# Patient Record
Sex: MALE | Race: OTHER | NOT HISPANIC OR LATINO | URBAN - METROPOLITAN AREA
[De-identification: names, ages, dates, MRNs, and addresses within clinical notes are randomized per-mention and may not be internally consistent; named-entity substitution may affect disease eponyms.]

---

## 2017-09-11 ENCOUNTER — INPATIENT (INPATIENT)
Facility: HOSPITAL | Age: 77
LOS: 2 days | Discharge: HOME | End: 2017-09-14
Attending: INTERNAL MEDICINE | Admitting: INTERNAL MEDICINE
Payer: MEDICARE

## 2017-09-11 VITALS
OXYGEN SATURATION: 100 % | TEMPERATURE: 98 F | SYSTOLIC BLOOD PRESSURE: 115 MMHG | HEART RATE: 54 BPM | DIASTOLIC BLOOD PRESSURE: 80 MMHG | RESPIRATION RATE: 16 BRPM

## 2017-09-11 DIAGNOSIS — K92.1 MELENA: ICD-10-CM

## 2017-09-11 DIAGNOSIS — K92.2 GASTROINTESTINAL HEMORRHAGE, UNSPECIFIED: ICD-10-CM

## 2017-09-11 DIAGNOSIS — N17.9 ACUTE KIDNEY FAILURE, UNSPECIFIED: ICD-10-CM

## 2017-09-11 DIAGNOSIS — E78.5 HYPERLIPIDEMIA, UNSPECIFIED: ICD-10-CM

## 2017-09-11 DIAGNOSIS — I10 ESSENTIAL (PRIMARY) HYPERTENSION: ICD-10-CM

## 2017-09-11 DIAGNOSIS — I48.91 UNSPECIFIED ATRIAL FIBRILLATION: ICD-10-CM

## 2017-09-11 DIAGNOSIS — Z29.9 ENCOUNTER FOR PROPHYLACTIC MEASURES, UNSPECIFIED: ICD-10-CM

## 2017-09-11 DIAGNOSIS — M10.9 GOUT, UNSPECIFIED: ICD-10-CM

## 2017-09-11 DIAGNOSIS — R55 SYNCOPE AND COLLAPSE: ICD-10-CM

## 2017-09-11 LAB
ALBUMIN SERPL ELPH-MCNC: 3.6 G/DL — SIGNIFICANT CHANGE UP (ref 3.3–5)
ALP SERPL-CCNC: 57 U/L — SIGNIFICANT CHANGE UP (ref 40–120)
ALT FLD-CCNC: 13 U/L — SIGNIFICANT CHANGE UP (ref 4–41)
ANISOCYTOSIS BLD QL: SLIGHT — SIGNIFICANT CHANGE UP
APTT BLD: 33.5 SEC — SIGNIFICANT CHANGE UP (ref 27.5–37.4)
AST SERPL-CCNC: 17 U/L — SIGNIFICANT CHANGE UP (ref 4–40)
BASE EXCESS BLDV CALC-SCNC: 1.8 MMOL/L — SIGNIFICANT CHANGE UP
BASOPHILS # BLD AUTO: 0.05 K/UL — SIGNIFICANT CHANGE UP (ref 0–0.2)
BASOPHILS NFR BLD AUTO: 0.5 % — SIGNIFICANT CHANGE UP (ref 0–2)
BASOPHILS NFR SPEC: 0 % — SIGNIFICANT CHANGE UP (ref 0–2)
BILIRUB SERPL-MCNC: 0.2 MG/DL — SIGNIFICANT CHANGE UP (ref 0.2–1.2)
BLD GP AB SCN SERPL QL: NEGATIVE — SIGNIFICANT CHANGE UP
BLOOD GAS VENOUS - CREATININE: 1.17 MG/DL — SIGNIFICANT CHANGE UP (ref 0.5–1.3)
BUN SERPL-MCNC: 57 MG/DL — HIGH (ref 7–23)
CALCIUM SERPL-MCNC: 8.9 MG/DL — SIGNIFICANT CHANGE UP (ref 8.4–10.5)
CHLORIDE BLDV-SCNC: 106 MMOL/L — SIGNIFICANT CHANGE UP (ref 96–108)
CHLORIDE SERPL-SCNC: 104 MMOL/L — SIGNIFICANT CHANGE UP (ref 98–107)
CK MB BLD-MCNC: 1 NG/ML — SIGNIFICANT CHANGE UP (ref 1–6.6)
CK SERPL-CCNC: 15 U/L — LOW (ref 30–200)
CK SERPL-CCNC: 21 U/L — LOW (ref 30–200)
CO2 SERPL-SCNC: 25 MMOL/L — SIGNIFICANT CHANGE UP (ref 22–31)
CREAT SERPL-MCNC: 1.17 MG/DL — SIGNIFICANT CHANGE UP (ref 0.5–1.3)
DACRYOCYTES BLD QL SMEAR: SLIGHT — SIGNIFICANT CHANGE UP
ELLIPTOCYTES BLD QL SMEAR: SLIGHT — SIGNIFICANT CHANGE UP
EOSINOPHIL # BLD AUTO: 0.1 K/UL — SIGNIFICANT CHANGE UP (ref 0–0.5)
EOSINOPHIL NFR BLD AUTO: 0.9 % — SIGNIFICANT CHANGE UP (ref 0–6)
EOSINOPHIL NFR FLD: 0 % — SIGNIFICANT CHANGE UP (ref 0–6)
FERRITIN SERPL-MCNC: 180.3 NG/ML — SIGNIFICANT CHANGE UP (ref 30–400)
GAS PNL BLDV: 139 MMOL/L — SIGNIFICANT CHANGE UP (ref 136–146)
GIANT PLATELETS BLD QL SMEAR: PRESENT — SIGNIFICANT CHANGE UP
GLUCOSE BLDV-MCNC: 156 — HIGH (ref 70–99)
GLUCOSE SERPL-MCNC: 151 MG/DL — HIGH (ref 70–99)
HCO3 BLDV-SCNC: 24 MMOL/L — SIGNIFICANT CHANGE UP (ref 20–27)
HCT VFR BLD CALC: 31.2 % — LOW (ref 39–50)
HCT VFR BLD CALC: 38.6 % — LOW (ref 39–50)
HCT VFR BLDV CALC: 38.2 % — LOW (ref 39–51)
HGB BLD-MCNC: 11.8 G/DL — LOW (ref 13–17)
HGB BLD-MCNC: 9.9 G/DL — LOW (ref 13–17)
HGB BLDV-MCNC: 12.4 G/DL — LOW (ref 13–17)
HYPOCHROMIA BLD QL: SLIGHT — SIGNIFICANT CHANGE UP
IMM GRANULOCYTES # BLD AUTO: 0.04 # — SIGNIFICANT CHANGE UP
IMM GRANULOCYTES NFR BLD AUTO: 0.4 % — SIGNIFICANT CHANGE UP (ref 0–1.5)
INR BLD: 1.5 — HIGH (ref 0.88–1.17)
IRON SATN MFR SERPL: 107 UG/DL — SIGNIFICANT CHANGE UP (ref 45–165)
IRON SATN MFR SERPL: 217 UG/DL — SIGNIFICANT CHANGE UP (ref 155–535)
LACTATE BLDV-MCNC: 2.1 MMOL/L — HIGH (ref 0.5–2)
LDH SERPL L TO P-CCNC: 105 U/L — LOW (ref 135–225)
LYMPHOCYTES # BLD AUTO: 2.79 K/UL — SIGNIFICANT CHANGE UP (ref 1–3.3)
LYMPHOCYTES # BLD AUTO: 25.8 % — SIGNIFICANT CHANGE UP (ref 13–44)
LYMPHOCYTES NFR SPEC AUTO: 25.9 % — SIGNIFICANT CHANGE UP (ref 13–44)
MCHC RBC-ENTMCNC: 22.5 PG — LOW (ref 27–34)
MCHC RBC-ENTMCNC: 23.6 PG — LOW (ref 27–34)
MCHC RBC-ENTMCNC: 30.6 % — LOW (ref 32–36)
MCHC RBC-ENTMCNC: 31.7 % — LOW (ref 32–36)
MCV RBC AUTO: 73.5 FL — LOW (ref 80–100)
MCV RBC AUTO: 74.5 FL — LOW (ref 80–100)
MICROCYTES BLD QL: SLIGHT — SIGNIFICANT CHANGE UP
MONOCYTES # BLD AUTO: 0.67 K/UL — SIGNIFICANT CHANGE UP (ref 0–0.9)
MONOCYTES NFR BLD AUTO: 6.2 % — SIGNIFICANT CHANGE UP (ref 2–14)
MONOCYTES NFR BLD: 6.5 % — SIGNIFICANT CHANGE UP (ref 2–9)
NEUTROPHIL AB SER-ACNC: 64.8 % — SIGNIFICANT CHANGE UP (ref 43–77)
NEUTROPHILS # BLD AUTO: 7.17 K/UL — SIGNIFICANT CHANGE UP (ref 1.8–7.4)
NEUTROPHILS NFR BLD AUTO: 66.2 % — SIGNIFICANT CHANGE UP (ref 43–77)
NEUTS BAND # BLD: 0.9 % — SIGNIFICANT CHANGE UP (ref 0–6)
NRBC # FLD: 0 — SIGNIFICANT CHANGE UP
NRBC # FLD: 0 — SIGNIFICANT CHANGE UP
OB PNL STL: POSITIVE — SIGNIFICANT CHANGE UP
PCO2 BLDV: 51 MMHG — SIGNIFICANT CHANGE UP (ref 41–51)
PH BLDV: 7.34 PH — SIGNIFICANT CHANGE UP (ref 7.32–7.43)
PLATELET # BLD AUTO: 187 K/UL — SIGNIFICANT CHANGE UP (ref 150–400)
PLATELET # BLD AUTO: 218 K/UL — SIGNIFICANT CHANGE UP (ref 150–400)
PLATELET COUNT - ESTIMATE: NORMAL — SIGNIFICANT CHANGE UP
PMV BLD: 11.6 FL — SIGNIFICANT CHANGE UP (ref 7–13)
PMV BLD: 11.9 FL — SIGNIFICANT CHANGE UP (ref 7–13)
PO2 BLDV: < 24 MMHG — LOW (ref 35–40)
POIKILOCYTOSIS BLD QL AUTO: SLIGHT — SIGNIFICANT CHANGE UP
POTASSIUM BLDV-SCNC: 5.3 MMOL/L — HIGH (ref 3.4–4.5)
POTASSIUM SERPL-MCNC: 5.2 MMOL/L — SIGNIFICANT CHANGE UP (ref 3.5–5.3)
POTASSIUM SERPL-SCNC: 5.2 MMOL/L — SIGNIFICANT CHANGE UP (ref 3.5–5.3)
PROT SERPL-MCNC: 7.7 G/DL — SIGNIFICANT CHANGE UP (ref 6–8.3)
PROTHROM AB SERPL-ACNC: 16.9 SEC — HIGH (ref 9.8–13.1)
RBC # BLD: 4.19 M/UL — LOW (ref 4.2–5.8)
RBC # BLD: 5.25 M/UL — SIGNIFICANT CHANGE UP (ref 4.2–5.8)
RBC # FLD: 15 % — HIGH (ref 10.3–14.5)
RBC # FLD: 15.2 % — HIGH (ref 10.3–14.5)
RETICS #: 0.1 10X6/UL — HIGH (ref 0.02–0.07)
RETICS/RBC NFR: 1.4 % — SIGNIFICANT CHANGE UP (ref 0.5–2.5)
REVIEW TO FOLLOW: YES — SIGNIFICANT CHANGE UP
RH IG SCN BLD-IMP: POSITIVE — SIGNIFICANT CHANGE UP
SAO2 % BLDV: 17.2 % — LOW (ref 60–85)
SODIUM SERPL-SCNC: 142 MMOL/L — SIGNIFICANT CHANGE UP (ref 135–145)
TARGETS BLD QL SMEAR: SLIGHT — SIGNIFICANT CHANGE UP
TROPONIN T SERPL-MCNC: < 0.06 NG/ML — SIGNIFICANT CHANGE UP (ref 0–0.06)
TROPONIN T SERPL-MCNC: < 0.06 NG/ML — SIGNIFICANT CHANGE UP (ref 0–0.06)
UIBC SERPL-MCNC: 110 UG/DL — SIGNIFICANT CHANGE UP (ref 110–370)
VARIANT LYMPHS # BLD: 1.9 % — SIGNIFICANT CHANGE UP
WBC # BLD: 10.82 K/UL — HIGH (ref 3.8–10.5)
WBC # BLD: 9.84 K/UL — SIGNIFICANT CHANGE UP (ref 3.8–10.5)
WBC # FLD AUTO: 10.82 K/UL — HIGH (ref 3.8–10.5)
WBC # FLD AUTO: 9.84 K/UL — SIGNIFICANT CHANGE UP (ref 3.8–10.5)

## 2017-09-11 PROCEDURE — 99223 1ST HOSP IP/OBS HIGH 75: CPT

## 2017-09-11 RX ORDER — METOPROLOL TARTRATE 50 MG
5 TABLET ORAL ONCE
Qty: 0 | Refills: 0 | Status: COMPLETED | OUTPATIENT
Start: 2017-09-11 | End: 2017-09-11

## 2017-09-11 RX ORDER — ALLOPURINOL 300 MG
100 TABLET ORAL DAILY
Qty: 0 | Refills: 0 | Status: DISCONTINUED | OUTPATIENT
Start: 2017-09-11 | End: 2017-09-14

## 2017-09-11 RX ORDER — DILTIAZEM HCL 120 MG
120 CAPSULE, EXT RELEASE 24 HR ORAL DAILY
Qty: 0 | Refills: 0 | Status: DISCONTINUED | OUTPATIENT
Start: 2017-09-11 | End: 2017-09-12

## 2017-09-11 RX ORDER — METOPROLOL TARTRATE 50 MG
50 TABLET ORAL
Qty: 0 | Refills: 0 | Status: DISCONTINUED | OUTPATIENT
Start: 2017-09-11 | End: 2017-09-14

## 2017-09-11 RX ORDER — LISINOPRIL 2.5 MG/1
5 TABLET ORAL DAILY
Qty: 0 | Refills: 0 | Status: DISCONTINUED | OUTPATIENT
Start: 2017-09-11 | End: 2017-09-12

## 2017-09-11 RX ORDER — SODIUM CHLORIDE 9 MG/ML
1000 INJECTION INTRAMUSCULAR; INTRAVENOUS; SUBCUTANEOUS
Qty: 0 | Refills: 0 | Status: DISCONTINUED | OUTPATIENT
Start: 2017-09-11 | End: 2017-09-14

## 2017-09-11 RX ORDER — PANTOPRAZOLE SODIUM 20 MG/1
40 TABLET, DELAYED RELEASE ORAL
Qty: 0 | Refills: 0 | Status: DISCONTINUED | OUTPATIENT
Start: 2017-09-11 | End: 2017-09-12

## 2017-09-11 RX ORDER — SODIUM CHLORIDE 9 MG/ML
1000 INJECTION INTRAMUSCULAR; INTRAVENOUS; SUBCUTANEOUS ONCE
Qty: 0 | Refills: 0 | Status: COMPLETED | OUTPATIENT
Start: 2017-09-11 | End: 2017-09-11

## 2017-09-11 RX ADMIN — Medication 50 MILLIGRAM(S): at 18:05

## 2017-09-11 RX ADMIN — PANTOPRAZOLE SODIUM 40 MILLIGRAM(S): 20 TABLET, DELAYED RELEASE ORAL at 17:46

## 2017-09-11 RX ADMIN — SODIUM CHLORIDE 1000 MILLILITER(S): 9 INJECTION INTRAMUSCULAR; INTRAVENOUS; SUBCUTANEOUS at 10:57

## 2017-09-11 RX ADMIN — SODIUM CHLORIDE 75 MILLILITER(S): 9 INJECTION INTRAMUSCULAR; INTRAVENOUS; SUBCUTANEOUS at 17:03

## 2017-09-11 RX ADMIN — Medication 5 MILLIGRAM(S): at 17:03

## 2017-09-11 NOTE — H&P ADULT - PROBLEM SELECTOR PLAN 1
Admit to telemetry, serial EKGs, serial cardiac enzymes, orthostatics  Check CBC, CMP, TSH, FLP, HgA1C, UA  Fall precautions, ambulate with assistance  Likely multifactorial in setting of rapid atrial fibrillation, GI bleed, CJ  Continue NS at 75 cc/hr for hydration  Echocardiogram ordered  PT eval ordered for disposition  F/U MD note

## 2017-09-11 NOTE — ED ADULT TRIAGE NOTE - CHIEF COMPLAINT QUOTE
Pt with black, tarry stools since yesterday. Pt with 2 near syncopal episodes today. Pt on Xarelto for Afib. Currently in AFib.

## 2017-09-11 NOTE — H&P ADULT - NEGATIVE NEUROLOGICAL SYMPTOMS
no focal seizures/no tremors/no paresthesias/no generalized seizures/no facial palsy/no difficulty walking/no headache/no hemiparesis/no loss of sensation/no transient paralysis/no confusion

## 2017-09-11 NOTE — ED PROVIDER NOTE - ATTENDING CONTRIBUTION TO CARE
ED Attending Dr. Vicente: 78 yo male with afib on Xarelto (recently switched off of warfarin) in ED with generalized weakness and SOB, associated with multiple episodes of dark stool.  Wife states pt had syncopal episode this morning.  Pt denies CP, N/V or abdominal pain.  On exam pt overall well appearing and in NAD, heart irregular/tachy, lungs CTAB, abd NTND, extremities without swelling, strength 5/5 in all extremities and skin without rash.  I chaperoned rectal exam showing very dark brown liquid stool in vault.

## 2017-09-11 NOTE — H&P ADULT - NEGATIVE OPHTHALMOLOGIC SYMPTOMS
no photophobia/no pain R/no diplopia/no blurred vision R/no pain L/no blurred vision L/no loss of vision L/no loss of vision R

## 2017-09-11 NOTE — H&P ADULT - NSHPLABSRESULTS_GEN_ALL_CORE
EKG: Atrial fibrillation at 125 bpm with RVR  CE x1: Negative  WBC: 10.82  H/H: 11.8/38.6  Occult: Positive  BUN/Cr: 57/1.17  Glucose: 151

## 2017-09-11 NOTE — ED ADULT NURSE REASSESSMENT NOTE - NS ED NURSE REASSESS COMMENT FT1
Pt remains A&Ox3, denies dizziness, lightheadedness, weakness, palpitations, n/v/d at this time. Pt states he has had one episode of melana since being in the ED. Awaiting disposition. Pt appears comfortably in stretcher, respirations even and unlabored, nad noted at this time. Safety and comfort maintained.

## 2017-09-11 NOTE — H&P ADULT - ATTENDING COMMENTS
76 yo M w/ hx afib on xarelto ( last dose yesterday) p/w weakness. Pt was eating breakfast this AM when he wasn't feeling good subsequently had a syncopal episode. He states he did not hit the floor was lowered by his son. +dark stools. EKG- afib with RVR in 120 given metorpolol 5 mg IV BUN/Cr 57/1.17 H/H 11.8/38.6-->9.9/31.2 MCV-73. s/p 1l NS bolus. Denies ASA use/NSAID use/steroid no prior EGD/wt loss/hematemesis/BRBPR.  Acute blood loss anemia-likely UGIB consistent with elevated BUN and microcytic anemia                                       -CBC q6 transfuse as need keep H/H above 8/24                                      -PPI BID                                      -GI consult- shiekh                                      -check Fe studies                                        Afib- cont IVF       -hold Xarelto         -rate control with BB/cardizem          -check TTE

## 2017-09-11 NOTE — H&P ADULT - RS GEN PE MLT RESP DETAILS PC
no chest wall tenderness/clear to auscultation bilaterally/good air movement/respirations non-labored/airway patent/no wheezes/breath sounds equal/no rhonchi/no intercostal retractions/no rales

## 2017-09-11 NOTE — H&P ADULT - NSHPSOCIALHISTORY_GEN_ALL_CORE
. Lives with wife. Retired. Denies smoking/drinking. Received flu vaccine in 2016. Received PNA vaccine within last 5 years.

## 2017-09-11 NOTE — H&P ADULT - PROBLEM SELECTOR PLAN 4
BUN/Cr 57/1.17 with signs of dehydration (poor skin turgor, delayed capillary refill, dry mucosa)  Start NS at 75 cc/hr  Monitor renal function and lytes

## 2017-09-11 NOTE — H&P ADULT - HISTORY OF PRESENT ILLNESS
76 y/o [facility] male with a PMHx of atrial fibrillation (recently discontinued Coumadin on 8/29 and started Xarelto on 9/1), HTN and HLD presents to ED S/P syncopal episode this morning. Pt reports that he has been on Coumadin for years but his cardiologist just changed him to Xarelto on 9/1 because it was "safer." For the past three days, pt has been experiencing generalized weakness and fatigue, in addition to episodes of dizziness and dyspnea made worse with movement and exertion. Pt cannot identify any palliating factors, but notices that his symptoms are made worse with movement. Pt has also been having episodes of melena since Friday. Pt has had multiple episodes since 78 y/o [facility] male with a PMHx of atrial fibrillation (recently discontinued Coumadin on 8/29 and started Xarelto on 9/1), HTN and HLD presents to ED S/P syncopal episode this morning. Pt reports that he has been on Coumadin for years but his cardiologist just changed him to Xarelto on 9/1 because it was "safer." For the past three days, pt has been experiencing generalized weakness and fatigue, in addition to episodes of dizziness and dyspnea made worse with movement and exertion. Pt cannot identify any palliating factors, but notices that his symptoms are made worse with movement. Pt has also been having multiple episodes of melena since Friday. Pt reports that his stool is formed and has not seen any blood; it just appears darker than normal. This morning after patient woke up and prepared for breakfast, he elicited to his son and wife that he was not feeling well. Shortly after, pt had a syncopal episode and fell to the ground. Pt apparently was caught by his son and lowered to the ground with no head or bodily trauma. Pt lost consciousness for a couple of minutes but does not really remember anything that happened after stating that he did not feel well. Family called EMS was patient was picked up and brought to [facility]. Pt reports that he started to feel better after being placed on oxygen on route to hospital. Pt does admit to decreased appetite. Pt denies fever, chills, recent travel, headache, visual deficits, chest pain, orthopnea, palpitations, abdominal pain, N/V/D/C, hematochezia, dysuria, hematuria, seizures, convulsions, paresthesias, hemiparesis, aura, tongue lacerations, facial droop. Upon arrival to ED, EKG: Atrial fibrillation at 125 bpm with RVR. CE x1: Negative. WBC: 10.82. H/H: 11.8/38.6. Occult: Positive. BUN/Cr: 57/1.17. Glucose: 151. Pt was admitted to telemetry.

## 2017-09-11 NOTE — ED PROVIDER NOTE - OBJECTIVE STATEMENT
77 y.o male pmhx of Afib on xarelto ( was switched from coumadin 9/1 by Dr. Stevens) presents with weakness, SOB upon sitting up and 3 episodes of dark stool since 2 am. Wife states had syncopal episode earlier this morning. Denies fevers, chills, chest pain, cough, n/v/d, numbness, tingling, weakness.

## 2017-09-11 NOTE — H&P ADULT - NEGATIVE CARDIOVASCULAR SYMPTOMS
no chest pain/no peripheral edema/no orthopnea/no palpitations/no paroxysmal nocturnal dyspnea/no claudication

## 2017-09-11 NOTE — H&P ADULT - PROBLEM SELECTOR PLAN 3
Monitor on telemetry  Metoprolol 5mg IVP x 1 given for rapid atrial fibrillation with good response  HR currently in 90-100s  Continue Metoprolol and Cardizem for rate control (home medications)  Xarelto held for GI bleed  CHADS score 2  Will determine need for A/C pending GI workup

## 2017-09-11 NOTE — H&P ADULT - GASTROINTESTINAL DETAILS
no distention/soft/no guarding/no masses palpable/bowel sounds normal/no rebound tenderness/nontender

## 2017-09-11 NOTE — H&P ADULT - ASSESSMENT
76 y/o male with a PMHx of atrial fibrillation on Xarelto (recently switched from Coumadin), HTN, HLD and gout presents to ED with dizziness and weakness, follow by an episode of syncope likely in the setting of GI bleed, complicated by rapid atrial fibrillation.

## 2017-09-11 NOTE — ED PROVIDER NOTE - PROGRESS NOTE DETAILS
jen gregory: spoke to dr. stroud- pt on xarelto with normal recent stress test, EF 55% . pt to be admitted discussed case with pt, agrees with plan.

## 2017-09-11 NOTE — H&P ADULT - NEGATIVE GASTROINTESTINAL SYMPTOMS
no flatulence/no abdominal pain/no hematochezia/no nausea/no steatorrhea/no jaundice/no vomiting/no constipation/no change in bowel habits/no diarrhea

## 2017-09-11 NOTE — H&P ADULT - NEUROLOGICAL DETAILS
responds to pain/sensation intact/cranial nerves intact/normal strength/responds to verbal commands/alert and oriented x 3

## 2017-09-11 NOTE — ED ADULT NURSE NOTE - OBJECTIVE STATEMENT
Presents with c/o rectal bleeding, denies any abdominal pain at this time. Pt endorses being on Xarelto since Sept 1, 2017.  Pt is AOX4, skin warm, dry and intact. Pt maintained NPO, on cardiac monitor with known AFib.  IV access obtained, labs drawn and sent.  IVF in progress. Continue to monitor. SR up x 2.

## 2017-09-12 DIAGNOSIS — K92.1 MELENA: ICD-10-CM

## 2017-09-12 LAB
BLD GP AB SCN SERPL QL: NEGATIVE — SIGNIFICANT CHANGE UP
BUN SERPL-MCNC: 40 MG/DL — HIGH (ref 7–23)
CALCIUM SERPL-MCNC: 8.3 MG/DL — LOW (ref 8.4–10.5)
CHLORIDE SERPL-SCNC: 113 MMOL/L — HIGH (ref 98–107)
CHOLEST SERPL-MCNC: 92 MG/DL — LOW (ref 120–199)
CO2 SERPL-SCNC: 24 MMOL/L — SIGNIFICANT CHANGE UP (ref 22–31)
CREAT SERPL-MCNC: 0.96 MG/DL — SIGNIFICANT CHANGE UP (ref 0.5–1.3)
FOLATE SERPL-MCNC: 10.5 NG/ML — SIGNIFICANT CHANGE UP (ref 4.7–20)
GLUCOSE SERPL-MCNC: 125 MG/DL — HIGH (ref 70–99)
HBA1C BLD-MCNC: 7 % — HIGH (ref 4–5.6)
HCT VFR BLD CALC: 28.7 % — LOW (ref 39–50)
HCT VFR BLD CALC: 29.9 % — LOW (ref 39–50)
HCT VFR BLD CALC: 30.1 % — LOW (ref 39–50)
HCT VFR BLD CALC: 30.3 % — LOW (ref 39–50)
HDLC SERPL-MCNC: 22 MG/DL — LOW (ref 35–55)
HGB BLD-MCNC: 8.8 G/DL — LOW (ref 13–17)
HGB BLD-MCNC: 9.3 G/DL — LOW (ref 13–17)
HGB BLD-MCNC: 9.5 G/DL — LOW (ref 13–17)
HGB BLD-MCNC: 9.6 G/DL — LOW (ref 13–17)
INR BLD: 1.22 — HIGH (ref 0.88–1.17)
LIPID PNL WITH DIRECT LDL SERPL: 52 MG/DL — SIGNIFICANT CHANGE UP
MAGNESIUM SERPL-MCNC: 1.9 MG/DL — SIGNIFICANT CHANGE UP (ref 1.6–2.6)
MCHC RBC-ENTMCNC: 22.7 PG — LOW (ref 27–34)
MCHC RBC-ENTMCNC: 23.4 PG — LOW (ref 27–34)
MCHC RBC-ENTMCNC: 23.4 PG — LOW (ref 27–34)
MCHC RBC-ENTMCNC: 24 PG — LOW (ref 27–34)
MCHC RBC-ENTMCNC: 30.7 % — LOW (ref 32–36)
MCHC RBC-ENTMCNC: 31.1 % — LOW (ref 32–36)
MCHC RBC-ENTMCNC: 31.6 % — LOW (ref 32–36)
MCHC RBC-ENTMCNC: 31.7 % — LOW (ref 32–36)
MCV RBC AUTO: 73.9 FL — LOW (ref 80–100)
MCV RBC AUTO: 74 FL — LOW (ref 80–100)
MCV RBC AUTO: 75.3 FL — LOW (ref 80–100)
MCV RBC AUTO: 76 FL — LOW (ref 80–100)
NRBC # FLD: 0 — SIGNIFICANT CHANGE UP
PLATELET # BLD AUTO: 167 K/UL — SIGNIFICANT CHANGE UP (ref 150–400)
PLATELET # BLD AUTO: 177 K/UL — SIGNIFICANT CHANGE UP (ref 150–400)
PLATELET # BLD AUTO: 178 K/UL — SIGNIFICANT CHANGE UP (ref 150–400)
PLATELET # BLD AUTO: 184 K/UL — SIGNIFICANT CHANGE UP (ref 150–400)
PMV BLD: 11.4 FL — SIGNIFICANT CHANGE UP (ref 7–13)
PMV BLD: 11.4 FL — SIGNIFICANT CHANGE UP (ref 7–13)
PMV BLD: 11.9 FL — SIGNIFICANT CHANGE UP (ref 7–13)
PMV BLD: 12 FL — SIGNIFICANT CHANGE UP (ref 7–13)
POTASSIUM SERPL-MCNC: 4.6 MMOL/L — SIGNIFICANT CHANGE UP (ref 3.5–5.3)
POTASSIUM SERPL-SCNC: 4.6 MMOL/L — SIGNIFICANT CHANGE UP (ref 3.5–5.3)
PROTHROM AB SERPL-ACNC: 13.7 SEC — HIGH (ref 9.8–13.1)
RBC # BLD: 3.88 M/UL — LOW (ref 4.2–5.8)
RBC # BLD: 3.96 M/UL — LOW (ref 4.2–5.8)
RBC # BLD: 3.97 M/UL — LOW (ref 4.2–5.8)
RBC # BLD: 4.1 M/UL — LOW (ref 4.2–5.8)
RBC # FLD: 15.2 % — HIGH (ref 10.3–14.5)
RBC # FLD: 15.5 % — HIGH (ref 10.3–14.5)
RBC # FLD: 15.6 % — HIGH (ref 10.3–14.5)
RBC # FLD: 16.9 % — HIGH (ref 10.3–14.5)
RH IG SCN BLD-IMP: POSITIVE — SIGNIFICANT CHANGE UP
SODIUM SERPL-SCNC: 147 MMOL/L — HIGH (ref 135–145)
TRIGL SERPL-MCNC: 144 MG/DL — SIGNIFICANT CHANGE UP (ref 10–149)
TSH SERPL-MCNC: 1.21 UIU/ML — SIGNIFICANT CHANGE UP (ref 0.27–4.2)
VIT B12 SERPL-MCNC: 372 PG/ML — SIGNIFICANT CHANGE UP (ref 200–900)
WBC # BLD: 10.15 K/UL — SIGNIFICANT CHANGE UP (ref 3.8–10.5)
WBC # BLD: 10.28 K/UL — SIGNIFICANT CHANGE UP (ref 3.8–10.5)
WBC # BLD: 12.35 K/UL — HIGH (ref 3.8–10.5)
WBC # BLD: 9.44 K/UL — SIGNIFICANT CHANGE UP (ref 3.8–10.5)
WBC # FLD AUTO: 10.15 K/UL — SIGNIFICANT CHANGE UP (ref 3.8–10.5)
WBC # FLD AUTO: 10.28 K/UL — SIGNIFICANT CHANGE UP (ref 3.8–10.5)
WBC # FLD AUTO: 12.35 K/UL — HIGH (ref 3.8–10.5)
WBC # FLD AUTO: 9.44 K/UL — SIGNIFICANT CHANGE UP (ref 3.8–10.5)

## 2017-09-12 RX ORDER — PANTOPRAZOLE SODIUM 20 MG/1
8 TABLET, DELAYED RELEASE ORAL
Qty: 80 | Refills: 0 | Status: DISCONTINUED | OUTPATIENT
Start: 2017-09-12 | End: 2017-09-13

## 2017-09-12 RX ORDER — SUCRALFATE 1 G
1 TABLET ORAL
Qty: 0 | Refills: 0 | Status: DISCONTINUED | OUTPATIENT
Start: 2017-09-12 | End: 2017-09-14

## 2017-09-12 RX ORDER — PREGABALIN 225 MG/1
1000 CAPSULE ORAL DAILY
Qty: 0 | Refills: 0 | Status: COMPLETED | OUTPATIENT
Start: 2017-09-12 | End: 2017-09-13

## 2017-09-12 RX ADMIN — SODIUM CHLORIDE 75 MILLILITER(S): 9 INJECTION INTRAMUSCULAR; INTRAVENOUS; SUBCUTANEOUS at 04:38

## 2017-09-12 RX ADMIN — PREGABALIN 1000 MICROGRAM(S): 225 CAPSULE ORAL at 13:36

## 2017-09-12 RX ADMIN — PANTOPRAZOLE SODIUM 40 MILLIGRAM(S): 20 TABLET, DELAYED RELEASE ORAL at 04:32

## 2017-09-12 RX ADMIN — PANTOPRAZOLE SODIUM 10 MG/HR: 20 TABLET, DELAYED RELEASE ORAL at 18:49

## 2017-09-12 RX ADMIN — Medication 50 MILLIGRAM(S): at 18:29

## 2017-09-12 RX ADMIN — Medication 1 GRAM(S): at 18:29

## 2017-09-12 RX ADMIN — SODIUM CHLORIDE 75 MILLILITER(S): 9 INJECTION INTRAMUSCULAR; INTRAVENOUS; SUBCUTANEOUS at 18:49

## 2017-09-12 RX ADMIN — Medication 120 MILLIGRAM(S): at 11:16

## 2017-09-12 RX ADMIN — Medication 100 MILLIGRAM(S): at 13:12

## 2017-09-12 NOTE — PROGRESS NOTE ADULT - PROBLEM SELECTOR PLAN 5
discontinue unnecessary BP meds  adin since BP running on the low side  leave metoprolol on for HR control

## 2017-09-12 NOTE — CONSULT NOTE ADULT - SUBJECTIVE AND OBJECTIVE BOX
Chief Complaint:  Patient is a 77y old  Male who presents with a chief complaint of Weakness, melena (11 Sep 2017 17:42)    Gout  HLD (hyperlipidemia)  HTN (hypertension)  AF (atrial fibrillation)  No pertinent past medical history  No significant past surgical history     HPI:  78 y/o [facility] male with a PMHx of atrial fibrillation (recently discontinued Coumadin on 8/29 and started Xarelto on 9/1), HTN and HLD presents to ED S/P syncopal episode this morning and having episodes of melena. Pt reports that he has been on Coumadin for years but his cardiologist just changed him to Xarelto on 9/1 because it was "safer." For the past three days, pt has been experiencing generalized weakness and fatigue, in addition to episodes of dizziness and dyspnea with multiple episodes of melena since Friday. While making breakfast, he elicited to his son and wife that he was not feeling well and shortly after, pt had a syncopal episode and fell to the ground. Pt denies ever having EGD or Colonoscopy in the past. Pt does admit to decreased appetite. Pt denies fever, chills, recent travel, headache, visual deficits, chest pain, orthopnea, palpitations, abdominal pain, N/V/D/C, hematochezia, dysuria, hematuria, seizures, convulsions, paresthesias, hemiparesis, aura, tongue lacerations, facial droop. Upon arrival to ED, EKG: Atrial fibrillation at 125 bpm with RVR. CE x1: Negative. WBC: 10.82. H/H: 11.8/38.6. Occult: Positive. BUN/Cr: 57/1.17. Glucose: 151. Pt was admitted to telemetry and being transfused PRBC.      No Known Allergies      sodium chloride 0.9%. 1000 milliLiter(s) IV Continuous <Continuous>  metoprolol 50 milliGRAM(s) Oral two times a day  allopurinol 100 milliGRAM(s) Oral daily  cyanocobalamin Injectable 1000 MICROGram(s) IntraMuscular daily  pantoprazole Infusion 8 mG/Hr IV Continuous <Continuous>  sucralfate 1 Gram(s) Oral four times a day        FAMILY HISTORY:  No pertinent family history in first degree relatives        Review of Systems:    General:  No wt loss, fevers, chills, night sweats, fatigue  Eyes:  Good vision, no reported pain  ENT:  No sore throat, pain, runny nose, dysphagia  CV:  No pain, palpitations, no lightheadedness  Resp:  No dyspnea, cough, tachypnea, wheezing  GI: +melena; denies n/v/d/c, abdominal pain or brbpr   :  No pain, bleeding, incontinence, nocturia  Muscle:  No pain, weakness  Neuro:  No weakness, tingling, memory problems  Psych:  No fatigue, insomnia, mood problems, depression  Endocrine:  No polyuria, polydypsia, cold/heat intolerance  Heme:  No petechiae, ecchymosis, easy bruisability  Skin:  No rash, tattoos, scars, edema    Relevant Family History:   n/c    Relevant Social History: n/c      Physical Exam:    Vital Signs:  Vital Signs Last 24 Hrs  T(C): 36.6 (12 Sep 2017 10:54), Max: 36.7 (11 Sep 2017 22:53)  T(F): 97.8 (12 Sep 2017 10:54), Max: 98 (11 Sep 2017 22:53)  HR: 66 (12 Sep 2017 10:54) (66 - 120)  BP: 137/97 (12 Sep 2017 10:54) (97/62 - 139/83)  BP(mean): --  RR: 17 (12 Sep 2017 10:54) (15 - 17)  SpO2: 100% (12 Sep 2017 10:54) (97% - 100%)  Daily Height in cm: 166.37 (11 Sep 2017 17:42)    Daily     General:  Appears stated age, well-groomed, nad  HEENT:  NC/AT,  conjunctivae clear and pink, no thyromegaly, nodules, adenopathy, no JVD  Chest:  Full & symmetric excursion, no increased effort, breath sounds clear  Cardiovascular:  Regular rhythm, S1, S2, no murmur/rub/S3/S4, no abdominal bruit, no edema  Abdomen:  Soft, non-tender, non-distended, normoactive bowel sounds,  no masses ,no hepatosplenomeagaly, no signs of chronic liver disease  Extremities:  no cyanosis,clubbing or edema  Skin:  No rash/erythema/ecchymoses/petechiae/wounds/abscess/warm/dry  Neuro/Psych:  A&O  , no asterixis, no tremor, no encephalopathy    Laboratory:                            9.3    12.35 )-----------( 184      ( 12 Sep 2017 12:21 )             29.9     09-12    147<H>  |  113<H>  |  40<H>  ----------------------------<  125<H>  4.6   |  24  |  0.96    Ca    8.3<L>      12 Sep 2017 05:15  Mg     1.9     09-12    TPro  7.7  /  Alb  3.6  /  TBili  0.2  /  DBili  x   /  AST  17  /  ALT  13  /  AlkPhos  57  09-11    LIVER FUNCTIONS - ( 11 Sep 2017 11:07 )  Alb: 3.6 g/dL / Pro: 7.7 g/dL / ALK PHOS: 57 u/L / ALT: 13 u/L / AST: 17 u/L / GGT: x           PT/INR - ( 12 Sep 2017 05:15 )   PT: 13.7 SEC;   INR: 1.22          PTT - ( 11 Sep 2017 10:57 )  PTT:33.5 SEC      Imaging:

## 2017-09-12 NOTE — CONSULT NOTE ADULT - SUBJECTIVE AND OBJECTIVE BOX
Cardiology/Vascular Medicine Inpatient Consultation Note      HISTORY OF PRESENT ILLNESS:  76 y/o [facility] male with a PMHx of atrial fibrillation (recently discontinued Coumadin on 8/29 and started Xarelto on 9/1), HTN and HLD presents to ED S/P syncopal episode this morning. Pt reports that he has been on Coumadin for years but his cardiologist just changed him to Xarelto on 9/1 because it was "safer." For the past three days, pt has been experiencing generalized weakness and fatigue, in addition to episodes of dizziness and dyspnea made worse with movement and exertion. Pt cannot identify any palliating factors, but notices that his symptoms are made worse with movement. Pt has also been having multiple episodes of melena since Friday. Pt reports that his stool is formed and has not seen any blood; it just appears darker than normal. This morning after patient woke up and prepared for breakfast, he elicited to his son and wife that he was not feeling well. Shortly after, pt had a syncopal episode and fell to the ground. Pt apparently was caught by his son and lowered to the ground with no head or bodily trauma. Pt lost consciousness for a couple of minutes but does not really remember anything that happened after stating that he did not feel well. Family called EMS was patient was picked up and brought to [facility]. Pt reports that he started to feel better after being placed on oxygen on route to hospital. Pt does admit to decreased appetite. Pt denies fever, chills, recent travel, headache, visual deficits, chest pain, orthopnea, palpitations, abdominal pain, N/V/D/C, hematochezia, dysuria, hematuria, seizures, convulsions, paresthesias, hemiparesis, aura, tongue lacerations, facial droop. Upon arrival to ED, EKG: Atrial fibrillation at 125 bpm with RVR. CE x1: Negative. WBC: 10.82. H/H: 11.8/38.6. Occult: Positive. BUN/Cr: 57/1.17. Glucose: 151. Pt was admitted to telemetry. (11 Sep 2017 17:42)      Allergies  No Known Allergies    MEDICATIONS:  metoprolol 50 milliGRAM(s) Oral two times a day    pantoprazole Infusion 8 mG/Hr IV Continuous <Continuous>  sucralfate 1 Gram(s) Oral four times a day    allopurinol 100 milliGRAM(s) Oral daily    sodium chloride 0.9%. 1000 milliLiter(s) IV Continuous <Continuous>  cyanocobalamin Injectable 1000 MICROGram(s) IntraMuscular daily      PAST MEDICAL & SURGICAL HISTORY:  Gout  HLD (hyperlipidemia)  HTN (hypertension)  AF (atrial fibrillation)  No significant past surgical history      FAMILY HISTORY:  No pertinent family history in first degree relatives      SOCIAL HISTORY:    NC    REVIEW OF SYSTEMS:  CONSTITUTIONAL: No fever, weight loss, or fatigue  EYES: No eye pain, visual disturbances, or discharge  ENMT:  No difficulty hearing, tinnitus, vertigo; No sinus or throat pain  NECK: No pain or stiffness  RESPIRATORY: No cough, wheezing, chills or hemoptysis; No Shortness of Breath  CARDIOVASCULAR: No chest pain, palpitations, passing out, dizziness, or leg swelling  GASTROINTESTINAL: No abdominal or epigastric pain. No nausea, vomiting, or hematemesis; No diarrhea or constipation. No melena or hematochezia.  GENITOURINARY: No dysuria, frequency, hematuria, or incontinence  NEUROLOGICAL: No headaches, memory loss, loss of strength, numbness, or tremors  SKIN: No itching, burning, rashes, or lesions   LYMPH Nodes: No enlarged glands  ENDOCRINE: No heat or cold intolerance; No hair loss  MUSCULOSKELETAL: No joint pain or swelling; No muscle, back, or extremity pain  PSYCHIATRIC: No depression, anxiety, mood swings, or difficulty sleeping  HEME/LYMPH: No easy bruising, or bleeding gums  ALLERGY AND IMMUNOLOGIC: No hives or eczema	    [ ] All others negative	  [ ] Unable to obtain    PHYSICAL EXAM:  T(C): 36.6 (09-12-17 @ 10:54), Max: 36.7 (09-11-17 @ 22:53)  HR: 66 (09-12-17 @ 10:54) (66 - 120)  BP: 137/97 (09-12-17 @ 10:54) (97/62 - 139/83)  RR: 17 (09-12-17 @ 10:54) (15 - 17)  SpO2: 100% (09-12-17 @ 10:54) (97% - 100%)  Wt(kg): --  I&O's Summary      Appearance: Normal	  HEENT:   Normal oral mucosa, PERRL, EOMI	  Lymphatic: No lymphadenopathy  Cardiovascular: Normal S1 S2, No JVD, No murmurs, No edema  Respiratory: Lungs clear to auscultation	  Psychiatry: A & O x 3, Mood & affect appropriate  Gastrointestinal:  Soft, Non-tender, + BS	  Skin: No rashes, No ecchymoses, No cyanosis	  Neurologic: Non-focal  Extremities: Normal range of motion, No clubbing, cyanosis or edema  Vascular: Peripheral pulses palpable 2+ bilaterally      LABS:	                           9.3    12.35 )-----------( 184      ( 12 Sep 2017 12:21 )             29.9     09-12    147<H>  |  113<H>  |  40<H>  ----------------------------<  125<H>  4.6   |  24  |  0.96  09-11    142  |  104  |  57<H>  ----------------------------<  151<H>  5.2   |  25  |  1.17    Ca    8.3<L>      12 Sep 2017 05:15  Ca    8.9      11 Sep 2017 11:07  Mg     1.9     09-12    TPro  7.7  /  Alb  3.6  /  TBili  0.2  /  DBili  x   /  AST  17  /  ALT  13  /  AlkPhos  57  09-11      proBNP:   Lipid Profile: Cholesterol, total92 mg/dL<L> [120 - 199]  Direct LDL52 mg/dL  HDL Cholesterol, serum22 mg/dL<L> [35 - 55]  Triglycerides, vjiyc296 mg/dL [10 - 149]    HgA1c: Hemoglobin A1C, Whole Blood: 7.0 % (09-12 @ 05:15)    TSH: Thyroid Stimulating Hormone, Serum: 1.21 uIU/mL (09-12 @ 05:15)

## 2017-09-12 NOTE — CONSULT NOTE ADULT - PROBLEM SELECTOR RECOMMENDATION 3
- likely from transient hypotension from GI bleed, however, cardiology eval in progress/appreciated  - TTE pending

## 2017-09-12 NOTE — CONSULT NOTE ADULT - ATTENDING COMMENTS
Patient seen and examined.  Agree with above.   -F/u GI  -Check TTE  -AC on hold for SHERWIN Miguel MD  [facility]  [address] [place] [address], [id number]  [place] [address]  office: [phone]  pager: [phone]

## 2017-09-12 NOTE — PROGRESS NOTE ADULT - SUBJECTIVE AND OBJECTIVE BOX
Patient is a 77y old  Male who presents with a chief complaint of Weakness, melena (11 Sep 2017 17:42)  patient not known to me, assigned this am at 930Am to my care  chart reviewed, events thus far noted      SUBJECTIVE / OVERNIGHT EVENTS: No nausea, vomiting or diarrhea, no fever or chills, no dizziness or chest pain, no dysuria or hematuria, no joint pain or swelling  no BM this AM. LAst BM yesterday dark/black  MEDICATIONS  (STANDING):  pantoprazole  Injectable 40 milliGRAM(s) IV Push two times a day  sodium chloride 0.9%. 1000 milliLiter(s) (75 mL/Hr) IV Continuous <Continuous>  lisinopril 5 milliGRAM(s) Oral daily  diltiazem    milliGRAM(s) Oral daily  metoprolol 50 milliGRAM(s) Oral two times a day  allopurinol 100 milliGRAM(s) Oral daily    MEDICATIONS  (PRN):    PHYSICAL EXAM:  GENERAL: NAD, well-developed  HEAD:  Atraumatic, Normocephalic  EYES: EOMI, PERRLA, conjunctiva and sclera clear, mild pallor  NECK: Supple, No JVD  CHEST/LUNG: Clear to auscultation bilaterally; No wheeze  HEART: irregular rate and rhythm; No murmurs, rubs, or gallops  ABDOMEN: Soft, Nontender, Nondistended; Bowel sounds present  EXTREMITIES:   no edema, No clubbing or cyanosis, + Peripheral Pulses,  PSYCH: AO x 3  NEUROLOGY: non-focal  SKIN: No rashes or lesions    LABS:                        8.8    9.44  )-----------( 178      ( 12 Sep 2017 05:15 )             28.7     09-12    147<H>  |  113<H>  |  40<H>  ----------------------------<  125<H>  4.6   |  24  |  0.96    Ca    8.3<L>      12 Sep 2017 05:15  Mg     1.9     09-12    TPro  7.7  /  Alb  3.6  /  TBili  0.2  /  DBili  x   /  AST  17  /  ALT  13  /  AlkPhos  57  09-11    PT/INR - ( 12 Sep 2017 05:15 )   PT: 13.7 SEC;   INR: 1.22          PTT - ( 11 Sep 2017 10:57 )  PTT:33.5 SEC  CARDIAC MARKERS ( 11 Sep 2017 17:45 )  x     / < 0.06 ng/mL / 15 u/L / 1.00 ng/mL / x      CARDIAC MARKERS ( 11 Sep 2017 11:07 )  x     / < 0.06 ng/mL / 21 u/L / x     / x                Consultant(s) Notes Reviewed:      Care Discussed with Consultants/Other Providers:    Contact Number, Dr Ariza [phone]

## 2017-09-12 NOTE — CONSULT NOTE ADULT - PROBLEM SELECTOR RECOMMENDATION 9
- likely 2/2 Xarelto use; r/o UGIB  - transfuse prn   - trend h/h  - ppi gtt  - carafate qid  - monitor stools for further melena  - cld today  - npo p mn for EGD tomorrow, 9/13 - likely 2/2 Xarelto use; r/o UGIB  - hold xarelto in setting of melena  - transfuse prn   - trend h/h  - ppi gtt  - carafate qid  - monitor stools for further melena  - cld today  - npo p mn for EGD tomorrow, 9/13

## 2017-09-12 NOTE — PROGRESS NOTE ADULT - ASSESSMENT
78 y/o male with a PMHx of atrial fibrillation on Xarelto (recently switched from Coumadin), HTN, HLD and gout presents to ED with dizziness and weakness, follow by an episode of syncope likely in the setting of GI bleed, complicated by rapid atrial fibrillation.

## 2017-09-12 NOTE — CONSULT NOTE ADULT - ASSESSMENT
78 y/o male with a PMHx of atrial fibrillation on Xarelto (recently switched from Coumadin), HTN, HLD and gout presents to ED with dizziness and weakness, follow by an episode of syncope likely in the setting of GI bleed, complicated by rapid atrial fibrillation

## 2017-09-12 NOTE — CONSULT NOTE ADULT - SUBJECTIVE AND OBJECTIVE BOX
76 y/o [place] male with a PMHx of atrial fibrillation (recently discontinued Coumadin on 8/29 and started Xarelto on 9/1), HTN and HLD presents after having diarrhea ( black stools) since 9/9. He had a prodrome of feeling "bad" was diaphoretic and had very brief syncopal episode   Pt reports that he has been on Coumadin for years but his cardiologist just changed him to Xarelto on 9/1 because it was "safer." He reports that he was very therapeutic on coumadin with inr "always" bet 2-3  He also complains of dizziness on standing feeling of lightheadedness. He denies any other associated symptoms. There was no post ictal confusion.  no tongue laceration no incontinence.     Review of Systems:  · Negative General Symptoms	no fever; no chills; no sweating	  · Skin Symptoms	rash; itching	  · Negative Ophthalmologic Symptoms	no photophobia	   	 	  · Negative Respiratory and Thorax Symptoms	no wheezing; no dyspnea; no cough; no pleuritic chest pain	  · Negative Cardiovascular Symptoms	no chest pain; no palpitations; no dyspnea on exertion	  · Negative Gastrointestinal Symptoms	+ occ nausea; no vomiting; + bloody diarrhea; no abdominal pain	  · Genitourinary Comments	no dysuria	  · Negative Musculoskeletal Symptoms	no arthralgia	  · Negative Neurological Symptoms	no transient paralysis; no weakness; no paresthesias	  · Negative Psychiatric Symptoms	no suicidal ideation; no depression	      Allergies and Intolerances:        Allergies:  	No Known Allergies:     Past Medical History:  as above       Social History:  Social History (marital status, living situation, occupation, tobacco use, alcohol and drug use, and sexual history): Pt lives with family. No history of significant smoking or ETOH. quit alcohol may years ago.  retired . Has been living in the   for over 50 years. 	          Appears well.   no acute distress.   exam: alert oriented x 3   able to name, repeat.  Can follow 3step command  able to say # of quarters in $1.75   Recall 2/3    Attention fair  normal fluency.      EOMI   No facial   tongue is midline  Moving all 4 ext no drift.   Finger to nose no dysmetria     gait : unable to assess as pt complains of dizziness on standing.         Assessment and Recommendation:   · Assessment		  Plan:   76 y/o [place] male with a PMHx of atrial fibrillation (recently discontinued Coumadin on 8/29 and started Xarelto on 9/1), HTN and HLD presents to ED S/P syncopal episode.    Dizziness check orthostatics however becomes tachycardic on standing which suggests orthostatic hypotension. Rapid afib may also be causing poor perfusion.   likely volume depletion due to to diarrhea.   fluid hydration would help. bun/cr elevated  He is getting transfused per primary team.   consider compression stocking if orthostatic.   I do not suspect a neurologic etiology for his dizziness, as the prodrome suggestive of a cardiac syncope.    Keep bp > 120.   Microcytic anemia: of unknown etiology, management per medicine. No evidence of iron def. Consider hemoglobin electrophoresis.       Thank you for allowing me to participate in the care of this brian patient.       Attending Attestation:   60 minutes spent on total encounter; more than 50% of the visit was spent counseling and/or coordinating care by the attending physician.

## 2017-09-12 NOTE — PROGRESS NOTE ADULT - PROBLEM SELECTOR PLAN 2
appears to maybe have ongoing bleeding  will give one unit PRBC adin since Hb has drastically dropped since admission   and recent use of Xarelto  likely EGD tomorrow

## 2017-09-12 NOTE — PROGRESS NOTE ADULT - PROBLEM SELECTOR PLAN 4
creatinine 0.9 today  will monitor  BP 90s systolic  should improve with blood transfusion  continue IVF   Monitor renal function and lytes

## 2017-09-12 NOTE — CHART NOTE - NSCHARTNOTEFT_GEN_A_CORE
Discussed with Dr. Jakob Ariza. Would like to transfuse 1 unit pRBC. T&S ordered. Pt consented and signed (in chart). 1 unit pRBC ordered. Continue to monitor CBC.

## 2017-09-12 NOTE — CONSULT NOTE ADULT - SUBJECTIVE AND OBJECTIVE BOX
HISTORY OF PRESENT ILLNESS:  78 y/o [facility] male with a PMHx of atrial fibrillation (recently discontinued Coumadin on 8/29 and started Xarelto on 9/1), HTN and HLD presents to ED S/P syncopal episode this morning. Pt reports that he has been on Coumadin for years but his cardiologist just changed him to Xarelto on 9/1 because it was "safer." For the past three days, pt has been experiencing generalized weakness and fatigue, in addition to episodes of dizziness and dyspnea made worse with movement and exertion.  Pt has also been having multiple episodes of melena since Friday. Pt reports that his stool is formed and has not seen any blood; it just appears darker than normal. This morning after patient woke up and prepared for breakfast, he elicited to his son and wife that he was not feeling well. Shortly after, pt had a syncopal episode and fell to the ground. Pt apparently was caught by his son and lowered to the ground with no head or bodily trauma. Pt lost consciousness for a couple of minutes but does not really remember anything that happened after stating that he did not feel well.  Denies PMH of CAD/MI/CHF.  Denies CP or SOB.    PAST MEDICAL & SURGICAL HISTORY:  Gout  HLD (hyperlipidemia)  HTN (hypertension)  AF (atrial fibrillation)  No significant past surgical history      FAMILY HISTORY:  No pertinent family history in first degree relatives      SOCIAL HISTORY:    ( x) Non-smoker ( ) Smoker ( ) Alcohol Abuse ( ) IVDA    Allergies    No Known Allergies    MEDICATIONS  (STANDING):  sodium chloride 0.9%. 1000 milliLiter(s) (75 mL/Hr) IV Continuous <Continuous>  metoprolol 50 milliGRAM(s) Oral two times a day  allopurinol 100 milliGRAM(s) Oral daily  cyanocobalamin Injectable 1000 MICROGram(s) IntraMuscular daily  pantoprazole Infusion 8 mG/Hr (10 mL/Hr) IV Continuous <Continuous>  sucralfate 1 Gram(s) Oral four times a day    REVIEW OF SYSTEMS:     CONSTITUTIONAL: No fever,chills  RESPIRATORY: No cough, wheezing, chills or hemoptysis; No Shortness of Breath  CARDIOVASCULAR: No chest pain, palpitations, passing out, dizziness, or leg swelling  GASTROINTESTINAL: +poor appetite +melena No Nausea/vomiting.  GENITOURINARY: No dysuria, frequency, hematuria, or incontinence  NEUROLOGICAL: No headaches, memory loss, loss of strength, numbness, or tremors  SKIN: No itching, burning, rashes, or lesions   HEME/LYMPH: No easy bruising, or bleeding gums  	  [ x] All others negative	  [ ] Unable to obtain    PHYSICAL EXAM:  Vital Signs Last 24 Hrs  T(C): 36.8 (12 Sep 2017 15:36), Max: 36.8 (12 Sep 2017 15:36)  T(F): 98.2 (12 Sep 2017 15:36), Max: 98.2 (12 Sep 2017 15:36)  HR: 124 (12 Sep 2017 15:36) (66 - 124)  BP: 123/67 (12 Sep 2017 15:36) (97/62 - 139/83)  RR: 18 (12 Sep 2017 15:36) (15 - 18)  SpO2: 100% (12 Sep 2017 15:36) (97% - 100%)  	  Lymphatic: No lymphadenopathy  Cardiovascular: Normal S1 S2, No JVD, No murmurs, No edema  Respiratory: Lungs clear to auscultation Bilaterally	  Gastrointestinal:  Soft, Non-tender, + BS	  Skin: No rashes, No ecchymoses, No cyanosis	  Extremities: Normal range of motion, No clubbing, cyanosis or edema  Vascular: Peripheral pulses palpable 2+ bilaterally    DATA:	      ECG:  Afib, Non specific ST-T changes	    LABS:	 	    CARDIAC MARKERS ( 11 Sep 2017 17:45 )  x     / < 0.06 ng/mL / 15 u/L / 1.00 ng/mL / x      CARDIAC MARKERS ( 11 Sep 2017 11:07 )  x     / < 0.06 ng/mL / 21 u/L / x     / x                          9.3    12.35 )-----------( 184      ( 12 Sep 2017 12:21 )             29.9   147<H>  |  113<H>  |  40<H>  ----------------------------<  125<H>  4.6   |  24  |  0.96    Ca    8.3<L>      12 Sep 2017 05:15  Mg     1.9     09-12    TPro  7.7  /  Alb  3.6  /  TBili  0.2  /  DBili  x   /  AST  17  /  ALT  13  /  AlkPhos  57  09-11    PT/INR - ( 12 Sep 2017 05:15 )   PT: 13.7 SEC;   INR: 1.22       HgA1c: Hemoglobin A1C, Whole Blood: 7.0 % (09-12 @ 05:15)    TSH: Thyroid Stimulating Hormone, Serum: 1.21 uIU/mL (09-12 @ 05:15)    ASSESSMENT/PLAN: 	  78 y/o Des male with a PMHx of atrial fibrillation (recently discontinued Coumadin on 8/29 and started Xarelto on 9/1), HTN and HLD presents to ED S/P syncopal episode this morning, witnessed LOC by family.  Reports Melena for the last few days.  No Chest pain  --ACS ruled out with serial CE  --Xarelto on hold  --f/u GI eval  --Check TTE eval LV function  --monitor on telemetry    Karen Michelle PA-C  [facility]  [address], [address]   [place] [address]  office [phone]  pager [phone]

## 2017-09-12 NOTE — ED ADULT NURSE REASSESSMENT NOTE - NS ED NURSE REASSESS COMMENT FT1
Appears sleeping in stretcher, respirations even and unlabored, nad noted at this time. Safety and comfort maintained. Pt in afib on the monitor (has hx of afib). Report given to ESS[facility] 1 MIKAEL Adames pt transported to ESSU 1 in the ED.

## 2017-09-13 ENCOUNTER — RESULT REVIEW (OUTPATIENT)
Age: 77
End: 2017-09-13

## 2017-09-13 LAB
BUN SERPL-MCNC: 19 MG/DL — SIGNIFICANT CHANGE UP (ref 7–23)
CALCIUM SERPL-MCNC: 8.4 MG/DL — SIGNIFICANT CHANGE UP (ref 8.4–10.5)
CHLORIDE SERPL-SCNC: 112 MMOL/L — HIGH (ref 98–107)
CO2 SERPL-SCNC: 21 MMOL/L — LOW (ref 22–31)
CREAT SERPL-MCNC: 0.88 MG/DL — SIGNIFICANT CHANGE UP (ref 0.5–1.3)
GLUCOSE SERPL-MCNC: 109 MG/DL — HIGH (ref 70–99)
HCT VFR BLD CALC: 29 % — LOW (ref 39–50)
HGB BLD-MCNC: 9.1 G/DL — LOW (ref 13–17)
MAGNESIUM SERPL-MCNC: 1.8 MG/DL — SIGNIFICANT CHANGE UP (ref 1.6–2.6)
MCHC RBC-ENTMCNC: 23.8 PG — LOW (ref 27–34)
MCHC RBC-ENTMCNC: 31.4 % — LOW (ref 32–36)
MCV RBC AUTO: 75.9 FL — LOW (ref 80–100)
NRBC # FLD: 0.02 — SIGNIFICANT CHANGE UP
PLATELET # BLD AUTO: 168 K/UL — SIGNIFICANT CHANGE UP (ref 150–400)
PMV BLD: 11.5 FL — SIGNIFICANT CHANGE UP (ref 7–13)
POTASSIUM SERPL-MCNC: 4.1 MMOL/L — SIGNIFICANT CHANGE UP (ref 3.5–5.3)
POTASSIUM SERPL-SCNC: 4.1 MMOL/L — SIGNIFICANT CHANGE UP (ref 3.5–5.3)
RBC # BLD: 3.82 M/UL — LOW (ref 4.2–5.8)
RBC # FLD: 16.3 % — HIGH (ref 10.3–14.5)
SODIUM SERPL-SCNC: 142 MMOL/L — SIGNIFICANT CHANGE UP (ref 135–145)
WBC # BLD: 8.96 K/UL — SIGNIFICANT CHANGE UP (ref 3.8–10.5)
WBC # FLD AUTO: 8.96 K/UL — SIGNIFICANT CHANGE UP (ref 3.8–10.5)

## 2017-09-13 PROCEDURE — 88305 TISSUE EXAM BY PATHOLOGIST: CPT | Mod: 26

## 2017-09-13 PROCEDURE — 88312 SPECIAL STAINS GROUP 1: CPT | Mod: 26

## 2017-09-13 RX ORDER — PANTOPRAZOLE SODIUM 20 MG/1
40 TABLET, DELAYED RELEASE ORAL
Qty: 0 | Refills: 0 | Status: DISCONTINUED | OUTPATIENT
Start: 2017-09-13 | End: 2017-09-14

## 2017-09-13 RX ADMIN — Medication 1 GRAM(S): at 18:07

## 2017-09-13 RX ADMIN — Medication 1 GRAM(S): at 01:41

## 2017-09-13 RX ADMIN — PANTOPRAZOLE SODIUM 40 MILLIGRAM(S): 20 TABLET, DELAYED RELEASE ORAL at 18:08

## 2017-09-13 RX ADMIN — Medication 100 MILLIGRAM(S): at 11:40

## 2017-09-13 RX ADMIN — PREGABALIN 1000 MICROGRAM(S): 225 CAPSULE ORAL at 18:08

## 2017-09-13 RX ADMIN — Medication 50 MILLIGRAM(S): at 18:07

## 2017-09-13 RX ADMIN — Medication 1 GRAM(S): at 11:40

## 2017-09-13 RX ADMIN — SODIUM CHLORIDE 75 MILLILITER(S): 9 INJECTION INTRAMUSCULAR; INTRAVENOUS; SUBCUTANEOUS at 06:14

## 2017-09-13 RX ADMIN — PANTOPRAZOLE SODIUM 10 MG/HR: 20 TABLET, DELAYED RELEASE ORAL at 06:38

## 2017-09-13 RX ADMIN — Medication 50 MILLIGRAM(S): at 06:14

## 2017-09-13 RX ADMIN — Medication 1 GRAM(S): at 06:14

## 2017-09-13 NOTE — PROGRESS NOTE ADULT - PROBLEM SELECTOR PLAN 1
likely from transient hypotension from GI bleed  will await echocardiogram  cardiology consult noted   neuro consult noted  will follow recommendations

## 2017-09-13 NOTE — PROGRESS NOTE ADULT - SUBJECTIVE AND OBJECTIVE BOX
SUBJECTIVE: No CP or SOB    MEDICATIONS  (STANDING):  sodium chloride 0.9%. 1000 milliLiter(s) (75 mL/Hr) IV Continuous <Continuous>  metoprolol 50 milliGRAM(s) Oral two times a day  allopurinol 100 milliGRAM(s) Oral daily  cyanocobalamin Injectable 1000 MICROGram(s) IntraMuscular daily  pantoprazole Infusion 8 mG/Hr (10 mL/Hr) IV Continuous <Continuous>  sucralfate 1 Gram(s) Oral four times a day    LABS:                        9.1    8.96  )-----------( 168      ( 13 Sep 2017 05:40 )             29.0     142  |  112<H>  |  19  ----------------------------<  109<H>  4.1   |  21<L>  |  0.88    Ca    8.4      13 Sep 2017 05:40  Mg     1.8     09-13    CARDIAC MARKERS ( 11 Sep 2017 17:45 )  x     / < 0.06 ng/mL / 15 u/L / 1.00 ng/mL / x      CARDIAC MARKERS ( 11 Sep 2017 11:07 )  x     / < 0.06 ng/mL / 21 u/L / x     / x        PHYSICAL EXAM:  Vital Signs Last 24 Hrs  T(C): 36.7 (13 Sep 2017 13:05), Max: 36.8 (12 Sep 2017 15:36)  T(F): 98 (13 Sep 2017 13:05), Max: 98.2 (12 Sep 2017 15:36)  HR: 78 (13 Sep 2017 13:05) (77 - 124)  BP: 125/71 (13 Sep 2017 13:05) (114/69 - 139/71)  RR: 18 (13 Sep 2017 13:05) (18 - 18)  SpO2: 100% (13 Sep 2017 13:05) (100% - 100%)    HEENT: Normal Oral mucosa, PERRL, EOMI  Lymphatic: No obvious lymphadenopathy, No edema  Cardiovascular: Normal S1S2, No JVD, 1/6 ANAI, Peripheral pulses palpable 2+ B/L  Respiratory: Lungs clear to auscultation, normal effort  Gastrointestinal: Abdomen soft, ND, NT, +BS    DIAGNOSTIC DATA  TELEMETRY: Afib     RADIOLOGY:    ASSESSMENT AND PLAN:  76 y/o [facility] male with a PMHx of atrial fibrillation (recently discontinued Coumadin on 8/29 and started Xarelto on 9/1), HTN and HLD presents to ED S/P syncopal episode this morning, witnessed LOC by family.  Reports Melena for the last few days.  No Chest pain  --ACS ruled out with serial CE  --Xarelto on hold  --Check TTE eval LV function  --Continue telemetry  --No further work up needed prior to EGD, f/u report    Karen Michelle PA-C  [facility]  [address], [address]   [place] [address]  office [phone]  pager [phone]

## 2017-09-13 NOTE — PROGRESS NOTE ADULT - PROBLEM SELECTOR PLAN 3
HR better controlled   will continue to monitor  hold all AC at this point secondary to likely ongoing bleeding  patient states does NOT want to resume Xarelto  will d/w cardiology attending

## 2017-09-13 NOTE — PROGRESS NOTE ADULT - SUBJECTIVE AND OBJECTIVE BOX
Patient is a 77y old  Male who presents with a chief complaint of Weakness, syncope and diagnosed with GI bleed    SUBJECTIVE / OVERNIGHT EVENTS: No nausea, vomiting or diarrhea, no fever or chills, no dizziness or chest pain, no dysuria or hematuria, no joint pain or swelling    MEDICATIONS  (STANDING):  sodium chloride 0.9%. 1000 milliLiter(s) (75 mL/Hr) IV Continuous <Continuous>  metoprolol 50 milliGRAM(s) Oral two times a day  allopurinol 100 milliGRAM(s) Oral daily  cyanocobalamin Injectable 1000 MICROGram(s) IntraMuscular daily  pantoprazole Infusion 8 mG/Hr (10 mL/Hr) IV Continuous <Continuous>  sucralfate 1 Gram(s) Oral four times a day    MEDICATIONS  (PRN):    PHYSICAL EXAM:  GENERAL: NAD, well-developed  HEAD:  Atraumatic, Normocephalic  EYES: EOMI, PERRLA, conjunctiva and sclera clear, no pallor today  NECK: Supple, No JVD  CHEST/LUNG: Clear to auscultation bilaterally; No wheeze  HEART: irregular rate and rhythm; No murmurs, rubs, or gallops  ABDOMEN: Soft, Nontender, Nondistended; Bowel sounds present  EXTREMITIES:   no edema, No clubbing or cyanosis, + Peripheral Pulses,  PSYCH: AO x 3  NEUROLOGY: non-focal  SKIN: No rashes or lesions    LABS:                        9.1    8.96  )-----------( 168      ( 13 Sep 2017 05:40 )             29.0     09-13    142  |  112<H>  |  19  ----------------------------<  109<H>  4.1   |  21<L>  |  0.88    Ca    8.4      13 Sep 2017 05:40  Mg     1.8     09-13    TPro  7.7  /  Alb  3.6  /  TBili  0.2  /  DBili  x   /  AST  17  /  ALT  13  /  AlkPhos  57  09-11    PT/INR - ( 12 Sep 2017 05:15 )   PT: 13.7 SEC;   INR: 1.22            CARDIAC MARKERS ( 11 Sep 2017 17:45 )  x     / < 0.06 ng/mL / 15 u/L / 1.00 ng/mL / x      CARDIAC MARKERS ( 11 Sep 2017 11:07 )  x     / < 0.06 ng/mL / 21 u/L / x     / x                Consultant(s) Notes Reviewed:      Care Discussed with Consultants/Other Providers:    Contact Number, Dr Ariza [phone]

## 2017-09-13 NOTE — PROGRESS NOTE ADULT - SUBJECTIVE AND OBJECTIVE BOX
He denies any dizziness on standing.   He feels better after the blood transfusion       Appears well.   no acute distress.   exam: alert oriented x 3   able to name, repeat.  Can follow 3step command  able to say # of quarters in $1.75   Recall 2/3    Attention fair  normal fluency.      EOMI   No facial   tongue is midline  Moving all 4 ext no drift.   Finger to nose no dysmetria  Gait : able to ambulate without assistance.

## 2017-09-13 NOTE — PROGRESS NOTE ADULT - ASSESSMENT
Assessment and Recommendation:      76 y/o Des male with a PMHx of atrial fibrillation (recently discontinued Coumadin on 8/29 and started Xarelto on 9/1), HTN and HLD presents to ED S/P syncopal episode.    Dizziness: check orthostatics, ordered not done. Rapid afib may also becontributing to the dizziness.   likely volume depletion due to to diarrhea.     bun/cr improving      I do not suspect a neurologic etiology for his dizziness, as the prodrome suggestive of a cardiac syncope.    Keep bp > 120.   Microcytic anemia: of unknown etiology, management per medicine. No evidence of iron def. Consider hemoglobin electrophoresis to r/o underlying hemoglobinopathy    Awaiting endoscopy today to r/o ugi bleed.     Thank you for allowing me to participate in the care of this brian patient. As the dizziness is resolved,I will sign off. please reconsult if necessary.

## 2017-09-14 ENCOUNTER — TRANSCRIPTION ENCOUNTER (OUTPATIENT)
Age: 77
End: 2017-09-14

## 2017-09-14 VITALS — HEART RATE: 96 BPM | DIASTOLIC BLOOD PRESSURE: 79 MMHG | SYSTOLIC BLOOD PRESSURE: 153 MMHG

## 2017-09-14 DIAGNOSIS — I48.91 UNSPECIFIED ATRIAL FIBRILLATION: ICD-10-CM

## 2017-09-14 LAB
BUN SERPL-MCNC: 11 MG/DL — SIGNIFICANT CHANGE UP (ref 7–23)
CALCIUM SERPL-MCNC: 8 MG/DL — LOW (ref 8.4–10.5)
CHLORIDE SERPL-SCNC: 112 MMOL/L — HIGH (ref 98–107)
CO2 SERPL-SCNC: 21 MMOL/L — LOW (ref 22–31)
CREAT SERPL-MCNC: 0.82 MG/DL — SIGNIFICANT CHANGE UP (ref 0.5–1.3)
GLUCOSE SERPL-MCNC: 108 MG/DL — HIGH (ref 70–99)
HCT VFR BLD CALC: 26.2 % — LOW (ref 39–50)
HGB BLD-MCNC: 8.3 G/DL — LOW (ref 13–17)
MCHC RBC-ENTMCNC: 24.5 PG — LOW (ref 27–34)
MCHC RBC-ENTMCNC: 31.7 % — LOW (ref 32–36)
MCV RBC AUTO: 77.3 FL — LOW (ref 80–100)
NRBC # FLD: 0.02 — SIGNIFICANT CHANGE UP
PLATELET # BLD AUTO: 165 K/UL — SIGNIFICANT CHANGE UP (ref 150–400)
PMV BLD: 11.6 FL — SIGNIFICANT CHANGE UP (ref 7–13)
POTASSIUM SERPL-MCNC: 3.9 MMOL/L — SIGNIFICANT CHANGE UP (ref 3.5–5.3)
POTASSIUM SERPL-SCNC: 3.9 MMOL/L — SIGNIFICANT CHANGE UP (ref 3.5–5.3)
RBC # BLD: 3.39 M/UL — LOW (ref 4.2–5.8)
RBC # FLD: 16.7 % — HIGH (ref 10.3–14.5)
SODIUM SERPL-SCNC: 144 MMOL/L — SIGNIFICANT CHANGE UP (ref 135–145)
WBC # BLD: 8.22 K/UL — SIGNIFICANT CHANGE UP (ref 3.8–10.5)
WBC # FLD AUTO: 8.22 K/UL — SIGNIFICANT CHANGE UP (ref 3.8–10.5)

## 2017-09-14 RX ORDER — RIVAROXABAN 15 MG-20MG
1 KIT ORAL
Qty: 0 | Refills: 0 | COMMUNITY

## 2017-09-14 RX ORDER — DILTIAZEM HCL 120 MG
1 CAPSULE, EXT RELEASE 24 HR ORAL
Qty: 0 | Refills: 0 | COMMUNITY

## 2017-09-14 RX ORDER — LISINOPRIL 2.5 MG/1
1 TABLET ORAL
Qty: 0 | Refills: 0 | COMMUNITY
Start: 2017-09-14

## 2017-09-14 RX ORDER — SUCRALFATE 1 G
1 TABLET ORAL
Qty: 120 | Refills: 0 | OUTPATIENT
Start: 2017-09-14 | End: 2017-10-14

## 2017-09-14 RX ORDER — POTASSIUM CHLORIDE 20 MEQ
1 PACKET (EA) ORAL
Qty: 0 | Refills: 0 | COMMUNITY

## 2017-09-14 RX ORDER — LISINOPRIL 2.5 MG/1
1 TABLET ORAL
Qty: 0 | Refills: 0 | COMMUNITY

## 2017-09-14 RX ORDER — PANTOPRAZOLE SODIUM 20 MG/1
1 TABLET, DELAYED RELEASE ORAL
Qty: 60 | Refills: 0 | OUTPATIENT
Start: 2017-09-14 | End: 2017-10-14

## 2017-09-14 RX ORDER — DILTIAZEM HCL 120 MG
1 CAPSULE, EXT RELEASE 24 HR ORAL
Qty: 0 | Refills: 0 | COMMUNITY
Start: 2017-09-14

## 2017-09-14 RX ADMIN — PANTOPRAZOLE SODIUM 40 MILLIGRAM(S): 20 TABLET, DELAYED RELEASE ORAL at 17:02

## 2017-09-14 RX ADMIN — Medication 1 GRAM(S): at 00:57

## 2017-09-14 RX ADMIN — SODIUM CHLORIDE 75 MILLILITER(S): 9 INJECTION INTRAMUSCULAR; INTRAVENOUS; SUBCUTANEOUS at 05:22

## 2017-09-14 RX ADMIN — Medication 1 GRAM(S): at 17:02

## 2017-09-14 RX ADMIN — Medication 1 GRAM(S): at 12:04

## 2017-09-14 RX ADMIN — SODIUM CHLORIDE 75 MILLILITER(S): 9 INJECTION INTRAMUSCULAR; INTRAVENOUS; SUBCUTANEOUS at 00:57

## 2017-09-14 RX ADMIN — Medication 100 MILLIGRAM(S): at 12:04

## 2017-09-14 RX ADMIN — Medication 50 MILLIGRAM(S): at 05:21

## 2017-09-14 RX ADMIN — Medication 50 MILLIGRAM(S): at 17:02

## 2017-09-14 RX ADMIN — PANTOPRAZOLE SODIUM 40 MILLIGRAM(S): 20 TABLET, DELAYED RELEASE ORAL at 05:21

## 2017-09-14 RX ADMIN — Medication 1 GRAM(S): at 05:21

## 2017-09-14 NOTE — DISCHARGE NOTE ADULT - CARE PLAN
Principal Discharge DX:	Syncope and collapse  Goal:	prevent another episode  Instructions for follow-up, activity and diet:	keep well hydrated  continue current meds  F/U with PMD in 1 week  Secondary Diagnosis:	UGIB (upper gastrointestinal bleed)  Instructions for follow-up, activity and diet:	continue current meds  F/U with Frances Morfin in 1-2 weeks  Secondary Diagnosis:	AF (atrial fibrillation)  Instructions for follow-up, activity and diet:	continue current meds  Stop Xarelto for 1 week till your appointment with Dr. Stevens on 9/20th at 1:45pm for further recommendations  Secondary Diagnosis:	HLD (hyperlipidemia)  Instructions for follow-up, activity and diet:	low chol diet  continue current meds  Secondary Diagnosis:	HTN (hypertension)  Instructions for follow-up, activity and diet:	low salt diet  continue current meds  Secondary Diagnosis:	CJ (acute kidney injury)  Instructions for follow-up, activity and diet:	Follow up kidney function by blood work with PMD in 1 week  Secondary Diagnosis:	Gout  Instructions for follow-up, activity and diet:	continue current meds

## 2017-09-14 NOTE — DISCHARGE NOTE ADULT - PATIENT PORTAL LINK FT
“You can access the Follow[employer] Patient Portal, offered by [facility], by registering with the following website: [email or web address]”

## 2017-09-14 NOTE — PROGRESS NOTE ADULT - ATTENDING COMMENTS
Agree with above.   -Check TTE  -f/u GI to see if ac can be restarted safely in the future    Jennifer Miguel MD  [facility]  [address] [place] [address], [address]  [place] [address]  office: [phone]  pager: [phone]
Agree with above.   -f/u GI to see if ac would be safe to restart in the future  -Check TTE    Jennifer Miguel MD  [facility]  [address] [place] [address], [address]  [place] [address]  office: [phone]  pager: [phone]
discussed with patient in detail, all questions answered
discussed with patient in detail, all questions answered
discussed with patient in detail, all questions answered   d/w tele PA in detail

## 2017-09-14 NOTE — DISCHARGE NOTE ADULT - HOSPITAL COURSE
76 y/o [facility] male with a PMHx of atrial fibrillation (recently discontinued Coumadin on 8/29 and started Xarelto on 9/1), HTN and HLD presents to ED S/P syncopal episode this morning. Pt reports that he has been on Coumadin for years but his cardiologist just changed him to Xarelto on 9/1 because it was "safer." For the past three days, pt has been experiencing generalized weakness and fatigue, in addition to episodes of dizziness and dyspnea made worse with movement and exertion. Pt cannot identify any palliating factors, but notices that his symptoms are made worse with movement. Pt has also been having multiple episodes of melena since Friday. Pt reports that his stool is formed and has not seen any blood; it just appears darker than normal. This morning after patient woke up and prepared for breakfast, he elicited to his son and wife that he was not feeling well. Shortly after, pt had a syncopal episode and fell to the ground. Pt apparently was caught by his son and lowered to the ground with no head or bodily trauma. Pt lost consciousness for a couple of minutes but does not really remember anything that happened after stating that he did not feel well. Family called EMS was patient was picked up and brought to [facility]. Pt reports that he started to feel better after being placed on oxygen on route to hospital. Pt does admit to decreased appetite. Pt denies fever, chills, recent travel, headache, visual deficits, chest pain, orthopnea, palpitations, abdominal pain, N/V/D/C, hematochezia, dysuria, hematuria, seizures, convulsions, paresthesias, hemiparesis, aura, tongue lacerations, facial droop. Upon arrival to ED, EKG: Atrial fibrillation at 125 bpm with RVR. CE x1: Negative. WBC: 10.82. H/H: 11.8/38.6. Occult: Positive. BUN/Cr: 57/1.17. Glucose: 151. Pt was admitted to telemetry.   The following was the hospital course:  EKG: Atrial fibrillation at 125 bpm with RVR  CE x2: Negative  WBC: 10.82  H/H: 11.8/38.6  Occult: Positive  BUN/Cr: 57/1.17  Glucose: 151    9/12 GI consult (Dr. Starkey): Melena likely secondary to Xarelto use. Hold Xarelto in setting of melena. Transfuse PRN. Trend H/H. PPI gtt. Carafate QID. Monitor stools for further melena. NPO after midnight for EGD tomorrow, 9/13. Rapid atrial fibrillation. Cards following/appreciated. Hold Xarelto for now given melena. Echo pending.   9/12 Neuro consult ([place]): Dizziness. Check orthostatics. Pt becomes tachycardic on standing which suggests orthostatic hypotension. Rapid afib may also be causing poor perfusion. Likely volume depletion due to to diarrhea. Fluid hydration would help. Compression stockings if orthostatic. I do not suspect a neurologic etiology for his dizziness, as the prodrome suggestive of a cardiac syncope. Microcytic anemia of unknown etiology, management per medicine. No evidence of iron def. Consider hemoglobin electrophoresis.   9/12 Cards consult (Dr. Miguel): ACS ruled out with serial cardiac enzymes. Xarelto on hold. Follow up GI eval. Check echo evaluate LV function. Monitor on telemetry.   9/13 EGD: Impression:          - Non-bleeding gastric ulcer with a clean ulcer base                        (Nolberto Class III). Biopsied.                       - Non-bleeding gastric ulcer with a clean ulcer base                        (Nolberto Class III).  Recommendation:      - Give Protonix (pantoprazole): 8 mg/hr IV by continuous                        infusion. Carafate 1 gm q 6 hours. Monitor H/h, tranfuse                        as needed. Follow up biopsy results.  9/13 Medicine: REgular diet, Protonix 40mg IV BID, No AC  9/14 Medicine:  s/p EGD noted  for gastric ulcer  d/w Dr ASCENCION Ariza of GI  ok to discharge home  on carafate and PPI BID  patient given copy of EGD  d/w pts cardiology attending Dr Stevens  he will decide to resume anticoagulation as outpatient  no anticoagulation at present for 1 week.

## 2017-09-14 NOTE — PROGRESS NOTE ADULT - PROBLEM SELECTOR PLAN 2
s/p EGD noted  for gastric ulcer  d/w Dr ASCENCION Ariza of GI  ok to discharge home  on carafate and PPI BID  patient given copy of EGD  d/w pts cardiology attending Dr Stevens  he will decide to resume anticoagulation as outpatient  no anticoagulation at present for 1 week

## 2017-09-14 NOTE — DISCHARGE NOTE ADULT - PLAN OF CARE
prevent another episode keep well hydrated  continue current meds  F/U with PMD in 1 week continue current meds  F/U with Frances Morfin in 1-2 weeks continue current meds  Stop Xarelto for 1 week till your appointment with Dr. Stevens on 9/20th at 1:45pm for further recommendations low chol diet  continue current meds low salt diet  continue current meds Follow up kidney function by blood work with PMD in 1 week continue current meds

## 2017-09-14 NOTE — PROGRESS NOTE ADULT - PROBLEM SELECTOR PLAN 1
secondary to peptic ulcer disease  continue protonix 40 mg bid, carafate 1 gm q 6  monitor h/h  avoid NSAIDs  f/u biopsy to rule out H pylori gastritis

## 2017-09-14 NOTE — PROGRESS NOTE ADULT - SUBJECTIVE AND OBJECTIVE BOX
Patient is a 77y old  Male who presents with a chief complaint of Weakness, melena (11 Sep 2017 17:42)      SUBJECTIVE / OVERNIGHT EVENTS: No nausea, vomiting or diarrhea, no fever or chills, no dizziness or chest pain, no dysuria or hematuria, no joint pain or swelling  no BM yet after EGD    MEDICATIONS  (STANDING):  sodium chloride 0.9%. 1000 milliLiter(s) (75 mL/Hr) IV Continuous <Continuous>  metoprolol 50 milliGRAM(s) Oral two times a day  allopurinol 100 milliGRAM(s) Oral daily  sucralfate 1 Gram(s) Oral four times a day  pantoprazole  Injectable 40 milliGRAM(s) IV Push two times a day    MEDICATIONS  (PRN):        CAPILLARY BLOOD GLUCOSE        I&O's Summary    13 Sep 2017 07:01  -  14 Sep 2017 07:00  --------------------------------------------------------  IN: 0 mL / OUT: 450 mL / NET: -450 mL    PHYSICAL EXAM:  GENERAL: NAD, well-developed  HEAD:  Atraumatic, Normocephalic  EYES: EOMI, PERRLA, conjunctiva and sclera clear, no pallor today  NECK: Supple, No JVD  CHEST/LUNG: Clear to auscultation bilaterally; No wheeze  HEART: irregular rate and rhythm; No murmurs, rubs, or gallops  ABDOMEN: Soft, Nontender, Nondistended; Bowel sounds present  EXTREMITIES:   no edema, No clubbing or cyanosis, + Peripheral Pulses,  PSYCH: AO x 3  NEUROLOGY: non-focal  SKIN: No rashes or lesions    LABS:                        8.3    8.22  )-----------( 165      ( 14 Sep 2017 06:20 )             26.2     09-14    144  |  112<H>  |  11  ----------------------------<  108<H>  3.9   |  21<L>  |  0.82    Ca    8.0<L>      14 Sep 2017 06:20  Mg     1.8     09-13                  Consultant(s) Notes Reviewed:      Care Discussed with Consultants/Other Providers:    Contact Number, Dr Ariza [phone]

## 2017-09-14 NOTE — PROGRESS NOTE ADULT - PROBLEM SELECTOR PLAN 2
AC on hold  resume in 4-5 days after ulcer healing with acid suppression therapy   monitor h/h  cardiology recs

## 2017-09-14 NOTE — PROGRESS NOTE ADULT - SUBJECTIVE AND OBJECTIVE BOX
SUBJECTIVE: No CP or SOB      MEDICATIONS  (STANDING):  sodium chloride 0.9%. 1000 milliLiter(s) (75 mL/Hr) IV Continuous <Continuous>  metoprolol 50 milliGRAM(s) Oral two times a day  allopurinol 100 milliGRAM(s) Oral daily  sucralfate 1 Gram(s) Oral four times a day  pantoprazole  Injectable 40 milliGRAM(s) IV Push two times a day    LABS:                        8.3    8.22  )-----------( 165      ( 14 Sep 2017 06:20 )             26.2     Hemoglobin: 8.3 g/dL (09-14 @ 06:20)  Hemoglobin: 9.1 g/dL (09-13 @ 05:40)  Hemoglobin: 9.5 g/dL (09-12 @ 20:53)  Hemoglobin: 9.3 g/dL (09-12 @ 12:21)  Hemoglobin: 8.8 g/dL (09-12 @ 05:15)    09-14    144  |  112<H>  |  11  ----------------------------<  108<H>  3.9   |  21<L>  |  0.82    Ca    8.0<L>      14 Sep 2017 06:20  Mg     1.8     09-13    Creatinine Trend: 0.82<--, 0.88<--, 0.96<--, 1.17<--     CARDIAC MARKERS ( 11 Sep 2017 17:45 )  x     / < 0.06 ng/mL / 15 u/L / 1.00 ng/mL / x        PHYSICAL EXAM  Vital Signs Last 24 Hrs  T(C): 36.8 (14 Sep 2017 10:30), Max: 36.8 (14 Sep 2017 10:30)  T(F): 98.2 (14 Sep 2017 10:30), Max: 98.2 (14 Sep 2017 10:30)  HR: 109 (14 Sep 2017 10:30) (78 - 109)  BP: 134/88 (14 Sep 2017 10:30) (124/68 - 135/72)  RR: 18 (14 Sep 2017 10:30) (18 - 18)  SpO2: 100% (14 Sep 2017 10:30) (100% - 100%)    HEENT: Normal Oral mucosa, PERRL, EOMI  Lymphatic: No obvious lymphadenopathy, No edema  Cardiovascular: Normal S1S2, No JVD, 1/6 ANAI, Peripheral pulses palpable 2+ B/L  Respiratory: Lungs clear to auscultation, normal effort  Gastrointestinal: Abdomen soft, ND, NT, +BS    DIAGNOSTIC DATA  TELEMETRY: Afib     < from: Upper Endoscopy (09.13.17 @ 14:13) >  Impression:          - Non-bleeding gastric ulcer with a clean ulcer base                        (Nolberto Class III). Biopsied.                       - Non-bleeding gastric ulcer with a clean ulcer base                        (Nolberto Class III).  Recommendation:      - Give Protonix (pantoprazole): 8 mg/hr IV by continuous                        infusion. Carafate 1 gm q 6 hours. Monitor H/h, tranfuse                        as needed. Follow up biopsy results.                                                  Attending Participation:       I personally performed the entire procedure.                                                                                     _________________  FATOUMATA BONILLA MD  9/13/2017 2:45:07 PM  This report has been signed electronically.  Number of Addenda: 0    < end of copied text >      ASSESSMENT AND PLAN:  78 y/o male with a PMHx of atrial fibrillation (recently discontinued Coumadin on 8/29 and started Xarelto on 9/1), HTN and HLD presents to ED S/P syncopal episode this morning, witnessed LOC by family.  Reports Melena for the last few days.  No Chest pain  --ACS ruled out with serial CE  --Xarelto on hold  --s/p EGD as above  --Check TTE to eval LV function      Karen Michelle PA-C  [facility]  [address], [address]   [place] [address]  office [phone]  pager [phone] SUBJECTIVE: No CP or SOB      MEDICATIONS  (STANDING):  sodium chloride 0.9%. 1000 milliLiter(s) (75 mL/Hr) IV Continuous <Continuous>  metoprolol 50 milliGRAM(s) Oral two times a day  allopurinol 100 milliGRAM(s) Oral daily  sucralfate 1 Gram(s) Oral four times a day  pantoprazole  Injectable 40 milliGRAM(s) IV Push two times a day    LABS:                        8.3    8.22  )-----------( 165      ( 14 Sep 2017 06:20 )             26.2     Hemoglobin: 8.3 g/dL (09-14 @ 06:20)  Hemoglobin: 9.1 g/dL (09-13 @ 05:40)  Hemoglobin: 9.5 g/dL (09-12 @ 20:53)  Hemoglobin: 9.3 g/dL (09-12 @ 12:21)  Hemoglobin: 8.8 g/dL (09-12 @ 05:15)    09-14    144  |  112<H>  |  11  ----------------------------<  108<H>  3.9   |  21<L>  |  0.82    Ca    8.0<L>      14 Sep 2017 06:20  Mg     1.8     09-13    Creatinine Trend: 0.82<--, 0.88<--, 0.96<--, 1.17<--     CARDIAC MARKERS ( 11 Sep 2017 17:45 )  x     / < 0.06 ng/mL / 15 u/L / 1.00 ng/mL / x        PHYSICAL EXAM  Vital Signs Last 24 Hrs  T(C): 36.8 (14 Sep 2017 10:30), Max: 36.8 (14 Sep 2017 10:30)  T(F): 98.2 (14 Sep 2017 10:30), Max: 98.2 (14 Sep 2017 10:30)  HR: 109 (14 Sep 2017 10:30) (78 - 109)  BP: 134/88 (14 Sep 2017 10:30) (124/68 - 135/72)  RR: 18 (14 Sep 2017 10:30) (18 - 18)  SpO2: 100% (14 Sep 2017 10:30) (100% - 100%)    HEENT: Normal Oral mucosa, PERRL, EOMI  Lymphatic: No obvious lymphadenopathy, No edema  Cardiovascular: Normal S1S2, No JVD, 1/6 ANAI, Peripheral pulses palpable 2+ B/L  Respiratory: Lungs clear to auscultation, normal effort  Gastrointestinal: Abdomen soft, ND, NT, +BS    DIAGNOSTIC DATA  TELEMETRY: Afib     < from: Upper Endoscopy (09.13.17 @ 14:13) >  Impression:          - Non-bleeding gastric ulcer with a clean ulcer base                        (Nolberto Class III). Biopsied.                       - Non-bleeding gastric ulcer with a clean ulcer base                        (Nolberto Class III).  Recommendation:      - Give Protonix (pantoprazole): 8 mg/hr IV by continuous                        infusion. Carafate 1 gm q 6 hours. Monitor H/h, tranfuse                        as needed. Follow up biopsy results.                                                  Attending Participation:       I personally performed the entire procedure.                                                                                     _________________  FATOUMATA BONILLA MD  9/13/2017 2:45:07 PM  This report has been signed electronically.  Number of Addenda: 0    < end of copied text >      ASSESSMENT AND PLAN:  78 y/o male with a PMHx of atrial fibrillation (recently discontinued Coumadin on 8/29 and started Xarelto on 9/1), HTN and HLD presents to ED S/P syncopal episode this morning, witnessed LOC by family.  Reports Melena for the last few days.  No Chest pain  --ACS ruled out with serial CE  --Xarelto on hold  --s/p EGD as above  --Check TTE to eval LV function  --f/u GI to see If/When AC can be resumed      Karen Michelle PA-C  [facility]  [address], [address]   [place] [address]  office [phone]  pager [phone]

## 2017-09-14 NOTE — DISCHARGE NOTE ADULT - CARE PROVIDER_API CALL
Branden Stevens), Cardiology  [address] [place] [address]  Phone: [phone]  Fax: [phone]    Frances Ariza), Internal Medicine  [address] [place] [address]  Phone: [phone]  Fax: [phone]

## 2017-09-14 NOTE — PROGRESS NOTE ADULT - SUBJECTIVE AND OBJECTIVE BOX
Interval history: denies blood in stools, abdominal pain      HPI:  76 y/o Des male with a PMHx of atrial fibrillation (recently discontinued Coumadin on 8/29 and started Xarelto on 9/1), HTN and HLD presents to ED S/P syncopal episode this morning and having episodes of melena. Pt reports that he has been on Coumadin for years but his cardiologist just changed him to Xarelto on 9/1 because it was "safer." For the past three days, pt has been experiencing generalized weakness and fatigue, in addition to episodes of dizziness and dyspnea with multiple episodes of melena since Friday. While making breakfast, he elicited to his son and wife that he was not feeling well and shortly after, pt had a syncopal episode and fell to the ground. Pt denies ever having EGD or Colonoscopy in the past. Pt does admit to decreased appetite. Pt denies fever, chills, recent travel, headache, visual deficits, chest pain, orthopnea, palpitations, abdominal pain, N/V/D/C, hematochezia, dysuria, hematuria, seizures, convulsions, paresthesias, hemiparesis, aura, tongue lacerations, facial droop. Upon arrival to ED, EKG: Atrial fibrillation at 125 bpm with RVR. CE x1: Negative. WBC: 10.82. H/H: 11.8/38.6. Occult: Positive. BUN/Cr: 57/1.17. Glucose: 151. Pt was admitted to telemetry and being transfused PRBC.      No Known Allergies      sodium chloride 0.9%. 1000 milliLiter(s) IV Continuous <Continuous>  metoprolol 50 milliGRAM(s) Oral two times a day  allopurinol 100 milliGRAM(s) Oral daily  cyanocobalamin Injectable 1000 MICROGram(s) IntraMuscular daily  pantoprazole Infusion 8 mG/Hr IV Continuous <Continuous>  sucralfate 1 Gram(s) Oral four times a day      Review of Systems:    General:  No wt loss, fevers, chills, night sweats, fatigue  Eyes:  Good vision, no reported pain  ENT:  No sore throat, pain, runny nose, dysphagia  CV:  No pain, palpitations, no lightheadedness  Resp:  No dyspnea, cough, tachypnea, wheezing  GI: +melena; denies n/v/d/c, abdominal pain or brbpr   :  No pain, bleeding, incontinence, nocturia  Muscle:  No pain, weakness  Neuro:  No weakness, tingling, memory problems  Psych:  No fatigue, insomnia, mood problems, depression  Endocrine:  No polyuria, polydypsia, cold/heat intolerance  Heme:  No petechiae, ecchymosis, easy bruisability  Skin:  No rash, tattoos, scars, edema    Relevant Family History:   n/c    Relevant Social History: n/c      Physical Exam:    Vital Signs:  Vital Signs Last 24 Hrs  T(C): 36.6 (12 Sep 2017 10:54), Max: 36.7 (11 Sep 2017 22:53)  T(F): 97.8 (12 Sep 2017 10:54), Max: 98 (11 Sep 2017 22:53)  HR: 66 (12 Sep 2017 10:54) (66 - 120)  BP: 137/97 (12 Sep 2017 10:54) (97/62 - 139/83)  BP(mean): --  RR: 17 (12 Sep 2017 10:54) (15 - 17)  SpO2: 100% (12 Sep 2017 10:54) (97% - 100%)  Daily Height in cm: 166.37 (11 Sep 2017 17:42)    Daily     General:  Appears stated age, well-groomed, nad  HEENT:  NC/AT,  conjunctivae clear and pink, no thyromegaly, nodules, adenopathy, no JVD  Chest:  Full & symmetric excursion, no increased effort, breath sounds clear  Cardiovascular:  Regular rhythm, S1, S2, no murmur/rub/S3/S4, no abdominal bruit, no edema  Abdomen:  Soft, non-tender, non-distended, normoactive bowel sounds,  no masses ,no hepatosplenomeagaly, no signs of chronic liver disease  Extremities:  no cyanosis,clubbing or edema  Skin:  No rash/erythema/ecchymoses/petechiae/wounds/abscess/warm/dry  Neuro/Psych:  A&O  , no asterixis, no tremor, no encephalopathy    Laboratory:                            9.3    12.35 )-----------( 184      ( 12 Sep 2017 12:21 )             29.9     09-12    147<H>  |  113<H>  |  40<H>  ----------------------------<  125<H>  4.6   |  24  |  0.96    Ca    8.3<L>      12 Sep 2017 05:15  Mg     1.9     09-12    TPro  7.7  /  Alb  3.6  /  TBili  0.2  /  DBili  x   /  AST  17  /  ALT  13  /  AlkPhos  57  09-11    LIVER FUNCTIONS - ( 11 Sep 2017 11:07 )  Alb: 3.6 g/dL / Pro: 7.7 g/dL / ALK PHOS: 57 u/L / ALT: 13 u/L / AST: 17 u/L / GGT: x           PT/INR - ( 12 Sep 2017 05:15 )   PT: 13.7 SEC;   INR: 1.22          PTT - ( 11 Sep 2017 10:57 )  PTT:33.5 SEC      Imaging:

## 2017-09-14 NOTE — DISCHARGE NOTE ADULT - SECONDARY DIAGNOSIS.
UGIB (upper gastrointestinal bleed) AF (atrial fibrillation) HLD (hyperlipidemia) HTN (hypertension) CJ (acute kidney injury) Gout

## 2017-09-14 NOTE — DISCHARGE NOTE ADULT - MEDICATION SUMMARY - MEDICATIONS TO TAKE
I will START or STAY ON the medications listed below when I get home from the hospital:    Prinivil 5 mg oral tablet  -- 1 tab(s) by mouth once a day  -- Indication: For HTN (hypertension)    Cardizem  mg/24 hours oral capsule, extended release  -- 1 cap(s) by mouth once a day  -- Indication: For HTN (hypertension)    allopurinol 100 mg oral tablet  -- 1 tab(s) by mouth once a day  -- Indication: For Gout    metoprolol tartrate 50 mg oral tablet  -- 1 tab(s) by mouth 2 times a day  -- Indication: For HTN (hypertension)    sucralfate 1 g oral tablet  -- 1 tab(s) by mouth 4 times a day  -- Indication: For UGIB (upper gastrointestinal bleed)    Protonix 40 mg oral delayed release tablet  -- 1 tab(s) by mouth 2 times a day   -- It is very important that you take or use this exactly as directed.  Do not skip doses or discontinue unless directed by your doctor.  Obtain medical advice before taking any non-prescription drugs as some may affect the action of this medication.  Swallow whole.  Do not crush.    -- Indication: For UGIB (upper gastrointestinal bleed)

## 2017-09-15 LAB — SURGICAL PATHOLOGY STUDY: SIGNIFICANT CHANGE UP

## 2017-11-14 ENCOUNTER — INPATIENT (INPATIENT)
Facility: HOSPITAL | Age: 77
LOS: 3 days | Discharge: HOME | End: 2017-11-18
Attending: INTERNAL MEDICINE | Admitting: INTERNAL MEDICINE
Payer: MEDICARE

## 2017-11-14 VITALS
TEMPERATURE: 99 F | HEART RATE: 91 BPM | RESPIRATION RATE: 20 BRPM | OXYGEN SATURATION: 100 % | DIASTOLIC BLOOD PRESSURE: 90 MMHG | SYSTOLIC BLOOD PRESSURE: 152 MMHG

## 2017-11-14 DIAGNOSIS — J12.9 VIRAL PNEUMONIA, UNSPECIFIED: ICD-10-CM

## 2017-11-14 LAB
ALBUMIN SERPL ELPH-MCNC: 3.9 G/DL — SIGNIFICANT CHANGE UP (ref 3.3–5)
ALP SERPL-CCNC: 81 U/L — SIGNIFICANT CHANGE UP (ref 40–120)
ALT FLD-CCNC: 14 U/L — SIGNIFICANT CHANGE UP (ref 4–41)
ANISOCYTOSIS BLD QL: SIGNIFICANT CHANGE UP
APTT BLD: 34.2 SEC — SIGNIFICANT CHANGE UP (ref 27.5–37.4)
AST SERPL-CCNC: 26 U/L — SIGNIFICANT CHANGE UP (ref 4–40)
B PERT DNA SPEC QL NAA+PROBE: SIGNIFICANT CHANGE UP
BASE EXCESS BLDV CALC-SCNC: 0.7 MMOL/L — SIGNIFICANT CHANGE UP
BASOPHILS # BLD AUTO: 0.05 K/UL — SIGNIFICANT CHANGE UP (ref 0–0.2)
BASOPHILS NFR BLD AUTO: 0.5 % — SIGNIFICANT CHANGE UP (ref 0–2)
BILIRUB SERPL-MCNC: 0.7 MG/DL — SIGNIFICANT CHANGE UP (ref 0.2–1.2)
BLOOD GAS VENOUS - CREATININE: 0.99 MG/DL — SIGNIFICANT CHANGE UP (ref 0.5–1.3)
BUN SERPL-MCNC: 13 MG/DL — SIGNIFICANT CHANGE UP (ref 7–23)
C PNEUM DNA SPEC QL NAA+PROBE: NOT DETECTED — SIGNIFICANT CHANGE UP
CALCIUM SERPL-MCNC: 8.3 MG/DL — LOW (ref 8.4–10.5)
CHLORIDE BLDV-SCNC: 105 MMOL/L — SIGNIFICANT CHANGE UP (ref 96–108)
CHLORIDE SERPL-SCNC: 100 MMOL/L — SIGNIFICANT CHANGE UP (ref 98–107)
CK MB BLD-MCNC: 1 NG/ML — SIGNIFICANT CHANGE UP (ref 1–6.6)
CK MB BLD-MCNC: SIGNIFICANT CHANGE UP (ref 0–2.5)
CK SERPL-CCNC: 66 U/L — SIGNIFICANT CHANGE UP (ref 30–200)
CO2 SERPL-SCNC: 23 MMOL/L — SIGNIFICANT CHANGE UP (ref 22–31)
CREAT SERPL-MCNC: 1.04 MG/DL — SIGNIFICANT CHANGE UP (ref 0.5–1.3)
EOSINOPHIL # BLD AUTO: 0.11 K/UL — SIGNIFICANT CHANGE UP (ref 0–0.5)
EOSINOPHIL NFR BLD AUTO: 1 % — SIGNIFICANT CHANGE UP (ref 0–6)
FLUAV H1 2009 PAND RNA SPEC QL NAA+PROBE: NOT DETECTED — SIGNIFICANT CHANGE UP
FLUAV H1 RNA SPEC QL NAA+PROBE: NOT DETECTED — SIGNIFICANT CHANGE UP
FLUAV H3 RNA SPEC QL NAA+PROBE: NOT DETECTED — SIGNIFICANT CHANGE UP
FLUAV SUBTYP SPEC NAA+PROBE: SIGNIFICANT CHANGE UP
FLUBV RNA SPEC QL NAA+PROBE: NOT DETECTED — SIGNIFICANT CHANGE UP
GAS PNL BLDV: 129 MMOL/L — LOW (ref 136–146)
GLUCOSE BLDV-MCNC: 137 — HIGH (ref 70–99)
GLUCOSE SERPL-MCNC: 134 MG/DL — HIGH (ref 70–99)
HADV DNA SPEC QL NAA+PROBE: NOT DETECTED — SIGNIFICANT CHANGE UP
HCO3 BLDV-SCNC: 24 MMOL/L — SIGNIFICANT CHANGE UP (ref 20–27)
HCOV 229E RNA SPEC QL NAA+PROBE: NOT DETECTED — SIGNIFICANT CHANGE UP
HCOV HKU1 RNA SPEC QL NAA+PROBE: NOT DETECTED — SIGNIFICANT CHANGE UP
HCOV NL63 RNA SPEC QL NAA+PROBE: NOT DETECTED — SIGNIFICANT CHANGE UP
HCOV OC43 RNA SPEC QL NAA+PROBE: NOT DETECTED — SIGNIFICANT CHANGE UP
HCT VFR BLD CALC: 32 % — LOW (ref 39–50)
HCT VFR BLDV CALC: 30 % — LOW (ref 39–51)
HGB BLD-MCNC: 9.7 G/DL — LOW (ref 13–17)
HGB BLDV-MCNC: 9.7 G/DL — LOW (ref 13–17)
HMPV RNA SPEC QL NAA+PROBE: NOT DETECTED — SIGNIFICANT CHANGE UP
HPIV1 RNA SPEC QL NAA+PROBE: NOT DETECTED — SIGNIFICANT CHANGE UP
HPIV2 RNA SPEC QL NAA+PROBE: NOT DETECTED — SIGNIFICANT CHANGE UP
HPIV3 RNA SPEC QL NAA+PROBE: NOT DETECTED — SIGNIFICANT CHANGE UP
HPIV4 RNA SPEC QL NAA+PROBE: NOT DETECTED — SIGNIFICANT CHANGE UP
HYPOCHROMIA BLD QL: SIGNIFICANT CHANGE UP
IMM GRANULOCYTES # BLD AUTO: 0.06 # — SIGNIFICANT CHANGE UP
IMM GRANULOCYTES NFR BLD AUTO: 0.6 % — SIGNIFICANT CHANGE UP (ref 0–1.5)
INR BLD: 1.59 — HIGH (ref 0.88–1.17)
LACTATE BLDV-MCNC: 2.9 MMOL/L — HIGH (ref 0.5–2)
LYMPHOCYTES # BLD AUTO: 1.8 K/UL — SIGNIFICANT CHANGE UP (ref 1–3.3)
LYMPHOCYTES # BLD AUTO: 16.8 % — SIGNIFICANT CHANGE UP (ref 13–44)
M PNEUMO DNA SPEC QL NAA+PROBE: NOT DETECTED — SIGNIFICANT CHANGE UP
MACROCYTES BLD QL: SIGNIFICANT CHANGE UP
MANUAL SMEAR VERIFICATION: SIGNIFICANT CHANGE UP
MCHC RBC-ENTMCNC: 20.4 PG — LOW (ref 27–34)
MCHC RBC-ENTMCNC: 30.3 % — LOW (ref 32–36)
MCV RBC AUTO: 67.4 FL — LOW (ref 80–100)
MICROCYTES BLD QL: SLIGHT — SIGNIFICANT CHANGE UP
MONOCYTES # BLD AUTO: 0.9 K/UL — SIGNIFICANT CHANGE UP (ref 0–0.9)
MONOCYTES NFR BLD AUTO: 8.4 % — SIGNIFICANT CHANGE UP (ref 2–14)
NEUTROPHILS # BLD AUTO: 7.8 K/UL — HIGH (ref 1.8–7.4)
NEUTROPHILS NFR BLD AUTO: 72.7 % — SIGNIFICANT CHANGE UP (ref 43–77)
NRBC # FLD: 0 — SIGNIFICANT CHANGE UP
NT-PROBNP SERPL-SCNC: 2656 PG/ML — SIGNIFICANT CHANGE UP
OVALOCYTES BLD QL SMEAR: SLIGHT — SIGNIFICANT CHANGE UP
PCO2 BLDV: 47 MMHG — SIGNIFICANT CHANGE UP (ref 41–51)
PH BLDV: 7.36 PH — SIGNIFICANT CHANGE UP (ref 7.32–7.43)
PLATELET # BLD AUTO: 245 K/UL — SIGNIFICANT CHANGE UP (ref 150–400)
PLATELET COUNT - ESTIMATE: NORMAL — SIGNIFICANT CHANGE UP
PMV BLD: 10.4 FL — SIGNIFICANT CHANGE UP (ref 7–13)
PO2 BLDV: 26 MMHG — LOW (ref 35–40)
POIKILOCYTOSIS BLD QL AUTO: SIGNIFICANT CHANGE UP
POTASSIUM BLDV-SCNC: 5.3 MMOL/L — HIGH (ref 3.4–4.5)
POTASSIUM SERPL-MCNC: 4.6 MMOL/L — SIGNIFICANT CHANGE UP (ref 3.5–5.3)
POTASSIUM SERPL-SCNC: 4.6 MMOL/L — SIGNIFICANT CHANGE UP (ref 3.5–5.3)
PROT SERPL-MCNC: 7.8 G/DL — SIGNIFICANT CHANGE UP (ref 6–8.3)
PROTHROM AB SERPL-ACNC: 18 SEC — HIGH (ref 9.8–13.1)
RBC # BLD: 4.75 M/UL — SIGNIFICANT CHANGE UP (ref 4.2–5.8)
RBC # FLD: 19.5 % — HIGH (ref 10.3–14.5)
RSV RNA SPEC QL NAA+PROBE: NOT DETECTED — SIGNIFICANT CHANGE UP
RV+EV RNA SPEC QL NAA+PROBE: POSITIVE — HIGH
SAO2 % BLDV: 38 % — LOW (ref 60–85)
SCHISTOCYTES BLD QL AUTO: SLIGHT — SIGNIFICANT CHANGE UP
SODIUM SERPL-SCNC: 136 MMOL/L — SIGNIFICANT CHANGE UP (ref 135–145)
TARGETS BLD QL SMEAR: SIGNIFICANT CHANGE UP
TROPONIN T SERPL-MCNC: < 0.06 NG/ML — SIGNIFICANT CHANGE UP (ref 0–0.06)
WBC # BLD: 10.72 K/UL — HIGH (ref 3.8–10.5)
WBC # FLD AUTO: 10.72 K/UL — HIGH (ref 3.8–10.5)

## 2017-11-14 PROCEDURE — 71010: CPT | Mod: 26

## 2017-11-14 RX ORDER — FUROSEMIDE 40 MG
40 TABLET ORAL ONCE
Qty: 0 | Refills: 0 | Status: COMPLETED | OUTPATIENT
Start: 2017-11-14 | End: 2017-11-14

## 2017-11-14 RX ORDER — VANCOMYCIN HCL 1 G
1000 VIAL (EA) INTRAVENOUS ONCE
Qty: 0 | Refills: 0 | Status: DISCONTINUED | OUTPATIENT
Start: 2017-11-14 | End: 2017-11-15

## 2017-11-14 RX ORDER — CIPROFLOXACIN LACTATE 400MG/40ML
400 VIAL (ML) INTRAVENOUS ONCE
Qty: 0 | Refills: 0 | Status: COMPLETED | OUTPATIENT
Start: 2017-11-14 | End: 2017-11-14

## 2017-11-14 RX ORDER — IPRATROPIUM/ALBUTEROL SULFATE 18-103MCG
3 AEROSOL WITH ADAPTER (GRAM) INHALATION EVERY 6 HOURS
Qty: 0 | Refills: 0 | Status: DISCONTINUED | OUTPATIENT
Start: 2017-11-14 | End: 2017-11-18

## 2017-11-14 RX ORDER — CEFEPIME 1 G/1
1000 INJECTION, POWDER, FOR SOLUTION INTRAMUSCULAR; INTRAVENOUS ONCE
Qty: 0 | Refills: 0 | Status: COMPLETED | OUTPATIENT
Start: 2017-11-14 | End: 2017-11-14

## 2017-11-14 RX ORDER — TIOTROPIUM BROMIDE 18 UG/1
1 CAPSULE ORAL; RESPIRATORY (INHALATION) DAILY
Qty: 0 | Refills: 0 | Status: DISCONTINUED | OUTPATIENT
Start: 2017-11-14 | End: 2017-11-18

## 2017-11-14 RX ORDER — IPRATROPIUM/ALBUTEROL SULFATE 18-103MCG
3 AEROSOL WITH ADAPTER (GRAM) INHALATION ONCE
Qty: 0 | Refills: 0 | Status: COMPLETED | OUTPATIENT
Start: 2017-11-14 | End: 2017-11-14

## 2017-11-14 RX ORDER — ALBUTEROL 90 UG/1
1 AEROSOL, METERED ORAL EVERY 4 HOURS
Qty: 0 | Refills: 0 | Status: DISCONTINUED | OUTPATIENT
Start: 2017-11-14 | End: 2017-11-18

## 2017-11-14 RX ORDER — NITROGLYCERIN 6.5 MG
0.4 CAPSULE, EXTENDED RELEASE ORAL ONCE
Qty: 0 | Refills: 0 | Status: COMPLETED | OUTPATIENT
Start: 2017-11-14 | End: 2017-11-14

## 2017-11-14 RX ADMIN — Medication 3 MILLILITER(S): at 19:31

## 2017-11-14 RX ADMIN — Medication 3 MILLILITER(S): at 19:32

## 2017-11-14 RX ADMIN — Medication 0.4 MILLIGRAM(S): at 19:31

## 2017-11-14 RX ADMIN — Medication 40 MILLIGRAM(S): at 22:33

## 2017-11-14 RX ADMIN — Medication 200 MILLIGRAM(S): at 23:17

## 2017-11-14 NOTE — ED ADULT NURSE NOTE - OBJECTIVE STATEMENT
pt arrived from home via ems. changed into gown, attached to monitors. swabbed for flu, placed on droplet precautions pending flu results. offered visitors mask to wear. iv start 20g left ac, labs drawn. labeled and sent to lab. pt hx afib, afib on monitor. hr  irregular. c/o cp with sob and cough.

## 2017-11-14 NOTE — ED ADULT NURSE NOTE - CHPI ED SYMPTOMS NEG
no decreased eating/drinking/no chills/no fever/no weakness/no numbness/no nausea/no tingling/no dizziness/no vomiting

## 2017-11-14 NOTE — ED PROVIDER NOTE - OBJECTIVE STATEMENT
78 y/o M with PMH  atrial fibrillation on  Xarelto HTN and HLD p/w sob and cough x 3 days.  Pt. states he has had increased SOB over 3 days worse with ambulation. Pt states he has nonproductive sputum for last 2 days. denies chest pain, chills, nausea, vomiting, diarrhea, dysuria. Pt. states he feels more SOB over the last 2 days and has mild LE. denies hx of COPD or smoking. tachypneic at time of exam, aaox4,

## 2017-11-14 NOTE — ED ADULT NURSE REASSESSMENT NOTE - NS ED NURSE REASSESS COMMENT FT1
RN break Coverage : Alert and oriented x 4. Pt received from RN Enedina in no distress. VSS . Will continue to monitor.

## 2017-11-14 NOTE — ED PROVIDER NOTE - ATTENDING CONTRIBUTION TO CARE
I, Jennifer Cabot, MD, have performed a history and physical exam of the patient and discussed their management with the resident. I reviewed the resident's note and agree with the documented findings and plan of care. My medical decision making and observations are found above.    cabot: 77M with PMH of atrial fibrillation on  Xarelto, HTN, HLD here with 3 days of substernal CP, cough with white sputum, SOB.  No N/V/diaphoresis.  No F/C.  CP is intermittent, exertional.  No PE risk factors aside from tachycardia.  No sick contacts.  On exam, , HDS, saturating 94% on RA.  bilateral wheezing throughout, heart sounds tachy but normal, abd benign, LE with trace BLE edema, pulses normal and equal.  Enterovirus positive, bilateral patchy opacities.  Will cover for HCAP and admit given age, comorbidities, bilat opacities.

## 2017-11-14 NOTE — ED PROVIDER NOTE - MEDICAL DECISION MAKING DETAILS
78 y/o M with PMH  atrial fibrillation on  Xarelto HTN and HLD p/w sob and cough x 3 days. r/o CHF vs multiliobar pna,. cxr, probnp, cbc, cmp, vbg, pt/inr, trop, ckmb, bipap, nitro 78 y/o M with PMH  atrial fibrillation on  Xarelto HTN and HLD p/w sob and cough x 3 days. r/o CHF vs multiliobar pna,. cxr, probnp, cbc, cmp, vbg, pt/inr, trop, ckmb, nitro    cabot: 77M with PMH of atrial fibrillation on  Xarelto, HTN, HLD here with 3 days of substernal CP, cough with white sputum, SOB.  No N/V/diaphoresis.  No F/C.  CP is intermittent, exertional.  No PE risk factors aside from tachycardia.  No sick contacts.  On exam, , HDS, saturating 94% on RA.  bilateral wheezing throughout, heart sounds tachy but normal, abd benign, LE with trace BLE edema, pulses normal and equal.  Enterovirus positive, bilateral patchy opacities.  Will cover for HCAP and admit given age, comorbidities, bilat opacities.

## 2017-11-15 DIAGNOSIS — M10.9 GOUT, UNSPECIFIED: ICD-10-CM

## 2017-11-15 DIAGNOSIS — I10 ESSENTIAL (PRIMARY) HYPERTENSION: ICD-10-CM

## 2017-11-15 DIAGNOSIS — R06.02 SHORTNESS OF BREATH: ICD-10-CM

## 2017-11-15 DIAGNOSIS — I48.91 UNSPECIFIED ATRIAL FIBRILLATION: ICD-10-CM

## 2017-11-15 DIAGNOSIS — Z29.9 ENCOUNTER FOR PROPHYLACTIC MEASURES, UNSPECIFIED: ICD-10-CM

## 2017-11-15 DIAGNOSIS — E78.5 HYPERLIPIDEMIA, UNSPECIFIED: ICD-10-CM

## 2017-11-15 LAB
BASOPHILS # BLD AUTO: 0.04 K/UL — SIGNIFICANT CHANGE UP (ref 0–0.2)
BASOPHILS NFR BLD AUTO: 0.4 % — SIGNIFICANT CHANGE UP (ref 0–2)
BUN SERPL-MCNC: 14 MG/DL — SIGNIFICANT CHANGE UP (ref 7–23)
CALCIUM SERPL-MCNC: 8.2 MG/DL — LOW (ref 8.4–10.5)
CHLORIDE SERPL-SCNC: 98 MMOL/L — SIGNIFICANT CHANGE UP (ref 98–107)
CK MB BLD-MCNC: 1 NG/ML — SIGNIFICANT CHANGE UP (ref 1–6.6)
CK MB BLD-MCNC: 1 NG/ML — SIGNIFICANT CHANGE UP (ref 1–6.6)
CK MB BLD-MCNC: SIGNIFICANT CHANGE UP (ref 0–2.5)
CK MB BLD-MCNC: SIGNIFICANT CHANGE UP (ref 0–2.5)
CK SERPL-CCNC: 73 U/L — SIGNIFICANT CHANGE UP (ref 30–200)
CK SERPL-CCNC: 74 U/L — SIGNIFICANT CHANGE UP (ref 30–200)
CO2 SERPL-SCNC: 26 MMOL/L — SIGNIFICANT CHANGE UP (ref 22–31)
CREAT SERPL-MCNC: 1.1 MG/DL — SIGNIFICANT CHANGE UP (ref 0.5–1.3)
EOSINOPHIL # BLD AUTO: 0.37 K/UL — SIGNIFICANT CHANGE UP (ref 0–0.5)
EOSINOPHIL NFR BLD AUTO: 3.8 % — SIGNIFICANT CHANGE UP (ref 0–6)
GLUCOSE SERPL-MCNC: 111 MG/DL — HIGH (ref 70–99)
HCT VFR BLD CALC: 29.8 % — LOW (ref 39–50)
HGB BLD-MCNC: 8.8 G/DL — LOW (ref 13–17)
IMM GRANULOCYTES # BLD AUTO: 0.02 # — SIGNIFICANT CHANGE UP
IMM GRANULOCYTES NFR BLD AUTO: 0.2 % — SIGNIFICANT CHANGE UP (ref 0–1.5)
LYMPHOCYTES # BLD AUTO: 2.84 K/UL — SIGNIFICANT CHANGE UP (ref 1–3.3)
LYMPHOCYTES # BLD AUTO: 29 % — SIGNIFICANT CHANGE UP (ref 13–44)
MAGNESIUM SERPL-MCNC: 1.9 MG/DL — SIGNIFICANT CHANGE UP (ref 1.6–2.6)
MCHC RBC-ENTMCNC: 19.6 PG — LOW (ref 27–34)
MCHC RBC-ENTMCNC: 29.5 % — LOW (ref 32–36)
MCV RBC AUTO: 66.2 FL — LOW (ref 80–100)
MONOCYTES # BLD AUTO: 1.11 K/UL — HIGH (ref 0–0.9)
MONOCYTES NFR BLD AUTO: 11.3 % — SIGNIFICANT CHANGE UP (ref 2–14)
NEUTROPHILS # BLD AUTO: 5.42 K/UL — SIGNIFICANT CHANGE UP (ref 1.8–7.4)
NEUTROPHILS NFR BLD AUTO: 55.3 % — SIGNIFICANT CHANGE UP (ref 43–77)
NRBC # FLD: 0 — SIGNIFICANT CHANGE UP
PHOSPHATE SERPL-MCNC: 3.5 MG/DL — SIGNIFICANT CHANGE UP (ref 2.5–4.5)
PLATELET # BLD AUTO: 224 K/UL — SIGNIFICANT CHANGE UP (ref 150–400)
PMV BLD: 9.9 FL — SIGNIFICANT CHANGE UP (ref 7–13)
POTASSIUM SERPL-MCNC: 3.9 MMOL/L — SIGNIFICANT CHANGE UP (ref 3.5–5.3)
POTASSIUM SERPL-SCNC: 3.9 MMOL/L — SIGNIFICANT CHANGE UP (ref 3.5–5.3)
RBC # BLD: 4.5 M/UL — SIGNIFICANT CHANGE UP (ref 4.2–5.8)
RBC # FLD: 19.5 % — HIGH (ref 10.3–14.5)
SODIUM SERPL-SCNC: 139 MMOL/L — SIGNIFICANT CHANGE UP (ref 135–145)
TROPONIN T SERPL-MCNC: < 0.06 NG/ML — SIGNIFICANT CHANGE UP (ref 0–0.06)
TROPONIN T SERPL-MCNC: < 0.06 NG/ML — SIGNIFICANT CHANGE UP (ref 0–0.06)
TSH SERPL-MCNC: 0.87 UIU/ML — SIGNIFICANT CHANGE UP (ref 0.27–4.2)
WBC # BLD: 9.8 K/UL — SIGNIFICANT CHANGE UP (ref 3.8–10.5)
WBC # FLD AUTO: 9.8 K/UL — SIGNIFICANT CHANGE UP (ref 3.8–10.5)

## 2017-11-15 PROCEDURE — 71250 CT THORAX DX C-: CPT | Mod: 26

## 2017-11-15 PROCEDURE — 93306 TTE W/DOPPLER COMPLETE: CPT | Mod: 26

## 2017-11-15 RX ORDER — INFLUENZA VIRUS VACCINE 15; 15; 15; 15 UG/.5ML; UG/.5ML; UG/.5ML; UG/.5ML
0.5 SUSPENSION INTRAMUSCULAR ONCE
Qty: 0 | Refills: 0 | Status: DISCONTINUED | OUTPATIENT
Start: 2017-11-15 | End: 2017-11-18

## 2017-11-15 RX ORDER — METOPROLOL TARTRATE 50 MG
50 TABLET ORAL
Qty: 0 | Refills: 0 | Status: DISCONTINUED | OUTPATIENT
Start: 2017-11-15 | End: 2017-11-18

## 2017-11-15 RX ORDER — VANCOMYCIN HCL 1 G
1000 VIAL (EA) INTRAVENOUS ONCE
Qty: 0 | Refills: 0 | Status: COMPLETED | OUTPATIENT
Start: 2017-11-15 | End: 2017-11-15

## 2017-11-15 RX ORDER — CEFEPIME 1 G/1
1000 INJECTION, POWDER, FOR SOLUTION INTRAMUSCULAR; INTRAVENOUS ONCE
Qty: 0 | Refills: 0 | Status: COMPLETED | OUTPATIENT
Start: 2017-11-15 | End: 2017-11-15

## 2017-11-15 RX ORDER — CEFEPIME 1 G/1
INJECTION, POWDER, FOR SOLUTION INTRAMUSCULAR; INTRAVENOUS
Qty: 0 | Refills: 0 | Status: DISCONTINUED | OUTPATIENT
Start: 2017-11-15 | End: 2017-11-18

## 2017-11-15 RX ORDER — ATORVASTATIN CALCIUM 80 MG/1
10 TABLET, FILM COATED ORAL AT BEDTIME
Qty: 0 | Refills: 0 | Status: DISCONTINUED | OUTPATIENT
Start: 2017-11-15 | End: 2017-11-18

## 2017-11-15 RX ORDER — SODIUM CHLORIDE 9 MG/ML
3 INJECTION INTRAMUSCULAR; INTRAVENOUS; SUBCUTANEOUS EVERY 8 HOURS
Qty: 0 | Refills: 0 | Status: DISCONTINUED | OUTPATIENT
Start: 2017-11-15 | End: 2017-11-18

## 2017-11-15 RX ORDER — SUCRALFATE 1 G
1 TABLET ORAL
Qty: 0 | Refills: 0 | Status: DISCONTINUED | OUTPATIENT
Start: 2017-11-15 | End: 2017-11-18

## 2017-11-15 RX ORDER — CEFEPIME 1 G/1
1000 INJECTION, POWDER, FOR SOLUTION INTRAMUSCULAR; INTRAVENOUS EVERY 8 HOURS
Qty: 0 | Refills: 0 | Status: DISCONTINUED | OUTPATIENT
Start: 2017-11-15 | End: 2017-11-18

## 2017-11-15 RX ORDER — FUROSEMIDE 40 MG
40 TABLET ORAL DAILY
Qty: 0 | Refills: 0 | Status: DISCONTINUED | OUTPATIENT
Start: 2017-11-15 | End: 2017-11-17

## 2017-11-15 RX ORDER — RIVAROXABAN 15 MG-20MG
15 KIT ORAL EVERY 24 HOURS
Qty: 0 | Refills: 0 | Status: DISCONTINUED | OUTPATIENT
Start: 2017-11-15 | End: 2017-11-18

## 2017-11-15 RX ORDER — VANCOMYCIN HCL 1 G
1000 VIAL (EA) INTRAVENOUS EVERY 12 HOURS
Qty: 0 | Refills: 0 | Status: DISCONTINUED | OUTPATIENT
Start: 2017-11-16 | End: 2017-11-18

## 2017-11-15 RX ORDER — LISINOPRIL 2.5 MG/1
5 TABLET ORAL DAILY
Qty: 0 | Refills: 0 | Status: DISCONTINUED | OUTPATIENT
Start: 2017-11-15 | End: 2017-11-18

## 2017-11-15 RX ORDER — DILTIAZEM HCL 120 MG
120 CAPSULE, EXT RELEASE 24 HR ORAL DAILY
Qty: 0 | Refills: 0 | Status: DISCONTINUED | OUTPATIENT
Start: 2017-11-15 | End: 2017-11-18

## 2017-11-15 RX ORDER — ALLOPURINOL 300 MG
100 TABLET ORAL DAILY
Qty: 0 | Refills: 0 | Status: DISCONTINUED | OUTPATIENT
Start: 2017-11-15 | End: 2017-11-18

## 2017-11-15 RX ORDER — VANCOMYCIN HCL 1 G
VIAL (EA) INTRAVENOUS
Qty: 0 | Refills: 0 | Status: DISCONTINUED | OUTPATIENT
Start: 2017-11-15 | End: 2017-11-18

## 2017-11-15 RX ADMIN — Medication 1 GRAM(S): at 21:56

## 2017-11-15 RX ADMIN — Medication 250 MILLIGRAM(S): at 23:05

## 2017-11-15 RX ADMIN — Medication 1 GRAM(S): at 07:02

## 2017-11-15 RX ADMIN — Medication 50 MILLIGRAM(S): at 17:38

## 2017-11-15 RX ADMIN — RIVAROXABAN 15 MILLIGRAM(S): KIT at 17:38

## 2017-11-15 RX ADMIN — ATORVASTATIN CALCIUM 10 MILLIGRAM(S): 80 TABLET, FILM COATED ORAL at 21:56

## 2017-11-15 RX ADMIN — SODIUM CHLORIDE 3 MILLILITER(S): 9 INJECTION INTRAMUSCULAR; INTRAVENOUS; SUBCUTANEOUS at 21:49

## 2017-11-15 RX ADMIN — Medication 3 MILLILITER(S): at 04:55

## 2017-11-15 RX ADMIN — LISINOPRIL 5 MILLIGRAM(S): 2.5 TABLET ORAL at 07:01

## 2017-11-15 RX ADMIN — Medication 3 MILLILITER(S): at 17:37

## 2017-11-15 RX ADMIN — Medication 1 GRAM(S): at 17:38

## 2017-11-15 RX ADMIN — Medication 100 MILLIGRAM(S): at 17:38

## 2017-11-15 RX ADMIN — Medication 120 MILLIGRAM(S): at 07:01

## 2017-11-15 RX ADMIN — SODIUM CHLORIDE 3 MILLILITER(S): 9 INJECTION INTRAMUSCULAR; INTRAVENOUS; SUBCUTANEOUS at 15:03

## 2017-11-15 RX ADMIN — CEFEPIME 100 MILLIGRAM(S): 1 INJECTION, POWDER, FOR SOLUTION INTRAMUSCULAR; INTRAVENOUS at 23:05

## 2017-11-15 RX ADMIN — CEFEPIME 100 MILLIGRAM(S): 1 INJECTION, POWDER, FOR SOLUTION INTRAMUSCULAR; INTRAVENOUS at 14:19

## 2017-11-15 RX ADMIN — SODIUM CHLORIDE 3 MILLILITER(S): 9 INJECTION INTRAMUSCULAR; INTRAVENOUS; SUBCUTANEOUS at 07:02

## 2017-11-15 RX ADMIN — Medication 50 MILLIGRAM(S): at 07:02

## 2017-11-15 RX ADMIN — Medication 3 MILLILITER(S): at 12:17

## 2017-11-15 RX ADMIN — CEFEPIME 100 MILLIGRAM(S): 1 INJECTION, POWDER, FOR SOLUTION INTRAMUSCULAR; INTRAVENOUS at 00:08

## 2017-11-15 RX ADMIN — Medication 250 MILLIGRAM(S): at 12:47

## 2017-11-15 RX ADMIN — Medication 1 GRAM(S): at 12:29

## 2017-11-15 RX ADMIN — Medication 3 MILLILITER(S): at 06:16

## 2017-11-15 RX ADMIN — Medication 40 MILLIGRAM(S): at 09:48

## 2017-11-15 NOTE — H&P ADULT - ASSESSMENT
78 y/o M with hx of afib on Xarelto, HTN, HLD, non bleeding gastric ulcer presents with SOB and orthopnea. admitted for acut annette chronic CHF 76 y/o M with hx of afib on Xarelto, HTN, HLD, non bleeding gastric ulcer presents with SOB and orthopnea. admitted for acute on chronic CHF

## 2017-11-15 NOTE — H&P ADULT - HISTORY OF PRESENT ILLNESS
78 y/o M with hx of afib on Xarelto, HTN, HLD, non bleeding gastric ulcer presents with SOB and orthopnea. Patient is complains dyspnea while lying flat x 2 days. He states he normally has no issues with lying flat. He also complains of SANCHEZ after 9ft which is also new for him. Also noted to have mild LE edema and non productive cough. Denies fever, chills, falls, LOC, chest pain, abdominal pain, nausea, vomiting, melena, hematochezia calf tenderness, dysuria, diarrhea, or constipation.     Patient was admitted for anemia/gastric ulcer in September and Xarelto was on hold for anemia, but it was restarted at lower dose per patient

## 2017-11-15 NOTE — CONSULT NOTE ADULT - SUBJECTIVE AND OBJECTIVE BOX
HISTORY OF PRESENT ILLNESS:    76 y/o M with hx of afib on Xarelto, HTN, HLD, non bleeding gastric ulcer presents with SOB and orthopnea. Patient is complains dyspnea while lying flat x 2 days. He states he normally has no issues with lying flat. He also complains of SANCHEZ after 9ft which is also new for him. Also noted to have mild LE edema and non productive cough.  Denies CP on exertion.  Records from last hospitalization in September reviewed.    PAST MEDICAL & SURGICAL HISTORY:  Gout  HLD (hyperlipidemia)  HTN (hypertension)  AF (atrial fibrillation)  No significant past surgical history    FAMILY HISTORY:  No pertinent family history in first degree relatives    SOCIAL HISTORY:    (x ) Non-smoker ( ) Smoker ( ) Alcohol Abuse ( ) IVDA    Allergies  No Known Allergies    MEDICATIONS  (STANDING):  ALBUTerol    90 MICROgram(s) HFA Inhaler 1 Puff(s) Inhalation every 4 hours  ALBUTerol/ipratropium for Nebulization 3 milliLiter(s) Nebulizer every 6 hours  allopurinol 100 milliGRAM(s) Oral daily  atorvastatin 10 milliGRAM(s) Oral at bedtime     cefepime  IVPB 1000 milliGRAM(s) IV Intermittent every 8 hours  diltiazem    milliGRAM(s) Oral daily  furosemide   Injectable 40 milliGRAM(s) IV Push daily  lisinopril 5 milliGRAM(s) Oral daily  metoprolol     tartrate 50 milliGRAM(s) Oral two times a day  rivaroxaban 15 milliGRAM(s) Oral every 24 hours  sodium chloride 0.9% lock flush 3 milliLiter(s) IV Push every 8 hours  sucralfate 1 Gram(s) Oral four times a day  tiotropium 18 MICROgram(s) Capsule 1 Capsule(s) Inhalation daily  vancomycin  IVPB        MEDICATIONS  (PRN):      REVIEW OF SYSTEMS:     CONSTITUTIONAL: No fever, chills, weight loss, or fatigue  RESPIRATORY: see HPI  CARDIOVASCULAR: No chest pain, palpitations, dizziness, syncope, or leg swelling  GASTROINTESTINAL: No abdominal pain. No nausea, vomiting, diarrhea or constipation. No melena or hematochezia.  GENITOURINARY: No dysuria, frequency, hematuria, or incontinence  NEUROLOGICAL: No headaches, memory loss, loss of strength, numbness, or tremors  SKIN: No itching, burning, rashes, or lesions   LYMPH Nodes: No enlarged glands  	  [ x] All others negative	  [ ] Unable to obtain    PHYSICAL EXAM:  Vital Signs Last 24 Hrs  T(C): 36.8 (15 Nov 2017 13:41), Max: 37.4 (14 Nov 2017 16:47)  T(F): 98.3 (15 Nov 2017 13:41), Max: 99.4 (14 Nov 2017 16:47)  HR: 72 (15 Nov 2017 13:41) (72 - 110)  BP: 139/72 (15 Nov 2017 13:41) (126/77 - 160/99)  RR: 16 (15 Nov 2017 13:41) (14 - 23)  SpO2: 100% (15 Nov 2017 13:41) (94% - 100%)    HEENT:   Normal oral mucosa, PERRL, EOMI	  Cardiovascular:  S1 S2 RRR, No JVD, No murmurs  Respiratory: Lungs CTA B/L, No wheeze, rales, or rhonchi	  Gastrointestinal:  Soft, Non-tender, + BS	  Extremities: No clubbing, cyanosis or edema  Vascular: Peripheral pulses palpable 2+ bilaterally    DATA:	      ECG:  	Afib, nonspecific ST-T changes    RADIOLOGY:    < from: CT Chest No Cont (11.15.17 @ 02:55) >  IMPRESSION: Right upper and lower lobe groundglass opacities are   nonspecific however because of retraction of the right major fissure is   suggestive of atelectasis. Small bilateral pleural effusions.    Mild mediastinal lymphadenopathy as described above.    FANTASMA SIMS M.D., RADIOLOGY RESIDENT  This document has been electronically signed.  JUSTICE ESTES M.D., ATTENDING RADIOLOGIST  This document has been electronicallysigned. Nov 15 2017  2:30PM    < end of copied text >  		  LABS:	 	    CARDIAC MARKERS ( 15 Nov 2017 06:14 )  x     / < 0.06 ng/mL / 73 u/L / 1.00 ng/mL / x      CARDIAC MARKERS ( 15 Nov 2017 03:00 )  x     / < 0.06 ng/mL / 74 u/L / 1.00 ng/mL / x      CARDIAC MARKERS ( 14 Nov 2017 18:48 )  x     / < 0.06 ng/mL / 66 u/L / 1.00 ng/mL / x                          8.8    9.80  )-----------( 224      ( 15 Nov 2017 06:14 )             29.8     139  |  98  |  14  ----------------------------<  111<H>  3.9   |  26  |  1.10    Ca    8.2<L>      15 Nov 2017 06:14  Phos  3.5     11-15  Mg     1.9     11-15    TPro  7.8  /  Alb  3.9  /  TBili  0.7  /  DBili  x   /  AST  26  /  ALT  14  /  AlkPhos  81  11-14    PT/INR - ( 14 Nov 2017 18:50 )   PT: 18.0 SEC;   INR: 1.59     PTT - ( 14 Nov 2017 18:50 )  PTT:34.2 SEC  Serum Pro-Brain Natriuretic Peptide: 2656 pg/mL (11-14 @ 18:48)  Thyroid Stimulating Hormone, Serum: 0.87 uIU/mL (11-15 @ 06:14)    ASSESSMENT/PLAN: 	  76 y/o M (OP cardiologist Dr Stevens) with hx of afib on Xarelto, HTN, HLD, non bleeding gastric ulcer on recent EGD presents with SOB and orthopnea.  --Started on IV Lasix given small pleural effusions seen on CT CHest  --Started on IV Abx for ?PNA per medicine  --cont Xarelto  --Check TTE  --further ischemic work up pending above    Karen Michelle PA-C  [facility]  [address], [address]   [place] [address]  office [phone]  pager [phone]

## 2017-11-15 NOTE — CONSULT NOTE ADULT - SUBJECTIVE AND OBJECTIVE BOX
I have seen and examined the patients and reviewed the history Pt has been sick for few days but has been coughing for a quite some time now! He got  more SOB on walking upstairs but denies having any fever as sore throat!  Patient is a 77y old  Male who presents with a chief complaint of SANCHEZ and Orthopnea x 2 days (15 Nov 2017 02:36):: He denies any underlying lung disease and had not been  using any inhlares for breathing: But he does say he has had chronic cough for a while!: His RVP is positve too this time on admission!!      HPI:  76 y/o M with hx of afib on Xarelto, HTN, HLD, non bleeding gastric ulcer presents with SOB and orthopnea. Patient is complains dyspnea while lying flat x 2 days. He states he normally has no issues with lying flat. He also complains of SANCHEZ after 9ft which is also new for him. Also noted to have mild LE edema and non productive cough. Denies fever, chills, falls, LOC, chest pain, abdominal pain, nausea, vomiting, melena, hematochezia calf tenderness, dysuria, diarrhea, or constipation.     Patient was admitted for anemia/gastric ulcer in September and Xarelto was on hold for anemia, but it was restarted at lower dose per patient (15 Nov 2017 02:36)      ?FOLLOWING PRESENT  [x ] Hx of PE/DVT, [x ] Hx COPD, [x ] Hx of Asthma, [y ] Hx of Hospitalization, [ x]  Hx of BiPAP/CPAP use, [x ] Hx of GUCCI    Allergies    No Known Allergies    Intolerances        PAST MEDICAL & SURGICAL HISTORY:  Gout  HLD (hyperlipidemia)  HTN (hypertension)  AF (atrial fibrillation)  No significant past surgical history      FAMILY HISTORY:  No pertinent family history in first degree relatives      Social History: [  x] TOBACCO                  [ x ] ETOH                                 [ x ] IVDA/DRUGS    REVIEW OF SYSTEMS      General:	x    Skin/Breast:x  	  Ophthalmologic:x  	  ENMT:	x    Respiratory and Thorax: sob, cough   	  Cardiovascular:	SANCHEZ    Gastrointestinal:	x    Genitourinary:	x    Musculoskeletal:	x    Neurological:	x    Psychiatric:x    Hematology/Lymphatics:	x    Endocrine:	x    Allergic/Immunologic:	x    MEDICATIONS  (STANDING):  ALBUTerol    90 MICROgram(s) HFA Inhaler 1 Puff(s) Inhalation every 4 hours  ALBUTerol/ipratropium for Nebulization 3 milliLiter(s) Nebulizer every 6 hours  allopurinol 100 milliGRAM(s) Oral daily  atorvastatin 10 milliGRAM(s) Oral at bedtime  diltiazem    milliGRAM(s) Oral daily  furosemide   Injectable 40 milliGRAM(s) IV Push daily  lisinopril 5 milliGRAM(s) Oral daily  metoprolol     tartrate 50 milliGRAM(s) Oral two times a day  rivaroxaban 15 milliGRAM(s) Oral every 24 hours  sodium chloride 0.9% lock flush 3 milliLiter(s) IV Push every 8 hours  sucralfate 1 Gram(s) Oral four times a day  tiotropium 18 MICROgram(s) Capsule 1 Capsule(s) Inhalation daily    MEDICATIONS  (PRN):       Vital Signs Last 24 Hrs  T(C): 36.4 (15 Nov 2017 10:00), Max: 37.4 (14 Nov 2017 16:47)  T(F): 97.6 (15 Nov 2017 10:00), Max: 99.4 (14 Nov 2017 16:47)  HR: 82 (15 Nov 2017 10:00) (82 - 110)  BP: 126/77 (15 Nov 2017 10:00) (126/77 - 160/99)  BP(mean): --  RR: 14 (15 Nov 2017 10:00) (14 - 23)  SpO2: 97% (15 Nov 2017 10:00) (94% - 100%)        I&O's Summary      Physical Exam:   GENERAL: NAD, well-groomed, well-developed  HEENT: SHERINE/   Atraumatic, Normocephalic  ENMT: No tonsillar erythema, exudates, or enlargement; Moist mucous membranes, Good dentition, No lesions  NECK: Supple, No JVD, Normal thyroid  CHEST/LUNG: Scattered crackles right lower half  CVS: Regular rate and rhythm; No murmurs, rubs, or gallops  GI: : Soft, Nontender, Nondistended; Bowel sounds present  NERVOUS SYSTEM:  Alert & Oriented X3  EXTREMITIES:  2+ Peripheral Pulses, No clubbing, cyanosis, or edema  LYMPH: No lymphadenopathy noted  SKIN: No rashes or lesions  ENDOCRINOLOGY: No Thyromegaly  PSYCH: Appropriate    Labs:  0.7<47<4>>26<<7.365>>0.7<<3><<4><<5<<269>>  CARDIAC MARKERS ( 15 Nov 2017 06:14 )  x     / < 0.06 ng/mL / 73 u/L / 1.00 ng/mL / x      CARDIAC MARKERS ( 15 Nov 2017 03:00 )  x     / < 0.06 ng/mL / 74 u/L / 1.00 ng/mL / x      CARDIAC MARKERS ( 14 Nov 2017 18:48 )  x     / < 0.06 ng/mL / 66 u/L / 1.00 ng/mL / x                                8.8    9.80  )-----------( 224      ( 15 Nov 2017 06:14 )             29.8                         9.7    10.72 )-----------( 245      ( 14 Nov 2017 18:48 )             32.0     11-15    139  |  98  |  14  ----------------------------<  111<H>  3.9   |  26  |  1.10  11-14    136  |  100  |  13  ----------------------------<  134<H>  4.6   |  23  |  1.04    Ca    8.2<L>      15 Nov 2017 06:14  Ca    8.3<L>      14 Nov 2017 18:48  Phos  3.5     11-15  Mg     1.9     11-15    TPro  7.8  /  Alb  3.9  /  TBili  0.7  /  DBili  x   /  AST  26  /  ALT  14  /  AlkPhos  81  11-14    CAPILLARY BLOOD GLUCOSE        LIVER FUNCTIONS - ( 14 Nov 2017 18:48 )  Alb: 3.9 g/dL / Pro: 7.8 g/dL / ALK PHOS: 81 u/L / ALT: 14 u/L / AST: 26 u/L / GGT: x           PT/INR - ( 14 Nov 2017 18:50 )   PT: 18.0 SEC;   INR: 1.59          PTT - ( 14 Nov 2017 18:50 )  PTT:34.2 SEC    D DImer  Serum Pro-Brain Natriuretic Peptide: 2656 pg/mL (11-14 @ 18:48)    Cultures:           < from: Xray Chest 1 View AP/PA (11.14.17 @ 19:46) >    EXAM:  RAD CHEST AP PA 1 VIEW        PROCEDURE DATE:  Nov 14 2017         INTERPRETATION:  CLINICAL INDICATION: Cough, shortness of breath.    EXAM: Frontal view of the chest with no prior radiographs.    FINDINGS:   Small bibasilar opacities. There are no pleural effusions or pneumothorax.  The heart size is enlarged.  No acute bony pathology.    IMPRESSION:  Right basilar haziness, nonspecific in nature could   represent layering effusions.                FANTASMA SIMS M.D., RADIOLOGY RESIDENT  This document has been electronically signed.  ABRIL IYER M.D.; ATTENDING RADIOLOGIST  This document has been electronically signed. Nov 15 2017  8:42AM        < end of copied text >              Rapid Respiratory Viral Panel Result        11-14 @ 18:48  Rapid RVP --  Coronovirus --  Adenovirus NOT DETECTED  Bordetella Pertussis NOT DETECTED  Chlamydia Pneumonia NOT DETECTED  Entero/RhinovirusPOSITIVE  HKU1 Coronovirus --  HMPV Coronovirus NOT DETECTED  Influenza A NOT DETECTED (any subtype)  Influenza AH1 NOT DETECTED  Influenza AH1 2009 NOT DETECTED  Influenza AH3 NOT DETECTED  Influenza B NOT DETECTED  Mycoplasma Pneumoniae NOT DETECTED This nucleic acid amplification assay was performed using  the [employer] FilmArray. The following pathogens are tested  for: Adenovirus, Coronavirus 229E, Coronavirus HKU1,  Coronavirus NL63, Coronavirus OC43, Human Metapneumovirus  (HMPV), Rhinovirus/Enterovirus, Influenza A H1, Influenza A  H1 2009 (Pandemic H1 2009), Influenza A H3, Influenza A (Flu  A) subtype not identified, Influenza B, Parainfluenza Virus  (types 1, 2, 3, 4), Respiratory Syncytial Virus (RSV),  Bordetella pertussis, Chlamydophila pneumoniae, and  Mycoplasma pneumoniae. A negative FilmArray result does not  always exclude the possibility of viral or bacterial  infection. Laboratory results should always be interpreted  in the context of clinical findings.  NL63 Coronovirus --  OC43 Coronovirus --  Parainfluenza 1 NOT DETECTED  Parainfluenza 2 NOT DETECTED  Parainfluenza 3 NOT DETECTED  Parainfluenza 4 NOT DETECTED  Resp Syncytial Virus NOT DETECTED        Studies  Chest X-RAY  CT SCAN Chest   CT Abdomen  Venous Dopplers: LE:   Others

## 2017-11-15 NOTE — CONSULT NOTE ADULT - ATTENDING COMMENTS
Patient seen and examined.  Agree with above.   -Check TTE to assess LV function  -ac for cva prevention if no contraindications    Jennifer Miguel MD  [facility]  [address] [place] [address], [address]  [place] [address]  office: [phone]  pager: [phone]

## 2017-11-15 NOTE — PHYSICAL THERAPY INITIAL EVALUATION ADULT - PERTINENT HX OF CURRENT PROBLEM, REHAB EVAL
dyspnea on exertion & orthopnea X2 days, history of fib on Xarelto, HTN, HLD, non bleeding gastric ulcer

## 2017-11-15 NOTE — H&P ADULT - PROBLEM SELECTOR PLAN 1
Admit to tele . CHF exacerbation v ?PNA from viral illness  check cbc, bmp, tsh, trend CE  CT chest ordered  IV lasix  Strict I&Os  daily weights  Fluid restriction to 1200 mL   f/u MD note  Discussed with Dr Ariza

## 2017-11-15 NOTE — H&P ADULT - ATTENDING COMMENTS
seen and examined     PHYSICAL EXAM: vital signs as above  in no apparent distress  HEENT: SHERINE EOMI  Neck: Supple, no JVD, no thyromegaly  Lungs: no rhonchi, no wheeze, b/l crackles lower lung zones decreased breath sounds at bases   CVS: S1 S2 irregular soft ejection systolic murmur best heard at left sternal border   Abdomen: no tenderness, no organomegaly, BS present  Neuro: AO x 3 no focal weakness, no sensory abnormalities  Psych: appropriate affect  Skin: warm, dry  Ext: no cyanosis or clubbing, b/l pitting edema R>L   Msk: no joint swelling or deformities  Back: no CVA tenderness, no kyphosis/scoliosis     Plan as outlined above  pulmonary consult Dr Trevon Schmitz to see  CT chest result pending   s/p one dose of antibiotics   positive for enterovirus on RVP  possibility of pneumonia remains high  will defer to pulmonary seen and examined     PHYSICAL EXAM: vital signs as above  in no apparent distress  HEENT: SHERINE EOMI  Neck: Supple, no JVD, no thyromegaly  Lungs: no rhonchi, no wheeze, b/l crackles lower lung zones decreased breath sounds at bases   CVS: S1 S2 irregular soft ejection systolic murmur best heard at left sternal border   Abdomen: no tenderness, no organomegaly, BS present  Neuro: AO x 3 no focal weakness, no sensory abnormalities  Psych: appropriate affect  Skin: warm, dry  Ext: no cyanosis or clubbing, b/l pitting edema R>L   Msk: no joint swelling or deformities  Back: no CVA tenderness, no kyphosis/scoliosis     Plan as outlined above  pulmonary consult Dr Trevon Schmitz to see  CT chest result pending   s/p one dose of antibiotics   positive for enterovirus on RVP  possibility of pneumonia remains high  will defer to pulmonary    Meanwhile continue to diurese with IV lasix  cardiology attending evaluation Dr Miguel to see

## 2017-11-15 NOTE — CONSULT NOTE ADULT - PROBLEM SELECTOR RECOMMENDATION 9
seems multifactorial: underlying pneumonia as well as CHF exacerbation: He does have bilateral pleural effusions: There is no official report of CT scan chest yet available! For now would add broad spectrum antibiotics: Need to do echo as his probnp is high too!! Pleural effusions are not large enough to tap: Supportive care for rhinovirus positivity!!

## 2017-11-15 NOTE — PHYSICAL THERAPY INITIAL EVALUATION ADULT - PATIENT PROFILE REVIEW, REHAB EVAL
yes/Spoke with RN, patient is OK to be seen for PT today. Formal activity orders not in place. PT evaluate and treat

## 2017-11-15 NOTE — CONSULT NOTE ADULT - ASSESSMENT
78 y/o M with hx of afib on Xarelto, HTN, HLD, non bleeding gastric ulcer presents with SOB and orthopnea. admitted for acute on chronic CHF

## 2017-11-15 NOTE — H&P ADULT - NSHPLABSRESULTS_GEN_ALL_CORE
9.7    10.72 )-----------( 245      ( 14 Nov 2017 18:48 )             32.0     11-14    136  |  100  |  13  ----------------------------<  134<H>  4.6   |  23  |  1.04    Ca    8.3<L>      14 Nov 2017 18:48    TPro  7.8  /  Alb  3.9  /  TBili  0.7  /  DBili  x   /  AST  26  /  ALT  14  /  AlkPhos  81  11-14      CARDIAC MARKERS ( 14 Nov 2017 18:48 )  x     / < 0.06 ng/mL / 66 u/L / 1.00 ng/mL / x    EKG: afib with RVR at 104 BPM

## 2017-11-16 DIAGNOSIS — J18.9 PNEUMONIA, UNSPECIFIED ORGANISM: ICD-10-CM

## 2017-11-16 DIAGNOSIS — I50.33 ACUTE ON CHRONIC DIASTOLIC (CONGESTIVE) HEART FAILURE: ICD-10-CM

## 2017-11-16 LAB
BUN SERPL-MCNC: 11 MG/DL — SIGNIFICANT CHANGE UP (ref 7–23)
CALCIUM SERPL-MCNC: 8.7 MG/DL — SIGNIFICANT CHANGE UP (ref 8.4–10.5)
CHLORIDE SERPL-SCNC: 98 MMOL/L — SIGNIFICANT CHANGE UP (ref 98–107)
CO2 SERPL-SCNC: 25 MMOL/L — SIGNIFICANT CHANGE UP (ref 22–31)
CREAT SERPL-MCNC: 1.26 MG/DL — SIGNIFICANT CHANGE UP (ref 0.5–1.3)
GLUCOSE SERPL-MCNC: 107 MG/DL — HIGH (ref 70–99)
HCT VFR BLD CALC: 36.7 % — LOW (ref 39–50)
HGB BLD-MCNC: 10.6 G/DL — LOW (ref 13–17)
MAGNESIUM SERPL-MCNC: 2 MG/DL — SIGNIFICANT CHANGE UP (ref 1.6–2.6)
MCHC RBC-ENTMCNC: 19.6 PG — LOW (ref 27–34)
MCHC RBC-ENTMCNC: 28.9 % — LOW (ref 32–36)
MCV RBC AUTO: 67.8 FL — LOW (ref 80–100)
NRBC # FLD: 0 — SIGNIFICANT CHANGE UP
PLATELET # BLD AUTO: 259 K/UL — SIGNIFICANT CHANGE UP (ref 150–400)
PMV BLD: 10.5 FL — SIGNIFICANT CHANGE UP (ref 7–13)
POTASSIUM SERPL-MCNC: 3.8 MMOL/L — SIGNIFICANT CHANGE UP (ref 3.5–5.3)
POTASSIUM SERPL-SCNC: 3.8 MMOL/L — SIGNIFICANT CHANGE UP (ref 3.5–5.3)
RBC # BLD: 5.41 M/UL — SIGNIFICANT CHANGE UP (ref 4.2–5.8)
RBC # FLD: 20.3 % — HIGH (ref 10.3–14.5)
SODIUM SERPL-SCNC: 136 MMOL/L — SIGNIFICANT CHANGE UP (ref 135–145)
WBC # BLD: 8.46 K/UL — SIGNIFICANT CHANGE UP (ref 3.8–10.5)
WBC # FLD AUTO: 8.46 K/UL — SIGNIFICANT CHANGE UP (ref 3.8–10.5)

## 2017-11-16 RX ADMIN — Medication 100 MILLIGRAM(S): at 11:37

## 2017-11-16 RX ADMIN — Medication 1 GRAM(S): at 11:37

## 2017-11-16 RX ADMIN — Medication 1 GRAM(S): at 06:11

## 2017-11-16 RX ADMIN — CEFEPIME 100 MILLIGRAM(S): 1 INJECTION, POWDER, FOR SOLUTION INTRAMUSCULAR; INTRAVENOUS at 06:10

## 2017-11-16 RX ADMIN — Medication 40 MILLIGRAM(S): at 06:10

## 2017-11-16 RX ADMIN — Medication 3 MILLILITER(S): at 05:08

## 2017-11-16 RX ADMIN — Medication 1 GRAM(S): at 23:03

## 2017-11-16 RX ADMIN — CEFEPIME 100 MILLIGRAM(S): 1 INJECTION, POWDER, FOR SOLUTION INTRAMUSCULAR; INTRAVENOUS at 14:03

## 2017-11-16 RX ADMIN — CEFEPIME 100 MILLIGRAM(S): 1 INJECTION, POWDER, FOR SOLUTION INTRAMUSCULAR; INTRAVENOUS at 22:02

## 2017-11-16 RX ADMIN — Medication 50 MILLIGRAM(S): at 17:17

## 2017-11-16 RX ADMIN — ATORVASTATIN CALCIUM 10 MILLIGRAM(S): 80 TABLET, FILM COATED ORAL at 22:02

## 2017-11-16 RX ADMIN — Medication 3 MILLILITER(S): at 10:13

## 2017-11-16 RX ADMIN — Medication 120 MILLIGRAM(S): at 06:10

## 2017-11-16 RX ADMIN — LISINOPRIL 5 MILLIGRAM(S): 2.5 TABLET ORAL at 06:11

## 2017-11-16 RX ADMIN — Medication 50 MILLIGRAM(S): at 18:22

## 2017-11-16 RX ADMIN — Medication 1 GRAM(S): at 17:16

## 2017-11-16 RX ADMIN — Medication 250 MILLIGRAM(S): at 17:23

## 2017-11-16 RX ADMIN — Medication 3 MILLILITER(S): at 16:38

## 2017-11-16 RX ADMIN — SODIUM CHLORIDE 3 MILLILITER(S): 9 INJECTION INTRAMUSCULAR; INTRAVENOUS; SUBCUTANEOUS at 14:04

## 2017-11-16 RX ADMIN — RIVAROXABAN 15 MILLIGRAM(S): KIT at 17:17

## 2017-11-16 RX ADMIN — Medication 50 MILLIGRAM(S): at 06:11

## 2017-11-16 RX ADMIN — SODIUM CHLORIDE 3 MILLILITER(S): 9 INJECTION INTRAMUSCULAR; INTRAVENOUS; SUBCUTANEOUS at 06:14

## 2017-11-16 RX ADMIN — SODIUM CHLORIDE 3 MILLILITER(S): 9 INJECTION INTRAMUSCULAR; INTRAVENOUS; SUBCUTANEOUS at 22:00

## 2017-11-16 RX ADMIN — Medication 3 MILLILITER(S): at 21:09

## 2017-11-16 NOTE — PROGRESS NOTE ADULT - ATTENDING COMMENTS
Patient seen and examined.  Agree with above.   -TTE with normal LV function  -No further cv workup needed at this time    Jennifer Miguel MD  [facility]  [address] [facility], [address]  [place] [address]  office: [phone]  pager: [phone] Patient seen and examined.  Agree with above.   -Admitted with sob, found to have +RVP  -TTE with normal LV function  -No further cv workup needed at this time    Jennifer Miguel MD  [facility]  [address] [place] [address], [address]  [place] [address]  office: [phone]  pager: [phone]

## 2017-11-16 NOTE — PROGRESS NOTE ADULT - PROBLEM SELECTOR PLAN 2
on IV lasix  will continue  diuresing well  cardiology attending note appreciated  await echo on IV lasix  will continue  diuresing well  cardiology attending note appreciated  echocardiogram result noted  normal LV function

## 2017-11-17 LAB
BUN SERPL-MCNC: 24 MG/DL — HIGH (ref 7–23)
CALCIUM SERPL-MCNC: 8.8 MG/DL — SIGNIFICANT CHANGE UP (ref 8.4–10.5)
CHLORIDE SERPL-SCNC: 98 MMOL/L — SIGNIFICANT CHANGE UP (ref 98–107)
CO2 SERPL-SCNC: 21 MMOL/L — LOW (ref 22–31)
CREAT SERPL-MCNC: 1.27 MG/DL — SIGNIFICANT CHANGE UP (ref 0.5–1.3)
GLUCOSE BLDC GLUCOMTR-MCNC: 204 MG/DL — HIGH (ref 70–99)
GLUCOSE SERPL-MCNC: 197 MG/DL — HIGH (ref 70–99)
HCT VFR BLD CALC: 35.3 % — LOW (ref 39–50)
HGB BLD-MCNC: 10.4 G/DL — LOW (ref 13–17)
MAGNESIUM SERPL-MCNC: 2.1 MG/DL — SIGNIFICANT CHANGE UP (ref 1.6–2.6)
MCHC RBC-ENTMCNC: 19.7 PG — LOW (ref 27–34)
MCHC RBC-ENTMCNC: 29.5 % — LOW (ref 32–36)
MCV RBC AUTO: 66.9 FL — LOW (ref 80–100)
NRBC # FLD: 0 — SIGNIFICANT CHANGE UP
PHOSPHATE SERPL-MCNC: 3 MG/DL — SIGNIFICANT CHANGE UP (ref 2.5–4.5)
PLATELET # BLD AUTO: 262 K/UL — SIGNIFICANT CHANGE UP (ref 150–400)
PMV BLD: 9.7 FL — SIGNIFICANT CHANGE UP (ref 7–13)
POTASSIUM SERPL-MCNC: 4.3 MMOL/L — SIGNIFICANT CHANGE UP (ref 3.5–5.3)
POTASSIUM SERPL-SCNC: 4.3 MMOL/L — SIGNIFICANT CHANGE UP (ref 3.5–5.3)
RBC # BLD: 5.28 M/UL — SIGNIFICANT CHANGE UP (ref 4.2–5.8)
RBC # FLD: 19.9 % — HIGH (ref 10.3–14.5)
SODIUM SERPL-SCNC: 134 MMOL/L — LOW (ref 135–145)
VANCOMYCIN TROUGH SERPL-MCNC: 15.7 UG/ML — SIGNIFICANT CHANGE UP (ref 10–20)
WBC # BLD: 6.88 K/UL — SIGNIFICANT CHANGE UP (ref 3.8–10.5)
WBC # FLD AUTO: 6.88 K/UL — SIGNIFICANT CHANGE UP (ref 3.8–10.5)

## 2017-11-17 RX ORDER — FUROSEMIDE 40 MG
40 TABLET ORAL DAILY
Qty: 0 | Refills: 0 | Status: DISCONTINUED | OUTPATIENT
Start: 2017-11-17 | End: 2017-11-18

## 2017-11-17 RX ADMIN — Medication 250 MILLIGRAM(S): at 17:19

## 2017-11-17 RX ADMIN — Medication 120 MILLIGRAM(S): at 05:31

## 2017-11-17 RX ADMIN — RIVAROXABAN 15 MILLIGRAM(S): KIT at 17:22

## 2017-11-17 RX ADMIN — Medication 1 GRAM(S): at 12:28

## 2017-11-17 RX ADMIN — CEFEPIME 100 MILLIGRAM(S): 1 INJECTION, POWDER, FOR SOLUTION INTRAMUSCULAR; INTRAVENOUS at 14:36

## 2017-11-17 RX ADMIN — Medication 50 MILLIGRAM(S): at 05:31

## 2017-11-17 RX ADMIN — Medication 3 MILLILITER(S): at 16:27

## 2017-11-17 RX ADMIN — Medication 1 GRAM(S): at 17:20

## 2017-11-17 RX ADMIN — SODIUM CHLORIDE 3 MILLILITER(S): 9 INJECTION INTRAMUSCULAR; INTRAVENOUS; SUBCUTANEOUS at 22:01

## 2017-11-17 RX ADMIN — CEFEPIME 100 MILLIGRAM(S): 1 INJECTION, POWDER, FOR SOLUTION INTRAMUSCULAR; INTRAVENOUS at 05:31

## 2017-11-17 RX ADMIN — SODIUM CHLORIDE 3 MILLILITER(S): 9 INJECTION INTRAMUSCULAR; INTRAVENOUS; SUBCUTANEOUS at 05:30

## 2017-11-17 RX ADMIN — CEFEPIME 100 MILLIGRAM(S): 1 INJECTION, POWDER, FOR SOLUTION INTRAMUSCULAR; INTRAVENOUS at 22:02

## 2017-11-17 RX ADMIN — Medication 3 MILLILITER(S): at 04:49

## 2017-11-17 RX ADMIN — SODIUM CHLORIDE 3 MILLILITER(S): 9 INJECTION INTRAMUSCULAR; INTRAVENOUS; SUBCUTANEOUS at 14:53

## 2017-11-17 RX ADMIN — LISINOPRIL 5 MILLIGRAM(S): 2.5 TABLET ORAL at 05:31

## 2017-11-17 RX ADMIN — ATORVASTATIN CALCIUM 10 MILLIGRAM(S): 80 TABLET, FILM COATED ORAL at 22:02

## 2017-11-17 RX ADMIN — Medication 3 MILLILITER(S): at 10:49

## 2017-11-17 RX ADMIN — Medication 40 MILLIGRAM(S): at 17:22

## 2017-11-17 RX ADMIN — Medication 1 GRAM(S): at 05:31

## 2017-11-17 RX ADMIN — Medication 50 MILLIGRAM(S): at 17:20

## 2017-11-17 RX ADMIN — Medication 250 MILLIGRAM(S): at 06:15

## 2017-11-17 RX ADMIN — Medication 3 MILLILITER(S): at 20:48

## 2017-11-17 RX ADMIN — Medication 40 MILLIGRAM(S): at 05:31

## 2017-11-17 RX ADMIN — Medication 100 MILLIGRAM(S): at 12:28

## 2017-11-17 NOTE — DIETITIAN INITIAL EVALUATION ADULT. - OTHER INFO
Pt admitted with Dx Viral Pneumonia. Remains with excellent PO intakes.  Denies chewing, swallowing difficulties or any nausea, vomiting, diarrhea, constipation. Pt voiced concerns about very high blood sugars while in hospital, states sugars are <130gm/dl at home when checked. Noted Pt is on 50 mg of prednisone, informed likely related to steroid therapy. Usual weight 183#. Educated on low na diet and consistent CHO meal intakes (last admit 9/12/17--HgA1C noted at 75). Pt admitted with Dx Viral Pneumonia. Remains with excellent PO intakes.  Denies chewing, swallowing difficulties or any nausea, vomiting, diarrhea, constipation. Pt voiced concerns about very high blood sugars while in hospital, states sugars are <130gm/dl at home when checked. Noted Pt is on 50 mg of prednisone, informed likely related to steroid therapy. Usual weight 183#. Educated on low na diet and consistent CHO meal intakes (last admit 9/12/17--HgA1C noted at 7.0).

## 2017-11-17 NOTE — PROGRESS NOTE ADULT - PROBLEM SELECTOR PLAN 2
on IV Lasix  will continue  diuresing well  cardiology attending note appreciated  echocardiogram result noted  normal LV function

## 2017-11-17 NOTE — DIETITIAN INITIAL EVALUATION ADULT. - PROBLEM SELECTOR PLAN 1
Admit to tele . CHF exacerbation v ?PNA from viral illness  check cbc, bmp, tsh, trend CE  CT chest ordered  IV lasix  Strict I&Os  daily weights  Fluid restriction to 1200 mL   f/u MD note  Discussed with Dr Airza

## 2017-11-17 NOTE — DIETITIAN INITIAL EVALUATION ADULT. - MD RECOMMEND
Recommend to change diet to Low Na, Consistent Carbohydrate, 1200 Fluid Restriction, Caffeine Restricted, No Carbonated beverages, No chocolate Recommend to change diet to Low Na, Consistent Carbohydrate, 1200 ML Fluid Restriction, Caffeine Restricted, No Carbonated beverages, No chocolate

## 2017-11-17 NOTE — PROGRESS NOTE ADULT - ATTENDING COMMENTS
Patient seen and examined.  Agree with above.   -Admitted with dyspnea, found to have +RVP  -TTE with normal LV function and mild AS  -no further cardiac workup needed at this time    Jennifer Miguel MD  [facility]  [address] [place] [address], [address]  [place] [address]  office: [phone]  pager: [phone]

## 2017-11-18 ENCOUNTER — TRANSCRIPTION ENCOUNTER (OUTPATIENT)
Age: 77
End: 2017-11-18

## 2017-11-18 VITALS
OXYGEN SATURATION: 97 % | RESPIRATION RATE: 16 BRPM | SYSTOLIC BLOOD PRESSURE: 132 MMHG | DIASTOLIC BLOOD PRESSURE: 65 MMHG | HEART RATE: 81 BPM

## 2017-11-18 LAB
BUN SERPL-MCNC: 30 MG/DL — HIGH (ref 7–23)
CALCIUM SERPL-MCNC: 8.5 MG/DL — SIGNIFICANT CHANGE UP (ref 8.4–10.5)
CHLORIDE SERPL-SCNC: 100 MMOL/L — SIGNIFICANT CHANGE UP (ref 98–107)
CO2 SERPL-SCNC: 21 MMOL/L — LOW (ref 22–31)
CREAT SERPL-MCNC: 1.28 MG/DL — SIGNIFICANT CHANGE UP (ref 0.5–1.3)
GLUCOSE SERPL-MCNC: 132 MG/DL — HIGH (ref 70–99)
HCT VFR BLD CALC: 31.2 % — LOW (ref 39–50)
HGB BLD-MCNC: 9.3 G/DL — LOW (ref 13–17)
MAGNESIUM SERPL-MCNC: 2 MG/DL — SIGNIFICANT CHANGE UP (ref 1.6–2.6)
MCHC RBC-ENTMCNC: 19.5 PG — LOW (ref 27–34)
MCHC RBC-ENTMCNC: 29.8 % — LOW (ref 32–36)
MCV RBC AUTO: 65.5 FL — LOW (ref 80–100)
NRBC # FLD: 0 — SIGNIFICANT CHANGE UP
PHOSPHATE SERPL-MCNC: 2.9 MG/DL — SIGNIFICANT CHANGE UP (ref 2.5–4.5)
PLATELET # BLD AUTO: 261 K/UL — SIGNIFICANT CHANGE UP (ref 150–400)
PMV BLD: 10.6 FL — SIGNIFICANT CHANGE UP (ref 7–13)
POTASSIUM SERPL-MCNC: 3.9 MMOL/L — SIGNIFICANT CHANGE UP (ref 3.5–5.3)
POTASSIUM SERPL-SCNC: 3.9 MMOL/L — SIGNIFICANT CHANGE UP (ref 3.5–5.3)
RBC # BLD: 4.76 M/UL — SIGNIFICANT CHANGE UP (ref 4.2–5.8)
RBC # FLD: 19.2 % — HIGH (ref 10.3–14.5)
SODIUM SERPL-SCNC: 137 MMOL/L — SIGNIFICANT CHANGE UP (ref 135–145)
WBC # BLD: 10.46 K/UL — SIGNIFICANT CHANGE UP (ref 3.8–10.5)
WBC # FLD AUTO: 10.46 K/UL — SIGNIFICANT CHANGE UP (ref 3.8–10.5)

## 2017-11-18 RX ORDER — LISINOPRIL 2.5 MG/1
1 TABLET ORAL
Qty: 0 | Refills: 0 | DISCHARGE
Start: 2017-11-18

## 2017-11-18 RX ORDER — LISINOPRIL 2.5 MG/1
0 TABLET ORAL
Qty: 0 | Refills: 0 | COMMUNITY

## 2017-11-18 RX ORDER — METOPROLOL TARTRATE 50 MG
1 TABLET ORAL
Qty: 0 | Refills: 0 | COMMUNITY

## 2017-11-18 RX ORDER — FUROSEMIDE 40 MG
1 TABLET ORAL
Qty: 0 | Refills: 0 | COMMUNITY
Start: 2017-11-18

## 2017-11-18 RX ORDER — FUROSEMIDE 40 MG
1 TABLET ORAL
Qty: 30 | Refills: 1 | OUTPATIENT
Start: 2017-11-18 | End: 2018-01-16

## 2017-11-18 RX ORDER — FONDAPARINUX SODIUM 2.5 MG/.5ML
0 INJECTION, SOLUTION SUBCUTANEOUS
Qty: 0 | Refills: 0 | COMMUNITY

## 2017-11-18 RX ORDER — SUCRALFATE 1 G
1 TABLET ORAL
Qty: 0 | Refills: 0 | COMMUNITY
Start: 2017-11-18

## 2017-11-18 RX ORDER — SUCRALFATE 1 G
1 TABLET ORAL
Qty: 120 | Refills: 0 | OUTPATIENT
Start: 2017-11-18 | End: 2017-12-18

## 2017-11-18 RX ORDER — RIVAROXABAN 15 MG-20MG
1 KIT ORAL
Qty: 0 | Refills: 0 | COMMUNITY
Start: 2017-11-18

## 2017-11-18 RX ORDER — METOPROLOL TARTRATE 50 MG
1 TABLET ORAL
Qty: 0 | Refills: 0 | COMMUNITY
Start: 2017-11-18

## 2017-11-18 RX ADMIN — Medication 50 MILLIGRAM(S): at 06:03

## 2017-11-18 RX ADMIN — RIVAROXABAN 15 MILLIGRAM(S): KIT at 17:23

## 2017-11-18 RX ADMIN — SODIUM CHLORIDE 3 MILLILITER(S): 9 INJECTION INTRAMUSCULAR; INTRAVENOUS; SUBCUTANEOUS at 06:05

## 2017-11-18 RX ADMIN — Medication 250 MILLIGRAM(S): at 17:23

## 2017-11-18 RX ADMIN — CEFEPIME 100 MILLIGRAM(S): 1 INJECTION, POWDER, FOR SOLUTION INTRAMUSCULAR; INTRAVENOUS at 06:00

## 2017-11-18 RX ADMIN — SODIUM CHLORIDE 3 MILLILITER(S): 9 INJECTION INTRAMUSCULAR; INTRAVENOUS; SUBCUTANEOUS at 11:04

## 2017-11-18 RX ADMIN — Medication 100 MILLIGRAM(S): at 11:04

## 2017-11-18 RX ADMIN — Medication 3 MILLILITER(S): at 16:10

## 2017-11-18 RX ADMIN — Medication 120 MILLIGRAM(S): at 06:00

## 2017-11-18 RX ADMIN — Medication 3 MILLILITER(S): at 09:13

## 2017-11-18 RX ADMIN — Medication 50 MILLIGRAM(S): at 17:23

## 2017-11-18 RX ADMIN — Medication 1 GRAM(S): at 11:04

## 2017-11-18 RX ADMIN — Medication 1 GRAM(S): at 00:55

## 2017-11-18 RX ADMIN — CEFEPIME 100 MILLIGRAM(S): 1 INJECTION, POWDER, FOR SOLUTION INTRAMUSCULAR; INTRAVENOUS at 13:05

## 2017-11-18 RX ADMIN — Medication 1 GRAM(S): at 17:23

## 2017-11-18 RX ADMIN — Medication 50 MILLIGRAM(S): at 06:04

## 2017-11-18 RX ADMIN — Medication 1 GRAM(S): at 06:04

## 2017-11-18 RX ADMIN — LISINOPRIL 5 MILLIGRAM(S): 2.5 TABLET ORAL at 06:03

## 2017-11-18 RX ADMIN — Medication 40 MILLIGRAM(S): at 06:03

## 2017-11-18 RX ADMIN — Medication 3 MILLILITER(S): at 03:18

## 2017-11-18 RX ADMIN — Medication 250 MILLIGRAM(S): at 06:04

## 2017-11-18 NOTE — PROGRESS NOTE ADULT - PROBLEM SELECTOR PLAN 3
HR controlled : on AC : tolerating well  11/18 On AC, rate controlled
HR controlled : on AC : tolerating well
cont statin
HR controlled : on AC : tolerating well
cont statin
cont statin

## 2017-11-18 NOTE — PROGRESS NOTE ADULT - PROBLEM SELECTOR PROBLEM 3
AF (atrial fibrillation)
AF (atrial fibrillation)
HLD (hyperlipidemia)
AF (atrial fibrillation)
HLD (hyperlipidemia)
HLD (hyperlipidemia)

## 2017-11-18 NOTE — PROGRESS NOTE ADULT - PROBLEM SELECTOR PLAN 1
secondary to pneumonia as well as wheezing: steroids added for a few days only!  follow vanc levels  11/18 doing better. denies SOB. still wheezing though. Continue current meds including oral steriod
continue antibiotics  will likely change to po antibiotics tomorrow and discharge  wheeze much less
secondary to pneumonia as well as wheezing: steroids added for a few days only!  follow vanc levels  11/17: improved significantly: mild wheezing still percent , but he  feels much better then before: CT damien wetzel noted: but would like to finish 7 days of therapy!
continue antibiotics  will likely change to po antibiotics tomorrow and discharge  wheeze much less  Augmentin 875 BIC x two more days
likely cause of shortness of breath along with CHF  on IV antibiotics  will continue  pulmonary note appreciated
secondary to pneumonia as well as wheezing: steroids added for a few days only!  follow vanc levels

## 2017-11-18 NOTE — DISCHARGE NOTE ADULT - OTHER SIGNIFICANT FINDINGS
(PMH) Gout  (PMH) HLD (hyperlipidemia)  (PMH) HTN (hypertension)  (PMH) AF (atrial fibrillation)  (PSH) No significant past surgical history

## 2017-11-18 NOTE — PROGRESS NOTE ADULT - PROBLEM SELECTOR PROBLEM 2
HLD (hyperlipidemia)
Acute on chronic diastolic congestive heart failure
HLD (hyperlipidemia)
Acute on chronic diastolic congestive heart failure
Acute on chronic diastolic congestive heart failure
HLD (hyperlipidemia)

## 2017-11-18 NOTE — DISCHARGE NOTE ADULT - CARE PLAN
Principal Discharge DX:	Acute on chronic diastolic congestive heart failure  Goal:	Maintain euvolemia.  Instructions for follow-up, activity and diet:	Continue Lasix, current medications.   Monitor urine output, weight, fluid intake.   Follow-up with your Private Medical Doctor within 1 week.  Secondary Diagnosis:	AF (atrial fibrillation)  Goal:	Rate control.  Instructions for follow-up, activity and diet:	Continue Xarelto, Metoprolol.  Secondary Diagnosis:	Pneumonia  Goal:	Continue to monitor.  Instructions for follow-up, activity and diet:	Continue Augmentin 875mg for 2 days.  Secondary Diagnosis:	Gout  Goal:	Continue to monitor.  Instructions for follow-up, activity and diet:	Continue Allopurinol, Prednisone.  Secondary Diagnosis:	HTN (hypertension)  Goal:	Reduce blood pressure.  Instructions for follow-up, activity and diet:	Continue Lisinopril, Metoprolol, Lasix.

## 2017-11-18 NOTE — DISCHARGE NOTE ADULT - MEDICATION SUMMARY - MEDICATIONS TO TAKE
I will START or STAY ON the medications listed below when I get home from the hospital:    predniSONE 50 mg oral tablet  -- 1 tab(s) by mouth once a day  -- Indication: For Steroid    lisinopril 5 mg oral tablet  -- 1 tab(s) by mouth once a day  -- Indication: For HTN (hypertension)    Cartia  mg/24 hours oral capsule, extended release  -- Indication: For A-Fib    rivaroxaban 15 mg oral tablet  -- 1 tab(s) by mouth every 24 hours  -- Indication: For A-Fib    allopurinol 100 mg oral tablet  -- 1 tab(s) by mouth once a day  -- Indication: For Gout    pravastatin 40 mg oral tablet  -- Indication: For HLD (hyperlipidemia)    metoprolol tartrate 50 mg oral tablet  -- 1 tab(s) by mouth 2 times a day  -- Indication: For A-fib    furosemide 40 mg oral tablet  -- 1 tab(s) by mouth once a day  -- Indication: For CHF exacerbation    sucralfate 1 g oral tablet  -- 1 tab(s) by mouth 4 times a day  -- Indication: For GERD    Augmentin 875 mg-125 mg oral tablet  -- 1 tab(s) by mouth every 12 hours  -- Indication: For Pneumonia I will START or STAY ON the medications listed below when I get home from the hospital:    predniSONE 50 mg oral tablet  -- 1 tab(s) by mouth once a day stop after 1 day.   -- Indication: For Steroid     lisinopril 5 mg oral tablet  -- 1 tab(s) by mouth once a day  -- Indication: For HTN (hypertension)    Cartia  mg/24 hours oral capsule, extended release  -- Indication: For A-Fib    rivaroxaban 15 mg oral tablet  -- 1 tab(s) by mouth every 24 hours  -- Indication: For A-Fib    allopurinol 100 mg oral tablet  -- 1 tab(s) by mouth once a day  -- Indication: For Gout    pravastatin 40 mg oral tablet  -- Indication: For HLD (hyperlipidemia)    metoprolol tartrate 50 mg oral tablet  -- 1 tab(s) by mouth 2 times a day  -- Indication: For A-fib    furosemide 40 mg oral tablet  -- 1 tab(s) by mouth once a day  -- Indication: For CHF    sucralfate 1 g oral tablet  -- 1 tab(s) by mouth 4 times a day  -- Indication: For GERD    Augmentin 875 mg-125 mg oral tablet  -- 1 tab(s) by mouth every 12 hours  -- Indication: For Pneumonia

## 2017-11-18 NOTE — PROGRESS NOTE ADULT - PROBLEM SELECTOR PLAN 6
on AC anyway  11/18 on AC
cont allopurinol
on AC anyway
cont allopurinol
cont allopurinol
on AC anyway

## 2017-11-18 NOTE — PROGRESS NOTE ADULT - PROBLEM SELECTOR PLAN 2
now on po furosemide  will monitor   diuresing well  cardiology attending note appreciated  echocardiogram result noted  normal LV function

## 2017-11-18 NOTE — PROGRESS NOTE ADULT - PROBLEM SELECTOR PLAN 4
Controlled  11/18 SBP less than 160 for last 24 hrs
Controlled
HR controlled   will monitor  continue Xarelto
Controlled
HR controlled   will monitor  continue Xarelto
HR controlled   will monitor  continue Xarelto

## 2017-11-18 NOTE — PROGRESS NOTE ADULT - SUBJECTIVE AND OBJECTIVE BOX
Patient is a 77y old  Male who presents with a chief complaint of SANCHEZ and Orthopnea x 2 days    SUBJECTIVE / OVERNIGHT EVENTS: No nausea, vomiting or diarrhea, no fever or chills, no dizziness or chest pain, no dysuria or hematuria, no joint pain or swelling    states feels much better  family at bedside    MEDICATIONS  (STANDING):  ALBUTerol    90 MICROgram(s) HFA Inhaler 1 Puff(s) Inhalation every 4 hours  ALBUTerol/ipratropium for Nebulization 3 milliLiter(s) Nebulizer every 6 hours  allopurinol 100 milliGRAM(s) Oral daily  atorvastatin 10 milliGRAM(s) Oral at bedtime  cefepime  IVPB      cefepime  IVPB 1000 milliGRAM(s) IV Intermittent every 8 hours  diltiazem    milliGRAM(s) Oral daily  furosemide   Injectable 40 milliGRAM(s) IV Push daily  influenza   Vaccine 0.5 milliLiter(s) IntraMuscular once  lisinopril 5 milliGRAM(s) Oral daily  metoprolol     tartrate 50 milliGRAM(s) Oral two times a day  predniSONE   Tablet 50 milliGRAM(s) Oral daily  rivaroxaban 15 milliGRAM(s) Oral every 24 hours  sodium chloride 0.9% lock flush 3 milliLiter(s) IV Push every 8 hours  sucralfate 1 Gram(s) Oral four times a day  tiotropium 18 MICROgram(s) Capsule 1 Capsule(s) Inhalation daily  vancomycin  IVPB 1000 milliGRAM(s) IV Intermittent every 12 hours  vancomycin  IVPB        MEDICATIONS  (PRN):        CAPILLARY BLOOD GLUCOSE      POCT Blood Glucose.: 204 mg/dL (17 Nov 2017 09:18)    I&O's Summary    16 Nov 2017 07:01  -  17 Nov 2017 07:00  --------------------------------------------------------  IN: 1318 mL / OUT: 700 mL / NET: 618 mL    PHYSICAL EXAM:  GENERAL: NAD, well-developed  HEAD:  Atraumatic, Normocephalic  EYES: EOMI, PERRLA, conjunctiva and sclera clear, no pallor today  NECK: Supple, No JVD  CHEST/LUNG: b/l wheeze much improved, no crackles decreased breath sounds at bases   likely rub right lower lung zone  HEART: irregular rate and rhythm; No murmurs, rubs, or gallops  ABDOMEN: Soft, Nontender, Nondistended; Bowel sounds present  EXTREMITIES:   no edema, No clubbing or cyanosis, + Peripheral Pulses,  PSYCH: AO x 3  NEUROLOGY: non-focal  SKIN: No rashes or lesions    LABS:                        10.4   6.88  )-----------( 262      ( 17 Nov 2017 05:30 )             35.3     11-17    134<L>  |  98  |  24<H>  ----------------------------<  197<H>  4.3   |  21<L>  |  1.27    Ca    8.8      17 Nov 2017 05:30  Phos  3.0     11-17  Mg     2.1     11-17                  Consultant(s) Notes Reviewed:      Care Discussed with Consultants/Other Providers:    Contact Number, Dr Ariza [phone]
Patient is a 77y old  Male who presents with a chief complaint of SANCHEZ and Orthopnea x 2 days (15 Nov 2017 02:36)  pt is doing ok  not SOB       Any change in ROS:     MEDICATIONS  (STANDING):  ALBUTerol    90 MICROgram(s) HFA Inhaler 1 Puff(s) Inhalation every 4 hours  ALBUTerol/ipratropium for Nebulization 3 milliLiter(s) Nebulizer every 6 hours  allopurinol 100 milliGRAM(s) Oral daily  atorvastatin 10 milliGRAM(s) Oral at bedtime  cefepime  IVPB      cefepime  IVPB 1000 milliGRAM(s) IV Intermittent every 8 hours  diltiazem    milliGRAM(s) Oral daily  furosemide    Tablet 40 milliGRAM(s) Oral daily  influenza   Vaccine 0.5 milliLiter(s) IntraMuscular once  lisinopril 5 milliGRAM(s) Oral daily  metoprolol     tartrate 50 milliGRAM(s) Oral two times a day  predniSONE   Tablet 50 milliGRAM(s) Oral daily  rivaroxaban 15 milliGRAM(s) Oral every 24 hours  sodium chloride 0.9% lock flush 3 milliLiter(s) IV Push every 8 hours  sucralfate 1 Gram(s) Oral four times a day  tiotropium 18 MICROgram(s) Capsule 1 Capsule(s) Inhalation daily  vancomycin  IVPB 1000 milliGRAM(s) IV Intermittent every 12 hours  vancomycin  IVPB        MEDICATIONS  (PRN):    Vital Signs Last 24 Hrs  T(C): 36.3 (17 Nov 2017 12:26), Max: 37 (16 Nov 2017 21:58)  T(F): 97.4 (17 Nov 2017 12:26), Max: 98.6 (16 Nov 2017 21:58)  HR: 84 (17 Nov 2017 12:26) (67 - 111)  BP: 128/72 (17 Nov 2017 12:26) (112/66 - 141/90)  BP(mean): --  RR: 14 (17 Nov 2017 12:26) (14 - 18)  SpO2: 98% (17 Nov 2017 12:26) (97% - 100%)    I&O's Summary    16 Nov 2017 07:01  -  17 Nov 2017 07:00  --------------------------------------------------------  IN: 1318 mL / OUT: 700 mL / NET: 618 mL    17 Nov 2017 07:01  -  17 Nov 2017 16:19  --------------------------------------------------------  IN: 590 mL / OUT: 0 mL / NET: 590 mL          Physical Exam:   GENERAL: NAD, well-groomed, well-developed  HEENT: SHERINE/   Atraumatic, Normocephalic  ENMT: No tonsillar erythema, exudates, or enlargement; Moist mucous membranes, Good dentition, No lesions  NECK: Supple, No JVD, Normal thyroid  CHEST/LUNG: pleural rub vs coarse rhonchi bilateral lower lobes   CVS: Regular rate and rhythm; No murmurs, rubs, or gallops  GI: : Soft, Nontender, Nondistended; Bowel sounds present  NERVOUS SYSTEM:  Alert & Oriented X3  EXTREMITIES:  2+ Peripheral Pulses, No clubbing, cyanosis, or edema  LYMPH: No lymphadenopathy noted  SKIN: No rashes or lesions  ENDOCRINOLOGY: No Thyromegaly  PSYCH: Appropriate    Labs:  24                            10.4   6.88  )-----------( 262      ( 17 Nov 2017 05:30 )             35.3                         10.6   8.46  )-----------( 259      ( 16 Nov 2017 07:00 )             36.7                         8.8    9.80  )-----------( 224      ( 15 Nov 2017 06:14 )             29.8                         9.7    10.72 )-----------( 245      ( 14 Nov 2017 18:48 )             32.0     11-17    134<L>  |  98  |  24<H>  ----------------------------<  197<H>  4.3   |  21<L>  |  1.27  11-16    136  |  98  |  11  ----------------------------<  107<H>  3.8   |  25  |  1.26  11-15    139  |  98  |  14  ----------------------------<  111<H>  3.9   |  26  |  1.10  11-14    136  |  100  |  13  ----------------------------<  134<H>  4.6   |  23  |  1.04    Ca    8.8      17 Nov 2017 05:30  Ca    8.7      16 Nov 2017 07:00  Phos  3.0     11-17  Mg     2.1     11-17  Mg     2.0     11-16    TPro  7.8  /  Alb  3.9  /  TBili  0.7  /  DBili  x   /  AST  26  /  ALT  14  /  AlkPhos  81  11-14    CAPILLARY BLOOD GLUCOSE      POCT Blood Glucose.: 204 mg/dL (17 Nov 2017 09:18)            Serum Pro-Brain Natriuretic Peptide: 2656 pg/mL (11-14 @ 18:48)  Cultures:     Vancomycin Trough:     11-17 @ 05:30  15.7    Gentamicin:          11-17 @ 05:30  --              < from: CT Chest No Cont (11.15.17 @ 02:55) >  HEST:     LUNGS AND LARGE AIRWAYS: Patent central airways.  Groundglass opacities   in the right lower lobe with retraction of the major fissure and inferior   portion of the right upper lobe. Scattered small peripheral patchy and   linear opacities in the bilateral upper lobes and right middle lobe.   Small calcified granuloma in the right lower lobe. No pulmonary nodules.  PLEURA: Small bilateral pleural effusions.  VESSELS: Normal left-sided aortic arch and descending aorta. No aneurysm.   Mild aortic atherosclerotic changes.  HEART: Heart size is enlarged. No pericardial effusion. Aortic and   coronary artery calcifications.  MEDIASTINUM AND PAYAL: Mildmediastinal lymphadenopathy. For reference,   largest lymph node in the pretracheal space measures 1.1 cm in short axis   (series 3, image 121). Small prevascular lymph nodes are seen.  CHEST WALL AND LOWER NECK: Within normal limits.  VISUALIZED UPPER ABDOMEN: Cholelithiasis.  BONES: Mild thoracic spondylosis.    IMPRESSION: Right upper and lower lobe groundglass opacities are   nonspecific however because of retraction of the right major fissure is   suggestive of atelectasis. Small bilateral pleural effusions.    Mild mediastinal lymphadenopathy as described above.                  FANTASMA SIMS M.D., RADIOLOGY RESIDENT  This document has been electronically signed.  JUSTICE ESTES M.D., ATTENDING RADIOLOGIST  This document has been electronicallysigned. Nov 15 2017  2:30PM        < end of copied text >          Rapid Respiratory Viral Panel Result        11-14 @ 18:48  Rapid RVP --  Coronovirus --  Adenovirus NOT DETECTED  Bordetella Pertussis NOT DETECTED  Chlamydia Pneumonia NOT DETECTED  Entero/RhinovirusPOSITIVE  HKU1 Coronovirus --  HMPV Coronovirus NOT DETECTED  Influenza A NOT DETECTED (any subtype)  Influenza AH1 NOT DETECTED  Influenza AH1 2009 NOT DETECTED  Influenza AH3 NOT DETECTED  Influenza B NOT DETECTED  Mycoplasma Pneumoniae NOT DETECTED This nucleic acid amplification assay was performed using  the [employer] FilmArray. The following pathogens are tested  for: Adenovirus, Coronavirus 229E, Coronavirus HKU1,  Coronavirus NL63, Coronavirus OC43, Human Metapneumovirus  (HMPV), Rhinovirus/Enterovirus, Influenza A H1, Influenza A  H1 2009 (Pandemic H1 2009), Influenza A H3, Influenza A (Flu  A) subtype not identified, Influenza B, Parainfluenza Virus  (types 1, 2, 3, 4), Respiratory Syncytial Virus (RSV),  Bordetella pertussis, Chlamydophila pneumoniae, and  Mycoplasma pneumoniae. A negative FilmArray result does not  always exclude the possibility of viral or bacterial  infection. Laboratory results should always be interpreted  in the context of clinical findings.  NL63 Coronovirus --  OC43 Coronovirus --  Parainfluenza 1 NOT DETECTED  Parainfluenza 2 NOT DETECTED  Parainfluenza 3 NOT DETECTED  Parainfluenza 4 NOT DETECTED  Resp Syncytial Virus NOT DETECTED          Studies  Chest X-RAY  CT SCAN Chest   Venous Dopplers: LE:   CT Abdomen  Others
CARDIOLOGY ATTENDING    no palpitations, no syncope, no angina    ALBUTerol    90 MICROgram(s) HFA Inhaler 1 Puff(s) Inhalation every 4 hours  ALBUTerol/ipratropium for Nebulization 3 milliLiter(s) Nebulizer every 6 hours  allopurinol 100 milliGRAM(s) Oral daily  atorvastatin 10 milliGRAM(s) Oral at bedtime  cefepime  IVPB      cefepime  IVPB 1000 milliGRAM(s) IV Intermittent every 8 hours  diltiazem    milliGRAM(s) Oral daily  furosemide    Tablet 40 milliGRAM(s) Oral daily  influenza   Vaccine 0.5 milliLiter(s) IntraMuscular once  lisinopril 5 milliGRAM(s) Oral daily  metoprolol     tartrate 50 milliGRAM(s) Oral two times a day  predniSONE   Tablet 50 milliGRAM(s) Oral daily  rivaroxaban 15 milliGRAM(s) Oral every 24 hours  sodium chloride 0.9% lock flush 3 milliLiter(s) IV Push every 8 hours  sucralfate 1 Gram(s) Oral four times a day  tiotropium 18 MICROgram(s) Capsule 1 Capsule(s) Inhalation daily  vancomycin  IVPB 1000 milliGRAM(s) IV Intermittent every 12 hours  vancomycin  IVPB                                9.3    10.46 )-----------( 261      ( 18 Nov 2017 06:30 )             31.2       11-18    137  |  100  |  30<H>  ----------------------------<  132<H>  3.9   |  21<L>  |  1.28    Ca    8.5      18 Nov 2017 06:30  Phos  2.9     11-18  Mg     2.0     11-18        T(C): 36.6 (11-18-17 @ 13:43), Max: 36.6 (11-17-17 @ 19:23)  HR: 88 (11-18-17 @ 13:43) (68 - 95)  BP: 128/60 (11-18-17 @ 13:43) (115/70 - 154/90)  RR: 16 (11-18-17 @ 13:43) (12 - 16)  SpO2: 100% (11-18-17 @ 13:43) (97% - 100%)  Wt(kg): --    no JVD  RRR, no murmurs  CTAB  soft nt/nd  no c/c/e    echo normal      A/P) 76 y/o male (OP cardiologist Dr Stevens) with hx of afib on Xarelto, HTN, HLD, non bleeding gastric ulcer on recent EGD presents with SOB and orthopnea. Echo normal. Found to be RVP positive    -Started on IV Abx for PNA   -RVP + enterovirus  -cont Xarelto for CVA prevention   -TTE noted above with normal LV function, mild As  -no further cardiac workup needed
Patient is a 77y old  Male who presents with a chief complaint of SANCHEZ and Orthopnea x 2 days (15 Nov 2017 02:36)      Any change in ROS:  still wheezing a little bit: but feels much better thenyesterday1    MEDICATIONS  (STANDING):  ALBUTerol    90 MICROgram(s) HFA Inhaler 1 Puff(s) Inhalation every 4 hours  ALBUTerol/ipratropium for Nebulization 3 milliLiter(s) Nebulizer every 6 hours  allopurinol 100 milliGRAM(s) Oral daily  atorvastatin 10 milliGRAM(s) Oral at bedtime  cefepime  IVPB      cefepime  IVPB 1000 milliGRAM(s) IV Intermittent every 8 hours  diltiazem    milliGRAM(s) Oral daily  furosemide   Injectable 40 milliGRAM(s) IV Push daily  influenza   Vaccine 0.5 milliLiter(s) IntraMuscular once  lisinopril 5 milliGRAM(s) Oral daily  metoprolol     tartrate 50 milliGRAM(s) Oral two times a day  rivaroxaban 15 milliGRAM(s) Oral every 24 hours  sodium chloride 0.9% lock flush 3 milliLiter(s) IV Push every 8 hours  sucralfate 1 Gram(s) Oral four times a day  tiotropium 18 MICROgram(s) Capsule 1 Capsule(s) Inhalation daily  vancomycin  IVPB 1000 milliGRAM(s) IV Intermittent every 12 hours  vancomycin  IVPB        MEDICATIONS  (PRN):    Vital Signs Last 24 Hrs  T(C): 36.8 (16 Nov 2017 14:30), Max: 36.8 (15 Nov 2017 20:24)  T(F): 98.3 (16 Nov 2017 14:30), Max: 98.3 (15 Nov 2017 20:24)  HR: 106 (16 Nov 2017 16:38) (73 - 113)  BP: 112/61 (16 Nov 2017 13:16) (112/61 - 159/90)  BP(mean): --  RR: 14 (16 Nov 2017 13:16) (12 - 16)  SpO2: 99% (16 Nov 2017 13:16) (96% - 99%)    I&O's Summary    15 Nov 2017 07:01  -  16 Nov 2017 07:00  --------------------------------------------------------  IN: 535 mL / OUT: 950 mL / NET: -415 mL    16 Nov 2017 07:01  -  16 Nov 2017 17:18  --------------------------------------------------------  IN: 398 mL / OUT: 0 mL / NET: 398 mL          Physical Exam:   GENERAL: NAD, well-groomed, well-developed  HEENT: SHERINE/   Atraumatic, Normocephalic  ENMT: No tonsillar erythema, exudates, or enlargement; Moist mucous membranes, Good dentition, No lesions  NECK: Supple, No JVD, Normal thyroid  CHEST/LUNG: mild wheezing   CVS: Regular rate and rhythm; No murmurs, rubs, or gallops  GI: : Soft, Nontender, Nondistended; Bowel sounds present  NERVOUS SYSTEM:  Alert & Oriented X3, Good concentration; Motor Strength 5/5 B/L upper and lower extremities; DTRs 2+ intact and symmetric  EXTREMITIES:  2+ Peripheral Pulses, No clubbing, cyanosis, or edema  LYMPH: No lymphadenopathy noted  SKIN: No rashes or lesions  ENDOCRINOLOGY: No Thyromegaly  PSYCH: Appropriate    Labs:  24  CARDIAC MARKERS ( 15 Nov 2017 06:14 )  x     / < 0.06 ng/mL / 73 u/L / 1.00 ng/mL / x      CARDIAC MARKERS ( 15 Nov 2017 03:00 )  x     / < 0.06 ng/mL / 74 u/L / 1.00 ng/mL / x      CARDIAC MARKERS ( 14 Nov 2017 18:48 )  x     / < 0.06 ng/mL / 66 u/L / 1.00 ng/mL / x                                10.6   8.46  )-----------( 259      ( 16 Nov 2017 07:00 )             36.7                         8.8    9.80  )-----------( 224      ( 15 Nov 2017 06:14 )             29.8                         9.7    10.72 )-----------( 245      ( 14 Nov 2017 18:48 )             32.0     11-16    136  |  98  |  11  ----------------------------<  107<H>  3.8   |  25  |  1.26  11-15    139  |  98  |  14  ----------------------------<  111<H>  3.9   |  26  |  1.10  11-14    136  |  100  |  13  ----------------------------<  134<H>  4.6   |  23  |  1.04    Ca    8.7      16 Nov 2017 07:00  Ca    8.2<L>      15 Nov 2017 06:14  Ca    8.3<L>      14 Nov 2017 18:48  Phos  3.5     11-15  Mg     2.0     11-16  Mg     1.9     11-15    TPro  7.8  /  Alb  3.9  /  TBili  0.7  /  DBili  x   /  AST  26  /  ALT  14  /  AlkPhos  81  11-14    CAPILLARY BLOOD GLUCOSE          LIVER FUNCTIONS - ( 14 Nov 2017 18:48 )  Alb: 3.9 g/dL / Pro: 7.8 g/dL / ALK PHOS: 81 u/L / ALT: 14 u/L / AST: 26 u/L / GGT: x           PT/INR - ( 14 Nov 2017 18:50 )   PT: 18.0 SEC;   INR: 1.59          PTT - ( 14 Nov 2017 18:50 )  PTT:34.2 SEC    Serum Pro-Brain Natriuretic Peptide: 2656 pg/mL (11-14 @ 18:48)  Cultures:             < from: CT Chest No Cont (11.15.17 @ 02:55) >    CHEST:     LUNGS AND LARGE AIRWAYS: Patent central airways.  Groundglass opacities   in the right lower lobe with retraction of the major fissure and inferior   portion of the right upper lobe. Scattered small peripheral patchy and   linear opacities in the bilateral upper lobes and right middle lobe.   Small calcified granuloma in the right lower lobe. No pulmonary nodules.  PLEURA: Small bilateral pleural effusions.  VESSELS: Normal left-sided aortic arch and descending aorta. No aneurysm.   Mild aortic atherosclerotic changes.  HEART: Heart size is enlarged. No pericardial effusion. Aortic and   coronary artery calcifications.  MEDIASTINUM AND PAYAL: Mildmediastinal lymphadenopathy. For reference,   largest lymph node in the pretracheal space measures 1.1 cm in short axis   (series 3, image 121). Small prevascular lymph nodes are seen.  CHEST WALL AND LOWER NECK: Within normal limits.  VISUALIZED UPPER ABDOMEN: Cholelithiasis.  BONES: Mild thoracic spondylosis.    IMPRESSION: Right upper and lower lobe groundglass opacities are   nonspecific however because of retraction of the right major fissure is   suggestive of atelectasis. Small bilateral pleural effusions.    Mild mediastinal lymphadenopathy as described above.                  FANTASMA SIMS M.D., RADIOLOGY RESIDENT  This document has been electronically signed.  JUSTICE ESTES M.D., ATTENDING RADIOLOGIST  This document has been electronicallysigned. Nov 15 2017  2:30PM        < end of copied text >            Rapid Respiratory Viral Panel Result        11-14 @ 18:48  Rapid RVP --  Coronovirus --  Adenovirus NOT DETECTED  Bordetella Pertussis NOT DETECTED  Chlamydia Pneumonia NOT DETECTED  Entero/RhinovirusPOSITIVE  HKU1 Coronovirus --  HMPV Coronovirus NOT DETECTED  Influenza A NOT DETECTED (any subtype)  Influenza AH1 NOT DETECTED  Influenza AH1 2009 NOT DETECTED  Influenza AH3 NOT DETECTED  Influenza B NOT DETECTED  Mycoplasma Pneumoniae NOT DETECTED This nucleic acid amplification assay was performed using  the [employer] FilmArray. The following pathogens are tested  for: Adenovirus, Coronavirus 229E, Coronavirus HKU1,  Coronavirus NL63, Coronavirus OC43, Human Metapneumovirus  (HMPV), Rhinovirus/Enterovirus, Influenza A H1, Influenza A  H1 2009 (Pandemic H1 2009), Influenza A H3, Influenza A (Flu  A) subtype not identified, Influenza B, Parainfluenza Virus  (types 1, 2, 3, 4), Respiratory Syncytial Virus (RSV),  Bordetella pertussis, Chlamydophila pneumoniae, and  Mycoplasma pneumoniae. A negative FilmArray result does not  always exclude the possibility of viral or bacterial  infection. Laboratory results should always be interpreted  in the context of clinical findings.  NL63 Coronovirus --  OC43 Coronovirus --  Parainfluenza 1 NOT DETECTED  Parainfluenza 2 NOT DETECTED  Parainfluenza 3 NOT DETECTED  Parainfluenza 4 NOT DETECTED  Resp Syncytial Virus NOT DETECTED          Studies  Chest X-RAY  CT SCAN Chest   Venous Dopplers: LE:   CT Abdomen  Others
Patient is a 77y old  Male who presents with a chief complaint of SANCHEZ and Orthopnea x 2 days (15 Nov 2017 02:36)      SUBJECTIVE / OVERNIGHT EVENTS: No nausea, vomiting or diarrhea, no fever or chills, no dizziness or chest pain, no dysuria or hematuria, no joint pain or swelling    feels much better    MEDICATIONS  (STANDING):  ALBUTerol    90 MICROgram(s) HFA Inhaler 1 Puff(s) Inhalation every 4 hours  ALBUTerol/ipratropium for Nebulization 3 milliLiter(s) Nebulizer every 6 hours  allopurinol 100 milliGRAM(s) Oral daily  atorvastatin 10 milliGRAM(s) Oral at bedtime  cefepime  IVPB      cefepime  IVPB 1000 milliGRAM(s) IV Intermittent every 8 hours  diltiazem    milliGRAM(s) Oral daily  furosemide   Injectable 40 milliGRAM(s) IV Push daily  influenza   Vaccine 0.5 milliLiter(s) IntraMuscular once  lisinopril 5 milliGRAM(s) Oral daily  metoprolol     tartrate 50 milliGRAM(s) Oral two times a day  rivaroxaban 15 milliGRAM(s) Oral every 24 hours  sodium chloride 0.9% lock flush 3 milliLiter(s) IV Push every 8 hours  sucralfate 1 Gram(s) Oral four times a day  tiotropium 18 MICROgram(s) Capsule 1 Capsule(s) Inhalation daily  vancomycin  IVPB 1000 milliGRAM(s) IV Intermittent every 12 hours  vancomycin  IVPB        MEDICATIONS  (PRN):        CAPILLARY BLOOD GLUCOSE        I&O's Summary    15 Nov 2017 07:01  -  16 Nov 2017 07:00  --------------------------------------------------------  IN: 535 mL / OUT: 950 mL / NET: -415 mL    16 Nov 2017 07:01  -  16 Nov 2017 12:00  --------------------------------------------------------  IN: 398 mL / OUT: 0 mL / NET: 398 mL    PHYSICAL EXAM:  GENERAL: NAD, well-developed  HEAD:  Atraumatic, Normocephalic  EYES: EOMI, PERRLA, conjunctiva and sclera clear, no pallor today  NECK: Supple, No JVD  CHEST/LUNG: mild b/l wheeze no crackles  HEART: irregular rate and rhythm; No murmurs, rubs, or gallops  ABDOMEN: Soft, Nontender, Nondistended; Bowel sounds present  EXTREMITIES:   no edema, No clubbing or cyanosis, + Peripheral Pulses,  PSYCH: AO x 3  NEUROLOGY: non-focal  SKIN: No rashes or lesions    LABS:                        10.6   8.46  )-----------( 259      ( 16 Nov 2017 07:00 )             36.7     11-16    136  |  98  |  11  ----------------------------<  107<H>  3.8   |  25  |  1.26    Ca    8.7      16 Nov 2017 07:00  Phos  3.5     11-15  Mg     2.0     11-16    TPro  7.8  /  Alb  3.9  /  TBili  0.7  /  DBili  x   /  AST  26  /  ALT  14  /  AlkPhos  81  11-14    PT/INR - ( 14 Nov 2017 18:50 )   PT: 18.0 SEC;   INR: 1.59          PTT - ( 14 Nov 2017 18:50 )  PTT:34.2 SEC  CARDIAC MARKERS ( 15 Nov 2017 06:14 )  x     / < 0.06 ng/mL / 73 u/L / 1.00 ng/mL / x      CARDIAC MARKERS ( 15 Nov 2017 03:00 )  x     / < 0.06 ng/mL / 74 u/L / 1.00 ng/mL / x      CARDIAC MARKERS ( 14 Nov 2017 18:48 )  x     / < 0.06 ng/mL / 66 u/L / 1.00 ng/mL / x                Consultant(s) Notes Reviewed:      Care Discussed with Consultants/Other Providers:    Contact Number, Dr Ariza [phone]
Patient is a 77y old  Male who presents with a chief complaint of SANCHEZ and Orthopnea x 2 days (15 Nov 2017 02:36)      SUBJECTIVE / OVERNIGHT EVENTS: No nausea, vomiting or diarrhea, no fever or chills, no dizziness or chest pain, no dysuria or hematuria, no joint pain or swelling    still a little cough but improved     MEDICATIONS  (STANDING):  ALBUTerol    90 MICROgram(s) HFA Inhaler 1 Puff(s) Inhalation every 4 hours  ALBUTerol/ipratropium for Nebulization 3 milliLiter(s) Nebulizer every 6 hours  allopurinol 100 milliGRAM(s) Oral daily  atorvastatin 10 milliGRAM(s) Oral at bedtime  cefepime  IVPB      cefepime  IVPB 1000 milliGRAM(s) IV Intermittent every 8 hours  diltiazem    milliGRAM(s) Oral daily  furosemide    Tablet 40 milliGRAM(s) Oral daily  influenza   Vaccine 0.5 milliLiter(s) IntraMuscular once  lisinopril 5 milliGRAM(s) Oral daily  metoprolol     tartrate 50 milliGRAM(s) Oral two times a day  predniSONE   Tablet 50 milliGRAM(s) Oral daily  rivaroxaban 15 milliGRAM(s) Oral every 24 hours  sodium chloride 0.9% lock flush 3 milliLiter(s) IV Push every 8 hours  sucralfate 1 Gram(s) Oral four times a day  tiotropium 18 MICROgram(s) Capsule 1 Capsule(s) Inhalation daily  vancomycin  IVPB 1000 milliGRAM(s) IV Intermittent every 12 hours  vancomycin  IVPB        MEDICATIONS  (PRN):        CAPILLARY BLOOD GLUCOSE        I&O's Summary    17 Nov 2017 07:01  -  18 Nov 2017 07:00  --------------------------------------------------------  IN: 1475 mL / OUT: 1500 mL / NET: -25 mL    18 Nov 2017 07:01  -  18 Nov 2017 12:16  --------------------------------------------------------  IN: 120 mL / OUT: 0 mL / NET: 120 mL      PHYSICAL EXAM:  GENERAL: NAD, well-developed  HEAD:  Atraumatic, Normocephalic  EYES: EOMI, PERRLA, conjunctiva and sclera clear, no pallor today  NECK: Supple, No JVD  CHEST/LUNG: b/l wheeze much improved, no crackles decreased breath sounds at bases   rub right lower lung zone better air entry  HEART: irregular rate and rhythm; No murmurs, rubs, or gallops  ABDOMEN: Soft, Nontender, Nondistended; Bowel sounds present  EXTREMITIES:   no edema, No clubbing or cyanosis, + Peripheral Pulses,  PSYCH: AO x 3  NEUROLOGY: non-focal  SKIN: No rashes or lesions    LABS:                        9.3    10.46 )-----------( 261      ( 18 Nov 2017 06:30 )             31.2     11-18    137  |  100  |  30<H>  ----------------------------<  132<H>  3.9   |  21<L>  |  1.28    Ca    8.5      18 Nov 2017 06:30  Phos  2.9     11-18  Mg     2.0     11-18                  Consultant(s) Notes Reviewed:      Care Discussed with Consultants/Other Providers:    Contact Number, Dr Ariza [phone]
SUBJECTIVE: No CP Palps or SOB.      MEDICATIONS  (STANDING):  ALBUTerol    90 MICROgram(s) HFA Inhaler 1 Puff(s) Inhalation every 4 hours  ALBUTerol/ipratropium for Nebulization 3 milliLiter(s) Nebulizer every 6 hours  allopurinol 100 milliGRAM(s) Oral daily  atorvastatin 10 milliGRAM(s) Oral at bedtime  cefepime  IVPB      cefepime  IVPB 1000 milliGRAM(s) IV Intermittent every 8 hours  diltiazem    milliGRAM(s) Oral daily  furosemide    Tablet 40 milliGRAM(s) Oral daily  influenza   Vaccine 0.5 milliLiter(s) IntraMuscular once  lisinopril 5 milliGRAM(s) Oral daily  metoprolol     tartrate 50 milliGRAM(s) Oral two times a day  predniSONE   Tablet 50 milliGRAM(s) Oral daily  rivaroxaban 15 milliGRAM(s) Oral every 24 hours  sodium chloride 0.9% lock flush 3 milliLiter(s) IV Push every 8 hours  sucralfate 1 Gram(s) Oral four times a day  tiotropium 18 MICROgram(s) Capsule 1 Capsule(s) Inhalation daily  vancomycin  IVPB 1000 milliGRAM(s) IV Intermittent every 12 hours  vancomycin  IVPB        MEDICATIONS  (PRN):      LABS:                        10.4   6.88  )-----------( 262      ( 17 Nov 2017 05:30 )             35.3     Hemoglobin: 10.4 g/dL (11-17 @ 05:30)  Hemoglobin: 10.6 g/dL (11-16 @ 07:00)  Hemoglobin: 8.8 g/dL (11-15 @ 06:14)  Hemoglobin: 9.7 g/dL (11-14 @ 18:48)    11-17    134<L>  |  98  |  24<H>  ----------------------------<  197<H>  4.3   |  21<L>  |  1.27    Ca    8.8      17 Nov 2017 05:30  Phos  3.0     11-17  Mg     2.1     11-17      Creatinine Trend: 1.27<--, 1.26<--, 1.10<--, 1.04<--     CARDIAC MARKERS ( 15 Nov 2017 06:14 )  x     / < 0.06 ng/mL / 73 u/L / 1.00 ng/mL / x      CARDIAC MARKERS ( 15 Nov 2017 03:00 )  x     / < 0.06 ng/mL / 74 u/L / 1.00 ng/mL / x      CARDIAC MARKERS ( 14 Nov 2017 18:48 )  x     / < 0.06 ng/mL / 66 u/L / 1.00 ng/mL / x            PHYSICAL EXAM  Vital Signs Last 24 Hrs  T(C): 36.3 (17 Nov 2017 12:26), Max: 37 (16 Nov 2017 21:58)  T(F): 97.4 (17 Nov 2017 12:26), Max: 98.6 (16 Nov 2017 21:58)  HR: 84 (17 Nov 2017 12:26) (67 - 111)  BP: 128/72 (17 Nov 2017 12:26) (112/66 - 141/90)  BP(mean): --  RR: 14 (17 Nov 2017 12:26) (14 - 18)  SpO2: 98% (17 Nov 2017 12:26) (97% - 100%)    Cardiovascular:  S1S2 RRR, No JVD, 1/6 ANAI   Respiratory: Lungs clear to auscultation, normal effort  Gastrointestinal: Abdomen soft, ND, NT, +BS  Skin: Warm, dry, intact. No rash.  Musculoskeletal: Normal ROM, normal strength  Vascular: Peripheral pulses palpable 2+ B/L    DIAGNOSTIC DATA  TELEMETRY: AF 80s    < from: CT Chest No Cont (11.15.17 @ 02:55) >    IMPRESSION: Right upper and lower lobe groundglass opacities are   nonspecific however because of retraction of the right major fissure is   suggestive of atelectasis. Small bilateral pleural effusions.    Mild mediastinal lymphadenopathy as described above.    FANTASMA SIMS M.D., RADIOLOGY RESIDENT  This document has been electronically signed.  JUSTICE ESTES M.D., ATTENDING RADIOLOGIST  This document has been electronicallysigned. Nov 15 2017  2:30PM    < end of copied text >    < from: Transthoracic Echocardiogram (11.15.17 @ 15:33) >  CONCLUSIONS:  1. Mitral annular calcification, otherwise normal mitral  valve. Mild mitral regurgitation.  2. Aortic valve leaflet morphology not well visualized.  The valve is calcified.Peak transaortic valve gradient  equals 22 mm Hg, mean transaortic valve gradient equals 11  mm Hg, consistent with at least mild aortic stenosis. Mild  aortic regurgitation.  3. Normal left ventricular internal dimensions and wall  thicknesses.  4. Endocardium not well visualized; grossly normal left  ventricular systolic function.  5. The right ventricle is not well visualized; grossly  normal right ventricular systolic function.  ------------------------------------------------------------------------  Confirmed on  11/15/2017 - 16:39:05 by Gurdeep Shane M.D.    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  --Started on IV Lasix given small pleural effusions seen on CT CHest  --Started on IV Abx for PNA   --RVP + enterovirus  --cont Xarelto for CVA prevention   --TTE noted above with normal LV function, mild As  --no further ischemic work up at this time given PNA / Viral illness
SUBJECTIVE: No CP Palps or SOB.  Sitting in chair    MEDICATIONS  (STANDING):  ALBUTerol    90 MICROgram(s) HFA Inhaler 1 Puff(s) Inhalation every 4 hours  ALBUTerol/ipratropium for Nebulization 3 milliLiter(s) Nebulizer every 6 hours  allopurinol 100 milliGRAM(s) Oral daily  atorvastatin 10 milliGRAM(s) Oral at bedtime  cefepime  IVPB 1000 milliGRAM(s) IV Intermittent every 8 hours  diltiazem    milliGRAM(s) Oral daily  furosemide   Injectable 40 milliGRAM(s) IV Push daily  influenza   Vaccine 0.5 milliLiter(s) IntraMuscular once  lisinopril 5 milliGRAM(s) Oral daily  metoprolol     tartrate 50 milliGRAM(s) Oral two times a day  rivaroxaban 15 milliGRAM(s) Oral every 24 hours  sodium chloride 0.9% lock flush 3 milliLiter(s) IV Push every 8 hours  sucralfate 1 Gram(s) Oral four times a day  tiotropium 18 MICROgram(s) Capsule 1 Capsule(s) Inhalation daily  vancomycin  IVPB 1000 milliGRAM(s) IV Intermittent every 12 hours    LABS:                        10.6   8.46  )-----------( 259      ( 16 Nov 2017 07:00 )             36.7    136  |  98  |  11  ----------------------------<  107<H>  3.8   |  25  |  1.26    Ca    8.7      16 Nov 2017 07:00  Phos  3.5     11-15  Mg     2.0     11-16    TPro  7.8  /  Alb  3.9  /  TBili  0.7  /  DBili  x   /  AST  26  /  ALT  14  /  AlkPhos  81  11-14    CARDIAC MARKERS ( 15 Nov 2017 06:14 )  x     / < 0.06 ng/mL / 73 u/L / 1.00 ng/mL / x      CARDIAC MARKERS ( 15 Nov 2017 03:00 )  x     / < 0.06 ng/mL / 74 u/L / 1.00 ng/mL / x      CARDIAC MARKERS ( 14 Nov 2017 18:48 )  x     / < 0.06 ng/mL / 66 u/L / 1.00 ng/mL / x        Serum Pro-Brain Natriuretic Peptide: 2656 pg/mL (11-14 @ 18:48)    PHYSICAL EXAM:  Vital Signs Last 24 Hrs  T(C): 36.7 (16 Nov 2017 06:08), Max: 36.8 (15 Nov 2017 13:41)  T(F): 98 (16 Nov 2017 06:08), Max: 98.3 (15 Nov 2017 13:41)  HR: 104 (16 Nov 2017 10:14) (72 - 113)  BP: 121/79 (16 Nov 2017 06:08) (121/79 - 159/90)  RR: 12 (16 Nov 2017 06:08) (12 - 16)  SpO2: 99% (16 Nov 2017 10:14) (96% - 100%)    Cardiovascular:  S1S2 RRR, No JVD, 1/6 ANAI   Respiratory: Lungs clear to auscultation, normal effort  Gastrointestinal: Abdomen soft, ND, NT, +BS  Skin: Warm, dry, intact. No rash.  Musculoskeletal: Normal ROM, normal strength  Vascular: Peripheral pulses palpable 2+ B/L    DIAGNOSTIC DATA  TELEMETRY: AF 80s    < from: CT Chest No Cont (11.15.17 @ 02:55) >    IMPRESSION: Right upper and lower lobe groundglass opacities are   nonspecific however because of retraction of the right major fissure is   suggestive of atelectasis. Small bilateral pleural effusions.    Mild mediastinal lymphadenopathy as described above.    FANTASMA SIMS M.D., RADIOLOGY RESIDENT  This document has been electronically signed.  JUSTICE ESTES M.D., ATTENDING RADIOLOGIST  This document has been electronicallysigned. Nov 15 2017  2:30PM    < end of copied text >    < from: Transthoracic Echocardiogram (11.15.17 @ 15:33) >  CONCLUSIONS:  1. Mitral annular calcification, otherwise normal mitral  valve. Mild mitral regurgitation.  2. Aortic valve leaflet morphology not well visualized.  The valve is calcified.Peak transaortic valve gradient  equals 22 mm Hg, mean transaortic valve gradient equals 11  mm Hg, consistent with at least mild aortic stenosis. Mild  aortic regurgitation.  3. Normal left ventricular internal dimensions and wall  thicknesses.  4. Endocardium not well visualized; grossly normal left  ventricular systolic function.  5. The right ventricle is not well visualized; grossly  normal right ventricular systolic function.  ------------------------------------------------------------------------  Confirmed on  11/15/2017 - 16:39:05 by Gurdeep Shane M.D.    < end of copied text >    ASSESSMENT AND PLAN:    78 y/o M (OP cardiologist Dr Stevens) with hx of afib on Xarelto, HTN, HLD, non bleeding gastric ulcer on recent EGD presents with SOB and orthopnea.  --Started on IV Lasix given small pleural effusions seen on CT CHest  --Started on IV Abx for ?PNA   --RVP + enterovirus  --cont Xarelto  --TTE noted above with normal LV function, mild As  --no further ischemic work up at this time given PNA/Viral illness    Karen Michelle PA-C  [facility]  [address], [address]   [place] [address]  office [phone]  pager [phone]
Patient is a 77y old  Male who presents with a chief complaint of SANCHEZ and Orthopnea x 2 days (15 Nov 2017 02:36)    Pertinent ROS: OOB to chair, doing ok. cough (+), wheezing without acute distress    MEDICATIONS  (STANDING):  ALBUTerol    90 MICROgram(s) HFA Inhaler 1 Puff(s) Inhalation every 4 hours  ALBUTerol/ipratropium for Nebulization 3 milliLiter(s) Nebulizer every 6 hours  allopurinol 100 milliGRAM(s) Oral daily  atorvastatin 10 milliGRAM(s) Oral at bedtime  cefepime  IVPB      cefepime  IVPB 1000 milliGRAM(s) IV Intermittent every 8 hours  diltiazem    milliGRAM(s) Oral daily  furosemide    Tablet 40 milliGRAM(s) Oral daily  influenza   Vaccine 0.5 milliLiter(s) IntraMuscular once  lisinopril 5 milliGRAM(s) Oral daily  metoprolol     tartrate 50 milliGRAM(s) Oral two times a day  predniSONE   Tablet 50 milliGRAM(s) Oral daily  rivaroxaban 15 milliGRAM(s) Oral every 24 hours  sodium chloride 0.9% lock flush 3 milliLiter(s) IV Push every 8 hours  sucralfate 1 Gram(s) Oral four times a day  tiotropium 18 MICROgram(s) Capsule 1 Capsule(s) Inhalation daily  vancomycin  IVPB 1000 milliGRAM(s) IV Intermittent every 12 hours  vancomycin  IVPB        MEDICATIONS  (PRN):    Vital Signs Last 24 Hrs  T(C): 36.6 (18 Nov 2017 05:58), Max: 36.6 (17 Nov 2017 19:23)  T(F): 97.8 (18 Nov 2017 05:58), Max: 97.9 (17 Nov 2017 19:23)  HR: 80 (18 Nov 2017 09:14) (68 - 95)  BP: 115/70 (18 Nov 2017 05:58) (115/70 - 154/90)  BP(mean): --  RR: 12 (18 Nov 2017 05:58) (12 - 14)  SpO2: 97% (18 Nov 2017 09:14) (97% - 100%)    I&O's Summary    17 Nov 2017 07:01  -  18 Nov 2017 07:00  --------------------------------------------------------  IN: 1475 mL / OUT: 1500 mL / NET: -25 mL    18 Nov 2017 07:01  -  18 Nov 2017 12:01  --------------------------------------------------------  IN: 120 mL / OUT: 0 mL / NET: 120 mL        Physical Exam:   GENERAL: NAD, well-groomed, well-developed  HEENT: SHERINE, Atraumatic, Normocephalic  NECK: Supple, No JVD, Normal thyroid  CHEST/LUNG: expiratory wheezing (+) without acute distress  CVS: irregular rate and rhythm; No murmurs, rubs, or gallops  GI: : Soft, Nontender, Nondistended; Bowel sounds present, obese  NERVOUS SYSTEM:  Alert & Oriented X3  EXTREMITIES:  2+ Peripheral Pulses, No clubbing, cyanosis, or edema  SKIN: Normal color, normal turgor, dry  ENDOCRINOLOGY: No Thyromegaly  PSYCH: Appropriate  Labs:                  9.3    10.46 )-----------( 261      ( 18 Nov 2017 06:30 )             31.2     11-18    137  |  100  |  30<H>  ----------------------------<  132<H>  3.9   |  21<L>  |  1.28    Ca    8.5      18 Nov 2017 06:30  Phos  2.9     11-18  Mg     2.0     11-18      CAPILLARY BLOOD GLUCOSE              D DImer  Cultures:     Vancomycin Trough:     11-17 @ 05:30  15.7    Gentamicin:          11-17 @ 05:30  --    Rapid Respiratory Viral Panel Result        11-14 @ 18:48  Rapid RVP --  Coronovirus --  Adenovirus NOT DETECTED  Bordetella Pertussis NOT DETECTED  Chlamydia Pneumonia NOT DETECTED  Entero/RhinovirusPOSITIVE  HKU1 Coronovirus --  HMPV Coronovirus NOT DETECTED  Influenza A NOT DETECTED (any subtype)  Influenza AH1 NOT DETECTED  Influenza AH1 2009 NOT DETECTED  Influenza AH3 NOT DETECTED  Influenza B NOT DETECTED  Mycoplasma Pneumoniae NOT DETECTED This nucleic acid amplification assay was performed using  the [employer] FilmArray. The following pathogens are tested  for: Adenovirus, Coronavirus 229E, Coronavirus HKU1,  Coronavirus NL63, Coronavirus OC43, Human Metapneumovirus  (HMPV), Rhinovirus/Enterovirus, Influenza A H1, Influenza A  H1 2009 (Pandemic H1 2009), Influenza A H3, Influenza A (Flu  A) subtype not identified, Influenza B, Parainfluenza Virus  (types 1, 2, 3, 4), Respiratory Syncytial Virus (RSV),  Bordetella pertussis, Chlamydophila pneumoniae, and  Mycoplasma pneumoniae. A negative FilmArray result does not  always exclude the possibility of viral or bacterial  infection. Laboratory results should always be interpreted  in the context of clinical findings.  NL63 Coronovirus --  OC43 Coronovirus --  Parainfluenza 1 NOT DETECTED  Parainfluenza 2 NOT DETECTED  Parainfluenza 3 NOT DETECTED  Parainfluenza 4 NOT DETECTED  Resp Syncytial Virus NOT DETECTED        Studies  Chest X-RAY < from: Xray Chest 1 View AP/PA (11.14.17 @ 19:46) >    EXAM:  RAD CHEST AP PA 1 VIEW        PROCEDURE DATE:  Nov 14 2017         INTERPRETATION:  CLINICAL INDICATION: Cough, shortness of breath.    EXAM: Frontal view of the chest with no prior radiographs.    FINDINGS:   Small bibasilar opacities. There are no pleural effusions or pneumothorax.  The heart size is enlarged.  No acute bony pathology.    IMPRESSION:  Right basilar haziness, nonspecific in nature could   represent layering effusions.    < end of copied text >    CT SCAN Chest < from: CT Chest No Cont (11.15.17 @ 02:55) >    EXAM:  CT CHEST        PROCEDURE DATE:  Nov 15 2017         INTERPRETATION:  CLINICAL INFORMATION: Shortness of breath    COMPARISON: No prior chest CT imaging. Prior chest radiograph 11/14/2017.    PROCEDURE:   CT of the Chest was performed without intravenous contrast.  Sagittal and coronal reformats were performed.    FINDINGS:    CHEST:     LUNGS AND LARGE AIRWAYS: Patent central airways.  Groundglass opacities   in the right lower lobe with retraction of the major fissure and inferior   portion of the right upper lobe. Scattered small peripheral patchy and   linear opacities in the bilateral upper lobes and right middle lobe.   Small calcified granuloma in the right lower lobe. No pulmonary nodules.  PLEURA: Small bilateral pleural effusions.  VESSELS: Normal left-sided aortic arch and descending aorta. No aneurysm.   Mild aortic atherosclerotic changes.  HEART: Heart size is enlarged. No pericardial effusion. Aortic and   coronary artery calcifications.  MEDIASTINUM AND PAYAL: Mildmediastinal lymphadenopathy. For reference,   largest lymph node in the pretracheal space measures 1.1 cm in short axis   (series 3, image 121). Small prevascular lymph nodes are seen.  CHEST WALL AND LOWER NECK: Within normal limits.  VISUALIZED UPPER ABDOMEN: Cholelithiasis.  BONES: Mild thoracic spondylosis.    IMPRESSION: Right upper and lower lobe groundglass opacities are   nonspecific however because of retraction of the right major fissure is   suggestive of atelectasis. Small bilateral pleural effusions.    Mild mediastinal lymphadenopathy as described above.    < end of copied text >    CT Abdomen   Venous Dopplers: LE:   Others  < from: Transthoracic Echocardiogram (11.15.17 @ 15:33) >  Patient name: ANA MARQUEZ  YOB: 1940   Age: 77 (M)   MR#: [id number]  Study Date: 11/15/2017  Location: CDUSonographer: Christine Fuller RDCS  Study quality: Technically Difficult  Referring Physician: Jakob Ariza MD  Blood Pressure: 152/90 mmHg  Height: 152 cm  Weight: 83 kg  BSA: 1.8 m2  ------------------------------------------------------------------------  PROCEDURE: Transthoracic echocardiogram with 2-D, M-Mode  and complete spectral and color flow Doppler.  INDICATION: Shortness of breath (R06.02)  ------------------------------------------------------------------------  DIMENSIONS:  Dimensions:     Normal Values:  LA:     4.3 cm    2.0 - 4.0 cm  Ao:     3.3 cm    2.0 - 3.8 cm  SEPTUM: 0.7 cm    0.6 - 1.2 cm  PWT:    0.6 cm    0.6 - 1.1 cm  LVIDd:  4.4 cm    3.0 - 5.6 cm  LVIDs:  2.8 cm    1.8 - 4.0 cm  Derived Variables:  LVMI: 47 g/m2  RWT: 0.27  Fractional short: 36 %  Ejection Fraction (Teicholtz): 66 %  ------------------------------------------------------------------------  OBSERVATIONS:  Mitral Valve: Mitral annular calcification, otherwise  normal mitral valve. Mild mitral regurgitation.  Aortic Root: Normal aortic root.  Aortic Valve: Aortic valve leaflet morphology not well  visualized.  The valve iscalcified. Peak transaortic valve  gradient equals 22 mm Hg, mean transaortic valve gradient  equals 11 mm Hg, consistent with at least mild aortic  stenosis. Mild aortic regurgitation.  Left Atrium: Mildly dilated left atrium.  LA volume index =  36cc/m2.  Left Ventricle: Endocardium not well visualized; grossly  normal left ventricular systolic function. Normal left  ventricular internal dimensions and wall thicknesses.  Right Heart: Normal right atrium. The right ventricle is  not well visualized; grossly normal right ventricular  systolic function. Normal tricuspid valve.  Mild tricuspid  regurgitation. Normal pulmonic valve. Minimal pulmonic  regurgitation.  Pericardium/PleuraNormal pericardium with no pericardial  effusion.  ------------------------------------------------------------------------  CONCLUSIONS:  1. Mitral annular calcification, otherwise normal mitral  valve. Mild mitral regurgitation.  2. Aortic valve leaflet morphology not well visualized.  The valve is calcified.Peak transaortic valve gradient  equals 22 mm Hg, mean transaortic valve gradient equals 11  mm Hg, consistent with at least mild aortic stenosis. Mild  aortic regurgitation.  3. Normal left ventricular internal dimensions and wall  thicknesses.  4. Endocardium not well visualized; grossly normal left  ventricular systolic function.  5. The right ventricle is not well visualized; grossly  normal right ventricular systolic function.    < end of copied text >

## 2017-11-18 NOTE — DISCHARGE NOTE ADULT - PLAN OF CARE
Maintain euvolemia. Continue Lasix, current medications.   Monitor urine output, weight, fluid intake.   Follow-up with your Private Medical Doctor within 1 week. Rate control. Continue Xarelto, Metoprolol. Continue to monitor. Continue Augmentin 875mg for 2 days. Continue Allopurinol, Prednisone. Reduce blood pressure. Continue Lisinopril, Metoprolol, Lasix.

## 2017-11-18 NOTE — DISCHARGE NOTE ADULT - PATIENT PORTAL LINK FT
“You can access the [employer] Patient Portal, offered by [facility], by registering with the following website: [email or web address]”

## 2017-11-18 NOTE — PROGRESS NOTE ADULT - ASSESSMENT
76 y/o M with hx of afib on Xarelto, HTN, HLD, non bleeding gastric ulcer presents with SOB and orthopnea. admitted for acute on chronic CHF
78 y/o M with hx of afib on Xarelto, HTN, HLD, non bleeding gastric ulcer presents with SOB and orthopnea. admitted for acute on chronic CHF
76 y/o M with hx of afib on Xarelto, HTN, HLD, non bleeding gastric ulcer presents with SOB and orthopnea. admitted for acute on chronic CHF

## 2017-11-18 NOTE — PROGRESS NOTE ADULT - PROBLEM SELECTOR PROBLEM 4
HTN (hypertension)
AF (atrial fibrillation)
HTN (hypertension)
AF (atrial fibrillation)
AF (atrial fibrillation)
HTN (hypertension)

## 2017-11-18 NOTE — PROGRESS NOTE ADULT - PROBLEM SELECTOR PLAN 5
stable: no pain  11/18 on allopurinol
acceptable at present
stable: no pain
acceptable at present
acceptable at present
stable: no pain

## 2017-11-18 NOTE — PROGRESS NOTE ADULT - PROBLEM SELECTOR PROBLEM 6
Need for prophylactic measure
Gout
Need for prophylactic measure
Gout
Gout
Need for prophylactic measure

## 2017-11-18 NOTE — DISCHARGE NOTE ADULT - HOSPITAL COURSE
76 y/o M with PMH  atrial fibrillation on Xarelto HTN and HLD p/w sob and cough x 3 days.    +SOB --> CHF exacberation --> on PO lasix, TTE-EF 66%, Mild AS, Mild AR, nl LVSF, nl RVSF   +Suspected PNA-on Vanco & Cefepime, pulm following, CT Chest-GGO, atelectasis, bilat pleural effusions (too small to tap), RVP +Entero/Rhinovirus    Tests:   EKG: afiib wtih ARVR at 104 BPM  CEx3 neg  BNP: 2656  11/14 RVP: +Entero/Rhinovirus  11/14 CXR: Right basilar haziness, nonspecific in nature could represent layering effusions.  11/15 CT Chest: Right upper and lower lobe groundglass opacities are nonspecific however because of retraction of the right major fissure is suggestive of atelectasis. Small bilateral pleural effusions. Mild mediastinal lymphadenopathy as described above.  11/15 TTE: EF 66%  1. Mitral annular calcification, otherwise normal mitral valve. Mild mitral regurgitation.  2. Aortic valve leaflet morphology not well visualized. The valve is calcified.Peak transaortic valve gradient equals 22 mm Hg, mean transaortic valve gradient equals 11 mm Hg, consistent with at least mild aortic stenosis. Mild aortic regurgitation.  3. Normal left ventricular internal dimensions and wall thicknesses.  4. Endocardium not well visualized; grossly normal left ventricular systolic function.  5. The right ventricle is not well visualized; grossly normal right ventricular systolic function  Cardiology ([facility]) 11/15 TTE, AC for cva prevention if no contraindication  Pulm([facility]) 11/15  Add broad spectrum ABX, f/u CT chest, need TTE, no need to tap effusion, supportive care for Rhinovirus  Med: 11/15 Pulm c/s, CT chest, IV lasix.  11/16: Med: IV abx per pulm, IV lasix  11/16: Cardio: no further inpt w/u  11/16: Pulm: still wheezing a little bit: but feels much better than yesterday. follow vanc levels. steroids added for a few days only! 76 y/o M with PMH  atrial fibrillation on Xarelto HTN and HLD p/w sob and cough x 3 days.    +SOB --> CHF exacberation --> on PO lasix, TTE-EF 66%, Mild AS, Mild AR, nl LVSF, nl RVSF   +Suspected PNA-on Vanco & Cefepime, pulm following, CT Chest-GGO, atelectasis, bilat pleural effusions (too small to tap), RVP +Entero/Rhinovirus    Tests:   EKG: afiib wtih ARVR at 104 BPM  CEx3 neg  BNP: 2656  11/14 RVP: +Entero/Rhinovirus  11/14 CXR: Right basilar haziness, nonspecific in nature could represent layering effusions.  11/15 CT Chest: Right upper and lower lobe groundglass opacities are nonspecific however because of retraction of the right major fissure is suggestive of atelectasis. Small bilateral pleural effusions. Mild mediastinal lymphadenopathy as described above.  11/15 TTE: EF 66%  1. Mitral annular calcification, otherwise normal mitral valve. Mild mitral regurgitation.  2. Aortic valve leaflet morphology not well visualized. The valve is calcified.Peak transaortic valve gradient equals 22 mm Hg, mean transaortic valve gradient equals 11 mm Hg, consistent with at least mild aortic stenosis. Mild aortic regurgitation.  3. Normal left ventricular internal dimensions and wall thicknesses.  4. Endocardium not well visualized; grossly normal left ventricular systolic function.  5. The right ventricle is not well visualized; grossly normal right ventricular systolic function  Cardiology ([facility] [place]) 11/15 TTE, AC for cva prevention if no contraindication  Pulm([facility]) 11/15  Add broad spectrum ABX, f/u CT chest, need TTE, no need to tap effusion, supportive care for Rhinovirus  Med: 11/15 Pulm c/s, CT chest, IV lasix.  11/16: Med: IV abx per pulm, IV lasix  11/16: Cardio: no further inpt w/u  11/16: Pulm: still wheezing a little bit: but feels much better than yesterday. follow vanc levels. steroids added for a few days only!    11/18/17 Pt is medically stable for discharge home today as per Dr. Ariza.

## 2017-11-18 NOTE — PROGRESS NOTE ADULT - PROVIDER SPECIALTY LIST ADULT
Cardiology
Internal Medicine
Internal Medicine
Pulmonology
Pulmonology
Internal Medicine
Pulmonology

## 2018-05-06 ENCOUNTER — INPATIENT (INPATIENT)
Facility: HOSPITAL | Age: 78
LOS: 2 days | Discharge: HOME | End: 2018-05-09
Attending: HOSPITALIST | Admitting: HOSPITALIST
Payer: MEDICARE

## 2018-05-06 VITALS
SYSTOLIC BLOOD PRESSURE: 161 MMHG | TEMPERATURE: 98 F | HEART RATE: 65 BPM | RESPIRATION RATE: 16 BRPM | DIASTOLIC BLOOD PRESSURE: 76 MMHG | OXYGEN SATURATION: 100 %

## 2018-05-06 DIAGNOSIS — R27.0 ATAXIA, UNSPECIFIED: ICD-10-CM

## 2018-05-06 LAB
ALBUMIN SERPL ELPH-MCNC: 3.8 G/DL — SIGNIFICANT CHANGE UP (ref 3.3–5)
ALP SERPL-CCNC: 75 U/L — SIGNIFICANT CHANGE UP (ref 40–120)
ALT FLD-CCNC: 15 U/L — SIGNIFICANT CHANGE UP (ref 4–41)
ANISOCYTOSIS BLD QL: SIGNIFICANT CHANGE UP
APTT BLD: 37.4 SEC — SIGNIFICANT CHANGE UP (ref 27.5–37.4)
AST SERPL-CCNC: 22 U/L — SIGNIFICANT CHANGE UP (ref 4–40)
BASE EXCESS BLDV CALC-SCNC: 1.2 MMOL/L — SIGNIFICANT CHANGE UP
BASOPHILS # BLD AUTO: 0.08 K/UL — SIGNIFICANT CHANGE UP (ref 0–0.2)
BASOPHILS NFR BLD AUTO: 1.1 % — SIGNIFICANT CHANGE UP (ref 0–2)
BASOPHILS NFR SPEC: 1.1 % — SIGNIFICANT CHANGE UP (ref 0–2)
BILIRUB SERPL-MCNC: 0.3 MG/DL — SIGNIFICANT CHANGE UP (ref 0.2–1.2)
BLASTS # FLD: 0 % — SIGNIFICANT CHANGE UP (ref 0–0)
BLOOD GAS VENOUS - CREATININE: 1.04 MG/DL — SIGNIFICANT CHANGE UP (ref 0.5–1.3)
BUN SERPL-MCNC: 16 MG/DL — SIGNIFICANT CHANGE UP (ref 7–23)
BURR CELLS BLD QL SMEAR: PRESENT — SIGNIFICANT CHANGE UP
CALCIUM SERPL-MCNC: 8.8 MG/DL — SIGNIFICANT CHANGE UP (ref 8.4–10.5)
CHLORIDE BLDV-SCNC: 107 MMOL/L — SIGNIFICANT CHANGE UP (ref 96–108)
CHLORIDE SERPL-SCNC: 104 MMOL/L — SIGNIFICANT CHANGE UP (ref 98–107)
CK MB BLD-MCNC: 1.33 NG/ML — SIGNIFICANT CHANGE UP (ref 1–6.6)
CK MB BLD-MCNC: SIGNIFICANT CHANGE UP (ref 0–2.5)
CK SERPL-CCNC: 56 U/L — SIGNIFICANT CHANGE UP (ref 30–200)
CO2 SERPL-SCNC: 19 MMOL/L — LOW (ref 22–31)
CREAT SERPL-MCNC: 1.08 MG/DL — SIGNIFICANT CHANGE UP (ref 0.5–1.3)
EOSINOPHIL # BLD AUTO: 0.29 K/UL — SIGNIFICANT CHANGE UP (ref 0–0.5)
EOSINOPHIL NFR BLD AUTO: 3.8 % — SIGNIFICANT CHANGE UP (ref 0–6)
EOSINOPHIL NFR FLD: 3.4 % — SIGNIFICANT CHANGE UP (ref 0–6)
GAS PNL BLDV: 134 MMOL/L — LOW (ref 136–146)
GIANT PLATELETS BLD QL SMEAR: PRESENT — SIGNIFICANT CHANGE UP
GLUCOSE BLDV-MCNC: 173 — HIGH (ref 70–99)
GLUCOSE SERPL-MCNC: 166 MG/DL — HIGH (ref 70–99)
HCO3 BLDV-SCNC: 24 MMOL/L — SIGNIFICANT CHANGE UP (ref 20–27)
HCT VFR BLD CALC: 42.7 % — SIGNIFICANT CHANGE UP (ref 39–50)
HCT VFR BLDV CALC: 42.5 % — SIGNIFICANT CHANGE UP (ref 39–51)
HGB BLD-MCNC: 12.8 G/DL — LOW (ref 13–17)
HGB BLDV-MCNC: 13.8 G/DL — SIGNIFICANT CHANGE UP (ref 13–17)
HYPOCHROMIA BLD QL: SLIGHT — SIGNIFICANT CHANGE UP
IMM GRANULOCYTES # BLD AUTO: 0.02 # — SIGNIFICANT CHANGE UP
IMM GRANULOCYTES NFR BLD AUTO: 0.3 % — SIGNIFICANT CHANGE UP (ref 0–1.5)
INR BLD: 1.29 — HIGH (ref 0.88–1.17)
LACTATE BLDV-MCNC: 1.5 MMOL/L — SIGNIFICANT CHANGE UP (ref 0.5–2)
LYMPHOCYTES # BLD AUTO: 2.72 K/UL — SIGNIFICANT CHANGE UP (ref 1–3.3)
LYMPHOCYTES # BLD AUTO: 35.9 % — SIGNIFICANT CHANGE UP (ref 13–44)
LYMPHOCYTES NFR SPEC AUTO: 43.3 % — SIGNIFICANT CHANGE UP (ref 13–44)
MACROCYTES BLD QL: SLIGHT — SIGNIFICANT CHANGE UP
MCHC RBC-ENTMCNC: 19.8 PG — LOW (ref 27–34)
MCHC RBC-ENTMCNC: 30 % — LOW (ref 32–36)
MCV RBC AUTO: 66 FL — LOW (ref 80–100)
METAMYELOCYTES # FLD: 1.1 % — HIGH (ref 0–1)
MICROCYTES BLD QL: SIGNIFICANT CHANGE UP
MONOCYTES # BLD AUTO: 0.71 K/UL — SIGNIFICANT CHANGE UP (ref 0–0.9)
MONOCYTES NFR BLD AUTO: 9.4 % — SIGNIFICANT CHANGE UP (ref 2–14)
MONOCYTES NFR BLD: 1.1 % — LOW (ref 2–9)
MYELOCYTES NFR BLD: 1.1 % — HIGH (ref 0–0)
NEUTROPHIL AB SER-ACNC: 41.1 % — LOW (ref 43–77)
NEUTROPHILS # BLD AUTO: 3.75 K/UL — SIGNIFICANT CHANGE UP (ref 1.8–7.4)
NEUTROPHILS NFR BLD AUTO: 49.5 % — SIGNIFICANT CHANGE UP (ref 43–77)
NEUTS BAND # BLD: 0 % — SIGNIFICANT CHANGE UP (ref 0–6)
NRBC # FLD: 0 — SIGNIFICANT CHANGE UP
NT-PROBNP SERPL-SCNC: 1106 PG/ML — SIGNIFICANT CHANGE UP
OTHER - HEMATOLOGY %: 0 — SIGNIFICANT CHANGE UP
OVALOCYTES BLD QL SMEAR: SIGNIFICANT CHANGE UP
PCO2 BLDV: 49 MMHG — SIGNIFICANT CHANGE UP (ref 41–51)
PH BLDV: 7.35 PH — SIGNIFICANT CHANGE UP (ref 7.32–7.43)
PLATELET # BLD AUTO: 211 K/UL — SIGNIFICANT CHANGE UP (ref 150–400)
PLATELET COUNT - ESTIMATE: NORMAL — SIGNIFICANT CHANGE UP
PMV BLD: SIGNIFICANT CHANGE UP FL (ref 7–13)
PO2 BLDV: 27 MMHG — LOW (ref 35–40)
POIKILOCYTOSIS BLD QL AUTO: SIGNIFICANT CHANGE UP
POLYCHROMASIA BLD QL SMEAR: SIGNIFICANT CHANGE UP
POTASSIUM BLDV-SCNC: 8.7 MMOL/L — CRITICAL HIGH (ref 3.4–4.5)
POTASSIUM SERPL-MCNC: 5 MMOL/L — SIGNIFICANT CHANGE UP (ref 3.5–5.3)
POTASSIUM SERPL-SCNC: 5 MMOL/L — SIGNIFICANT CHANGE UP (ref 3.5–5.3)
PROMYELOCYTES # FLD: 0 % — SIGNIFICANT CHANGE UP (ref 0–0)
PROT SERPL-MCNC: 7.7 G/DL — SIGNIFICANT CHANGE UP (ref 6–8.3)
PROTHROM AB SERPL-ACNC: 14.9 SEC — HIGH (ref 9.8–13.1)
RBC # BLD: 6.47 M/UL — HIGH (ref 4.2–5.8)
RBC # FLD: 21.7 % — HIGH (ref 10.3–14.5)
SAO2 % BLDV: 42 % — LOW (ref 60–85)
SMUDGE CELLS # BLD: PRESENT — SIGNIFICANT CHANGE UP
SODIUM SERPL-SCNC: 138 MMOL/L — SIGNIFICANT CHANGE UP (ref 135–145)
TARGETS BLD QL SMEAR: SIGNIFICANT CHANGE UP
TROPONIN T SERPL-MCNC: < 0.06 NG/ML — SIGNIFICANT CHANGE UP (ref 0–0.06)
VARIANT LYMPHS # BLD: 7.8 % — SIGNIFICANT CHANGE UP
WBC # BLD: 7.57 K/UL — SIGNIFICANT CHANGE UP (ref 3.8–10.5)
WBC # FLD AUTO: 7.57 K/UL — SIGNIFICANT CHANGE UP (ref 3.8–10.5)

## 2018-05-06 PROCEDURE — 99223 1ST HOSP IP/OBS HIGH 75: CPT | Mod: GC

## 2018-05-06 PROCEDURE — 71045 X-RAY EXAM CHEST 1 VIEW: CPT | Mod: 26

## 2018-05-06 PROCEDURE — 70450 CT HEAD/BRAIN W/O DYE: CPT | Mod: 26

## 2018-05-06 RX ORDER — DILTIAZEM HCL 120 MG
120 CAPSULE, EXT RELEASE 24 HR ORAL DAILY
Qty: 0 | Refills: 0 | Status: DISCONTINUED | OUTPATIENT
Start: 2018-05-06 | End: 2018-05-09

## 2018-05-06 RX ORDER — ALLOPURINOL 300 MG
100 TABLET ORAL DAILY
Qty: 0 | Refills: 0 | Status: DISCONTINUED | OUTPATIENT
Start: 2018-05-06 | End: 2018-05-09

## 2018-05-06 RX ORDER — LISINOPRIL 2.5 MG/1
5 TABLET ORAL DAILY
Qty: 0 | Refills: 0 | Status: DISCONTINUED | OUTPATIENT
Start: 2018-05-06 | End: 2018-05-09

## 2018-05-06 RX ORDER — SODIUM CHLORIDE 9 MG/ML
1000 INJECTION INTRAMUSCULAR; INTRAVENOUS; SUBCUTANEOUS ONCE
Qty: 0 | Refills: 0 | Status: COMPLETED | OUTPATIENT
Start: 2018-05-06 | End: 2018-05-06

## 2018-05-06 RX ORDER — RIVAROXABAN 15 MG-20MG
15 KIT ORAL EVERY 24 HOURS
Qty: 0 | Refills: 0 | Status: DISCONTINUED | OUTPATIENT
Start: 2018-05-07 | End: 2018-05-08

## 2018-05-06 RX ORDER — METOPROLOL TARTRATE 50 MG
25 TABLET ORAL
Qty: 0 | Refills: 0 | Status: DISCONTINUED | OUTPATIENT
Start: 2018-05-06 | End: 2018-05-09

## 2018-05-06 RX ORDER — ALBUTEROL 90 UG/1
2.5 AEROSOL, METERED ORAL
Qty: 0 | Refills: 0 | Status: COMPLETED | OUTPATIENT
Start: 2018-05-06 | End: 2018-05-06

## 2018-05-06 RX ADMIN — ALBUTEROL 2.5 MILLIGRAM(S): 90 AEROSOL, METERED ORAL at 18:27

## 2018-05-06 RX ADMIN — ALBUTEROL 2.5 MILLIGRAM(S): 90 AEROSOL, METERED ORAL at 18:52

## 2018-05-06 RX ADMIN — SODIUM CHLORIDE 2000 MILLILITER(S): 9 INJECTION INTRAMUSCULAR; INTRAVENOUS; SUBCUTANEOUS at 22:30

## 2018-05-06 RX ADMIN — ALBUTEROL 2.5 MILLIGRAM(S): 90 AEROSOL, METERED ORAL at 19:31

## 2018-05-06 NOTE — H&P ADULT - PROBLEM SELECTOR PLAN 9
Patient was previously hospitalized with dx of "acute on chronic CHF" in the setting of SOB and +RVP. TTE demonstrated normal LV size and function, no mentioned signs of acute or chronic CHF. Of note, patient was previously on low dose diuretics outpatient for minor swelling. However, at this time, it does not seem that the patient has heart failure based on most recent TTE.  - Consider follow-up with PMD.  - Monitor volume status and for respiratory distress. Patient was previously hospitalized with dx of "acute on chronic CHF" in the setting of SOB and +RVP. TTE demonstrated normal LV size and function; Of note, patient was previously on low dose diuretics outpatient for LE swelling. Suspect mild pEF HF in the past;   - Monitor volume status  - BP control

## 2018-05-06 NOTE — H&P ADULT - NSHPLABSRESULTS_GEN_ALL_CORE
All labs personally reviewed: WBC 7.57, Hgb 12.8, Plt 211, INR 1.29, K 5.0, BUN 15, Cr 1.08, LFTs wnl, CE wnl.    All Imaging personally reviewed: CXR demonstrated no emergent findings on prelim read. Appears clear, but ?LLL consolidation, pending official read. CT head demonstrated no acute findings.    EKG personally reviewed: AFib, 58 bpm, normal intervals, QTc 441, normal axis, no TWI or ST elevations All labs personally reviewed: WBC 7.57, Hgb 12.8, Plt 211, INR 1.29, K 5.0, BUN 15, Cr 1.08, LFTs wnl, CE wnl.    All Imaging personally reviewed: CXR demonstrated no emergent findings on prelim read. Appears clear, but ?LLL consolidation, pending official read. CT head demonstrated no acute findings.    EKG personally reviewed: AFib, 58 bpm, QTc 441, no acute TWI or ST changes - my reading

## 2018-05-06 NOTE — H&P ADULT - PROBLEM SELECTOR PLAN 5
Patient currently takes 10 mg PO potassium supplement daily. Current K 5.0. This is likely because patient was formerly on loop diuretics.  - Will discontinue PO potassium at this time and monitor on BMP. Patient currently takes 10 mg PO potassium supplement daily. Current K 5.0.  Supp likely due to prior loop diuretic regimen - now off  - Will discontinue PO potassium at this time and monitor on BMP.

## 2018-05-06 NOTE — ED PROVIDER NOTE - NEUROLOGICAL, MLM
Alert and oriented, no focal deficits, no motor or sensory deficits. cn 2-12 intact, finger to nose intact, pt ataxic when standing, feels very lightheaded when attempting to stand

## 2018-05-06 NOTE — H&P ADULT - PROBLEM SELECTOR PLAN 4
Patient hypertensive in ED.  - C/w home meds Lisinopril 5 mg, Diltiazem 120 mg/d, and Metoprolol (changed 50 ER qDay to 25 tartrate BID).  - Monitor q4h vitals. Goal normotension. Adjust medications as indicated.

## 2018-05-06 NOTE — CONSULT NOTE ADULT - SUBJECTIVE AND OBJECTIVE BOX
HPI:  Pt is a 78 yo M with a PMH of A-Fib (on Xarelto), HTN, HLD and CHF who is presenting with complaint of right foot cramping and weakness on awakening that progressed to right hand cramping and weakness throughout the day. Pt's aid notes that en route to the ED pt had an episode of slurred speech that resolved within minutes. Pt notes feeling unwell over the past week with cough and multiple episodes of dizziness on ambulating that are worsening in intensity. Otherwise denies any fevers, chills, HA, numbness, tingling, vision changes, CP, SOB, nausea, vomiting, abdominal pain, changes in BMs/urination. NIHSS: 0. MRS: 0.      MEDICATIONS  (STANDING):    MEDICATIONS  (PRN):    PAST MEDICAL & SURGICAL HISTORY:  Gout  HLD (hyperlipidemia)  HTN (hypertension)  AF (atrial fibrillation)  No significant past surgical history    FAMILY HISTORY:  No pertinent family history in first degree relatives    Allergies    No Known Allergies    Intolerances    SHx - No smoking, No ETOH, No drug abuse    Review of Systems:  See HPI.    Vital Signs Last 24 Hrs  T(C): 36.7 (06 May 2018 17:29), Max: 36.7 (06 May 2018 17:29)  T(F): 98.1 (06 May 2018 17:29), Max: 98.1 (06 May 2018 17:29)  HR: 65 (06 May 2018 17:29) (65 - 65)  BP: 161/76 (06 May 2018 17:29) (161/76 - 161/76)  BP(mean): --  RR: 16 (06 May 2018 18:22) (16 - 16)  SpO2: 100% (06 May 2018 18:22) (100% - 100%)      General Exam:   General appearance: No acute distress    Neurological Exam:  Mental Status: Orientated to self, date and place.  Attention intact.  No dysarthria. Speech fluent.  Cranial Nerves: PERRL, EOMI, VFF, no nystagmus. CN V1-3 intact to light touch b/l.  No facial asymmetry.  Hearing intact to finger rub bilaterally.  Tongue, uvula and palate midline.  Sternocleidomastoid and Trapezius intact bilaterally.    Motor:   Tone: normal.                  Strength:     [] Upper extremity                      Delt       Bicep    Tricep                                                  R         5/5        5/5        5/5       5/5                                               L          5/5        5/5        5/5       5/5  [] Lower extremity                       HF          KE          KF        DF         PF                                               R        5/5 5/5 5/5 5/5 5/5                                               L         5/5 5/5 5/5 5/5 5/5  Pronator drift: none b/l  Dysmetria: None to finger-nose-finger b/l  No truncal ataxia.    Tremor: No resting, postural or action tremor.  No myoclonus.    Sensation: intact to light touch throughout, no neglect or extinction    Gait: unsteady, felt dizzy on standing      05-06    138  |  104  |  16  ----------------------------<  166<H>  5.0   |  19<L>  |  1.08    Ca    8.8      06 May 2018 17:30    TPro  7.7  /  Alb  3.8  /  TBili  0.3  /  DBili  x   /  AST  22  /  ALT  15  /  AlkPhos  75  05-06                            12.8   7.57  )-----------( 211      ( 06 May 2018 17:30 )             42.7       Radiology:  < from: CT Brain Stroke Protocol (05.06.18 @ 17:12) >  IMPRESSION:     No CT evidence for acute infarct or acute hemorrhage. If the patient has   a new and persistent neurologic deficit, consider short interval   follow-up head CT or brain MRI follow-up.

## 2018-05-06 NOTE — H&P ADULT - ASSESSMENT
77M PMH A-Fib (on Xarelto), HTN, HLD, PUD and ?CHF who presents with TIA 78yo Male with Hx of A-Fib (on Xarelto), HTN, HLD, PUD and ?CHF a/w likely TIA vs CVA

## 2018-05-06 NOTE — ED ADULT NURSE REASSESSMENT NOTE - NS ED NURSE REASSESS COMMENT FT1
pt awake, a/ox3, vitals as noted, pt states "the cramps went away", pt is NAD at this time, on cardiac monitor, will continue to monitor

## 2018-05-06 NOTE — CONSULT NOTE ADULT - ATTENDING COMMENTS
I have seen and examined this patient with the stroke neurology team.     History was reviewed with the patient and/or available family members.   ROS: All negative except documented in the HPI.   Neurological exam was performed and agree with exam as documented above.   Laboratory results and imaging studies were reviewed by me.   I agree with the neurology resident note as documented above.    77 years old man with multiple vascular risk factors, including atrial fibrillation, on therapeutic anticoagulation with rivaroxaban is evaluated at [facility] for acute onset of right-sided symptoms described as sensory paresthesia involving the right foot/lower leg, which spread to the right hand/forearm over 4-5 hours and associated right-sided weakness on 5/6. CT brain on admission did not show any evidence of acute infarct or hemorrhage. Neurological examination today shows subjective right arm and leg sensory paresthesia, as well as subtle right upper extremity ataxia/dysmetria.     Impression:  Cerebral thrombosis with cerebral infarction. Probable left PCA distribution stroke leading to left thalamic dysfunction, presenting as ataxic-hemiparesis (hypoesthetic) lacunar stroke syndrome - likely etiology being small vessel disease but possibility of cardioembolism in the setting of atrial fibrillation could not be ruled out, doubt large vessel disease, being the etiology of his stroke  but  who needs to be ruled out    Plan:  - Therapeutic anticoagulation ([id number] score>1 and possible non-valvular atrial fibrillation) with rivaroxaban as per home dose for secondary stroke prevention  - No indications to add antiplatelet therapy to therapeutic anticoagulation unless patient has history of CAD or cardiac stents  - SCDs for the DVT prophylaxis   - LDL 82; consider atorvastatin 80 mg at bedtime once able to pass the swallow evaluation considering likely atheroembolic etiology of his stroke  - HbA1C 7.2, continue with aggressive vascular risk factors modifications   - Permissive HTN up to 180/105 mmHg (in the setting of therapeutic anticoagulation) for first 48-72 hours from symptom onset followed by gradual normotension  - Awaiting MRI brain without contrast; MRA head and neck to evaluate for intracranial and extracranial cerebral vasculature   - TTE with bubble study  and continue with telemetry   - Evaluation and management of atrial fibrillation as per primary team  - PT/OT/Speech and swallow/safety eval    Above mentioned plan was discussed with patient and available family member in detail. All the questions were answered and concerns were addressed.

## 2018-05-06 NOTE — H&P ADULT - NSHPSOCIALHISTORY_GEN_ALL_CORE
Denies tobacco or alcohol use.  Used to drink alcohol daily, quit in 1988  Lives with wife. Able to walk without assistance and performs all ADLs Reports no tobacco or alcohol use.  Used to drink alcohol daily, quit in 1988  Lives with wife.  Able to walk without assistance and performs all ADLs

## 2018-05-06 NOTE — H&P ADULT - NSHPREVIEWOFSYSTEMS_GEN_ALL_CORE
Review of Systems:   CONSTITUTIONAL: No fever, weight changes, fatigue, appetite changes  EYES: No eye pain, visual disturbances, or discharge  ENMT:  No difficulty hearing, tinnitus, vertigo; No sinus or throat pain  NECK: No pain or stiffness  RESPIRATORY: +cough. No wheezing, chills or hemoptysis; No shortness of breath  CARDIOVASCULAR: No chest pain, palpitations, dizziness, or leg swelling  GASTROINTESTINAL: No abdominal or epigastric pain. No nausea, vomiting, or hematemesis; No diarrhea or constipation. No melena or hematochezia.  GENITOURINARY: No dysuria, frequency, hematuria, or incontinence  NEUROLOGICAL: No headaches, memory loss, loss of strength, numbness, or tremors  SKIN: No itching, burning, rashes, or lesions   LYMPH NODES: No enlarged glands  ENDOCRINE: No heat or cold intolerance; No hair loss  MUSCULOSKELETAL: No joint pain or swelling; No muscle, back, or extremity pain  PSYCHIATRIC: No depression, anxiety, mood swings, or difficulty sleeping  HEME/LYMPH: No easy bruising, or bleeding gums  ALLERY AND IMMUNOLOGIC: No hives or eczema Review of Systems:   CONSTITUTIONAL: No fever, weight changes, fatigue, appetite changes  EYES: No eye pain, visual disturbances, or discharge  ENMT:  No difficulty hearing, tinnitus, vertigo; No sinus or throat pain  NECK: No pain or stiffness  RESPIRATORY: +cough. No wheezing, chills or hemoptysis; No shortness of breath  CARDIOVASCULAR: No chest pain, palpitations, dizziness, or leg swelling  GASTROINTESTINAL: No abdominal or epigastric pain. No nausea, vomiting, or hematemesis; No diarrhea or constipation. No melena or hematochezia.  GENITOURINARY: No dysuria, frequency, hematuria, or incontinence  NEUROLOGICAL: No headaches or memory loss, (+) Right hand weakness and numbness with unstable gait  SKIN: No itching, burning, rashes, or lesions   LYMPH NODES: No enlarged glands  ENDOCRINE: No heat or cold intolerance; No hair loss  MUSCULOSKELETAL: No joint pain or swelling; No muscle, back, or extremity pain  PSYCHIATRIC: No depression, anxiety, mood swings, or difficulty sleeping  HEME/LYMPH: No easy bruising, or bleeding gums  ALLERY AND IMMUNOLOGIC: No hives or eczema

## 2018-05-06 NOTE — H&P ADULT - PROBLEM SELECTOR PLAN 1
Patient with episode of "cramping" in LLE, associated with worsening weakness and instability, progressing to hand tingling and reported episode of dysarthria. All symptoms currently resolved. Suspect TIA. CT negative for acute stroke  - Neurology following, appreciate recs  - C/w Xarelto  - c/w home statin  - Follow-up lipid panel and A1c in AM  - PT/OT ordered  - Goal to maintain normotension, per Neuro  - Consider TTE to evaluate for LV thrombus. Patient may require full-dose anticoagulation, in which case he may require PPI given hx PUD with bleed. Patient with episode of "cramping" in LLE, associated with worsening weakness and gait instability, progressing to hand tingling and reported episode of dysarthria. All symptoms currently resolved. Suspect TIA. CT negative for acute stroke  - Neurology following, appreciate recs  - C/w Xarelto  - c/w home statin  - Follow-up lipid panel and A1c in AM  - PT/OT ordered  - Goal to maintain normotension, per Neuro  - TTE to evaluate for LV thrombus.  - Patient may require full-dose anticoagulation, in which case he may require PPI given hx PUD with bleed. Patient with episode of "cramping" in LLE, associated with worsening weakness and gait instability, progressing to hand tingling and reported episode of dysarthria. All symptoms currently resolved. Suspect TIA. CT negative for acute stroke  - Neurology following, appreciate recs  - C/w Xarelto  - c/w home statin  - Follow-up lipid panel and A1c in AM  - PT/OT ordered  - Goal to maintain normotension, per Neuro  - TTE to evaluate for LV thrombus.  - Patient may require full-dose anticoagulation, in which case he may require PPI given hx PUD with bleed.  -No need for Telemetry, known h/o Afib  -MRA head and neck Patient with episode of "cramping" in LLE, associated with worsening weakness and gait instability, progressing to hand tingling and reported episode of dysarthria. All symptoms currently resolved. Suspect TIA. CT negative for acute stroke  - Neurology following, appreciate recs  - C/w Xarelto  - c/w home statin  - Follow-up lipid panel and A1c in AM  - PT/OT ordered  - Goal to maintain normotension, per Neuro  - TTE to evaluate for LV thrombus.  - Patient may require full-dose anticoagulation, in which case he may require PPI given hx PUD with bleed.  -No need for Telemetry, known h/o Afib  -MR head, MRA h&n  -NPO until S&S performed

## 2018-05-06 NOTE — H&P ADULT - PROBLEM SELECTOR PLAN 2
Patient reports 1 week of persistent cough 2/2 URI. Likely viral syndrome with residual cough. Denies fevers or chills.  - Flonase for PND associated cough  - Tessalon perles PRN  - Trend for fevers  - Follow-up official read CXR.

## 2018-05-06 NOTE — H&P ADULT - NSHPPHYSICALEXAM_GEN_ALL_CORE
.  VITAL SIGNS:  T(C): 36.6 (05-06-18 @ 20:43), Max: 36.7 (05-06-18 @ 17:29)  T(F): 97.9 (05-06-18 @ 20:43), Max: 98.1 (05-06-18 @ 17:29)  HR: 84 (05-06-18 @ 20:43) (65 - 84)  BP: 170/82 (05-06-18 @ 20:43) (161/76 - 170/82)  BP(mean): --  RR: 15 (05-06-18 @ 20:43) (15 - 16)  SpO2: 100% (05-06-18 @ 20:43) (100% - 100%)  Wt(kg): --    PHYSICAL EXAM:    Constitutional: WDWN resting comfortably in bed; NAD  Head: NC/AT  Eyes: PERRL, EOMI, anicteric sclera  ENT: no nasal discharge; uvula midline, no oropharyngeal erythema or exudates; MMM  Neck: supple; no JVD or thyromegaly  Respiratory: CTA B/L; no W/R/R, no retractions  Cardiac: +S1/S2; RRR; no M/R/G; PMI non-displaced  Gastrointestinal: soft, NT/ND; no rebound or guarding; +BSx4  Back: spine midline, no bony tenderness or step-offs; no CVAT B/L  Extremities: no clubbing or cyanosis; no peripheral edema  Musculoskeletal: NROM x4; no joint swelling, tenderness or erythema  Vascular: 2+ radial, femoral, DP/PT pulses B/L  Dermatologic: skin warm, dry and intact; no rashes, wounds, or scars  Lymphatic: no submandibular or cervical LAD  Neurologic: AAOx3; CNII-XII grossly intact; no focal deficits  Psychiatric: affect and characteristics of appearance, verbalizations, behaviors are appropriate .  VITAL SIGNS:  T(C): 36.6 (05-06-18 @ 20:43), Max: 36.7 (05-06-18 @ 17:29)  T(F): 97.9 (05-06-18 @ 20:43), Max: 98.1 (05-06-18 @ 17:29)  HR: 84 (05-06-18 @ 20:43) (65 - 84)  BP: 170/82 (05-06-18 @ 20:43) (161/76 - 170/82)  BP(mean): --  RR: 15 (05-06-18 @ 20:43) (15 - 16)  SpO2: 100% (05-06-18 @ 20:43) (100% - 100%)  Wt(kg): --    PHYSICAL EXAM:    Constitutional: WDWN resting comfortably in bed; NAD  Eyes: PERRL, EOMI, anicteric sclera  ENT: no nasal discharge; uvula midline, no oropharyngeal erythema or exudates; MMM  Neck: supple; no JVD or thyromegaly  Respiratory: CTA B/L; no W/R/C, no retractions  Cardiac: +S1/S2; no M/R/G; PMI non-displaced  Gastrointestinal: soft, NT/ND; no rebound or guarding; +BSx4  Back: spine midline, no bony tenderness or step-offs; no CVAT B/L  Extremities: no clubbing or cyanosis; no peripheral edema  Musculoskeletal: NROM x4; no joint swelling, tenderness or erythema  Vascular: 2+ radial, femoral, DP/PT pulses B/L  Dermatologic: skin warm, dry and intact; no rashes, wounds, or scars  Psychiatric: affect and characteristics of appearance, verbalizations, behaviors are appropriate

## 2018-05-06 NOTE — CONSULT NOTE ADULT - ASSESSMENT
Pt is a 78 yo M with a PMH of A-Fib (on Xarelto), HTN, HLD and CHF who is presenting with complaint of right foot cramping and weakness on awakening that progressed to right hand cramping and weakness throughout the day. Had an episode of slurred speech that resolved within minutes. Feeling unwell the past week with cough, multiple episodes of dizziness on ambulating. Exam nonfocal, but significant for dizziness on standing and unsteady gait. Likely orthostatic, very low suspicion for stroke given hx and exam, though regardless pt already on Xarelto. NIHSS: 0. MRS: 0.    Recommendations:  - Can Repeat Head CT in 24-48 hours to confirm if there is a stroke or not  - c/w Xarelto  - c/w home statin  - Cardiac w/u  - Lipid Panel  - HbA1c Pt is a 76 yo M with a PMH of A-Fib (on Xarelto), HTN, HLD and CHF who is presenting with complaint of right foot cramping and weakness on awakening that progressed to right hand cramping and weakness throughout the day. Had an episode of slurred speech that resolved within minutes. Feeling unwell the past week with cough, multiple episodes of dizziness on ambulating. Exam nonfocal, but significant for dizziness on standing and unsteady gait. Likely orthostatic, very low suspicion for stroke given hx and exam, though regardless pt already on Xarelto. NIHSS: 0. MRS: 0.    Recommendations:  - Can Repeat Head CT in 24-48 hours to r/o stroke as inpatient or outpatient  - c/w Xarelto  - c/w home statin  - Cardiac w/u  - Lipid Panel  - HbA1c Pt is a 78 yo M with a PMH of A-Fib (on Xarelto), HTN, HLD and CHF who is presenting with complaint of right foot cramping and weakness on awakening that progressed to right hand cramping and weakness throughout the day. Had an episode of slurred speech that resolved within minutes. Feeling unwell the past week with cough, multiple episodes of dizziness on ambulating. Exam nonfocal, but significant for dizziness on standing and unsteady gait. Likely orthostatic, very low suspicion for stroke given hx and exam, though regardless pt already on Xarelto. NIHSS: 0. MRS: 0.    Recommendations:  - Can Repeat Head CT in 24-48 hours to r/o stroke as inpatient or outpatient  - c/w Xarelto  - c/w home statin  - Cardiac w/u Pt is a 76 yo M with a PMH of A-Fib (on Xarelto), HTN, HLD and CHF who is presenting with complaint of right foot cramping and weakness on awakening that progressed to right hand cramping and weakness throughout the day. Had an episode of slurred speech that resolved within minutes. Feeling unwell the past week with cough, multiple episodes of dizziness on ambulating. Exam nonfocal, but significant for dizziness on standing and unsteady gait. Likely orthostatic, very low suspicion for stroke given hx and exam, though regardless pt already on Xarelto. NIHSS: 0. MRS: 0.    Recommendations:  - Repeat Head CT in 24 hours  - c/w Xarelto  - c/w home statin  - PT/OT  - HbA1c  - Lipid Panel Pt is a 78 yo M with a PMH of A-Fib (on Xarelto), HTN, HLD and CHF who is presenting with complaint of right foot cramping and weakness on awakening that progressed to right hand cramping and weakness throughout the day. Had an episode of slurred speech that resolved within minutes. Feeling unwell the past week with cough, multiple episodes of dizziness on ambulating. Exam nonfocal, but significant for dizziness on standing and unsteady gait. Likely orthostatic, very low suspicion for stroke given hx and exam, though regardless pt already on Xarelto. NIHSS: 0. MRS: 0.    Recommendations:  - Repeat Head CT in 24 hours  - c/w Xarelto  - c/w home statin  - PT/OT  - HbA1c  - Lipid Panel  - Maintain Normotension Pt is a 78 yo M with a PMH of A-Fib (on Xarelto), HTN, HLD and CHF who is presenting with complaint of right foot cramping and weakness on awakening that progressed to right hand cramping and weakness throughout the day. Had an episode of slurred speech that resolved within minutes. Feeling unwell the past week with cough, multiple episodes of dizziness on ambulating. Exam nonfocal, but significant for dizziness on standing and unsteady gait. Low suspicion for stroke given hx and exam, though regardless pt already on Xarelto. NIHSS: 0. MRS: 0.    Recommendations:  - Repeat Head CT in 24 hours  - c/w Xarelto  - c/w home statin  - PT/OT  - HbA1c  - Lipid Panel  - Maintain Normotension

## 2018-05-06 NOTE — H&P ADULT - PROBLEM SELECTOR PLAN 8
Patient reports he is on adjusted dose of Xarelto because of his history of UGIB with peptic ulcers. No signs/symptoms of GI bleed at this time.  - Monitor for signs of bleed.  - C/w current dose of Xarelto  - Consider PPI if Xarelto is increased to 20 mg daily.

## 2018-05-06 NOTE — ED PROVIDER NOTE - PROGRESS NOTE DETAILS
karli: pt evaluated by myself in walk-in. on xarelto for a fib. no hx cva.   awoke 745am with rt sided weakness and inability to walk. slurred speech since 1pm.   there is weakness to RLE. Pt is awake and alert and able to speak understandbly.   pt brought to CT and to be scanned stat. Neurology contacted and will evaluate pt.  Dr Kennedy will be caring for pt and is aware of pts hx. Neurology at bedside

## 2018-05-06 NOTE — ED PROVIDER NOTE - MEDICAL DECISION MAKING DETAILS
subjective weakness in rue and rle without objective weakness on exam->ct head, neuro consult, basic labs/lytes, if all wnl will complete TIA workup

## 2018-05-06 NOTE — ED PROVIDER NOTE - OBJECTIVE STATEMENT
77yM hx afib on xarelto, htn, hld, chf (on lasix, EF 66%), mild AS/AR p/w weak rt knee that he noted upon waking up at 6am and gradually worsened throughout the day and became accompanied by rt hand weakness at 11am. Wife noted slurred speech at 4pm today en route to hospital for rue and rle weakness. Pt states he felt funny last night but did not have focal deficits at that time. Pt did not fall. No recent illness, fever. Pt states symptoms have improved now as compared to this morning. Had a cough x 1wk (not on abx).  Cards: Rodney

## 2018-05-06 NOTE — H&P ADULT - HISTORY OF PRESENT ILLNESS
74M PMH 77M PMH A-Fib (on Xarelto), HTN, HLD, PUD and ?CHF who presents with chief complaint of R sided weakness. The patient noted lower R leg crampling when he woke up this morning that worsened throughout the day. He had to have his wife help him walk because he thought he would fall, he reports no actual falls. He later noted weakness of his R hand in the late afternoon, which prompted him to go to the ED. His wife noted him to have slurred speech on the way to the hospital, which resolved within a few minutes. Patient feels back to baseline at this time.  Patient does note that he has had a URI for the past ~10 days with persistent cough that is not improving. He denies any fevers or chills, CP, N/V, palpitations, diarrhea, constipation, LE swelling, ear pain/pressure, tinnitus. 77M PMH A-Fib (on Xarelto), HTN, HLD, PUD and ?CHF who presents with chief complaint of R sided weakness. The patient noted lower R leg crampling when he woke up this morning that worsened throughout the day. He had to have his wife help him walk because he thought he would fall, he reports no actual falls. He later noted weakness of his R hand in the late afternoon, which prompted him to go to the ED. His wife noted him to have slurred speech on the way to the hospital, which resolved within a few minutes. Patient feels back to baseline at this time.  Patient does note that he has had a URI for the past ~10 days with persistent cough that is not improving. He denies any fevers or chills, CP, N/V, palpitations, diarrhea, constipation, LE swelling, ear pain/pressure, tinnitus.  Patient is currently taking Xarelto 15 mg instead of 20 mg daily, which he reports is because he had an upper GI bleed due to peptic ulcers in the past, and had his dose lowered to reduce the risk of bleeding.  Patient does not take aspirin.    ED Vitals: T 98.1, HR 65, /76, RR 16, SpO2 100% on RA  Initial Labs: WBC 7.57, Hgb 12.8, Plt 211, INR 1.29, K 5.0, BUN 15, Cr 1.08, LFTs wnl, CE wnl.  Initial Imaging: CXR demonstrated no emergent findings on prelim read. Appears clear, but ?LLL consolidation, pending official read. CT head demonstrated no acute findings.    In the ED, the patient received 1L IV NS, albuterol x1, and orthostatics were checked, found to be normal.  Patient was seen by neurology. 78yo Male with Hx of A-Fib (on Xarelto), HTN, HLD, PUD and ?CHF who presents with chief complaint of Rt sided weakness. The patient noted lower Rt leg cramping when he woke up this morning that worsened throughout the day. He had to have his wife help him walk because he thought he would fall, he reports no actual falls. He later noted weakness of his R hand in the late afternoon, which prompted him to go to the ED. His wife noted him to have slurred speech on the way to the hospital, which resolved within a few minutes. Patient feels back to baseline at this time.  Patient does note that he has had a URI for the past ~10 days with persistent cough that is not improving. He denies any fevers or chills, CP, N/V, palpitations, diarrhea, constipation, LE swelling, ear pain/pressure, tinnitus.  Patient is currently taking Xarelto 15 mg instead of 20 mg daily, which he reports is because he had an upper GI bleed due to peptic ulcers in the past, and had his dose lowered to reduce the risk of bleeding.  Patient does not take aspirin.    ED Vitals: T 98.1, HR 65, /76, RR 16, SpO2 100% on RA  Initial Labs: WBC 7.57, Hgb 12.8, Plt 211, INR 1.29, K 5.0, BUN 15, Cr 1.08, LFTs wnl, CE wnl.  Initial Imaging: CXR demonstrated no emergent findings on prelim read. Appears clear, but ?LLL consolidation, pending official read. CT head demonstrated no acute findings.    In the ED, the patient received 1L IV NS, albuterol x1, and orthostatics were checked, found to be normal.  Patient was seen by neurology. 76yo Male with Hx of A-Fib (on Xarelto), HTN, HLD, PUD and ?CHF who presents with chief complaint of Rt sided weakness. The patient noted lower Rt leg cramping when he woke up this morning that worsened throughout the day. He had to have his wife help him walk because he thought he would fall, he reports no actual falls. He later noted weakness of his Rt hand in the late afternoon, which prompted him to go to the ED. His wife noted him to have slurred speech on the way to the hospital, which resolved within a few minutes. Patient feels back to baseline at this time.  Patient does note that he has had a URI for the past ~10 days with persistent cough that is not improving. He denies any fevers or chills, CP, N/V, palpitations, diarrhea, constipation, LE swelling, ear pain/pressure, tinnitus.  Patient is currently taking Xarelto 15 mg instead of 20 mg daily, which he reports is because he had an upper GI bleed due to peptic ulcers in the past, and had his dose lowered to reduce the risk of bleeding.  Patient does not take aspirin.    ED Vitals: T 98.1, HR 65, /76, RR 16, SpO2 100% on RA  Initial Labs: WBC 7.57, Hgb 12.8, Plt 211, INR 1.29, K 5.0, BUN 15, Cr 1.08, LFTs wnl, CE wnl.    In the ED, the patient received 1L IV NS, albuterol x1, and orthostatics were checked, found to be normal.  Patient was seen by neurology.

## 2018-05-06 NOTE — H&P ADULT - PROBLEM SELECTOR PLAN 3
Patient currently in AFib, rate controlled. [id number] score 4 to 5 (+2 - age, +1 - HTN, +1 TIA today. Questionable hx CHF). On Xarelto 15 mg daily (instead of 20 mg, patient states dose lowered because of hx UGIB 2/2 peptic ulcers).  - C/w diltiazem and metoprolol with hold parameters  - C/w Xarelto 15 mg daily  - If Xarelto dose increased, would suggest PPI ppx. Patient currently in AFib, rate controlled. [id number] score 5.  On Xarelto 15 mg daily (instead of 20 mg, patient states dose lowered because of hx UGIB 2/2 peptic ulcers).  - C/w diltiazem and metoprolol with hold parameters  - C/w Xarelto 15 mg daily  - If Xarelto dose increased, would suggest PPI ppx.

## 2018-05-06 NOTE — ED PROVIDER NOTE - ATTENDING CONTRIBUTION TO CARE
77M p/w c/o R sided weakness and inability to walk starting this morning as well as slurred speech starting this early afternoon, most symptoms are resolving however pt still feeling quite unsteady when walking, associated with dizziness when standing.  No fever or trauma.  Pt on Xarelto and out of window thus not a candidate for tPA.  Deficits have improved since being in ED however pt with significantly unsteady gait.  Admit.  Orthostatics wnl  VS:  unremarkable except HTN    GEN - NAD; well appearing; A+O x3.  Speech fluent, face symmetric.  HEAD - NC/AT     ENT - PEERL, EOMI, mucous membranes  moist , no discharge      NECK: Neck supple, non-tender without lymphadenopathy, no masses, no JVD  PULM - CTA b/l,  symmetric breath sounds  COR -  normal heart sounds    ABD - , ND, NT, soft, no guarding, no rebound, no masses    BACK - no CVA tenderness, nontender spine     EXTREMS - no edema, no deformity, warm and well perfused    SKIN - no rash or bruising      NEUROLOGIC - alert, CN 2-12 intact, sensation nl, motor 5/5 RUE/LUE/RLE/LLE.  Unsteady gait, near falling after taking only 1-2 steps.

## 2018-05-06 NOTE — ED ADULT NURSE NOTE - OBJECTIVE STATEMENT
Pt received a&ox3, c/o right hand and leg cramping/numbness x 11 hours, no neuro deficits noted, equal sensation and strength noted to both sides of body, pt denies hx of strokes, vs as reported, pt denies ha/dizziness/lightheadedness/blurry vision/cp/sob/nv, pt appears comfortable and to be in NAD, 20 gauge IV placed in left and right acs, labs drawn and sent, pt placed on monitor, will continue to monitor.

## 2018-05-07 DIAGNOSIS — Z29.9 ENCOUNTER FOR PROPHYLACTIC MEASURES, UNSPECIFIED: ICD-10-CM

## 2018-05-07 DIAGNOSIS — I10 ESSENTIAL (PRIMARY) HYPERTENSION: ICD-10-CM

## 2018-05-07 DIAGNOSIS — R05 COUGH: ICD-10-CM

## 2018-05-07 DIAGNOSIS — E78.5 HYPERLIPIDEMIA, UNSPECIFIED: ICD-10-CM

## 2018-05-07 DIAGNOSIS — Z87.11 PERSONAL HISTORY OF PEPTIC ULCER DISEASE: ICD-10-CM

## 2018-05-07 DIAGNOSIS — E87.6 HYPOKALEMIA: ICD-10-CM

## 2018-05-07 DIAGNOSIS — M10.9 GOUT, UNSPECIFIED: ICD-10-CM

## 2018-05-07 DIAGNOSIS — G45.9 TRANSIENT CEREBRAL ISCHEMIC ATTACK, UNSPECIFIED: ICD-10-CM

## 2018-05-07 DIAGNOSIS — E11.9 TYPE 2 DIABETES MELLITUS WITHOUT COMPLICATIONS: ICD-10-CM

## 2018-05-07 DIAGNOSIS — I48.91 UNSPECIFIED ATRIAL FIBRILLATION: ICD-10-CM

## 2018-05-07 DIAGNOSIS — I50.9 HEART FAILURE, UNSPECIFIED: ICD-10-CM

## 2018-05-07 LAB
ALBUMIN SERPL ELPH-MCNC: 3.7 G/DL — SIGNIFICANT CHANGE UP (ref 3.3–5)
ALP SERPL-CCNC: 78 U/L — SIGNIFICANT CHANGE UP (ref 40–120)
ALT FLD-CCNC: 17 U/L — SIGNIFICANT CHANGE UP (ref 4–41)
APPEARANCE UR: CLEAR — SIGNIFICANT CHANGE UP
APTT BLD: 34.4 SEC — SIGNIFICANT CHANGE UP (ref 27.5–37.4)
AST SERPL-CCNC: 21 U/L — SIGNIFICANT CHANGE UP (ref 4–40)
BASOPHILS # BLD AUTO: 0.07 K/UL — SIGNIFICANT CHANGE UP (ref 0–0.2)
BASOPHILS NFR BLD AUTO: 0.9 % — SIGNIFICANT CHANGE UP (ref 0–2)
BILIRUB SERPL-MCNC: 0.5 MG/DL — SIGNIFICANT CHANGE UP (ref 0.2–1.2)
BILIRUB UR-MCNC: NEGATIVE — SIGNIFICANT CHANGE UP
BLOOD UR QL VISUAL: NEGATIVE — SIGNIFICANT CHANGE UP
BUN SERPL-MCNC: 11 MG/DL — SIGNIFICANT CHANGE UP (ref 7–23)
CALCIUM SERPL-MCNC: 8.5 MG/DL — SIGNIFICANT CHANGE UP (ref 8.4–10.5)
CHLORIDE SERPL-SCNC: 102 MMOL/L — SIGNIFICANT CHANGE UP (ref 98–107)
CHOLEST SERPL-MCNC: 126 MG/DL — SIGNIFICANT CHANGE UP (ref 120–199)
CO2 SERPL-SCNC: 23 MMOL/L — SIGNIFICANT CHANGE UP (ref 22–31)
COLOR SPEC: SIGNIFICANT CHANGE UP
CREAT SERPL-MCNC: 0.85 MG/DL — SIGNIFICANT CHANGE UP (ref 0.5–1.3)
EOSINOPHIL # BLD AUTO: 0.27 K/UL — SIGNIFICANT CHANGE UP (ref 0–0.5)
EOSINOPHIL NFR BLD AUTO: 3.5 % — SIGNIFICANT CHANGE UP (ref 0–6)
GLUCOSE SERPL-MCNC: 114 MG/DL — HIGH (ref 70–99)
GLUCOSE UR-MCNC: NEGATIVE — SIGNIFICANT CHANGE UP
HBA1C BLD-MCNC: 7.2 % — HIGH (ref 4–5.6)
HCT VFR BLD CALC: 42.9 % — SIGNIFICANT CHANGE UP (ref 39–50)
HDLC SERPL-MCNC: 41 MG/DL — SIGNIFICANT CHANGE UP (ref 35–55)
HGB BLD-MCNC: 12.9 G/DL — LOW (ref 13–17)
IMM GRANULOCYTES # BLD AUTO: 0.02 # — SIGNIFICANT CHANGE UP
IMM GRANULOCYTES NFR BLD AUTO: 0.3 % — SIGNIFICANT CHANGE UP (ref 0–1.5)
INR BLD: 1.07 — SIGNIFICANT CHANGE UP (ref 0.88–1.17)
KETONES UR-MCNC: NEGATIVE — SIGNIFICANT CHANGE UP
LEUKOCYTE ESTERASE UR-ACNC: SIGNIFICANT CHANGE UP
LIPID PNL WITH DIRECT LDL SERPL: 82 MG/DL — SIGNIFICANT CHANGE UP
LYMPHOCYTES # BLD AUTO: 3 K/UL — SIGNIFICANT CHANGE UP (ref 1–3.3)
LYMPHOCYTES # BLD AUTO: 38.8 % — SIGNIFICANT CHANGE UP (ref 13–44)
MAGNESIUM SERPL-MCNC: 2 MG/DL — SIGNIFICANT CHANGE UP (ref 1.6–2.6)
MCHC RBC-ENTMCNC: 19.6 PG — LOW (ref 27–34)
MCHC RBC-ENTMCNC: 30.1 % — LOW (ref 32–36)
MCV RBC AUTO: 65.3 FL — LOW (ref 80–100)
MONOCYTES # BLD AUTO: 0.74 K/UL — SIGNIFICANT CHANGE UP (ref 0–0.9)
MONOCYTES NFR BLD AUTO: 9.6 % — SIGNIFICANT CHANGE UP (ref 2–14)
MUCOUS THREADS # UR AUTO: SIGNIFICANT CHANGE UP
NEUTROPHILS # BLD AUTO: 3.64 K/UL — SIGNIFICANT CHANGE UP (ref 1.8–7.4)
NEUTROPHILS NFR BLD AUTO: 46.9 % — SIGNIFICANT CHANGE UP (ref 43–77)
NITRITE UR-MCNC: NEGATIVE — SIGNIFICANT CHANGE UP
NRBC # FLD: 0 — SIGNIFICANT CHANGE UP
PH UR: 6.5 — SIGNIFICANT CHANGE UP (ref 4.6–8)
PHOSPHATE SERPL-MCNC: 3.1 MG/DL — SIGNIFICANT CHANGE UP (ref 2.5–4.5)
PLATELET # BLD AUTO: 188 K/UL — SIGNIFICANT CHANGE UP (ref 150–400)
PMV BLD: SIGNIFICANT CHANGE UP FL (ref 7–13)
POTASSIUM SERPL-MCNC: 4.3 MMOL/L — SIGNIFICANT CHANGE UP (ref 3.5–5.3)
POTASSIUM SERPL-SCNC: 4.3 MMOL/L — SIGNIFICANT CHANGE UP (ref 3.5–5.3)
PROT SERPL-MCNC: 7.7 G/DL — SIGNIFICANT CHANGE UP (ref 6–8.3)
PROT UR-MCNC: 30 MG/DL — HIGH
PROTHROM AB SERPL-ACNC: 12.3 SEC — SIGNIFICANT CHANGE UP (ref 9.8–13.1)
RBC # BLD: 6.57 M/UL — HIGH (ref 4.2–5.8)
RBC # FLD: 21.6 % — HIGH (ref 10.3–14.5)
RBC CASTS # UR COMP ASSIST: SIGNIFICANT CHANGE UP (ref 0–?)
SODIUM SERPL-SCNC: 138 MMOL/L — SIGNIFICANT CHANGE UP (ref 135–145)
SP GR SPEC: 1.01 — SIGNIFICANT CHANGE UP (ref 1–1.04)
SQUAMOUS # UR AUTO: SIGNIFICANT CHANGE UP
TRIGL SERPL-MCNC: 78 MG/DL — SIGNIFICANT CHANGE UP (ref 10–149)
TSH SERPL-MCNC: 1.73 UIU/ML — SIGNIFICANT CHANGE UP (ref 0.27–4.2)
UROBILINOGEN FLD QL: NORMAL MG/DL — SIGNIFICANT CHANGE UP
WBC # BLD: 7.74 K/UL — SIGNIFICANT CHANGE UP (ref 3.8–10.5)
WBC # FLD AUTO: 7.74 K/UL — SIGNIFICANT CHANGE UP (ref 3.8–10.5)
WBC CLUMPS #/AREA URNS HPF: PRESENT — HIGH (ref 0–?)
WBC UR QL: SIGNIFICANT CHANGE UP (ref 0–?)

## 2018-05-07 PROCEDURE — 99233 SBSQ HOSP IP/OBS HIGH 50: CPT

## 2018-05-07 RX ORDER — GLUCAGON INJECTION, SOLUTION 0.5 MG/.1ML
1 INJECTION, SOLUTION SUBCUTANEOUS ONCE
Qty: 0 | Refills: 0 | Status: DISCONTINUED | OUTPATIENT
Start: 2018-05-07 | End: 2018-05-09

## 2018-05-07 RX ORDER — DEXTROSE 50 % IN WATER 50 %
12.5 SYRINGE (ML) INTRAVENOUS ONCE
Qty: 0 | Refills: 0 | Status: DISCONTINUED | OUTPATIENT
Start: 2018-05-07 | End: 2018-05-09

## 2018-05-07 RX ORDER — DEXTROSE 50 % IN WATER 50 %
1 SYRINGE (ML) INTRAVENOUS ONCE
Qty: 0 | Refills: 0 | Status: DISCONTINUED | OUTPATIENT
Start: 2018-05-07 | End: 2018-05-09

## 2018-05-07 RX ORDER — DEXTROSE 50 % IN WATER 50 %
25 SYRINGE (ML) INTRAVENOUS ONCE
Qty: 0 | Refills: 0 | Status: DISCONTINUED | OUTPATIENT
Start: 2018-05-07 | End: 2018-05-09

## 2018-05-07 RX ORDER — SODIUM CHLORIDE 9 MG/ML
1000 INJECTION, SOLUTION INTRAVENOUS
Qty: 0 | Refills: 0 | Status: DISCONTINUED | OUTPATIENT
Start: 2018-05-07 | End: 2018-05-09

## 2018-05-07 RX ORDER — FLUTICASONE PROPIONATE 50 MCG
1 SPRAY, SUSPENSION NASAL
Qty: 0 | Refills: 0 | Status: DISCONTINUED | OUTPATIENT
Start: 2018-05-07 | End: 2018-05-09

## 2018-05-07 RX ORDER — INSULIN LISPRO 100/ML
VIAL (ML) SUBCUTANEOUS
Qty: 0 | Refills: 0 | Status: DISCONTINUED | OUTPATIENT
Start: 2018-05-07 | End: 2018-05-09

## 2018-05-07 RX ORDER — PANTOPRAZOLE SODIUM 20 MG/1
40 TABLET, DELAYED RELEASE ORAL
Qty: 0 | Refills: 0 | Status: DISCONTINUED | OUTPATIENT
Start: 2018-05-07 | End: 2018-05-07

## 2018-05-07 RX ORDER — INSULIN LISPRO 100/ML
VIAL (ML) SUBCUTANEOUS AT BEDTIME
Qty: 0 | Refills: 0 | Status: DISCONTINUED | OUTPATIENT
Start: 2018-05-07 | End: 2018-05-09

## 2018-05-07 RX ADMIN — Medication 120 MILLIGRAM(S): at 06:59

## 2018-05-07 RX ADMIN — Medication 1 SPRAY(S): at 17:47

## 2018-05-07 RX ADMIN — Medication 25 MILLIGRAM(S): at 17:46

## 2018-05-07 RX ADMIN — Medication 100 MILLIGRAM(S): at 06:59

## 2018-05-07 RX ADMIN — Medication 25 MILLIGRAM(S): at 06:59

## 2018-05-07 RX ADMIN — LISINOPRIL 5 MILLIGRAM(S): 2.5 TABLET ORAL at 06:59

## 2018-05-07 RX ADMIN — Medication 1 SPRAY(S): at 06:59

## 2018-05-07 RX ADMIN — Medication 100 MILLIGRAM(S): at 13:51

## 2018-05-07 RX ADMIN — Medication 40 MILLIGRAM(S): at 21:23

## 2018-05-07 RX ADMIN — Medication 100 MILLIGRAM(S): at 21:23

## 2018-05-07 RX ADMIN — RIVAROXABAN 15 MILLIGRAM(S): KIT at 17:47

## 2018-05-07 RX ADMIN — Medication 1: at 17:47

## 2018-05-07 NOTE — PROGRESS NOTE ADULT - PROBLEM SELECTOR PLAN 4
- C/w home meds Lisinopril 5 mg, Diltiazem 120 mg/d, and Metoprolol (changed 50 ER qDay to 25 tartrate BID). - HbA1c 7.2, pt was formerly on Metformin 500mg daily, but was taken off by PMD for years and now controlling his DM2 with diet/exercise  - Pt OK with restarting Metformin as outpatient, would d/w PMD prior to discharge - HbA1c 7.2, pt was formerly on Metformin 500mg daily, but was taken off by PMD for years and now controlling his DM2 with diet/exercise  - Pt OK with restarting Metformin as outpatient, would d/w PMD prior to discharge.  - Will monitor FSG while inpatient and continue low dose HISS

## 2018-05-07 NOTE — PROGRESS NOTE ADULT - PROBLEM SELECTOR PLAN 5
- C/w pravastatin  \ - C/w home meds Lisinopril 5 mg, Diltiazem 120 mg/d, and Metoprolol (changed 50 ER qDay to 25 tartrate BID).

## 2018-05-07 NOTE — OCCUPATIONAL THERAPY INITIAL EVALUATION ADULT - PLANNED THERAPY INTERVENTIONS, OT EVAL
balance training/neuromuscular re-education/transfer training/motor coordination training/ADL retraining/bed mobility training

## 2018-05-07 NOTE — PHYSICAL THERAPY INITIAL EVALUATION ADULT - ADDITIONAL COMMENTS
Pt. was left supine in stretcher post PT Evaluation, NAD. RN aware of pt. status and participation in PT.

## 2018-05-07 NOTE — PROGRESS NOTE ADULT - PROBLEM SELECTOR PLAN 7
- Patient reports he is on adjusted dose of Xarelto because of his history of UGIB with peptic ulcers. No signs/symptoms of GI bleed at this time.  - Pt has been off PPI and Sucralfate after prescribed course.  Consider starting H2 blocker - No evidence of active flare.  - C/w allopurinol

## 2018-05-07 NOTE — PROGRESS NOTE ADULT - PROBLEM SELECTOR PLAN 1
- Suspect TIA. CT negative for acute stroke  - Neurology following, MRI/A pending, TTE with bubble pending  - Pt on Xarelto, however dosed at 15mg as per cardiology recs given hx of GIB.  Discussed increasing dose back to 20mg with pt and family - they would like to wait for MRI results first and then decide   - c/w home statin  - PT/OT: PT recommending inpt rehab   - S&S passed - Suspect TIA. CT negative for acute stroke  - Neurology following, MRI/A pending, TTE with bubble pending  - Pt on Xarelto, however dosed at 15mg as per cardiology recs given hx of GIB.  Discussed increasing dose back to 20mg with pt and family - they would like to wait for MRI results first and then decide   - c/w home statin  - PT/OT: PT recommending inpt rehab   - Will start dysphagia diet until official S&S evaluation.

## 2018-05-07 NOTE — PROGRESS NOTE ADULT - PROBLEM SELECTOR PLAN 8
No need for additional DVT ppx - C/w patient specific Xarelto regimen - Patient reports he is on adjusted dose of Xarelto because of his history of UGIB with peptic ulcers. No signs/symptoms of GI bleed at this time.  - Pt has been off PPI and Sucralfate after prescribed course.  Consider starting H2 blocker

## 2018-05-07 NOTE — OCCUPATIONAL THERAPY INITIAL EVALUATION ADULT - PERTINENT HX OF CURRENT PROBLEM, REHAB EVAL
6yo Male with Hx of A-Fib (on Xarelto), HTN, HLD, PUD and ?CHF who presents with chief complaint of Rt sided weakness. His wife noted him to have slurred speech on the way to the hospital, which resolved within a few minutes. CT Brain: No CT evidence for acute infarct or acute hemorrhage. If the patient has a new and persistent neurologic deficit, consider short interval follow-up head CT or brain MRI follow-up.

## 2018-05-07 NOTE — PHYSICAL THERAPY INITIAL EVALUATION ADULT - PERTINENT HX OF CURRENT PROBLEM, REHAB EVAL
Pt. admitted for right handed weakness and unstable gait. Per documentation, no CT evidence for acute infarct or acute hemorrhage. PMH of A-Fib (on Xarelto), HTN, HLD, PUD and ?CHF.

## 2018-05-07 NOTE — PHYSICAL THERAPY INITIAL EVALUATION ADULT - GENERAL OBSERVATIONS, REHAB EVAL
Consult received, chart reviewed. Patient received supine in [facility]. Patient agreed to Evaluation from Physical Therapist.

## 2018-05-07 NOTE — PROGRESS NOTE ADULT - SUBJECTIVE AND OBJECTIVE BOX
Patient is a 77y old  Male who presents with a chief complaint of Right hand weakness and numbness with unstable gait (06 May 2018 22:32)      SUBJECTIVE / OVERNIGHT EVENTS: Still with numbness and weakness in R foot.  No further slurring of speech and numbness/weakness in R hand has improved.  Denies chest pain/SOB; had 2 second pause on telemetry early this morning, but asymptomatic.       MEDICATIONS  (STANDING):  allopurinol 100 milliGRAM(s) Oral daily  benzonatate 100 milliGRAM(s) Oral every 8 hours  diltiazem    milliGRAM(s) Oral daily  fluticasone propionate 50 MICROgram(s)/spray Nasal Spray 1 Spray(s) Both Nostrils two times a day  lisinopril 5 milliGRAM(s) Oral daily  metoprolol tartrate 25 milliGRAM(s) Oral two times a day  pravastatin 40 milliGRAM(s) Oral at bedtime  rivaroxaban 15 milliGRAM(s) Oral every 24 hours    MEDICATIONS  (PRN):      Vital Signs Last 24 Hrs  T(C): 36.5 (07 May 2018 09:38), Max: 36.7 (06 May 2018 17:29)  T(F): 97.7 (07 May 2018 09:38), Max: 98.1 (06 May 2018 17:29)  HR: 77 (07 May 2018 09:38) (65 - 84)  BP: 155/88 (07 May 2018 09:38) (154/87 - 174/82)  BP(mean): --  RR: 19 (07 May 2018 09:38) (15 - 21)  SpO2: 99% (07 May 2018 09:38) (99% - 100%)  CAPILLARY BLOOD GLUCOSE      POCT Blood Glucose.: 166 mg/dL (06 May 2018 16:59)      PHYSICAL EXAM  GENERAL: NAD, well-developed  HEAD:  Atraumatic, Normocephalic  EYES: EOMI, PERRLA, conjunctiva and sclera clear  NECK: Supple, No JVD  CHEST/LUNG: Clear to auscultation bilaterally; No wheeze  HEART: Irregular rate; No murmurs, rubs, or gallops  ABDOMEN: Soft, Nontender, Nondistended; Bowel sounds present  EXTREMITIES:  2+ Peripheral Pulses, No clubbing, cyanosis, or edema  PSYCH: AAOx3  SKIN: No rashes or lesions  NEURO: Motor in RLE 4/5, 5/5 LLE and b/l UE, sensory fully intact, no dysarthria, no aphasia     LABS:                        12.9   7.74  )-----------( 188      ( 07 May 2018 06:59 )             42.9     -    138  |  102  |  11  ----------------------------<  114<H>  4.3   |  23  |  0.85    Ca    8.5      07 May 2018 06:59  Phos  3.1       Mg     2.0         TPro  7.7  /  Alb  3.7  /  TBili  0.5  /  DBili  x   /  AST  21  /  ALT  17  /  AlkPhos  78      PT/INR - ( 07 May 2018 06:59 )   PT: 12.3 SEC;   INR: 1.07          PTT - ( 07 May 2018 06:59 )  PTT:34.4 SEC  CARDIAC MARKERS ( 06 May 2018 17:30 )  x     / < 0.06 ng/mL / 56 u/L / 1.33 ng/mL / x          Urinalysis Basic - ( 07 May 2018 04:00 )    Color: PLYEL / Appearance: CLEAR / S.011 / pH: 6.5  Gluc: NEGATIVE / Ketone: NEGATIVE  / Bili: NEGATIVE / Urobili: NORMAL mg/dL   Blood: NEGATIVE / Protein: 30 mg/dL / Nitrite: NEGATIVE   Leuk Esterase: TRACE / RBC: 0-2 / WBC 0-2   Sq Epi: OCC / Non Sq Epi: x / Bacteria: x        RADIOLOGY & ADDITIONAL TESTS:    Imaging Personally Reviewed:  < from: CT Brain Stroke Protocol (18 @ 17:12) >  IMPRESSION:     No CT evidence for acute infarct or acute hemorrhage. If the patient has   a new and persistent neurologic deficit, consider short interval   follow-up head CT or brain MRI follow-up.      < end of copied text >    Consultant(s) Notes Reviewed:  Neuro

## 2018-05-07 NOTE — PROGRESS NOTE ADULT - PROBLEM SELECTOR PROBLEM 4
HTN (hypertension) Type 2 diabetes mellitus without complication, without long-term current use of insulin

## 2018-05-07 NOTE — PROGRESS NOTE ADULT - ASSESSMENT
78yo Male with Hx of A-Fib (on subtherapeutic dose of Xarelto 15mg 2/2 hx of GIB), HTN, HLD, PUD a/w likely TIA vs CVA

## 2018-05-07 NOTE — ED ADULT NURSE REASSESSMENT NOTE - NS ED NURSE REASSESS COMMENT FT1
pt awake, a/ox3, vitals as noted in NAD, admitting MD at bedside, pt states he has equal sensation to all extremities- able to move all extremities on command- will continue to monitor pt awake, a/ox3, vitals as noted in NAD, admitting MD at bedside, pt states he has equal sensation to all extremities- able to move all extremities on command, no slurred speech noted, no facial asymmetry- will continue to monitor

## 2018-05-07 NOTE — ED ADULT NURSE REASSESSMENT NOTE - NS ED NURSE REASSESS COMMENT FT1
pt sleeping, arouses to verbal stimuli, UA sent, pt in NAD, vitally stable, a/ox3, able to move all extremities on command, pt states his sensation is equal in all extremities, will continue to monitor

## 2018-05-07 NOTE — PHYSICAL THERAPY INITIAL EVALUATION ADULT - CRITERIA FOR SKILLED THERAPEUTIC INTERVENTIONS
impairments found/therapy frequency/rehab potential/risk reduction/prevention/anticipated discharge recommendation/predicted duration of therapy intervention

## 2018-05-07 NOTE — OCCUPATIONAL THERAPY INITIAL EVALUATION ADULT - DIAGNOSIS, OT EVAL
Right UE ataxia, decreased standing balance, decreased functional mobility, decreased ADL independence

## 2018-05-08 DIAGNOSIS — I63.9 CEREBRAL INFARCTION, UNSPECIFIED: ICD-10-CM

## 2018-05-08 LAB
BUN SERPL-MCNC: 17 MG/DL — SIGNIFICANT CHANGE UP (ref 7–23)
CALCIUM SERPL-MCNC: 8.7 MG/DL — SIGNIFICANT CHANGE UP (ref 8.4–10.5)
CHLORIDE SERPL-SCNC: 103 MMOL/L — SIGNIFICANT CHANGE UP (ref 98–107)
CO2 SERPL-SCNC: 23 MMOL/L — SIGNIFICANT CHANGE UP (ref 22–31)
CREAT SERPL-MCNC: 0.98 MG/DL — SIGNIFICANT CHANGE UP (ref 0.5–1.3)
GLUCOSE SERPL-MCNC: 92 MG/DL — SIGNIFICANT CHANGE UP (ref 70–99)
HCT VFR BLD CALC: 44.8 % — SIGNIFICANT CHANGE UP (ref 39–50)
HGB BLD-MCNC: 13.3 G/DL — SIGNIFICANT CHANGE UP (ref 13–17)
MAGNESIUM SERPL-MCNC: 2 MG/DL — SIGNIFICANT CHANGE UP (ref 1.6–2.6)
MCHC RBC-ENTMCNC: 19.7 PG — LOW (ref 27–34)
MCHC RBC-ENTMCNC: 29.7 % — LOW (ref 32–36)
MCV RBC AUTO: 66.4 FL — LOW (ref 80–100)
NRBC # FLD: 0 — SIGNIFICANT CHANGE UP
PLATELET # BLD AUTO: 200 K/UL — SIGNIFICANT CHANGE UP (ref 150–400)
PMV BLD: SIGNIFICANT CHANGE UP FL (ref 7–13)
POTASSIUM SERPL-MCNC: 4 MMOL/L — SIGNIFICANT CHANGE UP (ref 3.5–5.3)
POTASSIUM SERPL-SCNC: 4 MMOL/L — SIGNIFICANT CHANGE UP (ref 3.5–5.3)
RBC # BLD: 6.75 M/UL — HIGH (ref 4.2–5.8)
RBC # FLD: 21.4 % — HIGH (ref 10.3–14.5)
SODIUM SERPL-SCNC: 139 MMOL/L — SIGNIFICANT CHANGE UP (ref 135–145)
WBC # BLD: 6.97 K/UL — SIGNIFICANT CHANGE UP (ref 3.8–10.5)
WBC # FLD AUTO: 6.97 K/UL — SIGNIFICANT CHANGE UP (ref 3.8–10.5)

## 2018-05-08 PROCEDURE — 70548 MR ANGIOGRAPHY NECK W/DYE: CPT | Mod: 26

## 2018-05-08 PROCEDURE — 99233 SBSQ HOSP IP/OBS HIGH 50: CPT

## 2018-05-08 PROCEDURE — 70551 MRI BRAIN STEM W/O DYE: CPT | Mod: 26

## 2018-05-08 RX ORDER — RIVAROXABAN 15 MG-20MG
20 KIT ORAL EVERY 24 HOURS
Qty: 0 | Refills: 0 | Status: DISCONTINUED | OUTPATIENT
Start: 2018-05-08 | End: 2018-05-08

## 2018-05-08 RX ORDER — RIVAROXABAN 15 MG-20MG
15 KIT ORAL EVERY 24 HOURS
Qty: 0 | Refills: 0 | Status: DISCONTINUED | OUTPATIENT
Start: 2018-05-08 | End: 2018-05-09

## 2018-05-08 RX ADMIN — Medication 100 MILLIGRAM(S): at 12:56

## 2018-05-08 RX ADMIN — Medication 120 MILLIGRAM(S): at 06:30

## 2018-05-08 RX ADMIN — LISINOPRIL 5 MILLIGRAM(S): 2.5 TABLET ORAL at 05:13

## 2018-05-08 RX ADMIN — Medication 25 MILLIGRAM(S): at 18:52

## 2018-05-08 RX ADMIN — Medication 100 MILLIGRAM(S): at 21:55

## 2018-05-08 RX ADMIN — Medication 1 SPRAY(S): at 18:52

## 2018-05-08 RX ADMIN — Medication 100 MILLIGRAM(S): at 05:13

## 2018-05-08 RX ADMIN — Medication 40 MILLIGRAM(S): at 21:55

## 2018-05-08 RX ADMIN — Medication 25 MILLIGRAM(S): at 05:13

## 2018-05-08 RX ADMIN — Medication 1 SPRAY(S): at 05:13

## 2018-05-08 RX ADMIN — RIVAROXABAN 15 MILLIGRAM(S): KIT at 18:53

## 2018-05-08 NOTE — PROGRESS NOTE ADULT - PROBLEM SELECTOR PLAN 1
- Neurology following, MRI/A pending, TTE with bubble pending  - Pt on Xarelto, however dosed at 15mg as per cardiology recs given hx of GIB. However due to CVA, would recommend to resume at proper dose of 20mg  - c/w home statin  - PT/OT: PT recommending inpt rehab

## 2018-05-08 NOTE — PROGRESS NOTE ADULT - PROBLEM SELECTOR PLAN 8
- Patient reports he is on adjusted dose of Xarelto because of his history of UGIB with peptic ulcers. No signs/symptoms of GI bleed at this time.  - Pt has been off PPI and Sucralfate after prescribed course.  Consider starting H2 blocker

## 2018-05-08 NOTE — PROGRESS NOTE ADULT - PROBLEM SELECTOR PLAN 4
- HbA1c 7.2, pt was formerly on Metformin 500mg daily, but was taken off by PMD for years and now controlling his DM2 with diet/exercise  - Pt OK with restarting Metformin as outpatient, would d/w PMD prior to discharge.  - Will monitor FSG while inpatient and continue low dose HISS

## 2018-05-08 NOTE — SPEECH LANGUAGE PATHOLOGY EVALUATION - COMMENTS
Patient is a 77 year old male who presents with a chief complaint of Right hand weakness and numbness with unstable gait. CT Head negative for acute pathology. Suspect TIA.

## 2018-05-08 NOTE — SWALLOW BEDSIDE ASSESSMENT ADULT - SWALLOW EVAL: DIAGNOSIS
Patient demonstrates grossly intact oral/pharyngeal stages of swallow function characterized by adequate bolus collection, manipulation and transfer, timely pharyngeal trigger, adequate hyolaryngeal excursion upon digital palpation, and no overt clinical s/s of larygneal penetration/aspiration across trials of puree, regular solids, and thin liquids via single cup sips as well consecutive cup sip drinking x3 oz.

## 2018-05-08 NOTE — SPEECH LANGUAGE PATHOLOGY EVALUATION - SLP DIAGNOSIS
Patient's expressive and receptive language skills are judged to be WFLs. Patient demonstrates ability to follow 2-step commands, respond appropriately to open-ended questions, and express simple thoughts, ideas and opinions fluently. Patient also demonstrates functional cognitive-linguistic abilities characterized by intact short-term memory, long-term memory, and simple verbal problem solving and reasoning skills. Initial symptoms of "slurred speech" appear to have resolved at this time as patient demonstrates adequate articulatory precision and rate of speech during conversational output.

## 2018-05-08 NOTE — PROGRESS NOTE ADULT - PROBLEM SELECTOR PLAN 5
- C/w home meds Lisinopril 5 mg, Diltiazem 120 mg/d, and Metoprolol (changed 50 ER qDay to 25 tartrate BID).

## 2018-05-08 NOTE — PROGRESS NOTE ADULT - ASSESSMENT
Pt is a 76 yo M with a PMH of A-Fib (on Xarelto), HTN, HLD and CHF who is presenting with complaint of right foot cramping and weakness on awakening that progressed to right hand cramping and weakness throughout the day. Had an episode of slurred speech that resolved within minutes. Feeling unwell the past week with cough, multiple episodes of dizziness on ambulating. Exam significant for mild right sided ataxia,  mild LE drift and unsteady gait.     Possible Left PCA distribution stroke likely 2/2 small vessel disease, Cardioembolic source to be ruled out     Recommendations:  - PT needs a repeat CT scan 24 - 48 hours after stroke code - if MRI scan completed no need for repeat ct head.  - MRI brain, MRA head WO paulie, MRA neck with paulie  - Wll increase dose of Xarelto to 20mg   - c/w home statin  - PT/OT - rehab  - TTE can be down as inpatient or outpatient. 77 years old man with multiple vascular risk factors, including atrial fibrillation, on therapeutic anticoagulation with rivaroxaban is evaluated at [facility] for acute onset of right-sided symptoms described as sensory paresthesia involving the right foot/lower leg, which spread to the right hand/forearm over 4-5 hours and associated right-sided weakness on 5/6. CT brain on admission did not show any evidence of acute infarct or hemorrhage. Neurological examination today shows subjective right arm and leg sensory paresthesia, as well as subtle right upper extremity ataxia/dysmetria.     Impression:  Cerebral thrombosis with cerebral infarction. Probable left PCA distribution stroke leading to left thalamic dysfunction, presenting as ataxic-hemiparesis (hypoesthetic) lacunar stroke syndrome - likely etiology being small vessel disease but possibility of cardioembolism in the setting of atrial fibrillation could not be ruled out, doubt large vessel disease, being the etiology of his stroke  but  who needs to be ruled out    Plan:  - Therapeutic anticoagulation ([id number] score>1 and possible non-valvular atrial fibrillation) with rivaroxaban as per home dose for secondary stroke prevention. He reports to be taking rivaroxaban 15 mg once a day for therapeutic anticoagulation at home, which would provide subtherapeutic anticoagulation for secondary stroke prevention in this patient with normal renal function. Recommend either increasing dose to 20 mg once a day or switching to apixaban for therapeutic anticoagulation for secondary stroke prevention.   - No indications to add antiplatelet therapy to therapeutic anticoagulation unless patient has history of CAD or cardiac stents  - SCDs for the DVT prophylaxis   - LDL 82; consider atorvastatin 80 mg at bedtime once able to pass the swallow evaluation considering likely atheroembolic etiology of his stroke  - HbA1C 7.2, continue with aggressive vascular risk factors modifications   - Permissive HTN up to 180/105 mmHg (in the setting of therapeutic anticoagulation) for first 48-72 hours from symptom onset followed by gradual normotension  - Awaiting MRI brain without contrast; MRA head and neck to evaluate for intracranial and extracranial cerebral vasculature   - TTE with bubble study  and continue with telemetry   - Evaluation and management of atrial fibrillation as per primary team  - PT/OT/Speech and swallow/safety eval  - Stroke education     Above mentioned plan was discussed with patient in detail. All the questions were answered and concerns were addressed.

## 2018-05-08 NOTE — SWALLOW BEDSIDE ASSESSMENT ADULT - ASR SWALLOW ASPIRATION MONITOR
cough/upper respiratory infection/fever/throat clearing/oral hygiene/pneumonia/position upright (90Y)/gurgly voice/change of breathing pattern

## 2018-05-08 NOTE — PROGRESS NOTE ADULT - SUBJECTIVE AND OBJECTIVE BOX
CC: F/U for CVA    SUBJECTIVE / OVERNIGHT EVENTS:  States that his Rt sided weakness is improved.  No F/C, N/V, CP, SOB, Cough, lightheadedness, dizziness, abdominal pain, diarrhea, dysuria.    MEDICATIONS  (STANDING):  allopurinol 100 milliGRAM(s) Oral daily  benzonatate 100 milliGRAM(s) Oral every 8 hours  dextrose 5%. 1000 milliLiter(s) (50 mL/Hr) IV Continuous <Continuous>  dextrose 50% Injectable 12.5 Gram(s) IV Push once  dextrose 50% Injectable 25 Gram(s) IV Push once  dextrose 50% Injectable 25 Gram(s) IV Push once  diltiazem    milliGRAM(s) Oral daily  fluticasone propionate 50 MICROgram(s)/spray Nasal Spray 1 Spray(s) Both Nostrils two times a day  insulin lispro (HumaLOG) corrective regimen sliding scale   SubCutaneous three times a day before meals  insulin lispro (HumaLOG) corrective regimen sliding scale   SubCutaneous at bedtime  lisinopril 5 milliGRAM(s) Oral daily  metoprolol tartrate 25 milliGRAM(s) Oral two times a day  pravastatin 40 milliGRAM(s) Oral at bedtime  rivaroxaban 15 milliGRAM(s) Oral every 24 hours    MEDICATIONS  (PRN):  dextrose Gel 1 Dose(s) Oral once PRN Blood Glucose LESS THAN 70 milliGRAM(s)/deciliter  glucagon  Injectable 1 milliGRAM(s) IntraMuscular once PRN Glucose LESS THAN 70 milligrams/deciliter      Vital Signs Last 24 Hrs  T(C): 36.6 (08 May 2018 12:55), Max: 36.8 (07 May 2018 20:00)  T(F): 97.8 (08 May 2018 12:55), Max: 98.3 (07 May 2018 20:00)  HR: 74 (08 May 2018 12:55) (59 - 75)  BP: 144/79 (08 May 2018 12:55) (144/79 - 173/100)  BP(mean): --  RR: 18 (08 May 2018 12:55) (18 - 18)  SpO2: 95% (08 May 2018 12:55) (95% - 100%)  CAPILLARY BLOOD GLUCOSE      POCT Blood Glucose.: 105 mg/dL (08 May 2018 12:40)  POCT Blood Glucose.: 95 mg/dL (08 May 2018 08:31)  POCT Blood Glucose.: 81 mg/dL (07 May 2018 22:44)  POCT Blood Glucose.: 153 mg/dL (07 May 2018 17:23)    I&O's Summary      PHYSICAL EXAM:  GENERAL: NAD, well-developed  HEAD:  Atraumatic, Normocephalic  EYES: EOMI, PERRLA, conjunctiva and sclera clear  NECK: Supple, No JVD  CHEST/LUNG: Clear to auscultation bilaterally; No wheeze  HEART: Irregular rate; No murmurs, rubs, or gallops  ABDOMEN: Soft, Nontender, Nondistended; Bowel sounds present  EXTREMITIES:  2+ Peripheral Pulses, No clubbing, cyanosis, or edema  PSYCH: AAOx3  SKIN: No rashes or lesions  NEURO: Motor in RLE 4/5, 5/5 LLE and b/l UE, sensory fully intact, no dysarthria, no aphasia    LABS:                        13.3   6.97  )-----------( 200      ( 08 May 2018 06:10 )             44.8     05-08    139  |  103  |  17  ----------------------------<  92  4.0   |  23  |  0.98    Ca    8.7      08 May 2018 07:56  Phos  3.1     05-07  Mg     2.0     05-08    TPro  7.7  /  Alb  3.7  /  TBili  0.5  /  DBili  x   /  AST  21  /  ALT  17  /  AlkPhos  78  05-07    PT/INR - ( 07 May 2018 06:59 )   PT: 12.3 SEC;   INR: 1.07          PTT - ( 07 May 2018 06:59 )  PTT:34.4 SEC  CARDIAC MARKERS ( 06 May 2018 17:30 )  x     / < 0.06 ng/mL / 56 u/L / 1.33 ng/mL / x          Urinalysis Basic - ( 07 May 2018 04:00 )    Color: PLYEL / Appearance: CLEAR / S.011 / pH: 6.5  Gluc: NEGATIVE / Ketone: NEGATIVE  / Bili: NEGATIVE / Urobili: NORMAL mg/dL   Blood: NEGATIVE / Protein: 30 mg/dL / Nitrite: NEGATIVE   Leuk Esterase: TRACE / RBC: 0-2 / WBC 0-2   Sq Epi: OCC / Non Sq Epi: x / Bacteria: x        RADIOLOGY & ADDITIONAL TESTS:    Imaging Personally Reviewed:    Care Discussed with Consultants/Other Providers: Neurology - Dr. Mcneal - discussion of stroke management. Vascular Medicine - Dr. Shin - discussion of stroke management.

## 2018-05-08 NOTE — SWALLOW BEDSIDE ASSESSMENT ADULT - COMMENTS
Patient is a 77 year old male who presents with a chief complaint of Right hand weakness and numbness with unstable gait.   Patient was received awake, alert and cooperative. Patient is a 77 year old male who presents with a chief complaint of Right hand weakness and numbness with unstable gait. CT Head negative for acute pathology. Suspect TIA.

## 2018-05-08 NOTE — PROGRESS NOTE ADULT - SUBJECTIVE AND OBJECTIVE BOX
Neurology Consult Note    Interval History - no overnight events reports, pt in NAD.    MEDICATIONS  (STANDING):  allopurinol 100 milliGRAM(s) Oral daily  benzonatate 100 milliGRAM(s) Oral every 8 hours  dextrose 5%. 1000 milliLiter(s) (50 mL/Hr) IV Continuous <Continuous>  dextrose 50% Injectable 12.5 Gram(s) IV Push once  dextrose 50% Injectable 25 Gram(s) IV Push once  dextrose 50% Injectable 25 Gram(s) IV Push once  diltiazem    milliGRAM(s) Oral daily  fluticasone propionate 50 MICROgram(s)/spray Nasal Spray 1 Spray(s) Both Nostrils two times a day  insulin lispro (HumaLOG) corrective regimen sliding scale   SubCutaneous three times a day before meals  insulin lispro (HumaLOG) corrective regimen sliding scale   SubCutaneous at bedtime  lisinopril 5 milliGRAM(s) Oral daily  metoprolol tartrate 25 milliGRAM(s) Oral two times a day  pravastatin 40 milliGRAM(s) Oral at bedtime  rivaroxaban 15 milliGRAM(s) Oral every 24 hours    MEDICATIONS  (STANDING):  allopurinol 100 milliGRAM(s) Oral daily  benzonatate 100 milliGRAM(s) Oral every 8 hours  dextrose 5%. 1000 milliLiter(s) (50 mL/Hr) IV Continuous <Continuous>  dextrose 50% Injectable 12.5 Gram(s) IV Push once  dextrose 50% Injectable 25 Gram(s) IV Push once  dextrose 50% Injectable 25 Gram(s) IV Push once  diltiazem    milliGRAM(s) Oral daily  fluticasone propionate 50 MICROgram(s)/spray Nasal Spray 1 Spray(s) Both Nostrils two times a day  insulin lispro (HumaLOG) corrective regimen sliding scale   SubCutaneous three times a day before meals  insulin lispro (HumaLOG) corrective regimen sliding scale   SubCutaneous at bedtime  lisinopril 5 milliGRAM(s) Oral daily  metoprolol tartrate 25 milliGRAM(s) Oral two times a day  pravastatin 40 milliGRAM(s) Oral at bedtime  rivaroxaban 15 milliGRAM(s) Oral every 24 hours    MEDICATIONS  (PRN):  dextrose Gel 1 Dose(s) Oral once PRN Blood Glucose LESS THAN 70 milliGRAM(s)/deciliter  glucagon  Injectable 1 milliGRAM(s) IntraMuscular once PRN Glucose LESS THAN 70 milligrams/deciliter    General Exam:   General appearance: No acute distress    Neurological Exam:  Mental Status: Orientated to self, date and place.  Attention intact.  No dysarthria. Speech fluent.  Cranial Nerves: PERRL, EOMI, VFF, no nystagmus. CN V1-3 intact to light touch b/l.  No facial asymmetry.  Hearing intact to finger rub bilaterally.  Tongue, uvula and palate midline.  Sternocleidomastoid and Trapezius intact bilaterally.    Motor: RLE very mild drift               Strength:     [] Upper extremity                      Delt       Bicep    Tricep                                                  R         5/5        5/5        5/5       5/5                                               L          5/5        5/5        5/5       5/5  [] Lower extremity                       HF          KE          KF        DF         PF                                               R        5-/5        5/5        5/5       5/5       5/5                                               L         5/5        5/5       5/5       5/5        5/5  Pronator drift: none b/l  Dysmetria: FNF mildly ataxic on the right side   Sensation: intact to light touch throughout, no neglect or extinction  Gait -Unsteady     Labs             13.3   6.97  )-----------( 200      ( 08 May 2018 06:10 )             44.8   05-08    139  |  103  |  17  ----------------------------<  92  4.0   |  23  |  0.98    Ca    8.7      08 May 2018 07:56  Phos  3.1     05-07  Mg     2.0     05-08    TPro  7.7  /  Alb  3.7  /  TBili  0.5  /  DBili  x   /  AST  21  /  ALT  17  /  AlkPhos  78  05-07    Radiology:  < from: CT Brain Stroke Protocol (05.06.18 @ 17:12) >  IMPRESSION:     No CT evidence for acute infarct or acute hemorrhage. If the patient has   a new and persistent neurologic deficit, consider short interval   follow-up head CT or brain MRI follow-up. Neurology Progress Note    Interval History - no overnight events reports, pt in NAD.    MEDICATIONS  (STANDING):  allopurinol 100 milliGRAM(s) Oral daily  benzonatate 100 milliGRAM(s) Oral every 8 hours  dextrose 5%. 1000 milliLiter(s) (50 mL/Hr) IV Continuous <Continuous>  dextrose 50% Injectable 12.5 Gram(s) IV Push once  dextrose 50% Injectable 25 Gram(s) IV Push once  dextrose 50% Injectable 25 Gram(s) IV Push once  diltiazem    milliGRAM(s) Oral daily  fluticasone propionate 50 MICROgram(s)/spray Nasal Spray 1 Spray(s) Both Nostrils two times a day  insulin lispro (HumaLOG) corrective regimen sliding scale   SubCutaneous three times a day before meals  insulin lispro (HumaLOG) corrective regimen sliding scale   SubCutaneous at bedtime  lisinopril 5 milliGRAM(s) Oral daily  metoprolol tartrate 25 milliGRAM(s) Oral two times a day  pravastatin 40 milliGRAM(s) Oral at bedtime  rivaroxaban 15 milliGRAM(s) Oral every 24 hours    MEDICATIONS  (STANDING):  allopurinol 100 milliGRAM(s) Oral daily  benzonatate 100 milliGRAM(s) Oral every 8 hours  dextrose 5%. 1000 milliLiter(s) (50 mL/Hr) IV Continuous <Continuous>  dextrose 50% Injectable 12.5 Gram(s) IV Push once  dextrose 50% Injectable 25 Gram(s) IV Push once  dextrose 50% Injectable 25 Gram(s) IV Push once  diltiazem    milliGRAM(s) Oral daily  fluticasone propionate 50 MICROgram(s)/spray Nasal Spray 1 Spray(s) Both Nostrils two times a day  insulin lispro (HumaLOG) corrective regimen sliding scale   SubCutaneous three times a day before meals  insulin lispro (HumaLOG) corrective regimen sliding scale   SubCutaneous at bedtime  lisinopril 5 milliGRAM(s) Oral daily  metoprolol tartrate 25 milliGRAM(s) Oral two times a day  pravastatin 40 milliGRAM(s) Oral at bedtime  rivaroxaban 15 milliGRAM(s) Oral every 24 hours    MEDICATIONS  (PRN):  dextrose Gel 1 Dose(s) Oral once PRN Blood Glucose LESS THAN 70 milliGRAM(s)/deciliter  glucagon  Injectable 1 milliGRAM(s) IntraMuscular once PRN Glucose LESS THAN 70 milligrams/deciliter    General Exam:   General appearance: No acute distress    Neurological Exam:  Mental Status: Orientated to self, date and place.  Attention intact.  No dysarthria. Speech fluent.  Cranial Nerves: PERRL, EOMI, VFF, no nystagmus. CN V1-3 intact to light touch b/l.  No facial asymmetry.  Hearing intact to finger rub bilaterally.  Tongue, uvula and palate midline.  Sternocleidomastoid and Trapezius intact bilaterally.    Motor: RLE very mild drift               Strength:     [] Upper extremity                      Delt       Bicep    Tricep                                                  R         5/5        5/5        5/5       5/5                                               L          5/5        5/5        5/5       5/5  [] Lower extremity                       HF          KE          KF        DF         PF                                               R        5-/5        5/5        5/5       5/5       5/5                                               L         5/5        5/5       5/5       5/5        5/5  Pronator drift: none b/l  Dysmetria: FNF mildly ataxic on the right side   Sensation: intact to light touch throughout, no neglect or extinction  Gait -Unsteady     Labs             13.3   6.97  )-----------( 200      ( 08 May 2018 06:10 )             44.8   05-08    139  |  103  |  17  ----------------------------<  92  4.0   |  23  |  0.98    Ca    8.7      08 May 2018 07:56  Phos  3.1     05-07  Mg     2.0     05-08    TPro  7.7  /  Alb  3.7  /  TBili  0.5  /  DBili  x   /  AST  21  /  ALT  17  /  AlkPhos  78  05-07    Radiology:  < from: CT Brain Stroke Protocol (05.06.18 @ 17:12) >  IMPRESSION:     No CT evidence for acute infarct or acute hemorrhage. If the patient has   a new and persistent neurologic deficit, consider short interval   follow-up head CT or brain MRI follow-up.

## 2018-05-09 ENCOUNTER — TRANSCRIPTION ENCOUNTER (OUTPATIENT)
Age: 78
End: 2018-05-09

## 2018-05-09 VITALS
SYSTOLIC BLOOD PRESSURE: 142 MMHG | HEART RATE: 70 BPM | RESPIRATION RATE: 18 BRPM | DIASTOLIC BLOOD PRESSURE: 97 MMHG | TEMPERATURE: 98 F | OXYGEN SATURATION: 98 %

## 2018-05-09 LAB
BUN SERPL-MCNC: 21 MG/DL — SIGNIFICANT CHANGE UP (ref 7–23)
CALCIUM SERPL-MCNC: 8.6 MG/DL — SIGNIFICANT CHANGE UP (ref 8.4–10.5)
CHLORIDE SERPL-SCNC: 105 MMOL/L — SIGNIFICANT CHANGE UP (ref 98–107)
CO2 SERPL-SCNC: 23 MMOL/L — SIGNIFICANT CHANGE UP (ref 22–31)
CREAT SERPL-MCNC: 1.08 MG/DL — SIGNIFICANT CHANGE UP (ref 0.5–1.3)
GLUCOSE SERPL-MCNC: 84 MG/DL — SIGNIFICANT CHANGE UP (ref 70–99)
HCT VFR BLD CALC: 42.4 % — SIGNIFICANT CHANGE UP (ref 39–50)
HGB BLD-MCNC: 12.7 G/DL — LOW (ref 13–17)
MAGNESIUM SERPL-MCNC: 1.9 MG/DL — SIGNIFICANT CHANGE UP (ref 1.6–2.6)
MCHC RBC-ENTMCNC: 19.7 PG — LOW (ref 27–34)
MCHC RBC-ENTMCNC: 30 % — LOW (ref 32–36)
MCV RBC AUTO: 65.6 FL — LOW (ref 80–100)
NRBC # FLD: 0 — SIGNIFICANT CHANGE UP
PLATELET # BLD AUTO: 203 K/UL — SIGNIFICANT CHANGE UP (ref 150–400)
PMV BLD: SIGNIFICANT CHANGE UP FL (ref 7–13)
POTASSIUM SERPL-MCNC: 4.2 MMOL/L — SIGNIFICANT CHANGE UP (ref 3.5–5.3)
POTASSIUM SERPL-SCNC: 4.2 MMOL/L — SIGNIFICANT CHANGE UP (ref 3.5–5.3)
RBC # BLD: 6.46 M/UL — HIGH (ref 4.2–5.8)
RBC # FLD: 21.4 % — HIGH (ref 10.3–14.5)
SODIUM SERPL-SCNC: 141 MMOL/L — SIGNIFICANT CHANGE UP (ref 135–145)
WBC # BLD: 7.01 K/UL — SIGNIFICANT CHANGE UP (ref 3.8–10.5)
WBC # FLD AUTO: 7.01 K/UL — SIGNIFICANT CHANGE UP (ref 3.8–10.5)

## 2018-05-09 PROCEDURE — 99239 HOSP IP/OBS DSCHRG MGMT >30: CPT

## 2018-05-09 RX ORDER — METFORMIN HYDROCHLORIDE 850 MG/1
1 TABLET ORAL
Qty: 60 | Refills: 0 | OUTPATIENT
Start: 2018-05-09 | End: 2018-06-07

## 2018-05-09 RX ORDER — POTASSIUM CHLORIDE 20 MEQ
1 PACKET (EA) ORAL
Qty: 0 | Refills: 0 | COMMUNITY

## 2018-05-09 RX ORDER — FLUTICASONE PROPIONATE 50 MCG
1 SPRAY, SUSPENSION NASAL
Qty: 1 | Refills: 0
Start: 2018-05-09 | End: 2018-05-28

## 2018-05-09 RX ORDER — RIVAROXABAN 15 MG-20MG
20 KIT ORAL EVERY 24 HOURS
Qty: 0 | Refills: 0 | Status: DISCONTINUED | OUTPATIENT
Start: 2018-05-09 | End: 2018-05-09

## 2018-05-09 RX ORDER — RIVAROXABAN 15 MG-20MG
1 KIT ORAL
Qty: 30 | Refills: 0
Start: 2018-05-09 | End: 2018-06-07

## 2018-05-09 RX ADMIN — Medication 100 MILLIGRAM(S): at 05:17

## 2018-05-09 RX ADMIN — Medication 100 MILLIGRAM(S): at 13:20

## 2018-05-09 RX ADMIN — Medication 1 SPRAY(S): at 05:18

## 2018-05-09 RX ADMIN — Medication 25 MILLIGRAM(S): at 05:19

## 2018-05-09 RX ADMIN — LISINOPRIL 5 MILLIGRAM(S): 2.5 TABLET ORAL at 05:18

## 2018-05-09 RX ADMIN — Medication 120 MILLIGRAM(S): at 05:17

## 2018-05-09 RX ADMIN — RIVAROXABAN 20 MILLIGRAM(S): KIT at 18:12

## 2018-05-09 NOTE — PROGRESS NOTE ADULT - ASSESSMENT
77 years old man with multiple vascular risk factors, including atrial fibrillation, on therapeutic anticoagulation with rivaroxaban is evaluated at [facility] for acute onset of right-sided symptoms described as sensory paresthesia involving the right foot/lower leg, which spread to the right hand/forearm over 4-5 hours and associated right-sided weakness on 5/6. CT brain on admission did not show any evidence of acute infarct or hemorrhage. Neurological examination today shows subjective right arm and leg sensory paresthesia, as well as subtle right upper extremity ataxia/dysmetria. MRI shows     Impression:  Cerebral thrombosis with cerebral infarction. Probable left PCA distribution stroke leading to left thalamic dysfunction, presenting as ataxic-hemiparesis (hypoesthetic) lacunar stroke syndrome - likely etiology being small vessel disease but possibility of cardioembolism in the setting of atrial fibrillation could not be ruled out, doubt large vessel disease, being the etiology of his stroke  but  who needs to be ruled out    Plan:  - Therapeutic anticoagulation ([id number] score>1 and possible non-valvular atrial fibrillation) with rivaroxaban as per home dose for secondary stroke prevention. He reports to be taking rivaroxaban 15 mg once a day for therapeutic anticoagulation at home, which would provide subtherapeutic anticoagulation for secondary stroke prevention in this patient with normal renal function. Recommend either increasing dose to 20 mg once a day or switching to apixaban for therapeutic anticoagulation for secondary stroke prevention.   - No indications to add antiplatelet therapy to therapeutic anticoagulation unless patient has history of CAD or cardiac stents  - SCDs for the DVT prophylaxis   - LDL 82; consider atorvastatin 80 mg at bedtime once able to pass the swallow evaluation considering likely atheroembolic etiology of his stroke  - HbA1C 7.2, continue with aggressive vascular risk factors modifications   - Permissive HTN up to 180/105 mmHg (in the setting of therapeutic anticoagulation) for first 48-72 hours from symptom onset followed by gradual normotension  - TTE with bubble study  and continue with telemetry   - Evaluation and management of atrial fibrillation as per primary team  - PT/OT - rehab   - Stroke education

## 2018-05-09 NOTE — DISCHARGE NOTE ADULT - PLAN OF CARE
To help recover or improve as much as possible your sensory and motor abilities, to become more independent, and prevent future strokes. Follow up with neurology in 2-3 weeks. call to make appointment To restore or maintain a normal heart rate and rhythm, to prevent blood clots, and decrease the risks of stroke CVA/TIA. Please take your medications as prescribed.  Continue to take your blood thinner as prescribed and follow with your physician.  Low fat diet, reduce caffeine intake, and exercise at least 30 minutes daily. improve cough likely viral. continue supportive measure prevent gout flare followup with PMD in 2 weeks To maintain normal cholesterol levels to prevent stroke, coronary artery disease, peripheral vascular disease and heart attacks. Low fat diet, exercise daily and continue current medications. Follow up with primary care physician and cardiologist for management. Low sodium and fat diet, continue anti-hypertensive medications, and follow up with primary care physician. To maintain a normal blood glucose level and HgA1C level < 5.7 and to prevent diabetic complications such as uncontrolled diabetes, diabetic coma, blindness, renal failure, and amputations. Monitor finger sticks pre-meal and bedtime, low salt, fat and carbohydrate diet, minimize glucose intake.  Exercise daily for at least 30 minutes and weight loss.  Follow up with primary care physician and endocrinologist for routine Hemoglobin A1C checks and management.  Follow up with your ophthalmologist for routine yearly vision exams. You are started on metformin which can cause diarrhea. Monitor finger sticks low salt, fat and carbohydrate diet, minimize glucose intake.  Exercise daily for at least 30 minutes and weight loss.  Follow up with primary care physician and endocrinologist for routine Hemoglobin A1C checks and management.  Follow up with your ophthalmologist for routine yearly vision exams. Monitor finger sticks low salt, fat and carbohydrate diet, minimize glucose intake.  Exercise daily for at least 30 minutes and weight loss.  Follow up with primary care physician and endocrinologist for routine Hemoglobin A1C checks and management.  Follow up with your ophthalmologist for routine yearly vision exams.

## 2018-05-09 NOTE — PROGRESS NOTE ADULT - PROBLEM SELECTOR PLAN 3
- Hypercoagulable state 2/2 hx AFib, currently rate controlled. [id number] score 5. Had 2 sec pause this morning, asymptomatic  - Await MRI results prior to increasing dose of Xarelto back to 20mg  - C/w diltiazem and metoprolol with hold parameters
- Hypercoagulable state 2/2 hx AFib, currently rate controlled. [id number] score 5. Had 2 sec pause this morning, asymptomatic  - increased dose of Xarelto back to 20mg  - C/w diltiazem and metoprolol with hold parameters
- Hypercoagulable state 2/2 hx AFib, currently rate controlled. [id number] score 5. Had 2 sec pause this morning, asymptomatic  - Await MRI results prior to increasing dose of Xarelto back to 20mg  - C/w diltiazem and metoprolol with hold parameters

## 2018-05-09 NOTE — PROGRESS NOTE ADULT - PROBLEM SELECTOR PLAN 8
- Patient reports he is on adjusted dose of Xarelto because of his history of UGIB with peptic ulcers. No signs/symptoms of GI bleed at this time.  - Pt has been off PPI and Sucralfate after prescribed course.

## 2018-05-09 NOTE — CONSULT NOTE ADULT - SUBJECTIVE AND OBJECTIVE BOX
Cardiology/Vascular Medicine Inpatient Consultation Note    Date of Admission:        CHIEF COMPLAINT:        HISTORY OF PRESENT ILLNESS:  HPI:  76yo Male with Hx of A-Fib (on Xarelto), HTN, HLD, PUD and ?CHF who presents with chief complaint of Rt sided weakness. The patient noted lower Rt leg cramping when he woke up this morning that worsened throughout the day. He had to have his wife help him walk because he thought he would fall, he reports no actual falls. He later noted weakness of his Rt hand in the late afternoon, which prompted him to go to the ED. His wife noted him to have slurred speech on the way to the hospital, which resolved within a few minutes. Patient feels back to baseline at this time.  Patient does note that he has had a URI for the past ~10 days with persistent cough that is not improving. He denies any fevers or chills, CP, N/V, palpitations, diarrhea, constipation, LE swelling, ear pain/pressure, tinnitus.  Patient is currently taking Xarelto 15 mg instead of 20 mg daily, which he reports is because he had an upper GI bleed due to peptic ulcers in the past, and had his dose lowered to reduce the risk of bleeding.  Patient does not take aspirin.    ED Vitals: T 98.1, HR 65, /76, RR 16, SpO2 100% on RA  Initial Labs: WBC 7.57, Hgb 12.8, Plt 211, INR 1.29, K 5.0, BUN 15, Cr 1.08, LFTs wnl, CE wnl.    In the ED, the patient received 1L IV NS, albuterol x1, and orthostatics were checked, found to be normal.  Patient was seen by neurology. (06 May 2018 22:32)          Allergies    No Known Allergies    Intolerances    	    MEDICATIONS:  diltiazem    milliGRAM(s) Oral daily  lisinopril 5 milliGRAM(s) Oral daily  metoprolol tartrate 25 milliGRAM(s) Oral two times a day  rivaroxaban 20 milliGRAM(s) Oral every 24 hours      benzonatate 100 milliGRAM(s) Oral every 8 hours        allopurinol 100 milliGRAM(s) Oral daily  dextrose 50% Injectable 12.5 Gram(s) IV Push once  dextrose 50% Injectable 25 Gram(s) IV Push once  dextrose 50% Injectable 25 Gram(s) IV Push once  dextrose Gel 1 Dose(s) Oral once PRN  glucagon  Injectable 1 milliGRAM(s) IntraMuscular once PRN  insulin lispro (HumaLOG) corrective regimen sliding scale   SubCutaneous three times a day before meals  insulin lispro (HumaLOG) corrective regimen sliding scale   SubCutaneous at bedtime  pravastatin 40 milliGRAM(s) Oral at bedtime    dextrose 5%. 1000 milliLiter(s) IV Continuous <Continuous>  fluticasone propionate 50 MICROgram(s)/spray Nasal Spray 1 Spray(s) Both Nostrils two times a day      PAST MEDICAL & SURGICAL HISTORY:  Gout  HLD (hyperlipidemia)  HTN (hypertension)  AF (atrial fibrillation)  No significant past surgical history      FAMILY HISTORY:  Family history of stroke (Sibling)      SOCIAL HISTORY:    [ ] Non-smoker  [ ] Smoker  [ ] Alcohol    REVIEW OF SYSTEMS:  CONSTITUTIONAL: No fever, weight loss, or fatigue  EYES: No eye pain, visual disturbances, or discharge  ENMT:  No difficulty hearing, tinnitus, vertigo; No sinus or throat pain  NECK: No pain or stiffness  RESPIRATORY: No cough, wheezing, chills or hemoptysis; No Shortness of Breath  CARDIOVASCULAR: No chest pain, palpitations, passing out, dizziness, or leg swelling  GASTROINTESTINAL: No abdominal or epigastric pain. No nausea, vomiting, or hematemesis; No diarrhea or constipation. No melena or hematochezia.  GENITOURINARY: No dysuria, frequency, hematuria, or incontinence  NEUROLOGICAL: No headaches, memory loss, loss of strength, numbness, or tremors  SKIN: No itching, burning, rashes, or lesions   LYMPH Nodes: No enlarged glands  ENDOCRINE: No heat or cold intolerance; No hair loss  MUSCULOSKELETAL: No joint pain or swelling; No muscle, back, or extremity pain  PSYCHIATRIC: No depression, anxiety, mood swings, or difficulty sleeping  HEME/LYMPH: No easy bruising, or bleeding gums  ALLERY AND IMMUNOLOGIC: No hives or eczema	    [ ] All others negative	  [ ] Unable to obtain    PHYSICAL EXAM:  T(C): 36.7 (05-09-18 @ 05:00), Max: 36.7 (05-08-18 @ 19:30)  HR: 75 (05-09-18 @ 05:00) (69 - 75)  BP: 155/92 (05-09-18 @ 05:00) (155/92 - 184/100)  RR: 18 (05-09-18 @ 05:00) (18 - 18)  SpO2: 98% (05-09-18 @ 05:00) (96% - 98%)  Wt(kg): --  I&O's Summary      Appearance: Normal	  HEENT:   Normal oral mucosa, PERRL, EOMI	  Lymphatic: No lymphadenopathy  Cardiovascular: Normal S1 S2, No JVD, No murmurs, No edema  Respiratory: Lungs clear to auscultation	  Psychiatry: A & O x 3, Mood & affect appropriate  Gastrointestinal:  Soft, Non-tender, + BS	  Skin: No rashes, No ecchymoses, No cyanosis	  Neurologic: Non-focal  Extremities: Normal range of motion, No clubbing, cyanosis or edema  Vascular: Peripheral pulses palpable 2+ bilaterally      LABS:	 	                          12.7   7.01  )-----------( 203      ( 09 May 2018 07:25 )             42.4     05-09    141  |  105  |  21  ----------------------------<  84  4.2   |  23  |  1.08  05-08    139  |  103  |  17  ----------------------------<  92  4.0   |  23  |  0.98    Ca    8.6      09 May 2018 07:25  Ca    8.7      08 May 2018 07:56  Mg     1.9     05-09  Mg     2.0     05-08        proBNP:   Lipid Profile:   HgA1c:   TSH:       CARDIAC MARKERS:      CKMB: 1.33 ng/mL (05-06-18 @ 17:30)    CKMB Relative Index: Test not performed (05-06 @ 17:30)      TELEMETRY: 	    ECG:   	  RADIOLOGY:  OTHER: 	      PREVIOUS DIAGNOSTIC CARDIOVASCULAR TESTING:      [ ]  Echocardiogram:  [ ]  Catheterization:  [ ]  Stress test:    [ ]  Vascular studies:

## 2018-05-09 NOTE — DISCHARGE NOTE ADULT - MEDICATION SUMMARY - MEDICATIONS TO STOP TAKING
I will STOP taking the medications listed below when I get home from the hospital:    potassium chloride 10 mEq oral tablet, extended release  -- 1 tab(s) by mouth once a day

## 2018-05-09 NOTE — DISCHARGE NOTE ADULT - PROVIDER TOKENS
TOKSALINA:'[id number]' TOKEN:'[id number]',FREE:[LAST:[[facility] clinic],PHONE:[phone]],FAX:[(   )    -],ADDRESS:[[facility] [place][note 2] Kunal rankin    can consider another clinic at [phone]]] TOKEN:'[id number]',FREE:[LAST:[[facility]],PHONE:[phone]],FAX:[(   )    -],ADDRESS:[[facility] Kunal rankin    can consider another clinic at [phone]]],TOKEN:'[id number]'

## 2018-05-09 NOTE — DISCHARGE NOTE ADULT - SECONDARY DIAGNOSIS.
AF (atrial fibrillation) Cough Gout HLD (hyperlipidemia) HTN (hypertension) Type 2 diabetes mellitus without complication, without long-term current use of insulin

## 2018-05-09 NOTE — DISCHARGE NOTE ADULT - CARE PLAN
Principal Discharge DX:	Cerebrovascular accident (CVA), unspecified mechanism  Secondary Diagnosis:	AF (atrial fibrillation)  Secondary Diagnosis:	Cough  Secondary Diagnosis:	Gout  Secondary Diagnosis:	HLD (hyperlipidemia)  Secondary Diagnosis:	HTN (hypertension)  Secondary Diagnosis:	Type 2 diabetes mellitus without complication, without long-term current use of insulin Principal Discharge DX:	Cerebrovascular accident (CVA), unspecified mechanism  Goal:	To help recover or improve as much as possible your sensory and motor abilities, to become more independent, and prevent future strokes.  Assessment and plan of treatment:	Follow up with neurology in 2-3 weeks. call to make appointment  Secondary Diagnosis:	AF (atrial fibrillation)  Goal:	To restore or maintain a normal heart rate and rhythm, to prevent blood clots, and decrease the risks of stroke CVA/TIA.  Assessment and plan of treatment:	Please take your medications as prescribed.  Continue to take your blood thinner as prescribed and follow with your physician.  Low fat diet, reduce caffeine intake, and exercise at least 30 minutes daily.  Secondary Diagnosis:	Cough  Goal:	improve cough  Assessment and plan of treatment:	likely viral. continue supportive measure  Secondary Diagnosis:	Gout  Goal:	prevent gout flare  Assessment and plan of treatment:	followup with PMD in 2 weeks  Secondary Diagnosis:	HLD (hyperlipidemia)  Goal:	To maintain normal cholesterol levels to prevent stroke, coronary artery disease, peripheral vascular disease and heart attacks.  Assessment and plan of treatment:	Low fat diet, exercise daily and continue current medications. Follow up with primary care physician and cardiologist for management.  Secondary Diagnosis:	HTN (hypertension)  Goal:	Low sodium and fat diet, continue anti-hypertensive medications, and follow up with primary care physician.  Assessment and plan of treatment:	Low sodium and fat diet, continue anti-hypertensive medications, and follow up with primary care physician.  Secondary Diagnosis:	Type 2 diabetes mellitus without complication, without long-term current use of insulin  Goal:	To maintain a normal blood glucose level and HgA1C level < 5.7 and to prevent diabetic complications such as uncontrolled diabetes, diabetic coma, blindness, renal failure, and amputations.  Assessment and plan of treatment:	Monitor finger sticks pre-meal and bedtime, low salt, fat and carbohydrate diet, minimize glucose intake.  Exercise daily for at least 30 minutes and weight loss.  Follow up with primary care physician and endocrinologist for routine Hemoglobin A1C checks and management.  Follow up with your ophthalmologist for routine yearly vision exams. Principal Discharge DX:	Cerebrovascular accident (CVA), unspecified mechanism  Goal:	To help recover or improve as much as possible your sensory and motor abilities, to become more independent, and prevent future strokes.  Assessment and plan of treatment:	Follow up with neurology in 2-3 weeks. call to make appointment  Secondary Diagnosis:	AF (atrial fibrillation)  Goal:	To restore or maintain a normal heart rate and rhythm, to prevent blood clots, and decrease the risks of stroke CVA/TIA.  Assessment and plan of treatment:	Please take your medications as prescribed.  Continue to take your blood thinner as prescribed and follow with your physician.  Low fat diet, reduce caffeine intake, and exercise at least 30 minutes daily.  Secondary Diagnosis:	Cough  Goal:	improve cough  Assessment and plan of treatment:	likely viral. continue supportive measure  Secondary Diagnosis:	Gout  Goal:	prevent gout flare  Assessment and plan of treatment:	followup with PMD in 2 weeks  Secondary Diagnosis:	HLD (hyperlipidemia)  Goal:	To maintain normal cholesterol levels to prevent stroke, coronary artery disease, peripheral vascular disease and heart attacks.  Assessment and plan of treatment:	Low fat diet, exercise daily and continue current medications. Follow up with primary care physician and cardiologist for management.  Secondary Diagnosis:	HTN (hypertension)  Goal:	Low sodium and fat diet, continue anti-hypertensive medications, and follow up with primary care physician.  Assessment and plan of treatment:	Low sodium and fat diet, continue anti-hypertensive medications, and follow up with primary care physician.  Secondary Diagnosis:	Type 2 diabetes mellitus without complication, without long-term current use of insulin  Goal:	To maintain a normal blood glucose level and HgA1C level < 5.7 and to prevent diabetic complications such as uncontrolled diabetes, diabetic coma, blindness, renal failure, and amputations.  Assessment and plan of treatment:	You are started on metformin which can cause diarrhea. Monitor finger sticks low salt, fat and carbohydrate diet, minimize glucose intake.  Exercise daily for at least 30 minutes and weight loss.  Follow up with primary care physician and endocrinologist for routine Hemoglobin A1C checks and management.  Follow up with your ophthalmologist for routine yearly vision exams. Principal Discharge DX:	Cerebrovascular accident (CVA), unspecified mechanism  Goal:	To help recover or improve as much as possible your sensory and motor abilities, to become more independent, and prevent future strokes.  Assessment and plan of treatment:	Follow up with neurology in 2-3 weeks. call to make appointment  Secondary Diagnosis:	AF (atrial fibrillation)  Goal:	To restore or maintain a normal heart rate and rhythm, to prevent blood clots, and decrease the risks of stroke CVA/TIA.  Assessment and plan of treatment:	Please take your medications as prescribed.  Continue to take your blood thinner as prescribed and follow with your physician.  Low fat diet, reduce caffeine intake, and exercise at least 30 minutes daily.  Secondary Diagnosis:	Cough  Goal:	improve cough  Assessment and plan of treatment:	likely viral. continue supportive measure  Secondary Diagnosis:	Gout  Goal:	prevent gout flare  Assessment and plan of treatment:	followup with PMD in 2 weeks  Secondary Diagnosis:	HLD (hyperlipidemia)  Goal:	To maintain normal cholesterol levels to prevent stroke, coronary artery disease, peripheral vascular disease and heart attacks.  Assessment and plan of treatment:	Low fat diet, exercise daily and continue current medications. Follow up with primary care physician and cardiologist for management.  Secondary Diagnosis:	HTN (hypertension)  Goal:	Low sodium and fat diet, continue anti-hypertensive medications, and follow up with primary care physician.  Assessment and plan of treatment:	Low sodium and fat diet, continue anti-hypertensive medications, and follow up with primary care physician.  Secondary Diagnosis:	Type 2 diabetes mellitus without complication, without long-term current use of insulin  Goal:	To maintain a normal blood glucose level and HgA1C level < 5.7 and to prevent diabetic complications such as uncontrolled diabetes, diabetic coma, blindness, renal failure, and amputations.  Assessment and plan of treatment:	Monitor finger sticks low salt, fat and carbohydrate diet, minimize glucose intake.  Exercise daily for at least 30 minutes and weight loss.  Follow up with primary care physician and endocrinologist for routine Hemoglobin A1C checks and management.  Follow up with your ophthalmologist for routine yearly vision exams.

## 2018-05-09 NOTE — PROGRESS NOTE ADULT - SUBJECTIVE AND OBJECTIVE BOX
CC: F/U for CVA    SUBJECTIVE / OVERNIGHT EVENTS:  Weakness is improving.  No new complaints.  No F/C, N/V, CP, SOB, Cough, lightheadedness, dizziness, abdominal pain, diarrhea, dysuria.    MEDICATIONS  (STANDING):  allopurinol 100 milliGRAM(s) Oral daily  benzonatate 100 milliGRAM(s) Oral every 8 hours  dextrose 5%. 1000 milliLiter(s) (50 mL/Hr) IV Continuous <Continuous>  dextrose 50% Injectable 12.5 Gram(s) IV Push once  dextrose 50% Injectable 25 Gram(s) IV Push once  dextrose 50% Injectable 25 Gram(s) IV Push once  diltiazem    milliGRAM(s) Oral daily  fluticasone propionate 50 MICROgram(s)/spray Nasal Spray 1 Spray(s) Both Nostrils two times a day  insulin lispro (HumaLOG) corrective regimen sliding scale   SubCutaneous three times a day before meals  insulin lispro (HumaLOG) corrective regimen sliding scale   SubCutaneous at bedtime  lisinopril 5 milliGRAM(s) Oral daily  metoprolol tartrate 25 milliGRAM(s) Oral two times a day  pravastatin 40 milliGRAM(s) Oral at bedtime  rivaroxaban 20 milliGRAM(s) Oral every 24 hours    MEDICATIONS  (PRN):  dextrose Gel 1 Dose(s) Oral once PRN Blood Glucose LESS THAN 70 milliGRAM(s)/deciliter  glucagon  Injectable 1 milliGRAM(s) IntraMuscular once PRN Glucose LESS THAN 70 milligrams/deciliter      Vital Signs Last 24 Hrs  T(C): 36.7 (09 May 2018 05:00), Max: 36.7 (08 May 2018 19:30)  T(F): 98.1 (09 May 2018 05:00), Max: 98.1 (09 May 2018 05:00)  HR: 75 (09 May 2018 05:00) (69 - 75)  BP: 155/92 (09 May 2018 05:00) (155/92 - 184/100)  BP(mean): --  RR: 18 (09 May 2018 05:00) (18 - 18)  SpO2: 98% (09 May 2018 05:00) (96% - 98%)  CAPILLARY BLOOD GLUCOSE      POCT Blood Glucose.: 129 mg/dL (09 May 2018 12:36)  POCT Blood Glucose.: 118 mg/dL (09 May 2018 08:50)  POCT Blood Glucose.: 159 mg/dL (08 May 2018 22:45)  POCT Blood Glucose.: 86 mg/dL (08 May 2018 18:58)    I&O's Summary      PHYSICAL EXAM:  GENERAL: NAD, well-developed  HEAD:  Atraumatic, Normocephalic  EYES: EOMI, PERRLA, conjunctiva and sclera clear  NECK: Supple, No JVD  CHEST/LUNG: Clear to auscultation bilaterally; No wheeze  HEART: Irregular rate; No murmurs, rubs, or gallops  ABDOMEN: Soft, Nontender, Nondistended; Bowel sounds present  EXTREMITIES:  2+ Peripheral Pulses, No clubbing, cyanosis, or edema  PSYCH: AAOx3  SKIN: No rashes or lesions  NEURO: Motor in RLE 4/5, 5/5 LLE and b/l UE, sensory fully intact, no dysarthria, no     LABS:                        12.7   7.01  )-----------( 203      ( 09 May 2018 07:25 )             42.4     05-09    141  |  105  |  21  ----------------------------<  84  4.2   |  23  |  1.08    Ca    8.6      09 May 2018 07:25  Mg     1.9     05-09                RADIOLOGY & ADDITIONAL TESTS:  < from: MR Head No Cont (05.08.18 @ 19:00) >  IMPRESSION:    BRAIN MRI:   Acute nonhemorrhagic left thalamic infarct. No midline shift,   hydrocephalus, or abnormal intracranial enhancement.    Mild-moderate chronic bilateral cerebral white matter microvascular   changes.    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   Mild-moderate multifocal stenosis of the distal V2 and V3 segments of the   left vertebral artery.    Mild stenosis of the right internal carotid artery as described above.   Otherwise, the external MR angiography is within normal limits by NASCET   criteria.    Results discussed with LANE [facility], by Neuroradiology fellow Dr. Ignacio at   9:30 AM on May 9, 2018.               DEDRICK IGNACIO M.D., RADIOLOGY FELLOW  This document has been electronically signed.  SENTHIL NEVAREZ M.D., ATTENDING RADIOLOGIST  This document has been electronically signed. May  9 2018 12:58PM    < end of copied text >    Imaging Personally Reviewed:    Care Discussed with Consultants/Other Providers: Neurology - Dr. Mcneal - discussion of stroke management. Vascular Medicine - Dr. Shin - discussion of stroke management.

## 2018-05-09 NOTE — PROGRESS NOTE ADULT - SUBJECTIVE AND OBJECTIVE BOX
Neurology Progress Note    Interval History - no overnight events reports, pt in NAD.    MEDICATIONS  (STANDING):  allopurinol 100 milliGRAM(s) Oral daily  benzonatate 100 milliGRAM(s) Oral every 8 hours  dextrose 5%. 1000 milliLiter(s) (50 mL/Hr) IV Continuous <Continuous>  dextrose 50% Injectable 12.5 Gram(s) IV Push once  dextrose 50% Injectable 25 Gram(s) IV Push once  dextrose 50% Injectable 25 Gram(s) IV Push once  diltiazem    milliGRAM(s) Oral daily  fluticasone propionate 50 MICROgram(s)/spray Nasal Spray 1 Spray(s) Both Nostrils two times a day  insulin lispro (HumaLOG) corrective regimen sliding scale   SubCutaneous three times a day before meals  insulin lispro (HumaLOG) corrective regimen sliding scale   SubCutaneous at bedtime  lisinopril 5 milliGRAM(s) Oral daily  metoprolol tartrate 25 milliGRAM(s) Oral two times a day  pravastatin 40 milliGRAM(s) Oral at bedtime  rivaroxaban 15 milliGRAM(s) Oral every 24 hours    MEDICATIONS  (PRN):  dextrose Gel 1 Dose(s) Oral once PRN Blood Glucose LESS THAN 70 milliGRAM(s)/deciliter  glucagon  Injectable 1 milliGRAM(s) IntraMuscular once PRN Glucose LESS THAN 70 milligrams/deciliter    General Exam:   General appearance: No acute distress    Neurological Exam:  Mental Status: Orientated to self, date and place.  Attention intact.  No dysarthria. Speech fluent.  Cranial Nerves: PERRL, EOMI, VFF, no nystagmus. CN V1-3 intact to light touch b/l.  No facial asymmetry.  Hearing intact to finger rub bilaterally.  Tongue, uvula and palate midline.  Sternocleidomastoid and Trapezius intact bilaterally.    Motor: RLE very mild drift               Strength:     [] Upper extremity                      Delt       Bicep    Tricep                                                  R         5/5        5/5        5/5       5/5                                               L          5/5        5/5        5/5       5/5  [] Lower extremity                       HF          KE          KF        DF         PF                                               R        5-/5        5/5        5/5       5/5       5/5                                               L         5/5        5/5       5/5       5/5        5/5  Pronator drift: none b/l  Dysmetria: FNF mildly ataxic on the right side   Sensation: intact to light touch throughout, no neglect or extinction  Gait -Unsteady     Labs             13.3   6.97  )-----------( 200      ( 08 May 2018 06:10 )             44.8   05-08    139  |  103  |  17  ----------------------------<  92  4.0   |  23  |  0.98    Ca    8.7      08 May 2018 07:56  Phos  3.1     05-07  Mg     2.0     05-08    TPro  7.7  /  Alb  3.7  /  TBili  0.5  /  DBili  x   /  AST  21  /  ALT  17  /  AlkPhos  78  05-07    Radiology:  < from: CT Brain Stroke Protocol (05.06.18 @ 17:12) >  IMPRESSION:     No CT evidence for acute infarct or acute hemorrhage. If the patient has   a new and persistent neurologic deficit, consider short interval   follow-up head CT or brain MRI follow-up.

## 2018-05-09 NOTE — DISCHARGE NOTE ADULT - HOSPITAL COURSE
76yo Male with Hx of A-Fib (on Xarelto), HTN, HLD, PUD and ?CHF who presents with chief complaint of Rt sided weakness. The patient noted lower Rt leg cramping when he woke up this morning that worsened throughout the day. He had to have his wife help him walk because he thought he would fall, he reports no actual falls. He later noted weakness of his Rt hand in the late afternoon, which prompted him to go to the ED. His wife noted him to have slurred speech on the way to the hospital, which resolved within a few minutes. Patient feels back to baseline at this time.  Patient does note that he has had a URI for the past ~10 days with persistent cough that is not improving. He denies any fevers or chills, CP, N/V, palpitations, diarrhea, constipation, LE swelling, ear pain/pressure, tinnitus. Patient is currently taking Xarelto 15 mg instead of 20 mg daily, which he reports is because he had an upper GI bleed due to peptic ulcers in the past, and had his dose lowered to reduce the risk of bleeding. Patient does not take aspirin.    Hospital course:  5/6 CT head: negative for cva   5/8 BRAIN MRI: Acute nonhemorrhagic left thalamic infarct. No midline shift, hydrocephalus, or abnormal intracranial enhancement. Mild-moderate chronic bilateral cerebral white matter microvascular changes.  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 Mild-moderate multifocal stenosis of the distal V2 and V3 segments of the left vertebral artery.Mild stenosis of the right internal carotid artery as described above. Otherwise, the external MR angiography is within normal limits by NASCET criteria.    Pt with symptoms concern for stroke. Code stroke called, pt underwent CT head negative for CVA. Neurology consulted and recommended MRI/PT and TTE. MRI noted acute left thalamic CVA. Pt with hx of afib recommended to continue xarelto at full dose of 20mg. TTE***. PT recommended home PT.

## 2018-05-09 NOTE — DISCHARGE NOTE ADULT - PATIENT PORTAL LINK FT
You can access the [facility] Patient Portal, offered by [facility], by registering with the following website: [email or web address]

## 2018-05-09 NOTE — DISCHARGE NOTE ADULT - ADDITIONAL INSTRUCTIONS
Follow up with your PMD until your insurance PMD is changed. please followup with Dr Shin in 2 weeks for outpatient echocardiogram

## 2018-05-09 NOTE — DISCHARGE NOTE ADULT - CARE PROVIDERS DIRECT ADDRESSES
,[email or web address],[email or web address] ,[email or web address],DirectAddress_Unknown,[email or web address]

## 2018-05-09 NOTE — PROGRESS NOTE ADULT - ASSESSMENT
76yo Male with Hx of A-Fib (on subtherapeutic dose of Xarelto 2/2 hx of GIB), HTN, HLD, PUD p/w acute CVA

## 2018-05-09 NOTE — DISCHARGE NOTE ADULT - CARE PROVIDER_API CALL
Driss Mcneal (MBBS), Neurology; Vascular Neurology  [address] [address]  Phone: [phone]  Fax: [phone] Driss Mcneal ([email or web address]), Neurology; Vascular Neurology  [address] [facility] [address]  [place] [address]  Phone: [phone]  Fax: [phone]    [facility],   [address]    can consider another clinic at [phone]  Phone: [phone]  Fax: (   )    - Driss Mcneal (FIDE), Neurology; Vascular Neurology  [address] [facility]  [address]  [place] [address]  Phone: [phone]  Fax: [phone]    [facility],   [address]    can consider another clinic at [phone]  Phone: [phone]  Fax: (   )    -    Holland Shin (MD; PhD), Cardiology; Internal Medicine; Vascular Medicine  [address] [place] [address]  [place] [address]  Phone: [phone]  Fax: [phone]

## 2018-05-09 NOTE — DISCHARGE NOTE ADULT - MEDICATION SUMMARY - MEDICATIONS TO CHANGE
I will SWITCH the dose or number of times a day I take the medications listed below when I get home from the hospital:    rivaroxaban 15 mg oral tablet  -- 1 tab(s) by mouth every 24 hours

## 2018-05-09 NOTE — PROGRESS NOTE ADULT - PROBLEM SELECTOR PLAN 2
- Patient reports 1 week of persistent cough 2/2 URI. Likely viral syndrome with residual cough. Denies fevers or chills.  - Flonase for PND associated cough  - Tessalon perles PRN  - Trend for fevers  - CXR clear

## 2018-05-09 NOTE — PROGRESS NOTE ADULT - PROBLEM SELECTOR PROBLEM 1
TIA (transient ischemic attack)
Cerebrovascular accident (CVA), unspecified mechanism
Cerebrovascular accident (CVA), unspecified mechanism

## 2018-05-09 NOTE — PROGRESS NOTE ADULT - PROBLEM SELECTOR PLAN 1
- Neurology following, MRI/A pending, TTE with bubble can be done as outpatient  - Pt on Xarelto, however dosed at 15mg as per cardiology recs given hx of GIB. However due to CVA, would recommend to resume at proper dose of 20mg  - c/w home statin  - PT/OT: PT recommending home with home PT

## 2018-05-09 NOTE — DISCHARGE NOTE ADULT - MEDICATION SUMMARY - MEDICATIONS TO TAKE
I will START or STAY ON the medications listed below when I get home from the hospital:    Rolling walker  -- Rolling walker  ICD 10: R27.0, E11.9  AMAYA 99 moths   -- Indication: For PT    lisinopril 5 mg oral tablet  -- 1 tab(s) by mouth once a day  -- Indication: For HTN (hypertension)    Cartia  mg/24 hours oral capsule, extended release  -- Indication: For AFib    rivaroxaban 20 mg oral tablet  -- 1 tab(s) by mouth every 24 hours  -- Indication: For AFib    metFORMIN 500 mg oral tablet  -- 1 tab(s) by mouth 2 times a day   -- Check with your doctor before becoming pregnant.  Do not drink alcoholic beverages when taking this medication.  It is very important that you take or use this exactly as directed.  Do not skip doses or discontinue unless directed by your doctor.  Obtain medical advice before taking any non-prescription drugs as some may affect the action of this medication.  Take with food or milk.    -- Indication: For dm    allopurinol 100 mg oral tablet  -- 1 tab(s) by mouth once a day  -- Indication: For Gout    pravastatin 40 mg oral tablet  -- Indication: For HLD (hyperlipidemia)    benzonatate 100 mg oral capsule  -- 1 cap(s) by mouth every 8 hours  -- Indication: For Cough    Metoprolol Succinate ER 50 mg oral tablet, extended release  -- 1 tab(s) by mouth once a day  -- Indication: For AFib    fluticasone 50 mcg/inh nasal spray  -- 1 spray(s) into nose 2 times a day prn for congestion  -- Indication: For Allergy I will START or STAY ON the medications listed below when I get home from the hospital:    Rolling walker  -- Rolling walker  ICD 10: R27.0, E11.9  AMAYA 99 moths   -- Indication: For PT    lisinopril 5 mg oral tablet  -- 1 tab(s) by mouth once a day  -- Indication: For HTN (hypertension)    Cartia  mg/24 hours oral capsule, extended release  -- Indication: For AFib    rivaroxaban 20 mg oral tablet  -- 1 tab(s) by mouth every 24 hours  -- Indication: For AFib    allopurinol 100 mg oral tablet  -- 1 tab(s) by mouth once a day  -- Indication: For Gout    pravastatin 40 mg oral tablet  -- Indication: For HLD (hyperlipidemia)    benzonatate 100 mg oral capsule  -- 1 cap(s) by mouth every 8 hours  -- Indication: For Cough    Metoprolol Succinate ER 50 mg oral tablet, extended release  -- 1 tab(s) by mouth once a day  -- Indication: For AFib    fluticasone 50 mcg/inh nasal spray  -- 1 spray(s) into nose 2 times a day prn for congestion  -- Indication: For Allergy

## 2019-01-03 NOTE — PATIENT PROFILE ADULT. - [facility] AT [facility]
Daily Note     Today's date: 1/3/2019  Patient name: Elidia Celaya  : 1962  MRN: [id number]  Referring provider: Brady Hameed MD  Dx:   Encounter Diagnosis     ICD-10-CM    1  Subacromial impingement of left shoulder M75 42                   Subjective: Patient reports she is sore from the HEP  States that her shoulder is feeling pretty good today  Objective: See treatment diary below  Precautions: none     Daily Treatment Diary      Manual  12/31  1/3                   STM to proximal biceps tendon and post cap 5'                     L shoulder distraction and post mob 5'                      L PROM   HS                                                                         Exercise Diary  12/31  1/3                                           UBE nv  3'/3'                   pec stretch 3x30"                     Shoulder isometrics: ER, IR, Flex, Ext, abd 2x10, 5" hold                     Scapular retraction seated X20, 5"                     Chin tucks nv                     UT stretch 3x30"  30"x3                   Levator scap stretch nv  30"x3                   Rows and extensions nv  OTB 2x10 ea                   sidelying ER nv  5"  2x10                   Band IR/ER nv  OTB 2x10                   Seated ball roll outs (stretch into shoulder flexion) nv                                                                                                                                                                                                                           Modalities                       HP/CP PRN np                                                                              Assessment: Tolerated treatment well  Patient demonstrated fatigue post treatment      Plan: Continue per plan of care 
none

## 2019-04-06 ENCOUNTER — EMERGENCY (EMERGENCY)
Facility: HOSPITAL | Age: 79
LOS: 1 days | Discharge: HOME | End: 2019-04-06
Attending: EMERGENCY MEDICINE | Admitting: EMERGENCY MEDICINE
Payer: MEDICARE

## 2019-04-06 VITALS
RESPIRATION RATE: 18 BRPM | HEART RATE: 68 BPM | DIASTOLIC BLOOD PRESSURE: 78 MMHG | TEMPERATURE: 98 F | OXYGEN SATURATION: 98 % | SYSTOLIC BLOOD PRESSURE: 167 MMHG

## 2019-04-06 VITALS
RESPIRATION RATE: 18 BRPM | DIASTOLIC BLOOD PRESSURE: 82 MMHG | OXYGEN SATURATION: 95 % | TEMPERATURE: 98 F | HEART RATE: 67 BPM | SYSTOLIC BLOOD PRESSURE: 142 MMHG

## 2019-04-06 PROBLEM — I10 ESSENTIAL (PRIMARY) HYPERTENSION: Chronic | Status: ACTIVE | Noted: 2017-09-11

## 2019-04-06 PROBLEM — E78.5 HYPERLIPIDEMIA, UNSPECIFIED: Chronic | Status: ACTIVE | Noted: 2017-09-11

## 2019-04-06 PROBLEM — M10.9 GOUT, UNSPECIFIED: Chronic | Status: ACTIVE | Noted: 2017-09-11

## 2019-04-06 PROBLEM — I48.91 UNSPECIFIED ATRIAL FIBRILLATION: Chronic | Status: ACTIVE | Noted: 2017-09-11

## 2019-04-06 PROCEDURE — 71046 X-RAY EXAM CHEST 2 VIEWS: CPT | Mod: 26

## 2019-04-06 PROCEDURE — 99283 EMERGENCY DEPT VISIT LOW MDM: CPT | Mod: GC

## 2019-04-06 RX ORDER — AZITHROMYCIN 500 MG/1
1 TABLET, FILM COATED ORAL
Qty: 5 | Refills: 0
Start: 2019-04-06 | End: 2019-04-10

## 2019-04-06 RX ORDER — ALBUTEROL 90 UG/1
1 AEROSOL, METERED ORAL ONCE
Qty: 0 | Refills: 0 | Status: COMPLETED | OUTPATIENT
Start: 2019-04-06 | End: 2019-04-06

## 2019-04-06 RX ORDER — AZITHROMYCIN 500 MG/1
500 TABLET, FILM COATED ORAL ONCE
Qty: 0 | Refills: 0 | Status: COMPLETED | OUTPATIENT
Start: 2019-04-06 | End: 2019-04-06

## 2019-04-06 RX ORDER — RANITIDINE HYDROCHLORIDE 150 MG/1
1 TABLET, FILM COATED ORAL
Qty: 14 | Refills: 0
Start: 2019-04-06 | End: 2019-04-19

## 2019-04-06 RX ADMIN — ALBUTEROL 1 PUFF(S): 90 AEROSOL, METERED ORAL at 10:36

## 2019-04-06 RX ADMIN — AZITHROMYCIN 500 MILLIGRAM(S): 500 TABLET, FILM COATED ORAL at 12:53

## 2019-04-06 NOTE — ED ADULT NURSE NOTE - OBJECTIVE STATEMENT
Patient here with a cough for approximately 3 months. More at night when he tries to sleep. Pt is alert and oriented times three. Pt has been evaluated by MD. Albuter Inhaler 2 puffs given as ordered. Pt waiting for further dispo.             MIKAEL Christy

## 2019-04-06 NOTE — ED PROVIDER NOTE - OBJECTIVE STATEMENT
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 Patient reports that he has trial benzonatate but has not having cough resolve. There is no mucous production or coughing blood, no fever, no chills, no lethargy cp, sob, n/v/d reported. No sick contacts or recent travel. patient reports that he has not seen his PCP for unresolved cough. 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 Patient reports that he has trial benzonatate but has not having cough resolve. There is clear mucous production and dry cough but no coughing blood, no fever, no chills, no lethargy cp, sob, n/v/d reported. No sick contacts or recent travel. patient reports that he has not seen his PCP for unresolved cough. never smoker. No change in exercise tolerance. Does not become sob when lying flat.

## 2019-04-06 NOTE — ED PROVIDER NOTE - PHYSICAL EXAMINATION
GENERAL: NAD, well-developed  HEAD:  Atraumatic, Normocephalic  EYES: PERRLA, conjunctiva and sclera clear  Mouth: MMM, no lesions  NECK: Supple, no appreciable masses  Lung: normal work of breathing, cta b/l  Chest: S1&S2+, rrr, no m/r/g appreciated  ABDOMEN: bs+, soft, nt, nd, no appreciable masses  : No cisneros catheter, no CVA tenderness  EXTREMITIES:  radial pulse present b/l, PT present b/l, mild pitting edema in b/l LE  Neuro: Alert and appropriate to conversation, no focal deficits  SKIN: warm and dry, no visible rashes GENERAL: NAD, well-developed  HEAD:  Atraumatic, Normocephalic  EYES: PERRLA, conjunctiva and sclera clear  Mouth: MMM, no lesions  NECK: Supple, no appreciable masses  Lung: normal work of breathing, b/l mild-moderate crackles in base but clear elsewhere  Chest: S1&S2+, rrr, no m/r/g appreciated  ABDOMEN: bs+, soft, nt, nd, no appreciable masses  : No cisneros catheter, no CVA tenderness  EXTREMITIES:  radial pulse present b/l, PT present b/l, mild pitting edema in b/l LE  Neuro: Alert and appropriate to conversation, no focal deficits  SKIN: warm and dry, no visible rashes

## 2019-04-06 NOTE — ED PROVIDER NOTE - NSFOLLOWUPINSTRUCTIONS_ED_ALL_ED_FT
Because you were found to have mild crackles on the base of both lungs we have prescribed you azithromycin (antibiotic) to protect you should there be a developing pneumonia that you need to take for 5 days. You had an chest x-ray completed which appeared clear. It is also possible that the cough you are experiencing is from acid reflux and therefore you were prescribed ranitidine which is to be taken daily. It is important that you follow up with your primary medical doctor in 7 days of discharge and if you still are having cough then should have chest CT scan considered by your doctor.     Should you develop fevers, worsening shortness of breath, chest pain, or inability to eat or drink it is recommended that you return to the hospital.

## 2019-04-06 NOTE — ED PROVIDER NOTE - CLINICAL SUMMARY MEDICAL DECISION MAKING FREE TEXT BOX
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 Pending final read of CXR however no pna. DDx includes GERD given hx of PUD, lisinopril associated cough, or allergy. Less like malignancy however given age is considered. 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 Pending final read of CXR however no pna. DDx includes GERD given hx of PUD, lisinopril associated cough, or allergy. Less like malignancy however given age is considered.

## 2019-04-06 NOTE — ED PROVIDER NOTE - ATTENDING CONTRIBUTION TO CARE
Dr. Escalera:  I have personally performed a face to face bedside history and physical examination of this patient. I have discussed the history, examination, review of systems, assessment and plan of management with the resident. I have reviewed the electronic medical record and amended it to reflect my history, review of systems, physical exam, assessment and plan.    78M h/o PUD, afib on xarelto, HTN, CVA, presents with cough x 2.5wks.  Denies fevers, SOB, CP, sick contacts, recent travel.    Exam:  - nad  - rrr  - +bibasilar crackles, L>R  - abd soft ntnd    A/P  - consider PNA, eval other pathology  - CXR Dr. Escalera:  I have personally performed a face to face bedside history and physical examination of this patient. I have discussed the history, examination, review of systems, assessment and plan of management with the resident. I have reviewed the electronic medical record and amended it to reflect my history, review of systems, physical exam, assessment and plan.    78M h/o PUD, afib on xarelto, HTN, CVA, presents with cough x 2.5wks.  Denies fevers, SOB, CP, sick contacts, recent travel.    Exam:  - nad  - irreg  - +bibasilar crackles, L>R  - abd soft ntnd    A/P  - consider PNA, eval other pathology  - CXR

## 2019-04-06 NOTE — ED PROVIDER NOTE - NS ED ROS FT
CONSTITUTIONAL: No fever, no chills  EYES: No eye pain, no visual disturbance  Mouth: no pain in mouth, no cuts  RESPIRATORY: non-productive cough, no sob at rest  CARDIOVASCULAR: No CP, no palpitations  GASTROINTESTINAL: no abdominal pain, no n/v/d  GENITOURINARY: No dysuria, no hematuria  NEUROLOGICAL: No headaches, no weakness  SKIN: No itching, no rashes  MUSCULOSKELETAL: No joint pain, no joint swelling CONSTITUTIONAL: No fever, no chills  EYES: No eye pain, no visual disturbance  Mouth: no pain in mouth, no cuts  RESPIRATORY: reports at times small clear mucous but mostly non-productive cough, no sob at rest  CARDIOVASCULAR: No CP, no palpitations  GASTROINTESTINAL: no abdominal pain, no n/v/d  GENITOURINARY: No dysuria, no hematuria  NEUROLOGICAL: No headaches, no weakness  SKIN: No itching, no rashes  MUSCULOSKELETAL: No joint pain, no joint swelling

## 2019-08-20 NOTE — ED PROVIDER NOTE - CROS ED RESP ALL NEG
Spoke with patient and scheduled injection(s). Reviewed pre-op instructions. Patient verbalized understanding. Agreeable to holding ibuprofen medications as indicated in instructions listed below. Discussed medications with patient.  No other listed (in pre **To reduce the risk of infection, we ask that you bathe within 24 hours prior to your procedure. **      Day of Procedure:   Do not eat or drink anything (including water) 8 hours prior to your procedure.   ? If you take morning blood pressure medicat ? Plavix (Clopidogrel)  ? Epidural ____ - 10 days  ? Others 7 days   NSAIDs: 24 hours  Meloxicam -- Cervical procedures require 3 days  ?  Ibuprofen (Motrin, Advil, Vicoprofen), Naproxen (Naprosyn, Aleve), Piroxcam (Feldene), Meloxicam (Mobic)--(Cervical pr Transforaminal Joint Injections  What is a transforaminal joint injection? A transforaminal joint injection is an injection of a steroid, or anti-inflammatory medication, into the opening at the side of the spine where nerve roots exit.  This opening is kn It is done either with the patient on their side for most neck injections and with the patient on their stomach for back injections. Occasionally other positions are used to optimize the X-ray view.  All patients receiving sedation are monitored with EKG, b Can I have more than three transforaminal injections? In a six-month period, most patients do not receive more than three injections. This is because the effect of the medication injected frequently lasts for six months or more.  If three injections have n - - -

## 2020-12-01 ENCOUNTER — INPATIENT (INPATIENT)
Facility: HOSPITAL | Age: 80
LOS: 5 days | Discharge: HOME HEALTH | End: 2020-12-07
Attending: STUDENT IN AN ORGANIZED HEALTH CARE EDUCATION/TRAINING PROGRAM | Admitting: STUDENT IN AN ORGANIZED HEALTH CARE EDUCATION/TRAINING PROGRAM
Payer: MEDICARE

## 2020-12-01 VITALS
DIASTOLIC BLOOD PRESSURE: 89 MMHG | RESPIRATION RATE: 16 BRPM | SYSTOLIC BLOOD PRESSURE: 139 MMHG | HEART RATE: 95 BPM | TEMPERATURE: 101 F | OXYGEN SATURATION: 98 % | HEIGHT: 63 IN

## 2020-12-01 DIAGNOSIS — Z29.9 ENCOUNTER FOR PROPHYLACTIC MEASURES, UNSPECIFIED: ICD-10-CM

## 2020-12-01 DIAGNOSIS — R65.10 SYSTEMIC INFLAMMATORY RESPONSE SYNDROME (SIRS) OF NON-INFECTIOUS ORIGIN WITHOUT ACUTE ORGAN DYSFUNCTION: ICD-10-CM

## 2020-12-01 DIAGNOSIS — Z02.9 ENCOUNTER FOR ADMINISTRATIVE EXAMINATIONS, UNSPECIFIED: ICD-10-CM

## 2020-12-01 DIAGNOSIS — A41.89 OTHER SPECIFIED SEPSIS: ICD-10-CM

## 2020-12-01 DIAGNOSIS — M10.9 GOUT, UNSPECIFIED: ICD-10-CM

## 2020-12-01 DIAGNOSIS — I48.91 UNSPECIFIED ATRIAL FIBRILLATION: ICD-10-CM

## 2020-12-01 DIAGNOSIS — E87.1 HYPO-OSMOLALITY AND HYPONATREMIA: ICD-10-CM

## 2020-12-01 DIAGNOSIS — I10 ESSENTIAL (PRIMARY) HYPERTENSION: ICD-10-CM

## 2020-12-01 DIAGNOSIS — I25.10 ATHEROSCLEROTIC HEART DISEASE OF NATIVE CORONARY ARTERY WITHOUT ANGINA PECTORIS: ICD-10-CM

## 2020-12-01 DIAGNOSIS — E78.5 HYPERLIPIDEMIA, UNSPECIFIED: ICD-10-CM

## 2020-12-01 LAB
ALBUMIN SERPL ELPH-MCNC: 3.6 G/DL — SIGNIFICANT CHANGE UP (ref 3.3–5)
ALP SERPL-CCNC: 56 U/L — SIGNIFICANT CHANGE UP (ref 40–120)
ALT FLD-CCNC: 16 U/L — SIGNIFICANT CHANGE UP (ref 4–41)
ANION GAP SERPL CALC-SCNC: 12 MMO/L — SIGNIFICANT CHANGE UP (ref 7–14)
ANISOCYTOSIS BLD QL: SIGNIFICANT CHANGE UP
APPEARANCE UR: CLEAR — SIGNIFICANT CHANGE UP
APTT BLD: 33.4 SEC — SIGNIFICANT CHANGE UP (ref 27–36.3)
AST SERPL-CCNC: 43 U/L — HIGH (ref 4–40)
B PERT DNA SPEC QL NAA+PROBE: SIGNIFICANT CHANGE UP
BACTERIA # UR AUTO: NEGATIVE — SIGNIFICANT CHANGE UP
BASE EXCESS BLDV CALC-SCNC: -1.3 MMOL/L — SIGNIFICANT CHANGE UP
BASOPHILS # BLD AUTO: 0.01 K/UL — SIGNIFICANT CHANGE UP (ref 0–0.2)
BASOPHILS NFR BLD AUTO: 0.2 % — SIGNIFICANT CHANGE UP (ref 0–2)
BASOPHILS NFR SPEC: 0 % — SIGNIFICANT CHANGE UP (ref 0–2)
BILIRUB SERPL-MCNC: 0.6 MG/DL — SIGNIFICANT CHANGE UP (ref 0.2–1.2)
BILIRUB UR-MCNC: NEGATIVE — SIGNIFICANT CHANGE UP
BLOOD GAS VENOUS - CREATININE: 1.34 MG/DL — HIGH (ref 0.5–1.3)
BLOOD UR QL VISUAL: HIGH
BUN SERPL-MCNC: 17 MG/DL — SIGNIFICANT CHANGE UP (ref 7–23)
C PNEUM DNA SPEC QL NAA+PROBE: SIGNIFICANT CHANGE UP
CALCIUM SERPL-MCNC: 8.8 MG/DL — SIGNIFICANT CHANGE UP (ref 8.4–10.5)
CHLORIDE BLDV-SCNC: 98 MMOL/L — SIGNIFICANT CHANGE UP (ref 96–108)
CHLORIDE SERPL-SCNC: 96 MMOL/L — LOW (ref 98–107)
CO2 SERPL-SCNC: 22 MMOL/L — SIGNIFICANT CHANGE UP (ref 22–31)
COLOR SPEC: YELLOW — SIGNIFICANT CHANGE UP
CREAT SERPL-MCNC: 1.26 MG/DL — SIGNIFICANT CHANGE UP (ref 0.5–1.3)
EOSINOPHIL # BLD AUTO: 0 K/UL — SIGNIFICANT CHANGE UP (ref 0–0.5)
EOSINOPHIL NFR BLD AUTO: 0 % — SIGNIFICANT CHANGE UP (ref 0–6)
EOSINOPHIL NFR FLD: 0 % — SIGNIFICANT CHANGE UP (ref 0–6)
FLUAV H1 2009 PAND RNA SPEC QL NAA+PROBE: SIGNIFICANT CHANGE UP
FLUAV H1 RNA SPEC QL NAA+PROBE: SIGNIFICANT CHANGE UP
FLUAV H3 RNA SPEC QL NAA+PROBE: SIGNIFICANT CHANGE UP
FLUAV SUBTYP SPEC NAA+PROBE: SIGNIFICANT CHANGE UP
FLUBV RNA SPEC QL NAA+PROBE: SIGNIFICANT CHANGE UP
GAS PNL BLDV: 129 MMOL/L — LOW (ref 136–146)
GIANT PLATELETS BLD QL SMEAR: PRESENT — SIGNIFICANT CHANGE UP
GLUCOSE BLDV-MCNC: 121 MG/DL — HIGH (ref 70–99)
GLUCOSE SERPL-MCNC: 124 MG/DL — HIGH (ref 70–99)
GLUCOSE UR-MCNC: NEGATIVE — SIGNIFICANT CHANGE UP
HADV DNA SPEC QL NAA+PROBE: SIGNIFICANT CHANGE UP
HCO3 BLDV-SCNC: 22 MMOL/L — SIGNIFICANT CHANGE UP (ref 20–27)
HCOV PNL SPEC NAA+PROBE: SIGNIFICANT CHANGE UP
HCT VFR BLD CALC: 41.5 % — SIGNIFICANT CHANGE UP (ref 39–50)
HCT VFR BLDV CALC: 43.6 % — SIGNIFICANT CHANGE UP (ref 39–51)
HGB BLD-MCNC: 12.9 G/DL — LOW (ref 13–17)
HGB BLDV-MCNC: 14.2 G/DL — SIGNIFICANT CHANGE UP (ref 13–17)
HMPV RNA SPEC QL NAA+PROBE: SIGNIFICANT CHANGE UP
HPIV1 RNA SPEC QL NAA+PROBE: SIGNIFICANT CHANGE UP
HPIV2 RNA SPEC QL NAA+PROBE: SIGNIFICANT CHANGE UP
HPIV3 RNA SPEC QL NAA+PROBE: SIGNIFICANT CHANGE UP
HPIV4 RNA SPEC QL NAA+PROBE: SIGNIFICANT CHANGE UP
HYALINE CASTS # UR AUTO: NEGATIVE — SIGNIFICANT CHANGE UP
IMM GRANULOCYTES NFR BLD AUTO: 0.2 % — SIGNIFICANT CHANGE UP (ref 0–1.5)
INR BLD: 1.43 — HIGH (ref 0.88–1.16)
KETONES UR-MCNC: NEGATIVE — SIGNIFICANT CHANGE UP
LACTATE BLDV-MCNC: 3 MMOL/L — HIGH (ref 0.5–2)
LEUKOCYTE ESTERASE UR-ACNC: NEGATIVE — SIGNIFICANT CHANGE UP
LYMPHOCYTES # BLD AUTO: 1.53 K/UL — SIGNIFICANT CHANGE UP (ref 1–3.3)
LYMPHOCYTES # BLD AUTO: 27.3 % — SIGNIFICANT CHANGE UP (ref 13–44)
LYMPHOCYTES NFR SPEC AUTO: 21.9 % — SIGNIFICANT CHANGE UP (ref 13–44)
MACROCYTES BLD QL: SIGNIFICANT CHANGE UP
MCHC RBC-ENTMCNC: 22.1 PG — LOW (ref 27–34)
MCHC RBC-ENTMCNC: 31.1 % — LOW (ref 32–36)
MCV RBC AUTO: 71.1 FL — LOW (ref 80–100)
METAMYELOCYTES # FLD: 0.9 % — SIGNIFICANT CHANGE UP (ref 0–1)
MONOCYTES # BLD AUTO: 0.6 K/UL — SIGNIFICANT CHANGE UP (ref 0–0.9)
MONOCYTES NFR BLD AUTO: 10.7 % — SIGNIFICANT CHANGE UP (ref 2–14)
MONOCYTES NFR BLD: 6.1 % — SIGNIFICANT CHANGE UP (ref 2–9)
NEUTROPHIL AB SER-ACNC: 62.3 % — SIGNIFICANT CHANGE UP (ref 43–77)
NEUTROPHILS # BLD AUTO: 3.46 K/UL — SIGNIFICANT CHANGE UP (ref 1.8–7.4)
NEUTROPHILS NFR BLD AUTO: 61.6 % — SIGNIFICANT CHANGE UP (ref 43–77)
NEUTS BAND # BLD: 3.5 % — SIGNIFICANT CHANGE UP (ref 0–6)
NITRITE UR-MCNC: NEGATIVE — SIGNIFICANT CHANGE UP
NRBC # BLD: 3 /100WBC — SIGNIFICANT CHANGE UP
NRBC # FLD: 0 K/UL — SIGNIFICANT CHANGE UP (ref 0–0)
OVALOCYTES BLD QL SMEAR: SLIGHT — SIGNIFICANT CHANGE UP
PCO2 BLDV: 42 MMHG — SIGNIFICANT CHANGE UP (ref 41–51)
PH BLDV: 7.37 PH — SIGNIFICANT CHANGE UP (ref 7.32–7.43)
PH UR: 6.5 — SIGNIFICANT CHANGE UP (ref 5–8)
PLATELET # BLD AUTO: 110 K/UL — LOW (ref 150–400)
PLATELET COUNT - ESTIMATE: SIGNIFICANT CHANGE UP
PMV BLD: SIGNIFICANT CHANGE UP FL (ref 7–13)
PO2 BLDV: 26 MMHG — LOW (ref 35–40)
POIKILOCYTOSIS BLD QL AUTO: SIGNIFICANT CHANGE UP
POLYCHROMASIA BLD QL SMEAR: SLIGHT — SIGNIFICANT CHANGE UP
POTASSIUM BLDV-SCNC: 5.6 MMOL/L — HIGH (ref 3.4–4.5)
POTASSIUM SERPL-MCNC: 5.2 MMOL/L — SIGNIFICANT CHANGE UP (ref 3.5–5.3)
POTASSIUM SERPL-SCNC: 5.2 MMOL/L — SIGNIFICANT CHANGE UP (ref 3.5–5.3)
PROT SERPL-MCNC: 8 G/DL — SIGNIFICANT CHANGE UP (ref 6–8.3)
PROT UR-MCNC: 600 — HIGH
PROTHROM AB SERPL-ACNC: 16 SEC — HIGH (ref 10.6–13.6)
RAPID RVP RESULT: DETECTED
RBC # BLD: 5.84 M/UL — HIGH (ref 4.2–5.8)
RBC # FLD: 18 % — HIGH (ref 10.3–14.5)
RBC CASTS # UR COMP ASSIST: HIGH (ref 0–?)
RV+EV RNA SPEC QL NAA+PROBE: SIGNIFICANT CHANGE UP
SAO2 % BLDV: 40.4 % — LOW (ref 60–85)
SARS-COV-2 RNA SPEC QL NAA+PROBE: DETECTED
SODIUM SERPL-SCNC: 130 MMOL/L — LOW (ref 135–145)
SP GR SPEC: 1.02 — SIGNIFICANT CHANGE UP (ref 1–1.04)
SQUAMOUS # UR AUTO: SIGNIFICANT CHANGE UP
TARGETS BLD QL SMEAR: SLIGHT — SIGNIFICANT CHANGE UP
UROBILINOGEN FLD QL: NORMAL — SIGNIFICANT CHANGE UP
VARIANT LYMPHS # BLD: 5.3 % — SIGNIFICANT CHANGE UP
WBC # BLD: 5.61 K/UL — SIGNIFICANT CHANGE UP (ref 3.8–10.5)
WBC # FLD AUTO: 5.61 K/UL — SIGNIFICANT CHANGE UP (ref 3.8–10.5)
WBC UR QL: SIGNIFICANT CHANGE UP (ref 0–?)

## 2020-12-01 PROCEDURE — 93010 ELECTROCARDIOGRAM REPORT: CPT

## 2020-12-01 PROCEDURE — 71046 X-RAY EXAM CHEST 2 VIEWS: CPT | Mod: 26

## 2020-12-01 PROCEDURE — 99285 EMERGENCY DEPT VISIT HI MDM: CPT

## 2020-12-01 RX ORDER — ACETAMINOPHEN 500 MG
975 TABLET ORAL ONCE
Refills: 0 | Status: COMPLETED | OUTPATIENT
Start: 2020-12-01 | End: 2020-12-01

## 2020-12-01 RX ORDER — ACETAMINOPHEN 500 MG
650 TABLET ORAL EVERY 6 HOURS
Refills: 0 | Status: DISCONTINUED | OUTPATIENT
Start: 2020-12-01 | End: 2020-12-07

## 2020-12-01 RX ORDER — RIVAROXABAN 15 MG-20MG
20 KIT ORAL
Refills: 0 | Status: DISCONTINUED | OUTPATIENT
Start: 2020-12-01 | End: 2020-12-07

## 2020-12-01 RX ORDER — SODIUM CHLORIDE 9 MG/ML
1000 INJECTION, SOLUTION INTRAVENOUS
Refills: 0 | Status: DISCONTINUED | OUTPATIENT
Start: 2020-12-01 | End: 2020-12-07

## 2020-12-01 RX ORDER — INSULIN LISPRO 100/ML
VIAL (ML) SUBCUTANEOUS AT BEDTIME
Refills: 0 | Status: DISCONTINUED | OUTPATIENT
Start: 2020-12-01 | End: 2020-12-07

## 2020-12-01 RX ORDER — ISOSORBIDE MONONITRATE 60 MG/1
30 TABLET, EXTENDED RELEASE ORAL DAILY
Refills: 0 | Status: DISCONTINUED | OUTPATIENT
Start: 2020-12-01 | End: 2020-12-07

## 2020-12-01 RX ORDER — CLOPIDOGREL BISULFATE 75 MG/1
75 TABLET, FILM COATED ORAL DAILY
Refills: 0 | Status: DISCONTINUED | OUTPATIENT
Start: 2020-12-02 | End: 2020-12-07

## 2020-12-01 RX ORDER — GLUCAGON INJECTION, SOLUTION 0.5 MG/.1ML
1 INJECTION, SOLUTION SUBCUTANEOUS ONCE
Refills: 0 | Status: DISCONTINUED | OUTPATIENT
Start: 2020-12-01 | End: 2020-12-07

## 2020-12-01 RX ORDER — ALLOPURINOL 300 MG
100 TABLET ORAL DAILY
Refills: 0 | Status: DISCONTINUED | OUTPATIENT
Start: 2020-12-01 | End: 2020-12-07

## 2020-12-01 RX ORDER — METOPROLOL TARTRATE 50 MG
50 TABLET ORAL
Refills: 0 | Status: DISCONTINUED | OUTPATIENT
Start: 2020-12-01 | End: 2020-12-02

## 2020-12-01 RX ORDER — DEXTROSE 50 % IN WATER 50 %
25 SYRINGE (ML) INTRAVENOUS ONCE
Refills: 0 | Status: DISCONTINUED | OUTPATIENT
Start: 2020-12-01 | End: 2020-12-07

## 2020-12-01 RX ORDER — DEXTROSE 50 % IN WATER 50 %
12.5 SYRINGE (ML) INTRAVENOUS ONCE
Refills: 0 | Status: DISCONTINUED | OUTPATIENT
Start: 2020-12-01 | End: 2020-12-07

## 2020-12-01 RX ORDER — DEXTROSE 50 % IN WATER 50 %
15 SYRINGE (ML) INTRAVENOUS ONCE
Refills: 0 | Status: DISCONTINUED | OUTPATIENT
Start: 2020-12-01 | End: 2020-12-07

## 2020-12-01 RX ORDER — ALLOPURINOL 300 MG
1 TABLET ORAL
Qty: 0 | Refills: 0 | DISCHARGE

## 2020-12-01 RX ORDER — LISINOPRIL 2.5 MG/1
5 TABLET ORAL EVERY 12 HOURS
Refills: 0 | Status: DISCONTINUED | OUTPATIENT
Start: 2020-12-01 | End: 2020-12-03

## 2020-12-01 RX ORDER — DILTIAZEM HCL 120 MG
0 CAPSULE, EXT RELEASE 24 HR ORAL
Qty: 0 | Refills: 0 | DISCHARGE

## 2020-12-01 RX ORDER — PIPERACILLIN AND TAZOBACTAM 4; .5 G/20ML; G/20ML
3.38 INJECTION, POWDER, LYOPHILIZED, FOR SOLUTION INTRAVENOUS ONCE
Refills: 0 | Status: COMPLETED | OUTPATIENT
Start: 2020-12-01 | End: 2020-12-01

## 2020-12-01 RX ORDER — SODIUM CHLORIDE 9 MG/ML
1000 INJECTION INTRAMUSCULAR; INTRAVENOUS; SUBCUTANEOUS ONCE
Refills: 0 | Status: COMPLETED | OUTPATIENT
Start: 2020-12-01 | End: 2020-12-01

## 2020-12-01 RX ORDER — METOPROLOL TARTRATE 50 MG
1 TABLET ORAL
Qty: 0 | Refills: 0 | DISCHARGE

## 2020-12-01 RX ORDER — HYDRALAZINE HCL 50 MG
25 TABLET ORAL THREE TIMES A DAY
Refills: 0 | Status: DISCONTINUED | OUTPATIENT
Start: 2020-12-01 | End: 2020-12-07

## 2020-12-01 RX ORDER — INSULIN LISPRO 100/ML
VIAL (ML) SUBCUTANEOUS
Refills: 0 | Status: DISCONTINUED | OUTPATIENT
Start: 2020-12-01 | End: 2020-12-07

## 2020-12-01 RX ORDER — ATORVASTATIN CALCIUM 80 MG/1
40 TABLET, FILM COATED ORAL AT BEDTIME
Refills: 0 | Status: DISCONTINUED | OUTPATIENT
Start: 2020-12-01 | End: 2020-12-07

## 2020-12-01 RX ADMIN — Medication 975 MILLIGRAM(S): at 18:10

## 2020-12-01 RX ADMIN — PIPERACILLIN AND TAZOBACTAM 200 GRAM(S): 4; .5 INJECTION, POWDER, LYOPHILIZED, FOR SOLUTION INTRAVENOUS at 18:10

## 2020-12-01 RX ADMIN — SODIUM CHLORIDE 1000 MILLILITER(S): 9 INJECTION INTRAMUSCULAR; INTRAVENOUS; SUBCUTANEOUS at 18:11

## 2020-12-01 NOTE — H&P ADULT - NSICDXPASTMEDICALHX_GEN_ALL_CORE_FT
PAST MEDICAL HISTORY:  AF (atrial fibrillation)     Gout     HLD (hyperlipidemia)     HTN (hypertension)

## 2020-12-01 NOTE — H&P ADULT - PROBLEM SELECTOR PLAN 5
- Continue Home BP meds w/ parameters   - monitor BP   - DASH/TLC diet - Continue Plavix, statin   - Low fat, low cholesterol diet

## 2020-12-01 NOTE — H&P ADULT - NSHPREVIEWOFSYSTEMS_GEN_ALL_CORE
Constitutional Symptoms: +weakness + Loss of appetite No fevers, chills, weight loss  Eyes: No visual changes, headache, eye pain, double vision  Ears, Nose, Mouth, Throat: No runny nose, sinus pain, ear pain, tinnitus, sore throat, dysphagia, odynophagia  Cardiovascular: No chest pain, palpitations, edema  Respiratory: + cough No wheezing, hemoptysis, shortness of breath  Gastrointestinal: No abdominal pain, dysphagia, anorexia, nausea/vomiting, diarrhea/constipation, hematemesis, BRBPR, melena  Genitourinary: No dysuria, frequency, hematuria  Musculoskeletal: No joint pain, joint swelling, decreased ROM  Skin: No pruritus, rashes, lesions, wounds  Neurologic:  No seizures, headache, paraesthesia, numbness, limb weakness  Psychiatric: No depression, anxiety, difficulty concentrating, anhedonia, lack of energy    Positives and pertinent negatives noted and all other systems negative.

## 2020-12-01 NOTE — ED ADULT NURSE NOTE - OBJECTIVE STATEMENT
Receive pt in spot 17 alert and oriented x 4 c/o fever, weakness, unsteady gait,  and diarrhea.  Denies chest pain, sob, dizziness. cough.  Resp unlabored.  Skin intact.  IV 20 G L FA placed.  Labs sent.  Pt swabbed for covid.  Will continue to monitor

## 2020-12-01 NOTE — ED PROVIDER NOTE - ATTENDING CONTRIBUTION TO CARE
JOE Hu: I have personally performed a face to face bedside history and physical examination of this patient. I have discussed the history, examination, review of systems, assessment and plan of management with the resident. I have reviewed the electronic medical record and amended it to reflect my history, review of systems, physical exam, assessment and plan.

## 2020-12-01 NOTE — H&P ADULT - PROBLEM SELECTOR PLAN 9
1.  Name of PCP:   2.  PCP Contacted on Admission: [ ] Y    [x] N - admitted at night    3.  PCP contacted at Discharge: [ ] Y    [ ] N    [ ] N/A  4.  Post-Discharge Appointment Date and Location:  5.  Summary of Handoff given to PCP: DVT ppx: On Xarelto   PT c/s

## 2020-12-01 NOTE — H&P ADULT - PROBLEM SELECTOR PLAN 2
- Na 130  - trending Na  - repeat AM - likely viral infection   - fall precautions   - ambulate w/ assistance   - PT eval

## 2020-12-01 NOTE — H&P ADULT - ASSESSMENT
79 y/o Male with PMhx of HTN , DM2 (on PO agents) , HLD , CAD w/ 1 stent 2019, AFIB on Xarelto presents to [facility] with weakness Pt is being admitted for Viral Sepsis 2/2 Covid 19

## 2020-12-01 NOTE — H&P ADULT - NSHPPHYSICALEXAM_GEN_ALL_CORE
T(C): 36.7 (12-01-20 @ 21:32), Max: 38.2 (12-01-20 @ 14:07)  HR: 95 (12-01-20 @ 21:32) (75 - 95)  BP: 155/90 (12-01-20 @ 21:32) (139/89 - 155/90)  RR: 18 (12-01-20 @ 21:32) (16 - 20)  SpO2: 99% (12-01-20 @ 21:32) (98% - 100%)    Constitutional: NAD, well-developed, well-nourished  Ears, Nose, Mouth, and Throat: normal external ears and nose, normal hearing, moist oral mucosa  Eyes: normal conjunctiva, EOMI, PERRL  Neck: supple, no JVD  Respiratory: Clear to auscultation bilaterally. No wheezes, rales or rhonchi. Normal respiratory effort. Speaking Full sentences.   Cardiovascular: RRR, no M/R/G, no edema, 2+ Peripheral Pulses  Gastrointestinal: soft, nontender, nondistended, +BS, no hernia  Skin: warm, dry, no rash  Neurologic: sensation grossly intact, CN grossly intact, non-focal exam  Musculoskeletal: no clubbing, no cyanosis, no joint swelling  Psychiatric: AOX3, appropriate mood, affect

## 2020-12-01 NOTE — H&P ADULT - PROBLEM SELECTOR PLAN 4
- Continue ASA, Plavix, statin   - Low fat, low cholesterol diet - Continue Plavix, statin   - Low fat, low cholesterol diet - CHAD2-VASC: 6  - EKG pending, f/u EKG  - continue AC w/ Xarelto   - f/u coags daily   - continue rate control w/ Metoprolol   - no caffeine

## 2020-12-01 NOTE — ED ADULT NURSE NOTE - ED STAT RN HANDOFF DETAILS
Report given to MIKAEL Carter. pt a&ox4 and ambulatory with assistance. Pt respirations even and unlabored. pt denies any pain or discomfort. Pt awre of plan of care and awaiting transport at this time. Vital signs as noted, call bell in reach, comfort measures provided, will continue to monitor till transport.

## 2020-12-01 NOTE — H&P ADULT - NSICDXFAMILYHX_GEN_ALL_CORE_FT
FAMILY HISTORY:  Sibling  Still living? Unknown  Family history of stroke, Age at diagnosis: Age Unknown

## 2020-12-01 NOTE — H&P ADULT - PROBLEM SELECTOR PLAN 6
- Continue statin  - lipid profile   - Low fat/ low cholesterol diet - Continue Home BP meds w/ parameters   - monitor BP   - DASH/TLC diet

## 2020-12-01 NOTE — H&P ADULT - HISTORY OF PRESENT ILLNESS
79 y/o Male with PMhx of HTN , DM2 (on PO agents) , HLD , CAD w/ 1 stent 2019, AFIB on Xarelto presents to [facility] with weakness since Friday. Pt is poor historian, spoke with wife over the phone, who states his daughter is also hospitalized from COVID 19. Pt reports of  decreased PO intake and loss of appetite , pt also admits to dry cough for few days. Pt denies chest pain, SOB, SANCHEZ, PND, orthopnea, palpitations, diaphoresis, lightheadedness, syncope, increased lowr extremity edema, fever chills, malaise, abdominal pain, N/V/C/D BRBPR, melena, urinary symptoms, and wheezing.    In ED , Pt's Tmax 100.8 s/p Tylenol otherwise VSS, Covid PCR positive , SPO2 100% on RA   79 y/o Male with PMhx of HTN , DM2 (on PO agents) , HLD , CAD w/ 1 stent 2019, AFIB on Xarelto presents to [facility] with weakness since Friday. History limited, pt is poor historian, spoke with wife over the phone. States his daughter is also hospitalized from COVID 19. Pt reports decreased PO intake and loss of appetite as well as dry cough for few days. Pt denies chest pain, SOB, SANCHEZ, PND, orthopnea, palpitations, diaphoresis, lightheadedness, syncope, increased lower extremity edema, fever chills, malaise, abdominal pain, N/V/C/D BRBPR, melena, urinary symptoms, and wheezing.    In ED , Pt's Tmax 100.8 s/p Tylenol otherwise VSS, Covid PCR positive , SPO2 100% on RA

## 2020-12-01 NOTE — ED PROVIDER NOTE - OBJECTIVE STATEMENT
80M w/ PMHx of HTN, HLD, afib, cardiac stents on AC p/w weakness.  Denies f/c, sick contacts, urinary sxs, CP, SOB.  States he's been eating less over the last few days.  Spoke w/ family member who stated he's been eating less.  Denies any cough, chest congestion. 80M w/ PMHx of HTN, HLD, afib, cardiac stents on AC p/w weakness.  Denies f/c, sick contacts, urinary sxs, CP, SOB.  States he's been eating less over the last few days.  Spoke w/ family member who stated he's been eating less.  Denies any cough, chest congestion, ha, vision changes, neck pain, abd pain, vomiting, diarrhea, dysuria.

## 2020-12-01 NOTE — ED ADULT NURSE NOTE - NSIMPLEMENTINTERV_GEN_ALL_ED
Implemented All Fall Risk Interventions:  [place] to call system. Call bell, personal items and telephone within reach. Instruct patient to call for assistance. Room bathroom lighting operational. Non-slip footwear when patient is off stretcher. Physically safe environment: no spills, clutter or unnecessary equipment. Stretcher in lowest position, wheels locked, appropriate side rails in place. Provide visual cue, wrist band, yellow gown, etc. Monitor gait and stability. Monitor for mental status changes and reorient to person, place, and time. Review medications for side effects contributing to fall risk. Reinforce activity limits and safety measures with patient and family.

## 2020-12-01 NOTE — ED ADULT TRIAGE NOTE - CHIEF COMPLAINT QUOTE
Pt c/o generalized weakness, fever, and poor appetite for the past 4 days. Pt also endorses 1 episode of diarrhea in the past 4 days, denies n/v. Pt h/o a.fib on xarelto.

## 2020-12-01 NOTE — H&P ADULT - NSHPSOCIALHISTORY_GEN_ALL_CORE
Tobacco Usage:  (x ) never smoked   ( ) former smoker  ( ) current smoker; Packs per day:   Alcohol Usage: (x ) none  ( ) occasional ( ) 2-3 times a week ( ) daily; Last drink:   Recreational drugs (x ) None  Lives with wife and daughter  Denies Recent travel

## 2020-12-01 NOTE — ED PROVIDER NOTE - PHYSICAL EXAMINATION
GENERAL: Patient awake alert NAD.  HEENT: NC/AT.  LUNGS: CTAB, no wheezes or crackles.  CARDIAC: RRR, no m/r/g.  ABDOMEN: Soft, NT, ND, No rebound, guarding.  EXT: No edema. No calf tenderness. CV 2+DP/PT bilaterally.  MSK: No spinal tenderness, no pain with movement, no deformities.  NEURO: A&Ox3. Moving all extremities. Motor strength 5/5 in all four extremities, sensation intact.  SKIN: Warm and dry. No rash.  PSYCH: Normal affect.

## 2020-12-01 NOTE — ED PROVIDER NOTE - NS ED ROS FT
General: weakness; denies fever, chills, weight loss/weight gain.  HENT: denies nasal congestion, sore throat, rhinorrhea, ear pain.  Eyes: denies visual changes, blurred vision, eye discharge, eye redness.  Neck: denies neck pain, neck swelling.  CV: denies chest pain, palpitations.  Resp: denies difficulty breathing, cough.  Abdominal: denies nausea, vomiting, diarrhea, abdominal pain, blood in s.tool, dark stool.  MSK: denies muscle aches, bony pain, leg pain, leg swelling  Neuro: denies headaches, numbness, tingling, dizziness, lightheadedness.  Skin: denies rashes, cuts, bruises.  Hematologic: denies unexplained bruises.

## 2020-12-01 NOTE — H&P ADULT - PROBLEM SELECTOR PLAN 1
- Elevated Lactate , Febrile  likely from Covid 19 infection  - positive Covid 19  - UA and Urine culture  - Satting On % SpO2  - Chest Xray: clear lungs  - Ferratin , LDH , CRP, Procalcitonin Ordered  - PRN O2 NC, may use NRB  - PRN Tylenol for fever > 100.4   - AVOID NSAIDS , ACE or ARBS  - AVOID HFNC/ BiPAP /Nebulizers  - Contact, airborne, droplet isolation precautions  - Monitor respiratory status closely  - Patient is at a low risk of decompensation at this time. - Elevated Lactate , Febrile  likely from Covid 19 infection  - positive Covid 19  - UA and Urine culture  - Satting On % SpO2  - Chest Xray: clear lungs  - Ferratin , LDH , CRP, Procalcitonin Ordered  - PRN O2 NC, may use NRB  - PRN Tylenol for fever > 100.4   - Contact, airborne, droplet isolation precautions  - Monitor respiratory status closely  - Patient is at a low risk of decompensation at this time. - Elevated Lactate , Febrile  likely from Covid 19 infection  - positive Covid 19  - UA and Urine culture  - Satting On % SpO2 , currently no need for starting decadron   - Chest Xray: clear lungs  - Ferratin , LDH , CRP, Procalcitonin Ordered  - PRN O2 NC, may use NRB  - PRN Tylenol for fever > 100.4   - Contact, airborne, droplet isolation precautions  - Monitor respiratory status closely  - Patient is at a low risk of decompensation at this time. - Elevated Lactate , Febrile likely from Covid 19 infection. Monitor off abx  - Satting On RA 98% SpO2 with clear CXR, RR 20 currently no need for starting decadron or remdesivir   - UA negative. F/u blood cultures   - Ferratin , LDH , CRP, Procalcitonin Ordered  - PRN O2 NC, may use NRB if needed  - PRN Tylenol for fever > 100.4   - Contact, airborne, droplet isolation precautions  - Monitor respiratory status closely  - Patient is at a low risk of decompensation at this time.

## 2020-12-01 NOTE — ED PROVIDER NOTE - CLINICAL SUMMARY MEDICAL DECISION MAKING FREE TEXT BOX
80M p/w weakness.  Bacterial vs viral etiology.  Will get basics, blood cultures, vbg w/ lactate, CXR, RVP, u/a w/ cultures, administer fluids, abx.  Will likely admit for weakness 2/2 infection. 80M p/w weakness.  Bacterial vs viral etiology.  Will get basics, blood cultures, vbg w/ lactate, CXR, RVP, u/a w/ cultures, administer fluids, abx.  Will likely admit for weakness 2/2 infection. suspect viral etiology though will cover with abx given unknown source and meets sirs criteria.

## 2020-12-01 NOTE — H&P ADULT - PROBLEM SELECTOR PLAN 3
- CHAD2-VASC: 6  - EKG pending, f/u EKG  - continue AC w/ Xarelto   - f/u coags daily   - continue rate control w/ Metoprolol   - no caffeine  - ECHO AM - Na 130  - trending Na  - Hold pt's home diuretic   - repeat AM - Na 130. Likely 2/2 to hypovolemia and decreased PO intake  - Hold pt's home diuretic for tonight  - trending Na  - repeat AM

## 2020-12-01 NOTE — H&P ADULT - ATTENDING COMMENTS
81 y/o Male with PMhx of HTN , DM2 (on PO agents) , HLD , CAD w/ 1 stent 2019, AFIB on Xarelto, HF? presents to [facility] with weakness likely 2/2 COVID infection. On exam, pt tachypneic to 20s however satting 98% on RA. He had a brief episode during a fever spike where he went into afib and required oxygen however resolved on my examination. As such, will hold off on decadron and remdesivir for now. Close monitoring of respiratory status. Hyponatremia likely 2/2 to decreased PO intake, holding torsemide for now- f/u as outpatient regarding HF history.

## 2020-12-01 NOTE — H&P ADULT - NSHPLABSRESULTS_GEN_ALL_CORE
Labs:                        12.9   5.61  )-----------( 110      ( 01 Dec 2020 15:30 )             41.5         130<L>  |  96<L>  |  17  ----------------------------<  124<H>  5.2   |  22  |  1.26    Ca    8.8      01 Dec 2020 15:30    TPro  8.0  /  Alb  3.6  /  TBili  0.6  /  DBili  x   /  AST  43<H>  /  ALT  16  /  AlkPhos  56      LFTs: Alk Phos: 56 u/L / AST: 43 u/L<H> / ALT: 16 u/L / Albumin 3.6 g/dL / Bilirubin: 0.6 mg/dL  20 @ 22:58    Coagulation Factors:  Prothrombin Time, Plasma: 16.0 SEC <H> [10.6 - 13.6] (20 @ 15:30)  INR: 1.43 <H> [0.88 - 1.16] (20 @ 15:30)  Activated Partial Thromboplastin Time: 33.4 SEC [27.0 - 36.3] (20 @ 15:30)    Blood Gas Venous: pH: 7.37 | HCO3: 22 | pCO2: 42 | pO2: 26 | Lactate: 3.0 20 @ 22:58      Urinalysis Basic: 20 @ 22:58  Color: YELLOW / Appearance: CLEAR / S.020 / pH: 6.5  Gluc: NEGATIVE / Ketone: NEGATIVE / Bili: NEGATIVE / Urobili: NORMAL  Blood: MODERATE / Protein: 600 / Nitrite: NEGATIVE  Leuk Esterase: NEGATIVE / RBC: 6-10 / WBC: 0-2  Sq Epi: OCC / Non Sq Epi: -- / Bacteria: NEGATIVE      Chest Xray: IMPRESSION: Clear lungs.

## 2020-12-02 DIAGNOSIS — E11.69 TYPE 2 DIABETES MELLITUS WITH OTHER SPECIFIED COMPLICATION: ICD-10-CM

## 2020-12-02 DIAGNOSIS — A41.9 SEPSIS, UNSPECIFIED ORGANISM: ICD-10-CM

## 2020-12-02 DIAGNOSIS — R53.1 WEAKNESS: ICD-10-CM

## 2020-12-02 DIAGNOSIS — I48.91 UNSPECIFIED ATRIAL FIBRILLATION: ICD-10-CM

## 2020-12-02 DIAGNOSIS — U07.1 COVID-19: ICD-10-CM

## 2020-12-02 LAB
ALBUMIN SERPL ELPH-MCNC: 3.7 G/DL — SIGNIFICANT CHANGE UP (ref 3.3–5)
ALP SERPL-CCNC: 53 U/L — SIGNIFICANT CHANGE UP (ref 40–120)
ALT FLD-CCNC: 22 U/L — SIGNIFICANT CHANGE UP (ref 4–41)
ANION GAP SERPL CALC-SCNC: 15 MMO/L — HIGH (ref 7–14)
APTT BLD: 41.7 SEC — HIGH (ref 27–36.3)
AST SERPL-CCNC: 48 U/L — HIGH (ref 4–40)
BILIRUB SERPL-MCNC: 0.6 MG/DL — SIGNIFICANT CHANGE UP (ref 0.2–1.2)
BUN SERPL-MCNC: 17 MG/DL — SIGNIFICANT CHANGE UP (ref 7–23)
CALCIUM SERPL-MCNC: 8 MG/DL — LOW (ref 8.4–10.5)
CHLORIDE SERPL-SCNC: 99 MMOL/L — SIGNIFICANT CHANGE UP (ref 98–107)
CK SERPL-CCNC: 224 U/L — HIGH (ref 30–200)
CO2 SERPL-SCNC: 20 MMOL/L — LOW (ref 22–31)
CREAT SERPL-MCNC: 1.2 MG/DL — SIGNIFICANT CHANGE UP (ref 0.5–1.3)
CRP SERPL-MCNC: 65.6 MG/L — HIGH
CULTURE RESULTS: SIGNIFICANT CHANGE UP
D DIMER BLD IA.RAPID-MCNC: 538 NG/ML — SIGNIFICANT CHANGE UP
FERRITIN SERPL-MCNC: 573.5 NG/ML — HIGH (ref 30–400)
FOLATE SERPL-MCNC: 10.6 NG/ML — SIGNIFICANT CHANGE UP (ref 4.7–20)
GLUCOSE SERPL-MCNC: 94 MG/DL — SIGNIFICANT CHANGE UP (ref 70–99)
HCT VFR BLD CALC: 42.8 % — SIGNIFICANT CHANGE UP (ref 39–50)
HGB BLD-MCNC: 13.4 G/DL — SIGNIFICANT CHANGE UP (ref 13–17)
INR BLD: 2.26 — HIGH (ref 0.88–1.16)
IRON SATN MFR SERPL: 16 UG/DL — LOW (ref 45–165)
IRON SATN MFR SERPL: 228 UG/DL — SIGNIFICANT CHANGE UP (ref 155–535)
LACTATE SERPL-SCNC: 2.6 MMOL/L — HIGH (ref 0.5–2)
LDH SERPL L TO P-CCNC: 358 U/L — HIGH (ref 135–225)
MAGNESIUM SERPL-MCNC: 2 MG/DL — SIGNIFICANT CHANGE UP (ref 1.6–2.6)
MCHC RBC-ENTMCNC: 22.3 PG — LOW (ref 27–34)
MCHC RBC-ENTMCNC: 31.3 % — LOW (ref 32–36)
MCV RBC AUTO: 71.2 FL — LOW (ref 80–100)
NRBC # FLD: 0 K/UL — SIGNIFICANT CHANGE UP (ref 0–0)
PHOSPHATE SERPL-MCNC: 3.3 MG/DL — SIGNIFICANT CHANGE UP (ref 2.5–4.5)
PLATELET # BLD AUTO: 107 K/UL — LOW (ref 150–400)
PMV BLD: SIGNIFICANT CHANGE UP FL (ref 7–13)
POTASSIUM SERPL-MCNC: 4.6 MMOL/L — SIGNIFICANT CHANGE UP (ref 3.5–5.3)
POTASSIUM SERPL-SCNC: 4.6 MMOL/L — SIGNIFICANT CHANGE UP (ref 3.5–5.3)
PROCALCITONIN SERPL-MCNC: 0.23 NG/ML — HIGH (ref 0.02–0.1)
PROT SERPL-MCNC: 7.3 G/DL — SIGNIFICANT CHANGE UP (ref 6–8.3)
PROTHROM AB SERPL-ACNC: 24.8 SEC — HIGH (ref 10.6–13.6)
RBC # BLD: 6.01 M/UL — HIGH (ref 4.2–5.8)
RBC # FLD: 18.4 % — HIGH (ref 10.3–14.5)
SARS-COV-2 IGG SERPL QL IA: NEGATIVE — SIGNIFICANT CHANGE UP
SARS-COV-2 IGM SERPL IA-ACNC: <0.1 INDEX — SIGNIFICANT CHANGE UP
SODIUM SERPL-SCNC: 134 MMOL/L — LOW (ref 135–145)
SPECIMEN SOURCE: SIGNIFICANT CHANGE UP
TROPONIN T, HIGH SENSITIVITY: 25 NG/L — SIGNIFICANT CHANGE UP (ref ?–14)
UIBC SERPL-MCNC: 212.1 UG/DL — SIGNIFICANT CHANGE UP (ref 110–370)
VIT B12 SERPL-MCNC: 783 PG/ML — SIGNIFICANT CHANGE UP (ref 200–900)
WBC # BLD: 4.59 K/UL — SIGNIFICANT CHANGE UP (ref 3.8–10.5)
WBC # FLD AUTO: 4.59 K/UL — SIGNIFICANT CHANGE UP (ref 3.8–10.5)

## 2020-12-02 PROCEDURE — 99223 1ST HOSP IP/OBS HIGH 75: CPT | Mod: GC

## 2020-12-02 RX ORDER — METOPROLOL TARTRATE 50 MG
5 TABLET ORAL ONCE
Refills: 0 | Status: COMPLETED | OUTPATIENT
Start: 2020-12-02 | End: 2020-12-02

## 2020-12-02 RX ORDER — SODIUM CHLORIDE 9 MG/ML
1000 INJECTION INTRAMUSCULAR; INTRAVENOUS; SUBCUTANEOUS
Refills: 0 | Status: DISCONTINUED | OUTPATIENT
Start: 2020-12-02 | End: 2020-12-03

## 2020-12-02 RX ORDER — METOPROLOL TARTRATE 50 MG
75 TABLET ORAL
Refills: 0 | Status: DISCONTINUED | OUTPATIENT
Start: 2020-12-02 | End: 2020-12-03

## 2020-12-02 RX ORDER — ACETAMINOPHEN 500 MG
1000 TABLET ORAL ONCE
Refills: 0 | Status: COMPLETED | OUTPATIENT
Start: 2020-12-02 | End: 2020-12-02

## 2020-12-02 RX ADMIN — ATORVASTATIN CALCIUM 40 MILLIGRAM(S): 80 TABLET, FILM COATED ORAL at 22:08

## 2020-12-02 RX ADMIN — Medication 650 MILLIGRAM(S): at 17:49

## 2020-12-02 RX ADMIN — ISOSORBIDE MONONITRATE 30 MILLIGRAM(S): 60 TABLET, EXTENDED RELEASE ORAL at 11:53

## 2020-12-02 RX ADMIN — Medication 400 MILLIGRAM(S): at 03:13

## 2020-12-02 RX ADMIN — Medication 100 MILLIGRAM(S): at 11:53

## 2020-12-02 RX ADMIN — RIVAROXABAN 20 MILLIGRAM(S): KIT at 01:33

## 2020-12-02 RX ADMIN — Medication 5 MILLIGRAM(S): at 03:13

## 2020-12-02 RX ADMIN — Medication 1000 MILLIGRAM(S): at 03:57

## 2020-12-02 RX ADMIN — Medication 650 MILLIGRAM(S): at 09:41

## 2020-12-02 RX ADMIN — Medication 25 MILLIGRAM(S): at 06:29

## 2020-12-02 RX ADMIN — Medication 650 MILLIGRAM(S): at 10:20

## 2020-12-02 RX ADMIN — LISINOPRIL 5 MILLIGRAM(S): 2.5 TABLET ORAL at 17:41

## 2020-12-02 RX ADMIN — LISINOPRIL 5 MILLIGRAM(S): 2.5 TABLET ORAL at 06:29

## 2020-12-02 RX ADMIN — Medication 5 MILLIGRAM(S): at 01:22

## 2020-12-02 RX ADMIN — Medication 650 MILLIGRAM(S): at 15:39

## 2020-12-02 RX ADMIN — Medication 5 MILLIGRAM(S): at 15:39

## 2020-12-02 RX ADMIN — Medication 75 MILLIGRAM(S): at 17:41

## 2020-12-02 RX ADMIN — SODIUM CHLORIDE 75 MILLILITER(S): 9 INJECTION INTRAMUSCULAR; INTRAVENOUS; SUBCUTANEOUS at 13:25

## 2020-12-02 RX ADMIN — CLOPIDOGREL BISULFATE 75 MILLIGRAM(S): 75 TABLET, FILM COATED ORAL at 11:54

## 2020-12-02 RX ADMIN — Medication 25 MILLIGRAM(S): at 22:08

## 2020-12-02 RX ADMIN — RIVAROXABAN 20 MILLIGRAM(S): KIT at 17:41

## 2020-12-02 RX ADMIN — Medication 25 MILLIGRAM(S): at 13:25

## 2020-12-02 RX ADMIN — Medication 50 MILLIGRAM(S): at 06:29

## 2020-12-02 NOTE — PROVIDER CONTACT NOTE (OTHER) - BACKGROUND
received pt alert and oriented x4, generalized weakness ambulating with 1 assists, Fs checked, no acute pain or distress noted, pt RA well above 95%, VSS.

## 2020-12-02 NOTE — PHYSICAL THERAPY INITIAL EVALUATION ADULT - PERTINENT HX OF CURRENT PROBLEM, REHAB EVAL
patient presents with fever, weakness. (+) COVID 19. PMH includes  DM type 2, CAD/stent, Afib on Xarelto

## 2020-12-02 NOTE — PROGRESS NOTE ADULT - PROBLEM SELECTOR PLAN 2
Will check O2 sats at rest and ambulating.    Will defer decadron and remdesivir until hypoxia present.

## 2020-12-02 NOTE — PROGRESS NOTE ADULT - ASSESSMENT
80M HTN, DM type 2, CAD/stent, Afib on Xarelto, daughter with COVID p/w clinically diagnosed COVID PNA c/b RVR.

## 2020-12-02 NOTE — PROGRESS NOTE ADULT - SUBJECTIVE AND OBJECTIVE BOX
[facility] Division of Hospital Medicine  Luis Paetl MD  Pager (M-F, 8A-5P): [phone]  Other Times:  [phone]    Patient is a 80y old  Male who presents with a chief complaint of Weakness (01 Dec 2020 22:43)    SUBJECTIVE / OVERNIGHT EVENTS:  Patient states that his symptoms are slightly better.  Continues to have fevers intermittently - associated with RVR.   No N/V, CP,  lightheadedness, dizziness, abdominal pain, diarrhea, dysuria.    MEDICATIONS  (STANDING):  allopurinol 100 milliGRAM(s) Oral daily  atorvastatin 40 milliGRAM(s) Oral at bedtime  clopidogrel Tablet 75 milliGRAM(s) Oral daily  dextrose 40% Gel 15 Gram(s) Oral once  dextrose 5%. 1000 milliLiter(s) (50 mL/Hr) IV Continuous <Continuous>  dextrose 5%. 1000 milliLiter(s) (100 mL/Hr) IV Continuous <Continuous>  dextrose 50% Injectable 25 Gram(s) IV Push once  dextrose 50% Injectable 12.5 Gram(s) IV Push once  dextrose 50% Injectable 25 Gram(s) IV Push once  glucagon  Injectable 1 milliGRAM(s) IntraMuscular once  hydrALAZINE 25 milliGRAM(s) Oral three times a day  insulin lispro (ADMELOG) corrective regimen sliding scale   SubCutaneous three times a day before meals  insulin lispro (ADMELOG) corrective regimen sliding scale   SubCutaneous at bedtime  isosorbide   mononitrate ER Tablet (IMDUR) 30 milliGRAM(s) Oral daily  lisinopril 5 milliGRAM(s) Oral every 12 hours  metoprolol tartrate 50 milliGRAM(s) Oral two times a day  metoprolol tartrate Injectable 5 milliGRAM(s) IV Push once  rivaroxaban 20 milliGRAM(s) Oral with dinner    MEDICATIONS  (PRN):  acetaminophen   Tablet .. 650 milliGRAM(s) Oral every 6 hours PRN Temp greater or equal to 38.5C (101.3F)      Vital Signs Last 24 Hrs  T(C): 37.3 (02 Dec 2020 11:51), Max: 39.1 (02 Dec 2020 02:55)  T(F): 99.1 (02 Dec 2020 11:51), Max: 102.3 (02 Dec 2020 02:55)  HR: 95 (02 Dec 2020 11:51) (75 - 133)  BP: 142/83 (02 Dec 2020 11:51) (130/75 - 170/95)  BP(mean): --  RR: 19 (02 Dec 2020 11:51) (16 - 20)  SpO2: 98% (02 Dec 2020 11:51) (93% - 100%)  CAPILLARY BLOOD GLUCOSE      POCT Blood Glucose.: 97 mg/dL (02 Dec 2020 11:33)  POCT Blood Glucose.: 94 mg/dL (02 Dec 2020 07:12)  POCT Blood Glucose.: 108 mg/dL (01 Dec 2020 23:23)    I&O's Summary      PHYSICAL EXAM:  GENERAL: NAD  HEAD:  Atraumatic, Normocephalic  EYES: EOMI, PERRLA, conjunctiva and sclera clear  NECK: Supple, No JVD  CHEST/LUNG: No respiratory distress  ABDOMEN: Nondistended; Bowel sounds present  EXTREMITIES:  No clubbing, cyanosis, or edema  PSYCH: Calm  NEUROLOGY: A/Ox3, non-focal  SKIN: No rashes or lesions    LABS:                        13.4   4.59  )-----------( 107      ( 02 Dec 2020 06:43 )             42.8     12-02    134<L>  |  99  |  17  ----------------------------<  94  4.6   |  20<L>  |  1.20    Ca    8.0<L>      02 Dec 2020 06:43  Phos  3.3     12-02  Mg     2.0     12-02    TPro  7.3  /  Alb  3.7  /  TBili  0.6  /  DBili  x   /  AST  48<H>  /  ALT  22  /  AlkPhos  53  12-02    PT/INR - ( 02 Dec 2020 06:43 )   PT: 24.8 SEC;   INR: 2.26          PTT - ( 02 Dec 2020 06:43 )  PTT:41.7 SEC  CARDIAC MARKERS ( 02 Dec 2020 06:43 )  x     / x     / 224 u/L / x     / x          Urinalysis Basic - ( 01 Dec 2020 18:15 )    Color: YELLOW / Appearance: CLEAR / S.020 / pH: 6.5  Gluc: NEGATIVE / Ketone: NEGATIVE  / Bili: NEGATIVE / Urobili: NORMAL   Blood: MODERATE / Protein: 600 / Nitrite: NEGATIVE   Leuk Esterase: NEGATIVE / RBC: 6-10 / WBC 0-2   Sq Epi: OCC / Non Sq Epi: x / Bacteria: NEGATIVE        Procalcitonin, Serum: 0.23 ng/mL (20 @ 06:43)    C-Reactive Protein, Serum: 65.6 mg/L (20 @ 06:43)      D-Dimer Assay, Quantitative: 538 ng/mL (20 @ 06:43)    Ferritin, Serum: 573.5 ng/mL (20 @ 06:43)        RADIOLOGY & ADDITIONAL TESTS:    Imaging Personally Reviewed:    Care Discussed with Consultants/Other Providers:

## 2020-12-03 LAB
ALBUMIN SERPL ELPH-MCNC: 3.1 G/DL — LOW (ref 3.3–5)
ALP SERPL-CCNC: 45 U/L — SIGNIFICANT CHANGE UP (ref 40–120)
ALT FLD-CCNC: 20 U/L — SIGNIFICANT CHANGE UP (ref 4–41)
ANION GAP SERPL CALC-SCNC: 11 MMO/L — SIGNIFICANT CHANGE UP (ref 7–14)
AST SERPL-CCNC: 49 U/L — HIGH (ref 4–40)
BILIRUB SERPL-MCNC: 0.6 MG/DL — SIGNIFICANT CHANGE UP (ref 0.2–1.2)
BUN SERPL-MCNC: 15 MG/DL — SIGNIFICANT CHANGE UP (ref 7–23)
CALCIUM SERPL-MCNC: 8 MG/DL — LOW (ref 8.4–10.5)
CHLORIDE SERPL-SCNC: 99 MMOL/L — SIGNIFICANT CHANGE UP (ref 98–107)
CO2 SERPL-SCNC: 20 MMOL/L — LOW (ref 22–31)
CREAT SERPL-MCNC: 1.06 MG/DL — SIGNIFICANT CHANGE UP (ref 0.5–1.3)
GIANT PLATELETS BLD QL SMEAR: PRESENT — SIGNIFICANT CHANGE UP
GLUCOSE SERPL-MCNC: 90 MG/DL — SIGNIFICANT CHANGE UP (ref 70–99)
HCT VFR BLD CALC: 39.1 % — SIGNIFICANT CHANGE UP (ref 39–50)
HGB BLD-MCNC: 12.6 G/DL — LOW (ref 13–17)
MANUAL SMEAR VERIFICATION: SIGNIFICANT CHANGE UP
MCHC RBC-ENTMCNC: 22.2 PG — LOW (ref 27–34)
MCHC RBC-ENTMCNC: 32.2 % — SIGNIFICANT CHANGE UP (ref 32–36)
MCV RBC AUTO: 69 FL — LOW (ref 80–100)
NRBC # FLD: 0 K/UL — SIGNIFICANT CHANGE UP (ref 0–0)
PLATELET # BLD AUTO: 96 K/UL — LOW (ref 150–400)
PLATELET COUNT - ESTIMATE: SIGNIFICANT CHANGE UP
PMV BLD: SIGNIFICANT CHANGE UP FL (ref 7–13)
POTASSIUM SERPL-MCNC: 3.9 MMOL/L — SIGNIFICANT CHANGE UP (ref 3.5–5.3)
POTASSIUM SERPL-SCNC: 3.9 MMOL/L — SIGNIFICANT CHANGE UP (ref 3.5–5.3)
PROT SERPL-MCNC: 6.9 G/DL — SIGNIFICANT CHANGE UP (ref 6–8.3)
RBC # BLD: 5.67 M/UL — SIGNIFICANT CHANGE UP (ref 4.2–5.8)
RBC # FLD: 16.9 % — HIGH (ref 10.3–14.5)
SODIUM SERPL-SCNC: 130 MMOL/L — LOW (ref 135–145)
WBC # BLD: 4.19 K/UL — SIGNIFICANT CHANGE UP (ref 3.8–10.5)
WBC # FLD AUTO: 4.19 K/UL — SIGNIFICANT CHANGE UP (ref 3.8–10.5)

## 2020-12-03 PROCEDURE — 99233 SBSQ HOSP IP/OBS HIGH 50: CPT

## 2020-12-03 RX ORDER — METOPROLOL TARTRATE 50 MG
100 TABLET ORAL
Refills: 0 | Status: DISCONTINUED | OUTPATIENT
Start: 2020-12-03 | End: 2020-12-07

## 2020-12-03 RX ORDER — LISINOPRIL 2.5 MG/1
10 TABLET ORAL DAILY
Refills: 0 | Status: DISCONTINUED | OUTPATIENT
Start: 2020-12-03 | End: 2020-12-04

## 2020-12-03 RX ORDER — DEXAMETHASONE 0.5 MG/5ML
6 ELIXIR ORAL DAILY
Refills: 0 | Status: DISCONTINUED | OUTPATIENT
Start: 2020-12-03 | End: 2020-12-07

## 2020-12-03 RX ORDER — DEXAMETHASONE 0.5 MG/5ML
6 ELIXIR ORAL DAILY
Refills: 0 | Status: DISCONTINUED | OUTPATIENT
Start: 2020-12-03 | End: 2020-12-03

## 2020-12-03 RX ADMIN — Medication 650 MILLIGRAM(S): at 19:23

## 2020-12-03 RX ADMIN — Medication 100 MILLIGRAM(S): at 12:04

## 2020-12-03 RX ADMIN — Medication 6 MILLIGRAM(S): at 19:23

## 2020-12-03 RX ADMIN — SODIUM CHLORIDE 75 MILLILITER(S): 9 INJECTION INTRAMUSCULAR; INTRAVENOUS; SUBCUTANEOUS at 05:35

## 2020-12-03 RX ADMIN — RIVAROXABAN 20 MILLIGRAM(S): KIT at 19:23

## 2020-12-03 RX ADMIN — CLOPIDOGREL BISULFATE 75 MILLIGRAM(S): 75 TABLET, FILM COATED ORAL at 12:04

## 2020-12-03 RX ADMIN — Medication 75 MILLIGRAM(S): at 05:35

## 2020-12-03 RX ADMIN — ISOSORBIDE MONONITRATE 30 MILLIGRAM(S): 60 TABLET, EXTENDED RELEASE ORAL at 12:04

## 2020-12-03 RX ADMIN — Medication 25 MILLIGRAM(S): at 15:02

## 2020-12-03 RX ADMIN — Medication 25 MILLIGRAM(S): at 05:35

## 2020-12-03 RX ADMIN — Medication 650 MILLIGRAM(S): at 19:24

## 2020-12-03 RX ADMIN — LISINOPRIL 5 MILLIGRAM(S): 2.5 TABLET ORAL at 05:35

## 2020-12-03 RX ADMIN — LISINOPRIL 10 MILLIGRAM(S): 2.5 TABLET ORAL at 12:04

## 2020-12-03 RX ADMIN — Medication 100 MILLIGRAM(S): at 19:24

## 2020-12-03 RX ADMIN — ATORVASTATIN CALCIUM 40 MILLIGRAM(S): 80 TABLET, FILM COATED ORAL at 22:05

## 2020-12-03 RX ADMIN — Medication 25 MILLIGRAM(S): at 22:05

## 2020-12-03 NOTE — PROGRESS NOTE ADULT - SUBJECTIVE AND OBJECTIVE BOX
[facility] Division of Hospital Medicine  Luis Patel MD  Pager (M-F, 8A-5P): [phone]  Other Times:  [phone]    Patient is a 80y old  Male who presents with a chief complaint of Weakness (02 Dec 2020 12:57)    SUBJECTIVE / OVERNIGHT EVENTS:  Patient appears more weaker than yesterday.  Occasionally confused during the interview.  Still states that he's thirsty.  No N/V, CP,  lightheadedness, dizziness, abdominal pain, diarrhea, dysuria.    MEDICATIONS  (STANDING):  allopurinol 100 milliGRAM(s) Oral daily  atorvastatin 40 milliGRAM(s) Oral at bedtime  clopidogrel Tablet 75 milliGRAM(s) Oral daily  dextrose 40% Gel 15 Gram(s) Oral once  dextrose 5%. 1000 milliLiter(s) (50 mL/Hr) IV Continuous <Continuous>  dextrose 5%. 1000 milliLiter(s) (100 mL/Hr) IV Continuous <Continuous>  dextrose 50% Injectable 25 Gram(s) IV Push once  dextrose 50% Injectable 12.5 Gram(s) IV Push once  dextrose 50% Injectable 25 Gram(s) IV Push once  glucagon  Injectable 1 milliGRAM(s) IntraMuscular once  hydrALAZINE 25 milliGRAM(s) Oral three times a day  insulin lispro (ADMELOG) corrective regimen sliding scale   SubCutaneous three times a day before meals  insulin lispro (ADMELOG) corrective regimen sliding scale   SubCutaneous at bedtime  isosorbide   mononitrate ER Tablet (IMDUR) 30 milliGRAM(s) Oral daily  lisinopril 10 milliGRAM(s) Oral daily  metoprolol tartrate 100 milliGRAM(s) Oral two times a day  rivaroxaban 20 milliGRAM(s) Oral with dinner    MEDICATIONS  (PRN):  acetaminophen   Tablet .. 650 milliGRAM(s) Oral every 6 hours PRN Temp greater or equal to 38.5C (101.3F)      Vital Signs Last 24 Hrs  T(C): 37.2 (03 Dec 2020 05:32), Max: 38.5 (02 Dec 2020 15:37)  T(F): 99 (03 Dec 2020 05:32), Max: 101.3 (02 Dec 2020 15:37)  HR: 100 (03 Dec 2020 12:00) (99 - 120)  BP: 157/88 (03 Dec 2020 12:00) (131/77 - 165/88)  BP(mean): --  RR: 18 (03 Dec 2020 05:32) (18 - 18)  SpO2: 96% (03 Dec 2020 05:32) (93% - 98%)  CAPILLARY BLOOD GLUCOSE      POCT Blood Glucose.: 90 mg/dL (03 Dec 2020 11:36)  POCT Blood Glucose.: 89 mg/dL (03 Dec 2020 07:24)  POCT Blood Glucose.: 93 mg/dL (02 Dec 2020 21:00)  POCT Blood Glucose.: 85 mg/dL (02 Dec 2020 16:55)    I&O's Summary      PHYSICAL EXAM:  GENERAL: NAD  HEAD:  Atraumatic, Normocephalic  EYES: EOMI, PERRLA, conjunctiva and sclera clear  NECK: Supple, No JVD  CHEST/LUNG: No respiratory distress  ABDOMEN: Nondistended; Bowel sounds present  EXTREMITIES:  No clubbing, cyanosis, or edema  PSYCH: Calm  NEUROLOGY: A/Ox2 - person place.  SKIN: No rashes or lesions    LABS:                        12.6   4.19  )-----------( 96       ( 03 Dec 2020 05:28 )             39.1     12-03    130<L>  |  99  |  15  ----------------------------<  90  3.9   |  20<L>  |  1.06    Ca    8.0<L>      03 Dec 2020 05:26  Phos  3.3     12-02  Mg     2.0     12-02    TPro  6.9  /  Alb  3.1<L>  /  TBili  0.6  /  DBili  x   /  AST  49<H>  /  ALT  20  /  AlkPhos  45  12-03    PT/INR - ( 02 Dec 2020 06:43 )   PT: 24.8 SEC;   INR: 2.26          PTT - ( 02 Dec 2020 06:43 )  PTT:41.7 SEC  CARDIAC MARKERS ( 02 Dec 2020 06:43 )  x     / x     / 224 u/L / x     / x          Urinalysis Basic - ( 01 Dec 2020 18:15 )    Color: YELLOW / Appearance: CLEAR / S.020 / pH: 6.5  Gluc: NEGATIVE / Ketone: NEGATIVE  / Bili: NEGATIVE / Urobili: NORMAL   Blood: MODERATE / Protein: 600 / Nitrite: NEGATIVE   Leuk Esterase: NEGATIVE / RBC: 6-10 / WBC 0-2   Sq Epi: OCC / Non Sq Epi: x / Bacteria: NEGATIVE        Procalcitonin, Serum: 0.23 ng/mL (20 @ 06:43)    C-Reactive Protein, Serum: 65.6 mg/L (20 @ 06:43)      D-Dimer Assay, Quantitative: 538 ng/mL (20 @ 06:43)    Ferritin, Serum: 573.5 ng/mL (20 @ 06:43)        RADIOLOGY & ADDITIONAL TESTS:    Imaging Personally Reviewed:    Care Discussed with Consultants/Other Providers:

## 2020-12-03 NOTE — PROGRESS NOTE ADULT - PROBLEM SELECTOR PLAN 1
due to clinical PNA from COVID  tachycardia, fever with lactic acidosis.  IVF hydration given  On IV Abx

## 2020-12-04 ENCOUNTER — TRANSCRIPTION ENCOUNTER (OUTPATIENT)
Age: 80
End: 2020-12-04

## 2020-12-04 LAB
ALBUMIN SERPL ELPH-MCNC: 2.8 G/DL — LOW (ref 3.3–5)
ALP SERPL-CCNC: 48 U/L — SIGNIFICANT CHANGE UP (ref 40–120)
ALT FLD-CCNC: 29 U/L — SIGNIFICANT CHANGE UP (ref 4–41)
ANION GAP SERPL CALC-SCNC: 12 MMO/L — SIGNIFICANT CHANGE UP (ref 7–14)
ANISOCYTOSIS BLD QL: SIGNIFICANT CHANGE UP
AST SERPL-CCNC: 74 U/L — HIGH (ref 4–40)
BASOPHILS NFR SPEC: 0.9 % — SIGNIFICANT CHANGE UP (ref 0–2)
BILIRUB SERPL-MCNC: 0.5 MG/DL — SIGNIFICANT CHANGE UP (ref 0.2–1.2)
BUN SERPL-MCNC: 27 MG/DL — HIGH (ref 7–23)
BURR CELLS BLD QL SMEAR: PRESENT — SIGNIFICANT CHANGE UP
CALCIUM SERPL-MCNC: 8.2 MG/DL — LOW (ref 8.4–10.5)
CHLORIDE SERPL-SCNC: 98 MMOL/L — SIGNIFICANT CHANGE UP (ref 98–107)
CO2 SERPL-SCNC: 20 MMOL/L — LOW (ref 22–31)
CREAT SERPL-MCNC: 1.43 MG/DL — HIGH (ref 0.5–1.3)
ELLIPTOCYTES BLD QL SMEAR: SIGNIFICANT CHANGE UP
EOSINOPHIL NFR FLD: 0 % — SIGNIFICANT CHANGE UP (ref 0–6)
GIANT PLATELETS BLD QL SMEAR: PRESENT — SIGNIFICANT CHANGE UP
GLUCOSE SERPL-MCNC: 153 MG/DL — HIGH (ref 70–99)
HCT VFR BLD CALC: 41.5 % — SIGNIFICANT CHANGE UP (ref 39–50)
HGB BLD-MCNC: 13.2 G/DL — SIGNIFICANT CHANGE UP (ref 13–17)
LYMPHOCYTES NFR SPEC AUTO: 11.6 % — LOW (ref 13–44)
MACROCYTES BLD QL: SIGNIFICANT CHANGE UP
MCHC RBC-ENTMCNC: 22.1 PG — LOW (ref 27–34)
MCHC RBC-ENTMCNC: 31.8 % — LOW (ref 32–36)
MCV RBC AUTO: 69.5 FL — LOW (ref 80–100)
MONOCYTES NFR BLD: 1.8 % — LOW (ref 2–9)
MYELOCYTES NFR BLD: 0.9 % — HIGH (ref 0–0)
NEUTROPHIL AB SER-ACNC: 52.7 % — SIGNIFICANT CHANGE UP (ref 43–77)
NEUTS BAND # BLD: 27.7 % — HIGH (ref 0–6)
NRBC # BLD: 2 /100WBC — SIGNIFICANT CHANGE UP
NRBC # FLD: 0 K/UL — SIGNIFICANT CHANGE UP (ref 0–0)
PLATELET # BLD AUTO: 102 K/UL — LOW (ref 150–400)
PLATELET COUNT - ESTIMATE: SIGNIFICANT CHANGE UP
PMV BLD: SIGNIFICANT CHANGE UP FL (ref 7–13)
POIKILOCYTOSIS BLD QL AUTO: SIGNIFICANT CHANGE UP
POLYCHROMASIA BLD QL SMEAR: SLIGHT — SIGNIFICANT CHANGE UP
POTASSIUM SERPL-MCNC: 4.3 MMOL/L — SIGNIFICANT CHANGE UP (ref 3.5–5.3)
POTASSIUM SERPL-SCNC: 4.3 MMOL/L — SIGNIFICANT CHANGE UP (ref 3.5–5.3)
PROT SERPL-MCNC: 6.7 G/DL — SIGNIFICANT CHANGE UP (ref 6–8.3)
RBC # BLD: 5.97 M/UL — HIGH (ref 4.2–5.8)
RBC # FLD: 18.3 % — HIGH (ref 10.3–14.5)
SMUDGE CELLS # BLD: PRESENT — SIGNIFICANT CHANGE UP
SODIUM SERPL-SCNC: 130 MMOL/L — LOW (ref 135–145)
TARGETS BLD QL SMEAR: SLIGHT — SIGNIFICANT CHANGE UP
VARIANT LYMPHS # BLD: 4.4 % — SIGNIFICANT CHANGE UP
WBC # BLD: 3.72 K/UL — LOW (ref 3.8–10.5)
WBC # FLD AUTO: 3.72 K/UL — LOW (ref 3.8–10.5)

## 2020-12-04 PROCEDURE — 99233 SBSQ HOSP IP/OBS HIGH 50: CPT

## 2020-12-04 RX ORDER — SODIUM CHLORIDE 9 MG/ML
1000 INJECTION INTRAMUSCULAR; INTRAVENOUS; SUBCUTANEOUS
Refills: 0 | Status: DISCONTINUED | OUTPATIENT
Start: 2020-12-04 | End: 2020-12-06

## 2020-12-04 RX ADMIN — Medication 6 MILLIGRAM(S): at 06:31

## 2020-12-04 RX ADMIN — Medication 1: at 09:32

## 2020-12-04 RX ADMIN — RIVAROXABAN 20 MILLIGRAM(S): KIT at 18:46

## 2020-12-04 RX ADMIN — Medication 100 MILLIGRAM(S): at 18:46

## 2020-12-04 RX ADMIN — Medication 100 MILLIGRAM(S): at 11:53

## 2020-12-04 RX ADMIN — LISINOPRIL 10 MILLIGRAM(S): 2.5 TABLET ORAL at 06:31

## 2020-12-04 RX ADMIN — ISOSORBIDE MONONITRATE 30 MILLIGRAM(S): 60 TABLET, EXTENDED RELEASE ORAL at 11:53

## 2020-12-04 RX ADMIN — CLOPIDOGREL BISULFATE 75 MILLIGRAM(S): 75 TABLET, FILM COATED ORAL at 11:53

## 2020-12-04 RX ADMIN — Medication 25 MILLIGRAM(S): at 22:36

## 2020-12-04 RX ADMIN — Medication 25 MILLIGRAM(S): at 05:36

## 2020-12-04 RX ADMIN — SODIUM CHLORIDE 75 MILLILITER(S): 9 INJECTION INTRAMUSCULAR; INTRAVENOUS; SUBCUTANEOUS at 10:00

## 2020-12-04 RX ADMIN — Medication 100 MILLIGRAM(S): at 05:35

## 2020-12-04 RX ADMIN — Medication 25 MILLIGRAM(S): at 13:36

## 2020-12-04 RX ADMIN — Medication 1: at 12:23

## 2020-12-04 RX ADMIN — ATORVASTATIN CALCIUM 40 MILLIGRAM(S): 80 TABLET, FILM COATED ORAL at 22:36

## 2020-12-04 NOTE — PROGRESS NOTE ADULT - ASSESSMENT
80M HTN, DM type 2, CAD/stent, Afib on Xarelto, daughter with COVID p/w clinically diagnosed COVID PNA c/b RVR, CJ

## 2020-12-04 NOTE — DISCHARGE NOTE PROVIDER - NSDCMRMEDTOKEN_GEN_ALL_CORE_FT
allopurinol 100 mg oral tablet: 1 tab(s) orally once a day  atorvastatin 40 mg oral tablet: 1 tab(s) orally once a day (at bedtime)  clopidogrel 75 mg oral tablet: 1 tab(s) orally once a day  fluticasone 50 mcg/inh nasal spray: 1 spray(s) in each nostril 2 times a day  hydrALAZINE 25 mg oral tablet: 1 tab(s) orally 3 times a day  isosorbide mononitrate 30 mg oral tablet, extended release: 1 tab(s) orally once a day (in the morning)  lisinopril 5 mg oral tablet: 1 tab(s) orally 2 times a day  metFORMIN 500 mg oral tablet: 1 tab(s) orally 2 times a day (with meals)  Metoprolol Tartrate 50 mg oral tablet: 1 tab(s) orally 2 times a day (with meals)  torsemide 10 mg oral tablet: 1 tab(s) orally once a day  Xarelto 20 mg oral tablet: 1 tab(s) orally once a day (in the evening with food)  Per pharmacy, 90-day supply last dispensed 8/18/2020   allopurinol 100 mg oral tablet: 1 tab(s) orally once a day  atorvastatin 40 mg oral tablet: 1 tab(s) orally once a day (at bedtime)  clopidogrel 75 mg oral tablet: 1 tab(s) orally once a day  fluticasone 50 mcg/inh nasal spray: 1 spray(s) in each nostril 2 times a day  hydrALAZINE 25 mg oral tablet: 1 tab(s) orally 3 times a day  isosorbide mononitrate 30 mg oral tablet, extended release: 1 tab(s) orally once a day (in the morning)  metFORMIN 500 mg oral tablet: 1 tab(s) orally 2 times a day (with meals)  metoprolol tartrate 100 mg oral tablet: 1 tab(s) orally 2 times a day  torsemide 10 mg oral tablet: 1 tab(s) orally once a day  Xarelto 20 mg oral tablet: 1 tab(s) orally once a day (in the evening with food)     allopurinol 100 mg oral tablet: 1 tab(s) orally once a day  atorvastatin 40 mg oral tablet: 1 tab(s) orally once a day (at bedtime)  clopidogrel 75 mg oral tablet: 1 tab(s) orally once a day  fluticasone 50 mcg/inh nasal spray: 1 spray(s) in each nostril 2 times a day  hydrALAZINE 25 mg oral tablet: 1 tab(s) orally 3 times a day  isosorbide mononitrate 30 mg oral tablet, extended release: 1 tab(s) orally once a day (in the morning)  metFORMIN 500 mg oral tablet: 1 tab(s) orally 2 times a day (with meals)  metoprolol tartrate 100 mg oral tablet: 1 tab(s) orally 2 times a day  Xarelto 20 mg oral tablet: 1 tab(s) orally once a day (in the evening with food)

## 2020-12-04 NOTE — DISCHARGE NOTE PROVIDER - HOSPITAL COURSE
79 y/o Male with PMhx of HTN , DM2 (on PO agents) , HLD , CAD w/ 1 stent 2019, AFIB on Xarelto presents to [facility] with weakness since Friday. History limited, pt is poor historian, spoke with wife over the phone. States his daughter is also hospitalized from COVID 19. Pt reports decreased PO intake and loss of appetite as well as dry cough for few days. Pt denies chest pain, SOB, SANCHEZ, PND, orthopnea, palpitations, diaphoresis, lightheadedness, syncope, increased lower extremity edema, fever chills, malaise, abdominal pain, N/V/C/D BRBPR, melena, urinary symptoms, and wheezing. In ED , Pt's Tmax 100.8 s/p Tylenol otherwise VSS, Covid PCR positive , SPO2 100% on RA.    Patient was admitted for viral sepsis and pneumonia from COVID-19. Patient was placed on supplemental oxygen. Chest x ray showed clear lungs. Patient was started on decadron. Patient was placed on IV fluids and IV antibiotics. Patient was found with hyponatremia and CJ. Lisinopril was discontinued and diuretic was held. Creatinine ________. Patient has history of Afib. Patient was found with Afib w/ RVR. Lopressor was increased and heart rate improved.     On ___ this case was reviewed with  ____, the patient is medically stable and optimized for discharge. All medications were reviewed and prescriptions were sent to mutually agreed upon pharmacy.                       81 y/o Male with PMhx of HTN , DM2 (on PO agents) , HLD , CAD w/ 1 stent 2019, AFIB on Xarelto presents to [facility] with weakness since Friday. History limited, pt is poor historian, spoke with wife over the phone. States his daughter is also hospitalized from COVID 19. Pt reports decreased PO intake and loss of appetite as well as dry cough for few days. Pt denies chest pain, SOB, SANCHEZ, PND, orthopnea, palpitations, diaphoresis, lightheadedness, syncope, increased lower extremity edema, fever chills, malaise, abdominal pain, N/V/C/D BRBPR, melena, urinary symptoms, and wheezing. In ED , Pt's Tmax 100.8 s/p Tylenol otherwise VSS, Covid PCR positive , SPO2 100% on RA.    Patient was admitted for viral sepsis and pneumonia from COVID-19. Patient was placed on supplemental oxygen. Chest x ray showed clear lungs. Patient was started on decadron. Patient was placed on IV fluids and IV antibiotics. Patient was found with hyponatremia and CJ. Lisinopril was discontinued and diuretic was held. Creatinine improved through out admission.  Patient has history of Afib. Patient was found with Afib w/ RVR. Lopressor was increased and heart rate improved.     On 12/7/2020 this case was reviewed with Dr. Mensah, the patient is medically stable and optimized for discharge. All medications were reviewed and prescriptions were sent to mutually agreed upon pharmacy.

## 2020-12-04 NOTE — DISCHARGE NOTE PROVIDER - NSDCCPCAREPLAN_GEN_ALL_CORE_FT
PRINCIPAL DISCHARGE DIAGNOSIS  Diagnosis: 2019 novel coronavirus disease (COVID-19)  Assessment and Plan of Treatment: You have been diagnosed with the COVID-19 virus during your hospital stay. You must self quarantine to complete a 14 day time period.  Monitor for fevers, shortness of breath and cough primarily.  Monitor your temperature daily to not any changes and increases.    It has been determined that you no longer need hospitalization and can recover while remaining in self-quarantine at home. You should follow the prevention steps below until a healthcare provider or local or state health department says you can return to your normal activities.  1. You should restrict activities outside your home, except for getting medical care.  2. Do not go to work, school, or public areas.  3. Avoid using public transportation, ride-sharing, or taxis.  4. Separate yourself from other people and animals in your home.  5. Call ahead before visiting your doctor.  6. Wear a facemask.  7. Cover your coughs and sneezes.  8. Clean your hands often.  9. Avoid sharing personal household items.  10. Clean all “high-touch” surfaces everyday.  11. Monitor your symptoms.  If you have a medical emergency and need to call 911, notify the dispatch personnel that you have COVID-19 If possible, put on a facemask before emergency medical services arrive.  12. Stopping home isolation.  Patients with confirmed COVID-19 should remain under home isolation precautions for 14 days since the positive COVID-19 test and until the risk of secondary transmission to others is thought to be low. The decision to discontinue home isolation precautions should be made on a case-by-case basis, in consultation with healthcare providers and state and local health departments. Your [facility] Department of Health can be reached at [phone] for further information about COVID-19.      SECONDARY DISCHARGE DIAGNOSES  Diagnosis: Atrial fibrillation, unspecified type  Assessment and Plan of Treatment: You have a history of an abnormal heart rhythm called atrial fibrillation. Your heart rate was increased and your lopressor dose was also increased. Please follow up with your primary care physician and cardiologist after discharge for continued monitoring and management.    Diagnosis: Hyponatremia  Assessment and Plan of Treatment: You were found with low sodium. You were given IV fluids and your sodium improved.     PRINCIPAL DISCHARGE DIAGNOSIS  Diagnosis: 2019 novel coronavirus disease (COVID-19)  Assessment and Plan of Treatment: You have been diagnosed with the COVID-19 virus during your hospital stay. You must self quarantine to complete a 14 day time period.  Monitor for fevers, shortness of breath and cough primarily.  Monitor your temperature daily to not any changes and increases.    It has been determined that you no longer need hospitalization and can recover while remaining in self-quarantine at home. You should follow the prevention steps below until a healthcare provider or local or state health department says you can return to your normal activities.  1. You should restrict activities outside your home, except for getting medical care.  2. Do not go to work, school, or public areas.  3. Avoid using public transportation, ride-sharing, or taxis.  4. Separate yourself from other people and animals in your home.  5. Call ahead before visiting your doctor.  6. Wear a facemask.  7. Cover your coughs and sneezes.  8. Clean your hands often.  9. Avoid sharing personal household items.  10. Clean all “high-touch” surfaces everyday.  11. Monitor your symptoms.  If you have a medical emergency and need to call 911, notify the dispatch personnel that you have COVID-19 If possible, put on a facemask before emergency medical services arrive.  12. Stopping home isolation.  Patients with confirmed COVID-19 should remain under home isolation precautions for 14 days since the positive COVID-19 test and until the risk of secondary transmission to others is thought to be low. The decision to discontinue home isolation precautions should be made on a case-by-case basis, in consultation with healthcare providers and state and local health departments. Your [facility] Department of Health can be reached at [phone] for further information about COVID-19.      SECONDARY DISCHARGE DIAGNOSES  Diagnosis: CJ (acute kidney injury)  Assessment and Plan of Treatment: When you came to the hospital your kidney numbers were elevated. This was likely due to your infection and being dehydrated. Your lisinopril is being held until you see you primary doctor to repeat your kidney function tests.    Diagnosis: Hyponatremia  Assessment and Plan of Treatment: You were found with low sodium. You were given IV fluids and your sodium improved.    Diagnosis: Atrial fibrillation, unspecified type  Assessment and Plan of Treatment: You have a history of an abnormal heart rhythm called atrial fibrillation. Your heart rate was increased and your lopressor dose was also increased. Please follow up with your primary care physician and cardiologist after discharge for continued monitoring and management.

## 2020-12-04 NOTE — DISCHARGE NOTE PROVIDER - CARE PROVIDER_API CALL
Branden Stevens  CARDIOLOGY  [address], [place] [address]  Phone: [phone]  Fax: [phone]  Follow Up Time:    Branden Stevens  CARDIOLOGY  [address], [address]  [place] [address]  Phone: [phone]  Fax: [phone]  Follow Up Time:     GOPAL ESTES  [facility]  [address] [place] [address]  Phone: [phone]  Fax: [phone]  Follow Up Time:

## 2020-12-04 NOTE — DISCHARGE NOTE PROVIDER - PROVIDER TOKENS
PROVIDER:[TOKEN:[[id number]]] PROVIDER:[TOKEN:[[id number]]],PROVIDER:[TOKEN:[[id number]]]

## 2020-12-04 NOTE — PROGRESS NOTE ADULT - SUBJECTIVE AND OBJECTIVE BOX
[facility] Division of Hospital Medicine  Luis Patel MD  Pager (M-F, 8A-5P): [phone]  Other Times:  [phone]    Patient is a 80y old  Male who presents with a chief complaint of Weakness (04 Dec 2020 11:37)    SUBJECTIVE / OVERNIGHT EVENTS:  Patient is clinically doing better today than yesterday.  More alert and back to baseline mental status.  However, Crt increased overnight.  No new complaints.  No N/V, CP,  lightheadedness, dizziness, abdominal pain, diarrhea, dysuria.    MEDICATIONS  (STANDING):  allopurinol 100 milliGRAM(s) Oral daily  atorvastatin 40 milliGRAM(s) Oral at bedtime  clopidogrel Tablet 75 milliGRAM(s) Oral daily  dexAMETHasone     Tablet 6 milliGRAM(s) Oral daily  dextrose 40% Gel 15 Gram(s) Oral once  dextrose 5%. 1000 milliLiter(s) (50 mL/Hr) IV Continuous <Continuous>  dextrose 5%. 1000 milliLiter(s) (100 mL/Hr) IV Continuous <Continuous>  dextrose 50% Injectable 25 Gram(s) IV Push once  dextrose 50% Injectable 12.5 Gram(s) IV Push once  dextrose 50% Injectable 25 Gram(s) IV Push once  glucagon  Injectable 1 milliGRAM(s) IntraMuscular once  hydrALAZINE 25 milliGRAM(s) Oral three times a day  insulin lispro (ADMELOG) corrective regimen sliding scale   SubCutaneous three times a day before meals  insulin lispro (ADMELOG) corrective regimen sliding scale   SubCutaneous at bedtime  isosorbide   mononitrate ER Tablet (IMDUR) 30 milliGRAM(s) Oral daily  metoprolol tartrate 100 milliGRAM(s) Oral two times a day  rivaroxaban 20 milliGRAM(s) Oral with dinner  sodium chloride 0.9%. 1000 milliLiter(s) (75 mL/Hr) IV Continuous <Continuous>    MEDICATIONS  (PRN):  acetaminophen   Tablet .. 650 milliGRAM(s) Oral every 6 hours PRN Temp greater or equal to 38.5C (101.3F)      Vital Signs Last 24 Hrs  T(C): 36.4 (04 Dec 2020 09:33), Max: 38.4 (03 Dec 2020 17:11)  T(F): 97.5 (04 Dec 2020 09:33), Max: 101.2 (03 Dec 2020 17:11)  HR: 82 (04 Dec 2020 11:46) (82 - 110)  BP: 144/87 (04 Dec 2020 11:46) (114/65 - 144/87)  BP(mean): --  RR: 18 (04 Dec 2020 11:46) (18 - 19)  SpO2: 96% (04 Dec 2020 11:46) (96% - 100%)  CAPILLARY BLOOD GLUCOSE      POCT Blood Glucose.: 172 mg/dL (04 Dec 2020 12:01)  POCT Blood Glucose.: 154 mg/dL (04 Dec 2020 09:16)  POCT Blood Glucose.: 153 mg/dL (04 Dec 2020 07:07)  POCT Blood Glucose.: 99 mg/dL (03 Dec 2020 21:28)  POCT Blood Glucose.: 98 mg/dL (03 Dec 2020 17:05)    I&O's Summary    03 Dec 2020 07:01  -  04 Dec 2020 07:00  --------------------------------------------------------  IN: 560 mL / OUT: 0 mL / NET: 560 mL        PHYSICAL EXAM:  GENERAL: NAD  HEAD:  Atraumatic, Normocephalic  EYES: EOMI, PERRLA, conjunctiva and sclera clear  NECK: Supple, No JVD  CHEST/LUNG: No respiratory distress  ABDOMEN: Nondistended; Bowel sounds present  EXTREMITIES:  No clubbing, cyanosis, or edema  PSYCH: Calm  NEUROLOGY: A/Ox3  SKIN: No rashes or lesions    LABS:                        13.2   3.72  )-----------( 102      ( 04 Dec 2020 05:30 )             41.5     12-04    130<L>  |  98  |  27<H>  ----------------------------<  153<H>  4.3   |  20<L>  |  1.43<H>    Ca    8.2<L>      04 Dec 2020 05:30    TPro  6.7  /  Alb  2.8<L>  /  TBili  0.5  /  DBili  x   /  AST  74<H>  /  ALT  29  /  AlkPhos  48  12-04              Procalcitonin, Serum: 0.23 ng/mL (12-02-20 @ 06:43)    C-Reactive Protein, Serum: 65.6 mg/L (12-02-20 @ 06:43)      D-Dimer Assay, Quantitative: 538 ng/mL (12-02-20 @ 06:43)    Ferritin, Serum: 573.5 ng/mL (12-02-20 @ 06:43)        RADIOLOGY & ADDITIONAL TESTS:    Imaging Personally Reviewed:    Care Discussed with Consultants/Other Providers:

## 2020-12-05 LAB
ALBUMIN SERPL ELPH-MCNC: 2.7 G/DL — LOW (ref 3.3–5)
ALP SERPL-CCNC: 50 U/L — SIGNIFICANT CHANGE UP (ref 40–120)
ALT FLD-CCNC: 26 U/L — SIGNIFICANT CHANGE UP (ref 4–41)
ANION GAP SERPL CALC-SCNC: 12 MMO/L — SIGNIFICANT CHANGE UP (ref 7–14)
AST SERPL-CCNC: 66 U/L — HIGH (ref 4–40)
BILIRUB SERPL-MCNC: 0.5 MG/DL — SIGNIFICANT CHANGE UP (ref 0.2–1.2)
BUN SERPL-MCNC: 32 MG/DL — HIGH (ref 7–23)
CALCIUM SERPL-MCNC: 7.9 MG/DL — LOW (ref 8.4–10.5)
CHLORIDE SERPL-SCNC: 101 MMOL/L — SIGNIFICANT CHANGE UP (ref 98–107)
CK SERPL-CCNC: 252 U/L — HIGH (ref 30–200)
CO2 SERPL-SCNC: 18 MMOL/L — LOW (ref 22–31)
CREAT SERPL-MCNC: 1.18 MG/DL — SIGNIFICANT CHANGE UP (ref 0.5–1.3)
CRP SERPL-MCNC: 52.8 MG/L — HIGH
D DIMER BLD IA.RAPID-MCNC: 459 NG/ML — SIGNIFICANT CHANGE UP
FERRITIN SERPL-MCNC: 4127 NG/ML — HIGH (ref 30–400)
GLUCOSE SERPL-MCNC: 146 MG/DL — HIGH (ref 70–99)
HCT VFR BLD CALC: 37.2 % — LOW (ref 39–50)
HGB BLD-MCNC: 12.1 G/DL — LOW (ref 13–17)
MCHC RBC-ENTMCNC: 23.2 PG — LOW (ref 27–34)
MCHC RBC-ENTMCNC: 32.5 % — SIGNIFICANT CHANGE UP (ref 32–36)
MCV RBC AUTO: 71.3 FL — LOW (ref 80–100)
NRBC # FLD: 0.03 K/UL — SIGNIFICANT CHANGE UP (ref 0–0)
PLATELET # BLD AUTO: 134 K/UL — LOW (ref 150–400)
PMV BLD: SIGNIFICANT CHANGE UP FL (ref 7–13)
POTASSIUM SERPL-MCNC: 4 MMOL/L — SIGNIFICANT CHANGE UP (ref 3.5–5.3)
POTASSIUM SERPL-SCNC: 4 MMOL/L — SIGNIFICANT CHANGE UP (ref 3.5–5.3)
PROT SERPL-MCNC: 6.3 G/DL — SIGNIFICANT CHANGE UP (ref 6–8.3)
RBC # BLD: 5.22 M/UL — SIGNIFICANT CHANGE UP (ref 4.2–5.8)
RBC # FLD: 18.6 % — HIGH (ref 10.3–14.5)
SODIUM SERPL-SCNC: 131 MMOL/L — LOW (ref 135–145)
WBC # BLD: 8.76 K/UL — SIGNIFICANT CHANGE UP (ref 3.8–10.5)
WBC # FLD AUTO: 8.76 K/UL — SIGNIFICANT CHANGE UP (ref 3.8–10.5)

## 2020-12-05 PROCEDURE — 99233 SBSQ HOSP IP/OBS HIGH 50: CPT

## 2020-12-05 RX ORDER — ACETAMINOPHEN 500 MG
325 TABLET ORAL ONCE
Refills: 0 | Status: COMPLETED | OUTPATIENT
Start: 2020-12-05 | End: 2020-12-05

## 2020-12-05 RX ADMIN — Medication 25 MILLIGRAM(S): at 22:13

## 2020-12-05 RX ADMIN — Medication 100 MILLIGRAM(S): at 17:20

## 2020-12-05 RX ADMIN — SODIUM CHLORIDE 75 MILLILITER(S): 9 INJECTION INTRAMUSCULAR; INTRAVENOUS; SUBCUTANEOUS at 05:05

## 2020-12-05 RX ADMIN — Medication 25 MILLIGRAM(S): at 13:52

## 2020-12-05 RX ADMIN — Medication 1: at 12:08

## 2020-12-05 RX ADMIN — Medication 100 MILLIGRAM(S): at 12:09

## 2020-12-05 RX ADMIN — ISOSORBIDE MONONITRATE 30 MILLIGRAM(S): 60 TABLET, EXTENDED RELEASE ORAL at 12:09

## 2020-12-05 RX ADMIN — CLOPIDOGREL BISULFATE 75 MILLIGRAM(S): 75 TABLET, FILM COATED ORAL at 12:09

## 2020-12-05 RX ADMIN — SODIUM CHLORIDE 75 MILLILITER(S): 9 INJECTION INTRAMUSCULAR; INTRAVENOUS; SUBCUTANEOUS at 18:17

## 2020-12-05 RX ADMIN — Medication 25 MILLIGRAM(S): at 05:05

## 2020-12-05 RX ADMIN — RIVAROXABAN 20 MILLIGRAM(S): KIT at 17:20

## 2020-12-05 RX ADMIN — Medication 6 MILLIGRAM(S): at 05:05

## 2020-12-05 RX ADMIN — ATORVASTATIN CALCIUM 40 MILLIGRAM(S): 80 TABLET, FILM COATED ORAL at 22:13

## 2020-12-05 RX ADMIN — Medication 100 MILLIGRAM(S): at 05:05

## 2020-12-05 RX ADMIN — Medication 650 MILLIGRAM(S): at 03:15

## 2020-12-05 RX ADMIN — Medication 650 MILLIGRAM(S): at 04:16

## 2020-12-05 RX ADMIN — Medication 325 MILLIGRAM(S): at 05:04

## 2020-12-05 RX ADMIN — Medication 325 MILLIGRAM(S): at 04:26

## 2020-12-05 RX ADMIN — Medication 1: at 17:19

## 2020-12-05 NOTE — PROGRESS NOTE ADULT - SUBJECTIVE AND OBJECTIVE BOX
Patient is a 80y old  Male who presents with a chief complaint of Weakness (04 Dec 2020 12:23)      SUBJECTIVE / OVERNIGHT EVENTS:  No shortness of breath or chest pain.  States poor appetite Had 1 BM yesterday.     MEDICATIONS  (STANDING):  allopurinol 100 milliGRAM(s) Oral daily  atorvastatin 40 milliGRAM(s) Oral at bedtime  clopidogrel Tablet 75 milliGRAM(s) Oral daily  dexAMETHasone     Tablet 6 milliGRAM(s) Oral daily  dextrose 40% Gel 15 Gram(s) Oral once  dextrose 5%. 1000 milliLiter(s) (50 mL/Hr) IV Continuous <Continuous>  dextrose 5%. 1000 milliLiter(s) (100 mL/Hr) IV Continuous <Continuous>  dextrose 50% Injectable 25 Gram(s) IV Push once  dextrose 50% Injectable 12.5 Gram(s) IV Push once  dextrose 50% Injectable 25 Gram(s) IV Push once  glucagon  Injectable 1 milliGRAM(s) IntraMuscular once  hydrALAZINE 25 milliGRAM(s) Oral three times a day  insulin lispro (ADMELOG) corrective regimen sliding scale   SubCutaneous three times a day before meals  insulin lispro (ADMELOG) corrective regimen sliding scale   SubCutaneous at bedtime  isosorbide   mononitrate ER Tablet (IMDUR) 30 milliGRAM(s) Oral daily  metoprolol tartrate 100 milliGRAM(s) Oral two times a day  rivaroxaban 20 milliGRAM(s) Oral with dinner  sodium chloride 0.9%. 1000 milliLiter(s) (75 mL/Hr) IV Continuous <Continuous>    MEDICATIONS  (PRN):  acetaminophen   Tablet .. 650 milliGRAM(s) Oral every 6 hours PRN Temp greater or equal to 38.5C (101.3F)      Vital Signs Last 24 Hrs  T(C): 36.4 (05 Dec 2020 13:54), Max: 38.8 (05 Dec 2020 03:14)  T(F): 97.6 (05 Dec 2020 13:54), Max: 101.8 (05 Dec 2020 03:14)  HR: 84 (05 Dec 2020 13:54) (80 - 126)  BP: 136/73 (05 Dec 2020 13:54) (108/65 - 136/73)  BP(mean): --  RR: 18 (05 Dec 2020 13:54) (18 - 19)  SpO2: 95% (05 Dec 2020 13:54) (94% - 97%)  CAPILLARY BLOOD GLUCOSE      POCT Blood Glucose.: 177 mg/dL (05 Dec 2020 11:46)  POCT Blood Glucose.: 143 mg/dL (05 Dec 2020 08:02)  POCT Blood Glucose.: 159 mg/dL (04 Dec 2020 21:14)  POCT Blood Glucose.: 137 mg/dL (04 Dec 2020 17:07)    I&O's Summary    04 Dec 2020 07:01  -  05 Dec 2020 07:00  --------------------------------------------------------  IN: 240 mL / OUT: 0 mL / NET: 240 mL        Constitutional: NAD, awake and alert  Respiratory: no respiratory distress, Breath sounds are clear bilaterally. No wheezing, rales or rhonchi  Cardiovascular: S1 and S2, no murmurs.  Gastrointestinal: Soft, nontender, nondistended. +BS  Extremities: No lower extremity edema, no calf tenderness, warm to touch  Neurological: nonfocal    Skin: No rashes, No erythema   Psych: Alert and Oriented x3, normal mood, normal affect    LABS:                        12.1   8.76  )-----------( 134      ( 05 Dec 2020 06:08 )             37.2     12-05    131<L>  |  101  |  32<H>  ----------------------------<  146<H>  4.0   |  18<L>  |  1.18    Ca    7.9<L>      05 Dec 2020 06:08    TPro  6.3  /  Alb  2.7<L>  /  TBili  0.5  /  DBili  x   /  AST  66<H>  /  ALT  26  /  AlkPhos  50  12-05      CARDIAC MARKERS ( 05 Dec 2020 06:08 )  x     / x     / 252 u/L / x     / x              RADIOLOGY & ADDITIONAL TESTS:    Imaging Personally Reviewed:    Consultant(s) Notes Reviewed:      Care Discussed with Consultants/Other Providers: GÓMEZ

## 2020-12-05 NOTE — PROGRESS NOTE ADULT - PROBLEM SELECTOR PLAN 1
due to clinical PNA from COVID  c/w IVF hydration   On IV dexamethasone, no remdesivir as patient on room air

## 2020-12-06 PROCEDURE — 99233 SBSQ HOSP IP/OBS HIGH 50: CPT

## 2020-12-06 RX ADMIN — Medication 25 MILLIGRAM(S): at 13:58

## 2020-12-06 RX ADMIN — ATORVASTATIN CALCIUM 40 MILLIGRAM(S): 80 TABLET, FILM COATED ORAL at 21:30

## 2020-12-06 RX ADMIN — Medication 1: at 18:11

## 2020-12-06 RX ADMIN — Medication 100 MILLIGRAM(S): at 05:08

## 2020-12-06 RX ADMIN — Medication 25 MILLIGRAM(S): at 05:08

## 2020-12-06 RX ADMIN — Medication 6 MILLIGRAM(S): at 05:08

## 2020-12-06 RX ADMIN — CLOPIDOGREL BISULFATE 75 MILLIGRAM(S): 75 TABLET, FILM COATED ORAL at 12:02

## 2020-12-06 RX ADMIN — Medication 1: at 12:01

## 2020-12-06 RX ADMIN — RIVAROXABAN 20 MILLIGRAM(S): KIT at 18:12

## 2020-12-06 RX ADMIN — Medication 100 MILLIGRAM(S): at 18:14

## 2020-12-06 RX ADMIN — ISOSORBIDE MONONITRATE 30 MILLIGRAM(S): 60 TABLET, EXTENDED RELEASE ORAL at 12:02

## 2020-12-06 RX ADMIN — Medication 25 MILLIGRAM(S): at 21:30

## 2020-12-06 RX ADMIN — SODIUM CHLORIDE 75 MILLILITER(S): 9 INJECTION INTRAMUSCULAR; INTRAVENOUS; SUBCUTANEOUS at 05:08

## 2020-12-06 RX ADMIN — Medication 100 MILLIGRAM(S): at 12:02

## 2020-12-06 NOTE — PROGRESS NOTE ADULT - ATTENDING COMMENTS
Discussed with daughter Edna on 12/3 on phone [phone] for 20 minutes.  Answered all the questions.
One fever overnight responded well to tylenol, continue with treatment of COVID with dexamethasone.
Discussed with daughter Edna Naidu on phone 12/4. Answered all the questions.
Spoke to granddaughter at bedside and updated her.

## 2020-12-06 NOTE — PROGRESS NOTE ADULT - SUBJECTIVE AND OBJECTIVE BOX
Patient is a 80y old  Male who presents with a chief complaint of Weakness (05 Dec 2020 14:40)      SUBJECTIVE / OVERNIGHT EVENTS:  Feels well states that he slept overnight. Has not had much appetite. No chest pain  Tele reviewed with occasional PVCs     MEDICATIONS  (STANDING):  allopurinol 100 milliGRAM(s) Oral daily  atorvastatin 40 milliGRAM(s) Oral at bedtime  clopidogrel Tablet 75 milliGRAM(s) Oral daily  dexAMETHasone     Tablet 6 milliGRAM(s) Oral daily  dextrose 40% Gel 15 Gram(s) Oral once  dextrose 5%. 1000 milliLiter(s) (50 mL/Hr) IV Continuous <Continuous>  dextrose 5%. 1000 milliLiter(s) (100 mL/Hr) IV Continuous <Continuous>  dextrose 50% Injectable 25 Gram(s) IV Push once  dextrose 50% Injectable 12.5 Gram(s) IV Push once  dextrose 50% Injectable 25 Gram(s) IV Push once  glucagon  Injectable 1 milliGRAM(s) IntraMuscular once  hydrALAZINE 25 milliGRAM(s) Oral three times a day  insulin lispro (ADMELOG) corrective regimen sliding scale   SubCutaneous three times a day before meals  insulin lispro (ADMELOG) corrective regimen sliding scale   SubCutaneous at bedtime  isosorbide   mononitrate ER Tablet (IMDUR) 30 milliGRAM(s) Oral daily  metoprolol tartrate 100 milliGRAM(s) Oral two times a day  rivaroxaban 20 milliGRAM(s) Oral with dinner    MEDICATIONS  (PRN):  acetaminophen   Tablet .. 650 milliGRAM(s) Oral every 6 hours PRN Temp greater or equal to 38.5C (101.3F)      Vital Signs Last 24 Hrs  T(C): 36.6 (06 Dec 2020 09:52), Max: 36.9 (05 Dec 2020 17:23)  T(F): 97.9 (06 Dec 2020 09:52), Max: 98.4 (05 Dec 2020 17:23)  HR: 93 (06 Dec 2020 09:52) (91 - 103)  BP: 139/74 (06 Dec 2020 13:55) (138/94 - 157/92)  BP(mean): --  RR: 18 (06 Dec 2020 13:55) (18 - 20)  SpO2: 91% (06 Dec 2020 13:55) (91% - 96%)  CAPILLARY BLOOD GLUCOSE      POCT Blood Glucose.: 157 mg/dL (06 Dec 2020 11:38)  POCT Blood Glucose.: 143 mg/dL (06 Dec 2020 07:29)  POCT Blood Glucose.: 139 mg/dL (05 Dec 2020 21:40)  POCT Blood Glucose.: 189 mg/dL (05 Dec 2020 17:06)    I&O's Summary    05 Dec 2020 07:01  -  06 Dec 2020 07:00  --------------------------------------------------------  IN: 1010 mL / OUT: 875 mL / NET: 135 mL        Constitutional: NAD, awake and alert  Respiratory: no respiratory distress, Breath sounds are clear bilaterally. No wheezing, rales or rhonchi  Cardiovascular: S1 and S2, no murmurs.  Gastrointestinal: Soft, nontender, nondistended. +BS  Extremities: No lower extremity edema, no calf tenderness, warm to touch  Neurological: nonfocal    Skin: No rashes, No erythema   Psych: Alert and Oriented x3, normal mood, normal affect    LABS:                        11.7   8.00  )-----------( 136      ( 06 Dec 2020 09:01 )             32.0     12-06    131<L>  |  103  |  24<H>  ----------------------------<  136<H>  4.4   |  19<L>  |  0.97    Ca    8.0<L>      06 Dec 2020 08:58  Phos  2.4     12-06  Mg     2.3     12-06    TPro  6.2  /  Alb  2.6<L>  /  TBili  0.6  /  DBili  x   /  AST  74<H>  /  ALT  25  /  AlkPhos  60  12-06      CARDIAC MARKERS ( 05 Dec 2020 06:08 )  x     / x     / 252 u/L / x     / x              RADIOLOGY & ADDITIONAL TESTS:    Imaging Personally Reviewed:    Consultant(s) Notes Reviewed:      Care Discussed with Consultants/Other Providers: Irina MAGAÑA

## 2020-12-06 NOTE — PROGRESS NOTE ADULT - PROBLEM SELECTOR PLAN 3
On Lopressor. With occasional RVR,  Likely exacerbated by fevers. tolerating increased dose.  Continue Xarelto.

## 2020-12-06 NOTE — PROVIDER CONTACT NOTE (OTHER) - ASSESSMENT
Patient /84, HR 98, asymptomatic no complaint of chest pain or shortness of breath
Patient experienced rapid A.fib on telemetry monitor when ambulating to bathroom, no acute distress noted, O2 sat 97% on 2L NC, HR has come down to 110
Pt noted to be tachycardiac to the 130s, BP noted to be 150/110, redone manually 150/80, afebrile, no chest pain, no N/V/D noted. PT stated to be feeling anxious and visually shaking, able to calm patient down, Placed pt on 2L of NC. due to O2 reading in the lower 90s.
Pt temp 101.3, all other vitals stable, no chills or shaking noted
temp 101.7 after PRN tylenol, no chills/sweats noted

## 2020-12-06 NOTE — PROVIDER CONTACT NOTE (OTHER) - ACTION/TREATMENT ORDERED:
EKG ordered
Continue to monitor
Given PRN tylenol, continue to monitor
Medications given as per order, Tele monitoring placed, pulse ox maintained, will continue to assess patient
ice packs, continue to monitor, no medicine intervention at this time

## 2020-12-06 NOTE — PROGRESS NOTE ADULT - ASSESSMENT
80M HTN, DM type 2, CAD/stent, Afib on Xarelto, daughter with COVID p/w clinically diagnosed COVID PNA c/b RVR, CJ.

## 2020-12-07 ENCOUNTER — TRANSCRIPTION ENCOUNTER (OUTPATIENT)
Age: 80
End: 2020-12-07

## 2020-12-07 VITALS
RESPIRATION RATE: 18 BRPM | HEART RATE: 79 BPM | TEMPERATURE: 97 F | DIASTOLIC BLOOD PRESSURE: 96 MMHG | OXYGEN SATURATION: 96 % | SYSTOLIC BLOOD PRESSURE: 155 MMHG

## 2020-12-07 LAB
CULTURE RESULTS: SIGNIFICANT CHANGE UP
CULTURE RESULTS: SIGNIFICANT CHANGE UP
GLUCOSE BLDC GLUCOMTR-MCNC: 189 MG/DL — HIGH (ref 70–99)
GLUCOSE BLDC GLUCOMTR-MCNC: 214 MG/DL — HIGH (ref 70–99)
SPECIMEN SOURCE: SIGNIFICANT CHANGE UP
SPECIMEN SOURCE: SIGNIFICANT CHANGE UP

## 2020-12-07 PROCEDURE — 99239 HOSP IP/OBS DSCHRG MGMT >30: CPT

## 2020-12-07 RX ORDER — LISINOPRIL 2.5 MG/1
1 TABLET ORAL
Qty: 0 | Refills: 0 | DISCHARGE

## 2020-12-07 RX ORDER — RIVAROXABAN 15 MG-20MG
1 KIT ORAL
Qty: 0 | Refills: 0 | DISCHARGE

## 2020-12-07 RX ORDER — METOPROLOL TARTRATE 50 MG
1 TABLET ORAL
Qty: 0 | Refills: 0 | DISCHARGE

## 2020-12-07 RX ORDER — METOPROLOL TARTRATE 50 MG
1 TABLET ORAL
Qty: 60 | Refills: 0
Start: 2020-12-07 | End: 2021-01-05

## 2020-12-07 RX ADMIN — Medication 1: at 12:20

## 2020-12-07 RX ADMIN — Medication 2: at 17:18

## 2020-12-07 RX ADMIN — Medication 25 MILLIGRAM(S): at 13:42

## 2020-12-07 RX ADMIN — CLOPIDOGREL BISULFATE 75 MILLIGRAM(S): 75 TABLET, FILM COATED ORAL at 11:38

## 2020-12-07 RX ADMIN — Medication 100 MILLIGRAM(S): at 06:12

## 2020-12-07 RX ADMIN — ISOSORBIDE MONONITRATE 30 MILLIGRAM(S): 60 TABLET, EXTENDED RELEASE ORAL at 11:39

## 2020-12-07 RX ADMIN — Medication 100 MILLIGRAM(S): at 11:37

## 2020-12-07 RX ADMIN — Medication 25 MILLIGRAM(S): at 06:12

## 2020-12-07 RX ADMIN — Medication 6 MILLIGRAM(S): at 06:12

## 2020-12-07 NOTE — DISCHARGE NOTE NURSING/CASE MANAGEMENT/SOCIAL WORK - PATIENT PORTAL LINK FT
You can access the FollowMyHealth Patient Portal offered by [facility] by registering at the following website: [email or web address]. By joining [facility]’s FollowMyHealth portal, you will also be able to view your health information using other applications (apps) compatible with our system.

## 2020-12-07 NOTE — DISCHARGE NOTE NURSING/CASE MANAGEMENT/SOCIAL WORK - NSDCPEXARELTOCOMP_GEN_ALL_CORE
Rivaroxaban/Xarelto is used to treat and prevent blood clots.  If you are not able to swallow the tablets whole, you may crush the tablets and mix them with a small amount of applesauce and promptly take within four hours. Eat some food right after you swallow the mixture. Take 2.5mg or 10mg tablets of Rivaroxaban/Xarelto with or without food. Take 15mg or 20mg tablets of Rivaroxaban/Xarelto with food. Never skip a dose of Rivaroxaban/Xarelto. If you take Rivaroxaban/Xarelto once a day and you forget to take your dose, take a dose as soon as you remember on the same day. If you take Rivaroxaban/Xarelto 2.5 mg twice a day and you forget to take your dose, skip the missed dose and take your next dose at your usual time. DO NOT take an extra pill to ‘catch up’. If you take Rivaroxaban/Xarelto 15 mg twice a day and you forget to take your dose, take a dose as soon as you remember on the same day. You may take 2 doses at the same time to make up for missed dose ONLY if you take a total of 30mg per day. Notify your doctor that you missed a dose. Take Rivaroxaban/Xarelto at the same time each morning and evening. Rivaroxaban/Xarelto may be taken with other medication or food. Rivaroxaban/Xarelto is used to treat and prevent blood clots.  If you are not able to swallow the tablets whole, you may crush the tablets and mix them with a small amount of applesauce and promptly take within four hours. Eat some food right after you swallow the mixture. Take 2.5mg or 10mg tablets of Rivaroxaban/Xarelto with or without food. Take 15mg or 20mg tablets of Rivaroxaban/Xarelto with food. Never skip a dose of Rivaroxaban/Xarelto.  If you take Rivaroxaban/Xarelto once a day and you forget to take your dose, take a dose as soon as you remember on the same day. If you take Rivaroxaban/Xarelto 2.5 mg twice a day and you forget to take your dose, skip the missed dose and take your next dose at your usual time. DO NOT take an extra pill to ‘catch up’. If you take Rivaroxaban/Xarelto 15 mg twice a day and you forget to take your dose, take a dose as soon as you remember on the same day. You may take 2 doses at the same time to make up for missed dose ONLY if you take a total of 30mg per day. Notify your doctor that you missed a dose. Take Rivaroxaban/Xarelto at the same time each morning and evening. Rivaroxaban/Xarelto may be taken with other medication or food.

## 2020-12-07 NOTE — PROGRESS NOTE ADULT - SUBJECTIVE AND OBJECTIVE BOX
[facility] Division of Hospital Medicine  Sabino Mensah MD  In House Pager [phone]    Patient is a 80y old  Male who presents with a chief complaint of Weakness (06 Dec 2020 14:15)      SUBJECTIVE / OVERNIGHT EVENTS:  No overnight events. Patient seen and examined at bedside, labs and vitals reviewed.  patient resting in bed, feeling well on RA. no acute complaints.   No fever, no chills, no SOB, no CP, no n/v/d, no abd pain, no dysuria      MEDICATIONS  (STANDING):  allopurinol 100 milliGRAM(s) Oral daily  atorvastatin 40 milliGRAM(s) Oral at bedtime  clopidogrel Tablet 75 milliGRAM(s) Oral daily  dexAMETHasone     Tablet 6 milliGRAM(s) Oral daily  dextrose 40% Gel 15 Gram(s) Oral once  dextrose 5%. 1000 milliLiter(s) (50 mL/Hr) IV Continuous <Continuous>  dextrose 5%. 1000 milliLiter(s) (100 mL/Hr) IV Continuous <Continuous>  dextrose 50% Injectable 25 Gram(s) IV Push once  dextrose 50% Injectable 12.5 Gram(s) IV Push once  dextrose 50% Injectable 25 Gram(s) IV Push once  glucagon  Injectable 1 milliGRAM(s) IntraMuscular once  hydrALAZINE 25 milliGRAM(s) Oral three times a day  insulin lispro (ADMELOG) corrective regimen sliding scale   SubCutaneous three times a day before meals  insulin lispro (ADMELOG) corrective regimen sliding scale   SubCutaneous at bedtime  isosorbide   mononitrate ER Tablet (IMDUR) 30 milliGRAM(s) Oral daily  metoprolol tartrate 100 milliGRAM(s) Oral two times a day  rivaroxaban 20 milliGRAM(s) Oral with dinner    MEDICATIONS  (PRN):  acetaminophen   Tablet .. 650 milliGRAM(s) Oral every 6 hours PRN Temp greater or equal to 38.5C (101.3F)    CAPILLARY BLOOD GLUCOSE      POCT Blood Glucose.: 189 mg/dL (07 Dec 2020 12:04)  POCT Blood Glucose.: 145 mg/dL (07 Dec 2020 07:30)  POCT Blood Glucose.: 197 mg/dL (06 Dec 2020 21:41)  POCT Blood Glucose.: 183 mg/dL (06 Dec 2020 21:33)  POCT Blood Glucose.: 166 mg/dL (06 Dec 2020 17:17)    I&O's Summary    06 Dec 2020 07:01  -  07 Dec 2020 07:00  --------------------------------------------------------  IN: 120 mL / OUT: 1100 mL / NET: -980 mL        PHYSICAL EXAM:  Vital Signs Last 24 Hrs  T(C): 36.3 (07 Dec 2020 13:40), Max: 36.7 (06 Dec 2020 18:18)  T(F): 97.4 (07 Dec 2020 13:40), Max: 98 (06 Dec 2020 18:18)  HR: 79 (07 Dec 2020 13:40) (75 - 96)  BP: 155/96 (07 Dec 2020 13:40) (133/81 - 163/95)  BP(mean): --  RR: 18 (07 Dec 2020 13:40) (18 - 18)  SpO2: 96% (07 Dec 2020 13:40) (92% - 97%)    Gen: NAD; resting in bed.  Pulm: no respiratory distress; CTA b/l; no wheezing  Cards: RRR, nl S1/S2; no obvious murmurs  Abd: soft; NT on exam  Ext: no cyanosis; no edema  Skin: no rash; no cyanosis    LABS:                        11.7   8.00  )-----------( 136      ( 06 Dec 2020 09:01 )             32.0     12-06    131<L>  |  103  |  24<H>  ----------------------------<  136<H>  4.4   |  19<L>  |  0.97    Ca    8.0<L>      06 Dec 2020 08:58  Phos  2.4     12-06  Mg     2.3     12-06    TPro  6.2  /  Alb  2.6<L>  /  TBili  0.6  /  DBili  x   /  AST  74<H>  /  ALT  25  /  AlkPhos  60  12-06                RADIOLOGY & ADDITIONAL TESTS:  Results Reviewed: Y  Imaging Personally Reviewed: Y  Electrocardiogram Personally Reviewed: Y    COORDINATION OF CARE:  Care Discussed with Consultants/Other Providers [Y/N]: Y  Prior or Outpatient Records Reviewed [Y/N]: Y

## 2020-12-07 NOTE — PROGRESS NOTE ADULT - PROBLEM SELECTOR PROBLEM 4
Attempted to call patient no answer no voicemail
Last Appt:  4/18/2019  Next Appt:   6/25/2019  Med verified in [address] checked for [place], [place], and [place]: 04/18/2019 [place] 10/325 #120. Due NOW.
CJ (acute kidney injury)
Type 2 diabetes mellitus with other specified complication, unspecified whether long term insulin use
Type 2 diabetes mellitus with other specified complication, unspecified whether long term insulin use

## 2020-12-07 NOTE — PROGRESS NOTE ADULT - PROBLEM SELECTOR PLAN 1
due to clinical PNA from COVID  On IV dexamethasone, no remdesivir as patient on room air  clinically improving. stable to discharge home today.

## 2020-12-07 NOTE — PROGRESS NOTE ADULT - ASSESSMENT
80M HTN, DM type 2, CAD/stent, Afib on Xarelto, daughter with COVID p/w clinically diagnosed COVID PNA c/b RVR, CJ. Clinically improved and now on RA. medically stable, d/c home today.     45 minutes spent coordinating discharge

## 2020-12-07 NOTE — DISCHARGE NOTE NURSING/CASE MANAGEMENT/SOCIAL WORK - NSDCPNINST_GEN_ALL_CORE
please call your provider or go to the ER if you experience any chest pain, shortness of breath, uncontrolled pain, fever > 100.4, nausea/vomiting.

## 2020-12-10 ENCOUNTER — INPATIENT (INPATIENT)
Facility: HOSPITAL | Age: 80
LOS: 5 days | Discharge: HOME HEALTH | End: 2020-12-16
Attending: HOSPITALIST | Admitting: HOSPITALIST
Payer: MEDICARE

## 2020-12-10 VITALS
TEMPERATURE: 99 F | SYSTOLIC BLOOD PRESSURE: 128 MMHG | OXYGEN SATURATION: 100 % | DIASTOLIC BLOOD PRESSURE: 85 MMHG | HEIGHT: 65 IN | HEART RATE: 106 BPM | RESPIRATION RATE: 19 BRPM

## 2020-12-10 DIAGNOSIS — R55 SYNCOPE AND COLLAPSE: ICD-10-CM

## 2020-12-10 DIAGNOSIS — I48.11 LONGSTANDING PERSISTENT ATRIAL FIBRILLATION: ICD-10-CM

## 2020-12-10 DIAGNOSIS — I10 ESSENTIAL (PRIMARY) HYPERTENSION: ICD-10-CM

## 2020-12-10 DIAGNOSIS — E86.1 HYPOVOLEMIA: ICD-10-CM

## 2020-12-10 DIAGNOSIS — N17.9 ACUTE KIDNEY FAILURE, UNSPECIFIED: ICD-10-CM

## 2020-12-10 DIAGNOSIS — E11.9 TYPE 2 DIABETES MELLITUS WITHOUT COMPLICATIONS: ICD-10-CM

## 2020-12-10 DIAGNOSIS — E78.5 HYPERLIPIDEMIA, UNSPECIFIED: ICD-10-CM

## 2020-12-10 DIAGNOSIS — M10.9 GOUT, UNSPECIFIED: ICD-10-CM

## 2020-12-10 DIAGNOSIS — U07.1 COVID-19: ICD-10-CM

## 2020-12-10 DIAGNOSIS — Z29.9 ENCOUNTER FOR PROPHYLACTIC MEASURES, UNSPECIFIED: ICD-10-CM

## 2020-12-10 DIAGNOSIS — R53.1 WEAKNESS: ICD-10-CM

## 2020-12-10 DIAGNOSIS — Z95.5 PRESENCE OF CORONARY ANGIOPLASTY IMPLANT AND GRAFT: Chronic | ICD-10-CM

## 2020-12-10 LAB
ADD ON TEST-SPECIMEN IN LAB: SIGNIFICANT CHANGE UP
ALBUMIN SERPL ELPH-MCNC: 3.2 G/DL — LOW (ref 3.3–5)
ALP SERPL-CCNC: 74 U/L — SIGNIFICANT CHANGE UP (ref 40–120)
ALT FLD-CCNC: 30 U/L — SIGNIFICANT CHANGE UP (ref 4–41)
ANION GAP SERPL CALC-SCNC: 13 MMOL/L — SIGNIFICANT CHANGE UP (ref 7–14)
APPEARANCE UR: CLEAR — SIGNIFICANT CHANGE UP
APTT BLD: 27.9 SEC — SIGNIFICANT CHANGE UP (ref 27–36.3)
AST SERPL-CCNC: 62 U/L — HIGH (ref 4–40)
BACTERIA # UR AUTO: NEGATIVE — SIGNIFICANT CHANGE UP
BASOPHILS # BLD AUTO: 0.01 K/UL — SIGNIFICANT CHANGE UP (ref 0–0.2)
BASOPHILS NFR BLD AUTO: 0.1 % — SIGNIFICANT CHANGE UP (ref 0–2)
BILIRUB SERPL-MCNC: 1.3 MG/DL — HIGH (ref 0.2–1.2)
BILIRUB UR-MCNC: NEGATIVE — SIGNIFICANT CHANGE UP
BLOOD GAS VENOUS COMPREHENSIVE RESULT: SIGNIFICANT CHANGE UP
BUN SERPL-MCNC: 19 MG/DL — SIGNIFICANT CHANGE UP (ref 7–23)
CALCIUM SERPL-MCNC: 8.8 MG/DL — SIGNIFICANT CHANGE UP (ref 8.4–10.5)
CHLORIDE SERPL-SCNC: 99 MMOL/L — SIGNIFICANT CHANGE UP (ref 98–107)
CO2 SERPL-SCNC: 24 MMOL/L — SIGNIFICANT CHANGE UP (ref 22–31)
COLOR SPEC: SIGNIFICANT CHANGE UP
CREAT SERPL-MCNC: 1.45 MG/DL — HIGH (ref 0.5–1.3)
D DIMER BLD IA.RAPID-MCNC: 3957 NG/ML DDU — HIGH
DIFF PNL FLD: ABNORMAL
EOSINOPHIL # BLD AUTO: 0.05 K/UL — SIGNIFICANT CHANGE UP (ref 0–0.5)
EOSINOPHIL NFR BLD AUTO: 0.7 % — SIGNIFICANT CHANGE UP (ref 0–6)
EPI CELLS # UR: 0 /HPF — SIGNIFICANT CHANGE UP (ref 0–5)
GLUCOSE BLDC GLUCOMTR-MCNC: 158 MG/DL — HIGH (ref 70–99)
GLUCOSE BLDC GLUCOMTR-MCNC: 161 MG/DL — HIGH (ref 70–99)
GLUCOSE SERPL-MCNC: 128 MG/DL — HIGH (ref 70–99)
GLUCOSE UR QL: NEGATIVE — SIGNIFICANT CHANGE UP
HCT VFR BLD CALC: 42.6 % — SIGNIFICANT CHANGE UP (ref 39–50)
HGB BLD-MCNC: 13.9 G/DL — SIGNIFICANT CHANGE UP (ref 13–17)
IANC: 5.56 K/UL — SIGNIFICANT CHANGE UP (ref 1.5–8.5)
IMM GRANULOCYTES NFR BLD AUTO: 1.4 % — SIGNIFICANT CHANGE UP (ref 0–1.5)
INR BLD: 1.22 RATIO — HIGH (ref 0.88–1.17)
KETONES UR-MCNC: ABNORMAL
LEUKOCYTE ESTERASE UR-ACNC: NEGATIVE — SIGNIFICANT CHANGE UP
LYMPHOCYTES # BLD AUTO: 0.83 K/UL — LOW (ref 1–3.3)
LYMPHOCYTES # BLD AUTO: 11.9 % — LOW (ref 13–44)
MCHC RBC-ENTMCNC: 24.1 PG — LOW (ref 27–34)
MCHC RBC-ENTMCNC: 32.6 GM/DL — SIGNIFICANT CHANGE UP (ref 32–36)
MCV RBC AUTO: 73.8 FL — LOW (ref 80–100)
MONOCYTES # BLD AUTO: 0.4 K/UL — SIGNIFICANT CHANGE UP (ref 0–0.9)
MONOCYTES NFR BLD AUTO: 5.8 % — SIGNIFICANT CHANGE UP (ref 2–14)
NEUTROPHILS # BLD AUTO: 5.56 K/UL — SIGNIFICANT CHANGE UP (ref 1.8–7.4)
NEUTROPHILS NFR BLD AUTO: 80.1 % — HIGH (ref 43–77)
NITRITE UR-MCNC: NEGATIVE — SIGNIFICANT CHANGE UP
NRBC # BLD: 0 /100 WBCS — SIGNIFICANT CHANGE UP
NRBC # FLD: 0.02 K/UL — HIGH
PH UR: 7 — SIGNIFICANT CHANGE UP (ref 5–8)
PLATELET # BLD AUTO: 220 K/UL — SIGNIFICANT CHANGE UP (ref 150–400)
POTASSIUM SERPL-MCNC: 4.5 MMOL/L — SIGNIFICANT CHANGE UP (ref 3.5–5.3)
POTASSIUM SERPL-SCNC: 4.5 MMOL/L — SIGNIFICANT CHANGE UP (ref 3.5–5.3)
PROT SERPL-MCNC: 7.7 G/DL — SIGNIFICANT CHANGE UP (ref 6–8.3)
PROT UR-MCNC: ABNORMAL
PROTHROM AB SERPL-ACNC: 13.9 SEC — HIGH (ref 9.8–13.1)
RBC # BLD: 5.77 M/UL — SIGNIFICANT CHANGE UP (ref 4.2–5.8)
RBC # FLD: 20.3 % — HIGH (ref 10.3–14.5)
RBC CASTS # UR COMP ASSIST: 0 /HPF — SIGNIFICANT CHANGE UP (ref 0–4)
SARS-COV-2 RNA SPEC QL NAA+PROBE: DETECTED
SODIUM SERPL-SCNC: 136 MMOL/L — SIGNIFICANT CHANGE UP (ref 135–145)
SP GR SPEC: 1.01 — SIGNIFICANT CHANGE UP (ref 1.01–1.02)
TROPONIN T, HIGH SENSITIVITY RESULT: 25 NG/L — SIGNIFICANT CHANGE UP
UROBILINOGEN FLD QL: SIGNIFICANT CHANGE UP
WBC # BLD: 6.95 K/UL — SIGNIFICANT CHANGE UP (ref 3.8–10.5)
WBC # FLD AUTO: 6.95 K/UL — SIGNIFICANT CHANGE UP (ref 3.8–10.5)
WBC UR QL: 0 /HPF — SIGNIFICANT CHANGE UP (ref 0–5)

## 2020-12-10 PROCEDURE — 70450 CT HEAD/BRAIN W/O DYE: CPT | Mod: 26,77

## 2020-12-10 PROCEDURE — 72170 X-RAY EXAM OF PELVIS: CPT | Mod: 26

## 2020-12-10 PROCEDURE — 99285 EMERGENCY DEPT VISIT HI MDM: CPT

## 2020-12-10 PROCEDURE — 70450 CT HEAD/BRAIN W/O DYE: CPT | Mod: 26

## 2020-12-10 PROCEDURE — 72125 CT NECK SPINE W/O DYE: CPT | Mod: 26

## 2020-12-10 PROCEDURE — 99223 1ST HOSP IP/OBS HIGH 75: CPT

## 2020-12-10 PROCEDURE — 71045 X-RAY EXAM CHEST 1 VIEW: CPT | Mod: 26

## 2020-12-10 RX ORDER — SODIUM CHLORIDE 9 MG/ML
1000 INJECTION, SOLUTION INTRAVENOUS ONCE
Refills: 0 | Status: COMPLETED | OUTPATIENT
Start: 2020-12-10 | End: 2020-12-10

## 2020-12-10 RX ORDER — FLUTICASONE PROPIONATE 50 MCG
1 SPRAY, SUSPENSION NASAL
Refills: 0 | Status: DISCONTINUED | OUTPATIENT
Start: 2020-12-10 | End: 2020-12-16

## 2020-12-10 RX ORDER — GLUCAGON INJECTION, SOLUTION 0.5 MG/.1ML
1 INJECTION, SOLUTION SUBCUTANEOUS ONCE
Refills: 0 | Status: DISCONTINUED | OUTPATIENT
Start: 2020-12-10 | End: 2020-12-16

## 2020-12-10 RX ORDER — INSULIN LISPRO 100/ML
VIAL (ML) SUBCUTANEOUS AT BEDTIME
Refills: 0 | Status: DISCONTINUED | OUTPATIENT
Start: 2020-12-10 | End: 2020-12-16

## 2020-12-10 RX ORDER — ALLOPURINOL 300 MG
100 TABLET ORAL DAILY
Refills: 0 | Status: DISCONTINUED | OUTPATIENT
Start: 2020-12-10 | End: 2020-12-16

## 2020-12-10 RX ORDER — DEXTROSE 50 % IN WATER 50 %
15 SYRINGE (ML) INTRAVENOUS ONCE
Refills: 0 | Status: DISCONTINUED | OUTPATIENT
Start: 2020-12-10 | End: 2020-12-16

## 2020-12-10 RX ORDER — METOPROLOL TARTRATE 50 MG
5 TABLET ORAL ONCE
Refills: 0 | Status: COMPLETED | OUTPATIENT
Start: 2020-12-10 | End: 2020-12-10

## 2020-12-10 RX ORDER — METOPROLOL TARTRATE 50 MG
25 TABLET ORAL
Refills: 0 | Status: DISCONTINUED | OUTPATIENT
Start: 2020-12-10 | End: 2020-12-12

## 2020-12-10 RX ORDER — ATORVASTATIN CALCIUM 80 MG/1
40 TABLET, FILM COATED ORAL AT BEDTIME
Refills: 0 | Status: DISCONTINUED | OUTPATIENT
Start: 2020-12-10 | End: 2020-12-11

## 2020-12-10 RX ORDER — DEXTROSE 50 % IN WATER 50 %
25 SYRINGE (ML) INTRAVENOUS ONCE
Refills: 0 | Status: DISCONTINUED | OUTPATIENT
Start: 2020-12-10 | End: 2020-12-16

## 2020-12-10 RX ORDER — SODIUM CHLORIDE 9 MG/ML
1000 INJECTION, SOLUTION INTRAVENOUS
Refills: 0 | Status: DISCONTINUED | OUTPATIENT
Start: 2020-12-10 | End: 2020-12-16

## 2020-12-10 RX ORDER — METOPROLOL TARTRATE 50 MG
25 TABLET ORAL
Refills: 0 | Status: DISCONTINUED | OUTPATIENT
Start: 2020-12-10 | End: 2020-12-10

## 2020-12-10 RX ORDER — ISOSORBIDE MONONITRATE 60 MG/1
30 TABLET, EXTENDED RELEASE ORAL DAILY
Refills: 0 | Status: DISCONTINUED | OUTPATIENT
Start: 2020-12-10 | End: 2020-12-16

## 2020-12-10 RX ORDER — DEXTROSE 50 % IN WATER 50 %
12.5 SYRINGE (ML) INTRAVENOUS ONCE
Refills: 0 | Status: DISCONTINUED | OUTPATIENT
Start: 2020-12-10 | End: 2020-12-16

## 2020-12-10 RX ORDER — SODIUM CHLORIDE 9 MG/ML
1000 INJECTION INTRAMUSCULAR; INTRAVENOUS; SUBCUTANEOUS ONCE
Refills: 0 | Status: COMPLETED | OUTPATIENT
Start: 2020-12-10 | End: 2020-12-10

## 2020-12-10 RX ORDER — ONDANSETRON 8 MG/1
4 TABLET, FILM COATED ORAL ONCE
Refills: 0 | Status: COMPLETED | OUTPATIENT
Start: 2020-12-10 | End: 2020-12-10

## 2020-12-10 RX ORDER — METOPROLOL TARTRATE 50 MG
50 TABLET ORAL ONCE
Refills: 0 | Status: COMPLETED | OUTPATIENT
Start: 2020-12-10 | End: 2020-12-10

## 2020-12-10 RX ORDER — CLOPIDOGREL BISULFATE 75 MG/1
75 TABLET, FILM COATED ORAL DAILY
Refills: 0 | Status: DISCONTINUED | OUTPATIENT
Start: 2020-12-10 | End: 2020-12-16

## 2020-12-10 RX ORDER — RIVAROXABAN 15 MG-20MG
15 KIT ORAL
Refills: 0 | Status: DISCONTINUED | OUTPATIENT
Start: 2020-12-10 | End: 2020-12-16

## 2020-12-10 RX ORDER — INSULIN LISPRO 100/ML
VIAL (ML) SUBCUTANEOUS
Refills: 0 | Status: DISCONTINUED | OUTPATIENT
Start: 2020-12-10 | End: 2020-12-16

## 2020-12-10 RX ORDER — SODIUM CHLORIDE 9 MG/ML
1000 INJECTION INTRAMUSCULAR; INTRAVENOUS; SUBCUTANEOUS
Refills: 0 | Status: COMPLETED | OUTPATIENT
Start: 2020-12-10 | End: 2020-12-10

## 2020-12-10 RX ORDER — HYDRALAZINE HCL 50 MG
25 TABLET ORAL THREE TIMES A DAY
Refills: 0 | Status: DISCONTINUED | OUTPATIENT
Start: 2020-12-10 | End: 2020-12-16

## 2020-12-10 RX ADMIN — RIVAROXABAN 15 MILLIGRAM(S): KIT at 18:43

## 2020-12-10 RX ADMIN — Medication 1: at 17:27

## 2020-12-10 RX ADMIN — SODIUM CHLORIDE 1000 MILLILITER(S): 9 INJECTION INTRAMUSCULAR; INTRAVENOUS; SUBCUTANEOUS at 09:00

## 2020-12-10 RX ADMIN — SODIUM CHLORIDE 75 MILLILITER(S): 9 INJECTION INTRAMUSCULAR; INTRAVENOUS; SUBCUTANEOUS at 21:39

## 2020-12-10 RX ADMIN — ATORVASTATIN CALCIUM 40 MILLIGRAM(S): 80 TABLET, FILM COATED ORAL at 21:36

## 2020-12-10 RX ADMIN — Medication 25 MILLIGRAM(S): at 21:36

## 2020-12-10 RX ADMIN — SODIUM CHLORIDE 1000 MILLILITER(S): 9 INJECTION, SOLUTION INTRAVENOUS at 12:21

## 2020-12-10 RX ADMIN — Medication 100 MILLIGRAM(S): at 18:44

## 2020-12-10 RX ADMIN — Medication 5 MILLIGRAM(S): at 12:03

## 2020-12-10 RX ADMIN — SODIUM CHLORIDE 333.33 MILLILITER(S): 9 INJECTION, SOLUTION INTRAVENOUS at 10:40

## 2020-12-10 RX ADMIN — SODIUM CHLORIDE 1000 MILLILITER(S): 9 INJECTION INTRAMUSCULAR; INTRAVENOUS; SUBCUTANEOUS at 10:00

## 2020-12-10 RX ADMIN — CLOPIDOGREL BISULFATE 75 MILLIGRAM(S): 75 TABLET, FILM COATED ORAL at 18:44

## 2020-12-10 RX ADMIN — ONDANSETRON 4 MILLIGRAM(S): 8 TABLET, FILM COATED ORAL at 09:35

## 2020-12-10 RX ADMIN — Medication 25 MILLIGRAM(S): at 18:43

## 2020-12-10 RX ADMIN — ISOSORBIDE MONONITRATE 30 MILLIGRAM(S): 60 TABLET, EXTENDED RELEASE ORAL at 21:38

## 2020-12-10 RX ADMIN — Medication 50 MILLIGRAM(S): at 12:21

## 2020-12-10 RX ADMIN — Medication 1 SPRAY(S): at 18:43

## 2020-12-10 NOTE — H&P ADULT - PROBLEM SELECTOR PLAN 8
No additional prophylaxis needed.  Pt on Xarelto for Afib No additional prophylaxis needed.  Pt on Xarelto for Afib  Fall precautions  PT Daily glucose monitoring  insulin sliding scale  DASH 1800 ADA diet  serum HgA1C

## 2020-12-10 NOTE — CHART NOTE - NSCHARTNOTEFT_GEN_A_CORE
SUBJECTIVE:    CC: "I feel ok"  HPI: Notified by RN that Pt HR intermittently increased on tele  Denies F/C, N/V, HA, CP, SOB, palpitations, cough, diaphoresis, vision changes, sore throat, abd pain, diarrhea, numbness, weakness, rashes, pain.     ROS:    Constitutional: Denies F/C, malaise, fatigue, diaphoresis, weakness, weight loss  Eyes: Denies visual changes, eye pain, double vision  ENT: Denies rhinorrhea, congestion, sinus pain/pressure, ear pain, tinnitus, sore throat, odynophagia  Cardio: Denies CP, palpitations, edema, paroxysmal nocturnal dyspnea, orthopnea   Pulm: Denies cough, wheezing, hemoptysis, SOB  GI: Denies N/V, Diarrhea, constipation, Abd pain, dysphagia, anorexia, hematemesis, BRBPR, melena  : Denies, dysuria, frequency, incontinence, change in urine color, difficulty urinating  MSK: Denies arthralgia, joint swelling, decreased ROM  Skin: Denies pruritus, rashes, lesions, wounds  Neuro: Denies seizures, HA, paresthesia, numbness, weakness  Psych: Denies Anxiety, depression, difficulty concentrating, labile mood, lack of energy, trouble sleeping  Endocrine: Denies heat/cold intolerance, polydipsia, polyuria  Hematologic: Denies bleeding, easy bruising, painful/swollen lymph nodes  Positives and pertinent negatives noted, all other systems negative.     PAST MEDICAL & SURGICAL HISTORY:  Gout    HLD (hyperlipidemia)    HTN (hypertension)    AF (atrial fibrillation)    Stented coronary artery  x1        OBJECTIVE:  Vital Signs Last 24 Hrs  T(C): 36.3 (12-10-20 @ 21:45), Max: 37.1 (12-10-20 @ 18:47)  T(F): 97.4 (12-10-20 @ 21:45), Max: 98.7 (12-10-20 @ 18:47)  HR: 95 (12-10-20 @ 21:45) (88 - 132)  BP: 156/106 (12-10-20 @ 21:45) (128/85 - 180/110)  RR: 20 (12-10-20 @ 21:45) (18 - 22)  SpO2: 92% (12-10-20 @ 21:45) (92% - 100%) on (O2)    Physical Exam:   General: NAD, A&Ox3, WN/WD  Eyes: PERRLA, EOMI, Vision grossly intact b/l  ENT: MMM, no stridor, no pharyngeal/tonsilar erythema/edema, hearing grossly intact  Neck: Supple, trachea midline, no JVD, no thyromegaly/nodules, no lymphademopathy, no ttp  Resp: CTA b/l, no wheezing, rales, rhonchi  Cardio: RRR, nl S1/2, no murmurs, rubs, or gallops  Abd: Soft, NT/ND, +BS  Extremities: no edema, radial/DP pulses 2+ b/l, no acrocyanosis, Cap refill<3 sec  Neuro: Strength 5/5 in UE/LE b/l, sensation equal/intact b/l, CN 2-12 intact  Skin: no rashes, ecchymosis, normal temp, turgur  Lymph: no neck, axilla, groin LAD  Psych: appropriate mood/affect                          13.9   6.95  )-----------( 220      ( 10 Dec 2020 09:14 )             42.6     12-10    136  |  99  |  19  ----------------------------<  128<H>  4.5   |  24  |  1.45<H>    Ca    8.8      10 Dec 2020 09:14    TPro  7.7  /  Alb  3.2<L>  /  TBili  1.3<H>  /  DBili  x   /  AST  62<H>  /  ALT  30  /  AlkPhos  74  12-10    PT/INR - ( 10 Dec 2020 09:14 )   PT: 13.9 sec;   INR: 1.22 ratio         PTT - ( 10 Dec 2020 09:14 )  PTT:27.9 sec  MEDICATIONS  (STANDING):  allopurinol 100 milliGRAM(s) Oral daily  atorvastatin 40 milliGRAM(s) Oral at bedtime  clopidogrel Tablet 75 milliGRAM(s) Oral daily  dextrose 40% Gel 15 Gram(s) Oral once  dextrose 5%. 1000 milliLiter(s) (50 mL/Hr) IV Continuous <Continuous>  dextrose 5%. 1000 milliLiter(s) (100 mL/Hr) IV Continuous <Continuous>  dextrose 50% Injectable 25 Gram(s) IV Push once  dextrose 50% Injectable 12.5 Gram(s) IV Push once  dextrose 50% Injectable 25 Gram(s) IV Push once  fluticasone propionate 50 MICROgram(s)/spray Nasal Spray 1 Spray(s) Both Nostrils two times a day  glucagon  Injectable 1 milliGRAM(s) IntraMuscular once  hydrALAZINE 25 milliGRAM(s) Oral three times a day  insulin lispro (ADMELOG) corrective regimen sliding scale   SubCutaneous three times a day before meals  insulin lispro (ADMELOG) corrective regimen sliding scale   SubCutaneous at bedtime  isosorbide   mononitrate ER Tablet (IMDUR) 30 milliGRAM(s) Oral daily  metoprolol tartrate 25 milliGRAM(s) Oral two times a day  rivaroxaban 15 milliGRAM(s) Oral with dinner    MEDICATIONS  (PRN):    Reviewed all relevant lab results, tests, radiology studies, medications, telemetry, nursing assessments, and EKG.     ASSESSMENT:    80y Male admitted with complaints of Patient is a 80y old  Male who presents with a chief complaint of syncope (10 Dec 2020 15:20)  Now c/o ________________    Problem:   1)    2)    PLAN:  1)    2)    Case d/w Dr_______________ , in agreement w/ assessment and plan.     Will continue to monitor   [] Low complexity/risk (Time> 15min)  [] Medium complexity/risk (Time> 25 min)  [] High complexity/risk (Time>35 min) SUBJECTIVE:    CC: "I feel ok"  HPI: Notified by RN that Pt HR intermittently increased on tele and upon entering his room, found the patient down on the floor w/ injury to head but otherwise in usual state of health. Pt assessed at bedside. Denies F/C, N/V, HA, CP, SOB, palpitations, cough, diaphoresis, vision changes, sore throat, abd pain, diarrhea, numbness, weakness, rashes, pain.     ROS:    Constitutional: Denies F/C, malaise, fatigue, diaphoresis, weakness, weight loss  Eyes: Denies visual changes, eye pain, double vision  ENT: Denies rhinorrhea, congestion, sinus pain/pressure, ear pain, tinnitus, sore throat, odynophagia  Cardio: Denies CP, palpitations, edema, paroxysmal nocturnal dyspnea, orthopnea   Pulm: Denies cough, wheezing, hemoptysis, SOB  GI: Denies N/V, Diarrhea, constipation, Abd pain, dysphagia, anorexia, hematemesis, BRBPR, melena  : Denies, dysuria, frequency, incontinence, change in urine color, difficulty urinating  MSK: Denies arthralgia, joint swelling, decreased ROM  Skin: Denies pruritus, rashes, lesions, wounds  Neuro: Denies seizures, HA, paresthesia, numbness, weakness  Psych: Denies Anxiety, depression, difficulty concentrating, labile mood, lack of energy, trouble sleeping  Endocrine: Denies heat/cold intolerance, polydipsia, polyuria  Hematologic: Denies bleeding, easy bruising, painful/swollen lymph nodes  Positives and pertinent negatives noted, all other systems negative.     PAST MEDICAL & SURGICAL HISTORY:  Gout    HLD (hyperlipidemia)    HTN (hypertension)    AF (atrial fibrillation)    Stented coronary artery  x1        OBJECTIVE:  Vital Signs Last 24 Hrs  T(C): 36.3 (12-10-20 @ 21:45), Max: 37.1 (12-10-20 @ 18:47)  T(F): 97.4 (12-10-20 @ 21:45), Max: 98.7 (12-10-20 @ 18:47)  HR: 95 (12-10-20 @ 21:45) (88 - 132)  BP: 156/106 (12-10-20 @ 21:45) (128/85 - 180/110)  RR: 20 (12-10-20 @ 21:45) (18 - 22)  SpO2: 92% (12-10-20 @ 21:45) (92% - 100%) on (O2)    Physical Exam:   General: NAD, A&Ox3, WN/WD  Eyes: PERRLA, EOMI, Vision grossly intact b/l  ENT: MMM, no stridor, no pharyngeal/tonsilar erythema/edema, hearing grossly intact  Neck: Supple, trachea midline, no JVD, no thyromegaly/nodules, no lymphademopathy, no ttp  Resp: CTA b/l, no wheezing, rales, rhonchi  Cardio: RRR, nl S1/2, no murmurs, rubs, or gallops  Abd: Soft, NT/ND, +BS  Extremities: no edema, radial/DP pulses 2+ b/l, no acrocyanosis, Cap refill<3 sec  Neuro: Strength 5/5 in UE/LE b/l, sensation equal/intact b/l, CN 2-12 intact  Skin: no rashes, ecchymosis, normal temp, turgur  Lymph: no neck, axilla, groin LAD  Psych: appropriate mood/affect                          13.9   6.95  )-----------( 220      ( 10 Dec 2020 09:14 )             42.6     12-10    136  |  99  |  19  ----------------------------<  128<H>  4.5   |  24  |  1.45<H>    Ca    8.8      10 Dec 2020 09:14    TPro  7.7  /  Alb  3.2<L>  /  TBili  1.3<H>  /  DBili  x   /  AST  62<H>  /  ALT  30  /  AlkPhos  74  12-10    PT/INR - ( 10 Dec 2020 09:14 )   PT: 13.9 sec;   INR: 1.22 ratio         PTT - ( 10 Dec 2020 09:14 )  PTT:27.9 sec  MEDICATIONS  (STANDING):  allopurinol 100 milliGRAM(s) Oral daily  atorvastatin 40 milliGRAM(s) Oral at bedtime  clopidogrel Tablet 75 milliGRAM(s) Oral daily  dextrose 40% Gel 15 Gram(s) Oral once  dextrose 5%. 1000 milliLiter(s) (50 mL/Hr) IV Continuous <Continuous>  dextrose 5%. 1000 milliLiter(s) (100 mL/Hr) IV Continuous <Continuous>  dextrose 50% Injectable 25 Gram(s) IV Push once  dextrose 50% Injectable 12.5 Gram(s) IV Push once  dextrose 50% Injectable 25 Gram(s) IV Push once  fluticasone propionate 50 MICROgram(s)/spray Nasal Spray 1 Spray(s) Both Nostrils two times a day  glucagon  Injectable 1 milliGRAM(s) IntraMuscular once  hydrALAZINE 25 milliGRAM(s) Oral three times a day  insulin lispro (ADMELOG) corrective regimen sliding scale   SubCutaneous three times a day before meals  insulin lispro (ADMELOG) corrective regimen sliding scale   SubCutaneous at bedtime  isosorbide   mononitrate ER Tablet (IMDUR) 30 milliGRAM(s) Oral daily  metoprolol tartrate 25 milliGRAM(s) Oral two times a day  rivaroxaban 15 milliGRAM(s) Oral with dinner    MEDICATIONS  (PRN):    Reviewed all relevant lab results, tests, radiology studies, medications, telemetry, nursing assessments, and EKG.     ASSESSMENT:    80y Male admitted with complaints of Patient is a 80y old  Male who presents with a chief complaint of syncope (10 Dec 2020 15:20)  Now c/o ________________    Problem:   1)    2)    PLAN:  1)    2)    Case d/w Dr_______________ , in agreement w/ assessment and plan.     Will continue to monitor   [] Low complexity/risk (Time> 15min)  [] Medium complexity/risk (Time> 25 min)  [] High complexity/risk (Time>35 min) SUBJECTIVE:    CC: "I feel ok"  HPI: Notified by RN that Pt HR intermittently increased on tele and upon entering his room, found the patient down on the floor w/ injury to head but otherwise at baseline per RN. Pt assessed at bedside. He states he got up to go to bathroom and felt dizzy and then "slipped," hitting his head on the floor but denies LOC. He endorses this is similar to the episode that happened prior to admission. He denies any other injury and other symptoms. Denies F/C, N/V, HA, CP, SOB, palpitations, current dizziness, diaphoresis, vision changes, abd pain, diarrhea, numbness, tingling, weakness, confusion.    ROS:    Constitutional: Denies F/C, malaise, fatigue, diaphoresis, weakness, weight loss  Eyes: Denies visual changes, eye pain, double vision  ENT: Denies rhinorrhea, congestion, sinus pain/pressure, ear pain, tinnitus, sore throat, odynophagia  Cardio: Denies CP, palpitations, edema, paroxysmal nocturnal dyspnea, orthopnea   Pulm: Denies cough, wheezing, hemoptysis, SOB  GI: Denies N/V, Diarrhea, constipation, Abd pain, dysphagia, anorexia, hematemesis, BRBPR, melena  : Denies, dysuria, frequency, incontinence, change in urine color, difficulty urinating  MSK: Denies arthralgia, joint swelling, decreased ROM  Skin: Denies pruritus, rashes, lesions, wounds  Neuro: Denies seizures, HA, paresthesia, numbness, weakness  Psych: Denies Anxiety, depression, difficulty concentrating, labile mood, lack of energy, trouble sleeping  Endocrine: Denies heat/cold intolerance, polydipsia, polyuria  Hematologic: Denies bleeding, easy bruising, painful/swollen lymph nodes  Positives and pertinent negatives noted, all other systems negative.       OBJECTIVE:  Vital Signs Last 24 Hrs  T(C): 36.3 (12-10-20 @ 21:45), Max: 37.1 (12-10-20 @ 18:47)  T(F): 97.4 (12-10-20 @ 21:45), Max: 98.7 (12-10-20 @ 18:47)  HR: 95 (12-10-20 @ 21:45) (88 - 132)  BP: 156/106 (12-10-20 @ 21:45) (128/85 - 180/110)  RR: 20 (12-10-20 @ 21:45) (18 - 22)  SpO2: 92% (12-10-20 @ 21:45) (92% - 100%) on (O2)    Physical Exam:   General: NAD, A&Ox3, WN/WD  Eyes: PERRLA, EOMI, peripheral vision and visual acuity intact b/l  Neck: Supple, trachea midline, no JVD, no thyromegaly/nodules, no lymphademopathy, no bony or musculoskeletal ttp  Resp: CTA b/l, no wheezing, rales, rhonchi  Cardio: RRR, nl S1/2, no murmurs, rubs, or gallops  Abd: Soft, NT/ND, +BS  Extremities: no edema, radial/DP pulses 2+ b/l, no acrocyanosis, Cap refill<3 sec  Neuro: lethargic, Strength 5/5 in UE/LE b/l, sensation equal/intact b/l, CN 2-12 intact. slightly delayed response to questions but Pt and RN state this is baseline, and noted prior to fall.   Skin: + Right Forehead hematoma approximately 3x3cm. No rashes, normal temp, turgur      Reviewed all relevant lab results, tests, radiology studies, medications, telemetry, nursing assessments, and EKG.     ASSESSMENT:    80y Male admitted with complaints of Patient is a 80y old  Male who presents with a chief complaint of syncope (10 Dec 2020 15:20)  Now c/o Fall    Problem/Plan  1) Syncope/Collapse  - fall/aspiration precautions  - c/w Tele  - TTE    2) r/o ICH/Frontal Fracture  - hematoma stable, nontender, marked to monitor progression  - CTH expedited, prelim from Rad resident [id number], no ICH or emergent findings, will f/u official read.   - Neuro checks q4h.       Case d/w PT, RN, in agreement w/ assessment and plan.     Will continue to monitor SUBJECTIVE:    CC: "I feel ok"  HPI: Notified by RN that Pt HR intermittently increased on tele and upon entering his room, found the patient down on the floor w/ injury to head but otherwise at baseline per RN. Pt assessed at bedside. He states he got up to go to bathroom and felt dizzy and then "slipped," hitting his head on the floor but denies LOC. He endorses this is similar to the episode that happened prior to admission. He denies any other injury and other symptoms. Denies F/C, N/V, HA, CP, SOB, palpitations, current dizziness, diaphoresis, vision changes, abd pain, diarrhea, numbness, tingling, weakness, confusion.    ROS:    Constitutional: Denies F/C, malaise, fatigue, diaphoresis, weakness, weight loss  Eyes: Denies visual changes, eye pain, double vision  ENT: Denies rhinorrhea, congestion, sinus pain/pressure, ear pain, tinnitus, sore throat, odynophagia  Cardio: Denies CP, palpitations, edema, paroxysmal nocturnal dyspnea, orthopnea   Pulm: Denies cough, wheezing, hemoptysis, SOB  GI: Denies N/V, Diarrhea, constipation, Abd pain, dysphagia, anorexia, hematemesis, BRBPR, melena  : Denies, dysuria, frequency, incontinence, change in urine color, difficulty urinating  MSK: Denies arthralgia, joint swelling, decreased ROM  Skin: Denies pruritus, rashes, lesions, wounds  Neuro: Denies seizures, HA, paresthesia, numbness, weakness  Psych: Denies Anxiety, depression, difficulty concentrating, labile mood, lack of energy, trouble sleeping  Endocrine: Denies heat/cold intolerance, polydipsia, polyuria  Hematologic: Denies bleeding, easy bruising, painful/swollen lymph nodes  Positives and pertinent negatives noted, all other systems negative.       OBJECTIVE:  Vital Signs Last 24 Hrs  T(C): 36.3 (12-10-20 @ 21:45), Max: 37.1 (12-10-20 @ 18:47)  T(F): 97.4 (12-10-20 @ 21:45), Max: 98.7 (12-10-20 @ 18:47)  HR: 95 (12-10-20 @ 21:45) (88 - 132)  BP: 156/106 (12-10-20 @ 21:45) (128/85 - 180/110)  RR: 20 (12-10-20 @ 21:45) (18 - 22)  SpO2: 92% (12-10-20 @ 21:45) (92% - 100%) on (O2)    Physical Exam:   General: NAD, A&Ox3, WN/WD  Eyes: PERRLA, EOMI, peripheral vision and visual acuity intact b/l  Neck: Supple, trachea midline, no JVD, no thyromegaly/nodules, no lymphademopathy, no bony or musculoskeletal ttp  Resp: CTA b/l, no wheezing, rales, rhonchi  Cardio: RRR, nl S1/2, no murmurs, rubs, or gallops  Abd: Soft, NT/ND, +BS  Extremities: no edema, radial/DP pulses 2+ b/l, no acrocyanosis, Cap refill<3 sec  Neuro: lethargic, Strength 5/5 in UE/LE b/l, sensation equal/intact b/l, CN 2-12 intact. slightly delayed response to questions but Pt and RN state this is baseline, and noted prior to fall.   Skin: + Right Forehead hematoma approximately 3x3cm. No rashes, normal temp, turgur      Reviewed all relevant lab results, tests, radiology studies, medications, telemetry, nursing assessments, and EKG.     ASSESSMENT:    80y Male admitted with complaints of Patient is a 80y old  Male who presents with a chief complaint of syncope (10 Dec 2020 15:20)  Now c/o Fall    Problem/Plan  1) Syncope/Collapse  - fall/aspiration precautions  - c/w Tele  - TTE    2) r/o ICH/Frontal Fracture, high risk given anticoagulation.  - hematoma stable, nontender, marked to monitor progression  - CTH expedited, prelim from Rad resident [id number], no ICH or emergent findings, will f/u official read.   - Neuro checks q4h.       Case d/w PT, RN, in agreement w/ assessment and plan.     Will continue to monitor

## 2020-12-10 NOTE — H&P ADULT - PROBLEM SELECTOR PLAN 3
[id number] Score=4  continue Xarelto and metoprolol Likely prerenal in nature.   C/w IVF 75cc to finish one dose.  Encourage po intake.  If renal function continue to worsen in the am, will check urine lytes and consider nephrology consult.

## 2020-12-10 NOTE — ED ADULT NURSE NOTE - OBJECTIVE STATEMENT
pt received alert and oriented x4. pt was recently d/c from [facility] for +covid. pt now c/o feeling dizzy and having a syncopal episode at home and feeling more weak when he got discharge. pt is afib on the cm. respirations equal and unlabored. pt denies chest pain, sob ,fevers chills, abdomal pain.

## 2020-12-10 NOTE — ED ADULT TRIAGE NOTE - CHIEF COMPLAINT QUOTE
pt arrives s/p syncope. pt c/o abdominal pain and states tried to use the bathroom when he passed out and fell.  pt LOC on plavix. pt denies and cough, fever, chest pain pt hx a fib , HTN. pt is covid +

## 2020-12-10 NOTE — H&P ADULT - NSHPLABSRESULTS_GEN_ALL_CORE
EKG: Afib @ 95bpm  CT Head + C-Spine: No acute hemorrhage mass, mass effect, fractures or dislocations are seen seen.  CXR: clear  BUN/Cr: 19/1.45  UA: mild proteinuria. Neg Leuks/nitrates.  COVID: POSitive  AST/ALT: 74/62

## 2020-12-10 NOTE — ED PROVIDER NOTE - OBJECTIVE STATEMENT
80yM h/o HTN , DM2 (on PO agents) , HLD , CAD (1 stent), Afib (Xarelto) presents to [facility]  presents fall s/p syncope while using the bathroom. Reports that he has been feeling more weak since discharge in which he was feeling dizzy while using the bathroom. Unsure if head trauma was involved. Recently admitted (12/1-12/7/20) for weakness, found to be covid positive requiring 02 supplementation, hyponatremic with CJ. Dizziness resolved.  Endorse nausea but denies headache, fevers, chest pain,  shortness of breath, abdominal pain, urinary or bowel irregularities.     Collateral from daughter: Lives with patient, heard the fall and found patient on the floor. States that patient has been more weak, decrease PO in having more difficulty walking since discharge. Use to ambulate without any assistance prior to admission but have bene using a cane since being home.

## 2020-12-10 NOTE — H&P ADULT - PROBLEM SELECTOR PLAN 1
tele monitor   Trend cardiac enzymes x 2  Orthostatic blood pressures  DASH 1800 ADA diet  -with Hx of CAD w/ stents continue Plavix, atorvastatin, metoprolol   -gentle IV fluids hydration w/ NS @ 75cc/hr  -PT eval  -check TSH, FLP, HgA1c

## 2020-12-10 NOTE — PROVIDER CONTACT NOTE (FALL NOTIFICATION) - SITUATION
Tele tech informed me patients hr was at 158. As I entered the room I saw the patient laying at the base of the bed on the floor.

## 2020-12-10 NOTE — H&P ADULT - PROBLEM SELECTOR PLAN 9
COVID positive on exam. No resp distress at present.  Isolation  Hold off on starting antiviral and steroids at this time.  CXR clear for now  Cautious of additional IVF after finishing current dose No additional prophylaxis needed.  Pt on Xarelto for Afib  Fall precautions  PT

## 2020-12-10 NOTE — H&P ADULT - PROBLEM SELECTOR PLAN 7
Daily glucose monitoring  insulin sliding scale  DASH 1800 ADA diet  serum HgA1C serum FLP in am   continue statin

## 2020-12-10 NOTE — ED PROVIDER NOTE - ATTENDING CONTRIBUTION TO CARE
81 yo with HTN DM HLD CAD A fib presenting with an episode of syncope while using the bathroom.  Was experiencing some dizziness at the time when he thinks he passed out.  Recently was admitted and dc from [facility] for COVID requiring o2, CJ and hyponatremia.  Collateral from daughter is that he has been having progressive weakness, problems with ambulation and decreased PO since dc form hospital.  she heard him fall and found him on the floor.    Gen: Well appearing in NAD  Head: NC/AT  Neck: trachea midline  Resp:  No distress CTAB  CV: irreg irreg  Ext: no deformities  Neuro:  A&O appears non focal  Skin:  Warm and dry as visualized  Psych:  Normal affect and mood     81 yo with mult med co-morbidities with a complicated COVID course now with an episode of syncope which based on its history could very well be vasovagal but with the patient's collateral of progressive weakness and problems with ambulation need to consider if he could even be a safe discharge home.  Will need labs and imaging of the head.  There is a low threshold to readmit 79 yo with HTN DM HLD CAD A fib presenting with an episode of syncope while using the bathroom.  Was experiencing some dizziness at the time when he thinks he passed out.  Recently was admitted and dc from [facility] for COVID requiring o2, CJ and hyponatremia.  Collateral from daughter is that he has been having progressive weakness, problems with ambulation and decreased PO since dc form hospital.  she heard him fall and found him on the floor.    Gen: Well appearing in NAD  Head: NC/AT  Neck: trachea midline  Resp:  No distress CTAB  CV: irreg irreg  Ext: no deformities  Neuro:  A&O appears non focal  Skin:  Warm and dry as visualized  Psych:  Normal affect and mood     79 yo with mult med co-morbidities with a complicated COVID course now with an episode of syncope which based on its history could very well be vasovagal but with the patient's collateral of progressive weakness and problems with ambulation need to consider if he could even be a safe discharge home.  Will need labs and imaging of the head.  There is a low threshold to readmit.

## 2020-12-10 NOTE — ED ADULT NURSE NOTE - NSIMPLEMENTINTERV_GEN_ALL_ED
Implemented All Fall Risk Interventions:  [place] to call system. Call bell, personal items and telephone within reach. Instruct patient to call for assistance. Room bathroom lighting operational. Non-slip footwear when patient is off stretcher. Physically safe environment: no spills, clutter or unnecessary equipment. Stretcher in lowest position, wheels locked, appropriate side rails in place. Provide visual cue, wrist band, yellow gown, etc. Monitor gait and stability. Monitor for mental status changes and reorient to person, place, and time. Review medications for side effects contributing to fall risk. Reinforce activity limits and safety measures with patient and family.

## 2020-12-10 NOTE — ED PROVIDER NOTE - PHYSICAL EXAMINATION
Gen: AAOx3, non-toxic  Head: NCAT  HEENT: EOMI, oral mucosa moist, normal conjunctiva  Lung: CTAB, no respiratory distress, speaking in full sentences  CV: RRR, no murmurs, rubs or gallops  Abd: soft, NTND, no guarding  MSK: no visible deformities, Pelvis stable          Strength 5/5 U/E b/l, 4/5 LE b/l  Neuro: No focal sensory or motor deficits  Skin: Warm, well perfused, no rash  Psych: normal affect.   ~Jason Lincoln M.D. Resident

## 2020-12-10 NOTE — ED PROVIDER NOTE - NS ED ROS FT
GENERAL: +weakness, No fever or chills, EYES: no change in vision, HEENT: no trouble swallowing or speaking, CARDIAC: no chest pain, PULMONARY: no cough or SOB, GI: no abdominal pain, +nausea, no vomiting, no diarrhea or constipation, : No changes in urination, SKIN: no rashes, NEURO: +dizziness, no headache,  MSK: No joint pain ~Jason Lincoln M.D. Resident

## 2020-12-10 NOTE — H&P ADULT - ASSESSMENT
80yM h/o HTN , DM2 (on PO agents) , HLD , CAD (1 stent), Afib (Xarelto) presents to [facility]  p/w dizziness, weakness, s/p fall, admitted for syncope and collapse, r/o ACS, and CJ in a setting of COVID-19 infection.

## 2020-12-10 NOTE — ED PROVIDER NOTE - CLINICAL SUMMARY MEDICAL DECISION MAKING FREE TEXT BOX
80yM h/o HTN , DM2 (on PO agents) , HLD , CAD (1 stent), Afib (Xarelto) presents to [facility]  presents fall s/p syncope while using the bathroom. Concern for cardiac vs metabolic vs vasovagal vs msk. Will get ekg, labs, trop, coags, CXR, CT head/Cspine, CXR, UA and reassess

## 2020-12-10 NOTE — H&P ADULT - HISTORY OF PRESENT ILLNESS
80yM h/o HTN , DM2 (on PO agents) , HLD , CAD (1 stent), Afib (Xarelto) presents to [facility]  p/w dizziness, weakness, s/p fall last night. Pt reports he was sitting on the toilet trying to have a bowel movement and next thing he remembers his son in law was by bedside. Pt reports his son in law heard a "thud" sound and came right away to the bathroom. Pt admits to feeling little confused at that time and urinated on himself. He recently was discharged from [facility] on 12/7/2020 for COVID infection and PNA where he was treated with IV abx and discharged home. Pt admits decreased appetite, dizziness and generalized weakness. He denies fever, chills, cough, congestion, rhinorrhea, chest pain, sob, or palpitations.

## 2020-12-10 NOTE — H&P ADULT - PROBLEM SELECTOR PLAN 2
-gentle IV fluids hydration w/ NS @ 75cc/hr  -encourage PO intake COVID positive on exam. No resp distress at present.  Isolation  Hold off on starting antiviral and steroids at this time.  CXR clear for now  Cautious of additional IVF after finishing current dose

## 2020-12-11 DIAGNOSIS — T14.8XXA OTHER INJURY OF UNSPECIFIED BODY REGION, INITIAL ENCOUNTER: ICD-10-CM

## 2020-12-11 LAB
A1C WITH ESTIMATED AVERAGE GLUCOSE RESULT: 6.2 % — HIGH (ref 4–5.6)
ALBUMIN SERPL ELPH-MCNC: 2.9 G/DL — LOW (ref 3.3–5)
ALP SERPL-CCNC: 77 U/L — SIGNIFICANT CHANGE UP (ref 40–120)
ALT FLD-CCNC: 58 U/L — HIGH (ref 4–41)
ANION GAP SERPL CALC-SCNC: 10 MMOL/L — SIGNIFICANT CHANGE UP (ref 7–14)
AST SERPL-CCNC: 88 U/L — HIGH (ref 4–40)
BILIRUB SERPL-MCNC: 1.3 MG/DL — HIGH (ref 0.2–1.2)
BUN SERPL-MCNC: 19 MG/DL — SIGNIFICANT CHANGE UP (ref 7–23)
CALCIUM SERPL-MCNC: 8.7 MG/DL — SIGNIFICANT CHANGE UP (ref 8.4–10.5)
CHLORIDE SERPL-SCNC: 101 MMOL/L — SIGNIFICANT CHANGE UP (ref 98–107)
CHOLEST SERPL-MCNC: 77 MG/DL — SIGNIFICANT CHANGE UP
CO2 SERPL-SCNC: 25 MMOL/L — SIGNIFICANT CHANGE UP (ref 22–31)
CREAT SERPL-MCNC: 1.19 MG/DL — SIGNIFICANT CHANGE UP (ref 0.5–1.3)
CRP SERPL-MCNC: 47.9 MG/L — HIGH
D DIMER BLD IA.RAPID-MCNC: 2355 NG/ML DDU — HIGH
ERYTHROCYTE [SEDIMENTATION RATE] IN BLOOD: 12 MM/HR — SIGNIFICANT CHANGE UP (ref 1–15)
ESTIMATED AVERAGE GLUCOSE: 131 MG/DL — HIGH (ref 68–114)
FERRITIN SERPL-MCNC: 1160 NG/ML — HIGH (ref 30–400)
GLUCOSE BLDC GLUCOMTR-MCNC: 119 MG/DL — HIGH (ref 70–99)
GLUCOSE BLDC GLUCOMTR-MCNC: 123 MG/DL — HIGH (ref 70–99)
GLUCOSE BLDC GLUCOMTR-MCNC: 133 MG/DL — HIGH (ref 70–99)
GLUCOSE BLDC GLUCOMTR-MCNC: 134 MG/DL — HIGH (ref 70–99)
GLUCOSE SERPL-MCNC: 122 MG/DL — HIGH (ref 70–99)
HCT VFR BLD CALC: 42.5 % — SIGNIFICANT CHANGE UP (ref 39–50)
HDLC SERPL-MCNC: 40 MG/DL — LOW
HGB BLD-MCNC: 13.9 G/DL — SIGNIFICANT CHANGE UP (ref 13–17)
LDH SERPL L TO P-CCNC: 815 U/L — HIGH (ref 135–225)
LIPID PNL WITH DIRECT LDL SERPL: 27 MG/DL — SIGNIFICANT CHANGE UP
MCHC RBC-ENTMCNC: 23.5 PG — LOW (ref 27–34)
MCHC RBC-ENTMCNC: 32.7 GM/DL — SIGNIFICANT CHANGE UP (ref 32–36)
MCV RBC AUTO: 71.8 FL — LOW (ref 80–100)
NON HDL CHOLESTEROL: 37 MG/DL — SIGNIFICANT CHANGE UP
NRBC # BLD: 0 /100 WBCS — SIGNIFICANT CHANGE UP
NRBC # FLD: 0.03 K/UL — HIGH
NT-PROBNP SERPL-SCNC: HIGH PG/ML
PLATELET # BLD AUTO: 212 K/UL — SIGNIFICANT CHANGE UP (ref 150–400)
POTASSIUM SERPL-MCNC: 4 MMOL/L — SIGNIFICANT CHANGE UP (ref 3.5–5.3)
POTASSIUM SERPL-SCNC: 4 MMOL/L — SIGNIFICANT CHANGE UP (ref 3.5–5.3)
PROCALCITONIN SERPL-MCNC: 0.12 NG/ML — HIGH (ref 0.02–0.1)
PROT SERPL-MCNC: 7.4 G/DL — SIGNIFICANT CHANGE UP (ref 6–8.3)
RBC # BLD: 5.92 M/UL — HIGH (ref 4.2–5.8)
RBC # FLD: 19.5 % — HIGH (ref 10.3–14.5)
SODIUM SERPL-SCNC: 136 MMOL/L — SIGNIFICANT CHANGE UP (ref 135–145)
TRIGL SERPL-MCNC: 52 MG/DL — SIGNIFICANT CHANGE UP
TSH SERPL-MCNC: 0.38 UIU/ML — SIGNIFICANT CHANGE UP (ref 0.27–4.2)
WBC # BLD: 8.9 K/UL — SIGNIFICANT CHANGE UP (ref 3.8–10.5)
WBC # FLD AUTO: 8.9 K/UL — SIGNIFICANT CHANGE UP (ref 3.8–10.5)

## 2020-12-11 PROCEDURE — 93306 TTE W/DOPPLER COMPLETE: CPT | Mod: 26

## 2020-12-11 PROCEDURE — 99233 SBSQ HOSP IP/OBS HIGH 50: CPT

## 2020-12-11 RX ADMIN — Medication 1 SPRAY(S): at 05:30

## 2020-12-11 RX ADMIN — RIVAROXABAN 15 MILLIGRAM(S): KIT at 17:59

## 2020-12-11 RX ADMIN — Medication 25 MILLIGRAM(S): at 22:04

## 2020-12-11 RX ADMIN — Medication 1 SPRAY(S): at 17:59

## 2020-12-11 RX ADMIN — Medication 25 MILLIGRAM(S): at 15:50

## 2020-12-11 RX ADMIN — CLOPIDOGREL BISULFATE 75 MILLIGRAM(S): 75 TABLET, FILM COATED ORAL at 15:50

## 2020-12-11 RX ADMIN — Medication 100 MILLIGRAM(S): at 15:50

## 2020-12-11 RX ADMIN — ISOSORBIDE MONONITRATE 30 MILLIGRAM(S): 60 TABLET, EXTENDED RELEASE ORAL at 15:50

## 2020-12-11 RX ADMIN — Medication 25 MILLIGRAM(S): at 17:59

## 2020-12-11 RX ADMIN — Medication 25 MILLIGRAM(S): at 05:30

## 2020-12-11 NOTE — PHYSICAL THERAPY INITIAL EVALUATION ADULT - PERTINENT HX OF CURRENT PROBLEM, REHAB EVAL
80yM h/o HTN , DM2 (on PO agents) , HLD , CAD (1 stent), Afib (Xarelto) presents to [facility]  p/w dizziness, weakness, s/p fall last night. Pt reports he was sitting on the toilet trying to have a bowel movement and next thing he remembers his son in law was by bedside. Pt reports his son in law heard a "thud" sound and came right away to the bathroom.

## 2020-12-11 NOTE — PROGRESS NOTE ADULT - PROBLEM SELECTOR PLAN 9
No additional prophylaxis needed.  Pt on Xarelto for Afib  Fall precautions  PT Daily glucose monitoring  insulin sliding scale  DASH 1800 ADA diet  serum HgA1C

## 2020-12-11 NOTE — PROVIDER CONTACT NOTE (OTHER) - SITUATION
Patient was tachycardic in the 140s while voiding, pt remains asymptomatic, denies any chest pain or SOB

## 2020-12-11 NOTE — PROGRESS NOTE ADULT - PROBLEM SELECTOR PLAN 8
Daily glucose monitoring  insulin sliding scale  DASH 1800 ADA diet  serum HgA1C - hold statin in setting of worsening transaminitis

## 2020-12-11 NOTE — PROGRESS NOTE ADULT - SUBJECTIVE AND OBJECTIVE BOX
Patient is a 80y old  Male who presents with a chief complaint of syncope (10 Dec 2020 15:20)      SUBJECTIVE / OVERNIGHT EVENTS:    Pt seen and examined at bedside, very fatigued, does not recall fall last night, minimal po intake, reports dyspnea stable, no active CP.     MEDICATIONS  (STANDING):  allopurinol 100 milliGRAM(s) Oral daily  atorvastatin 40 milliGRAM(s) Oral at bedtime  clopidogrel Tablet 75 milliGRAM(s) Oral daily  dextrose 40% Gel 15 Gram(s) Oral once  dextrose 5%. 1000 milliLiter(s) (50 mL/Hr) IV Continuous <Continuous>  dextrose 5%. 1000 milliLiter(s) (100 mL/Hr) IV Continuous <Continuous>  dextrose 50% Injectable 25 Gram(s) IV Push once  dextrose 50% Injectable 12.5 Gram(s) IV Push once  dextrose 50% Injectable 25 Gram(s) IV Push once  fluticasone propionate 50 MICROgram(s)/spray Nasal Spray 1 Spray(s) Both Nostrils two times a day  glucagon  Injectable 1 milliGRAM(s) IntraMuscular once  hydrALAZINE 25 milliGRAM(s) Oral three times a day  insulin lispro (ADMELOG) corrective regimen sliding scale   SubCutaneous three times a day before meals  insulin lispro (ADMELOG) corrective regimen sliding scale   SubCutaneous at bedtime  isosorbide   mononitrate ER Tablet (IMDUR) 30 milliGRAM(s) Oral daily  metoprolol tartrate 25 milliGRAM(s) Oral two times a day  rivaroxaban 15 milliGRAM(s) Oral with dinner    MEDICATIONS  (PRN):      Vital Signs Last 24 Hrs  T(C): 36.3 (11 Dec 2020 10:29), Max: 37.1 (10 Dec 2020 18:47)  T(F): 97.4 (11 Dec 2020 10:29), Max: 98.7 (10 Dec 2020 18:47)  HR: 82 (11 Dec 2020 10:29) (82 - 132)  BP: 154/81 (11 Dec 2020 10:29) (153/99 - 180/110)  BP(mean): --  RR: 19 (11 Dec 2020 10:29) (18 - 20)  SpO2: 95% (11 Dec 2020 10:29) (92% - 97%)  CAPILLARY BLOOD GLUCOSE      POCT Blood Glucose.: 123 mg/dL (11 Dec 2020 11:53)  POCT Blood Glucose.: 119 mg/dL (11 Dec 2020 07:38)  POCT Blood Glucose.: 158 mg/dL (10 Dec 2020 21:26)  POCT Blood Glucose.: 161 mg/dL (10 Dec 2020 17:08)    I&O's Summary    10 Dec 2020 07:01  -  11 Dec 2020 07:00  --------------------------------------------------------  IN: 0 mL / OUT: 1150 mL / NET: -1150 mL        PHYSICAL EXAM:  Vital Signs Last 24 Hrs  T(C): 36.3 (20 @ 10:29)  T(F): 97.4 (20 @ 10:29), Max: 98.7 (12-10-20 @ 18:47)  HR: 82 (20 @ 10:29) (82 - 132)  BP: 154/81 (20 @ 10:29)  BP(mean): --  RR: 19 (20 @ 10:29) (18 - 20)  SpO2: 95% (20 @ 10:29) (92% - 97%)  Wt(kg): --    12-10 @ 07:01  -   @ 07:00  --------------------------------------------------------  IN: 0 mL / OUT: 1150 mL / NET: -1150 mL      Constitutional: NAD, awake and alert  Respiratory: Breath sounds are clear bilaterally, No wheezing, rales or rhonchi  Cardiovascular: S1 and S2, regular rate and rhythm, no Murmurs, gallops or rubs, no JVD,    Gastrointestinal: Bowel Sounds present, soft, nontender, nondistended, no guarding, no rebound  Extremities: No cyanosis or clubbing; warm to touch  Vascular: 2+ peripheral pulses lower ex  Neurological: No focal deficits, CN II-XII intact bilaterally, sensation to light touch intact in all extremities. Pupils are equally reactive to light and symmetrical in size.   Musculoskeletal: 5/5 strength b/l upper and lower extremities; no joint swelling.  Skin: No rashes  Psych: AAOx3  HEME: no bruises, no nose bleeds      LABS:                        13.9   8.90  )-----------( 212      ( 11 Dec 2020 07:32 )             42.5     12-11    136  |  101  |  19  ----------------------------<  122<H>  4.0   |  25  |  1.19    Ca    8.7      11 Dec 2020 07:32    TPro  7.4  /  Alb  2.9<L>  /  TBili  1.3<H>  /  DBili  x   /  AST  88<H>  /  ALT  58<H>  /  AlkPhos  77  12-11    PT/INR - ( 10 Dec 2020 09:14 )   PT: 13.9 sec;   INR: 1.22 ratio         PTT - ( 10 Dec 2020 09:14 )  PTT:27.9 sec      Urinalysis Basic - ( 10 Dec 2020 09:14 )    Color: Light Yellow / Appearance: Clear / S.010 / pH: x  Gluc: x / Ketone: Trace  / Bili: Negative / Urobili: <2 mg/dL   Blood: x / Protein: 30 mg/dL / Nitrite: Negative   Leuk Esterase: Negative / RBC: 0 /HPF / WBC 0 /HPF   Sq Epi: x / Non Sq Epi: 0 /HPF / Bacteria: Negative        RADIOLOGY & ADDITIONAL TESTS:    Imaging Personally Reviewed:    Consultant(s) Notes Reviewed:      Care Discussed with Consultants/Other Providers:   Patient is a 80y old  Male who presents with a chief complaint of syncope (10 Dec 2020 15:20)      SUBJECTIVE / OVERNIGHT EVENTS:    Pt seen and examined at bedside, very fatigued, does not recall mechanical fall last night, minimal po intake, reports dyspnea stable, no active CP.     MEDICATIONS  (STANDING):  allopurinol 100 milliGRAM(s) Oral daily  atorvastatin 40 milliGRAM(s) Oral at bedtime  clopidogrel Tablet 75 milliGRAM(s) Oral daily  dextrose 40% Gel 15 Gram(s) Oral once  dextrose 5%. 1000 milliLiter(s) (50 mL/Hr) IV Continuous <Continuous>  dextrose 5%. 1000 milliLiter(s) (100 mL/Hr) IV Continuous <Continuous>  dextrose 50% Injectable 25 Gram(s) IV Push once  dextrose 50% Injectable 12.5 Gram(s) IV Push once  dextrose 50% Injectable 25 Gram(s) IV Push once  fluticasone propionate 50 MICROgram(s)/spray Nasal Spray 1 Spray(s) Both Nostrils two times a day  glucagon  Injectable 1 milliGRAM(s) IntraMuscular once  hydrALAZINE 25 milliGRAM(s) Oral three times a day  insulin lispro (ADMELOG) corrective regimen sliding scale   SubCutaneous three times a day before meals  insulin lispro (ADMELOG) corrective regimen sliding scale   SubCutaneous at bedtime  isosorbide   mononitrate ER Tablet (IMDUR) 30 milliGRAM(s) Oral daily  metoprolol tartrate 25 milliGRAM(s) Oral two times a day  rivaroxaban 15 milliGRAM(s) Oral with dinner    MEDICATIONS  (PRN):      Vital Signs Last 24 Hrs  T(C): 36.3 (11 Dec 2020 10:29), Max: 37.1 (10 Dec 2020 18:47)  T(F): 97.4 (11 Dec 2020 10:29), Max: 98.7 (10 Dec 2020 18:47)  HR: 82 (11 Dec 2020 10:29) (82 - 132)  BP: 154/81 (11 Dec 2020 10:29) (153/99 - 180/110)  BP(mean): --  RR: 19 (11 Dec 2020 10:29) (18 - 20)  SpO2: 95% (11 Dec 2020 10:29) (92% - 97%)  CAPILLARY BLOOD GLUCOSE      POCT Blood Glucose.: 123 mg/dL (11 Dec 2020 11:53)  POCT Blood Glucose.: 119 mg/dL (11 Dec 2020 07:38)  POCT Blood Glucose.: 158 mg/dL (10 Dec 2020 21:26)  POCT Blood Glucose.: 161 mg/dL (10 Dec 2020 17:08)    I&O's Summary    10 Dec 2020 07:01  -  11 Dec 2020 07:00  --------------------------------------------------------  IN: 0 mL / OUT: 1150 mL / NET: -1150 mL        PHYSICAL EXAM:  Vital Signs Last 24 Hrs  T(C): 36.3 (20 @ 10:29)  T(F): 97.4 (20 @ 10:29), Max: 98.7 (12-10-20 @ 18:47)  HR: 82 (20 @ 10:29) (82 - 132)  BP: 154/81 (20 @ 10:29)  BP(mean): --  RR: 19 (20 @ 10:29) (18 - 20)  SpO2: 95% (20 @ 10:29) (92% - 97%)  Wt(kg): --    12-10 @ 07:01  -   @ 07:00  --------------------------------------------------------  IN: 0 mL / OUT: 1150 mL / NET: -1150 mL      Constitutional: NAD, awake and alert  Respiratory: Breath sounds are clear bilaterally, No wheezing, rales or rhonchi  Cardiovascular: S1 and S2, regular rate and rhythm, no Murmurs, gallops or rubs, no JVD,    Gastrointestinal: Bowel Sounds present, soft, nontender, nondistended, no guarding, no rebound  Extremities: No cyanosis or clubbing; warm to touch  Vascular: 2+ peripheral pulses lower ex  Neurological: No focal deficits, CN II-XII intact bilaterally, sensation to light touch intact in all extremities. Pupils are equally reactive to light and symmetrical in size.   Musculoskeletal: 5/5 strength b/l upper and lower extremities; no joint swelling.  Skin: No rashes  Psych: AAOx3  HEME: no bruises, no nose bleeds      LABS:                        13.9   8.90  )-----------( 212      ( 11 Dec 2020 07:32 )             42.5     12-    136  |  101  |  19  ----------------------------<  122<H>  4.0   |  25  |  1.19    Ca    8.7      11 Dec 2020 07:32    TPro  7.4  /  Alb  2.9<L>  /  TBili  1.3<H>  /  DBili  x   /  AST  88<H>  /  ALT  58<H>  /  AlkPhos  77  12-11    PT/INR - ( 10 Dec 2020 09:14 )   PT: 13.9 sec;   INR: 1.22 ratio         PTT - ( 10 Dec 2020 09:14 )  PTT:27.9 sec      Urinalysis Basic - ( 10 Dec 2020 09:14 )    Color: Light Yellow / Appearance: Clear / S.010 / pH: x  Gluc: x / Ketone: Trace  / Bili: Negative / Urobili: <2 mg/dL   Blood: x / Protein: 30 mg/dL / Nitrite: Negative   Leuk Esterase: Negative / RBC: 0 /HPF / WBC 0 /HPF   Sq Epi: x / Non Sq Epi: 0 /HPF / Bacteria: Negative        RADIOLOGY & ADDITIONAL TESTS:    Imaging Personally Reviewed:    Consultant(s) Notes Reviewed:      Care Discussed with Consultants/Other Providers:

## 2020-12-11 NOTE — PHYSICAL THERAPY INITIAL EVALUATION ADULT - DISCHARGE DISPOSITION, PT EVAL
Unable to make recommendation at this time, please continue to follow pending further mobility assessment.

## 2020-12-11 NOTE — PROGRESS NOTE ADULT - PROBLEM SELECTOR PLAN 1
- initial troponin 25, no active CP at this time  - Orthostatic blood pressures pending, given LR x 1 and NS x 1  - DASH 1800 ADA diet  - with Hx of CAD w/ stents continue Plavix, atorvastatin, metoprolol   - PT eval

## 2020-12-11 NOTE — PHYSICAL THERAPY INITIAL EVALUATION ADULT - LEVEL OF INDEPENDENCE: SIT/STAND, REHAB EVAL
due to pt lethargic and complaining of fatigue. Pt laid back down after sitting up for approximately 30 seconds./unable to perform

## 2020-12-11 NOTE — PROGRESS NOTE ADULT - PROBLEM SELECTOR PLAN 2
COVID positive on exam. No resp distress at present but very fatigued  Pt just d/c 12/7, was trated w/ dexamehtasone at that time, remdesevir not given  - no increase oxygenation requirement at this time

## 2020-12-11 NOTE — PROGRESS NOTE ADULT - PROBLEM SELECTOR PLAN 10
No additional prophylaxis needed.  Pt on Xarelto for Afib  Fall precautions  PT No additional prophylaxis needed.  Pt on Xarelto for Afib, reduced dose on 15 mg because he had an upper GI bleed due to peptic ulcers in the past  Fall precautions  PT

## 2020-12-11 NOTE — PROGRESS NOTE ADULT - ASSESSMENT
80yM h/o HTN , DM2 (on PO agents) , HLD , CAD (1 stent), Afib (Xarelto) presents to [facility]  p/w dizziness, weakness, s/p fall, admitted for syncope and collapse, r/o ACS, and CJ in a setting of COVID-19 infection w/ recent admission for COVID d/c on 12/7

## 2020-12-11 NOTE — PROGRESS NOTE ADULT - PROBLEM SELECTOR PLAN 4
[id number] Score=4  continue Xarelto and metoprolol - R scalp hematoma increasing on rpeat CTH after fall however at this time no headache, change in vision, continue to mointor.

## 2020-12-11 NOTE — PHYSICAL THERAPY INITIAL EVALUATION ADULT - ADDITIONAL COMMENTS
Pt poor historian, social history taken from chart. Pt lives with his son-in-law, pt was not using assistive device prior to admission.

## 2020-12-12 LAB
ANION GAP SERPL CALC-SCNC: 12 MMOL/L — SIGNIFICANT CHANGE UP (ref 7–14)
BUN SERPL-MCNC: 22 MG/DL — SIGNIFICANT CHANGE UP (ref 7–23)
CALCIUM SERPL-MCNC: 8.7 MG/DL — SIGNIFICANT CHANGE UP (ref 8.4–10.5)
CHLORIDE SERPL-SCNC: 97 MMOL/L — LOW (ref 98–107)
CO2 SERPL-SCNC: 22 MMOL/L — SIGNIFICANT CHANGE UP (ref 22–31)
CREAT SERPL-MCNC: 1.43 MG/DL — HIGH (ref 0.5–1.3)
GLUCOSE BLDC GLUCOMTR-MCNC: 104 MG/DL — HIGH (ref 70–99)
GLUCOSE BLDC GLUCOMTR-MCNC: 107 MG/DL — HIGH (ref 70–99)
GLUCOSE BLDC GLUCOMTR-MCNC: 117 MG/DL — HIGH (ref 70–99)
GLUCOSE BLDC GLUCOMTR-MCNC: 121 MG/DL — HIGH (ref 70–99)
GLUCOSE SERPL-MCNC: 94 MG/DL — SIGNIFICANT CHANGE UP (ref 70–99)
HCT VFR BLD CALC: 41.7 % — SIGNIFICANT CHANGE UP (ref 39–50)
HGB BLD-MCNC: 13.7 G/DL — SIGNIFICANT CHANGE UP (ref 13–17)
MAGNESIUM SERPL-MCNC: 2 MG/DL — SIGNIFICANT CHANGE UP (ref 1.6–2.6)
MCHC RBC-ENTMCNC: 24 PG — LOW (ref 27–34)
MCHC RBC-ENTMCNC: 32.9 GM/DL — SIGNIFICANT CHANGE UP (ref 32–36)
MCV RBC AUTO: 73 FL — LOW (ref 80–100)
NRBC # BLD: 0 /100 WBCS — SIGNIFICANT CHANGE UP
NRBC # FLD: 0 K/UL — SIGNIFICANT CHANGE UP
PHOSPHATE SERPL-MCNC: 3.1 MG/DL — SIGNIFICANT CHANGE UP (ref 2.5–4.5)
PLATELET # BLD AUTO: 200 K/UL — SIGNIFICANT CHANGE UP (ref 150–400)
POTASSIUM SERPL-MCNC: 4.7 MMOL/L — SIGNIFICANT CHANGE UP (ref 3.5–5.3)
POTASSIUM SERPL-SCNC: 4.7 MMOL/L — SIGNIFICANT CHANGE UP (ref 3.5–5.3)
RBC # BLD: 5.71 M/UL — SIGNIFICANT CHANGE UP (ref 4.2–5.8)
RBC # FLD: 19.7 % — HIGH (ref 10.3–14.5)
SODIUM SERPL-SCNC: 131 MMOL/L — LOW (ref 135–145)
WBC # BLD: 11.25 K/UL — HIGH (ref 3.8–10.5)
WBC # FLD AUTO: 11.25 K/UL — HIGH (ref 3.8–10.5)

## 2020-12-12 PROCEDURE — 99233 SBSQ HOSP IP/OBS HIGH 50: CPT

## 2020-12-12 RX ORDER — METOPROLOL TARTRATE 50 MG
50 TABLET ORAL
Refills: 0 | Status: DISCONTINUED | OUTPATIENT
Start: 2020-12-12 | End: 2020-12-16

## 2020-12-12 RX ORDER — POLYETHYLENE GLYCOL 3350 17 G/17G
17 POWDER, FOR SOLUTION ORAL
Refills: 0 | Status: DISCONTINUED | OUTPATIENT
Start: 2020-12-12 | End: 2020-12-16

## 2020-12-12 RX ORDER — METOPROLOL TARTRATE 50 MG
2.5 TABLET ORAL ONCE
Refills: 0 | Status: COMPLETED | OUTPATIENT
Start: 2020-12-12 | End: 2020-12-12

## 2020-12-12 RX ORDER — SENNA PLUS 8.6 MG/1
2 TABLET ORAL AT BEDTIME
Refills: 0 | Status: DISCONTINUED | OUTPATIENT
Start: 2020-12-12 | End: 2020-12-16

## 2020-12-12 RX ORDER — SODIUM CHLORIDE 9 MG/ML
1000 INJECTION INTRAMUSCULAR; INTRAVENOUS; SUBCUTANEOUS
Refills: 0 | Status: DISCONTINUED | OUTPATIENT
Start: 2020-12-12 | End: 2020-12-14

## 2020-12-12 RX ADMIN — Medication 25 MILLIGRAM(S): at 11:14

## 2020-12-12 RX ADMIN — RIVAROXABAN 15 MILLIGRAM(S): KIT at 17:32

## 2020-12-12 RX ADMIN — Medication 2.5 MILLIGRAM(S): at 02:58

## 2020-12-12 RX ADMIN — SODIUM CHLORIDE 100 MILLILITER(S): 9 INJECTION INTRAMUSCULAR; INTRAVENOUS; SUBCUTANEOUS at 16:15

## 2020-12-12 RX ADMIN — Medication 25 MILLIGRAM(S): at 22:48

## 2020-12-12 RX ADMIN — CLOPIDOGREL BISULFATE 75 MILLIGRAM(S): 75 TABLET, FILM COATED ORAL at 11:14

## 2020-12-12 RX ADMIN — Medication 100 MILLIGRAM(S): at 11:14

## 2020-12-12 RX ADMIN — ISOSORBIDE MONONITRATE 30 MILLIGRAM(S): 60 TABLET, EXTENDED RELEASE ORAL at 15:09

## 2020-12-12 RX ADMIN — Medication 25 MILLIGRAM(S): at 05:08

## 2020-12-12 RX ADMIN — Medication 1 SPRAY(S): at 17:32

## 2020-12-12 RX ADMIN — Medication 1 SPRAY(S): at 05:05

## 2020-12-12 RX ADMIN — Medication 25 MILLIGRAM(S): at 05:05

## 2020-12-12 RX ADMIN — Medication 50 MILLIGRAM(S): at 17:33

## 2020-12-12 NOTE — PROGRESS NOTE ADULT - SUBJECTIVE AND OBJECTIVE BOX
Patient is a 80y old  Male who presents with a chief complaint of syncope (11 Dec 2020 13:45)      SUBJECTIVE / OVERNIGHT EVENTS:    Patient seen and examined at bedside, much more responsive today, sitting up eating in bed, no BM for several days, urinating, has not ambulated out of bed. runs of afib overnight requiring IVP lopressor. No chest pain.     MEDICATIONS  (STANDING):  allopurinol 100 milliGRAM(s) Oral daily  clopidogrel Tablet 75 milliGRAM(s) Oral daily  dextrose 40% Gel 15 Gram(s) Oral once  dextrose 5%. 1000 milliLiter(s) (50 mL/Hr) IV Continuous <Continuous>  dextrose 5%. 1000 milliLiter(s) (100 mL/Hr) IV Continuous <Continuous>  dextrose 50% Injectable 25 Gram(s) IV Push once  dextrose 50% Injectable 12.5 Gram(s) IV Push once  dextrose 50% Injectable 25 Gram(s) IV Push once  fluticasone propionate 50 MICROgram(s)/spray Nasal Spray 1 Spray(s) Both Nostrils two times a day  glucagon  Injectable 1 milliGRAM(s) IntraMuscular once  hydrALAZINE 25 milliGRAM(s) Oral three times a day  insulin lispro (ADMELOG) corrective regimen sliding scale   SubCutaneous three times a day before meals  insulin lispro (ADMELOG) corrective regimen sliding scale   SubCutaneous at bedtime  isosorbide   mononitrate ER Tablet (IMDUR) 30 milliGRAM(s) Oral daily  metoprolol tartrate 25 milliGRAM(s) Oral two times a day  rivaroxaban 15 milliGRAM(s) Oral with dinner    MEDICATIONS  (PRN):      Vital Signs Last 24 Hrs  T(C): 36.3 (12 Dec 2020 11:07), Max: 36.9 (11 Dec 2020 15:51)  T(F): 97.4 (12 Dec 2020 11:07), Max: 98.5 (11 Dec 2020 15:51)  HR: 109 (12 Dec 2020 11:07) (80 - 130)  BP: 163/94 (12 Dec 2020 11:07) (142/92 - 163/94)  BP(mean): --  RR: 20 (12 Dec 2020 11:07) (18 - 20)  SpO2: 99% (12 Dec 2020 11:07) (95% - 99%)  CAPILLARY BLOOD GLUCOSE      POCT Blood Glucose.: 117 mg/dL (12 Dec 2020 07:45)  POCT Blood Glucose.: 134 mg/dL (11 Dec 2020 22:01)  POCT Blood Glucose.: 133 mg/dL (11 Dec 2020 17:30)    I&O's Summary    11 Dec 2020 07:01  -  12 Dec 2020 07:00  --------------------------------------------------------  IN: 100 mL / OUT: 100 mL / NET: 0 mL        PHYSICAL EXAM:  Vital Signs Last 24 Hrs  T(C): 36.3 (12-12-20 @ 11:07)  T(F): 97.4 (12-12-20 @ 11:07), Max: 98.5 (12-11-20 @ 15:51)  HR: 109 (12-12-20 @ 11:07) (80 - 130)  BP: 163/94 (12-12-20 @ 11:07)  BP(mean): --  RR: 20 (12-12-20 @ 11:07) (18 - 20)  SpO2: 99% (12-12-20 @ 11:07) (95% - 99%)  Wt(kg): --    12-11 @ 07:01  -  12-12 @ 07:00  --------------------------------------------------------  IN: 100 mL / OUT: 100 mL / NET: 0 mL      Constitutional: NAD, awake and alert  EYES: EOMI  ENT:  Normal Hearing, no tonsillar exudates   Neck: Soft and supple , no thyromegaly   Respiratory: Breath sounds are clear bilaterally, No wheezing, rales or rhonchi  Cardiovascular: S1 and S2, iregular,  no Murmurs, gallops or rubs, no JVD,    Gastrointestinal: Bowel Sounds present, soft, nontender, nondistended, no guarding, no rebound  Extremities: No cyanosis or clubbing; warm to touch  Vascular: 2+ peripheral pulses lower ex  Neurological: No focal deficits, CN II-XII intact bilaterally, sensation to light touch intact in all extremities. Pupils are equally reactive to light and symmetrical in size.   Musculoskeletal: 5/5 strength b/l upper and lower extremities; no joint swelling.  Skin: No rashes  Psych:  AAOx3  HEME: no bruises, no nose bleeds      LABS:                        13.7   11.25 )-----------( 200      ( 12 Dec 2020 07:58 )             41.7     12-12    131<L>  |  97<L>  |  22  ----------------------------<  94  4.7   |  22  |  1.43<H>    Ca    8.7      12 Dec 2020 07:58  Phos  3.1     12-12  Mg     2.0     12-12    TPro  7.4  /  Alb  2.9<L>  /  TBili  1.3<H>  /  DBili  x   /  AST  88<H>  /  ALT  58<H>  /  AlkPhos  77  12-11              RADIOLOGY & ADDITIONAL TESTS:    Imaging Personally Reviewed:    Consultant(s) Notes Reviewed:      Care Discussed with Consultants/Other Providers:

## 2020-12-12 NOTE — CHART NOTE - NSCHARTNOTEFT_GEN_A_CORE
Patient was in persistent afib  up to 130s. Patient denies any cp, shortness of breath, dizziness @ this time    T(C): 36.8 (12-12-20 @ 01:50), Max: 36.9 (12-11-20 @ 15:51)  HR: 127 (12-12-20 @ 02:46) (80 - 130)  BP: 156/90 (12-12-20 @ 02:46) (142/92 - 176/104)  RR: 18 (12-12-20 @ 01:50) (18 - 19)  SpO2: 95% (12-12-20 @ 01:50) (95% - 98%)    PLAN:  - Lopressor 2.5 mg iv x 1 now  - Continue telemonitoring  - Continue with lopressor po ,due in am  - Patient already on xarelto for AC  Will continue to monitor

## 2020-12-12 NOTE — OCCUPATIONAL THERAPY INITIAL EVALUATION ADULT - ANTICIPATED DISCHARGE DISPOSITION, OT EVAL
TBD pending assessment of functional mobility when appropriate. Patient refused at this time despite encouragement.

## 2020-12-12 NOTE — PROGRESS NOTE ADULT - ASSESSMENT
80yM h/o HTN , DM2 (on PO agents) , HLD , CAD (1 stent), Afib (Xarelto) presents to [facility]  p/w dizziness, weakness, s/p fall, admitted for syncope and collapse, r/o ACS, and CJ in a setting of COVID-19 infection w/ recent admission for COVID d/c on 12/7 improving

## 2020-12-12 NOTE — OCCUPATIONAL THERAPY INITIAL EVALUATION ADULT - ADDITIONAL COMMENTS
Patient reports using a cane for ambulation. Further prior level of function limited as patient falling asleep/lethargic at times. Per care coordination assessment, patient was independent with ADLs prior to admission.

## 2020-12-12 NOTE — PROGRESS NOTE ADULT - PROBLEM SELECTOR PLAN 5
[id number] Score=4  continue Xarelto   metoprolol 25 bid will increase to 50 tid as possibly exacerbated in setting of COVID.

## 2020-12-12 NOTE — OCCUPATIONAL THERAPY INITIAL EVALUATION ADULT - PERTINENT HX OF CURRENT PROBLEM, REHAB EVAL
80 year old male with history of HTN, DM2, HLD, CAD (1 stent), Afib presents to [facility] with dizziness, weakness, s/p fall. Patient recently admitted for COVID-19 and discharged on 12/7/2020. Admitted for syncope and collapse, r/o ACS, and CJ in setting of COVID-19 infection.

## 2020-12-12 NOTE — PROGRESS NOTE ADULT - PROBLEM SELECTOR PLAN 4
- R scalp hematoma increasing on rpeat CTH after fall however at this time no headache, change in vision, continue to mointor.

## 2020-12-12 NOTE — PROGRESS NOTE ADULT - PROBLEM SELECTOR PLAN 1
- initial troponin 25, no active CP at this time  - pending orthostatics  - DASH 1800 ADA diet  - with Hx of CAD w/ stents continue Plavix, atorvastatin, metoprolol   - PT eval

## 2020-12-12 NOTE — OCCUPATIONAL THERAPY INITIAL EVALUATION ADULT - GENERAL OBSERVATIONS, REHAB EVAL
Patient received semisupine in bed in NAD. +tele. Per RN Lizz, patient okay to participate in OT evaluation. Patient alert, lethargic/falling asleep at times SpO2 above 96% on room air.

## 2020-12-12 NOTE — PROGRESS NOTE ADULT - PROBLEM SELECTOR PLAN 2
COVID positive on exam. No resp distress at present was very fatiuged 12/11 now improved  Pt just d/c 12/7, was trated w/ dexamehtasone at that time, remdesevir not given  - no increase oxygenation requirement at this time continue to monitor

## 2020-12-12 NOTE — PROGRESS NOTE ADULT - PROBLEM SELECTOR PLAN 10
No additional prophylaxis needed.  Pt on Xarelto for Afib, reduced dose on 15 mg because he had an upper GI bleed due to peptic ulcers in the past  Fall precautions  PT

## 2020-12-13 LAB
ALBUMIN SERPL ELPH-MCNC: 2.5 G/DL — LOW (ref 3.3–5)
ALP SERPL-CCNC: 66 U/L — SIGNIFICANT CHANGE UP (ref 40–120)
ALT FLD-CCNC: 37 U/L — SIGNIFICANT CHANGE UP (ref 4–41)
ANION GAP SERPL CALC-SCNC: 9 MMOL/L — SIGNIFICANT CHANGE UP (ref 7–14)
AST SERPL-CCNC: 44 U/L — HIGH (ref 4–40)
BASOPHILS # BLD AUTO: 0.01 K/UL — SIGNIFICANT CHANGE UP (ref 0–0.2)
BASOPHILS NFR BLD AUTO: 0.1 % — SIGNIFICANT CHANGE UP (ref 0–2)
BILIRUB SERPL-MCNC: 0.9 MG/DL — SIGNIFICANT CHANGE UP (ref 0.2–1.2)
BUN SERPL-MCNC: 21 MG/DL — SIGNIFICANT CHANGE UP (ref 7–23)
CALCIUM SERPL-MCNC: 8.3 MG/DL — LOW (ref 8.4–10.5)
CHLORIDE SERPL-SCNC: 102 MMOL/L — SIGNIFICANT CHANGE UP (ref 98–107)
CO2 SERPL-SCNC: 25 MMOL/L — SIGNIFICANT CHANGE UP (ref 22–31)
CREAT SERPL-MCNC: 1.31 MG/DL — HIGH (ref 0.5–1.3)
CRP SERPL-MCNC: 52.9 MG/L — HIGH
D DIMER BLD IA.RAPID-MCNC: 373 NG/ML DDU — HIGH
EOSINOPHIL # BLD AUTO: 0.1 K/UL — SIGNIFICANT CHANGE UP (ref 0–0.5)
EOSINOPHIL NFR BLD AUTO: 1.2 % — SIGNIFICANT CHANGE UP (ref 0–6)
FERRITIN SERPL-MCNC: 750 NG/ML — HIGH (ref 30–400)
GLUCOSE BLDC GLUCOMTR-MCNC: 108 MG/DL — HIGH (ref 70–99)
GLUCOSE BLDC GLUCOMTR-MCNC: 111 MG/DL — HIGH (ref 70–99)
GLUCOSE BLDC GLUCOMTR-MCNC: 112 MG/DL — HIGH (ref 70–99)
GLUCOSE BLDC GLUCOMTR-MCNC: 92 MG/DL — SIGNIFICANT CHANGE UP (ref 70–99)
GLUCOSE SERPL-MCNC: 86 MG/DL — SIGNIFICANT CHANGE UP (ref 70–99)
HCT VFR BLD CALC: 38.7 % — LOW (ref 39–50)
HGB BLD-MCNC: 12.4 G/DL — LOW (ref 13–17)
IANC: 6.06 K/UL — SIGNIFICANT CHANGE UP (ref 1.5–8.5)
IMM GRANULOCYTES NFR BLD AUTO: 0.7 % — SIGNIFICANT CHANGE UP (ref 0–1.5)
LYMPHOCYTES # BLD AUTO: 1.28 K/UL — SIGNIFICANT CHANGE UP (ref 1–3.3)
LYMPHOCYTES # BLD AUTO: 15.8 % — SIGNIFICANT CHANGE UP (ref 13–44)
MAGNESIUM SERPL-MCNC: 2 MG/DL — SIGNIFICANT CHANGE UP (ref 1.6–2.6)
MCHC RBC-ENTMCNC: 23.1 PG — LOW (ref 27–34)
MCHC RBC-ENTMCNC: 32 GM/DL — SIGNIFICANT CHANGE UP (ref 32–36)
MCV RBC AUTO: 72.2 FL — LOW (ref 80–100)
MONOCYTES # BLD AUTO: 0.58 K/UL — SIGNIFICANT CHANGE UP (ref 0–0.9)
MONOCYTES NFR BLD AUTO: 7.2 % — SIGNIFICANT CHANGE UP (ref 2–14)
NEUTROPHILS # BLD AUTO: 6.06 K/UL — SIGNIFICANT CHANGE UP (ref 1.8–7.4)
NEUTROPHILS NFR BLD AUTO: 75 % — SIGNIFICANT CHANGE UP (ref 43–77)
NRBC # BLD: 0 /100 WBCS — SIGNIFICANT CHANGE UP
NRBC # FLD: 0 K/UL — SIGNIFICANT CHANGE UP
PHOSPHATE SERPL-MCNC: 2.5 MG/DL — SIGNIFICANT CHANGE UP (ref 2.5–4.5)
PLATELET # BLD AUTO: 197 K/UL — SIGNIFICANT CHANGE UP (ref 150–400)
POTASSIUM SERPL-MCNC: 4.3 MMOL/L — SIGNIFICANT CHANGE UP (ref 3.5–5.3)
POTASSIUM SERPL-SCNC: 4.3 MMOL/L — SIGNIFICANT CHANGE UP (ref 3.5–5.3)
PROT SERPL-MCNC: 6.6 G/DL — SIGNIFICANT CHANGE UP (ref 6–8.3)
RBC # BLD: 5.36 M/UL — SIGNIFICANT CHANGE UP (ref 4.2–5.8)
RBC # FLD: 19 % — HIGH (ref 10.3–14.5)
SODIUM SERPL-SCNC: 136 MMOL/L — SIGNIFICANT CHANGE UP (ref 135–145)
WBC # BLD: 8.09 K/UL — SIGNIFICANT CHANGE UP (ref 3.8–10.5)
WBC # FLD AUTO: 8.09 K/UL — SIGNIFICANT CHANGE UP (ref 3.8–10.5)

## 2020-12-13 PROCEDURE — 99233 SBSQ HOSP IP/OBS HIGH 50: CPT

## 2020-12-13 RX ADMIN — Medication 50 MILLIGRAM(S): at 05:58

## 2020-12-13 RX ADMIN — CLOPIDOGREL BISULFATE 75 MILLIGRAM(S): 75 TABLET, FILM COATED ORAL at 12:42

## 2020-12-13 RX ADMIN — ISOSORBIDE MONONITRATE 30 MILLIGRAM(S): 60 TABLET, EXTENDED RELEASE ORAL at 12:42

## 2020-12-13 RX ADMIN — Medication 100 MILLIGRAM(S): at 12:42

## 2020-12-13 RX ADMIN — Medication 50 MILLIGRAM(S): at 17:38

## 2020-12-13 RX ADMIN — Medication 25 MILLIGRAM(S): at 21:26

## 2020-12-13 RX ADMIN — Medication 25 MILLIGRAM(S): at 14:32

## 2020-12-13 RX ADMIN — RIVAROXABAN 15 MILLIGRAM(S): KIT at 19:44

## 2020-12-13 RX ADMIN — Medication 1 SPRAY(S): at 17:39

## 2020-12-13 RX ADMIN — Medication 25 MILLIGRAM(S): at 05:58

## 2020-12-13 RX ADMIN — POLYETHYLENE GLYCOL 3350 17 GRAM(S): 17 POWDER, FOR SOLUTION ORAL at 17:38

## 2020-12-13 RX ADMIN — Medication 1 SPRAY(S): at 09:51

## 2020-12-13 NOTE — PROGRESS NOTE ADULT - SUBJECTIVE AND OBJECTIVE BOX
Patient is a 80y old  Male who presents with a chief complaint of syncope (12 Dec 2020 12:02)      SUBJECTIVE / OVERNIGHT EVENTS:    Patient seen and examined at bedside. Reports improvement, d/w daughter on phone, pt just fatigued. Tolerating po intake, having BM, minimal ambulation, no motor/sensory deficits.    MEDICATIONS  (STANDING):  allopurinol 100 milliGRAM(s) Oral daily  clopidogrel Tablet 75 milliGRAM(s) Oral daily  dextrose 40% Gel 15 Gram(s) Oral once  dextrose 5%. 1000 milliLiter(s) (50 mL/Hr) IV Continuous <Continuous>  dextrose 5%. 1000 milliLiter(s) (100 mL/Hr) IV Continuous <Continuous>  dextrose 50% Injectable 25 Gram(s) IV Push once  dextrose 50% Injectable 12.5 Gram(s) IV Push once  dextrose 50% Injectable 25 Gram(s) IV Push once  fluticasone propionate 50 MICROgram(s)/spray Nasal Spray 1 Spray(s) Both Nostrils two times a day  glucagon  Injectable 1 milliGRAM(s) IntraMuscular once  hydrALAZINE 25 milliGRAM(s) Oral three times a day  insulin lispro (ADMELOG) corrective regimen sliding scale   SubCutaneous at bedtime  insulin lispro (ADMELOG) corrective regimen sliding scale   SubCutaneous three times a day before meals  isosorbide   mononitrate ER Tablet (IMDUR) 30 milliGRAM(s) Oral daily  metoprolol tartrate 50 milliGRAM(s) Oral two times a day  polyethylene glycol 3350 17 Gram(s) Oral two times a day  rivaroxaban 15 milliGRAM(s) Oral with dinner  senna 2 Tablet(s) Oral at bedtime  sodium chloride 0.9%. 1000 milliLiter(s) (100 mL/Hr) IV Continuous <Continuous>    MEDICATIONS  (PRN):      Vital Signs Last 24 Hrs  T(C): 36.6 (13 Dec 2020 09:49), Max: 37.1 (12 Dec 2020 15:04)  T(F): 97.8 (13 Dec 2020 09:49), Max: 98.7 (12 Dec 2020 15:04)  HR: 96 (13 Dec 2020 09:49) (80 - 133)  BP: 167/83 (13 Dec 2020 09:49) (139/86 - 172/90)  BP(mean): 105 (13 Dec 2020 03:20) (97 - 105)  RR: 18 (13 Dec 2020 09:49) (18 - 20)  SpO2: 96% (13 Dec 2020 09:49) (96% - 98%)  CAPILLARY BLOOD GLUCOSE      POCT Blood Glucose.: 92 mg/dL (13 Dec 2020 07:30)  POCT Blood Glucose.: 107 mg/dL (12 Dec 2020 22:26)  POCT Blood Glucose.: 104 mg/dL (12 Dec 2020 17:07)  POCT Blood Glucose.: 121 mg/dL (12 Dec 2020 12:22)    I&O's Summary    12 Dec 2020 07:01  -  13 Dec 2020 07:00  --------------------------------------------------------  IN: 0 mL / OUT: 300 mL / NET: -300 mL        PHYSICAL EXAM:  Vital Signs Last 24 Hrs  T(C): 36.6 (12-13-20 @ 09:49)  T(F): 97.8 (12-13-20 @ 09:49), Max: 98.7 (12-12-20 @ 15:04)  HR: 96 (12-13-20 @ 09:49) (80 - 133)  BP: 167/83 (12-13-20 @ 09:49)  BP(mean): 105 (12-13-20 @ 03:20) (97 - 105)  RR: 18 (12-13-20 @ 09:49) (18 - 20)  SpO2: 96% (12-13-20 @ 09:49) (96% - 98%)  Wt(kg): --    12-12 @ 07:01  -  12-13 @ 07:00  --------------------------------------------------------  IN: 0 mL / OUT: 300 mL / NET: -300 mL      Constitutional: NAD, awake and alert  EYES: EOMI  ENT:  Normal Hearing, no tonsillar exudates   Neck: Soft and supple , no thyromegaly   Respiratory: Breath sounds are clear bilaterally, No wheezing, rales or rhonchi  Cardiovascular: S1 and S2, regular rate and rhythm, no Murmurs, gallops or rubs, no JVD,    Gastrointestinal: Bowel Sounds present, soft, nontender, nondistended, no guarding, no rebound  Extremities: No cyanosis or clubbing; warm to touch  Vascular: 2+ peripheral pulses lower ex  Neurological: No focal deficits, CN II-XII intact bilaterally, sensation to light touch intact in all extremities. Pupils are equally reactive to light and symmetrical in size.   Musculoskeletal: 5/5 strength b/l upper and lower extremities; no joint swelling.  Skin: No rashes  Psych: AAOx3  HEME: no bruises, no nose bleeds      LABS:                        12.4   8.09  )-----------( 197      ( 13 Dec 2020 10:13 )             38.7     12-13    136  |  102  |  21  ----------------------------<  86  4.3   |  25  |  1.31<H>    Ca    8.3<L>      13 Dec 2020 10:13  Phos  2.5     12-13  Mg     2.0     12-13    TPro  6.6  /  Alb  2.5<L>  /  TBili  0.9  /  DBili  x   /  AST  44<H>  /  ALT  37  /  AlkPhos  66  12-13              RADIOLOGY & ADDITIONAL TESTS:    Imaging Personally Reviewed:    Consultant(s) Notes Reviewed:      Care Discussed with Consultants/Other Providers:

## 2020-12-13 NOTE — PROGRESS NOTE ADULT - PROBLEM SELECTOR PLAN 1
- no syncopal events during hospitalization, pt had mechanical fall on day one of admission  - orhtostatics neg  - DASH 1800 ADA diet  - with Hx of CAD w/ stents continue Plavix, atorvastatin, metoprolol   - PT eval

## 2020-12-13 NOTE — PROGRESS NOTE ADULT - PROBLEM SELECTOR PLAN 5
[id number] Score=4  continue Xarelto   metoprolol 25 bid at home, increased to 50 bid here, trend overnight 80 -> 120.

## 2020-12-14 LAB
ADD ON TEST-SPECIMEN IN LAB: SIGNIFICANT CHANGE UP
ALBUMIN SERPL ELPH-MCNC: 2.6 G/DL — LOW (ref 3.3–5)
ALP SERPL-CCNC: 74 U/L — SIGNIFICANT CHANGE UP (ref 40–120)
ALT FLD-CCNC: 32 U/L — SIGNIFICANT CHANGE UP (ref 4–41)
ANION GAP SERPL CALC-SCNC: 12 MMOL/L — SIGNIFICANT CHANGE UP (ref 7–14)
AST SERPL-CCNC: 46 U/L — HIGH (ref 4–40)
BILIRUB DIRECT SERPL-MCNC: 0.3 MG/DL — HIGH (ref 0–0.2)
BILIRUB INDIRECT FLD-MCNC: 0.3 MG/DL — SIGNIFICANT CHANGE UP (ref 0–1)
BILIRUB SERPL-MCNC: 0.6 MG/DL — SIGNIFICANT CHANGE UP (ref 0.2–1.2)
BUN SERPL-MCNC: 21 MG/DL — SIGNIFICANT CHANGE UP (ref 7–23)
CALCIUM SERPL-MCNC: 8.1 MG/DL — LOW (ref 8.4–10.5)
CHLORIDE SERPL-SCNC: 99 MMOL/L — SIGNIFICANT CHANGE UP (ref 98–107)
CO2 SERPL-SCNC: 22 MMOL/L — SIGNIFICANT CHANGE UP (ref 22–31)
CREAT SERPL-MCNC: 1.29 MG/DL — SIGNIFICANT CHANGE UP (ref 0.5–1.3)
GLUCOSE BLDC GLUCOMTR-MCNC: 132 MG/DL — HIGH (ref 70–99)
GLUCOSE BLDC GLUCOMTR-MCNC: 81 MG/DL — SIGNIFICANT CHANGE UP (ref 70–99)
GLUCOSE BLDC GLUCOMTR-MCNC: 91 MG/DL — SIGNIFICANT CHANGE UP (ref 70–99)
GLUCOSE BLDC GLUCOMTR-MCNC: 95 MG/DL — SIGNIFICANT CHANGE UP (ref 70–99)
GLUCOSE SERPL-MCNC: 85 MG/DL — SIGNIFICANT CHANGE UP (ref 70–99)
HCT VFR BLD CALC: 37.7 % — LOW (ref 39–50)
HGB BLD-MCNC: 12.5 G/DL — LOW (ref 13–17)
MCHC RBC-ENTMCNC: 25.1 PG — LOW (ref 27–34)
MCHC RBC-ENTMCNC: 33.2 GM/DL — SIGNIFICANT CHANGE UP (ref 32–36)
MCV RBC AUTO: 75.6 FL — LOW (ref 80–100)
NRBC # BLD: 0 /100 WBCS — SIGNIFICANT CHANGE UP
NRBC # FLD: 0 K/UL — SIGNIFICANT CHANGE UP
PLATELET # BLD AUTO: 149 K/UL — LOW (ref 150–400)
POTASSIUM SERPL-MCNC: 4.3 MMOL/L — SIGNIFICANT CHANGE UP (ref 3.5–5.3)
POTASSIUM SERPL-SCNC: 4.3 MMOL/L — SIGNIFICANT CHANGE UP (ref 3.5–5.3)
PROT SERPL-MCNC: 6.3 G/DL — SIGNIFICANT CHANGE UP (ref 6–8.3)
RBC # BLD: 4.99 M/UL — SIGNIFICANT CHANGE UP (ref 4.2–5.8)
RBC # FLD: 21.2 % — HIGH (ref 10.3–14.5)
SODIUM SERPL-SCNC: 133 MMOL/L — LOW (ref 135–145)
WBC # BLD: 6.57 K/UL — SIGNIFICANT CHANGE UP (ref 3.8–10.5)
WBC # FLD AUTO: 6.57 K/UL — SIGNIFICANT CHANGE UP (ref 3.8–10.5)

## 2020-12-14 PROCEDURE — 99233 SBSQ HOSP IP/OBS HIGH 50: CPT

## 2020-12-14 RX ADMIN — Medication 25 MILLIGRAM(S): at 06:06

## 2020-12-14 RX ADMIN — ISOSORBIDE MONONITRATE 30 MILLIGRAM(S): 60 TABLET, EXTENDED RELEASE ORAL at 13:28

## 2020-12-14 RX ADMIN — CLOPIDOGREL BISULFATE 75 MILLIGRAM(S): 75 TABLET, FILM COATED ORAL at 13:28

## 2020-12-14 RX ADMIN — Medication 50 MILLIGRAM(S): at 17:14

## 2020-12-14 RX ADMIN — Medication 1 SPRAY(S): at 06:05

## 2020-12-14 RX ADMIN — Medication 100 MILLIGRAM(S): at 13:27

## 2020-12-14 RX ADMIN — RIVAROXABAN 15 MILLIGRAM(S): KIT at 17:14

## 2020-12-14 RX ADMIN — Medication 25 MILLIGRAM(S): at 13:28

## 2020-12-14 RX ADMIN — Medication 25 MILLIGRAM(S): at 21:33

## 2020-12-14 RX ADMIN — SENNA PLUS 2 TABLET(S): 8.6 TABLET ORAL at 21:33

## 2020-12-14 RX ADMIN — Medication 50 MILLIGRAM(S): at 06:06

## 2020-12-14 RX ADMIN — Medication 1 SPRAY(S): at 17:14

## 2020-12-14 NOTE — PROGRESS NOTE ADULT - PROBLEM SELECTOR PLAN 1
- no syncopal events during hospitalization, pt had mechanical fall on day one of admission  - orhtostatics neg  - DASH 1800 ADA diet  - with Hx of CAD w/ stents continue Plavix, atorvastatin, metoprolol   - PT eval - no syncopal events during hospitalization, pt had mechanical fall on day one of admission  - orthostatics negative   - DASH 1800 ADA diet  - with Hx of CAD w/ stents continue Plavix, atorvastatin, metoprolol   - PT eval

## 2020-12-14 NOTE — PROGRESS NOTE ADULT - ASSESSMENT
80yM h/o HTN , DM2 (on PO agents) , HLD , CAD (1 stent), Afib (Xarelto) presents to [facility]  p/w dizziness, weakness, s/p fall, admitted for syncope and collapse, r/o ACS, and CJ in a setting of COVID-19 infection w/ recent admission for COVID d/c on 12/7 improving

## 2020-12-14 NOTE — PROVIDER CONTACT NOTE (OTHER) - ASSESSMENT
Pt in no acute distress.
Asymptomatic, offers no complaints
HR was 140 while urinating but decreased to 106 at rest
No complaints of chest pain, shortness of  breath or headache.
Patient is A&Ox3- disoriented to the year. No signs of distress noted. Reoriented as needed.

## 2020-12-14 NOTE — PROVIDER CONTACT NOTE (OTHER) - ACTION/TREATMENT ORDERED:
Recheck BP in one hour, continue to monitor. Pt due for next hydralazine dose @ 10 pm as scheduled
ok to monitor
As per H&P on admission, patient's baseline is A&Ox3. Continue to monitor.
Continue to monitor heart rate
Will look into it.

## 2020-12-14 NOTE — PROGRESS NOTE ADULT - PROBLEM SELECTOR PLAN 4
- R scalp hematoma increasing on repeat CTH after fall however at this time no headache, change in vision, continue to monitor.

## 2020-12-14 NOTE — PROGRESS NOTE ADULT - PROBLEM SELECTOR PLAN 10
No additional prophylaxis needed.  Pt on Xarelto for Afib, reduced dose on 15 mg because he had an upper GI bleed due to peptic ulcers in the past  Fall precautions  PT No additional prophylaxis needed.  Pt on Xarelto for Afib, reduced dose on 15 mg because he had an upper GI bleed due to peptic ulcers in the past  Fall precautions  PT F/U for DC planning

## 2020-12-14 NOTE — PROGRESS NOTE ADULT - PROBLEM SELECTOR PLAN 2
COVID positive on exam. No resp distress at present was very fatigued 12/11 now improved  Pt just d/c 12/7, was trated w/ dexamehtasone at that time, remdesevir not given  - no increase oxygenation requirement at this time continue to monitor COVID positive on exam. No resp distress at present was very fatigued 12/11 now improved  Pt just d/c 12/7, was treated w/ dexamethasone at that time, remdesevir not given  - no increase oxygenation requirement at this time continue to monitor

## 2020-12-14 NOTE — PROGRESS NOTE ADULT - PROBLEM SELECTOR PLAN 9
Daily glucose monitoring  insulin sliding scale  DASH 1800 ADA diet  serum HgA1C Daily glucose monitoring  insulin sliding scale  DASH 1800 ADA diet  serum HgA1C 6.2

## 2020-12-14 NOTE — PROGRESS NOTE ADULT - PROBLEM SELECTOR PLAN 5
[id number] Score=4  continue Xarelto   metoprolol 25 bid at home, increased to 50 bid here, trend overnight 80 -> 120. [id number] Score=4  continue Xarelto   metoprolol 25 bid at home, increased to 50 bid here, trend overnight 82-> 100.

## 2020-12-14 NOTE — PROGRESS NOTE ADULT - PROBLEM SELECTOR PLAN 8
- hold statin in setting of transaminitis - holding  statin in setting of transaminitis, will repeat , if improving, will resume

## 2020-12-15 LAB
ANION GAP SERPL CALC-SCNC: 11 MMOL/L — SIGNIFICANT CHANGE UP (ref 7–14)
BUN SERPL-MCNC: 23 MG/DL — SIGNIFICANT CHANGE UP (ref 7–23)
CALCIUM SERPL-MCNC: 8.2 MG/DL — LOW (ref 8.4–10.5)
CHLORIDE SERPL-SCNC: 97 MMOL/L — LOW (ref 98–107)
CO2 SERPL-SCNC: 23 MMOL/L — SIGNIFICANT CHANGE UP (ref 22–31)
CREAT SERPL-MCNC: 1.29 MG/DL — SIGNIFICANT CHANGE UP (ref 0.5–1.3)
GLUCOSE BLDC GLUCOMTR-MCNC: 108 MG/DL — HIGH (ref 70–99)
GLUCOSE BLDC GLUCOMTR-MCNC: 132 MG/DL — HIGH (ref 70–99)
GLUCOSE BLDC GLUCOMTR-MCNC: 166 MG/DL — HIGH (ref 70–99)
GLUCOSE BLDC GLUCOMTR-MCNC: 95 MG/DL — SIGNIFICANT CHANGE UP (ref 70–99)
GLUCOSE SERPL-MCNC: 92 MG/DL — SIGNIFICANT CHANGE UP (ref 70–99)
HCT VFR BLD CALC: 37.5 % — LOW (ref 39–50)
HGB BLD-MCNC: 11.9 G/DL — LOW (ref 13–17)
MCHC RBC-ENTMCNC: 22.8 PG — LOW (ref 27–34)
MCHC RBC-ENTMCNC: 31.7 GM/DL — LOW (ref 32–36)
MCV RBC AUTO: 71.8 FL — LOW (ref 80–100)
NRBC # BLD: 0 /100 WBCS — SIGNIFICANT CHANGE UP
NRBC # FLD: 0 K/UL — SIGNIFICANT CHANGE UP
PLATELET # BLD AUTO: 157 K/UL — SIGNIFICANT CHANGE UP (ref 150–400)
POTASSIUM SERPL-MCNC: 4.6 MMOL/L — SIGNIFICANT CHANGE UP (ref 3.5–5.3)
POTASSIUM SERPL-SCNC: 4.6 MMOL/L — SIGNIFICANT CHANGE UP (ref 3.5–5.3)
RBC # BLD: 5.22 M/UL — SIGNIFICANT CHANGE UP (ref 4.2–5.8)
RBC # FLD: 19.3 % — HIGH (ref 10.3–14.5)
SARS-COV-2 RNA SPEC QL NAA+PROBE: DETECTED
SODIUM SERPL-SCNC: 131 MMOL/L — LOW (ref 135–145)
WBC # BLD: 6.56 K/UL — SIGNIFICANT CHANGE UP (ref 3.8–10.5)
WBC # FLD AUTO: 6.56 K/UL — SIGNIFICANT CHANGE UP (ref 3.8–10.5)

## 2020-12-15 PROCEDURE — 99232 SBSQ HOSP IP/OBS MODERATE 35: CPT

## 2020-12-15 RX ORDER — ATORVASTATIN CALCIUM 80 MG/1
40 TABLET, FILM COATED ORAL AT BEDTIME
Refills: 0 | Status: DISCONTINUED | OUTPATIENT
Start: 2020-12-15 | End: 2020-12-16

## 2020-12-15 RX ADMIN — Medication 1 SPRAY(S): at 05:38

## 2020-12-15 RX ADMIN — Medication 25 MILLIGRAM(S): at 22:19

## 2020-12-15 RX ADMIN — Medication 50 MILLIGRAM(S): at 05:38

## 2020-12-15 RX ADMIN — ISOSORBIDE MONONITRATE 30 MILLIGRAM(S): 60 TABLET, EXTENDED RELEASE ORAL at 11:31

## 2020-12-15 RX ADMIN — Medication 50 MILLIGRAM(S): at 17:40

## 2020-12-15 RX ADMIN — Medication 25 MILLIGRAM(S): at 05:38

## 2020-12-15 RX ADMIN — Medication 1: at 12:29

## 2020-12-15 RX ADMIN — CLOPIDOGREL BISULFATE 75 MILLIGRAM(S): 75 TABLET, FILM COATED ORAL at 11:31

## 2020-12-15 RX ADMIN — Medication 100 MILLIGRAM(S): at 11:31

## 2020-12-15 RX ADMIN — Medication 1 SPRAY(S): at 17:41

## 2020-12-15 RX ADMIN — ATORVASTATIN CALCIUM 40 MILLIGRAM(S): 80 TABLET, FILM COATED ORAL at 22:19

## 2020-12-15 RX ADMIN — Medication 25 MILLIGRAM(S): at 11:31

## 2020-12-15 RX ADMIN — RIVAROXABAN 15 MILLIGRAM(S): KIT at 17:40

## 2020-12-15 RX ADMIN — POLYETHYLENE GLYCOL 3350 17 GRAM(S): 17 POWDER, FOR SOLUTION ORAL at 05:38

## 2020-12-15 NOTE — PROGRESS NOTE ADULT - PROBLEM SELECTOR PLAN 10
No additional prophylaxis needed.  Pt on Xarelto for Afib, reduced dose on 15 mg because he had an upper GI bleed due to peptic ulcers in the past  Fall precautions  PT F/U for DC planning No additional prophylaxis needed.  Pt on Xarelto for Afib, reduced dose on 15 mg because he had an upper GI bleed due to peptic ulcers in the past  Fall precautions  PT F/U noted, recommend rehab

## 2020-12-15 NOTE — PROGRESS NOTE ADULT - ASSESSMENT
80yM h/o HTN , DM2 (on PO agents) , HLD , CAD (1 stent), Afib (Xarelto) presents to [facility]  p/w dizziness, weakness, s/p fall, admitted for syncope and collapse, r/o ACS, and CJ in a setting of COVID-19 infection w/ recent admission for COVID d/c on 12/7 improving

## 2020-12-15 NOTE — PROGRESS NOTE ADULT - PROBLEM SELECTOR PLAN 2
COVID positive on exam. No resp distress at present was very fatigued 12/11 now improved  Pt just d/c 12/7, was treated w/ dexamethasone at that time, remdesevir not given  - no increase oxygenation requirement at this time continue to monitor COVID positive on exam. No resp distress at present was very fatigued 12/11 now improved  Pt just d/c 12/7, was treated w/ dexamethasone at that time, Remdesevir not given  - no increase oxygenation requirement at this time continue to monitor

## 2020-12-15 NOTE — PROGRESS NOTE ADULT - PROBLEM SELECTOR PLAN 5
[id number] Score=4  continue Xarelto   metoprolol 25 bid at home, increased to 50 bid here, trend overnight 82-> 100. [id number] Score=4  continue Xarelto   metoprolol 25 bid at home, increased to 50 bid here, monitor HR

## 2020-12-15 NOTE — PROGRESS NOTE ADULT - PROBLEM SELECTOR PLAN 4
- R scalp hematoma increasing on repeat CTH after fall however at this time no headache, change in vision, continue to monitor. - R scalp hematoma increasing on repeat CTH after fall however at this time no headache, change in vision, continue to monitor.  - appears to be resolving on exam

## 2020-12-15 NOTE — PROGRESS NOTE ADULT - SUBJECTIVE AND OBJECTIVE BOX
Patient is a 80y old  Male who presents with a chief complaint of syncope (14 Dec 2020 11:00)      SUBJECTIVE / OVERNIGHT EVENTS:    MEDICATIONS  (STANDING):  allopurinol 100 milliGRAM(s) Oral daily  clopidogrel Tablet 75 milliGRAM(s) Oral daily  dextrose 40% Gel 15 Gram(s) Oral once  dextrose 5%. 1000 milliLiter(s) (50 mL/Hr) IV Continuous <Continuous>  dextrose 5%. 1000 milliLiter(s) (100 mL/Hr) IV Continuous <Continuous>  dextrose 50% Injectable 25 Gram(s) IV Push once  dextrose 50% Injectable 12.5 Gram(s) IV Push once  dextrose 50% Injectable 25 Gram(s) IV Push once  fluticasone propionate 50 MICROgram(s)/spray Nasal Spray 1 Spray(s) Both Nostrils two times a day  glucagon  Injectable 1 milliGRAM(s) IntraMuscular once  hydrALAZINE 25 milliGRAM(s) Oral three times a day  insulin lispro (ADMELOG) corrective regimen sliding scale   SubCutaneous three times a day before meals  insulin lispro (ADMELOG) corrective regimen sliding scale   SubCutaneous at bedtime  isosorbide   mononitrate ER Tablet (IMDUR) 30 milliGRAM(s) Oral daily  metoprolol tartrate 50 milliGRAM(s) Oral two times a day  polyethylene glycol 3350 17 Gram(s) Oral two times a day  rivaroxaban 15 milliGRAM(s) Oral with dinner  senna 2 Tablet(s) Oral at bedtime    MEDICATIONS  (PRN):      Vital Signs Last 24 Hrs  T(C): 36.4 (15 Dec 2020 05:35), Max: 36.8 (14 Dec 2020 17:00)  T(F): 97.5 (15 Dec 2020 05:35), Max: 98.2 (14 Dec 2020 17:00)  HR: 73 (15 Dec 2020 05:35) (73 - 103)  BP: 139/85 (15 Dec 2020 05:35) (131/73 - 156/79)  BP(mean): --  RR: 18 (15 Dec 2020 05:35) (18 - 20)  SpO2: 98% (15 Dec 2020 05:35) (97% - 99%)  CAPILLARY BLOOD GLUCOSE      POCT Blood Glucose.: 95 mg/dL (15 Dec 2020 07:31)  POCT Blood Glucose.: 132 mg/dL (14 Dec 2020 21:51)  POCT Blood Glucose.: 95 mg/dL (14 Dec 2020 16:24)  POCT Blood Glucose.: 81 mg/dL (14 Dec 2020 11:12)    I&O's Summary    14 Dec 2020 07:01  -  15 Dec 2020 07:00  --------------------------------------------------------  IN: 0 mL / OUT: 450 mL / NET: -450 mL        PHYSICAL EXAM:  GENERAL: NAD, well-developed  HEAD:  Atraumatic, Normocephalic  EYES: EOMI, PERRLA, conjunctiva and sclera clear  NECK: Supple, No JVD  CHEST/LUNG: Clear to auscultation bilaterally; No wheeze  HEART: Regular rate and rhythm; No murmurs, rubs, or gallops  ABDOMEN: Soft, Nontender, Nondistended; Bowel sounds present  EXTREMITIES:  2+ Peripheral Pulses, No clubbing, cyanosis, or edema  PSYCH: AAOx3  NEUROLOGY: non-focal  SKIN: No rashes or lesions    LABS:                        11.9   6.56  )-----------( 157      ( 15 Dec 2020 07:02 )             37.5     12-15    131<L>  |  97<L>  |  23  ----------------------------<  92  4.6   |  23  |  1.29    Ca    8.2<L>      15 Dec 2020 07:02    TPro  6.3  /  Alb  2.6<L>  /  TBili  0.6  /  DBili  0.3<H>  /  AST  46<H>  /  ALT  32  /  AlkPhos  74  12-14              RADIOLOGY & ADDITIONAL TESTS:    Imaging Personally Reviewed:    Consultant(s) Notes Reviewed:      Care Discussed with Consultants/Other Providers:   Patient is a 80y old  Male who presents with a chief complaint of syncope (14 Dec 2020 11:00)      SUBJECTIVE / OVERNIGHT EVENTS: patient seen and examined by bedside, awake alert, afebrile, denies headache, dizziness, SOB, CP, Palpitations , N/V/D, abdominal pain  saturating %RA       MEDICATIONS  (STANDING):  allopurinol 100 milliGRAM(s) Oral daily  clopidogrel Tablet 75 milliGRAM(s) Oral daily  dextrose 40% Gel 15 Gram(s) Oral once  dextrose 5%. 1000 milliLiter(s) (50 mL/Hr) IV Continuous <Continuous>  dextrose 5%. 1000 milliLiter(s) (100 mL/Hr) IV Continuous <Continuous>  dextrose 50% Injectable 25 Gram(s) IV Push once  dextrose 50% Injectable 12.5 Gram(s) IV Push once  dextrose 50% Injectable 25 Gram(s) IV Push once  fluticasone propionate 50 MICROgram(s)/spray Nasal Spray 1 Spray(s) Both Nostrils two times a day  glucagon  Injectable 1 milliGRAM(s) IntraMuscular once  hydrALAZINE 25 milliGRAM(s) Oral three times a day  insulin lispro (ADMELOG) corrective regimen sliding scale   SubCutaneous three times a day before meals  insulin lispro (ADMELOG) corrective regimen sliding scale   SubCutaneous at bedtime  isosorbide   mononitrate ER Tablet (IMDUR) 30 milliGRAM(s) Oral daily  metoprolol tartrate 50 milliGRAM(s) Oral two times a day  polyethylene glycol 3350 17 Gram(s) Oral two times a day  rivaroxaban 15 milliGRAM(s) Oral with dinner  senna 2 Tablet(s) Oral at bedtime    MEDICATIONS  (PRN):      Vital Signs Last 24 Hrs  T(C): 36.4 (15 Dec 2020 05:35), Max: 36.8 (14 Dec 2020 17:00)  T(F): 97.5 (15 Dec 2020 05:35), Max: 98.2 (14 Dec 2020 17:00)  HR: 73 (15 Dec 2020 05:35) (73 - 103)  BP: 139/85 (15 Dec 2020 05:35) (131/73 - 156/79)  BP(mean): --  RR: 18 (15 Dec 2020 05:35) (18 - 20)  SpO2: 98% (15 Dec 2020 05:35) (97% - 99%)  CAPILLARY BLOOD GLUCOSE      POCT Blood Glucose.: 95 mg/dL (15 Dec 2020 07:31)  POCT Blood Glucose.: 132 mg/dL (14 Dec 2020 21:51)  POCT Blood Glucose.: 95 mg/dL (14 Dec 2020 16:24)  POCT Blood Glucose.: 81 mg/dL (14 Dec 2020 11:12)    I&O's Summary    14 Dec 2020 07:01  -  15 Dec 2020 07:00  --------------------------------------------------------  IN: 0 mL / OUT: 450 mL / NET: -450 mL        PHYSICAL EXAM:  GENERAL: NAD, well-developed  HEAD:  rt scalp hematoma resolving  Normocephalic  EYES: EOMI, PERRLA, conjunctiva and sclera clear  CHEST/LUNG: Clear to auscultation bilaterally; No wheeze  HEART: Regular rate and rhythm;   ABDOMEN: Soft, Nontender, Nondistended; Bowel sounds present  EXTREMITIES:  2+ Peripheral Pulses, No clubbing, cyanosis, or edema  PSYCH: AAOx3  NEUROLOGY: non-focal  SKIN: No rashes or lesions      LABS:                        11.9   6.56  )-----------( 157      ( 15 Dec 2020 07:02 )             37.5     12-15    131<L>  |  97<L>  |  23  ----------------------------<  92  4.6   |  23  |  1.29    Ca    8.2<L>      15 Dec 2020 07:02    TPro  6.3  /  Alb  2.6<L>  /  TBili  0.6  /  DBili  0.3<H>  /  AST  46<H>  /  ALT  32  /  AlkPhos  74  12-14              RADIOLOGY & ADDITIONAL TESTS:    Imaging Personally Reviewed:    Consultant(s) Notes Reviewed:      Care Discussed with Consultants/Other Providers:

## 2020-12-15 NOTE — PROGRESS NOTE ADULT - PROBLEM SELECTOR PLAN 1
- no syncopal events during hospitalization, pt had mechanical fall on day one of admission  - orthostatics negative   - DASH 1800 ADA diet  - with Hx of CAD w/ stents continue Plavix, atorvastatin, metoprolol   - PT eval

## 2020-12-15 NOTE — PROGRESS NOTE ADULT - PROBLEM SELECTOR PLAN 8
- holding  statin in setting of transaminitis, will repeat , if improving, will resume Skin normal color for race, warm, dry and intact. No evidence of rash. - holding  statin in setting of transaminitis,   -LFTs improved, will resume

## 2020-12-16 ENCOUNTER — TRANSCRIPTION ENCOUNTER (OUTPATIENT)
Age: 80
End: 2020-12-16

## 2020-12-16 VITALS — OXYGEN SATURATION: 95 %

## 2020-12-16 LAB
ANION GAP SERPL CALC-SCNC: 8 MMOL/L — SIGNIFICANT CHANGE UP (ref 7–14)
BUN SERPL-MCNC: 19 MG/DL — SIGNIFICANT CHANGE UP (ref 7–23)
CALCIUM SERPL-MCNC: 8.4 MG/DL — SIGNIFICANT CHANGE UP (ref 8.4–10.5)
CHLORIDE SERPL-SCNC: 98 MMOL/L — SIGNIFICANT CHANGE UP (ref 98–107)
CO2 SERPL-SCNC: 25 MMOL/L — SIGNIFICANT CHANGE UP (ref 22–31)
CREAT SERPL-MCNC: 1.24 MG/DL — SIGNIFICANT CHANGE UP (ref 0.5–1.3)
GLUCOSE BLDC GLUCOMTR-MCNC: 106 MG/DL — HIGH (ref 70–99)
GLUCOSE BLDC GLUCOMTR-MCNC: 129 MG/DL — HIGH (ref 70–99)
GLUCOSE SERPL-MCNC: 90 MG/DL — SIGNIFICANT CHANGE UP (ref 70–99)
HCT VFR BLD CALC: 37.7 % — LOW (ref 39–50)
HGB BLD-MCNC: 12.5 G/DL — LOW (ref 13–17)
MCHC RBC-ENTMCNC: 23.6 PG — LOW (ref 27–34)
MCHC RBC-ENTMCNC: 33.2 GM/DL — SIGNIFICANT CHANGE UP (ref 32–36)
MCV RBC AUTO: 71.1 FL — LOW (ref 80–100)
NRBC # BLD: 0 /100 WBCS — SIGNIFICANT CHANGE UP
NRBC # FLD: 0 K/UL — SIGNIFICANT CHANGE UP
PLATELET # BLD AUTO: 154 K/UL — SIGNIFICANT CHANGE UP (ref 150–400)
POTASSIUM SERPL-MCNC: 4.7 MMOL/L — SIGNIFICANT CHANGE UP (ref 3.5–5.3)
POTASSIUM SERPL-SCNC: 4.7 MMOL/L — SIGNIFICANT CHANGE UP (ref 3.5–5.3)
RBC # BLD: 5.3 M/UL — SIGNIFICANT CHANGE UP (ref 4.2–5.8)
RBC # FLD: 19.1 % — HIGH (ref 10.3–14.5)
SODIUM SERPL-SCNC: 131 MMOL/L — LOW (ref 135–145)
WBC # BLD: 6.61 K/UL — SIGNIFICANT CHANGE UP (ref 3.8–10.5)
WBC # FLD AUTO: 6.61 K/UL — SIGNIFICANT CHANGE UP (ref 3.8–10.5)

## 2020-12-16 PROCEDURE — 99239 HOSP IP/OBS DSCHRG MGMT >30: CPT

## 2020-12-16 RX ORDER — RIVAROXABAN 15 MG-20MG
1 KIT ORAL
Qty: 0 | Refills: 0 | DISCHARGE

## 2020-12-16 RX ADMIN — Medication 1 SPRAY(S): at 05:57

## 2020-12-16 RX ADMIN — Medication 50 MILLIGRAM(S): at 05:56

## 2020-12-16 RX ADMIN — CLOPIDOGREL BISULFATE 75 MILLIGRAM(S): 75 TABLET, FILM COATED ORAL at 12:19

## 2020-12-16 RX ADMIN — Medication 25 MILLIGRAM(S): at 05:56

## 2020-12-16 RX ADMIN — Medication 100 MILLIGRAM(S): at 12:18

## 2020-12-16 RX ADMIN — ISOSORBIDE MONONITRATE 30 MILLIGRAM(S): 60 TABLET, EXTENDED RELEASE ORAL at 12:19

## 2020-12-16 NOTE — PROGRESS NOTE ADULT - PROBLEM SELECTOR PROBLEM 3
Dr Duncan [phone]
CJ (acute kidney injury)

## 2020-12-16 NOTE — DISCHARGE NOTE PROVIDER - HOSPITAL COURSE
80 M PMHx HTN, DM2 (A1c 6.2), HLD , CAD (1 stent), Afib ( on Xarelto), recent admission (12/2020) for COVID PNA presents to [facility]  p/w dizziness, weakness S/P fall. Admitted for syncope and collapse. + Syncope & collapse - CTH negative. HST 25 < 25 + CJ - on IVF, improving + COVID 19 - stable on RA - 98, last inflammatory markers 12/13 downtrending     Syncope & Collapse  - EKG: Afib @ 95bpm  - CT Head + C-Spine - no acute hemorrhage mass, mass effect, fractures or dislocations are seen seen.  - CXR: clear    CJ  - UA mild proteinuria. Neg Leuks/nitrates.  - IVF  - non-olguric at this time   - trend Cr                                                                   Transaminitis  - AST/ALT: 74/62  -IVF      2019 novel coronavirus disease (COVID-19).    - COVID positive on exam. No resp distress at present was very fatigued 12/11 now improved  - Pt just d/c 12/7, was treated w/ dexamethasone at that time, Remdesevir not given  - no increase oxygenation requirement at this time continue to monitor.     Hematoma.    - R scalp hematoma increasing on repeat CTH after fall however at this time no headache, change in vision, continue to monitor.  - appears to be resolving on exam.     Longstanding persistent atrial fibrillation.    - [id number] Score=4  - continue Xarelto   - metoprolol 25 bid at home, increased to 50 bid here, monitor HR.     Gout.   - c/w allopurinol  - no exacerbation.    Essential hypertension.    - Monitor BP daily  - DASH diet  - Continue bp meds.     HLD (hyperlipidemia).    - holding  statin in setting of transaminitis,   -LFTs improved, will resume.     DM2 (diabetes mellitus, type 2).    - Daily glucose monitoring  - insulin sliding scale  - DASH 1800 ADA diet  - serum HgA1C 6.2.     Need for prophylactic measure.   - No additional prophylaxis needed.  - Pt on Xarelto for Afib, reduced dose on 15 mg because he had an upper GI bleed due to peptic ulcers in the past  - Fall precautions  - PT F/U noted, recommend rehab.    dispo: home     On_________, case was discussed with __________, patient is medically cleared and optimized for discharge today. All medications were reviewed with attending, and sent to mutually agreed upon pharmacy.

## 2020-12-16 NOTE — PROGRESS NOTE ADULT - PROBLEM SELECTOR PLAN 4
- R scalp hematoma increasing on repeat CTH after fall however at this time no headache, change in vision, continue to monitor.  - appears to be resolving on exam

## 2020-12-16 NOTE — PROGRESS NOTE ADULT - PROBLEM SELECTOR PLAN 1
- no syncopal events during hospitalization, pt had mechanical fall on day one of admission  - orthostatics negative   - DASH 1800 ADA diet  - with Hx of CAD w/ stents continue Plavix, atorvastatin, metoprolol   - PT eval - no syncopal events during hospitalization, pt had mechanical fall on day one of admission  - orthostatics negative   - DASH 1800 ADA diet  - with Hx of CAD w/ stents continue Plavix, atorvastatin, metoprolol   - PT eval noted

## 2020-12-16 NOTE — PROGRESS NOTE ADULT - ASSESSMENT
80yM h/o HTN , DM2 (on PO agents) , HLD , CAD (1 stent), Afib (Xarelto) presents to [facility]  p/w dizziness, weakness, s/p fall, admitted for syncope and collapse, r/o ACS, and CJ in a setting of COVID-19 infection w/ recent admission for COVID d/c on 12/7 improving

## 2020-12-16 NOTE — PROGRESS NOTE ADULT - PROBLEM SELECTOR PLAN 5
[id number] Score=4  continue Xarelto   metoprolol 25 bid at home, increased to 50 bid here, monitor HR [id number] Score=4  continue Xarelto   metoprolol 25 bid at home, increased to 50 bid here, monitor HR, currently 70-80

## 2020-12-16 NOTE — DISCHARGE NOTE PROVIDER - NSDCMRMEDTOKEN_GEN_ALL_CORE_FT
allopurinol 100 mg oral tablet: 1 tab(s) orally once a day  atorvastatin 40 mg oral tablet: 1 tab(s) orally once a day (at bedtime)  clopidogrel 75 mg oral tablet: 1 tab(s) orally once a day  fluticasone 50 mcg/inh nasal spray: 1 spray(s) in each nostril 2 times a day  hydrALAZINE 25 mg oral tablet: 1 tab(s) orally 3 times a day  isosorbide mononitrate 30 mg oral tablet, extended release: 1 tab(s) orally once a day (in the morning)  metFORMIN 500 mg oral tablet: 1 tab(s) orally 2 times a day (with meals)  metoprolol tartrate 100 mg oral tablet: 1 tab(s) orally 2 times a day  Xarelto 20 mg oral tablet: 1 tab(s) orally once a day (in the evening with food)     allopurinol 100 mg oral tablet: 1 tab(s) orally once a day  atorvastatin 40 mg oral tablet: 1 tab(s) orally once a day (at bedtime)  clopidogrel 75 mg oral tablet: 1 tab(s) orally once a day  fluticasone 50 mcg/inh nasal spray: 1 spray(s) in each nostril 2 times a day  hydrALAZINE 25 mg oral tablet: 1 tab(s) orally 3 times a day  isosorbide mononitrate 30 mg oral tablet, extended release: 1 tab(s) orally once a day (in the morning)  metFORMIN 500 mg oral tablet: 1 tab(s) orally 2 times a day (with meals)  metoprolol tartrate 100 mg oral tablet: 1 tab(s) orally 2 times a day  Rolling Walker: Rolling Walker   for unsteady gait  Xarelto 20 mg oral tablet: 1 tab(s) orally once a day (in the evening with food)     allopurinol 100 mg oral tablet: 1 tab(s) orally once a day  atorvastatin 40 mg oral tablet: 1 tab(s) orally once a day (at bedtime)  clopidogrel 75 mg oral tablet: 1 tab(s) orally once a day  fluticasone 50 mcg/inh nasal spray: 1 spray(s) in each nostril 2 times a day  hydrALAZINE 25 mg oral tablet: 1 tab(s) orally 3 times a day  isosorbide mononitrate 30 mg oral tablet, extended release: 1 tab(s) orally once a day (in the morning)  metFORMIN 500 mg oral tablet: 1 tab(s) orally 2 times a day (with meals)  metoprolol tartrate 100 mg oral tablet: 1 tab(s) orally 2 times a day  rivaroxaban 15 mg oral tablet: 1 tab(s) orally once a day (in the evening)  Rolling Walker: Rolling Walker   for unsteady gait

## 2020-12-16 NOTE — DISCHARGE NOTE NURSING/CASE MANAGEMENT/SOCIAL WORK - NSDCPEPTCAREGIVEDUMATLIST _GEN_ALL_CORE
Diabetes/Influenza Vaccination/Coronavirus/COVID19 Diabetes/Influenza Vaccination/Rivaroxaban/Xarelto/Coronavirus/COVID19

## 2020-12-16 NOTE — DISCHARGE NOTE NURSING/CASE MANAGEMENT/SOCIAL WORK - NSTRANSFERBELONGINGSDISPO_GEN_A_NUR
ED Nurse Note:

Patient walked into [facility] accompanied by family member c/o sore throat for the 
past 5 days, family member states that the patient has recently been having a 
fever with an unknwon temp however at time of arrival patient's temp is 97.8 
axillary, patient acts appropriate for age with no deficits and able to 
ambulate with a steady gait
ER DISCHARGE NOTE:

Patient is cleared to be discharged per ERMD, pt is aox4, on room air, with 
stable vital signs. pt was given dc and prescription instructions, pt was able 
to verbalize understanding, pt id band removed without complications. pt is 
able to ambulate with steady gait. pt took all belongings.
with patient

## 2020-12-16 NOTE — PROGRESS NOTE ADULT - PROBLEM SELECTOR PLAN 7
Monitor BP daily  DASH diet  Continue bp meds Monitor BP daily, BP stable   DASH diet  Continue bp meds

## 2020-12-16 NOTE — PROGRESS NOTE ADULT - PROBLEM SELECTOR PLAN 2
COVID positive on exam. No resp distress at present was very fatigued 12/11 now improved  Pt just d/c 12/7, was treated w/ dexamethasone at that time, Remdesevir not given  - no increase oxygenation requirement at this time continue to monitor

## 2020-12-16 NOTE — DISCHARGE NOTE NURSING/CASE MANAGEMENT/SOCIAL WORK - PATIENT PORTAL LINK FT
You can access the FollowMyHealth Patient Portal offered by [facility] by registering at the following website: [email or web address]. By joining [employer]’s FollowMyHealth portal, you will also be able to view your health information using other applications (apps) compatible with our system.

## 2020-12-16 NOTE — DISCHARGE NOTE PROVIDER - CARE PROVIDER_API CALL
GOPAL ESTES  Family Practice  [address] [place] [address]  Phone: [phone]  Fax: [phone]  Established Patient  Follow Up Time: 1-3 days

## 2020-12-16 NOTE — PROGRESS NOTE ADULT - SUBJECTIVE AND OBJECTIVE BOX
Patient is a 80y old  Male who presents with a chief complaint of syncope (15 Dec 2020 10:31)      SUBJECTIVE / OVERNIGHT EVENTS:    MEDICATIONS  (STANDING):  allopurinol 100 milliGRAM(s) Oral daily  atorvastatin 40 milliGRAM(s) Oral at bedtime  clopidogrel Tablet 75 milliGRAM(s) Oral daily  dextrose 40% Gel 15 Gram(s) Oral once  dextrose 5%. 1000 milliLiter(s) (50 mL/Hr) IV Continuous <Continuous>  dextrose 5%. 1000 milliLiter(s) (100 mL/Hr) IV Continuous <Continuous>  dextrose 50% Injectable 25 Gram(s) IV Push once  dextrose 50% Injectable 12.5 Gram(s) IV Push once  dextrose 50% Injectable 25 Gram(s) IV Push once  fluticasone propionate 50 MICROgram(s)/spray Nasal Spray 1 Spray(s) Both Nostrils two times a day  glucagon  Injectable 1 milliGRAM(s) IntraMuscular once  hydrALAZINE 25 milliGRAM(s) Oral three times a day  insulin lispro (ADMELOG) corrective regimen sliding scale   SubCutaneous three times a day before meals  insulin lispro (ADMELOG) corrective regimen sliding scale   SubCutaneous at bedtime  isosorbide   mononitrate ER Tablet (IMDUR) 30 milliGRAM(s) Oral daily  metoprolol tartrate 50 milliGRAM(s) Oral two times a day  polyethylene glycol 3350 17 Gram(s) Oral two times a day  rivaroxaban 15 milliGRAM(s) Oral with dinner  senna 2 Tablet(s) Oral at bedtime    MEDICATIONS  (PRN):      Vital Signs Last 24 Hrs  T(C): 36.7 (16 Dec 2020 05:54), Max: 36.7 (16 Dec 2020 05:54)  T(F): 98.1 (16 Dec 2020 05:54), Max: 98.1 (16 Dec 2020 05:54)  HR: 82 (16 Dec 2020 05:54) (77 - 86)  BP: 149/85 (16 Dec 2020 05:54) (133/79 - 152/96)  BP(mean): 97 (15 Dec 2020 21:30) (97 - 97)  RR: 17 (16 Dec 2020 05:54) (17 - 19)  SpO2: 98% (16 Dec 2020 05:54) (96% - 100%)  CAPILLARY BLOOD GLUCOSE      POCT Blood Glucose.: 106 mg/dL (16 Dec 2020 08:00)  POCT Blood Glucose.: 132 mg/dL (15 Dec 2020 21:56)  POCT Blood Glucose.: 108 mg/dL (15 Dec 2020 17:36)  POCT Blood Glucose.: 166 mg/dL (15 Dec 2020 12:17)    I&O's Summary      PHYSICAL EXAM:  GENERAL: NAD, well-developed  HEAD:  Atraumatic, Normocephalic  EYES: EOMI, PERRLA, conjunctiva and sclera clear  NECK: Supple, No JVD  CHEST/LUNG: Clear to auscultation bilaterally; No wheeze  HEART: Regular rate and rhythm; No murmurs, rubs, or gallops  ABDOMEN: Soft, Nontender, Nondistended; Bowel sounds present  EXTREMITIES:  2+ Peripheral Pulses, No clubbing, cyanosis, or edema  PSYCH: AAOx3  NEUROLOGY: non-focal  SKIN: No rashes or lesions    LABS:                        12.5   6.61  )-----------( 154      ( 16 Dec 2020 07:47 )             37.7     12-16    131<L>  |  98  |  19  ----------------------------<  90  4.7   |  25  |  1.24    Ca    8.4      16 Dec 2020 07:47                RADIOLOGY & ADDITIONAL TESTS:    Imaging Personally Reviewed:    Consultant(s) Notes Reviewed:      Care Discussed with Consultants/Other Providers:   Patient is a 80y old  Male who presents with a chief complaint of syncope (15 Dec 2020 10:31)      SUBJECTIVE / OVERNIGHT EVENTS: patient seen and examined by bedside, awake, alert and afebrile  denies headache, dizziness, SOB, CP, Palpitations , N/V/D, abdominal pain  saturating in 90s on RA at rest and ambulation       MEDICATIONS  (STANDING):  allopurinol 100 milliGRAM(s) Oral daily  atorvastatin 40 milliGRAM(s) Oral at bedtime  clopidogrel Tablet 75 milliGRAM(s) Oral daily  dextrose 40% Gel 15 Gram(s) Oral once  dextrose 5%. 1000 milliLiter(s) (50 mL/Hr) IV Continuous <Continuous>  dextrose 5%. 1000 milliLiter(s) (100 mL/Hr) IV Continuous <Continuous>  dextrose 50% Injectable 25 Gram(s) IV Push once  dextrose 50% Injectable 12.5 Gram(s) IV Push once  dextrose 50% Injectable 25 Gram(s) IV Push once  fluticasone propionate 50 MICROgram(s)/spray Nasal Spray 1 Spray(s) Both Nostrils two times a day  glucagon  Injectable 1 milliGRAM(s) IntraMuscular once  hydrALAZINE 25 milliGRAM(s) Oral three times a day  insulin lispro (ADMELOG) corrective regimen sliding scale   SubCutaneous three times a day before meals  insulin lispro (ADMELOG) corrective regimen sliding scale   SubCutaneous at bedtime  isosorbide   mononitrate ER Tablet (IMDUR) 30 milliGRAM(s) Oral daily  metoprolol tartrate 50 milliGRAM(s) Oral two times a day  polyethylene glycol 3350 17 Gram(s) Oral two times a day  rivaroxaban 15 milliGRAM(s) Oral with dinner  senna 2 Tablet(s) Oral at bedtime    MEDICATIONS  (PRN):      Vital Signs Last 24 Hrs  T(C): 36.7 (16 Dec 2020 05:54), Max: 36.7 (16 Dec 2020 05:54)  T(F): 98.1 (16 Dec 2020 05:54), Max: 98.1 (16 Dec 2020 05:54)  HR: 82 (16 Dec 2020 05:54) (77 - 86)  BP: 149/85 (16 Dec 2020 05:54) (133/79 - 152/96)  BP(mean): 97 (15 Dec 2020 21:30) (97 - 97)  RR: 17 (16 Dec 2020 05:54) (17 - 19)  SpO2: 98% (16 Dec 2020 05:54) (96% - 100%)  CAPILLARY BLOOD GLUCOSE      POCT Blood Glucose.: 106 mg/dL (16 Dec 2020 08:00)  POCT Blood Glucose.: 132 mg/dL (15 Dec 2020 21:56)  POCT Blood Glucose.: 108 mg/dL (15 Dec 2020 17:36)  POCT Blood Glucose.: 166 mg/dL (15 Dec 2020 12:17)    I&O's Summary    PHYSICAL EXAM:  GENERAL: NAD, well-developed  HEAD:  rt scalp hematoma resolving  Normocephalic  EYES: EOMI, PERRLA, conjunctiva and sclera clear  CHEST/LUNG: Clear to auscultation bilaterally; No wheeze  HEART: Regular rate and rhythm;   ABDOMEN: Soft, Nontender, Nondistended; Bowel sounds present  EXTREMITIES:  2+ Peripheral Pulses, No clubbing, cyanosis, or edema  PSYCH: AAOx3  NEUROLOGY: non-focal  SKIN: No rashes or lesion      LABS:                        12.5   6.61  )-----------( 154      ( 16 Dec 2020 07:47 )             37.7     12-16    131<L>  |  98  |  19  ----------------------------<  90  4.7   |  25  |  1.24    Ca    8.4      16 Dec 2020 07:47                RADIOLOGY & ADDITIONAL TESTS:    Imaging Personally Reviewed:    Consultant(s) Notes Reviewed:      Care Discussed with Consultants/Other Providers:

## 2020-12-16 NOTE — PROGRESS NOTE ADULT - ATTENDING COMMENTS
Plan of care was d/w wife and daughter on the phone on 12/15  will dc home with home care and PT   Patient hemodynamically stable for discharge home  Time spent in discharge process is 40 min
Plan of care was d/w wife and daughter on the phone
DC planning pending PT eval

## 2020-12-16 NOTE — PROGRESS NOTE ADULT - PROBLEM SELECTOR PLAN 10
No additional prophylaxis needed.  Pt on Xarelto for Afib, reduced dose on 15 mg because he had an upper GI bleed due to peptic ulcers in the past  Fall precautions  PT F/U noted, recommend rehab No additional prophylaxis needed.  Pt on Xarelto for Afib, reduced dose on 15 mg because he had an upper GI bleed due to peptic ulcers in the past  Fall precautions  PT F/U noted, recommend rehab, family want to take patient home

## 2020-12-16 NOTE — DISCHARGE NOTE PROVIDER - NSDCCPCAREPLAN_GEN_ALL_CORE_FT
PRINCIPAL DISCHARGE DIAGNOSIS  Diagnosis: 2019 novel coronavirus disease (COVID-19)  Assessment and Plan of Treatment: You have been diagnosed with the COVID-19 virus during your hospital stay. You must self quarantine to complete a 14 day time period.  Monitor for fevers, shortness of breath and cough primarily.  Monitor your temperature daily to not any changes and increases.    It has been determined that you no longer need hospitalization and can recover while remaining in self-quarantine at home. You should follow the prevention steps below until a healthcare provider or local or state health department says you can return to your normal activities.  1. You should restrict activities outside your home, except for getting medical care.  2. Do not go to work, school, or public areas.  3. Avoid using public transportation, ride-sharing, or taxis.  4. Separate yourself from other people and animals in your home.  5. Call ahead before visiting your doctor.  6. Wear a facemask.  7. Cover your coughs and sneezes.  8. Clean your hands often.  9. Avoid sharing personal household items.  10. Clean all “high-touch” surfaces everyday.  11. Monitor your symptoms.  If you have a medical emergency and need to call 911, notify the dispatch personnel that you have COVID-19 If possible, put on a facemask before emergency medical services arrive.  12. Stopping home isolation.  Patients with confirmed COVID-19 should remain under home isolation precautions for 14 days since the positive COVID-19 test and until the risk of secondary transmission to others is thought to be low. The decision to discontinue home isolation precautions should be made on a case-by-case basis, in consultation with healthcare providers and state and local health departments.  Your [facility] Department of Health can be reached at [phone] for further information about COVID-19.        SECONDARY DISCHARGE DIAGNOSES  Diagnosis: CJ (acute kidney injury)  Assessment and Plan of Treatment: In order to prevent further disease progression, continue to follow recommendations made by your primary provider/nephrologist. Continue a diet that is low in sodium and avoid foods that are concentrated in potassium and phosphorus. Continue your medications/supplementations as directed and avoid over-the-counter drugs that are harmful to kidneys, such as, Non-Steroidal Anti-Inflammatory Drugs (NSAIDs). Follow-up as outpatient to monitor your kidney function, as well as, vitamin D, Calcium, potassium, and phosphorus levels.      Diagnosis: Essential hypertension  Assessment and Plan of Treatment: Continue blood pressure medication regimen as directed. Follow a low salt/cholesterol diet. Monitor for any visual changes, headaches or dizziness.  Monitor blood pressure regularly.  Follow up with your primary care provider for further management for high blood pressure.      Diagnosis: DM2 (diabetes mellitus, type 2)  Assessment and Plan of Treatment: Continue your medication regimen and a consistent carbohydrate diet (Meaning eating the same amount of carbohydrates at the same time each day). Monitor blood glucose levels throughout the day before meals and at bedtime. Record blood sugars and bring to outpatient providers appointment in order to be reviewed by your doctor for management modifications. If your sugars are more than 400 or less than 70 you should contact your PCP immediately. Monitor for signs/symptoms of low blood glucose, such as, dizziness, altered mental status, or cool/clammy skin. In addition, monitor for signs/symptoms of high blood glucose, such as, feeling hot, dry, fatigued, or with increased thirst/urination. Make regular podiatry appointments in order to have feet checked for wounds and uncontrolled toe nail growth to prevent infections, as well as, appointments with an ophthalmologist to monitor your vision.      Diagnosis: Longstanding persistent atrial fibrillation  Assessment and Plan of Treatment: Continue taking medications as prescribed, including Xarelto. Follow up with your cardiologist as scheduled.    Diagnosis: Syncope  Assessment and Plan of Treatment: You were seen for a fall after a syncopal episode.  Maintain a safe environment. Do not change positions quickly. Particpate in program recommended by physical therapy     PRINCIPAL DISCHARGE DIAGNOSIS  Diagnosis: 2019 novel coronavirus disease (COVID-19)  Assessment and Plan of Treatment: You have been diagnosed with the COVID-19 virus during your hospital stay. You must self quarantine to complete a 14 day time period.  Monitor for fevers, shortness of breath and cough primarily.  Monitor your temperature daily to not any changes and increases.    It has been determined that you no longer need hospitalization and can recover while remaining in self-quarantine at home. You should follow the prevention steps below until a healthcare provider or local or state health department says you can return to your normal activities.  1. You should restrict activities outside your home, except for getting medical care.  2. Do not go to work, school, or public areas.  3. Avoid using public transportation, ride-sharing, or taxis.  4. Separate yourself from other people and animals in your home.  5. Call ahead before visiting your doctor.  6. Wear a facemask.  7. Cover your coughs and sneezes.  8. Clean your hands often.  9. Avoid sharing personal household items.  10. Clean all “high-touch” surfaces everyday.  11. Monitor your symptoms.  If you have a medical emergency and need to call 911, notify the dispatch personnel that you have COVID-19 If possible, put on a facemask before emergency medical services arrive.  12. Stopping home isolation.  Patients with confirmed COVID-19 should remain under home isolation precautions for 14 days since the positive COVID-19 test and until the risk of secondary transmission to others is thought to be low. The decision to discontinue home isolation precautions should be made on a case-by-case basis, in consultation with healthcare providers and state and local health departments.  Your [facility] Department of Health can be reached at [phone] for further information about COVID-19.        SECONDARY DISCHARGE DIAGNOSES  Diagnosis: CJ (acute kidney injury)  Assessment and Plan of Treatment: In order to prevent further disease progression, continue to follow recommendations made by your primary provider/nephrologist. Continue a diet that is low in sodium and avoid foods that are concentrated in potassium and phosphorus. Continue your medications/supplementations as directed and avoid over-the-counter drugs that are harmful to kidneys, such as, Non-Steroidal Anti-Inflammatory Drugs (NSAIDs). Follow-up as outpatient to monitor your kidney function, as well as, vitamin D, Calcium, potassium, and phosphorus levels.      Diagnosis: Essential hypertension  Assessment and Plan of Treatment: Continue blood pressure medication regimen as directed. Follow a low salt/cholesterol diet. Monitor for any visual changes, headaches or dizziness.  Monitor blood pressure regularly.  Follow up with your primary care provider for further management for high blood pressure.      Diagnosis: DM2 (diabetes mellitus, type 2)  Assessment and Plan of Treatment: Continue your medication regimen and a consistent carbohydrate diet (Meaning eating the same amount of carbohydrates at the same time each day). Monitor blood glucose levels throughout the day before meals and at bedtime. Record blood sugars and bring to outpatient providers appointment in order to be reviewed by your doctor for management modifications. If your sugars are more than 400 or less than 70 you should contact your PCP immediately. Monitor for signs/symptoms of low blood glucose, such as, dizziness, altered mental status, or cool/clammy skin. In addition, monitor for signs/symptoms of high blood glucose, such as, feeling hot, dry, fatigued, or with increased thirst/urination. Make regular podiatry appointments in order to have feet checked for wounds and uncontrolled toe nail growth to prevent infections, as well as, appointments with an ophthalmologist to monitor your vision.      Diagnosis: Longstanding persistent atrial fibrillation  Assessment and Plan of Treatment: Continue taking medications as prescribed, including Xarelto 15 mg daily. . Follow up with your cardiologist within 1 week for any adjustments to medications that may be needed.    Diagnosis: Syncope  Assessment and Plan of Treatment: You were seen for a fall after a syncopal episode.  Maintain a safe environment. Do not change positions quickly. Particpate in program recommended by physical therapy

## 2020-12-17 LAB
SARS-COV-2 IGG SERPL QL IA: POSITIVE
SARS-COV-2 IGM SERPL IA-ACNC: 8.42 INDEX — HIGH

## 2021-03-20 NOTE — DISCHARGE NOTE ADULT - MEDICATION SUMMARY - MEDICATIONS TO STOP TAKING
I will STOP taking the medications listed below when I get home from the hospital:    Xarelto 20 mg oral tablet  -- 1 tab(s) by mouth once a day (in the evening)    potassium chloride 10 mEq oral capsule, extended release  -- 1 cap(s) by mouth once a day no pain, swelling or deformity of joints

## 2021-09-24 NOTE — PROGRESS NOTE ADULT - ASSESSMENT
80yM h/o HTN , DM2 (on PO agents) , HLD , CAD (1 stent), Afib (Xarelto) presents to [facility]  p/w dizziness, weakness, s/p fall, admitted for syncope and collapse, r/o ACS, and CJ in a setting of COVID-19 infection w/ recent admission for COVID d/c on 12/7 improving    Sig For Treatment 1 (If Needed): once daily at bedtime

## 2021-10-21 ENCOUNTER — EMERGENCY (EMERGENCY)
Facility: HOSPITAL | Age: 81
LOS: 1 days | Discharge: HOME | End: 2021-10-21
Attending: EMERGENCY MEDICINE
Payer: COMMERCIAL

## 2021-10-21 VITALS
RESPIRATION RATE: 17 BRPM | SYSTOLIC BLOOD PRESSURE: 159 MMHG | OXYGEN SATURATION: 98 % | TEMPERATURE: 98 F | DIASTOLIC BLOOD PRESSURE: 83 MMHG | HEART RATE: 68 BPM | WEIGHT: 171.96 LBS

## 2021-10-21 VITALS
DIASTOLIC BLOOD PRESSURE: 88 MMHG | TEMPERATURE: 98 F | RESPIRATION RATE: 18 BRPM | OXYGEN SATURATION: 99 % | SYSTOLIC BLOOD PRESSURE: 176 MMHG | HEART RATE: 84 BPM

## 2021-10-21 DIAGNOSIS — Z95.5 PRESENCE OF CORONARY ANGIOPLASTY IMPLANT AND GRAFT: Chronic | ICD-10-CM

## 2021-10-21 LAB
ALBUMIN SERPL ELPH-MCNC: 3.1 G/DL — LOW (ref 3.5–5)
ALP SERPL-CCNC: 73 U/L — SIGNIFICANT CHANGE UP (ref 40–120)
ALT FLD-CCNC: 16 U/L DA — SIGNIFICANT CHANGE UP (ref 10–60)
ANION GAP SERPL CALC-SCNC: 5 MMOL/L — SIGNIFICANT CHANGE UP (ref 5–17)
AST SERPL-CCNC: 19 U/L — SIGNIFICANT CHANGE UP (ref 10–40)
BASOPHILS # BLD AUTO: 0.05 K/UL — SIGNIFICANT CHANGE UP (ref 0–0.2)
BASOPHILS NFR BLD AUTO: 0.6 % — SIGNIFICANT CHANGE UP (ref 0–2)
BILIRUB SERPL-MCNC: 0.6 MG/DL — SIGNIFICANT CHANGE UP (ref 0.2–1.2)
BUN SERPL-MCNC: 34 MG/DL — HIGH (ref 7–18)
CALCIUM SERPL-MCNC: 9.2 MG/DL — SIGNIFICANT CHANGE UP (ref 8.4–10.5)
CHLORIDE SERPL-SCNC: 104 MMOL/L — SIGNIFICANT CHANGE UP (ref 96–108)
CK SERPL-CCNC: 63 U/L — SIGNIFICANT CHANGE UP (ref 35–232)
CO2 SERPL-SCNC: 28 MMOL/L — SIGNIFICANT CHANGE UP (ref 22–31)
CREAT SERPL-MCNC: 1.96 MG/DL — HIGH (ref 0.5–1.3)
EOSINOPHIL # BLD AUTO: 0.26 K/UL — SIGNIFICANT CHANGE UP (ref 0–0.5)
EOSINOPHIL NFR BLD AUTO: 3.3 % — SIGNIFICANT CHANGE UP (ref 0–6)
GLUCOSE SERPL-MCNC: 97 MG/DL — SIGNIFICANT CHANGE UP (ref 70–99)
HCT VFR BLD CALC: 41.1 % — SIGNIFICANT CHANGE UP (ref 39–50)
HGB BLD-MCNC: 12.5 G/DL — LOW (ref 13–17)
IMM GRANULOCYTES NFR BLD AUTO: 0.4 % — SIGNIFICANT CHANGE UP (ref 0–1.5)
LYMPHOCYTES # BLD AUTO: 1.89 K/UL — SIGNIFICANT CHANGE UP (ref 1–3.3)
LYMPHOCYTES # BLD AUTO: 23.9 % — SIGNIFICANT CHANGE UP (ref 13–44)
MAGNESIUM SERPL-MCNC: 2.3 MG/DL — SIGNIFICANT CHANGE UP (ref 1.6–2.6)
MCHC RBC-ENTMCNC: 20.7 PG — LOW (ref 27–34)
MCHC RBC-ENTMCNC: 30.4 GM/DL — LOW (ref 32–36)
MCV RBC AUTO: 68.2 FL — LOW (ref 80–100)
MONOCYTES # BLD AUTO: 0.96 K/UL — HIGH (ref 0–0.9)
MONOCYTES NFR BLD AUTO: 12.1 % — SIGNIFICANT CHANGE UP (ref 2–14)
NEUTROPHILS # BLD AUTO: 4.73 K/UL — SIGNIFICANT CHANGE UP (ref 1.8–7.4)
NEUTROPHILS NFR BLD AUTO: 59.7 % — SIGNIFICANT CHANGE UP (ref 43–77)
NRBC # BLD: 0 /100 WBCS — SIGNIFICANT CHANGE UP (ref 0–0)
NT-PROBNP SERPL-SCNC: 2735 PG/ML — HIGH (ref 0–450)
PLATELET # BLD AUTO: 148 K/UL — LOW (ref 150–400)
POTASSIUM SERPL-MCNC: 4.9 MMOL/L — SIGNIFICANT CHANGE UP (ref 3.5–5.3)
POTASSIUM SERPL-SCNC: 4.9 MMOL/L — SIGNIFICANT CHANGE UP (ref 3.5–5.3)
PROT SERPL-MCNC: 8 G/DL — SIGNIFICANT CHANGE UP (ref 6–8.3)
RBC # BLD: 6.03 M/UL — HIGH (ref 4.2–5.8)
RBC # FLD: 20.9 % — HIGH (ref 10.3–14.5)
SODIUM SERPL-SCNC: 137 MMOL/L — SIGNIFICANT CHANGE UP (ref 135–145)
TROPONIN I, HIGH SENSITIVITY RESULT: 10.6 NG/L — SIGNIFICANT CHANGE UP
TROPONIN I, HIGH SENSITIVITY RESULT: 11.9 NG/L — SIGNIFICANT CHANGE UP
WBC # BLD: 7.92 K/UL — SIGNIFICANT CHANGE UP (ref 3.8–10.5)
WBC # FLD AUTO: 7.92 K/UL — SIGNIFICANT CHANGE UP (ref 3.8–10.5)

## 2021-10-21 PROCEDURE — 80053 COMPREHEN METABOLIC PANEL: CPT

## 2021-10-21 PROCEDURE — 71045 X-RAY EXAM CHEST 1 VIEW: CPT | Mod: 26

## 2021-10-21 PROCEDURE — 84484 ASSAY OF TROPONIN QUANT: CPT

## 2021-10-21 PROCEDURE — 93010 ELECTROCARDIOGRAM REPORT: CPT

## 2021-10-21 PROCEDURE — 82962 GLUCOSE BLOOD TEST: CPT

## 2021-10-21 PROCEDURE — 36415 COLL VENOUS BLD VENIPUNCTURE: CPT

## 2021-10-21 PROCEDURE — 71045 X-RAY EXAM CHEST 1 VIEW: CPT

## 2021-10-21 PROCEDURE — 83880 ASSAY OF NATRIURETIC PEPTIDE: CPT

## 2021-10-21 PROCEDURE — 83735 ASSAY OF MAGNESIUM: CPT

## 2021-10-21 PROCEDURE — 99284 EMERGENCY DEPT VISIT MOD MDM: CPT | Mod: 25

## 2021-10-21 PROCEDURE — 82550 ASSAY OF CK (CPK): CPT

## 2021-10-21 PROCEDURE — 93005 ELECTROCARDIOGRAM TRACING: CPT

## 2021-10-21 PROCEDURE — 85025 COMPLETE CBC W/AUTO DIFF WBC: CPT

## 2021-10-21 PROCEDURE — 99285 EMERGENCY DEPT VISIT HI MDM: CPT

## 2021-10-21 NOTE — ED PROVIDER NOTE - OBJECTIVE STATEMENT
82 y/o M presents to the ED with fainting episode this morning. Patient states he was sitting in a chair and started feeling dizzy. Patient called his wife and told her he was not feeling well, who then called 911. Patient states he did not fully pass out and remembers everything that happened. Patient states he did not fall. Patient reports after the episode had the urge to move his bowels and went to the bathroom, where he then had a good and big bowel movement.

## 2021-10-21 NOTE — ED ADULT NURSE NOTE - CAS DISCH TRANSFER METHOD
Private car H Plasty Text: Given the location of the defect, shape of the defect and the proximity to free margins a H-plasty was deemed most appropriate for repair.  Using a sterile surgical marker, the appropriate advancement arms of the H-plasty were drawn incorporating the defect and placing the expected incisions within the relaxed skin tension lines where possible. The area thus outlined was incised deep to adipose tissue with a #15 scalpel blade. The skin margins were undermined to an appropriate distance in all directions utilizing iris scissors.  The opposing advancement arms were then advanced into place in opposite direction and anchored with interrupted buried subcutaneous sutures.

## 2021-10-21 NOTE — ED PROVIDER NOTE - CLINICAL SUMMARY MEDICAL DECISION MAKING FREE TEXT BOX
80 y/o M presents with episode of lightheadedness today, possibly a vagal episode. Given extensive cardiac history, will do 2nd troponin. If normal and patient is feeling well, will discharge with outpatient cardiology follow up.

## 2021-10-21 NOTE — ED PROVIDER NOTE - PATIENT PORTAL LINK FT
You can access the FollowMyHealth Patient Portal offered by [facility] by registering at the following website: [email or web address]. By joining [employer]’s FollowMyHealth portal, you will also be able to view your health information using other applications (apps) compatible with our system.

## 2021-10-21 NOTE — ED PROVIDER NOTE - NSFOLLOWUPINSTRUCTIONS_ED_ALL_ED_FT
Syncope    WHAT YOU NEED TO KNOW:    Syncope is also called fainting or passing out. Syncope is a sudden, temporary loss of consciousness, followed by a fall from a standing or sitting position. Syncope ranges from not serious to a sign of a more serious condition that needs to be treated. You can control some health conditions that cause syncope. Your healthcare providers can help you create a plan to manage syncope and prevent episodes.    DISCHARGE INSTRUCTIONS:    Seek care immediately if:   •You are bleeding because you hit your head when you fainted.       •You suddenly have double vision, difficulty speaking, numbness, and cannot move your arms or legs.      •You have chest pain and trouble breathing.      •You vomit blood or material that looks like coffee grounds.      •You see blood in your bowel movement.      Contact your healthcare provider if:   •You have new or worsening symptoms.      •You have another syncope episode.      •You have a headache, fast heartbeat, or feel too dizzy to stand up.      •You have questions or concerns about your condition or care.      Medicines:   •Medicines may be needed to help your heart pump strongly and regularly. Your healthcare provider may also make changes to any medicines that are causing syncope.       •Take your medicine as directed. Contact your healthcare provider if you think your medicine is not helping or if you have side effects. Tell him or her if you are allergic to any medicine. Keep a list of the medicines, vitamins, and herbs you take. Include the amounts, and when and why you take them. Bring the list or the pill bottles to follow-up visits. Carry your medicine list with you in case of an emergency.      Follow up with your doctor as directed: Write down your questions so you remember to ask them during your visits.     Manage syncope:   •Keep a record of your syncope episodes. Include your symptoms and your activity before and after the episode. The record can help your healthcare provider find the cause of your syncope and help you manage episodes.      •Sit or lie down when needed. This includes when you feel dizzy, your throat is getting tight, and your vision changes. Raise your legs above the level of your heart.      •Take slow, deep breaths if you start to breathe faster with anxiety or fear. This can help decrease dizziness and the feeling that you might faint.       •Check your blood pressure often. This is important if you take medicine to lower your blood pressure. Check your blood pressure when you are lying down and when you are standing. Ask how often to check during the day. Keep a record of your blood pressure numbers. Your healthcare provider may use the record to help plan your treatment.  How to take a Blood Pressure           Prevent a syncope episode:   •Move slowly and let yourself get used to one position before you move to another position. This is very important when you change from a lying or sitting position to a standing position. Take some deep breaths before you stand up from a lying position. Stand up slowly. Sudden movements may cause a fainting spell. Sit on the side of the bed or couch for a few minutes before you stand up. If you are on bedrest, try to be upright for about 2 hours each day, or as directed. Do not lock your legs if you are standing for a long period of time. Move your legs and bend your knees to keep blood flowing.      •Follow your healthcare provider's recommendations. Your provider may recommend that you drink more liquids to prevent dehydration. You may also need to have more salt to keep your blood pressure from dropping too low and causing syncope. Your provider will tell you how much liquid and sodium to have each day. He or she will also tell you how much physical activity is safe for you. This will depend on what is causing your syncope.      •Watch for signs of low blood sugar. These include hunger, nervousness, sweating, and fast or fluttery heartbeats. Talk with your healthcare provider about ways to keep your blood sugar level steady.      •Do not strain if you are constipated. You may faint if you strain to have a bowel movement. Walking is the best way to get your bowels moving. Eat foods high in fiber to make it easier to have a bowel movement. Good examples are high-fiber cereals, beans, vegetables, and whole-grain breads. Prune juice may help make bowel movements softer.      •Be careful in hot weather. Heat can cause a syncope episode. Limit activity done outside on hot days. Physical activity in hot weather can lead to dehydration. This can cause an episode.

## 2021-10-21 NOTE — ED ADULT NURSE NOTE - OBJECTIVE STATEMENT
Home
Pt c/o syncope while sitting at the table at home. As per wife, patient passed out for 2-3 minutes. Pt denied nausea, vomiting, fall, or injury.

## 2021-10-27 ENCOUNTER — INPATIENT (INPATIENT)
Facility: HOSPITAL | Age: 81
LOS: 17 days | Discharge: ANOTHER HOSPITAL | End: 2021-11-14
Attending: HOSPITALIST | Admitting: HOSPITALIST
Payer: MEDICARE

## 2021-10-27 VITALS
DIASTOLIC BLOOD PRESSURE: 99 MMHG | TEMPERATURE: 97 F | OXYGEN SATURATION: 100 % | HEART RATE: 74 BPM | HEIGHT: 65 IN | SYSTOLIC BLOOD PRESSURE: 152 MMHG | RESPIRATION RATE: 18 BRPM

## 2021-10-27 DIAGNOSIS — Z95.5 PRESENCE OF CORONARY ANGIOPLASTY IMPLANT AND GRAFT: Chronic | ICD-10-CM

## 2021-10-27 LAB
ALBUMIN SERPL ELPH-MCNC: 3.9 G/DL — SIGNIFICANT CHANGE UP (ref 3.3–5)
ALBUMIN SERPL ELPH-MCNC: 4.3 G/DL — SIGNIFICANT CHANGE UP (ref 3.3–5)
ALP SERPL-CCNC: 81 U/L — SIGNIFICANT CHANGE UP (ref 40–120)
ALP SERPL-CCNC: 87 U/L — SIGNIFICANT CHANGE UP (ref 40–120)
ALT FLD-CCNC: 12 U/L — SIGNIFICANT CHANGE UP (ref 4–41)
ALT FLD-CCNC: 17 U/L — SIGNIFICANT CHANGE UP (ref 4–41)
ANION GAP SERPL CALC-SCNC: 17 MMOL/L — HIGH (ref 7–14)
AST SERPL-CCNC: 20 U/L — SIGNIFICANT CHANGE UP (ref 4–40)
AST SERPL-CCNC: 38 U/L — SIGNIFICANT CHANGE UP (ref 4–40)
BASOPHILS # BLD AUTO: 0.03 K/UL — SIGNIFICANT CHANGE UP (ref 0–0.2)
BASOPHILS NFR BLD AUTO: 0.3 % — SIGNIFICANT CHANGE UP (ref 0–2)
BILIRUB SERPL-MCNC: 0.4 MG/DL — SIGNIFICANT CHANGE UP (ref 0.2–1.2)
BILIRUB SERPL-MCNC: 0.4 MG/DL — SIGNIFICANT CHANGE UP (ref 0.2–1.2)
BUN SERPL-MCNC: 40 MG/DL — HIGH (ref 7–23)
BUN SERPL-MCNC: 40 MG/DL — HIGH (ref 7–23)
CALCIUM SERPL-MCNC: 9.8 MG/DL — SIGNIFICANT CHANGE UP (ref 8.4–10.5)
CALCIUM SERPL-MCNC: 9.9 MG/DL — SIGNIFICANT CHANGE UP (ref 8.4–10.5)
CHLORIDE SERPL-SCNC: 98 MMOL/L — SIGNIFICANT CHANGE UP (ref 98–107)
CO2 SERPL-SCNC: 22 MMOL/L — SIGNIFICANT CHANGE UP (ref 22–31)
CO2 SERPL-SCNC: 23 MMOL/L — SIGNIFICANT CHANGE UP (ref 22–31)
CREAT SERPL-MCNC: 1.91 MG/DL — HIGH (ref 0.5–1.3)
CREAT SERPL-MCNC: 2.07 MG/DL — HIGH (ref 0.5–1.3)
EOSINOPHIL # BLD AUTO: 0.03 K/UL — SIGNIFICANT CHANGE UP (ref 0–0.5)
EOSINOPHIL NFR BLD AUTO: 0.3 % — SIGNIFICANT CHANGE UP (ref 0–6)
GLUCOSE SERPL-MCNC: 114 MG/DL — HIGH (ref 70–99)
GLUCOSE SERPL-MCNC: 119 MG/DL — HIGH (ref 70–99)
HCT VFR BLD CALC: 46.3 % — SIGNIFICANT CHANGE UP (ref 39–50)
HGB BLD-MCNC: 14.4 G/DL — SIGNIFICANT CHANGE UP (ref 13–17)
IANC: 7.02 K/UL — SIGNIFICANT CHANGE UP (ref 1.5–8.5)
IMM GRANULOCYTES NFR BLD AUTO: 0.2 % — SIGNIFICANT CHANGE UP (ref 0–1.5)
LYMPHOCYTES # BLD AUTO: 1.76 K/UL — SIGNIFICANT CHANGE UP (ref 1–3.3)
LYMPHOCYTES # BLD AUTO: 18.1 % — SIGNIFICANT CHANGE UP (ref 13–44)
MAGNESIUM SERPL-MCNC: 2 MG/DL — SIGNIFICANT CHANGE UP (ref 1.6–2.6)
MCHC RBC-ENTMCNC: 21.1 PG — LOW (ref 27–34)
MCHC RBC-ENTMCNC: 31.1 GM/DL — LOW (ref 32–36)
MCV RBC AUTO: 67.8 FL — LOW (ref 80–100)
MONOCYTES # BLD AUTO: 0.87 K/UL — SIGNIFICANT CHANGE UP (ref 0–0.9)
MONOCYTES NFR BLD AUTO: 8.9 % — SIGNIFICANT CHANGE UP (ref 2–14)
NEUTROPHILS # BLD AUTO: 7.02 K/UL — SIGNIFICANT CHANGE UP (ref 1.8–7.4)
NEUTROPHILS NFR BLD AUTO: 72.2 % — SIGNIFICANT CHANGE UP (ref 43–77)
NRBC # BLD: 0 /100 WBCS — SIGNIFICANT CHANGE UP
NRBC # FLD: 0 K/UL — SIGNIFICANT CHANGE UP
PHOSPHATE SERPL-MCNC: 3.5 MG/DL — SIGNIFICANT CHANGE UP (ref 2.5–4.5)
PLATELET # BLD AUTO: 200 K/UL — SIGNIFICANT CHANGE UP (ref 150–400)
POTASSIUM SERPL-MCNC: 6 MMOL/L — HIGH (ref 3.5–5.3)
POTASSIUM SERPL-SCNC: 6 MMOL/L — HIGH (ref 3.5–5.3)
PROT SERPL-MCNC: 8.5 G/DL — HIGH (ref 6–8.3)
PROT SERPL-MCNC: 9.6 G/DL — HIGH (ref 6–8.3)
RBC # BLD: 6.83 M/UL — HIGH (ref 4.2–5.8)
RBC # FLD: 20.8 % — HIGH (ref 10.3–14.5)
SODIUM SERPL-SCNC: 137 MMOL/L — SIGNIFICANT CHANGE UP (ref 135–145)
TROPONIN T, HIGH SENSITIVITY RESULT: 32 NG/L — SIGNIFICANT CHANGE UP
TROPONIN T, HIGH SENSITIVITY RESULT: 32 NG/L — SIGNIFICANT CHANGE UP
WBC # BLD: 9.73 K/UL — SIGNIFICANT CHANGE UP (ref 3.8–10.5)
WBC # FLD AUTO: 9.73 K/UL — SIGNIFICANT CHANGE UP (ref 3.8–10.5)

## 2021-10-27 PROCEDURE — 99285 EMERGENCY DEPT VISIT HI MDM: CPT

## 2021-10-27 RX ORDER — ACETAMINOPHEN 500 MG
975 TABLET ORAL ONCE
Refills: 0 | Status: COMPLETED | OUTPATIENT
Start: 2021-10-27 | End: 2021-10-27

## 2021-10-27 RX ADMIN — Medication 975 MILLIGRAM(S): at 21:08

## 2021-10-27 RX ADMIN — Medication 975 MILLIGRAM(S): at 20:48

## 2021-10-27 NOTE — ED PROVIDER NOTE - CLINICAL SUMMARY MEDICAL DECISION MAKING FREE TEXT BOX
81M PMH afib on xarelto, CAD s/p stent, HTN, DM2, HLD, gout complaining of 2 episodes of syncope today, both witnessed by wife, +LOC, no head trauma. Associated with dizziness, rapid breathing, and vision blacking out. No recent fall, headache, chest pain, palpatiation, fever. No focal neuro deficits. Irregular rate consistent with afib. Lungs CTA. R/o Arrhythmia vs orthostasis vs ICH. Labs, ecg, ct 81M PMH afib on xarelto, CAD s/p stent, HTN, DM2, HLD, gout complaining of 2 episodes of syncope today, both witnessed by wife, +LOC, no head trauma. Associated with dizziness, rapid breathing, and vision blacking out. No recent fall, headache, chest pain, palpatiation, fever. No focal neuro deficits. Irregular rate consistent with afib. Lungs CTA. R/o Arrhythmia vs aortic stenosis vs orthostasis vs ICH. Labs, ecg, ct head, admit

## 2021-10-27 NOTE — ED ADULT NURSE NOTE - OBJECTIVE STATEMENT
Patient presenting to  8. Patient AAOX4. Patient past medical history of atrial fib, hypertension and hyperlipidemia. Patient explains that he was walking today, became dizzy and his knees buckled. Patient also explained that this has happened to him before and that it is because he has weak knees. Patient explains that he has taken his medications this morning. Denies chest pain, headache and dizziness at this time. Denies hitting head upon fall. Skin intact. No pallor or diaphoresis noted. 20g started to left forearm. Patient placed on cardiac monitor; a fib. Will continue to monitor.

## 2021-10-27 NOTE — ED PROVIDER NOTE - ATTENDING CONTRIBUTION TO CARE
80 yo m past medical history afib on doac, cad, htn hld, diabetes, gout presents after 2 syncopal episodes. no prodromal symptoms, no significant exertion at time of syncope. denies ha, vision/hearing changes, fever chills, cp, sob, palpitations, back pain, abd pain, ext numbness/weakness, does reports worsening of chronic knee pain but no hx of trauma. exam as above, knee without edema. know hx of Ao stenosis on echo in emr. broad ddx for syncope. plan: labs, cxr, admit for further management. ekg afib 94 normal interval but qtc? mildly prolonged, no evidence of acute ischemia.

## 2021-10-27 NOTE — ED PROVIDER NOTE - PHYSICAL EXAMINATION
GENERAL: Awake. Alert. NAD. Well nourished.  HEENT: NC/AT, PERRL, EOMI, Conjunctiva pink, no scleral icterus. Airway patent. Moist mucous membranes.  LUNGS: CTAB. No wheezes or rales noted.  CARDIAC: Chest non-tender to palpation. RRR. S1 and S2 intact. No murmurs noted.  ABDOMEN: No masses noted. Soft, NT, ND, no rebound, no guarding.  EXT: No edema, no calf tenderness, distal pulses 2+ bilaterally, no deformities.  NEURO: A&Ox3. Moving all extremities. Sensation intact throughout. No focal deficits. Mild weakness in b/l LE, can lift LE off bed with effort but has difficulty resisting force.  SKIN: Warm and dry.  PSYCH: Normal affect. GENERAL: Awake. Alert. NAD. Well nourished.  HEENT: NC/AT, PERRL, EOMI, Conjunctiva pink, no scleral icterus. Airway patent. Moist mucous membranes.  LUNGS: CTAB. No wheezes or rales noted.  CARDIAC: Chest non-tender to palpation. Irregular rhythm consistent with afib. S1 and S2 intact. No murmurs noted.  ABDOMEN: No masses noted. Soft, NT, ND, no rebound, no guarding.  EXT: No edema, no calf tenderness, distal pulses 2+ bilaterally, no deformities.  NEURO: A&Ox3. Moving all extremities. Sensation intact throughout. No focal deficits. Mild weakness in b/l LE, can lift LE off bed with effort but has difficulty resisting force.  SKIN: Warm and dry.  PSYCH: Normal affect.

## 2021-10-27 NOTE — ED PROVIDER NOTE - PROGRESS NOTE DETAILS
Christy Edwards DO (PGY1): Spoke with hospitalist Dr. Muller, will admit to her service on telemetry. Requesting serum BNP level.

## 2021-10-27 NOTE — ED ADULT TRIAGE NOTE - CHIEF COMPLAINT QUOTE
pt s/p 2 witnessed syncopal episode today. states he felt lightheaded prior to episodes. denies cp/sob. was hospitalized in [facility] last week for same occurrence. had negative cardiac work up.

## 2021-10-27 NOTE — ED PROVIDER NOTE - OBJECTIVE STATEMENT
81M PMH afib on xarelto, CAD s/p stent, HTN, DM2, HLD, gout complaining of 2 episodes of syncope today, both witnessed by wife, +LOC, no head trauma. First episode while brushing teeth, pt felt dizzy, had rapid breathing, and eyes went black. He called for his wife who walked him to his bed. Felt better within 3 minutes. Second episode with same symptoms after standing up from sitting position. Pt had similar episode last Friday and was worked up at [facility] ED and d/c home. Pt no longer dizzy in the ED today. Denies recent fall and headache. Denies chest pain, palpitations, fever, cough, abdominal pain, n/v. Also reports worsening b/l knee pain over past week with difficulty walking.

## 2021-10-27 NOTE — ED ADULT NURSE NOTE - CHIEF COMPLAINT QUOTE
pt s/p 2 witnessed syncopal episode today. states he felt lightheaded prior to episodes. denies cp/sob. was hospitalized in [facility] last week for same occurrence. had negative cardiac work up.

## 2021-10-28 DIAGNOSIS — I50.32 CHRONIC DIASTOLIC (CONGESTIVE) HEART FAILURE: ICD-10-CM

## 2021-10-28 DIAGNOSIS — Z86.73 PERSONAL HISTORY OF TRANSIENT ISCHEMIC ATTACK (TIA), AND CEREBRAL INFARCTION WITHOUT RESIDUAL DEFICITS: ICD-10-CM

## 2021-10-28 DIAGNOSIS — N17.9 ACUTE KIDNEY FAILURE, UNSPECIFIED: ICD-10-CM

## 2021-10-28 DIAGNOSIS — I48.20 CHRONIC ATRIAL FIBRILLATION, UNSPECIFIED: ICD-10-CM

## 2021-10-28 DIAGNOSIS — R55 SYNCOPE AND COLLAPSE: ICD-10-CM

## 2021-10-28 DIAGNOSIS — I10 ESSENTIAL (PRIMARY) HYPERTENSION: ICD-10-CM

## 2021-10-28 DIAGNOSIS — Z29.9 ENCOUNTER FOR PROPHYLACTIC MEASURES, UNSPECIFIED: ICD-10-CM

## 2021-10-28 DIAGNOSIS — M25.562 PAIN IN LEFT KNEE: ICD-10-CM

## 2021-10-28 LAB
A1C WITH ESTIMATED AVERAGE GLUCOSE RESULT: 5.8 % — HIGH (ref 4–5.6)
ANION GAP SERPL CALC-SCNC: 15 MMOL/L — HIGH (ref 7–14)
ANION GAP SERPL CALC-SCNC: 15 MMOL/L — HIGH (ref 7–14)
ANISOCYTOSIS BLD QL: SLIGHT — SIGNIFICANT CHANGE UP
APTT BLD: 36.4 SEC — HIGH (ref 27–36.3)
BUN SERPL-MCNC: 40 MG/DL — HIGH (ref 7–23)
CALCIUM SERPL-MCNC: 9.9 MG/DL — SIGNIFICANT CHANGE UP (ref 8.4–10.5)
CHLORIDE SERPL-SCNC: 100 MMOL/L — SIGNIFICANT CHANGE UP (ref 98–107)
CHLORIDE SERPL-SCNC: 98 MMOL/L — SIGNIFICANT CHANGE UP (ref 98–107)
CO2 SERPL-SCNC: 24 MMOL/L — SIGNIFICANT CHANGE UP (ref 22–31)
CREAT SERPL-MCNC: 1.89 MG/DL — HIGH (ref 0.5–1.3)
CRP SERPL-MCNC: 44.8 MG/L — HIGH
ERYTHROCYTE [SEDIMENTATION RATE] IN BLOOD: 39 MM/HR — HIGH (ref 1–15)
ESTIMATED AVERAGE GLUCOSE: 120 — SIGNIFICANT CHANGE UP
GIANT PLATELETS BLD QL SMEAR: PRESENT — SIGNIFICANT CHANGE UP
GLUCOSE BLDC GLUCOMTR-MCNC: 105 MG/DL — HIGH (ref 70–99)
GLUCOSE BLDC GLUCOMTR-MCNC: 110 MG/DL — HIGH (ref 70–99)
GLUCOSE BLDC GLUCOMTR-MCNC: 114 MG/DL — HIGH (ref 70–99)
GLUCOSE BLDC GLUCOMTR-MCNC: 123 MG/DL — HIGH (ref 70–99)
GLUCOSE SERPL-MCNC: 167 MG/DL — HIGH (ref 70–99)
HCT VFR BLD CALC: 43.1 % — SIGNIFICANT CHANGE UP (ref 39–50)
HGB BLD-MCNC: 13.6 G/DL — SIGNIFICANT CHANGE UP (ref 13–17)
INR BLD: 1.95 RATIO — HIGH (ref 0.88–1.16)
MACROCYTES BLD QL: SLIGHT — SIGNIFICANT CHANGE UP
MANUAL SMEAR VERIFICATION: SIGNIFICANT CHANGE UP
MCHC RBC-ENTMCNC: 21.2 PG — LOW (ref 27–34)
MCHC RBC-ENTMCNC: 31.6 GM/DL — LOW (ref 32–36)
MCV RBC AUTO: 67.1 FL — LOW (ref 80–100)
NRBC # BLD: 0 /100 WBCS — SIGNIFICANT CHANGE UP
NRBC # FLD: 0 K/UL — SIGNIFICANT CHANGE UP
NT-PROBNP SERPL-SCNC: 3169 PG/ML — HIGH
OVALOCYTES BLD QL SMEAR: SLIGHT — SIGNIFICANT CHANGE UP
PLAT MORPH BLD: NORMAL — SIGNIFICANT CHANGE UP
PLATELET # BLD AUTO: 179 K/UL — SIGNIFICANT CHANGE UP (ref 150–400)
PLATELET COUNT - ESTIMATE: NORMAL — SIGNIFICANT CHANGE UP
POIKILOCYTOSIS BLD QL AUTO: SLIGHT — SIGNIFICANT CHANGE UP
POLYCHROMASIA BLD QL SMEAR: SLIGHT — SIGNIFICANT CHANGE UP
POTASSIUM SERPL-MCNC: 4.4 MMOL/L — SIGNIFICANT CHANGE UP (ref 3.5–5.3)
POTASSIUM SERPL-MCNC: 5.8 MMOL/L — HIGH (ref 3.5–5.3)
POTASSIUM SERPL-SCNC: 4.4 MMOL/L — SIGNIFICANT CHANGE UP (ref 3.5–5.3)
POTASSIUM SERPL-SCNC: 5.8 MMOL/L — HIGH (ref 3.5–5.3)
PROTHROM AB SERPL-ACNC: 21.5 SEC — HIGH (ref 10.6–13.6)
RBC # BLD: 6.42 M/UL — HIGH (ref 4.2–5.8)
RBC # FLD: 20.6 % — HIGH (ref 10.3–14.5)
RBC BLD AUTO: ABNORMAL
SARS-COV-2 RNA SPEC QL NAA+PROBE: SIGNIFICANT CHANGE UP
SMUDGE CELLS # BLD: PRESENT — SIGNIFICANT CHANGE UP
SODIUM SERPL-SCNC: 137 MMOL/L — SIGNIFICANT CHANGE UP (ref 135–145)
SODIUM SERPL-SCNC: 138 MMOL/L — SIGNIFICANT CHANGE UP (ref 135–145)
TSH SERPL-MCNC: 0.84 UIU/ML — SIGNIFICANT CHANGE UP (ref 0.27–4.2)
TSH SERPL-MCNC: 0.85 UIU/ML — SIGNIFICANT CHANGE UP (ref 0.27–4.2)
URATE SERPL-MCNC: 10.9 MG/DL — HIGH (ref 3.4–8.8)
VARIANT LYMPHS # BLD: 4.9 % — SIGNIFICANT CHANGE UP (ref 0–6)
WBC # BLD: 8.18 K/UL — SIGNIFICANT CHANGE UP (ref 3.8–10.5)
WBC # FLD AUTO: 8.18 K/UL — SIGNIFICANT CHANGE UP (ref 3.8–10.5)

## 2021-10-28 PROCEDURE — 73562 X-RAY EXAM OF KNEE 3: CPT | Mod: 26,LT

## 2021-10-28 PROCEDURE — 71045 X-RAY EXAM CHEST 1 VIEW: CPT | Mod: 26

## 2021-10-28 PROCEDURE — 70450 CT HEAD/BRAIN W/O DYE: CPT | Mod: 26,MA

## 2021-10-28 PROCEDURE — 99223 1ST HOSP IP/OBS HIGH 75: CPT

## 2021-10-28 RX ORDER — DEXTROSE 50 % IN WATER 50 %
25 SYRINGE (ML) INTRAVENOUS ONCE
Refills: 0 | Status: DISCONTINUED | OUTPATIENT
Start: 2021-10-28 | End: 2021-11-14

## 2021-10-28 RX ORDER — DEXTROSE 50 % IN WATER 50 %
15 SYRINGE (ML) INTRAVENOUS ONCE
Refills: 0 | Status: DISCONTINUED | OUTPATIENT
Start: 2021-10-28 | End: 2021-11-14

## 2021-10-28 RX ORDER — ATORVASTATIN CALCIUM 80 MG/1
1 TABLET, FILM COATED ORAL
Qty: 0 | Refills: 0 | DISCHARGE

## 2021-10-28 RX ORDER — RIVAROXABAN 15 MG-20MG
15 KIT ORAL
Refills: 0 | Status: DISCONTINUED | OUTPATIENT
Start: 2021-10-28 | End: 2021-10-31

## 2021-10-28 RX ORDER — GLUCAGON INJECTION, SOLUTION 0.5 MG/.1ML
1 INJECTION, SOLUTION SUBCUTANEOUS ONCE
Refills: 0 | Status: DISCONTINUED | OUTPATIENT
Start: 2021-10-28 | End: 2021-11-14

## 2021-10-28 RX ORDER — RIVAROXABAN 15 MG-20MG
1 KIT ORAL
Qty: 0 | Refills: 0 | DISCHARGE

## 2021-10-28 RX ORDER — SODIUM CHLORIDE 9 MG/ML
1000 INJECTION, SOLUTION INTRAVENOUS
Refills: 0 | Status: DISCONTINUED | OUTPATIENT
Start: 2021-10-28 | End: 2021-11-14

## 2021-10-28 RX ORDER — ACETAMINOPHEN 500 MG
650 TABLET ORAL EVERY 6 HOURS
Refills: 0 | Status: DISCONTINUED | OUTPATIENT
Start: 2021-10-28 | End: 2021-11-14

## 2021-10-28 RX ORDER — ALLOPURINOL 300 MG
100 TABLET ORAL DAILY
Refills: 0 | Status: DISCONTINUED | OUTPATIENT
Start: 2021-10-28 | End: 2021-11-14

## 2021-10-28 RX ORDER — DEXTROSE 50 % IN WATER 50 %
12.5 SYRINGE (ML) INTRAVENOUS ONCE
Refills: 0 | Status: DISCONTINUED | OUTPATIENT
Start: 2021-10-28 | End: 2021-11-14

## 2021-10-28 RX ORDER — CLOPIDOGREL BISULFATE 75 MG/1
1 TABLET, FILM COATED ORAL
Qty: 0 | Refills: 0 | DISCHARGE

## 2021-10-28 RX ORDER — METOPROLOL TARTRATE 50 MG
100 TABLET ORAL
Refills: 0 | Status: DISCONTINUED | OUTPATIENT
Start: 2021-10-28 | End: 2021-11-14

## 2021-10-28 RX ORDER — INSULIN LISPRO 100/ML
VIAL (ML) SUBCUTANEOUS AT BEDTIME
Refills: 0 | Status: DISCONTINUED | OUTPATIENT
Start: 2021-10-28 | End: 2021-11-14

## 2021-10-28 RX ORDER — ISOSORBIDE MONONITRATE 60 MG/1
1 TABLET, EXTENDED RELEASE ORAL
Qty: 0 | Refills: 0 | DISCHARGE

## 2021-10-28 RX ORDER — FLUTICASONE PROPIONATE 50 MCG
1 SPRAY, SUSPENSION NASAL
Refills: 0 | Status: DISCONTINUED | OUTPATIENT
Start: 2021-10-28 | End: 2021-11-14

## 2021-10-28 RX ORDER — INSULIN LISPRO 100/ML
VIAL (ML) SUBCUTANEOUS
Refills: 0 | Status: DISCONTINUED | OUTPATIENT
Start: 2021-10-28 | End: 2021-11-14

## 2021-10-28 RX ORDER — HYDRALAZINE HCL 50 MG
25 TABLET ORAL THREE TIMES A DAY
Refills: 0 | Status: DISCONTINUED | OUTPATIENT
Start: 2021-10-28 | End: 2021-11-14

## 2021-10-28 RX ADMIN — Medication 100 MILLIGRAM(S): at 09:05

## 2021-10-28 RX ADMIN — Medication 25 MILLIGRAM(S): at 21:33

## 2021-10-28 RX ADMIN — RIVAROXABAN 15 MILLIGRAM(S): KIT at 18:56

## 2021-10-28 RX ADMIN — Medication 100 MILLIGRAM(S): at 18:56

## 2021-10-28 RX ADMIN — Medication 25 MILLIGRAM(S): at 13:14

## 2021-10-28 RX ADMIN — Medication 100 MILLIGRAM(S): at 13:14

## 2021-10-28 NOTE — PHYSICAL THERAPY INITIAL EVALUATION ADULT - PLANNED THERAPY INTERVENTIONS, PT EVAL
Patient left positioned for safety on stretcher in ED, +bilateral side rails,  in NAD, bell in reach on table , all lines intact./balance training/bed mobility training/gait training/strengthening/transfer training

## 2021-10-28 NOTE — H&P ADULT - NSICDXPASTMEDICALHX_GEN_ALL_CORE_FT
PAST MEDICAL HISTORY:  AF (atrial fibrillation)     CVA (cerebrovascular accident) L hemiparesis 2019    Gout     HLD (hyperlipidemia)     HTN (hypertension)

## 2021-10-28 NOTE — PHYSICAL THERAPY INITIAL EVALUATION ADULT - ADDITIONAL COMMENTS
Pt owns a rolling walker and cane. 3 steps to enter with unilateral railing. Pt's bedroom on first floor.

## 2021-10-28 NOTE — H&P ADULT - ASSESSMENT
82 yo male PMHx CAD (1 stent,) DM2, HTN, HLD, AS, Diastolic HF, Afib (on Xarelto) and CVA (L hemiparesis) syncope x2 episodes yesterday and L knee pain, admitted to tele for Syncope, r/o ACS and Intractable L Knee pain.

## 2021-10-28 NOTE — H&P ADULT - PROBLEM SELECTOR PLAN 4
[id number] Score =7  Continue Xarelto (renally dosed)  Maintain falls and bleeding precautions  -c/w metoprolol

## 2021-10-28 NOTE — H&P ADULT - NSHPLABSRESULTS_GEN_ALL_CORE
EKG: Afib @ 94 bpm, LVH,                         14.4   9.73  )-----------( 200      ( 27 Oct 2021 21:32 )             46.3     10-27    138  |  100  |  40<H>  ----------------------------<  119<H>  4.4   |  23  |  2.07<H>    Ca    9.8      27 Oct 2021 23:21  Phos  3.5     10-27  Mg     2.00     10-27    TPro  8.5<H>  /  Alb  3.9  /  TBili  0.4  /  DBili  x   /  AST  20  /  ALT  12  /  AlkPhos  81  10-27      LIVER FUNCTIONS - ( 27 Oct 2021 23:21 )  Alb: 3.9 g/dL / Pro: 8.5 g/dL / ALK PHOS: 81 U/L / ALT: 12 U/L / AST: 20 U/L / GGT: x EKG reviewed personally: Afib @ 94 bpm, LVH,                         14.4   9.73  )-----------( 200      ( 27 Oct 2021 21:32 )             46.3     10-27    138  |  100  |  40<H>  ----------------------------<  119<H>  4.4   |  23  |  2.07<H>    Ca    9.8      27 Oct 2021 23:21  Phos  3.5     10-27  Mg     2.00     10-27    TPro  8.5<H>  /  Alb  3.9  /  TBili  0.4  /  DBili  x   /  AST  20  /  ALT  12  /  AlkPhos  81  10-27      LIVER FUNCTIONS - ( 27 Oct 2021 23:21 )  Alb: 3.9 g/dL / Pro: 8.5 g/dL / ALK PHOS: 81 U/L / ALT: 12 U/L / AST: 20 U/L / GGT: x

## 2021-10-28 NOTE — H&P ADULT - NSHPSOCIALHISTORY_GEN_ALL_CORE
Pt , lives with spouse. Denies smoking, drinking or drugs. Baseline: ambulates with walker up 1 block. Fatigues easily.

## 2021-10-28 NOTE — H&P ADULT - RS GEN PE MLT RESP DETAILS PC
airway patent/breath sounds equal/good air movement/respirations non-labored/clear to auscultation bilaterally/no chest wall tenderness/no intercostal retractions/no rales/no rhonchi

## 2021-10-28 NOTE — H&P ADULT - ATTENDING COMMENTS
80 yo M h/o CAD, DM2, HTN, HLD, AS, Diastolic HF, Afib (on Xarelto) and CVA (L hemiparesis) syncope and L knee pain. Trop neg, EKG unchanged, Check orthostatic VS and echo. Monitor on telemetry. Check ESR, CRP, x-ray L knee. Tylenol prn knee pain. PT consult. Continue Xarelto and Metoprolol for A fib. Hold Lasix and Lisinopril for now in setting of CJ. Check urine lytes. Consider renal consult if no improvement

## 2021-10-28 NOTE — H&P ADULT - PROBLEM SELECTOR PLAN 1
tele monitor   cardiac enzymes x 2: neg delta  check Orthostatic blood pressures  DASH 1800 ADA diet   -check TTE  -PT eval

## 2021-10-28 NOTE — H&P ADULT - PROBLEM SELECTOR PLAN 5
Pt appears euvolemic on exam  -check Chest xray  2G Sodium 1800 ADA diet  continue Torsemide and metoprolol.  Holding Lisinopril for CJ.

## 2021-10-28 NOTE — H&P ADULT - HISTORY OF PRESENT ILLNESS
80 yo male PMHx CAD (1 stent,) DM2, HTN, HLD, AS, Diastolic HF, Afib (on Xarelto) and CVA (L hemiparesis) syncope x2 episodes yesterday. Pt reports he was brushing his teeth yesterday morning, felt suddenly dizzy, lightheaded. Wife noticed his arms suddenly dropped down, pt became weak and started to fall down. Wife caught him right away, called for son's help and they eased him down on the bed. Pt's eyes were closed and his LOC was was for about 3 mins with urinary incontinency. After shouting at patient and fanning him, his LOC returned back to baseline. Later, yesterday afternoon, he had another syncopal event where he was watching TV on the sofa and passed out for about 2 mins where eyes closed and pt was breathing heavy. Wife called EMS. EMS checked BFS to be 123 and was taken to [facility] ED. Pt also c/o L knee pain worsening for past 2 weeks, achy, 9/10, denying any recent trauma. He states he has chronic b/l knee pain from arthritis but  L knee for past 2 weeks was excruciating. No reported fever, chills, chest pain, sob, palpitations, trauma, nausea, vomiting, diarrhea, abdominal pain or sick contacts.    Questionable compliance to some meds as pt reports he takes metoprolol once a day, but bottle says BID.

## 2021-10-28 NOTE — PHYSICAL THERAPY INITIAL EVALUATION ADULT - PERTINENT HX OF CURRENT PROBLEM, REHAB EVAL
81 year old male PMHx CAD, DM2, HTN, HLD, AS, Diastolic HF, Afib (on Xarelto) and CVA (L hemiparesis) syncope x2 episodes yesterday. Pt reports he was brushing his teeth yesterday morning, felt suddenly dizzy, lightheaded. Wife noticed his arms suddenly dropped down, pt became weak and started to fall down. Wife caught him right away, called for son's help and they eased him down on the bed, eyes were closed LOC was 3 mins with urinary incontinency. CT head negative. Left knee Xray negative.

## 2021-10-28 NOTE — PHYSICAL THERAPY INITIAL EVALUATION ADULT - STANDING BALANCE: DYNAMIC, REHAB EVAL
good minus Smokes 1 ppd Drank episodically x 3-4 years. Stopped ~1 year ago Only takes prescribed lorazepam.

## 2021-10-29 LAB
ANION GAP SERPL CALC-SCNC: 15 MMOL/L — HIGH (ref 7–14)
BUN SERPL-MCNC: 33 MG/DL — HIGH (ref 7–23)
CALCIUM SERPL-MCNC: 9.9 MG/DL — SIGNIFICANT CHANGE UP (ref 8.4–10.5)
CHLORIDE SERPL-SCNC: 102 MMOL/L — SIGNIFICANT CHANGE UP (ref 98–107)
CO2 SERPL-SCNC: 21 MMOL/L — LOW (ref 22–31)
COVID-19 SPIKE DOMAIN AB INTERP: POSITIVE
COVID-19 SPIKE DOMAIN ANTIBODY RESULT: >250 U/ML — HIGH
CREAT SERPL-MCNC: 1.56 MG/DL — HIGH (ref 0.5–1.3)
GLUCOSE BLDC GLUCOMTR-MCNC: 102 MG/DL — HIGH (ref 70–99)
GLUCOSE BLDC GLUCOMTR-MCNC: 110 MG/DL — HIGH (ref 70–99)
GLUCOSE BLDC GLUCOMTR-MCNC: 121 MG/DL — HIGH (ref 70–99)
GLUCOSE BLDC GLUCOMTR-MCNC: 128 MG/DL — HIGH (ref 70–99)
GLUCOSE BLDC GLUCOMTR-MCNC: 96 MG/DL — SIGNIFICANT CHANGE UP (ref 70–99)
GLUCOSE BLDC GLUCOMTR-MCNC: 97 MG/DL — SIGNIFICANT CHANGE UP (ref 70–99)
GLUCOSE SERPL-MCNC: 109 MG/DL — HIGH (ref 70–99)
HCT VFR BLD CALC: 44.9 % — SIGNIFICANT CHANGE UP (ref 39–50)
HGB BLD-MCNC: 13.8 G/DL — SIGNIFICANT CHANGE UP (ref 13–17)
MCHC RBC-ENTMCNC: 21.4 PG — LOW (ref 27–34)
MCHC RBC-ENTMCNC: 30.7 GM/DL — LOW (ref 32–36)
MCV RBC AUTO: 69.6 FL — LOW (ref 80–100)
NRBC # BLD: 0 /100 WBCS — SIGNIFICANT CHANGE UP
NRBC # FLD: 0 K/UL — SIGNIFICANT CHANGE UP
PLATELET # BLD AUTO: 186 K/UL — SIGNIFICANT CHANGE UP (ref 150–400)
POTASSIUM SERPL-MCNC: 4.5 MMOL/L — SIGNIFICANT CHANGE UP (ref 3.5–5.3)
POTASSIUM SERPL-SCNC: 4.5 MMOL/L — SIGNIFICANT CHANGE UP (ref 3.5–5.3)
RBC # BLD: 6.45 M/UL — HIGH (ref 4.2–5.8)
RBC # FLD: 20.5 % — HIGH (ref 10.3–14.5)
SARS-COV-2 IGG+IGM SERPL QL IA: >250 U/ML — HIGH
SARS-COV-2 IGG+IGM SERPL QL IA: POSITIVE
SODIUM SERPL-SCNC: 138 MMOL/L — SIGNIFICANT CHANGE UP (ref 135–145)
WBC # BLD: 8.46 K/UL — SIGNIFICANT CHANGE UP (ref 3.8–10.5)
WBC # FLD AUTO: 8.46 K/UL — SIGNIFICANT CHANGE UP (ref 3.8–10.5)

## 2021-10-29 PROCEDURE — 93306 TTE W/DOPPLER COMPLETE: CPT | Mod: 26

## 2021-10-29 PROCEDURE — 99233 SBSQ HOSP IP/OBS HIGH 50: CPT

## 2021-10-29 RX ADMIN — Medication 25 MILLIGRAM(S): at 15:03

## 2021-10-29 RX ADMIN — Medication 100 MILLIGRAM(S): at 18:26

## 2021-10-29 RX ADMIN — Medication 25 MILLIGRAM(S): at 22:01

## 2021-10-29 RX ADMIN — Medication 25 MILLIGRAM(S): at 05:38

## 2021-10-29 RX ADMIN — Medication 100 MILLIGRAM(S): at 05:38

## 2021-10-29 RX ADMIN — RIVAROXABAN 15 MILLIGRAM(S): KIT at 18:26

## 2021-10-29 RX ADMIN — Medication 100 MILLIGRAM(S): at 15:03

## 2021-10-29 NOTE — PROGRESS NOTE ADULT - PROBLEM SELECTOR PLAN 1
tele monitor - stable  cardiac enzymes neg for ACS  Pt is orthostatic, holding diuretics, possible dehydration  DASH 1800 ADA diet   -check TTE  -PT eval: home with home PT when stable

## 2021-10-29 NOTE — PROGRESS NOTE ADULT - PROBLEM SELECTOR PLAN 4
[id number] Score =7  Continue Xarelto (renally dosed)  Maintain falls and bleeding precautions  -c/w metoprolol

## 2021-10-29 NOTE — PROGRESS NOTE ADULT - SUBJECTIVE AND OBJECTIVE BOX
[facility] Division of Hospital Medicine  Hamilton Napoles MD  Pager [phone]    Patient is a 81y old  Male who presents with a chief complaint of syncope (28 Oct 2021 08:11)      SUBJECTIVE / OVERNIGHT EVENTS: Pt feels well      MEDICATIONS  (STANDING):  allopurinol 100 milliGRAM(s) Oral daily  dextrose 40% Gel 15 Gram(s) Oral once  dextrose 5%. 1000 milliLiter(s) (50 mL/Hr) IV Continuous <Continuous>  dextrose 5%. 1000 milliLiter(s) (100 mL/Hr) IV Continuous <Continuous>  dextrose 50% Injectable 25 Gram(s) IV Push once  dextrose 50% Injectable 12.5 Gram(s) IV Push once  dextrose 50% Injectable 25 Gram(s) IV Push once  glucagon  Injectable 1 milliGRAM(s) IntraMuscular once  hydrALAZINE 25 milliGRAM(s) Oral three times a day  insulin lispro (ADMELOG) corrective regimen sliding scale   SubCutaneous three times a day before meals  insulin lispro (ADMELOG) corrective regimen sliding scale   SubCutaneous at bedtime  metoprolol tartrate 100 milliGRAM(s) Oral two times a day  rivaroxaban 15 milliGRAM(s) Oral with dinner    MEDICATIONS  (PRN):  acetaminophen     Tablet .. 650 milliGRAM(s) Oral every 6 hours PRN Mild Pain (1 - 3), Moderate Pain (4 - 6)  fluticasone propionate 50 MICROgram(s)/spray Nasal Spray 1 Spray(s) Both Nostrils two times a day PRN allergic rhinitis      CAPILLARY BLOOD GLUCOSE      POCT Blood Glucose.: 128 mg/dL (29 Oct 2021 12:45)  POCT Blood Glucose.: 97 mg/dL (29 Oct 2021 09:33)  POCT Blood Glucose.: 114 mg/dL (28 Oct 2021 21:21)  POCT Blood Glucose.: 110 mg/dL (28 Oct 2021 20:41)  POCT Blood Glucose.: 123 mg/dL (28 Oct 2021 18:06)    I&O's Summary      PHYSICAL EXAM:  Vital Signs Last 24 Hrs  T(C): 36.8 (29 Oct 2021 05:35), Max: 36.8 (29 Oct 2021 05:35)  T(F): 98.3 (29 Oct 2021 05:35), Max: 98.3 (29 Oct 2021 05:35)  HR: 93 (29 Oct 2021 05:35) (85 - 100)  BP: 127/83 (29 Oct 2021 05:35) (127/83 - 179/91)  BP(mean): --  RR: 18 (29 Oct 2021 05:35) (18 - 18)  SpO2: 98% (29 Oct 2021 05:35) (98% - 100%)  Orthostatic: 128/76 lying, 92/65 sitting  CONSTITUTIONAL: NAD  EYES: EOMI, conjunctiva and sclera clear  ENMT: Moist oral mucosa  NECK: Supple  RESPIRATORY: Breathing unlabored, CTAB  CARDIOVASCULAR: S1S2 no MRG  ABDOMEN: Nontender to palpation, normoactive bowel sounds, no rebound/guarding  MUSCULOSKELETAL: no clubbing or cyanosis of digits  NEUROLOGY: No focal deficits   SKIN: No rashes or lesions    LABS:                        13.8   8.46  )-----------( 186      ( 29 Oct 2021 07:31 )             44.9     10-29    138  |  102  |  33<H>  ----------------------------<  109<H>  4.5   |  21<L>  |  1.56<H>    Ca    9.9      29 Oct 2021 07:31  Phos  3.5     10-27  Mg     2.00     10-27    TPro  8.5<H>  /  Alb  3.9  /  TBili  0.4  /  DBili  x   /  AST  20  /  ALT  12  /  AlkPhos  81  10-27    PT/INR - ( 28 Oct 2021 08:26 )   PT: 21.5 sec;   INR: 1.95 ratio         PTT - ( 28 Oct 2021 08:26 )  PTT:36.4 sec    < from: Xray Knee 3 Views, Left (10.28.21 @ 10:08) >    EXAM:  XR KNEE 3 VIEWS LT        PROCEDURE DATE:  Oct 28 2021         INTERPRETATION:  CLINICAL INDICATION: Left knee pain and erythema    TECHNIQUE:  AP, oblique, and lateral views of the left knee.    COMPARISON: No similar prior comparisons available.    FINDINGS:  There is no fracture or dislocation.  The joint spaces are preserved with smooth articular surfaces. Superior and inferior patellar enthesophytes.  The quadriceps and patellar tendon shadows are unremarkable. No joint effusion.  There are no destructive osseous lesions or periosteal reaction.    IMPRESSION:  There is no acute fracture, dislocation, or joint effusion.    --- End of Report ---            NAVDEEP LR MD; Resident Radiologist  This document has been electronically signed.  DEDRICK ESPINOSA MD; Attending Radiologist  This document has been electronically signed. Oct 28 2021 12:30PM    < end of copied text >      CODE: FULL

## 2021-10-29 NOTE — PROGRESS NOTE ADULT - ASSESSMENT
80 yo male PMHx CAD (1 stent,) DM2, HTN, HLD, AS, Diastolic HF, Afib (on Xarelto) and CVA (L hemiparesis) syncope x2 episodes the day before admission and L knee pain, admitted to tele for Syncope, r/o ACS and Intractable L Knee pain.

## 2021-10-30 DIAGNOSIS — I35.0 NONRHEUMATIC AORTIC (VALVE) STENOSIS: ICD-10-CM

## 2021-10-30 LAB
ANION GAP SERPL CALC-SCNC: 13 MMOL/L — SIGNIFICANT CHANGE UP (ref 7–14)
BUN SERPL-MCNC: 38 MG/DL — HIGH (ref 7–23)
CALCIUM SERPL-MCNC: 9.3 MG/DL — SIGNIFICANT CHANGE UP (ref 8.4–10.5)
CHLORIDE SERPL-SCNC: 101 MMOL/L — SIGNIFICANT CHANGE UP (ref 98–107)
CO2 SERPL-SCNC: 22 MMOL/L — SIGNIFICANT CHANGE UP (ref 22–31)
CREAT SERPL-MCNC: 1.56 MG/DL — HIGH (ref 0.5–1.3)
GLUCOSE BLDC GLUCOMTR-MCNC: 108 MG/DL — HIGH (ref 70–99)
GLUCOSE BLDC GLUCOMTR-MCNC: 110 MG/DL — HIGH (ref 70–99)
GLUCOSE BLDC GLUCOMTR-MCNC: 163 MG/DL — HIGH (ref 70–99)
GLUCOSE BLDC GLUCOMTR-MCNC: 179 MG/DL — HIGH (ref 70–99)
GLUCOSE SERPL-MCNC: 111 MG/DL — HIGH (ref 70–99)
HCT VFR BLD CALC: 43.2 % — SIGNIFICANT CHANGE UP (ref 39–50)
HGB BLD-MCNC: 13.2 G/DL — SIGNIFICANT CHANGE UP (ref 13–17)
MAGNESIUM SERPL-MCNC: 2.2 MG/DL — SIGNIFICANT CHANGE UP (ref 1.6–2.6)
MCHC RBC-ENTMCNC: 21.3 PG — LOW (ref 27–34)
MCHC RBC-ENTMCNC: 30.6 GM/DL — LOW (ref 32–36)
MCV RBC AUTO: 69.8 FL — LOW (ref 80–100)
NRBC # BLD: 0 /100 WBCS — SIGNIFICANT CHANGE UP
NRBC # FLD: 0 K/UL — SIGNIFICANT CHANGE UP
PHOSPHATE SERPL-MCNC: 3.4 MG/DL — SIGNIFICANT CHANGE UP (ref 2.5–4.5)
PLATELET # BLD AUTO: 190 K/UL — SIGNIFICANT CHANGE UP (ref 150–400)
POTASSIUM SERPL-MCNC: 4.2 MMOL/L — SIGNIFICANT CHANGE UP (ref 3.5–5.3)
POTASSIUM SERPL-SCNC: 4.2 MMOL/L — SIGNIFICANT CHANGE UP (ref 3.5–5.3)
RBC # BLD: 6.19 M/UL — HIGH (ref 4.2–5.8)
RBC # FLD: 19.9 % — HIGH (ref 10.3–14.5)
SODIUM SERPL-SCNC: 136 MMOL/L — SIGNIFICANT CHANGE UP (ref 135–145)
WBC # BLD: 9.68 K/UL — SIGNIFICANT CHANGE UP (ref 3.8–10.5)
WBC # FLD AUTO: 9.68 K/UL — SIGNIFICANT CHANGE UP (ref 3.8–10.5)

## 2021-10-30 PROCEDURE — 99233 SBSQ HOSP IP/OBS HIGH 50: CPT

## 2021-10-30 RX ADMIN — Medication 100 MILLIGRAM(S): at 17:32

## 2021-10-30 RX ADMIN — Medication 1: at 12:32

## 2021-10-30 RX ADMIN — Medication 25 MILLIGRAM(S): at 22:10

## 2021-10-30 RX ADMIN — Medication 25 MILLIGRAM(S): at 14:33

## 2021-10-30 RX ADMIN — Medication 100 MILLIGRAM(S): at 14:34

## 2021-10-30 RX ADMIN — Medication 25 MILLIGRAM(S): at 05:34

## 2021-10-30 RX ADMIN — Medication 100 MILLIGRAM(S): at 05:34

## 2021-10-30 RX ADMIN — RIVAROXABAN 15 MILLIGRAM(S): KIT at 17:32

## 2021-10-30 NOTE — PROGRESS NOTE ADULT - ASSESSMENT
80 yo male PMHx CAD (1 stent,) DM2, HTN, HLD, AS, Diastolic HF, Afib (on Xarelto) and CVA (L hemiparesis) syncope x2 episodes the day before admission and L knee pain, admitted to tele for Syncope, r/o ACS and  L Knee pain.

## 2021-10-30 NOTE — PROGRESS NOTE ADULT - PROBLEM SELECTOR PLAN 1
tele monitor - stable  cardiac enzymes neg for ACS  Pt is orthostatic, holding diuretics, possible dehydration  DASH 1800 ADA diet   -TTE shows progression of AS mod to severe since Dec 2020, syncope may be in setting of volume depletion  -PT eval: home with home PT when stable

## 2021-10-30 NOTE — PROGRESS NOTE ADULT - PROBLEM SELECTOR PLAN 5
[id number] Score =7  Continue Xarelto (renally dosed)  Maintain falls and bleeding precautions  -c/w metoprolol

## 2021-10-30 NOTE — PROGRESS NOTE ADULT - SUBJECTIVE AND OBJECTIVE BOX
[facility] Division of Hospital Medicine  Hamilton Napoles MD  Pager [phone]    Patient is a 81y old  Male who presents with a chief complaint of syncope (29 Oct 2021 13:26)      SUBJECTIVE / OVERNIGHT EVENTS: Denies c/o      MEDICATIONS  (STANDING):  allopurinol 100 milliGRAM(s) Oral daily  dextrose 40% Gel 15 Gram(s) Oral once  dextrose 5%. 1000 milliLiter(s) (50 mL/Hr) IV Continuous <Continuous>  dextrose 5%. 1000 milliLiter(s) (100 mL/Hr) IV Continuous <Continuous>  dextrose 50% Injectable 25 Gram(s) IV Push once  dextrose 50% Injectable 12.5 Gram(s) IV Push once  dextrose 50% Injectable 25 Gram(s) IV Push once  glucagon  Injectable 1 milliGRAM(s) IntraMuscular once  hydrALAZINE 25 milliGRAM(s) Oral three times a day  insulin lispro (ADMELOG) corrective regimen sliding scale   SubCutaneous three times a day before meals  insulin lispro (ADMELOG) corrective regimen sliding scale   SubCutaneous at bedtime  metoprolol tartrate 100 milliGRAM(s) Oral two times a day  rivaroxaban 15 milliGRAM(s) Oral with dinner    MEDICATIONS  (PRN):  acetaminophen     Tablet .. 650 milliGRAM(s) Oral every 6 hours PRN Mild Pain (1 - 3), Moderate Pain (4 - 6)  fluticasone propionate 50 MICROgram(s)/spray Nasal Spray 1 Spray(s) Both Nostrils two times a day PRN allergic rhinitis      CAPILLARY BLOOD GLUCOSE      POCT Blood Glucose.: 163 mg/dL (30 Oct 2021 12:27)  POCT Blood Glucose.: 108 mg/dL (30 Oct 2021 08:26)  POCT Blood Glucose.: 110 mg/dL (29 Oct 2021 21:25)  POCT Blood Glucose.: 121 mg/dL (29 Oct 2021 17:23)    I&O's Summary    30 Oct 2021 07:01  -  30 Oct 2021 12:50  --------------------------------------------------------  IN: 0 mL / OUT: 500 mL / NET: -500 mL        PHYSICAL EXAM:  Vital Signs Last 24 Hrs  T(C): 36.8 (30 Oct 2021 05:25), Max: 36.9 (29 Oct 2021 18:23)  T(F): 98.3 (30 Oct 2021 05:25), Max: 98.4 (29 Oct 2021 18:23)  HR: 88 (30 Oct 2021 05:25) (87 - 90)  BP: 145/85 (30 Oct 2021 05:25) (120/80 - 145/85)  BP(mean): --  RR: 18 (30 Oct 2021 05:25) (17 - 18)  SpO2: 100% (30 Oct 2021 05:25) (98% - 100%)  CONSTITUTIONAL: NAD  EYES: EOMI, conjunctiva and sclera clear  ENMT: Moist oral mucosa  NECK: Supple  RESPIRATORY: Breathing unlabored, CTAB  CARDIOVASCULAR: S1S2 no MRG  ABDOMEN: Nontender to palpation, normoactive bowel sounds, no rebound/guarding  MUSCULOSKELETAL: no clubbing or cyanosis of digits  NEUROLOGY: No focal deficits   SKIN: No rashes or lesions    LABS:                        13.2   9.68  )-----------( 190      ( 30 Oct 2021 06:43 )             43.2     10-30    136  |  101  |  38<H>  ----------------------------<  111<H>  4.2   |  22  |  1.56<H>    Ca    9.3      30 Oct 2021 06:43  Phos  3.4     10-30  Mg     2.20     10-30    < from: TTE with Doppler (w/Cont) (10.29.21 @ 13:57) >    Patient name: ANA MARQUEZ  YOB: 1940   Age: 81 (M)   MR#: [id number]  Study Date: 10/29/2021  Location: 422Sonographer: AMILCAR Wood  Study quality: Technically good  Referring Physician: Yojana Muller MD  Blood Pressure: 121/54mmHg  Height: 168 cm  Weight: 78 kg  BSA: 1.9 m2  ------------------------------------------------------------------------  PROCEDURE: Transthoracic echocardiogram with 2-D, M-Mode  and complete spectral and color flow Doppler.  Intravenous ultrasound enhancing agent was administered for  improved left ventricular endocardial border definition.  Following the intravenous injection of ultrasound enhancing  agent, harmonic imaging was performed.  INDICATION: Syncope and collapse (R55)  ------------------------------------------------------------------------  DIMENSIONS:  Dimensions:     Normal Values:  LA:     4.9 cm    2.0 - 4.0 cm  Ao:     2.8 cm    2.0 - 3.8 cm  SEPTUM: 1.4 cm    0.6 - 1.2 cm  PWT:    1.3 cm    0.6 - 1.1 cm  LVIDd:  3.5 cm 3.0 - 5.6 cm  LVIDs:    ---     1.8 - 4.0 cm  Derived Variables:  LVMI: 87 g/m2  RWT: 0.74  ------------------------------------------------------------------------  OBSERVATIONS:  Mitral Valve: Mitral annular calcification, otherwise  normal mitralvalve. Mild mitral regurgitation.  Aortic Root: Normal aortic root.  Aortic Valve: Calcified trileaflet aortic valve with  decreased opening. Peak transaortic valve gradient equals  30 mm Hg, mean transaortic valve gradient equals 19 mm Hg,  estimated aortic valve area equals 0.8 sqcm (by continuity  equation), consistent with severe aortic stenosis. Mild  aortic regurgitation.  Left Atrium: Normal left atrium.  Left Ventricle: Endocardial visualization enhanced with  intravenous injection of echo contrast (Definity).  Hyperdynamic left ventricle. Increased relative wall  thickness with normal left ventricular mass index,  consistent with concentric left ventricular remodeling.  Right Heart: Normal right atrium. Normal right ventricular  sizeand function. Normal tricuspid valve. Normal pulmonic  valve.  Pericardium/PleuraNormal pericardium with no pericardial  effusion.  ------------------------------------------------------------------------  CONCLUSIONS:  1. Mitral annular calcification, otherwise normal mitral  valve. Mild mitral regurgitation.  2. Calcified trileaflet aortic valve with decreased  opening. Peak transaortic valve gradient equals 30 mm Hg,  mean transaortic valve gradient equals 19 mm Hg, estimated  aortic valve area equals 0.8 sqcm (by continuity equation),  consistent with severe aortic stenosis. Mild aortic  regurgitation.  3. Increased relative wall thickness with normal left  ventricular mass index, consistent with concentric left  ventricular remodeling.  4. Endocardial visualization enhanced with intravenous  injection of echo contrast (Definity). Hyperdynamic left  ventricle.  5. Normal right ventricular size and function.  ------------------------------------------------------------------------  Confirmed on  10/29/2021 - 16:03:50 by DARELL Mosher  ------------------------------------------------------------------------    < end of copied text >            CODE: FULL

## 2021-10-30 NOTE — PROGRESS NOTE ADULT - PROBLEM SELECTOR PLAN 3
Creatinine improved from admission, ACE and diuretics held, uncertain if 1.5 is new baseline  Creat 1.2-1.3 Dec 2020

## 2021-10-30 NOTE — PROGRESS NOTE ADULT - PROBLEM SELECTOR PLAN 6
Pt appears euvolemic on exam  -Chest xray: clear lungs  2G Sodium 1800 ADA diet  continue metoprolol.  Holding Lisinopril and torsemide for CJ, orthostasis.

## 2021-10-31 LAB
ANION GAP SERPL CALC-SCNC: 13 MMOL/L — SIGNIFICANT CHANGE UP (ref 7–14)
BUN SERPL-MCNC: 34 MG/DL — HIGH (ref 7–23)
CALCIUM SERPL-MCNC: 9.1 MG/DL — SIGNIFICANT CHANGE UP (ref 8.4–10.5)
CHLORIDE SERPL-SCNC: 101 MMOL/L — SIGNIFICANT CHANGE UP (ref 98–107)
CO2 SERPL-SCNC: 20 MMOL/L — LOW (ref 22–31)
CREAT SERPL-MCNC: 1.52 MG/DL — HIGH (ref 0.5–1.3)
GLUCOSE BLDC GLUCOMTR-MCNC: 107 MG/DL — HIGH (ref 70–99)
GLUCOSE BLDC GLUCOMTR-MCNC: 158 MG/DL — HIGH (ref 70–99)
GLUCOSE BLDC GLUCOMTR-MCNC: 169 MG/DL — HIGH (ref 70–99)
GLUCOSE BLDC GLUCOMTR-MCNC: 99 MG/DL — SIGNIFICANT CHANGE UP (ref 70–99)
GLUCOSE SERPL-MCNC: 109 MG/DL — HIGH (ref 70–99)
HCT VFR BLD CALC: 40 % — SIGNIFICANT CHANGE UP (ref 39–50)
HGB BLD-MCNC: 12.8 G/DL — LOW (ref 13–17)
MAGNESIUM SERPL-MCNC: 2.2 MG/DL — SIGNIFICANT CHANGE UP (ref 1.6–2.6)
MCHC RBC-ENTMCNC: 21.4 PG — LOW (ref 27–34)
MCHC RBC-ENTMCNC: 32 GM/DL — SIGNIFICANT CHANGE UP (ref 32–36)
MCV RBC AUTO: 67 FL — LOW (ref 80–100)
NRBC # BLD: 0 /100 WBCS — SIGNIFICANT CHANGE UP
NRBC # FLD: 0 K/UL — SIGNIFICANT CHANGE UP
PHOSPHATE SERPL-MCNC: 3.3 MG/DL — SIGNIFICANT CHANGE UP (ref 2.5–4.5)
PLATELET # BLD AUTO: 176 K/UL — SIGNIFICANT CHANGE UP (ref 150–400)
POTASSIUM SERPL-MCNC: 4.4 MMOL/L — SIGNIFICANT CHANGE UP (ref 3.5–5.3)
POTASSIUM SERPL-SCNC: 4.4 MMOL/L — SIGNIFICANT CHANGE UP (ref 3.5–5.3)
RBC # BLD: 5.97 M/UL — HIGH (ref 4.2–5.8)
RBC # FLD: 19.6 % — HIGH (ref 10.3–14.5)
SODIUM SERPL-SCNC: 134 MMOL/L — LOW (ref 135–145)
WBC # BLD: 9.64 K/UL — SIGNIFICANT CHANGE UP (ref 3.8–10.5)
WBC # FLD AUTO: 9.64 K/UL — SIGNIFICANT CHANGE UP (ref 3.8–10.5)

## 2021-10-31 PROCEDURE — 99233 SBSQ HOSP IP/OBS HIGH 50: CPT

## 2021-10-31 RX ORDER — HEPARIN SODIUM 5000 [USP'U]/ML
6500 INJECTION INTRAVENOUS; SUBCUTANEOUS EVERY 6 HOURS
Refills: 0 | Status: DISCONTINUED | OUTPATIENT
Start: 2021-10-31 | End: 2021-11-04

## 2021-10-31 RX ORDER — HEPARIN SODIUM 5000 [USP'U]/ML
INJECTION INTRAVENOUS; SUBCUTANEOUS
Qty: 25000 | Refills: 0 | Status: DISCONTINUED | OUTPATIENT
Start: 2021-10-31 | End: 2021-11-04

## 2021-10-31 RX ORDER — HEPARIN SODIUM 5000 [USP'U]/ML
3000 INJECTION INTRAVENOUS; SUBCUTANEOUS EVERY 6 HOURS
Refills: 0 | Status: DISCONTINUED | OUTPATIENT
Start: 2021-10-31 | End: 2021-11-04

## 2021-10-31 RX ORDER — HEPARIN SODIUM 5000 [USP'U]/ML
6500 INJECTION INTRAVENOUS; SUBCUTANEOUS ONCE
Refills: 0 | Status: DISCONTINUED | OUTPATIENT
Start: 2021-10-31 | End: 2021-10-31

## 2021-10-31 RX ADMIN — Medication 100 MILLIGRAM(S): at 19:02

## 2021-10-31 RX ADMIN — Medication 1: at 15:23

## 2021-10-31 RX ADMIN — Medication 100 MILLIGRAM(S): at 16:00

## 2021-10-31 RX ADMIN — Medication 650 MILLIGRAM(S): at 15:10

## 2021-10-31 RX ADMIN — Medication 25 MILLIGRAM(S): at 06:03

## 2021-10-31 RX ADMIN — Medication 25 MILLIGRAM(S): at 21:36

## 2021-10-31 RX ADMIN — HEPARIN SODIUM 1400 UNIT(S)/HR: 5000 INJECTION INTRAVENOUS; SUBCUTANEOUS at 19:06

## 2021-10-31 RX ADMIN — Medication 25 MILLIGRAM(S): at 14:33

## 2021-10-31 RX ADMIN — Medication 650 MILLIGRAM(S): at 14:32

## 2021-10-31 RX ADMIN — Medication 100 MILLIGRAM(S): at 06:03

## 2021-10-31 NOTE — PROGRESS NOTE ADULT - PROBLEM SELECTOR PLAN 2
Progression of AS to severe, may be cause of syncope  Appreciate cards recs, for dobutamine stress echo, possible LHC, eval for AVR

## 2021-10-31 NOTE — PROGRESS NOTE ADULT - ASSESSMENT
82 yo male PMHx CAD (1 stent,) DM2, HTN, HLD, AS, Diastolic HF, Afib (on Xarelto) and CVA (L hemiparesis) syncope x2 episodes the day before admission and L knee pain, admitted to tele for Syncope, r/o ACS and  L Knee pain.

## 2021-10-31 NOTE — PROGRESS NOTE ADULT - PROBLEM SELECTOR PLAN 5
[id number] Score =7  Continue Xarelto (renally dosed)  Maintain falls and bleeding precautions  -c/w metoprolol

## 2021-10-31 NOTE — PROGRESS NOTE ADULT - SUBJECTIVE AND OBJECTIVE BOX
[facility] Division of Hospital Medicine  Hamilton Napoles MD  Pager [phone]    Patient is a 81y old  Male who presents with a chief complaint of syncope (30 Oct 2021 19:21)      SUBJECTIVE / OVERNIGHT EVENTS: Feels well, no events      MEDICATIONS  (STANDING):  allopurinol 100 milliGRAM(s) Oral daily  dextrose 40% Gel 15 Gram(s) Oral once  dextrose 5%. 1000 milliLiter(s) (50 mL/Hr) IV Continuous <Continuous>  dextrose 5%. 1000 milliLiter(s) (100 mL/Hr) IV Continuous <Continuous>  dextrose 50% Injectable 25 Gram(s) IV Push once  dextrose 50% Injectable 12.5 Gram(s) IV Push once  dextrose 50% Injectable 25 Gram(s) IV Push once  glucagon  Injectable 1 milliGRAM(s) IntraMuscular once  hydrALAZINE 25 milliGRAM(s) Oral three times a day  insulin lispro (ADMELOG) corrective regimen sliding scale   SubCutaneous three times a day before meals  insulin lispro (ADMELOG) corrective regimen sliding scale   SubCutaneous at bedtime  metoprolol tartrate 100 milliGRAM(s) Oral two times a day  rivaroxaban 15 milliGRAM(s) Oral with dinner    MEDICATIONS  (PRN):  acetaminophen     Tablet .. 650 milliGRAM(s) Oral every 6 hours PRN Mild Pain (1 - 3), Moderate Pain (4 - 6)  fluticasone propionate 50 MICROgram(s)/spray Nasal Spray 1 Spray(s) Both Nostrils two times a day PRN allergic rhinitis      CAPILLARY BLOOD GLUCOSE      POCT Blood Glucose.: 158 mg/dL (31 Oct 2021 12:47)  POCT Blood Glucose.: 107 mg/dL (31 Oct 2021 08:18)  POCT Blood Glucose.: 179 mg/dL (30 Oct 2021 21:40)  POCT Blood Glucose.: 110 mg/dL (30 Oct 2021 17:24)    I&O's Summary    30 Oct 2021 07:01  -  31 Oct 2021 07:00  --------------------------------------------------------  IN: 0 mL / OUT: 500 mL / NET: -500 mL        PHYSICAL EXAM:  Vital Signs Last 24 Hrs  T(C): 36.8 (31 Oct 2021 06:00), Max: 36.9 (30 Oct 2021 14:06)  T(F): 98.2 (31 Oct 2021 06:00), Max: 98.4 (30 Oct 2021 14:06)  HR: 90 (31 Oct 2021 06:00) (90 - 111)  BP: 126/85 (31 Oct 2021 06:00) (126/85 - 147/85)  BP(mean): --  RR: 18 (31 Oct 2021 06:00) (16 - 18)  SpO2: 100% (31 Oct 2021 06:00) (100% - 100%)  CONSTITUTIONAL: NAD  EYES: EOMI, conjunctiva and sclera clear  ENMT: Moist oral mucosa  NECK: Supple  RESPIRATORY: Breathing unlabored, CTAB  CARDIOVASCULAR: S1S2 syst M  ABDOMEN: Nontender to palpation, normoactive bowel sounds, no rebound/guarding  MUSCULOSKELETAL: no clubbing or cyanosis of digits  NEUROLOGY: No focal deficits   SKIN: No rashes or lesions    LABS:                        12.8   9.64  )-----------( 176      ( 31 Oct 2021 06:15 )             40.0     10-31    134<L>  |  101  |  34<H>  ----------------------------<  109<H>  4.4   |  20<L>  |  1.52<H>    Ca    9.1      31 Oct 2021 06:15  Phos  3.3     10-31  Mg     2.20     10-31        CODE: FULL

## 2021-10-31 NOTE — PHARMACOTHERAPY INTERVENTION NOTE - COMMENTS
No need for heparin drip bolus as pt was on therapeutic xarelto prior to switching. Provider aware and approved.

## 2021-10-31 NOTE — PROGRESS NOTE ADULT - PROBLEM SELECTOR PLAN 3
Creatinine improved from admission, ACE and diuretics held, 1.5 may be new baseline  Creat 1.2-1.3 Dec 2020  Will resume ACEI, continue to hold torsemide

## 2021-10-31 NOTE — PROGRESS NOTE ADULT - PROBLEM SELECTOR PLAN 1
tele monitor - stable  cardiac enzymes neg for ACS  Pt was orthostatic, holding diuretics, possible dehydration  10/30 not orthostatic  DASH 1800 ADA diet   -PT eval: home with home PT when stable

## 2021-10-31 NOTE — PROGRESS NOTE ADULT - SUBJECTIVE AND OBJECTIVE BOX
Cardiology    HISTORY OF PRESENT ILLNESS: HPI:       82 yo male PMHx CAD (1 stent,) DM2, HTN, HLD, AS, Diastolic HF, Afib (on Xarelto) and CVA (L hemiparesis) syncope x2 episodes yesterday. Pt reports he was brushing his teeth yesterday morning, felt suddenly dizzy, lightheaded. Wife noticed his arms suddenly dropped down, pt became weak and started to fall down. Wife caught him right away, called for son's help and they eased him down on the bed. Pt's eyes were closed and his LOC was was for about 3 mins with urinary incontinency. After shouting at patient and fanning him, his LOC returned back to baseline. Later, yesterday afternoon, he had another syncopal event where he was watching TV on the sofa and passed out for about 2 mins where eyes closed and pt was breathing heavy. Wife called EMS. EMS checked BFS to be 123 and was taken to [facility] ED. Pt also c/o L knee pain worsening for past 2 weeks, achy, 9/10, denying any recent trauma. He states he has chronic b/l knee pain from arthritis but  L knee for past 2 weeks was excruciating. No reported fever, chills, chest pain, sob, palpitations, trauma, nausea, vomiting, diarrhea, abdominal pain or sick contacts.    Questionable compliance to some meds as pt reports he takes metoprolol once a day, but bottle says BID.  (28 Oct 2021 08:11)    10/30 - patient has no prior history of syncope, this is a new symptom.  He sees Dr Branden Stevens ([facility]) for General Cardiology, where he is followed for a history of stroke, CAD w/ PCI, HTN and DM.  On his last echocardiogram he had moderate aortic valve stenosis.  On his current echocardiogram, severe aortic valve stenosis was identified, and cardiology was consulted to initiate a pre-surgical workup.  He states he is not very active, but does not experience angina doing his ADLs or running errands.  He does not have orthopnea/PND or leg swelling.  A 10 pt ROS is otherwise negative.  Date of Service 10/31 - no new complaints. family at bedside, discussed plan of care to work up and treat severe AS.  no angina or palpitations.  resting well.    acetaminophen     Tablet .. 650 milliGRAM(s) Oral every 6 hours PRN  allopurinol 100 milliGRAM(s) Oral daily  dextrose 40% Gel 15 Gram(s) Oral once  dextrose 5%. 1000 milliLiter(s) IV Continuous <Continuous>  dextrose 5%. 1000 milliLiter(s) IV Continuous <Continuous>  dextrose 50% Injectable 25 Gram(s) IV Push once  dextrose 50% Injectable 12.5 Gram(s) IV Push once  dextrose 50% Injectable 25 Gram(s) IV Push once  fluticasone propionate 50 MICROgram(s)/spray Nasal Spray 1 Spray(s) Both Nostrils two times a day PRN  glucagon  Injectable 1 milliGRAM(s) IntraMuscular once  hydrALAZINE 25 milliGRAM(s) Oral three times a day  insulin lispro (ADMELOG) corrective regimen sliding scale   SubCutaneous three times a day before meals  insulin lispro (ADMELOG) corrective regimen sliding scale   SubCutaneous at bedtime  metoprolol tartrate 100 milliGRAM(s) Oral two times a day  rivaroxaban 15 milliGRAM(s) Oral with dinner                          12.8   9.64  )-----------( 176      ( 31 Oct 2021 06:15 )             40.0       10-31    134<L>  |  101  |  34<H>  ----------------------------<  109<H>  4.4   |  20<L>  |  1.52<H>    Ca    9.1      31 Oct 2021 06:15  Phos  3.3     10-31  Mg     2.20     10-31      T(C): 36.7 (10-31-21 @ 14:25), Max: 36.8 (10-31-21 @ 06:00)  HR: 78 (10-31-21 @ 14:25) (78 - 111)  BP: 143/78 (10-31-21 @ 14:25) (126/85 - 143/78)  RR: 18 (10-31-21 @ 14:25) (17 - 18)  SpO2: 100% (10-31-21 @ 14:25) (100% - 100%)  Wt(kg): --    I&O's Summary    30 Oct 2021 07:01  -  31 Oct 2021 07:00  --------------------------------------------------------  IN: 0 mL / OUT: 500 mL / NET: -500 mL    Appearance: Normal appearing adult male in no acute distress, nondiaphoretic  HEENT:   Normal oral mucosa, PERRL, EOMI	  Lymphatic: No lymphadenopathy , no edema  Cardiovascular: Normal S1 S2, No JVD, loud late-peaking systolic murmur at the upper chest, soft to near-absent S2.  Carotid [id number] present , Peripheral pulses palpable 2+ bilaterally  Respiratory: Lungs clear to auscultation, normal effort 	  Gastrointestinal:  Soft, Non-tender, + BS	  Skin: No rashes, No ecchymoses, No cyanosis, warm to touch  Musculoskeletal: Normal range of motion, normal strength  Psychiatry:  Mood & affect appropriate   	    ECG: rate-controlled AFib  (note, QTc cannot be calculated during AFib due to [id number] irregularity) 	    Echo:  < from: TTE with Doppler (w/Cont) (10.29.21 @ 13:57) >  DIMENSIONS:  SEPTUM: 1.4 cm    0.6 - 1.2 cm  PWT:    1.3 cm    0.6 - 1.1 cm  Aortic Valve: Calcified trileaflet aortic valve with  decreased opening. Peak transaortic valve gradient equals  30 mm Hg, mean transaortic valve gradient equals 19 mm Hg,estimated aortic valve area equals 0.8 sqcm (by continuity  equation), consistent with severe aortic stenosis. Mild aortic regurgitation.  Left Atrium: Normal left atrium.  Left Ventricle: Endocardial visualization enhanced with intravenous injection of echo contrast (Definity).  Hyperdynamic left ventricle. Increased relative wall thickness with normal left ventricular mass index,  consistent with concentric left ventricular remodeling.  Right Heart: Normal right atrium. Normal right ventricular sizeand function. Normal tricuspid valve. Normal pulmonic  valve.  Pericardium/PleuraNormal pericardium with no pericardial  effusion.    < end of copied text >    ASSESSMENT/PLAN: 	81y Male with progession of aortic valve stenosis clinically and on echocardiogram, and presentation with 'red flag' symptom of syncope during minimal exertion.  An expedited aortic valve workup is indicated.  His valve gradients are not typical (mean >40mmHg), so he has paradoxical low-flow / low-gradient AS in the setting of normal ejection fraction, and normotensive state.  The valve is calcified, making this diagnosis very likely.    Will order a dobutamine stress echo to confirm.  Will discuss w/ interventional cardiology re: coronary angiography.  Will hold Xarelto and start Heparin infusion.  Monitor on telemetry. His AFib is chronic and he has no prior history of long pauses, but we are under surveillance for this as a possible cause of syncope.  Maintain K 4-4.5, Mg 2.  Will follow.       Eamon Maria M.D.  Cardiac Electrophysiology    office [phone]  pager [phone]

## 2021-10-31 NOTE — PROGRESS NOTE ADULT - PROBLEM SELECTOR PLAN 6
Pt appears euvolemic on exam  -Chest xray: clear lungs  2G Sodium 1800 ADA diet  continue metoprolol.  Will resume ACEI 11/1  continue to hold torsemide

## 2021-11-01 LAB
ANION GAP SERPL CALC-SCNC: 14 MMOL/L — SIGNIFICANT CHANGE UP (ref 7–14)
APTT BLD: 125.7 SEC — CRITICAL HIGH (ref 27–36.3)
APTT BLD: 144.8 SEC — CRITICAL HIGH (ref 27–36.3)
APTT BLD: 43.9 SEC — HIGH (ref 27–36.3)
BUN SERPL-MCNC: 39 MG/DL — HIGH (ref 7–23)
CALCIUM SERPL-MCNC: 9.4 MG/DL — SIGNIFICANT CHANGE UP (ref 8.4–10.5)
CHLORIDE SERPL-SCNC: 100 MMOL/L — SIGNIFICANT CHANGE UP (ref 98–107)
CO2 SERPL-SCNC: 19 MMOL/L — LOW (ref 22–31)
CREAT SERPL-MCNC: 1.53 MG/DL — HIGH (ref 0.5–1.3)
GLUCOSE BLDC GLUCOMTR-MCNC: 106 MG/DL — HIGH (ref 70–99)
GLUCOSE BLDC GLUCOMTR-MCNC: 114 MG/DL — HIGH (ref 70–99)
GLUCOSE BLDC GLUCOMTR-MCNC: 117 MG/DL — HIGH (ref 70–99)
GLUCOSE BLDC GLUCOMTR-MCNC: 121 MG/DL — HIGH (ref 70–99)
GLUCOSE SERPL-MCNC: 120 MG/DL — HIGH (ref 70–99)
HCT VFR BLD CALC: 40.1 % — SIGNIFICANT CHANGE UP (ref 39–50)
HCT VFR BLD CALC: 40.2 % — SIGNIFICANT CHANGE UP (ref 39–50)
HGB BLD-MCNC: 12.7 G/DL — LOW (ref 13–17)
HGB BLD-MCNC: 13.2 G/DL — SIGNIFICANT CHANGE UP (ref 13–17)
MAGNESIUM SERPL-MCNC: 2.4 MG/DL — SIGNIFICANT CHANGE UP (ref 1.6–2.6)
MCHC RBC-ENTMCNC: 21.7 PG — LOW (ref 27–34)
MCHC RBC-ENTMCNC: 21.8 PG — LOW (ref 27–34)
MCHC RBC-ENTMCNC: 31.7 GM/DL — LOW (ref 32–36)
MCHC RBC-ENTMCNC: 32.8 GM/DL — SIGNIFICANT CHANGE UP (ref 32–36)
MCV RBC AUTO: 66.3 FL — LOW (ref 80–100)
MCV RBC AUTO: 68.4 FL — LOW (ref 80–100)
NRBC # BLD: 0 /100 WBCS — SIGNIFICANT CHANGE UP
NRBC # BLD: 0 /100 WBCS — SIGNIFICANT CHANGE UP
NRBC # FLD: 0 K/UL — SIGNIFICANT CHANGE UP
NRBC # FLD: 0 K/UL — SIGNIFICANT CHANGE UP
PHOSPHATE SERPL-MCNC: 2.9 MG/DL — SIGNIFICANT CHANGE UP (ref 2.5–4.5)
PLATELET # BLD AUTO: 191 K/UL — SIGNIFICANT CHANGE UP (ref 150–400)
PLATELET # BLD AUTO: 206 K/UL — SIGNIFICANT CHANGE UP (ref 150–400)
POTASSIUM SERPL-MCNC: 4.5 MMOL/L — SIGNIFICANT CHANGE UP (ref 3.5–5.3)
POTASSIUM SERPL-SCNC: 4.5 MMOL/L — SIGNIFICANT CHANGE UP (ref 3.5–5.3)
RBC # BLD: 5.86 M/UL — HIGH (ref 4.2–5.8)
RBC # BLD: 6.06 M/UL — HIGH (ref 4.2–5.8)
RBC # FLD: 19.2 % — HIGH (ref 10.3–14.5)
RBC # FLD: 19.9 % — HIGH (ref 10.3–14.5)
SODIUM SERPL-SCNC: 133 MMOL/L — LOW (ref 135–145)
URATE SERPL-MCNC: 7.2 MG/DL — SIGNIFICANT CHANGE UP (ref 3.4–8.8)
WBC # BLD: 8.56 K/UL — SIGNIFICANT CHANGE UP (ref 3.8–10.5)
WBC # BLD: 9.53 K/UL — SIGNIFICANT CHANGE UP (ref 3.8–10.5)
WBC # FLD AUTO: 8.56 K/UL — SIGNIFICANT CHANGE UP (ref 3.8–10.5)
WBC # FLD AUTO: 9.53 K/UL — SIGNIFICANT CHANGE UP (ref 3.8–10.5)

## 2021-11-01 PROCEDURE — 99233 SBSQ HOSP IP/OBS HIGH 50: CPT

## 2021-11-01 PROCEDURE — 73620 X-RAY EXAM OF FOOT: CPT | Mod: 26,LT

## 2021-11-01 RX ADMIN — Medication 25 MILLIGRAM(S): at 05:19

## 2021-11-01 RX ADMIN — Medication 25 MILLIGRAM(S): at 22:19

## 2021-11-01 RX ADMIN — HEPARIN SODIUM 1100 UNIT(S)/HR: 5000 INJECTION INTRAVENOUS; SUBCUTANEOUS at 17:29

## 2021-11-01 RX ADMIN — Medication 100 MILLIGRAM(S): at 12:58

## 2021-11-01 RX ADMIN — Medication 100 MILLIGRAM(S): at 17:39

## 2021-11-01 RX ADMIN — Medication 25 MILLIGRAM(S): at 15:11

## 2021-11-01 RX ADMIN — HEPARIN SODIUM 1200 UNIT(S)/HR: 5000 INJECTION INTRAVENOUS; SUBCUTANEOUS at 02:27

## 2021-11-01 RX ADMIN — HEPARIN SODIUM 3000 UNIT(S): 5000 INJECTION INTRAVENOUS; SUBCUTANEOUS at 17:30

## 2021-11-01 RX ADMIN — Medication 100 MILLIGRAM(S): at 05:19

## 2021-11-01 RX ADMIN — HEPARIN SODIUM 900 UNIT(S)/HR: 5000 INJECTION INTRAVENOUS; SUBCUTANEOUS at 08:51

## 2021-11-01 RX ADMIN — HEPARIN SODIUM 0 UNIT(S)/HR: 5000 INJECTION INTRAVENOUS; SUBCUTANEOUS at 07:47

## 2021-11-01 NOTE — PROGRESS NOTE ADULT - PROBLEM SELECTOR PLAN 5
[id number] Score =7  Continue Xarelto (renally dosed)  Maintain falls and bleeding precautions  -c/w metoprolol [id number] Score =7  Xarelto last dosed 10/30. Currently on hep gtt  Maintain falls and bleeding precautions  -c/w metoprolol

## 2021-11-01 NOTE — PROVIDER CONTACT NOTE (CRITICAL VALUE NOTIFICATION) - RECOMMENDATIONS
Continue to follow Heparin nomogram and monitor for signs and symptoms of bleeding
decrease heparin done. new dose12 ml.

## 2021-11-01 NOTE — PROVIDER CONTACT NOTE (CRITICAL VALUE NOTIFICATION) - ASSESSMENT
Pt is AOx4 without signs or symptoms of bleeding
upon assessment, pt denies any headache, nausea, dizziness. no signs of bleeding noted

## 2021-11-01 NOTE — PROVIDER CONTACT NOTE (CRITICAL VALUE NOTIFICATION) - SITUATION
PT PTT ELEVATED 125.7
Pt had critical aPTT of 144.8; paused for 60 min per nomogram, new rate of 9 to be restarted. Pt asymptomatic

## 2021-11-01 NOTE — PROGRESS NOTE ADULT - PROBLEM SELECTOR PLAN 1
tele monitor - stable  cardiac enzymes neg for ACS  Pt was orthostatic, holding diuretics, possible dehydration  10/30 not orthostatic  DASH 1800 ADA diet   -PT eval: home with home PT when stable Suspect 2/2 severe AS as below.   tele monitor - stable  cardiac enzymes neg for ACS  Pt was orthostatic, holding diuretics, possible dehydration  10/30 not orthostatic  DASH 1800 ADA diet   -PT eval: home with home PT when stable

## 2021-11-01 NOTE — PROGRESS NOTE ADULT - PROBLEM SELECTOR PLAN 2
Progression of AS to severe, may be cause of syncope  Appreciate cards recs, for dobutamine stress echo -- results pending--, possible LHC, eval for TAVR Progression of AS to severe, may be cause of syncope  Appreciate cards recs,plan for [facility] 11/2 for TAVR workup

## 2021-11-01 NOTE — PROGRESS NOTE ADULT - SUBJECTIVE AND OBJECTIVE BOX
Cardiology    Date of service 11/1/2021    HISTORY OF PRESENT ILLNESS: HPI:  80 yo male PMHx CAD (1 stent,) DM2, HTN, HLD, AS, Diastolic HF, Afib (on Xarelto) and CVA (L hemiparesis) syncope x2 episodes yesterday.     Today denies chest pain, SOB or palps    Review of Systems:   Constitutional: [ ] fevers, [ ] chills.   Skin: [ ] dry skin. [ ] rashes.  Psychiatric: [ ] depression, [ ] anxiety.   Gastrointestinal: [ ] BRBPR, [ ] melena.   Neurological: [ ] confusion. [ ] seizures. [ ] shuffling gait.   Ears,Nose,Mouth and Throat: [ ] ear pain [ ] sore throat.   Eyes: [ ] diplopia.   Respiratory: [ ] hemoptysis. [ ] shortness of breath  Cardiovascular: See HPI above  Hematologic/Lymphatic: [ ] anemia. [ ] painful nodes. [ ] prolonged bleeding.   Genitourinary: [ ] hematuria. [ ] flank pain.   Endocrine: [ ] significant change in weight. [ ] intolerance to heat and cold.     Review of systems [ x] otherwise negative, [ ] otherwise unable to obtain    FH: no family history of sudden cardiac death in first degree relatives    SH: [ ] tobacco, [ ] alcohol, [ ] drugs    acetaminophen     Tablet .. 650 milliGRAM(s) Oral every 6 hours PRN  allopurinol 100 milliGRAM(s) Oral daily  dextrose 40% Gel 15 Gram(s) Oral once  dextrose 5%. 1000 milliLiter(s) IV Continuous <Continuous>  dextrose 5%. 1000 milliLiter(s) IV Continuous <Continuous>  dextrose 50% Injectable 25 Gram(s) IV Push once  dextrose 50% Injectable 12.5 Gram(s) IV Push once  dextrose 50% Injectable 25 Gram(s) IV Push once  fluticasone propionate 50 MICROgram(s)/spray Nasal Spray 1 Spray(s) Both Nostrils two times a day PRN  glucagon  Injectable 1 milliGRAM(s) IntraMuscular once  heparin   Injectable 6500 Unit(s) IV Push every 6 hours PRN  heparin   Injectable 3000 Unit(s) IV Push every 6 hours PRN  heparin  Infusion.  Unit(s)/Hr IV Continuous <Continuous>  hydrALAZINE 25 milliGRAM(s) Oral three times a day  insulin lispro (ADMELOG) corrective regimen sliding scale   SubCutaneous three times a day before meals  insulin lispro (ADMELOG) corrective regimen sliding scale   SubCutaneous at bedtime  metoprolol tartrate 100 milliGRAM(s) Oral two times a day                            13.2   8.56  )-----------( 191      ( 01 Nov 2021 06:58 )             40.2     133<L>  |  100  |  39<H>  ----------------------------<  120<H>  4.5   |  19<L>  |  1.53<H>    Ca    9.4      01 Nov 2021 06:58  Phos  2.9     11-01  Mg     2.40     11-01      T(C): 36.6 (11-01-21 @ 09:42), Max: 36.7 (10-31-21 @ 14:25)  HR: 86 (11-01-21 @ 09:42) (78 - 106)  BP: 135/95 (11-01-21 @ 09:42) (126/73 - 143/78)  RR: 17 (11-01-21 @ 09:42) (17 - 18)  SpO2: 100% (11-01-21 @ 09:42) (100% - 100%)    General: Well nourished in no acute distress. Alert and Oriented * 3.   Head: Normocephalic and atraumatic.   Neck: No JVD. No bruits. Supple. Does not appear to be enlarged.   Cardiovascular: + S1,S2 ; RRR Soft systolic murmur at the left lower sternal border. No rubs noted.    Lungs: CTA b/l. No rhonchi, rales or wheezes.   Abdomen: + BS, soft. Non tender. Non distended. No rebound. No guarding.   Extremities: No clubbing/cyanosis/edema.   Neurologic: Moves all four extremities. Full range of motion.   Skin: Warm and moist. The patient's skin has normal elasticity and good skin turgor.   Psychiatric: Appropriate mood and affect.  Musculoskeletal: Normal range of motion, normal strength   	    ECG: rate-controlled AFib  (note, QTc cannot be calculated during AFib due to [id number] irregularity) 	    Echo:  < from: TTE with Doppler (w/Cont) (10.29.21 @ 13:57) >  DIMENSIONS:  SEPTUM: 1.4 cm    0.6 - 1.2 cm  PWT:    1.3 cm    0.6 - 1.1 cm  Aortic Valve: Calcified trileaflet aortic valve with  decreased opening. Peak transaortic valve gradient equals  30 mm Hg, mean transaortic valve gradient equals 19 mm Hg,estimated aortic valve area equals 0.8 sqcm (by continuity  equation), consistent with severe aortic stenosis. Mild aortic regurgitation.  Left Atrium: Normal left atrium.  Left Ventricle: Endocardial visualization enhanced with intravenous injection of echo contrast (Definity).  Hyperdynamic left ventricle. Increased relative wall thickness with normal left ventricular mass index,  consistent with concentric left ventricular remodeling.  Right Heart: Normal right atrium. Normal right ventricular sizeand function. Normal tricuspid valve. Normal pulmonic  valve.  Pericardium/PleuraNormal pericardium with no pericardial  effusion.    < end of copied text >    ASSESSMENT/PLAN: 	81y Male with CAD hx PCI 3 years ago at [facility], and Echo now with severe AS admitted with syncope    --TTE noted as above  --Will update OP Cardiologist Dr Stevens  --last dose Xarelto 10/30, started on Heparin Gtt  --Will consult TAVR team  --Plan for [facility] tomorrow  --monitor tele

## 2021-11-01 NOTE — PROGRESS NOTE ADULT - SUBJECTIVE AND OBJECTIVE BOX
[facility] of [facility] Medicine  Angelina Black MD  Pager (M-F, 8A-5P): [phone]  Other Times:  [phone]      SUBJECTIVE / OVERNIGHT EVENTS:    MEDICATIONS  (STANDING):  allopurinol 100 milliGRAM(s) Oral daily  dextrose 40% Gel 15 Gram(s) Oral once  dextrose 5%. 1000 milliLiter(s) (50 mL/Hr) IV Continuous <Continuous>  dextrose 5%. 1000 milliLiter(s) (100 mL/Hr) IV Continuous <Continuous>  dextrose 50% Injectable 25 Gram(s) IV Push once  dextrose 50% Injectable 12.5 Gram(s) IV Push once  dextrose 50% Injectable 25 Gram(s) IV Push once  glucagon  Injectable 1 milliGRAM(s) IntraMuscular once  heparin  Infusion.  Unit(s)/Hr (14 mL/Hr) IV Continuous <Continuous>  hydrALAZINE 25 milliGRAM(s) Oral three times a day  insulin lispro (ADMELOG) corrective regimen sliding scale   SubCutaneous three times a day before meals  insulin lispro (ADMELOG) corrective regimen sliding scale   SubCutaneous at bedtime  metoprolol tartrate 100 milliGRAM(s) Oral two times a day    MEDICATIONS  (PRN):  acetaminophen     Tablet .. 650 milliGRAM(s) Oral every 6 hours PRN Mild Pain (1 - 3), Moderate Pain (4 - 6)  fluticasone propionate 50 MICROgram(s)/spray Nasal Spray 1 Spray(s) Both Nostrils two times a day PRN allergic rhinitis  heparin   Injectable 6500 Unit(s) IV Push every 6 hours PRN For aPTT less than 40  heparin   Injectable 3000 Unit(s) IV Push every 6 hours PRN For aPTT between 40 - 57      I&O's Summary      PHYSICAL EXAM:  Vital Signs Last 24 Hrs  T(C): 36.6 (01 Nov 2021 09:42), Max: 36.7 (31 Oct 2021 14:25)  T(F): 97.8 (01 Nov 2021 09:42), Max: 98 (31 Oct 2021 14:25)  HR: 86 (01 Nov 2021 09:42) (78 - 106)  BP: 135/95 (01 Nov 2021 09:42) (126/73 - 143/78)  BP(mean): --  RR: 17 (01 Nov 2021 09:42) (17 - 18)  SpO2: 100% (01 Nov 2021 09:42) (100% - 100%)  CONSTITUTIONAL: NAD, well-developed, well-groomed  EYES: PERRLA; conjunctiva and sclera clear  ENMT: Moist oral mucosa, no pharyngeal injection or exudates; normal dentition  RESPIRATORY: Normal respiratory effort; lungs are clear to auscultation bilaterally  CARDIOVASCULAR: Regular rate and rhythm, normal S1 and S2, no murmur/rub/gallop; No lower extremity edema; Peripheral pulses are 2+ bilaterally  ABDOMEN: Nontender to palpation, normoactive bowel sounds, no rebound/guarding; No hepatosplenomegaly  PSYCH: A+O to person, place, and time; affect appropriate  SKIN: No rashes; no palpable lesions    LABS:                        13.2   8.56  )-----------( 191      ( 01 Nov 2021 06:58 )             40.2     11-01    133<L>  |  100  |  39<H>  ----------------------------<  120<H>  4.5   |  19<L>  |  1.53<H>    Ca    9.4      01 Nov 2021 06:58  Phos  2.9     11-01  Mg     2.40     11-01      PTT - ( 01 Nov 2021 06:58 )  PTT:144.8 sec            RADIOLOGY & ADDITIONAL TESTS:  Results Reviewed:   Imaging Personally Reviewed:  Electrocardiogram Personally Reviewed:    COORDINATION OF CARE:  Care Discussed with Consultants/Other Providers [Y/N]:  Prior or Outpatient Records Reviewed [Y/N]:   [facility] Division of Hospital Medicine  Angelina Black MD  Pager (M-F, 8A-5P): [phone]  Other Times:  [phone]      SUBJECTIVE / OVERNIGHT EVENTS: Pt denies any issues overnight, specifically denies syncope. Has some difficulty understanding me though (hard of hearing?). Endorses pain in his L ankle/foot. Was taken off of his gout treatment, does not remember when. But has had pain in his L ankle that is bothering him.    MEDICATIONS  (STANDING):  allopurinol 100 milliGRAM(s) Oral daily  dextrose 40% Gel 15 Gram(s) Oral once  dextrose 5%. 1000 milliLiter(s) (50 mL/Hr) IV Continuous <Continuous>  dextrose 5%. 1000 milliLiter(s) (100 mL/Hr) IV Continuous <Continuous>  dextrose 50% Injectable 25 Gram(s) IV Push once  dextrose 50% Injectable 12.5 Gram(s) IV Push once  dextrose 50% Injectable 25 Gram(s) IV Push once  glucagon  Injectable 1 milliGRAM(s) IntraMuscular once  heparin  Infusion.  Unit(s)/Hr (14 mL/Hr) IV Continuous <Continuous>  hydrALAZINE 25 milliGRAM(s) Oral three times a day  insulin lispro (ADMELOG) corrective regimen sliding scale   SubCutaneous three times a day before meals  insulin lispro (ADMELOG) corrective regimen sliding scale   SubCutaneous at bedtime  metoprolol tartrate 100 milliGRAM(s) Oral two times a day    MEDICATIONS  (PRN):  acetaminophen     Tablet .. 650 milliGRAM(s) Oral every 6 hours PRN Mild Pain (1 - 3), Moderate Pain (4 - 6)  fluticasone propionate 50 MICROgram(s)/spray Nasal Spray 1 Spray(s) Both Nostrils two times a day PRN allergic rhinitis  heparin   Injectable 6500 Unit(s) IV Push every 6 hours PRN For aPTT less than 40  heparin   Injectable 3000 Unit(s) IV Push every 6 hours PRN For aPTT between 40 - 57      I&O's Summary      PHYSICAL EXAM:  Vital Signs Last 24 Hrs  T(C): 36.6 (01 Nov 2021 09:42), Max: 36.7 (31 Oct 2021 14:25)  T(F): 97.8 (01 Nov 2021 09:42), Max: 98 (31 Oct 2021 14:25)  HR: 86 (01 Nov 2021 09:42) (78 - 106)  BP: 135/95 (01 Nov 2021 09:42) (126/73 - 143/78)  BP(mean): --  RR: 17 (01 Nov 2021 09:42) (17 - 18)  SpO2: 100% (01 Nov 2021 09:42) (100% - 100%)  CONSTITUTIONAL: NAD, well-developed, well-groomed  EYES: PERRLA; conjunctiva and sclera clear  ENMT: Moist oral mucosa, no pharyngeal injection   RESPIRATORY: Normal respiratory effort; lungs are clear to auscultation bilaterally  CARDIOVASCULAR: Irregular rate and rhythm, no murmurs appreciated, no carotid bruits.  Trace peripheral edema bilatreally and symmetrically  ABDOMEN: Nontender to palpation, normoactive bowel sounds, no rebound/guarding; No hepatosplenomegaly  PSYCH: A+O to person, place, and time; affect appropriate  SKIN: No rashes; no palpable lesions  MSKL: exquisitely tender to palpation to the L ankle, pain with dorsiflexion, warm ankle however symmetric bilaterally.    LABS:                        13.2   8.56  )-----------( 191      ( 01 Nov 2021 06:58 )             40.2     11-01    133<L>  |  100  |  39<H>  ----------------------------<  120<H>  4.5   |  19<L>  |  1.53<H>    Ca    9.4      01 Nov 2021 06:58  Phos  2.9     11-01  Mg     2.40     11-01      PTT - ( 01 Nov 2021 06:58 )  PTT:144.8 sec      RADIOLOGY & ADDITIONAL TESTS:  Results Reviewed: Y  < from: TTE with Doppler (w/Cont) (10.29.21 @ 13:57) >  CONCLUSIONS:  1. Mitral annular calcification, otherwise normal mitral  valve. Mild mitral regurgitation.  2. Calcified trileaflet aortic valve with decreased  opening. Peak transaortic valve gradient equals 30 mm Hg,  mean transaortic valve gradient equals 19 mm Hg, estimated  aortic valve area equals 0.8 sqcm (by continuity equation),  consistent with severe aortic stenosis. Mild aortic  regurgitation.  3. Increased relative wall thickness with normal left  ventricular mass index, consistent with concentric left  ventricular remodeling.  4. Endocardial visualization enhanced with intravenous  injection of echo contrast (Definity). Hyperdynamic left  ventricle.  5. Normal right ventricular size and function.    < end of copied text >        COORDINATION OF CARE:  Care Discussed with Consultants/Other Providers [Y/N]: Y  Prior or Outpatient Records Reviewed [Y/N]: Y

## 2021-11-01 NOTE — PROGRESS NOTE ADULT - PROBLEM SELECTOR PLAN 4
-no fx on Xray L knee  -PT eval home with home PT  -Tylenol 650mg pO Q6h prn pain L ankle pain  Hx of gout, not on treatment  -no fx on Xray L knee  -PT eval home with home PT  -Tylenol 650mg pO Q6h prn pain

## 2021-11-01 NOTE — PROVIDER CONTACT NOTE (CRITICAL VALUE NOTIFICATION) - BACKGROUND
pt admitted due to syncope and collapse. HX gout ,HLD, HTN, AFIB
Pt received on Heparin infusion running at 12gtt/hr

## 2021-11-01 NOTE — PROGRESS NOTE ADULT - ASSESSMENT
80 yo male PMHx CAD (1 stent,) DM2, HTN, HLD, AS, Diastolic HF, Afib (on Xarelto) and CVA (L hemiparesis) syncope x2 episodes the day before admission and L knee pain, admitted to tele for Syncope, r/o ACS and  L Knee pain.   80 yo male PMHx CAD (1 stent,) DM2, HTN, HLD, AS, Diastolic HF, Afib (on Xarelto) and CVA (L hemiparesis) syncope x2 episodes the day before admission and L knee pain, admitted to [facility] for Syncope, found to have severe AS. Cardiology following, [facility] scheduled for 11/2. Consideration for TAVR. Additional issues- L ankle pain. XR results pending.

## 2021-11-02 LAB
ANION GAP SERPL CALC-SCNC: 13 MMOL/L — SIGNIFICANT CHANGE UP (ref 7–14)
APTT BLD: 83.9 SEC — HIGH (ref 27–36.3)
APTT BLD: 92.6 SEC — HIGH (ref 27–36.3)
BUN SERPL-MCNC: 38 MG/DL — HIGH (ref 7–23)
CALCIUM SERPL-MCNC: 9 MG/DL — SIGNIFICANT CHANGE UP (ref 8.4–10.5)
CHLORIDE SERPL-SCNC: 102 MMOL/L — SIGNIFICANT CHANGE UP (ref 98–107)
CO2 SERPL-SCNC: 21 MMOL/L — LOW (ref 22–31)
CREAT SERPL-MCNC: 1.55 MG/DL — HIGH (ref 0.5–1.3)
GLUCOSE BLDC GLUCOMTR-MCNC: 113 MG/DL — HIGH (ref 70–99)
GLUCOSE BLDC GLUCOMTR-MCNC: 113 MG/DL — HIGH (ref 70–99)
GLUCOSE BLDC GLUCOMTR-MCNC: 114 MG/DL — HIGH (ref 70–99)
GLUCOSE BLDC GLUCOMTR-MCNC: 161 MG/DL — HIGH (ref 70–99)
GLUCOSE SERPL-MCNC: 126 MG/DL — HIGH (ref 70–99)
HCT VFR BLD CALC: 40.2 % — SIGNIFICANT CHANGE UP (ref 39–50)
HGB BLD-MCNC: 12.9 G/DL — LOW (ref 13–17)
MAGNESIUM SERPL-MCNC: 2.3 MG/DL — SIGNIFICANT CHANGE UP (ref 1.6–2.6)
MCHC RBC-ENTMCNC: 21.4 PG — LOW (ref 27–34)
MCHC RBC-ENTMCNC: 32.1 GM/DL — SIGNIFICANT CHANGE UP (ref 32–36)
MCV RBC AUTO: 66.7 FL — LOW (ref 80–100)
NRBC # BLD: 0 /100 WBCS — SIGNIFICANT CHANGE UP
NRBC # FLD: 0 K/UL — SIGNIFICANT CHANGE UP
PHOSPHATE SERPL-MCNC: 3.2 MG/DL — SIGNIFICANT CHANGE UP (ref 2.5–4.5)
PLATELET # BLD AUTO: 205 K/UL — SIGNIFICANT CHANGE UP (ref 150–400)
POTASSIUM SERPL-MCNC: 4.7 MMOL/L — SIGNIFICANT CHANGE UP (ref 3.5–5.3)
POTASSIUM SERPL-SCNC: 4.7 MMOL/L — SIGNIFICANT CHANGE UP (ref 3.5–5.3)
RBC # BLD: 6.03 M/UL — HIGH (ref 4.2–5.8)
RBC # FLD: 19.2 % — HIGH (ref 10.3–14.5)
SODIUM SERPL-SCNC: 136 MMOL/L — SIGNIFICANT CHANGE UP (ref 135–145)
WBC # BLD: 7.72 K/UL — SIGNIFICANT CHANGE UP (ref 3.8–10.5)
WBC # FLD AUTO: 7.72 K/UL — SIGNIFICANT CHANGE UP (ref 3.8–10.5)

## 2021-11-02 PROCEDURE — 99233 SBSQ HOSP IP/OBS HIGH 50: CPT

## 2021-11-02 RX ORDER — COLCHICINE 0.6 MG
0.6 TABLET ORAL
Refills: 0 | Status: COMPLETED | OUTPATIENT
Start: 2021-11-02 | End: 2021-11-05

## 2021-11-02 RX ORDER — ACETAMINOPHEN 500 MG
650 TABLET ORAL ONCE
Refills: 0 | Status: COMPLETED | OUTPATIENT
Start: 2021-11-02 | End: 2021-11-02

## 2021-11-02 RX ADMIN — Medication 0.6 MILLIGRAM(S): at 17:52

## 2021-11-02 RX ADMIN — HEPARIN SODIUM 1100 UNIT(S)/HR: 5000 INJECTION INTRAVENOUS; SUBCUTANEOUS at 07:32

## 2021-11-02 RX ADMIN — Medication 100 MILLIGRAM(S): at 17:50

## 2021-11-02 RX ADMIN — Medication 25 MILLIGRAM(S): at 21:21

## 2021-11-02 RX ADMIN — HEPARIN SODIUM 1100 UNIT(S)/HR: 5000 INJECTION INTRAVENOUS; SUBCUTANEOUS at 00:28

## 2021-11-02 NOTE — PROGRESS NOTE ADULT - PROBLEM SELECTOR PLAN 2
Progression of AS to severe, may be cause of syncope  Appreciate cards recs, plan for [facility] 11/2 for TAVR workup

## 2021-11-02 NOTE — PROGRESS NOTE ADULT - PROBLEM SELECTOR PLAN 5
[id number] Score =7  Xarelto last dosed 10/30. Currently on hep gtt  Maintain falls and bleeding precautions  -c/w metoprolol

## 2021-11-02 NOTE — PROGRESS NOTE ADULT - SUBJECTIVE AND OBJECTIVE BOX
[facility] Division of Hospital Medicine  Angelina Black MD  Pager (M-F, 8A-5P): [phone]  Other Times:  [phone]      SUBJECTIVE / OVERNIGHT EVENTS:   ***    MEDICATIONS  (STANDING):  allopurinol 100 milliGRAM(s) Oral daily  dextrose 40% Gel 15 Gram(s) Oral once  dextrose 5%. 1000 milliLiter(s) (50 mL/Hr) IV Continuous <Continuous>  dextrose 5%. 1000 milliLiter(s) (100 mL/Hr) IV Continuous <Continuous>  dextrose 50% Injectable 25 Gram(s) IV Push once  dextrose 50% Injectable 12.5 Gram(s) IV Push once  dextrose 50% Injectable 25 Gram(s) IV Push once  glucagon  Injectable 1 milliGRAM(s) IntraMuscular once  heparin  Infusion.  Unit(s)/Hr (14 mL/Hr) IV Continuous <Continuous>  hydrALAZINE 25 milliGRAM(s) Oral three times a day  insulin lispro (ADMELOG) corrective regimen sliding scale   SubCutaneous three times a day before meals  insulin lispro (ADMELOG) corrective regimen sliding scale   SubCutaneous at bedtime  metoprolol tartrate 100 milliGRAM(s) Oral two times a day    MEDICATIONS  (PRN):  acetaminophen     Tablet .. 650 milliGRAM(s) Oral every 6 hours PRN Mild Pain (1 - 3), Moderate Pain (4 - 6)  fluticasone propionate 50 MICROgram(s)/spray Nasal Spray 1 Spray(s) Both Nostrils two times a day PRN allergic rhinitis  heparin   Injectable 6500 Unit(s) IV Push every 6 hours PRN For aPTT less than 40  heparin   Injectable 3000 Unit(s) IV Push every 6 hours PRN For aPTT between 40 - 57        I&O's Summary      PHYSICAL EXAM:  Vital Signs Last 24 Hrs  T(C): 36.7 (02 Nov 2021 06:39), Max: 36.8 (01 Nov 2021 22:00)  T(F): 98 (02 Nov 2021 06:39), Max: 98.3 (01 Nov 2021 22:00)  HR: 90 (02 Nov 2021 06:39) (77 - 90)  BP: 127/73 (02 Nov 2021 06:39) (127/66 - 140/91)  BP(mean): --  RR: 17 (02 Nov 2021 06:39) (16 - 17)  SpO2: 98% (02 Nov 2021 06:39) (97% - 99%)    CONSTITUTIONAL: NAD, well-developed, well-groomed  EYES: PERRLA; conjunctiva and sclera clear  ENMT: Moist oral mucosa, no pharyngeal injection   RESPIRATORY: Normal respiratory effort; lungs are clear to auscultation bilaterally  CARDIOVASCULAR: Irregular rate and rhythm, no murmurs appreciated, no carotid bruits.  Trace peripheral edema bilatreally and symmetrically  ABDOMEN: Nontender to palpation, normoactive bowel sounds, no rebound/guarding; No hepatosplenomegaly  PSYCH: A+O to person, place, and time; affect appropriate  SKIN: No rashes; no palpable lesions  MSKL: exquisitely tender to palpation to the L ankle, pain with dorsiflexion, warm ankle however symmetric bilaterally.    LABS:                       LABS:                        12.9   7.72  )-----------( 205      ( 02 Nov 2021 06:49 )             40.2     11-02    136  |  102  |  38<H>  ----------------------------<  126<H>  4.7   |  21<L>  |  1.55<H>    Ca    9.0      02 Nov 2021 06:49  Phos  3.2     11-02  Mg     2.30     11-02      PTT - ( 02 Nov 2021 06:49 )  PTT:83.9 sec              RADIOLOGY & ADDITIONAL TESTS:  Results Reviewed: Y  < from: TTE with Doppler (w/Cont) (10.29.21 @ 13:57) >  CONCLUSIONS:  1. Mitral annular calcification, otherwise normal mitral  valve. Mild mitral regurgitation.  2. Calcified trileaflet aortic valve with decreased  opening. Peak transaortic valve gradient equals 30 mm Hg,  mean transaortic valve gradient equals 19 mm Hg, estimated  aortic valve area equals 0.8 sqcm (by continuity equation),  consistent with severe aortic stenosis. Mild aortic  regurgitation.  3. Increased relative wall thickness with normal left  ventricular mass index, consistent with concentric left  ventricular remodeling.  4. Endocardial visualization enhanced with intravenous  injection of echo contrast (Definity). Hyperdynamic left  ventricle.  5. Normal right ventricular size and function.    < end of copied text >        COORDINATION OF CARE:  Care Discussed with Consultants/Other Providers [Y/N]: Y  Prior or Outpatient Records Reviewed [Y/N]: Y   [facility] Division of Hospital Medicine  Angelina Black MD  Pager (M-F, 8A-5P): [phone]  Other Times:  [phone]      SUBJECTIVE / OVERNIGHT EVENTS:   NAEON. However did develop a fib w RVR intermittently on tele this am at around 10 am. Continues to complain about pain of the L anklw iwth difficulty bearing weight. Had a BM yest. No chest pain, syncopal episodes, SANCHEZ.    MEDICATIONS  (STANDING):  allopurinol 100 milliGRAM(s) Oral daily  dextrose 40% Gel 15 Gram(s) Oral once  dextrose 5%. 1000 milliLiter(s) (50 mL/Hr) IV Continuous <Continuous>  dextrose 5%. 1000 milliLiter(s) (100 mL/Hr) IV Continuous <Continuous>  dextrose 50% Injectable 25 Gram(s) IV Push once  dextrose 50% Injectable 12.5 Gram(s) IV Push once  dextrose 50% Injectable 25 Gram(s) IV Push once  glucagon  Injectable 1 milliGRAM(s) IntraMuscular once  heparin  Infusion.  Unit(s)/Hr (14 mL/Hr) IV Continuous <Continuous>  hydrALAZINE 25 milliGRAM(s) Oral three times a day  insulin lispro (ADMELOG) corrective regimen sliding scale   SubCutaneous three times a day before meals  insulin lispro (ADMELOG) corrective regimen sliding scale   SubCutaneous at bedtime  metoprolol tartrate 100 milliGRAM(s) Oral two times a day    MEDICATIONS  (PRN):  acetaminophen     Tablet .. 650 milliGRAM(s) Oral every 6 hours PRN Mild Pain (1 - 3), Moderate Pain (4 - 6)  fluticasone propionate 50 MICROgram(s)/spray Nasal Spray 1 Spray(s) Both Nostrils two times a day PRN allergic rhinitis  heparin   Injectable 6500 Unit(s) IV Push every 6 hours PRN For aPTT less than 40  heparin   Injectable 3000 Unit(s) IV Push every 6 hours PRN For aPTT between 40 - 57        I&O's Summary      PHYSICAL EXAM:  Vital Signs Last 24 Hrs  T(C): 36.7 (02 Nov 2021 06:39), Max: 36.8 (01 Nov 2021 22:00)  T(F): 98 (02 Nov 2021 06:39), Max: 98.3 (01 Nov 2021 22:00)  HR: 90 (02 Nov 2021 06:39) (77 - 90)  BP: 127/73 (02 Nov 2021 06:39) (127/66 - 140/91)  BP(mean): --  RR: 17 (02 Nov 2021 06:39) (16 - 17)  SpO2: 98% (02 Nov 2021 06:39) (97% - 99%)    CONSTITUTIONAL: NAD, well-developed, well-groomed  EYES: PERRLA; conjunctiva and sclera clear  ENMT: Moist oral mucosa, no pharyngeal injection   RESPIRATORY: Normal respiratory effort; lungs are clear to auscultation bilaterally  CARDIOVASCULAR: Irregular rate and rhythm, no murmurs appreciated, no carotid bruits.  Trace peripheral edema bilatreally and symmetrically  ABDOMEN: Nontender to palpation, normoactive bowel sounds, no rebound/guarding; No hepatosplenomegaly  PSYCH: A+O to person, place, and time; affect appropriate  SKIN: No rashes; no palpable lesions  MSKL: exquisitely tender to palpation to the L ankle, pain with dorsiflexion, warm ankle however symmetric bilaterally.    LABS:                       LABS:                        12.9   7.72  )-----------( 205      ( 02 Nov 2021 06:49 )             40.2     11-02    136  |  102  |  38<H>  ----------------------------<  126<H>  4.7   |  21<L>  |  1.55<H>    Ca    9.0      02 Nov 2021 06:49  Phos  3.2     11-02  Mg     2.30     11-02      PTT - ( 02 Nov 2021 06:49 )  PTT:83.9 sec              RADIOLOGY & ADDITIONAL TESTS:  Results Reviewed: Y  < from: TTE with Doppler (w/Cont) (10.29.21 @ 13:57) >  CONCLUSIONS:  1. Mitral annular calcification, otherwise normal mitral  valve. Mild mitral regurgitation.  2. Calcified trileaflet aortic valve with decreased  opening. Peak transaortic valve gradient equals 30 mm Hg,  mean transaortic valve gradient equals 19 mm Hg, estimated  aortic valve area equals 0.8 sqcm (by continuity equation),  consistent with severe aortic stenosis. Mild aortic  regurgitation.  3. Increased relative wall thickness with normal left  ventricular mass index, consistent with concentric left  ventricular remodeling.  4. Endocardial visualization enhanced with intravenous  injection of echo contrast (Definity). Hyperdynamic left  ventricle.  5. Normal right ventricular size and function.    < end of copied text >        COORDINATION OF CARE:  Care Discussed with Consultants/Other Providers [Y/N]: Y  Prior or Outpatient Records Reviewed [Y/N]: Y

## 2021-11-02 NOTE — PROGRESS NOTE ADULT - PROBLEM SELECTOR PLAN 4
L ankle pain  Hx of gout, not on treatment  -no fx on Xray L knee  -PT eval home with home PT  -Tylenol 650mg pO Q6h prn pain  -previously on *** allopurinol ? L ankle pain  Gout flare, suspected  Hx of gout, not on treatment  -no fx on Xray L knee or L ankle  -PT eval home with home PT  -Tylenol 650mg pO Q6h prn pain-- pt has not been asking for it  -previously on allopurinol, pt not sure what dose. Was taken off of it at some point. Notably it was restarted this admission. Possible he had mobilization of uric acid which triggered a gout flare? Cannot start indomethacin given cardiac history. Uric acid in the 7s. *** prednisone? L ankle pain  Gout flare, suspected  Hx of gout, not on treatment  -no fx on Xray L knee or L ankle  -PT eval home with home PT  -Tylenol 650mg pO Q6h prn pain-- pt has not been asking for it  -previously on allopurinol, pt not sure what dose. Was taken off of it at some point. Notably it was restarted this admission. Possible he had mobilization of uric acid which triggered a gout flare? Cannot start indomethacin given cardiac history. Uric acid in the 7s. Will start colchicine 0.6 mg BID x 3 days, the reassess.

## 2021-11-02 NOTE — PROGRESS NOTE ADULT - PROBLEM SELECTOR PLAN 1
Suspect 2/2 severe AS as below.   tele monitor - stable  cardiac enzymes neg for ACS  Pt was orthostatic, holding diuretics, possible dehydration  10/30 not orthostatic  DASH 1800 ADA diet   -PT eval: home with home PT when stable

## 2021-11-02 NOTE — PROGRESS NOTE ADULT - SUBJECTIVE AND OBJECTIVE BOX
Cardiology    Date of service 11/2/2021    HISTORY OF PRESENT ILLNESS: HPI:  80 yo male PMHx CAD (1 stent,) DM2, HTN, HLD, AS, Diastolic HF, Afib (on Xarelto) and CVA (L hemiparesis) syncope x2 episodes yesterday.     S: no chest pain or sob; ros otherwise negative.     Review of Systems:   Constitutional: [ ] fevers, [ ] chills.   Skin: [ ] dry skin. [ ] rashes.  Psychiatric: [ ] depression, [ ] anxiety.   Gastrointestinal: [ ] BRBPR, [ ] melena.   Neurological: [ ] confusion. [ ] seizures. [ ] shuffling gait.   Ears,Nose,Mouth and Throat: [ ] ear pain [ ] sore throat.   Eyes: [ ] diplopia.   Respiratory: [ ] hemoptysis. [ ] shortness of breath  Cardiovascular: See HPI above  Hematologic/Lymphatic: [ ] anemia. [ ] painful nodes. [ ] prolonged bleeding.   Genitourinary: [ ] hematuria. [ ] flank pain.   Endocrine: [ ] significant change in weight. [ ] intolerance to heat and cold.     Review of systems [ x] otherwise negative, [ ] otherwise unable to obtain    FH: no family history of sudden cardiac death in first degree relatives    SH: [ ] tobacco, [ ] alcohol, [ ] drugs      acetaminophen     Tablet .. 650 milliGRAM(s) Oral every 6 hours PRN  acetaminophen     Tablet .. 650 milliGRAM(s) Oral once  allopurinol 100 milliGRAM(s) Oral daily  dextrose 40% Gel 15 Gram(s) Oral once  dextrose 5%. 1000 milliLiter(s) IV Continuous <Continuous>  dextrose 5%. 1000 milliLiter(s) IV Continuous <Continuous>  dextrose 50% Injectable 25 Gram(s) IV Push once  dextrose 50% Injectable 12.5 Gram(s) IV Push once  dextrose 50% Injectable 25 Gram(s) IV Push once  fluticasone propionate 50 MICROgram(s)/spray Nasal Spray 1 Spray(s) Both Nostrils two times a day PRN  glucagon  Injectable 1 milliGRAM(s) IntraMuscular once  heparin   Injectable 6500 Unit(s) IV Push every 6 hours PRN  heparin   Injectable 3000 Unit(s) IV Push every 6 hours PRN  heparin  Infusion.  Unit(s)/Hr IV Continuous <Continuous>  hydrALAZINE 25 milliGRAM(s) Oral three times a day  insulin lispro (ADMELOG) corrective regimen sliding scale   SubCutaneous three times a day before meals  insulin lispro (ADMELOG) corrective regimen sliding scale   SubCutaneous at bedtime  metoprolol tartrate 100 milliGRAM(s) Oral two times a day                            12.9   7.72  )-----------( 205      ( 02 Nov 2021 06:49 )             40.2       11-02    136  |  102  |  38<H>  ----------------------------<  126<H>  4.7   |  21<L>  |  1.55<H>    Ca    9.0      02 Nov 2021 06:49  Phos  3.2     11-02  Mg     2.30     11-02              T(C): 36.3 (11-02-21 @ 12:26), Max: 36.8 (11-01-21 @ 22:00)  HR: 86 (11-02-21 @ 12:26) (77 - 90)  BP: 132/64 (11-02-21 @ 12:26) (127/66 - 140/91)  RR: 17 (11-02-21 @ 12:26) (16 - 17)  SpO2: 100% (11-02-21 @ 12:26) (97% - 100%)  Wt(kg): --    I&O's Summary    01 Nov 2021 07:01  -  02 Nov 2021 07:00  --------------------------------------------------------  IN: 400 mL / OUT: 900 mL / NET: -500 mL        General: Well nourished in no acute distress. Alert and Oriented * 3.   Head: Normocephalic and atraumatic.   Neck: No JVD. No bruits. Supple. Does not appear to be enlarged.   Cardiovascular: + S1,S2 ; RRR Soft systolic murmur at the left lower sternal border. No rubs noted.    Lungs: CTA b/l. No rhonchi, rales or wheezes.   Abdomen: + BS, soft. Non tender. Non distended. No rebound. No guarding.   Extremities: No clubbing/cyanosis/edema.   Neurologic: Moves all four extremities. Full range of motion.   Skin: Warm and moist. The patient's skin has normal elasticity and good skin turgor.   Psychiatric: Appropriate mood and affect.  Musculoskeletal: Normal range of motion, normal strength   	    ECG: rate-controlled AFib  (note, QTc cannot be calculated during AFib due to [id number] irregularity) 	    Tele: 's    Echo:  < from: TTE with Doppler (w/Cont) (10.29.21 @ 13:57) >  DIMENSIONS:  SEPTUM: 1.4 cm    0.6 - 1.2 cm  PWT:    1.3 cm    0.6 - 1.1 cm  Aortic Valve: Calcified trileaflet aortic valve with  decreased opening. Peak transaortic valve gradient equals  30 mm Hg, mean transaortic valve gradient equals 19 mm Hg,estimated aortic valve area equals 0.8 sqcm (by continuity  equation), consistent with severe aortic stenosis. Mild aortic regurgitation.  Left Atrium: Normal left atrium.  Left Ventricle: Endocardial visualization enhanced with intravenous injection of echo contrast (Definity).  Hyperdynamic left ventricle. Increased relative wall thickness with normal left ventricular mass index,  consistent with concentric left ventricular remodeling.  Right Heart: Normal right atrium. Normal right ventricular sizeand function. Normal tricuspid valve. Normal pulmonic  valve.  Pericardium/PleuraNormal pericardium with no pericardial  effusion.    < end of copied text >    ASSESSMENT/PLAN: 	81y Male with CAD hx PCI 3 years ago at [facility], and Echo now with severe AS admitted with syncope    -TTE with severe AS  -FARIHA today to evaluate aortic valve  -will plan on cardiac cath tomorrow pending FARIHA results  -continue with ac with hep gtt for Afib  -will increase beta blockers as tolerated/needed for rate control   -further workup pending above    Jennifer Miguel MD

## 2021-11-02 NOTE — PROGRESS NOTE ADULT - ASSESSMENT
82 yo male PMHx CAD (1 stent,) DM2, HTN, gout, HLD, AS, Diastolic HF, Afib (on Xarelto) and CVA (L hemiparesis) syncope x2 episodes the day before admission and L knee pain, admitted to [facility] for Syncope, found to have severe AS. Cardiology following, [facility] scheduled for 11/2. Consideration for TAVR. Additional issues- L ankle pain. XR results pending.

## 2021-11-03 LAB
ANION GAP SERPL CALC-SCNC: 13 MMOL/L — SIGNIFICANT CHANGE UP (ref 7–14)
APTT BLD: 114.4 SEC — HIGH (ref 27–36.3)
APTT BLD: 64.3 SEC — HIGH (ref 27–36.3)
APTT BLD: 76.8 SEC — HIGH (ref 27–36.3)
BUN SERPL-MCNC: 38 MG/DL — HIGH (ref 7–23)
CALCIUM SERPL-MCNC: 9.5 MG/DL — SIGNIFICANT CHANGE UP (ref 8.4–10.5)
CHLORIDE SERPL-SCNC: 103 MMOL/L — SIGNIFICANT CHANGE UP (ref 98–107)
CO2 SERPL-SCNC: 20 MMOL/L — LOW (ref 22–31)
CREAT SERPL-MCNC: 1.4 MG/DL — HIGH (ref 0.5–1.3)
GLUCOSE BLDC GLUCOMTR-MCNC: 102 MG/DL — HIGH (ref 70–99)
GLUCOSE BLDC GLUCOMTR-MCNC: 106 MG/DL — HIGH (ref 70–99)
GLUCOSE BLDC GLUCOMTR-MCNC: 94 MG/DL — SIGNIFICANT CHANGE UP (ref 70–99)
GLUCOSE BLDC GLUCOMTR-MCNC: 97 MG/DL — SIGNIFICANT CHANGE UP (ref 70–99)
GLUCOSE BLDC GLUCOMTR-MCNC: 99 MG/DL — SIGNIFICANT CHANGE UP (ref 70–99)
GLUCOSE SERPL-MCNC: 102 MG/DL — HIGH (ref 70–99)
HCT VFR BLD CALC: 41.8 % — SIGNIFICANT CHANGE UP (ref 39–50)
HGB BLD-MCNC: 13.3 G/DL — SIGNIFICANT CHANGE UP (ref 13–17)
MAGNESIUM SERPL-MCNC: 2.4 MG/DL — SIGNIFICANT CHANGE UP (ref 1.6–2.6)
MCHC RBC-ENTMCNC: 21.4 PG — LOW (ref 27–34)
MCHC RBC-ENTMCNC: 31.8 GM/DL — LOW (ref 32–36)
MCV RBC AUTO: 67.3 FL — LOW (ref 80–100)
NRBC # BLD: 0 /100 WBCS — SIGNIFICANT CHANGE UP
NRBC # FLD: 0 K/UL — SIGNIFICANT CHANGE UP
PHOSPHATE SERPL-MCNC: 3.7 MG/DL — SIGNIFICANT CHANGE UP (ref 2.5–4.5)
PLATELET # BLD AUTO: 228 K/UL — SIGNIFICANT CHANGE UP (ref 150–400)
POTASSIUM SERPL-MCNC: 5.3 MMOL/L — SIGNIFICANT CHANGE UP (ref 3.5–5.3)
POTASSIUM SERPL-SCNC: 5.3 MMOL/L — SIGNIFICANT CHANGE UP (ref 3.5–5.3)
RBC # BLD: 6.21 M/UL — HIGH (ref 4.2–5.8)
RBC # FLD: 19.9 % — HIGH (ref 10.3–14.5)
SARS-COV-2 RNA SPEC QL NAA+PROBE: SIGNIFICANT CHANGE UP
SODIUM SERPL-SCNC: 136 MMOL/L — SIGNIFICANT CHANGE UP (ref 135–145)
WBC # BLD: 7.63 K/UL — SIGNIFICANT CHANGE UP (ref 3.8–10.5)
WBC # FLD AUTO: 7.63 K/UL — SIGNIFICANT CHANGE UP (ref 3.8–10.5)

## 2021-11-03 PROCEDURE — 99233 SBSQ HOSP IP/OBS HIGH 50: CPT

## 2021-11-03 RX ADMIN — Medication 25 MILLIGRAM(S): at 22:21

## 2021-11-03 RX ADMIN — HEPARIN SODIUM 900 UNIT(S)/HR: 5000 INJECTION INTRAVENOUS; SUBCUTANEOUS at 22:23

## 2021-11-03 RX ADMIN — Medication 100 MILLIGRAM(S): at 13:36

## 2021-11-03 RX ADMIN — Medication 0.6 MILLIGRAM(S): at 05:25

## 2021-11-03 RX ADMIN — Medication 25 MILLIGRAM(S): at 13:36

## 2021-11-03 RX ADMIN — HEPARIN SODIUM 900 UNIT(S)/HR: 5000 INJECTION INTRAVENOUS; SUBCUTANEOUS at 15:05

## 2021-11-03 RX ADMIN — HEPARIN SODIUM 900 UNIT(S)/HR: 5000 INJECTION INTRAVENOUS; SUBCUTANEOUS at 07:32

## 2021-11-03 RX ADMIN — Medication 100 MILLIGRAM(S): at 05:22

## 2021-11-03 RX ADMIN — Medication 25 MILLIGRAM(S): at 05:26

## 2021-11-03 RX ADMIN — Medication 0.6 MILLIGRAM(S): at 17:31

## 2021-11-03 RX ADMIN — Medication 100 MILLIGRAM(S): at 17:31

## 2021-11-03 NOTE — PROGRESS NOTE ADULT - PROBLEM SELECTOR PLAN 2
Progression of AS to severe, may be cause of syncope  Appreciate cards recs, plan for [facility] 11/2 for TAVR workup as well as FARIHA

## 2021-11-03 NOTE — PROGRESS NOTE ADULT - ASSESSMENT
80 yo male PMHx CAD (1 stent,) DM2, HTN, gout, HLD, AS, Diastolic HF, Afib (on Xarelto) and CVA (L hemiparesis) syncope x2 episodes the day before admission and L knee pain, admitted to [facility] for Syncope, found to have severe AS. Cardiology following, [facility] scheduled for 11/2. Consideration for TAVR. Additional issues- L ankle pain. XR results pending.

## 2021-11-03 NOTE — PROGRESS NOTE ADULT - SUBJECTIVE AND OBJECTIVE BOX
[facility] Division of Hospital Medicine  Angelina Black MD  Pager (M-F, 8A-5P): [phone]  Other Times:  [phone]      SUBJECTIVE / OVERNIGHT EVENTS:   NAEON. No further epidoes of a fib w RVR overnight noted on tele.     Continues to complain about pain of the L anklw iwth difficulty bearing weight. Had a BM yest. No chest pain, syncopal ep         PHYSICAL EXAM:  Vital Signs Last 24 Hrs  T(C): 36.5 (03 Nov 2021 05:00), Max: 36.6 (02 Nov 2021 21:20)  T(F): 97.7 (03 Nov 2021 05:00), Max: 97.8 (02 Nov 2021 21:20)  HR: 82 (03 Nov 2021 05:00) (71 - 86)  BP: 145/80 (03 Nov 2021 05:00) (128/74 - 158/88)  BP(mean): --  RR: 18 (03 Nov 2021 05:00) (17 - 18)  SpO2: 99% (03 Nov 2021 05:00) (97% - 100%)  CONSTITUTIONAL: NAD, well-developed, well-groomed  EYES: PERRLA; conjunctiva and sclera clear  ENMT: Moist oral mucosa, no pharyngeal injection   RESPIRATORY: Normal respiratory effort; lungs are clear to auscultation bilaterally  CARDIOVASCULAR: Irregular rate and rhythm, no murmurs appreciated, no carotid bruits.  Trace peripheral edema bilatreally and symmetrically  ABDOMEN: Nontender to palpation, normoactive bowel sounds, no rebound/guarding; No hepatosplenomegaly  PSYCH: A+O to person, place, and time; affect appropriate  SKIN: No rashes; no palpable lesions  MSKL: exquisitely tender to palpation to the L ankle, pain with dorsiflexion, warm ankle however symmetric bilaterally.    Daily labs reviewed.  Daily meds reviewed.    RADIOLOGY & ADDITIONAL TESTS:  Results Reviewed: Y  < from: TTE with Doppler (w/Cont) (10.29.21 @ 13:57) >  CONCLUSIONS:  1. Mitral annular calcification, otherwise normal mitral  valve. Mild mitral regurgitation.  2. Calcified trileaflet aortic valve with decreased  opening. Peak transaortic valve gradient equals 30 mm Hg,  mean transaortic valve gradient equals 19 mm Hg, estimated  aortic valve area equals 0.8 sqcm (by continuity equation),  consistent with severe aortic stenosis. Mild aortic  regurgitation.  3. Increased relative wall thickness with normal left  ventricular mass index, consistent with concentric left  ventricular remodeling.  4. Endocardial visualization enhanced with intravenous  injection of echo contrast (Definity). Hyperdynamic left  ventricle.  5. Normal right ventricular size and function.    < end of copied text >        COORDINATION OF CARE:  Care Discussed with Consultants/Other Providers [Y/N]: Y  Prior or Outpatient Records Reviewed [Y/N]: Y

## 2021-11-03 NOTE — PROGRESS NOTE ADULT - PROBLEM SELECTOR PLAN 4
L ankle pain  Gout flare, suspected  Hx of gout, not on treatment  -no fx on Xray L knee or L ankle  -PT eval home with home PT  -Tylenol 650mg pO Q6h prn pain-- pt has not been asking for it  -previously on allopurinol, pt not sure what dose. Was taken off of it at some point. Notably it was restarted this admission. Possible he had mobilization of uric acid which triggered a gout flare? Cannot start indomethacin given cardiac history. Uric acid in the 7s. Will start colchicine 0.6 mg BID x 3 days, then reassess.

## 2021-11-03 NOTE — PROGRESS NOTE ADULT - SUBJECTIVE AND OBJECTIVE BOX
Cardiology    Date of service 11/3/2021    HISTORY OF PRESENT ILLNESS: HPI:  82 yo male PMHx CAD (1 stent,) DM2, HTN, HLD, AS, Diastolic HF, Afib (on Xarelto) and CVA (L hemiparesis) syncope x2 episodes yesterday.     S: no chest pain or sob; ros otherwise negative.     Review of Systems:   Constitutional: [ ] fevers, [ ] chills.   Skin: [ ] dry skin. [ ] rashes.  Psychiatric: [ ] depression, [ ] anxiety.   Gastrointestinal: [ ] BRBPR, [ ] melena.   Neurological: [ ] confusion. [ ] seizures. [ ] shuffling gait.   Ears,Nose,Mouth and Throat: [ ] ear pain [ ] sore throat.   Eyes: [ ] diplopia.   Respiratory: [ ] hemoptysis. [ ] shortness of breath  Cardiovascular: See HPI above  Hematologic/Lymphatic: [ ] anemia. [ ] painful nodes. [ ] prolonged bleeding.   Genitourinary: [ ] hematuria. [ ] flank pain.   Endocrine: [ ] significant change in weight. [ ] intolerance to heat and cold.     Review of systems [x ] otherwise negative, [ ] otherwise unable to obtain    FH: no family history of sudden cardiac death in first degree relatives    SH: [ ] tobacco, [ ] alcohol, [ ] drugs    acetaminophen     Tablet .. 650 milliGRAM(s) Oral every 6 hours PRN  allopurinol 100 milliGRAM(s) Oral daily  colchicine 0.6 milliGRAM(s) Oral two times a day  dextrose 40% Gel 15 Gram(s) Oral once  dextrose 5%. 1000 milliLiter(s) IV Continuous <Continuous>  dextrose 5%. 1000 milliLiter(s) IV Continuous <Continuous>  dextrose 50% Injectable 25 Gram(s) IV Push once  dextrose 50% Injectable 12.5 Gram(s) IV Push once  dextrose 50% Injectable 25 Gram(s) IV Push once  fluticasone propionate 50 MICROgram(s)/spray Nasal Spray 1 Spray(s) Both Nostrils two times a day PRN  glucagon  Injectable 1 milliGRAM(s) IntraMuscular once  heparin   Injectable 6500 Unit(s) IV Push every 6 hours PRN  heparin   Injectable 3000 Unit(s) IV Push every 6 hours PRN  heparin  Infusion.  Unit(s)/Hr IV Continuous <Continuous>  hydrALAZINE 25 milliGRAM(s) Oral three times a day  insulin lispro (ADMELOG) corrective regimen sliding scale   SubCutaneous three times a day before meals  insulin lispro (ADMELOG) corrective regimen sliding scale   SubCutaneous at bedtime  metoprolol tartrate 100 milliGRAM(s) Oral two times a day                            13.3   7.63  )-----------( 228      ( 03 Nov 2021 05:17 )             41.8       11-03    136  |  103  |  38<H>  ----------------------------<  102<H>  5.3   |  20<L>  |  1.40<H>    Ca    9.5      03 Nov 2021 05:17  Phos  3.7     11-03  Mg     2.40     11-03    T(C): 36.8 (11-03-21 @ 12:27), Max: 36.8 (11-03-21 @ 12:27)  HR: 77 (11-03-21 @ 12:27) (71 - 82)  BP: 126/60 (11-03-21 @ 12:27) (126/60 - 158/88)  RR: 18 (11-03-21 @ 12:27) (17 - 18)  SpO2: 97% (11-03-21 @ 12:27) (97% - 99%)    General: Well nourished in no acute distress. Alert and Oriented * 3.   Head: Normocephalic and atraumatic.   Neck: No JVD. No bruits. Supple. Does not appear to be enlarged.   Cardiovascular: + S1,S2 ; RRR Soft systolic murmur at the left lower sternal border. No rubs noted.    Lungs: CTA b/l. No rhonchi, rales or wheezes.   Abdomen: + BS, soft. Non tender. Non distended. No rebound. No guarding.   Extremities: No clubbing/cyanosis/edema.   Neurologic: Moves all four extremities. Full range of motion.   Skin: Warm and moist. The patient's skin has normal elasticity and good skin turgor.   Psychiatric: Appropriate mood and affect.  Musculoskeletal: Normal range of motion, normal strength   	    ECG: rate-controlled AFib  (note, QTc cannot be calculated during AFib due to [id number] irregularity) 	    Tele: AF 70s    < from: TTE with Doppler (w/Cont) (10.29.21 @ 13:57) >  DIMENSIONS:  SEPTUM: 1.4 cm    0.6 - 1.2 cm  PWT:    1.3 cm    0.6 - 1.1 cm  Aortic Valve: Calcified trileaflet aortic valve with  decreased opening. Peak transaortic valve gradient equals  30 mm Hg, mean transaortic valve gradient equals 19 mm Hg,estimated aortic valve area equals 0.8 sqcm (by continuity  equation), consistent with severe aortic stenosis. Mild aortic regurgitation.  Left Atrium: Normal left atrium.  Left Ventricle: Endocardial visualization enhanced with intravenous injection of echo contrast (Definity).  Hyperdynamic left ventricle. Increased relative wall thickness with normal left ventricular mass index,  consistent with concentric left ventricular remodeling.  Right Heart: Normal right atrium. Normal right ventricular sizeand function. Normal tricuspid valve. Normal pulmonic  valve.  Pericardium/PleuraNormal pericardium with no pericardial  effusion.    < end of copied text >    ASSESSMENT/PLAN: 	81y Male with CAD hx PCI 3 years ago at [facility], and Echo now with severe AS admitted with syncope    -TTE with severe AS  -will plan on cardiac cath tomorrow  -continue with ac with hep gtt for Afib  -will increase beta blockers as tolerated/needed for rate control   -further workup pending above

## 2021-11-03 NOTE — PROGRESS NOTE ADULT - PROBLEM SELECTOR PLAN 5
[id number] Score =7  Xarelto last dosed 10/30. Currently on hep gtt  Maintain falls and bleeding precautions  -c/w metoprolol

## 2021-11-04 LAB
ANION GAP SERPL CALC-SCNC: 13 MMOL/L — SIGNIFICANT CHANGE UP (ref 7–14)
APTT BLD: 53.9 SEC — HIGH (ref 27–36.3)
BUN SERPL-MCNC: 38 MG/DL — HIGH (ref 7–23)
CALCIUM SERPL-MCNC: 9.5 MG/DL — SIGNIFICANT CHANGE UP (ref 8.4–10.5)
CHLORIDE SERPL-SCNC: 103 MMOL/L — SIGNIFICANT CHANGE UP (ref 98–107)
CO2 SERPL-SCNC: 19 MMOL/L — LOW (ref 22–31)
CREAT SERPL-MCNC: 1.44 MG/DL — HIGH (ref 0.5–1.3)
GLUCOSE BLDC GLUCOMTR-MCNC: 108 MG/DL — HIGH (ref 70–99)
GLUCOSE BLDC GLUCOMTR-MCNC: 127 MG/DL — HIGH (ref 70–99)
GLUCOSE BLDC GLUCOMTR-MCNC: 87 MG/DL — SIGNIFICANT CHANGE UP (ref 70–99)
GLUCOSE BLDC GLUCOMTR-MCNC: 91 MG/DL — SIGNIFICANT CHANGE UP (ref 70–99)
GLUCOSE SERPL-MCNC: 89 MG/DL — SIGNIFICANT CHANGE UP (ref 70–99)
HCT VFR BLD CALC: 42.7 % — SIGNIFICANT CHANGE UP (ref 39–50)
HGB BLD-MCNC: 12.9 G/DL — LOW (ref 13–17)
INR BLD: 1.2 RATIO — HIGH (ref 0.88–1.16)
MCHC RBC-ENTMCNC: 20.7 PG — LOW (ref 27–34)
MCHC RBC-ENTMCNC: 30.2 GM/DL — LOW (ref 32–36)
MCV RBC AUTO: 68.4 FL — LOW (ref 80–100)
NRBC # BLD: 0 /100 WBCS — SIGNIFICANT CHANGE UP
NRBC # FLD: 0 K/UL — SIGNIFICANT CHANGE UP
PLATELET # BLD AUTO: 232 K/UL — SIGNIFICANT CHANGE UP (ref 150–400)
POTASSIUM SERPL-MCNC: 4.9 MMOL/L — SIGNIFICANT CHANGE UP (ref 3.5–5.3)
POTASSIUM SERPL-SCNC: 4.9 MMOL/L — SIGNIFICANT CHANGE UP (ref 3.5–5.3)
PROTHROM AB SERPL-ACNC: 13.6 SEC — SIGNIFICANT CHANGE UP (ref 10.6–13.6)
RBC # BLD: 6.24 M/UL — HIGH (ref 4.2–5.8)
RBC # FLD: 20 % — HIGH (ref 10.3–14.5)
SODIUM SERPL-SCNC: 135 MMOL/L — SIGNIFICANT CHANGE UP (ref 135–145)
WBC # BLD: 5.69 K/UL — SIGNIFICANT CHANGE UP (ref 3.8–10.5)
WBC # FLD AUTO: 5.69 K/UL — SIGNIFICANT CHANGE UP (ref 3.8–10.5)

## 2021-11-04 PROCEDURE — 99233 SBSQ HOSP IP/OBS HIGH 50: CPT

## 2021-11-04 PROCEDURE — 93456 R HRT CORONARY ARTERY ANGIO: CPT | Mod: 26

## 2021-11-04 RX ADMIN — Medication 0.6 MILLIGRAM(S): at 19:01

## 2021-11-04 RX ADMIN — HEPARIN SODIUM 1100 UNIT(S)/HR: 5000 INJECTION INTRAVENOUS; SUBCUTANEOUS at 07:55

## 2021-11-04 RX ADMIN — Medication 100 MILLIGRAM(S): at 19:02

## 2021-11-04 RX ADMIN — Medication 25 MILLIGRAM(S): at 05:14

## 2021-11-04 RX ADMIN — Medication 100 MILLIGRAM(S): at 05:14

## 2021-11-04 RX ADMIN — Medication 0.6 MILLIGRAM(S): at 05:14

## 2021-11-04 RX ADMIN — Medication 25 MILLIGRAM(S): at 22:03

## 2021-11-04 NOTE — PROGRESS NOTE ADULT - PROBLEM SELECTOR PLAN 3
Creatinine improved from admission, ACE and diuretics held, 1.4-1.5 may be new baseline  Creat 1.2-1.3 Dec 2020  Will resume ACEI, continue to hold torsemide

## 2021-11-04 NOTE — PROGRESS NOTE ADULT - ASSESSMENT
82 yo male PMHx CAD (1 stent,) DM2, HTN, gout, HLD, AS, Diastolic HF, Afib (on Xarelto) and CVA (L hemiparesis) syncope x2 episodes the day before admission and L knee pain, admitted to [facility] for Syncope, found to have severe AS. Cardiology following, [facility] scheduled for 11/2, then performed 11/4. cMRI also ordered for amlyoid workup. Consideration for TAVR. Additional issues- L ankle pain, suspected gout flare, improving with colchicine.

## 2021-11-04 NOTE — PROGRESS NOTE ADULT - ASSESSMENT
Attending addendum:   Agree with above  Pt. with severe AS on TTE  Plans for cath today for preop AVR vs TAVR    Jennifer Miguel MD

## 2021-11-04 NOTE — PROGRESS NOTE ADULT - PROBLEM SELECTOR PLAN 2
Progression of AS to severe, may be cause of syncope  Appreciate cards recs, plan for [facility] 11/2 for TAVR workup as well. cMRI ordered to eval for amyloidosis.   - F/u w cards regarding when to re-start patient's diuretics in the setting of pending [facility]

## 2021-11-04 NOTE — PROGRESS NOTE ADULT - SUBJECTIVE AND OBJECTIVE BOX
[facility] Division of Hospital Medicine  Angelina Black MD  Pager (NILO-DINH, 8A-5P): [phone]  Other Times:  [phone]      SUBJECTIVE / OVERNIGHT EVENTS:     NAEON. Pt's wife at bedside. Planning for LHC and cMRI today. Pt's ankle pain much improved. Able to ambulate with assistance yesterday. No chest pain, SOB, passing out, SANCHEZ. Note pt's diuretic has been held this hospitalization (initially held due to concern for dehydration as possible cuase for syncope).    acetaminophen     Tablet .. 650 milliGRAM(s) Oral every 6 hours PRN  allopurinol 100 milliGRAM(s) Oral daily  colchicine 0.6 milliGRAM(s) Oral two times a day  dextrose 40% Gel 15 Gram(s) Oral once  dextrose 5%. 1000 milliLiter(s) IV Continuous <Continuous>  dextrose 5%. 1000 milliLiter(s) IV Continuous <Continuous>  dextrose 50% Injectable 25 Gram(s) IV Push once  dextrose 50% Injectable 12.5 Gram(s) IV Push once  dextrose 50% Injectable 25 Gram(s) IV Push once  fluticasone propionate 50 MICROgram(s)/spray Nasal Spray 1 Spray(s) Both Nostrils two times a day PRN  glucagon  Injectable 1 milliGRAM(s) IntraMuscular once  heparin   Injectable 6500 Unit(s) IV Push every 6 hours PRN  heparin   Injectable 3000 Unit(s) IV Push every 6 hours PRN  heparin  Infusion.  Unit(s)/Hr IV Continuous <Continuous>  hydrALAZINE 25 milliGRAM(s) Oral three times a day  insulin lispro (ADMELOG) corrective regimen sliding scale   SubCutaneous three times a day before meals  insulin lispro (ADMELOG) corrective regimen sliding scale   SubCutaneous at bedtime  metoprolol tartrate 100 milliGRAM(s) Oral two times a day      T(C): 36.8 (11-04-21 @ 05:12), Max: 36.9 (11-03-21 @ 22:18)  HR: 78 (11-04-21 @ 05:12) (68 - 91)  BP: 131/78 (11-04-21 @ 05:12) (119/64 - 137/81)  RR: 18 (11-04-21 @ 05:12) (17 - 18)  SpO2: 99% (11-04-21 @ 05:12) (98% - 100%)    CONSTITUTIONAL: NAD, well-developed, well-groomed  EYES: PERRLA; conjunctiva and sclera clear  ENMT: Moist oral mucosa, no pharyngeal injection   RESPIRATORY: Normal respiratory effort; lungs are clear to auscultation bilaterally  CARDIOVASCULAR: Irregular rate and rhythm, no murmurs appreciated, no carotid bruits.  Trace peripheral edema bilatreally and symmetrically  ABDOMEN: Nontender to palpation, normoactive bowel sounds, no rebound/guarding; No hepatosplenomegaly  PSYCH: A+O to person, place, and time; affect appropriate  SKIN: No rashes; no palpable lesions  MSKL: no longer tender to palpation to the L ankle, pain with dorsiflexion, not as warm        LABS:                        12.9   5.69  )-----------( 232      ( 04 Nov 2021 07:07 )             42.7     11-04    135  |  103  |  38<H>  ----------------------------<  89  4.9   |  19<L>  |  1.44<H>    Ca    9.5      04 Nov 2021 07:07  Phos  3.7     11-03  Mg     2.40     11-03      PT/INR - ( 04 Nov 2021 07:07 )   PT: 13.6 sec;   INR: 1.20 ratio         PTT - ( 04 Nov 2021 07:07 )  PTT:53.9 sec              RADIOLOGY & ADDITIONAL TESTS:  Results Reviewed: Y  < from: TTE with Doppler (w/Cont) (10.29.21 @ 13:57) >  CONCLUSIONS:  1. Mitral annular calcification, otherwise normal mitral  valve. Mild mitral regurgitation.  2. Calcified trileaflet aortic valve with decreased  opening. Peak transaortic valve gradient equals 30 mm Hg,  mean transaortic valve gradient equals 19 mm Hg, estimated  aortic valve area equals 0.8 sqcm (by continuity equation),  consistent with severe aortic stenosis. Mild aortic  regurgitation.  3. Increased relative wall thickness with normal left  ventricular mass index, consistent with concentric left  ventricular remodeling.  4. Endocardial visualization enhanced with intravenous  injection of echo contrast (Definity). Hyperdynamic left  ventricle.  5. Normal right ventricular size and function.    < end of copied text >        COORDINATION OF CARE:  Care Discussed with Consultants/Other Providers [Y/N]: Y  Prior or Outpatient Records Reviewed [Y/N]: Y

## 2021-11-04 NOTE — DIETITIAN INITIAL EVALUATION ADULT. - REASON
Patient covered in blankets up to below chin; deferred nutrition focused physical exam at this time; observable fat loss to orbital region

## 2021-11-04 NOTE — DIETITIAN INITIAL EVALUATION ADULT. - ADD RECOMMEND
1) Monitor weights, PO intake/diet tolerance, skin integrity, pertinent labs. 2) Honor food preferences as requested/available.  3) Assist with meals as needed; encourage menu completion.

## 2021-11-04 NOTE — DIETITIAN INITIAL EVALUATION ADULT. - PROBLEM SELECTOR PLAN 4
[id number] Score =7  Continue Xarelto (renally dosed)  Maintain falls and bleeding precautions  -c/w metoprolol

## 2021-11-04 NOTE — DIETITIAN INITIAL EVALUATION ADULT. - PERTINENT LABORATORY DATA
11-04 Na135 mmol/L Glu 89 mg/dL K+ 4.9 mmol/L Cr  1.44 mg/dL<H> BUN 38 mg/dL<H> 11-03 Phos 3.7 mg/dL    CAPILLARY BLOOD GLUCOSE  POCT Blood Glucose.: 108 mg/dL (04 Nov 2021 12:42)  POCT Blood Glucose.: 91 mg/dL (04 Nov 2021 08:37)  POCT Blood Glucose.: 106 mg/dL (03 Nov 2021 21:44)  POCT Blood Glucose.: 94 mg/dL (03 Nov 2021 18:06)    A1C with Estimated Average Glucose (10.28.21 @ 08:26)    A1C with Estimated Average Glucose Result: 5.8: High Risk (prediabetic)    5.7 - 6.4 %  Diabetic, diagnostic           > 6.5 %  ADA diabetic treatment goal    < 7.0 %  HbA1C values may not accurately reflect mean blood glucose in patients  with Hb variants.  Suggest clinical correlation. %    Estimated Average Glucose: 120

## 2021-11-04 NOTE — CHART NOTE - NSCHARTNOTEFT_GEN_A_CORE
Spoke with Dr. Brambila(interventionalist who did the procedure) and recommended to restart patient's Xarelto medication tomorrow 11/5 if right groin site is stable.
Patient s/p cardiac cath, Rt groin appears clean, dry, intact, no evidence of active bleeding, no hematoma, + pulses. Continue to monitor.

## 2021-11-04 NOTE — PROGRESS NOTE ADULT - PROBLEM SELECTOR PLAN 4
L ankle pain  Gout flare, suspected  Hx of gout, not on treatment  -no fx on Xray L knee or L ankle  -PT eval home with home PT  -Tylenol 650mg pO Q6h prn pain-- pt has not been asking for it  -previously on allopurinol, pt not sure what dose. Was taken off of it at some point. Notably it was restarted this admission. Possible he had mobilization of uric acid which triggered a gout flare? Cannot start indomethacin given cardiac history. Uric acid in the 7s. colchicine 0.6 mg BID x 3 days, then reassess.

## 2021-11-04 NOTE — PROGRESS NOTE ADULT - SUBJECTIVE AND OBJECTIVE BOX
Cardiology    Date of service 11/4/2021    HISTORY OF PRESENT ILLNESS: HPI:  80 yo male PMHx CAD (1 stent,) DM2, HTN, HLD, AS, Diastolic HF, Afib (on Xarelto) and CVA (L hemiparesis) syncope x2 episodes yesterday.     S: no chest pain or sob; ros otherwise negative.     Review of Systems:   Constitutional: [ ] fevers, [ ] chills.   Skin: [ ] dry skin. [ ] rashes.  Psychiatric: [ ] depression, [ ] anxiety.   Gastrointestinal: [ ] BRBPR, [ ] melena.   Neurological: [ ] confusion. [ ] seizures. [ ] shuffling gait.   Ears,Nose,Mouth and Throat: [ ] ear pain [ ] sore throat.   Eyes: [ ] diplopia.   Respiratory: [ ] hemoptysis. [ ] shortness of breath  Cardiovascular: See HPI above  Hematologic/Lymphatic: [ ] anemia. [ ] painful nodes. [ ] prolonged bleeding.   Genitourinary: [ ] hematuria. [ ] flank pain.   Endocrine: [ ] significant change in weight. [ ] intolerance to heat and cold.     Review of systems [x ] otherwise negative, [ ] otherwise unable to obtain    FH: no family history of sudden cardiac death in first degree relatives    SH: [ ] tobacco, [ ] alcohol, [ ] drugs    acetaminophen     Tablet .. 650 milliGRAM(s) Oral every 6 hours PRN  allopurinol 100 milliGRAM(s) Oral daily  colchicine 0.6 milliGRAM(s) Oral two times a day  dextrose 40% Gel 15 Gram(s) Oral once  dextrose 5%. 1000 milliLiter(s) IV Continuous <Continuous>  dextrose 5%. 1000 milliLiter(s) IV Continuous <Continuous>  dextrose 50% Injectable 25 Gram(s) IV Push once  dextrose 50% Injectable 12.5 Gram(s) IV Push once  dextrose 50% Injectable 25 Gram(s) IV Push once  fluticasone propionate 50 MICROgram(s)/spray Nasal Spray 1 Spray(s) Both Nostrils two times a day PRN  glucagon  Injectable 1 milliGRAM(s) IntraMuscular once  heparin   Injectable 6500 Unit(s) IV Push every 6 hours PRN  heparin   Injectable 3000 Unit(s) IV Push every 6 hours PRN  heparin  Infusion.  Unit(s)/Hr IV Continuous <Continuous>  hydrALAZINE 25 milliGRAM(s) Oral three times a day  insulin lispro (ADMELOG) corrective regimen sliding scale   SubCutaneous three times a day before meals  insulin lispro (ADMELOG) corrective regimen sliding scale   SubCutaneous at bedtime  metoprolol tartrate 100 milliGRAM(s) Oral two times a day                            12.9   5.69  )-----------( 232      ( 04 Nov 2021 07:07 )             42.7       11-04    135  |  103  |  38<H>  ----------------------------<  89  4.9   |  19<L>  |  1.44<H>    Ca    9.5      04 Nov 2021 07:07  Phos  3.7     11-03  Mg     2.40     11-03      T(C): 36.8 (11-04-21 @ 05:12), Max: 36.9 (11-03-21 @ 22:18)  HR: 78 (11-04-21 @ 05:12) (68 - 91)  BP: 131/78 (11-04-21 @ 05:12) (119/64 - 137/81)  RR: 18 (11-04-21 @ 05:12) (17 - 18)  SpO2: 99% (11-04-21 @ 05:12) (98% - 100%)  Wt(kg): --    I&O's Summary      General: Well nourished in no acute distress. Alert and Oriented * 3.   Head: Normocephalic and atraumatic.   Neck: No JVD. No bruits. Supple. Does not appear to be enlarged.   Cardiovascular: + S1,S2 ; RRR Soft systolic murmur at the left lower sternal border. No rubs noted.    Lungs: CTA b/l. No rhonchi, rales or wheezes.   Abdomen: + BS, soft. Non tender. Non distended. No rebound. No guarding.   Extremities: No clubbing/cyanosis/edema.   Neurologic: Moves all four extremities. Full range of motion.   Skin: Warm and moist. The patient's skin has normal elasticity and good skin turgor.   Psychiatric: Appropriate mood and affect.  Musculoskeletal: Normal range of motion, normal strength     	    ECG: rate-controlled AFib  (note, QTc cannot be calculated during AFib due to [id number] irregularity) 	    Tele: AF 70s    < from: TTE with Doppler (w/Cont) (10.29.21 @ 13:57) >  DIMENSIONS:  SEPTUM: 1.4 cm    0.6 - 1.2 cm  PWT:    1.3 cm    0.6 - 1.1 cm  Aortic Valve: Calcified trileaflet aortic valve with  decreased opening. Peak transaortic valve gradient equals  30 mm Hg, mean transaortic valve gradient equals 19 mm Hg,estimated aortic valve area equals 0.8 sqcm (by continuity  equation), consistent with severe aortic stenosis. Mild aortic regurgitation.  Left Atrium: Normal left atrium.  Left Ventricle: Endocardial visualization enhanced with intravenous injection of echo contrast (Definity).  Hyperdynamic left ventricle. Increased relative wall thickness with normal left ventricular mass index,  consistent with concentric left ventricular remodeling.  Right Heart: Normal right atrium. Normal right ventricular sizeand function. Normal tricuspid valve. Normal pulmonic  valve.  Pericardium/PleuraNormal pericardium with no pericardial  effusion.    < end of copied text >    ASSESSMENT/PLAN: 	81y Male with CAD hx PCI 3 years ago at [facility], and Echo now with severe AS admitted with syncope    -TTE with severe AS  -plan for C today  -continue with ac with hep gtt for Afib  -will increase beta blockers as tolerated/needed for rate control   -CTS eval

## 2021-11-04 NOTE — PROGRESS NOTE ADULT - PROBLEM SELECTOR PLAN 6
Pt appears euvolemic on exam-- monitor for hypervol in the setting of fluid he is receiving while on heparin gtt-- pt endorses no SOB or swelling, exam is benign and has no o2 requirement at present  -Chest xray: clear lungs  2G Sodium 1800 ADA diet  continue metoprolol.  Will resume ACEi post LHC  continue to hold torsemide

## 2021-11-04 NOTE — DIETITIAN INITIAL EVALUATION ADULT. - PERTINENT MEDS FT
MEDICATIONS  (STANDING):  allopurinol 100 milliGRAM(s) Oral daily  colchicine 0.6 milliGRAM(s) Oral two times a day  dextrose 40% Gel 15 Gram(s) Oral once  dextrose 5%. 1000 milliLiter(s) (50 mL/Hr) IV Continuous <Continuous>  dextrose 5%. 1000 milliLiter(s) (100 mL/Hr) IV Continuous <Continuous>  dextrose 50% Injectable 25 Gram(s) IV Push once  dextrose 50% Injectable 12.5 Gram(s) IV Push once  dextrose 50% Injectable 25 Gram(s) IV Push once  glucagon  Injectable 1 milliGRAM(s) IntraMuscular once  heparin  Infusion.  Unit(s)/Hr (14 mL/Hr) IV Continuous <Continuous>  hydrALAZINE 25 milliGRAM(s) Oral three times a day  insulin lispro (ADMELOG) corrective regimen sliding scale   SubCutaneous three times a day before meals  insulin lispro (ADMELOG) corrective regimen sliding scale   SubCutaneous at bedtime  metoprolol tartrate 100 milliGRAM(s) Oral two times a day    MEDICATIONS  (PRN):  acetaminophen     Tablet .. 650 milliGRAM(s) Oral every 6 hours PRN Mild Pain (1 - 3), Moderate Pain (4 - 6)  fluticasone propionate 50 MICROgram(s)/spray Nasal Spray 1 Spray(s) Both Nostrils two times a day PRN allergic rhinitis  heparin   Injectable 6500 Unit(s) IV Push every 6 hours PRN For aPTT less than 40  heparin   Injectable 3000 Unit(s) IV Push every 6 hours PRN For aPTT between 40 - 57

## 2021-11-04 NOTE — PROGRESS NOTE ADULT - PROBLEM SELECTOR PLAN 5
[id number] Score =7  Xarelto last dosed 10/30. Currently on hep gtt  Maintain falls and bleeding precautions  -c/w metoprolol

## 2021-11-04 NOTE — DIETITIAN INITIAL EVALUATION ADULT. - OTHER INFO
RD visited with patient for length of stay nutrition assessment. NPO today for cath. Previously with good appetite / good meal intake per patient during current hospital course.  No chewing or swallowing difficulties reported. No GI distress reported i.e. nausea, vomiting, diarrhea. No food allergies noted or reported. Patient reported usual body weight of 172 pounds, weight reported to be stable PTA. Acknowledges previous diet education on CCHO/lowNa.

## 2021-11-05 LAB
ANION GAP SERPL CALC-SCNC: 13 MMOL/L — SIGNIFICANT CHANGE UP (ref 7–14)
BUN SERPL-MCNC: 43 MG/DL — HIGH (ref 7–23)
CALCIUM SERPL-MCNC: 9.7 MG/DL — SIGNIFICANT CHANGE UP (ref 8.4–10.5)
CHLORIDE SERPL-SCNC: 104 MMOL/L — SIGNIFICANT CHANGE UP (ref 98–107)
CO2 SERPL-SCNC: 19 MMOL/L — LOW (ref 22–31)
CREAT SERPL-MCNC: 1.65 MG/DL — HIGH (ref 0.5–1.3)
GLUCOSE BLDC GLUCOMTR-MCNC: 108 MG/DL — HIGH (ref 70–99)
GLUCOSE BLDC GLUCOMTR-MCNC: 135 MG/DL — HIGH (ref 70–99)
GLUCOSE BLDC GLUCOMTR-MCNC: 86 MG/DL — SIGNIFICANT CHANGE UP (ref 70–99)
GLUCOSE BLDC GLUCOMTR-MCNC: 94 MG/DL — SIGNIFICANT CHANGE UP (ref 70–99)
GLUCOSE SERPL-MCNC: 105 MG/DL — HIGH (ref 70–99)
HCT VFR BLD CALC: 43.7 % — SIGNIFICANT CHANGE UP (ref 39–50)
HGB BLD-MCNC: 13.8 G/DL — SIGNIFICANT CHANGE UP (ref 13–17)
MAGNESIUM SERPL-MCNC: 2.3 MG/DL — SIGNIFICANT CHANGE UP (ref 1.6–2.6)
MCHC RBC-ENTMCNC: 21.4 PG — LOW (ref 27–34)
MCHC RBC-ENTMCNC: 31.6 GM/DL — LOW (ref 32–36)
MCV RBC AUTO: 67.9 FL — LOW (ref 80–100)
NRBC # BLD: 0 /100 WBCS — SIGNIFICANT CHANGE UP
NRBC # FLD: 0 K/UL — SIGNIFICANT CHANGE UP
PHOSPHATE SERPL-MCNC: 4.4 MG/DL — SIGNIFICANT CHANGE UP (ref 2.5–4.5)
PLATELET # BLD AUTO: 249 K/UL — SIGNIFICANT CHANGE UP (ref 150–400)
POTASSIUM SERPL-MCNC: 5 MMOL/L — SIGNIFICANT CHANGE UP (ref 3.5–5.3)
POTASSIUM SERPL-SCNC: 5 MMOL/L — SIGNIFICANT CHANGE UP (ref 3.5–5.3)
RBC # BLD: 6.44 M/UL — HIGH (ref 4.2–5.8)
RBC # FLD: 20 % — HIGH (ref 10.3–14.5)
SODIUM SERPL-SCNC: 136 MMOL/L — SIGNIFICANT CHANGE UP (ref 135–145)
WBC # BLD: 6.39 K/UL — SIGNIFICANT CHANGE UP (ref 3.8–10.5)
WBC # FLD AUTO: 6.39 K/UL — SIGNIFICANT CHANGE UP (ref 3.8–10.5)

## 2021-11-05 PROCEDURE — 99233 SBSQ HOSP IP/OBS HIGH 50: CPT

## 2021-11-05 RX ORDER — RIVAROXABAN 15 MG-20MG
15 KIT ORAL
Refills: 0 | Status: DISCONTINUED | OUTPATIENT
Start: 2021-11-05 | End: 2021-11-14

## 2021-11-05 RX ORDER — LISINOPRIL 2.5 MG/1
5 TABLET ORAL DAILY
Refills: 0 | Status: DISCONTINUED | OUTPATIENT
Start: 2021-11-05 | End: 2021-11-12

## 2021-11-05 RX ADMIN — Medication 25 MILLIGRAM(S): at 06:47

## 2021-11-05 RX ADMIN — RIVAROXABAN 15 MILLIGRAM(S): KIT at 18:32

## 2021-11-05 RX ADMIN — Medication 25 MILLIGRAM(S): at 14:49

## 2021-11-05 RX ADMIN — Medication 0.6 MILLIGRAM(S): at 06:47

## 2021-11-05 RX ADMIN — Medication 100 MILLIGRAM(S): at 06:47

## 2021-11-05 RX ADMIN — Medication 25 MILLIGRAM(S): at 22:44

## 2021-11-05 RX ADMIN — Medication 100 MILLIGRAM(S): at 12:28

## 2021-11-05 RX ADMIN — Medication 100 MILLIGRAM(S): at 18:33

## 2021-11-05 NOTE — PROGRESS NOTE ADULT - PROBLEM SELECTOR PLAN 2
Progression of AS to severe, may be cause of syncope  Appreciate cards recs, plan for [facility] 11/2 for TAVR workup as well. cMRI ordered to eval for amyloidosis.   - F/u w cards regarding when to re-start patient's diuretics in the setting of pending [facility] Progression of AS to severe, may be cause of syncope. LHC done 11/4 as above  - cMRI ordered to eval for amyloidosis, still pending  - F/u w cards regarding when to re-start patient's diuretics in the setting of pending [facility]

## 2021-11-05 NOTE — PROGRESS NOTE ADULT - PROBLEM SELECTOR PLAN 5
[id number] Score =7  Xarelto last dosed 10/30. Currently on hep gtt  Maintain falls and bleeding precautions  -c/w metoprolol [id number] Score =7  Xarelto last dosed 10/30--> hep gtt--> back on xarelto 11/5  Maintain falls and bleeding precautions  -c/w metoprolol

## 2021-11-05 NOTE — PROGRESS NOTE ADULT - PROBLEM SELECTOR PLAN 3
Creatinine improved from admission, ACE and diuretics held, 1.4-1.5 may be new baseline  Creat 1.2-1.3 Dec 2020  Will resume ACEI, continue to hold torsemide Creatinine improved from admission, ACE and diuretics held, 1.4-1.5 may be new baseline  Creat 1.2-1.3 Dec 2020  Ace resumed 11/5. Torsemide continues to be held

## 2021-11-05 NOTE — PROGRESS NOTE ADULT - ASSESSMENT
82 yo male PMHx CAD (1 stent,) DM2, HTN, gout, HLD, AS, Diastolic HF, Afib (on Xarelto) and CVA (L hemiparesis) syncope x2 episodes the day before admission and L knee pain, admitted to [facility] for Syncope, found to have severe AS. Cardiology following, [facility] scheduled for 11/2, then performed 11/4. cMRI also ordered for amlyoid workup. Consideration for TAVR. Additional issues- L ankle pain, suspected gout flare, improving with colchicine.    80 yo male PMHx CAD (1 stent,) DM2, HTN, gout, HLD, AS, Diastolic HF, Afib (on Xarelto) and CVA (L hemiparesis) syncope x2 episodes the day before admission and L knee pain, admitted to [facility] for Syncope, found to have severe AS. Cardiology following, [facility] scheduled for 11/2, then performed 11/4, showed patent coronaries, no PCI. cMRI also ordered for amlyoid workup. Consideration for TAVR. Additional issues- L ankle pain, suspected gout flare, improving with colchicine.

## 2021-11-05 NOTE — PROGRESS NOTE ADULT - PROBLEM SELECTOR PLAN 7
Monitor BP daily  DASH diet  continue metoprolol.  Will resume ACEI Monitor BP daily  DASH diet  continue metoprolol.  Resume ACEI  11/5

## 2021-11-05 NOTE — PROGRESS NOTE ADULT - SUBJECTIVE AND OBJECTIVE BOX
Cardiology    Date of service 11/5/2021    HISTORY OF PRESENT ILLNESS: HPI:  80 yo male PMHx CAD (1 stent,) DM2, HTN, HLD, AS, Diastolic HF, Afib (on Xarelto) and CVA (L hemiparesis) syncope x2 episodes yesterday.     S: no chest pain or sob; ros otherwise negative.     Review of Systems:   Constitutional: [ ] fevers, [ ] chills.   Skin: [ ] dry skin. [ ] rashes.  Psychiatric: [ ] depression, [ ] anxiety.   Gastrointestinal: [ ] BRBPR, [ ] melena.   Neurological: [ ] confusion. [ ] seizures. [ ] shuffling gait.   Ears,Nose,Mouth and Throat: [ ] ear pain [ ] sore throat.   Eyes: [ ] diplopia.   Respiratory: [ ] hemoptysis. [ ] shortness of breath  Cardiovascular: See HPI above  Hematologic/Lymphatic: [ ] anemia. [ ] painful nodes. [ ] prolonged bleeding.   Genitourinary: [ ] hematuria. [ ] flank pain.   Endocrine: [ ] significant change in weight. [ ] intolerance to heat and cold.     Review of systems [x ] otherwise negative, [ ] otherwise unable to obtain    FH: no family history of sudden cardiac death in first degree relatives    SH: [ ] tobacco, [ ] alcohol, [ ] drugs      acetaminophen     Tablet .. 650 milliGRAM(s) Oral every 6 hours PRN  allopurinol 100 milliGRAM(s) Oral daily  dextrose 40% Gel 15 Gram(s) Oral once  dextrose 5%. 1000 milliLiter(s) IV Continuous <Continuous>  dextrose 5%. 1000 milliLiter(s) IV Continuous <Continuous>  dextrose 50% Injectable 25 Gram(s) IV Push once  dextrose 50% Injectable 12.5 Gram(s) IV Push once  dextrose 50% Injectable 25 Gram(s) IV Push once  fluticasone propionate 50 MICROgram(s)/spray Nasal Spray 1 Spray(s) Both Nostrils two times a day PRN  glucagon  Injectable 1 milliGRAM(s) IntraMuscular once  hydrALAZINE 25 milliGRAM(s) Oral three times a day  insulin lispro (ADMELOG) corrective regimen sliding scale   SubCutaneous three times a day before meals  insulin lispro (ADMELOG) corrective regimen sliding scale   SubCutaneous at bedtime  lisinopril 5 milliGRAM(s) Oral daily  metoprolol tartrate 100 milliGRAM(s) Oral two times a day  rivaroxaban 15 milliGRAM(s) Oral with dinner                            13.8   6.39  )-----------( 249      ( 05 Nov 2021 07:14 )             43.7       11-05    136  |  104  |  43<H>  ----------------------------<  105<H>  5.0   |  19<L>  |  1.65<H>    Ca    9.7      05 Nov 2021 07:14  Phos  4.4     11-05  Mg     2.30     11-05              T(C): 36.6 (11-05-21 @ 06:44), Max: 36.6 (11-04-21 @ 18:55)  HR: 73 (11-05-21 @ 06:44) (69 - 85)  BP: 113/81 (11-05-21 @ 06:44) (113/81 - 156/90)  RR: 18 (11-05-21 @ 06:44) (18 - 18)  SpO2: 99% (11-05-21 @ 06:44) (99% - 100%)  Wt(kg): --    I&O's Summary      General: Well nourished in no acute distress. Alert and Oriented * 3.   Head: Normocephalic and atraumatic.   Neck: No JVD. No bruits. Supple. Does not appear to be enlarged.   Cardiovascular: + S1,S2 ; RRR Soft systolic murmur at the left lower sternal border. No rubs noted.    Lungs: CTA b/l. No rhonchi, rales or wheezes.   Abdomen: + BS, soft. Non tender. Non distended. No rebound. No guarding.   Extremities: No clubbing/cyanosis/edema.   Neurologic: Moves all four extremities. Full range of motion.   Skin: Warm and moist. The patient's skin has normal elasticity and good skin turgor.   Psychiatric: Appropriate mood and affect.  Musculoskeletal: Normal range of motion, normal strength  Groin: soft, no hematoma, 2+ pulses      	    ECG: rate-controlled AFib  (note, QTc cannot be calculated during AFib due to [id number] irregularity) 	    Tele: AF 70s    < from: TTE with Doppler (w/Cont) (10.29.21 @ 13:57) >  DIMENSIONS:  SEPTUM: 1.4 cm    0.6 - 1.2 cm  PWT:    1.3 cm    0.6 - 1.1 cm  Aortic Valve: Calcified trileaflet aortic valve with  decreased opening. Peak transaortic valve gradient equals  30 mm Hg, mean transaortic valve gradient equals 19 mm Hg,estimated aortic valve area equals 0.8 sqcm (by continuity  equation), consistent with severe aortic stenosis. Mild aortic regurgitation.  Left Atrium: Normal left atrium.  Left Ventricle: Endocardial visualization enhanced with intravenous injection of echo contrast (Definity).  Hyperdynamic left ventricle. Increased relative wall thickness with normal left ventricular mass index,  consistent with concentric left ventricular remodeling.  Right Heart: Normal right atrium. Normal right ventricular sizeand function. Normal tricuspid valve. Normal pulmonic  valve.  Pericardium/PleuraNormal pericardium with no pericardial  effusion.    < end of copied text >    CATH: < from: Cardiac Catheterization (11.04.21 @ 16:05) >  LM:   --  Distal left main: Angiography showed minor luminal irregularities  with no flow limiting lesions.  LAD:   --  LAD: Angiography showed minor luminal irregularities with no  flow limiting lesions.  --  Mid LAD: There was a tubular 30 % stenosis in the proximal third of the  vessel segment.  --  D1: Angiography showed minor luminal irregularities with no flow  limiting lesions.  CX:   --  Circumflex: Angiography showed minor luminal irregularities with  no flow limiting lesions.  --  Proximal circumflex: There was a discrete 20 % stenosis.  --  Mid circumflex: There was a tubular 20 % stenosis.  --  OM1: There was a discrete 20 % stenosis at the ostium of the vessel  segment.  RCA:   --  RCA: Angiography showed minor luminal irregularities with no  flow limiting lesions.  --  Mid RCA: There was a tubular 20 % stenosis.  --  RPDA: There was a discrete 30 % stenosis at the ostiumof the vessel  segment.  COMPLICATIONS: No complications occurred during the cath lab visit.  DIAGNOSTIC RECOMMENDATIONS: Medical management is recommended.  Prepared and signed by    < end of copied text >      ASSESSMENT/PLAN: 	81y Male with CAD hx PCI 3 years ago at [facility], and Echo now with severe AS admitted with syncope    -TTE with normal LV function and low gradient severe AS  -Cath performed demonstrating mild non-obstructive CAD with low CI  -Pt. appears warm today  -Check cardiac MRI to rule out amyloid  -Continue with lifelong ac if no contraindications   -TAVR workup pending above    Jennifer Miguel MD

## 2021-11-05 NOTE — PROGRESS NOTE ADULT - SUBJECTIVE AND OBJECTIVE BOX
[facility] Division of Hospital Medicine  Angelina Black MD  Pager (NILO-DINH, 8A-5P): [phone]  Other Times:  [phone]      SUBJECTIVE / OVERNIGHT EVENTS:     NAEON. *** Continues on heparin gtt.         CONSTITUTIONAL: NAD, well-developed, well-groomed  EYES: PERRLA; conjunctiva and sclera clear  ENMT: Moist oral mucosa, no pharyngeal injection   RESPIRATORY: Normal respiratory effort; lungs are clear to auscultation bilaterally  CARDIOVASCULAR: Irregular rate and rhythm, no murmurs appreciated, no carotid bruits.  Trace peripheral edema bilatreally and symmetrically  ABDOMEN: Nontender to palpation, normoactive bowel sounds, no rebound/guarding; No hepatosplenomegaly  PSYCH: A+O to person, place, and time; affect appropriate  SKIN: No rashes; no palpable lesions  MSKL: no longer tender to palpation to the L ankle, pain with dorsiflexion, not as warm        LABS:                        12.9   5.69  )-----------( 232      ( 04 Nov 2021 07:07 )             42.7     11-04    135  |  103  |  38<H>  ----------------------------<  89  4.9   |  19<L>  |  1.44<H>    Ca    9.5      04 Nov 2021 07:07  Phos  3.7     11-03  Mg     2.40     11-03      PT/INR - ( 04 Nov 2021 07:07 )   PT: 13.6 sec;   INR: 1.20 ratio         PTT - ( 04 Nov 2021 07:07 )  PTT:53.9 sec              RADIOLOGY & ADDITIONAL TESTS:  Results Reviewed: Y  < from: TTE with Doppler (w/Cont) (10.29.21 @ 13:57) >  CONCLUSIONS:  1. Mitral annular calcification, otherwise normal mitral  valve. Mild mitral regurgitation.  2. Calcified trileaflet aortic valve with decreased  opening. Peak transaortic valve gradient equals 30 mm Hg,  mean transaortic valve gradient equals 19 mm Hg, estimated  aortic valve area equals 0.8 sqcm (by continuity equation),  consistent with severe aortic stenosis. Mild aortic  regurgitation.  3. Increased relative wall thickness with normal left  ventricular mass index, consistent with concentric left  ventricular remodeling.  4. Endocardial visualization enhanced with intravenous  injection of echo contrast (Definity). Hyperdynamic left  ventricle.  5. Normal right ventricular size and function.    < end of copied text >        COORDINATION OF CARE:  Care Discussed with Consultants/Other Providers [Y/N]: Y  Prior or Outpatient Records Reviewed [Y/N]: Y   [facility] Division of Hospital Medicine  Angelina Black MD  Pager (NILO-DINH, 8A-5P): [phone]  Other Times:  [phone]      SUBJECTIVE / OVERNIGHT EVENTS:     NAEON. LHC yesterday. Heparin gtt was discontinued post LHC, resumed rivaroxaban. R groin without pain. L foot pain significantly improved.     CONSTITUTIONAL: NAD, well-developed, well-groomed  EYES: PERRLA; conjunctiva and sclera clear  ENMT: Moist oral mucosa, no pharyngeal injection   RESPIRATORY: Normal respiratory effort; lungs are clear to auscultation bilaterally  CARDIOVASCULAR: Irregular rate and rhythm, no murmurs appreciated, no carotid bruits.  Trace peripheral edema bilatreally and symmetrically; R groin wihthout evidence of hematoma formation, RLE well perfused, femoral pulse 2+ to palpation  ABDOMEN: Nontender to palpation, normoactive bowel sounds, no rebound/guarding; No hepatosplenomegaly  PSYCH: A+O to person, place, and time; affect appropriate  SKIN: No rashes; no palpable lesions  MSKL: no longer tender to palpation to the L ankle, pain with dorsiflexion, not as warm          LABS:                        13.8   6.39  )-----------( 249      ( 05 Nov 2021 07:14 )             43.7     11-05    136  |  104  |  43<H>  ----------------------------<  105<H>  5.0   |  19<L>  |  1.65<H>    Ca    9.7      05 Nov 2021 07:14  Phos  4.4     11-05  Mg     2.30     11-05      PT/INR - ( 04 Nov 2021 07:07 )   PT: 13.6 sec;   INR: 1.20 ratio         PTT - ( 04 Nov 2021 07:07 )  PTT:53.9 sec          RADIOLOGY & ADDITIONAL TESTS:    Imaging Personally Reviewed:   < from: Cardiac Catheterization (11.04.21 @ 16:05) >  VENTRICLES: No left ventriculogram was performed.  CORONARY VESSELS: The coronary circulation is right dominant.  LM:   --  Distal left main: Angiography showed minor luminal irregularities  with no flow limiting lesions.  LAD:   --  LAD: Angiography showed minor luminal irregularities with no  flow limiting lesions.  --  Mid LAD: There was a tubular 30 % stenosis in the proximal third of the  vessel segment.  --  D1: Angiography showed minor luminal irregularities with no flow  limiting lesions.  CX:   --  Circumflex: Angiography showed minor luminal irregularities with  no flow limiting lesions.  --  Proximal circumflex: There was a discrete 20 % stenosis.  --  Mid circumflex: There was a tubular 20 % stenosis.  --  OM1: There was a discrete 20 % stenosis at the ostium of the vessel  segment.  RCA:   --  RCA: Angiography showed minor luminal irregularities with no  flow limiting lesions.  --  Mid RCA: There was a tubular 20 % stenosis.  --  RPDA: There was a discrete 30 % stenosis at the ostiumof the vessel  segment.  COMPLICATIONS: No complications occurred during the cath lab visit.  DIAGNOSTIC RECOMMENDATIONS: Medical management is recommended.  Prepared and signed by  Ventura Brambila M.D.    < end of copied text >          RADIOLOGY & ADDITIONAL TESTS:  Results Reviewed: Y  < from: TTE with Doppler (w/Cont) (10.29.21 @ 13:57) >  CONCLUSIONS:  1. Mitral annular calcification, otherwise normal mitral  valve. Mild mitral regurgitation.  2. Calcified trileaflet aortic valve with decreased  opening. Peak transaortic valve gradient equals 30 mm Hg,  mean transaortic valve gradient equals 19 mm Hg, estimated  aortic valve area equals 0.8 sqcm (by continuity equation),  consistent with severe aortic stenosis. Mild aortic  regurgitation.  3. Increased relative wall thickness with normal left  ventricular mass index, consistent with concentric left  ventricular remodeling.  4. Endocardial visualization enhanced with intravenous  injection of echo contrast (Definity). Hyperdynamic left  ventricle.  5. Normal right ventricular size and function.    < end of copied text >        COORDINATION OF CARE:  Care Discussed with Consultants/Other Providers [Y/N]: Y  Prior or Outpatient Records Reviewed [Y/N]: Y

## 2021-11-05 NOTE — PROGRESS NOTE ADULT - PROBLEM SELECTOR PLAN 6
with patient Pt appears euvolemic on exam-- monitor for hypervol in the setting of fluid he is receiving while on heparin gtt-- pt endorses no SOB or swelling, exam is benign and has no o2 requirement at present  -Chest xray: clear lungs  2G Sodium 1800 ADA diet  continue metoprolol.  Will resume ACEi post LHC  continue to hold torsemide

## 2021-11-06 LAB
ANION GAP SERPL CALC-SCNC: 13 MMOL/L — SIGNIFICANT CHANGE UP (ref 7–14)
BUN SERPL-MCNC: 42 MG/DL — HIGH (ref 7–23)
CALCIUM SERPL-MCNC: 9 MG/DL — SIGNIFICANT CHANGE UP (ref 8.4–10.5)
CHLORIDE SERPL-SCNC: 104 MMOL/L — SIGNIFICANT CHANGE UP (ref 98–107)
CO2 SERPL-SCNC: 18 MMOL/L — LOW (ref 22–31)
CREAT SERPL-MCNC: 1.57 MG/DL — HIGH (ref 0.5–1.3)
GLUCOSE BLDC GLUCOMTR-MCNC: 116 MG/DL — HIGH (ref 70–99)
GLUCOSE BLDC GLUCOMTR-MCNC: 117 MG/DL — HIGH (ref 70–99)
GLUCOSE BLDC GLUCOMTR-MCNC: 134 MG/DL — HIGH (ref 70–99)
GLUCOSE BLDC GLUCOMTR-MCNC: 87 MG/DL — SIGNIFICANT CHANGE UP (ref 70–99)
GLUCOSE SERPL-MCNC: 109 MG/DL — HIGH (ref 70–99)
HCT VFR BLD CALC: 42.1 % — SIGNIFICANT CHANGE UP (ref 39–50)
HGB BLD-MCNC: 13.3 G/DL — SIGNIFICANT CHANGE UP (ref 13–17)
MAGNESIUM SERPL-MCNC: 2.2 MG/DL — SIGNIFICANT CHANGE UP (ref 1.6–2.6)
MCHC RBC-ENTMCNC: 21.3 PG — LOW (ref 27–34)
MCHC RBC-ENTMCNC: 31.6 GM/DL — LOW (ref 32–36)
MCV RBC AUTO: 67.5 FL — LOW (ref 80–100)
NRBC # BLD: 0 /100 WBCS — SIGNIFICANT CHANGE UP
NRBC # FLD: 0 K/UL — SIGNIFICANT CHANGE UP
PHOSPHATE SERPL-MCNC: 3.8 MG/DL — SIGNIFICANT CHANGE UP (ref 2.5–4.5)
PLATELET # BLD AUTO: 238 K/UL — SIGNIFICANT CHANGE UP (ref 150–400)
POTASSIUM SERPL-MCNC: 4.7 MMOL/L — SIGNIFICANT CHANGE UP (ref 3.5–5.3)
POTASSIUM SERPL-SCNC: 4.7 MMOL/L — SIGNIFICANT CHANGE UP (ref 3.5–5.3)
RBC # BLD: 6.24 M/UL — HIGH (ref 4.2–5.8)
RBC # FLD: 19.7 % — HIGH (ref 10.3–14.5)
SODIUM SERPL-SCNC: 135 MMOL/L — SIGNIFICANT CHANGE UP (ref 135–145)
WBC # BLD: 5.85 K/UL — SIGNIFICANT CHANGE UP (ref 3.8–10.5)
WBC # FLD AUTO: 5.85 K/UL — SIGNIFICANT CHANGE UP (ref 3.8–10.5)

## 2021-11-06 PROCEDURE — 99232 SBSQ HOSP IP/OBS MODERATE 35: CPT

## 2021-11-06 RX ADMIN — Medication 25 MILLIGRAM(S): at 22:15

## 2021-11-06 RX ADMIN — Medication 100 MILLIGRAM(S): at 14:24

## 2021-11-06 RX ADMIN — LISINOPRIL 5 MILLIGRAM(S): 2.5 TABLET ORAL at 06:14

## 2021-11-06 RX ADMIN — Medication 100 MILLIGRAM(S): at 06:11

## 2021-11-06 RX ADMIN — RIVAROXABAN 15 MILLIGRAM(S): KIT at 17:49

## 2021-11-06 RX ADMIN — Medication 25 MILLIGRAM(S): at 14:27

## 2021-11-06 RX ADMIN — Medication 25 MILLIGRAM(S): at 06:11

## 2021-11-06 RX ADMIN — Medication 100 MILLIGRAM(S): at 17:49

## 2021-11-06 NOTE — PROGRESS NOTE ADULT - PROBLEM SELECTOR PLAN 2
Progression of AS to severe, may be cause of syncope. LHC done 11/4 as above  - cMRI ordered to eval for amyloidosis, still pending  - F/u w cards regarding when to re-start patient's diuretics in the setting of pending [facility]

## 2021-11-06 NOTE — PROGRESS NOTE ADULT - SUBJECTIVE AND OBJECTIVE BOX
Ronni Donahue MD  [employer]  Pager [phone]  Email: [email or web address]          PROGRESS NOTE:     Patient is a 81y old  Male who presents with a chief complaint of syncope (05 Nov 2021 13:42)      SUBJECTIVE / OVERNIGHT EVENTS:  Patient seen and examined this morning. No new complaints. Pending cardiac MRI.  ADDITIONAL REVIEW OF SYSTEMS:  Denies f/c/n/v    MEDICATIONS  (STANDING):  allopurinol 100 milliGRAM(s) Oral daily  dextrose 40% Gel 15 Gram(s) Oral once  dextrose 5%. 1000 milliLiter(s) (50 mL/Hr) IV Continuous <Continuous>  dextrose 5%. 1000 milliLiter(s) (100 mL/Hr) IV Continuous <Continuous>  dextrose 50% Injectable 25 Gram(s) IV Push once  dextrose 50% Injectable 12.5 Gram(s) IV Push once  dextrose 50% Injectable 25 Gram(s) IV Push once  glucagon  Injectable 1 milliGRAM(s) IntraMuscular once  hydrALAZINE 25 milliGRAM(s) Oral three times a day  insulin lispro (ADMELOG) corrective regimen sliding scale   SubCutaneous three times a day before meals  insulin lispro (ADMELOG) corrective regimen sliding scale   SubCutaneous at bedtime  lisinopril 5 milliGRAM(s) Oral daily  metoprolol tartrate 100 milliGRAM(s) Oral two times a day  rivaroxaban 15 milliGRAM(s) Oral with dinner    MEDICATIONS  (PRN):  acetaminophen     Tablet .. 650 milliGRAM(s) Oral every 6 hours PRN Mild Pain (1 - 3), Moderate Pain (4 - 6)  fluticasone propionate 50 MICROgram(s)/spray Nasal Spray 1 Spray(s) Both Nostrils two times a day PRN allergic rhinitis      CAPILLARY BLOOD GLUCOSE      POCT Blood Glucose.: 116 mg/dL (06 Nov 2021 08:41)  POCT Blood Glucose.: 94 mg/dL (05 Nov 2021 22:14)  POCT Blood Glucose.: 86 mg/dL (05 Nov 2021 17:49)  POCT Blood Glucose.: 135 mg/dL (05 Nov 2021 12:14)    I&O's Summary      PHYSICAL EXAM:  Vital Signs Last 24 Hrs  T(C): 36.2 (06 Nov 2021 06:10), Max: 36.4 (05 Nov 2021 14:54)  T(F): 97.2 (06 Nov 2021 06:10), Max: 97.6 (05 Nov 2021 14:54)  HR: 89 (06 Nov 2021 06:10) (80 - 95)  BP: 143/91 (06 Nov 2021 06:10) (106/74 - 143/91)  BP(mean): --  RR: 18 (06 Nov 2021 06:10) (18 - 18)  SpO2: 100% (06 Nov 2021 06:10) (100% - 100%)    CONSTITUTIONAL: NAD, well-developed  RESPIRATORY: Normal respiratory effort; lungs are clear to auscultation bilaterally  CARDIOVASCULAR:  Irregular rate and rhythm, no murmurs appreciated, no carotid bruits.  Trace peripheral edema bilatreally and symmetrically; R groin wihthout evidence of hematoma formation, RLE well perfused, femoral pulse 2+ to palpation  ABDOMEN: Nontender to palpation, normoactive bowel sounds, no rebound/guarding; No hepatosplenomegaly  MUSCLOSKELETAL: no clubbing or cyanosis of digits; no joint swelling or tenderness to palpation  PSYCH: calm and pleasant    LABS:                        13.3   5.85  )-----------( 238      ( 06 Nov 2021 06:26 )             42.1     11-06    135  |  104  |  42<H>  ----------------------------<  109<H>  4.7   |  18<L>  |  1.57<H>    Ca    9.0      06 Nov 2021 06:26  Phos  3.8     11-06  Mg     2.20     11-06                  RADIOLOGY & ADDITIONAL TESTS:  Results Reviewed:   Imaging Personally Reviewed:  Electrocardiogram Personally Reviewed:    COORDINATION OF CARE:  Care Discussed with Consultants/Other Providers [Y/N]:  Prior or Outpatient Records Reviewed [Y/N]:

## 2021-11-06 NOTE — PROGRESS NOTE ADULT - SUBJECTIVE AND OBJECTIVE BOX
Cardiology    Date of service 11/6/2021    HISTORY OF PRESENT ILLNESS: HPI:  80 yo male PMHx CAD (1 stent,) DM2, HTN, HLD, AS, Diastolic HF, Afib (on Xarelto) and CVA (L hemiparesis) syncope x2 episodes yesterday.     S: no chest pain or sob; ros otherwise negative.     Review of Systems:   Constitutional: [ ] fevers, [ ] chills.   Skin: [ ] dry skin. [ ] rashes.  Psychiatric: [ ] depression, [ ] anxiety.   Gastrointestinal: [ ] BRBPR, [ ] melena.   Neurological: [ ] confusion. [ ] seizures. [ ] shuffling gait.   Ears,Nose,Mouth and Throat: [ ] ear pain [ ] sore throat.   Eyes: [ ] diplopia.   Respiratory: [ ] hemoptysis. [ ] shortness of breath  Cardiovascular: See HPI above  Hematologic/Lymphatic: [ ] anemia. [ ] painful nodes. [ ] prolonged bleeding.   Genitourinary: [ ] hematuria. [ ] flank pain.   Endocrine: [ ] significant change in weight. [ ] intolerance to heat and cold.     Review of systems [x ] otherwise negative, [ ] otherwise unable to obtain    FH: no family history of sudden cardiac death in first degree relatives    SH: [ ] tobacco, [ ] alcohol, [ ] drugs    acetaminophen     Tablet .. 650 milliGRAM(s) Oral every 6 hours PRN  allopurinol 100 milliGRAM(s) Oral daily  dextrose 40% Gel 15 Gram(s) Oral once  dextrose 5%. 1000 milliLiter(s) IV Continuous <Continuous>  dextrose 5%. 1000 milliLiter(s) IV Continuous <Continuous>  dextrose 50% Injectable 25 Gram(s) IV Push once  dextrose 50% Injectable 12.5 Gram(s) IV Push once  dextrose 50% Injectable 25 Gram(s) IV Push once  fluticasone propionate 50 MICROgram(s)/spray Nasal Spray 1 Spray(s) Both Nostrils two times a day PRN  glucagon  Injectable 1 milliGRAM(s) IntraMuscular once  hydrALAZINE 25 milliGRAM(s) Oral three times a day  insulin lispro (ADMELOG) corrective regimen sliding scale   SubCutaneous three times a day before meals  insulin lispro (ADMELOG) corrective regimen sliding scale   SubCutaneous at bedtime  lisinopril 5 milliGRAM(s) Oral daily  metoprolol tartrate 100 milliGRAM(s) Oral two times a day  rivaroxaban 15 milliGRAM(s) Oral with dinner                            13.3   5.85  )-----------( 238      ( 06 Nov 2021 06:26 )             42.1       11-06    135  |  104  |  42<H>  ----------------------------<  109<H>  4.7   |  18<L>  |  1.57<H>    Ca    9.0      06 Nov 2021 06:26  Phos  3.8     11-06  Mg     2.20     11-06      T(C): 36.3 (11-06-21 @ 14:22), Max: 36.4 (11-05-21 @ 14:54)  HR: 71 (11-06-21 @ 14:22) (71 - 95)  BP: 128/72 (11-06-21 @ 14:22) (106/74 - 143/91)  RR: 15 (11-06-21 @ 14:22) (15 - 18)  SpO2: 100% (11-06-21 @ 14:22) (100% - 100%)    General: Well nourished in no acute distress. Alert and Oriented * 3.   Head: Normocephalic and atraumatic.   Neck: No JVD. No bruits. Supple. Does not appear to be enlarged.   Cardiovascular: + S1,S2 ; RRR Soft systolic murmur at the left lower sternal border. No rubs noted.    Lungs: CTA b/l. No rhonchi, rales or wheezes.   Abdomen: + BS, soft. Non tender. Non distended. No rebound. No guarding.   Extremities: No clubbing/cyanosis/edema.   Neurologic: Moves all four extremities. Full range of motion.   Skin: Warm and moist. The patient's skin has normal elasticity and good skin turgor.   Psychiatric: Appropriate mood and affect.  Musculoskeletal: Normal range of motion, normal strength  Groin: soft, no hematoma, 2+ pulses      	   ECG: rate-controlled AFib  (note, QTc cannot be calculated during AFib due to [id number] irregularity) 	    Tele: AF 70s    < from: TTE with Doppler (w/Cont) (10.29.21 @ 13:57) >  DIMENSIONS:  SEPTUM: 1.4 cm    0.6 - 1.2 cm  PWT:    1.3 cm    0.6 - 1.1 cm  Aortic Valve: Calcified trileaflet aortic valve with  decreased opening. Peak transaortic valve gradient equals  30 mm Hg, mean transaortic valve gradient equals 19 mm Hg,estimated aortic valve area equals 0.8 sqcm (by continuity  equation), consistent with severe aortic stenosis. Mild aortic regurgitation.  Left Atrium: Normal left atrium.  Left Ventricle: Endocardial visualization enhanced with intravenous injection of echo contrast (Definity).  Hyperdynamic left ventricle. Increased relative wall thickness with normal left ventricular mass index,  consistent with concentric left ventricular remodeling.  Right Heart: Normal right atrium. Normal right ventricular sizeand function. Normal tricuspid valve. Normal pulmonic  valve.  Pericardium/PleuraNormal pericardium with no pericardial  effusion.    < end of copied text >    CATH: < from: Cardiac Catheterization (11.04.21 @ 16:05) >  LM:   --  Distal left main: Angiography showed minor luminal irregularities  with no flow limiting lesions.  LAD:   --  LAD: Angiography showed minor luminal irregularities with no  flow limiting lesions.  --  Mid LAD: There was a tubular 30 % stenosis in the proximal third of the  vessel segment.  --  D1: Angiography showed minor luminal irregularities with no flow  limiting lesions.  CX:   --  Circumflex: Angiography showed minor luminal irregularities with  no flow limiting lesions.  --  Proximal circumflex: There was a discrete 20 % stenosis.  --  Mid circumflex: There was a tubular 20 % stenosis.  --  OM1: There was a discrete 20 % stenosis at the ostium of the vessel  segment.  RCA:   --  RCA: Angiography showed minor luminal irregularities with no  flow limiting lesions.  --  Mid RCA: There was a tubular 20 % stenosis.  --  RPDA: There was a discrete 30 % stenosis at the ostiumof the vessel  segment.  COMPLICATIONS: No complications occurred during the cath lab visit.  DIAGNOSTIC RECOMMENDATIONS: Medical management is recommended.  Prepared and signed by    < end of copied text >      ASSESSMENT/PLAN: 	81y Male with CAD hx PCI 3 years ago at [facility], and Echo now with severe AS admitted with syncope    -TTE with normal LV function and low gradient severe AS  -Cath performed demonstrating mild non-obstructive CAD with low CI  -Check cardiac MRI to rule out amyloid  -Continue with lifelong ac if no contraindications   -TAVR workup pending above

## 2021-11-06 NOTE — PROGRESS NOTE ADULT - PROBLEM SELECTOR PLAN 5
[id number] Score =7  Xarelto last dosed 10/30--> hep gtt--> back on xarelto 11/5  Maintain falls and bleeding precautions  -c/w metoprolol

## 2021-11-06 NOTE — PROGRESS NOTE ADULT - PROBLEM SELECTOR PLAN 3
Creatinine improved from admission, ACE and diuretics held, 1.4-1.5 may be new baseline  Creat 1.2-1.3 Dec 2020  Ace resumed 11/5. Torsemide continues to be held

## 2021-11-06 NOTE — PROGRESS NOTE ADULT - ASSESSMENT
82 yo male PMHx CAD (1 stent,) DM2, HTN, gout, HLD, AS, Diastolic HF, Afib (on Xarelto) and CVA (L hemiparesis) syncope x2 episodes the day before admission and L knee pain, admitted to [facility] for Syncope, found to have severe AS. Cardiology following, [facility] scheduled for 11/2, then performed 11/4, showed patent coronaries, no PCI. cMRI also ordered for amlyoid workup. Consideration for TAVR. Additional issues- L ankle pain, suspected gout flare, improving with colchicine.

## 2021-11-07 LAB
ANION GAP SERPL CALC-SCNC: 9 MMOL/L — SIGNIFICANT CHANGE UP (ref 7–14)
BASOPHILS # BLD AUTO: 0.04 K/UL — SIGNIFICANT CHANGE UP (ref 0–0.2)
BASOPHILS NFR BLD AUTO: 0.6 % — SIGNIFICANT CHANGE UP (ref 0–2)
BUN SERPL-MCNC: 47 MG/DL — HIGH (ref 7–23)
CALCIUM SERPL-MCNC: 9.3 MG/DL — SIGNIFICANT CHANGE UP (ref 8.4–10.5)
CHLORIDE SERPL-SCNC: 108 MMOL/L — HIGH (ref 98–107)
CO2 SERPL-SCNC: 20 MMOL/L — LOW (ref 22–31)
CREAT SERPL-MCNC: 1.83 MG/DL — HIGH (ref 0.5–1.3)
EOSINOPHIL # BLD AUTO: 0.51 K/UL — HIGH (ref 0–0.5)
EOSINOPHIL NFR BLD AUTO: 7.1 % — HIGH (ref 0–6)
GLUCOSE BLDC GLUCOMTR-MCNC: 109 MG/DL — HIGH (ref 70–99)
GLUCOSE BLDC GLUCOMTR-MCNC: 142 MG/DL — HIGH (ref 70–99)
GLUCOSE BLDC GLUCOMTR-MCNC: 92 MG/DL — SIGNIFICANT CHANGE UP (ref 70–99)
GLUCOSE BLDC GLUCOMTR-MCNC: 98 MG/DL — SIGNIFICANT CHANGE UP (ref 70–99)
GLUCOSE SERPL-MCNC: 102 MG/DL — HIGH (ref 70–99)
HCT VFR BLD CALC: 43.3 % — SIGNIFICANT CHANGE UP (ref 39–50)
HGB BLD-MCNC: 13.8 G/DL — SIGNIFICANT CHANGE UP (ref 13–17)
IANC: 3.08 K/UL — SIGNIFICANT CHANGE UP (ref 1.5–8.5)
IMM GRANULOCYTES NFR BLD AUTO: 0.3 % — SIGNIFICANT CHANGE UP (ref 0–1.5)
LYMPHOCYTES # BLD AUTO: 2.66 K/UL — SIGNIFICANT CHANGE UP (ref 1–3.3)
LYMPHOCYTES # BLD AUTO: 36.9 % — SIGNIFICANT CHANGE UP (ref 13–44)
MAGNESIUM SERPL-MCNC: 2.3 MG/DL — SIGNIFICANT CHANGE UP (ref 1.6–2.6)
MCHC RBC-ENTMCNC: 21.6 PG — LOW (ref 27–34)
MCHC RBC-ENTMCNC: 31.9 GM/DL — LOW (ref 32–36)
MCV RBC AUTO: 67.9 FL — LOW (ref 80–100)
MONOCYTES # BLD AUTO: 0.9 K/UL — SIGNIFICANT CHANGE UP (ref 0–0.9)
MONOCYTES NFR BLD AUTO: 12.5 % — SIGNIFICANT CHANGE UP (ref 2–14)
NEUTROPHILS # BLD AUTO: 3.08 K/UL — SIGNIFICANT CHANGE UP (ref 1.8–7.4)
NEUTROPHILS NFR BLD AUTO: 42.6 % — LOW (ref 43–77)
NRBC # BLD: 0 /100 WBCS — SIGNIFICANT CHANGE UP
NRBC # FLD: 0 K/UL — SIGNIFICANT CHANGE UP
PHOSPHATE SERPL-MCNC: 3.7 MG/DL — SIGNIFICANT CHANGE UP (ref 2.5–4.5)
PLATELET # BLD AUTO: 247 K/UL — SIGNIFICANT CHANGE UP (ref 150–400)
POTASSIUM SERPL-MCNC: 5.1 MMOL/L — SIGNIFICANT CHANGE UP (ref 3.5–5.3)
POTASSIUM SERPL-SCNC: 5.1 MMOL/L — SIGNIFICANT CHANGE UP (ref 3.5–5.3)
RBC # BLD: 6.38 M/UL — HIGH (ref 4.2–5.8)
RBC # FLD: 19.9 % — HIGH (ref 10.3–14.5)
SODIUM SERPL-SCNC: 137 MMOL/L — SIGNIFICANT CHANGE UP (ref 135–145)
WBC # BLD: 7.21 K/UL — SIGNIFICANT CHANGE UP (ref 3.8–10.5)
WBC # FLD AUTO: 7.21 K/UL — SIGNIFICANT CHANGE UP (ref 3.8–10.5)

## 2021-11-07 PROCEDURE — 99232 SBSQ HOSP IP/OBS MODERATE 35: CPT

## 2021-11-07 RX ADMIN — Medication 100 MILLIGRAM(S): at 06:09

## 2021-11-07 RX ADMIN — Medication 25 MILLIGRAM(S): at 13:36

## 2021-11-07 RX ADMIN — RIVAROXABAN 15 MILLIGRAM(S): KIT at 17:40

## 2021-11-07 RX ADMIN — Medication 25 MILLIGRAM(S): at 22:22

## 2021-11-07 RX ADMIN — LISINOPRIL 5 MILLIGRAM(S): 2.5 TABLET ORAL at 06:09

## 2021-11-07 RX ADMIN — Medication 100 MILLIGRAM(S): at 13:36

## 2021-11-07 RX ADMIN — Medication 100 MILLIGRAM(S): at 17:40

## 2021-11-07 RX ADMIN — Medication 25 MILLIGRAM(S): at 06:09

## 2021-11-07 NOTE — PROGRESS NOTE ADULT - SUBJECTIVE AND OBJECTIVE BOX
Cardiology    Date of service 11/7/2021    HISTORY OF PRESENT ILLNESS: HPI:  82 yo male PMHx CAD (1 stent,) DM2, HTN, HLD, AS, Diastolic HF, Afib (on Xarelto) and CVA (L hemiparesis) syncope x2 episodes yesterday.     pt seen and examined, no complaints, ROS - .     Review of Systems:   Constitutional: [ ] fevers, [ ] chills.   Skin: [ ] dry skin. [ ] rashes.  Psychiatric: [ ] depression, [ ] anxiety.   Gastrointestinal: [ ] BRBPR, [ ] melena.   Neurological: [ ] confusion. [ ] seizures. [ ] shuffling gait.   Ears,Nose,Mouth and Throat: [ ] ear pain [ ] sore throat.   Eyes: [ ] diplopia.   Respiratory: [ ] hemoptysis. [ ] shortness of breath  Cardiovascular: See HPI above  Hematologic/Lymphatic: [ ] anemia. [ ] painful nodes. [ ] prolonged bleeding.   Genitourinary: [ ] hematuria. [ ] flank pain.   Endocrine: [ ] significant change in weight. [ ] intolerance to heat and cold.     Review of systems [x ] otherwise negative, [ ] otherwise unable to obtain    FH: no family history of sudden cardiac death in first degree relatives    SH: [ ] tobacco, [ ] alcohol, [ ] drugs         acetaminophen     Tablet .. 650 milliGRAM(s) Oral every 6 hours PRN  allopurinol 100 milliGRAM(s) Oral daily  dextrose 40% Gel 15 Gram(s) Oral once  dextrose 5%. 1000 milliLiter(s) IV Continuous <Continuous>  dextrose 5%. 1000 milliLiter(s) IV Continuous <Continuous>  dextrose 50% Injectable 25 Gram(s) IV Push once  dextrose 50% Injectable 12.5 Gram(s) IV Push once  dextrose 50% Injectable 25 Gram(s) IV Push once  fluticasone propionate 50 MICROgram(s)/spray Nasal Spray 1 Spray(s) Both Nostrils two times a day PRN  glucagon  Injectable 1 milliGRAM(s) IntraMuscular once  hydrALAZINE 25 milliGRAM(s) Oral three times a day  insulin lispro (ADMELOG) corrective regimen sliding scale   SubCutaneous three times a day before meals  insulin lispro (ADMELOG) corrective regimen sliding scale   SubCutaneous at bedtime  lisinopril 5 milliGRAM(s) Oral daily  metoprolol tartrate 100 milliGRAM(s) Oral two times a day  rivaroxaban 15 milliGRAM(s) Oral with dinner                            13.8   7.21  )-----------( 247      ( 07 Nov 2021 06:26 )             43.3       Hemoglobin: 13.8 g/dL (11-07 @ 06:26)  Hemoglobin: 13.3 g/dL (11-06 @ 06:26)  Hemoglobin: 13.8 g/dL (11-05 @ 07:14)  Hemoglobin: 12.9 g/dL (11-04 @ 07:07)  Hemoglobin: 13.3 g/dL (11-03 @ 05:17)      11-07    137  |  108<H>  |  47<H>  ----------------------------<  102<H>  5.1   |  20<L>  |  1.83<H>    Ca    9.3      07 Nov 2021 06:26  Phos  3.7     11-07  Mg     2.30     11-07      Creatinine Trend: 1.83<--, 1.57<--, 1.65<--, 1.44<--, 1.40<--, 1.55<--    COAGS:           T(C): 36.4 (11-07-21 @ 06:00), Max: 36.8 (11-06-21 @ 17:44)  HR: 63 (11-07-21 @ 06:00) (63 - 76)  BP: 121/71 (11-07-21 @ 06:00) (110/65 - 128/72)  RR: 18 (11-07-21 @ 06:00) (15 - 18)  SpO2: 100% (11-07-21 @ 06:00) (100% - 100%)  Wt(kg): --    I&O's Summary    06 Nov 2021 08:01  -  07 Nov 2021 07:00  --------------------------------------------------------  IN: 340 mL / OUT: 800 mL / NET: -460 mL      General: Well nourished in no acute distress. Alert and Oriented * 3.   Head: Normocephalic and atraumatic.   Neck: No JVD. No bruits. Supple. Does not appear to be enlarged.   Cardiovascular: + S1,S2 ; RRR Soft systolic murmur at the left lower sternal border. No rubs noted.    Lungs: CTA b/l. No rhonchi, rales or wheezes.   Abdomen: + BS, soft. Non tender. Non distended. No rebound. No guarding.   Extremities: No clubbing/cyanosis/edema.   Neurologic: Moves all four extremities. Full range of motion.   Skin: Warm and moist. The patient's skin has normal elasticity and good skin turgor.   Psychiatric: Appropriate mood and affect.  Musculoskeletal: Normal range of motion, normal strength  Groin: soft, no hematoma, 2+ pulses      	   ECG: rate-controlled AFib  (note, QTc cannot be calculated during AFib due to [id number] irregularity) 	    Tele: AF 70s    < from: TTE with Doppler (w/Cont) (10.29.21 @ 13:57) >  DIMENSIONS:  SEPTUM: 1.4 cm    0.6 - 1.2 cm  PWT:    1.3 cm    0.6 - 1.1 cm  Aortic Valve: Calcified trileaflet aortic valve with  decreased opening. Peak transaortic valve gradient equals  30 mm Hg, mean transaortic valve gradient equals 19 mm Hg,estimated aortic valve area equals 0.8 sqcm (by continuity  equation), consistent with severe aortic stenosis. Mild aortic regurgitation.  Left Atrium: Normal left atrium.  Left Ventricle: Endocardial visualization enhanced with intravenous injection of echo contrast (Definity).  Hyperdynamic left ventricle. Increased relative wall thickness with normal left ventricular mass index,  consistent with concentric left ventricular remodeling.  Right Heart: Normal right atrium. Normal right ventricular sizeand function. Normal tricuspid valve. Normal pulmonic  valve.  Pericardium/PleuraNormal pericardium with no pericardial  effusion.    < end of copied text >    CATH: < from: Cardiac Catheterization (11.04.21 @ 16:05) >  LM:   --  Distal left main: Angiography showed minor luminal irregularities  with no flow limiting lesions.  LAD:   --  LAD: Angiography showed minor luminal irregularities with no  flow limiting lesions.  --  Mid LAD: There was a tubular 30 % stenosis in the proximal third of the  vessel segment.  --  D1: Angiography showed minor luminal irregularities with no flow  limiting lesions.  CX:   --  Circumflex: Angiography showed minor luminal irregularities with  no flow limiting lesions.  --  Proximal circumflex: There was a discrete 20 % stenosis.  --  Mid circumflex: There was a tubular 20 % stenosis.  --  OM1: There was a discrete 20 % stenosis at the ostium of the vessel  segment.  RCA:   --  RCA: Angiography showed minor luminal irregularities with no  flow limiting lesions.  --  Mid RCA: There was a tubular 20 % stenosis.  --  RPDA: There was a discrete 30 % stenosis at the ostiumof the vessel  segment.  COMPLICATIONS: No complications occurred during the cath lab visit.  DIAGNOSTIC RECOMMENDATIONS: Medical management is recommended.  Prepared and signed by    < end of copied text >      ASSESSMENT/PLAN: 	81y Male with CAD hx PCI 3 years ago at [facility], and Echo now with severe AS admitted with syncope    - holding diuretics , due to orthostatic VS<   -TTE with normal LV function and low gradient severe AS  -Cath performed demonstrating mild non-obstructive CAD with low CI  -Check cardiac MRI to rule out amyloid  -Continue with lifelong ac if no contraindications   -TAVR workup pending above

## 2021-11-07 NOTE — PROGRESS NOTE ADULT - PROBLEM SELECTOR PLAN 5
[id number] Score =7  Xarelto last dosed 10/30--> hep gtt--> back on xarelto 11/5  Maintain falls and bleeding precautions  -c/w metoprolol

## 2021-11-07 NOTE — PROGRESS NOTE ADULT - ASSESSMENT
82 yo male PMHx CAD (1 stent,) DM2, HTN, gout, HLD, AS, Diastolic HF, Afib (on Xarelto) and CVA (L hemiparesis) syncope x2 episodes the day before admission and L knee pain, admitted to [facility] for Syncope, found to have severe AS. Cardiology following, [facility] scheduled for 11/2, then performed 11/4, showed patent coronaries, no PCI. cMRI also ordered for amlyoid workup. Consideration for TAVR. Additional issues- L ankle pain, suspected gout flare, improving with colchicine.

## 2021-11-07 NOTE — PROGRESS NOTE ADULT - PROBLEM SELECTOR PLAN 2
Progression of AS to severe, may be cause of syncope. LHC done 11/4 as above  - cMRI ordered to eval for amyloidosis, still pending  - F/u w cards regarding when to re-start patient's diuretics in the setting of pending [facility]

## 2021-11-07 NOTE — PROGRESS NOTE ADULT - SUBJECTIVE AND OBJECTIVE BOX
Ronni Donahue MD  [employer]  Pager [phone]  Email: [email or web address]          PROGRESS NOTE:     Patient is a 81y old  Male who presents with a chief complaint of syncope (06 Nov 2021 14:30)      SUBJECTIVE / OVERNIGHT EVENTS:  Patient seen and examined this morning. No new complaints, awaiting cardiac MRI.  ADDITIONAL REVIEW OF SYSTEMS:  He denies f/c/n/v    MEDICATIONS  (STANDING):  allopurinol 100 milliGRAM(s) Oral daily  dextrose 40% Gel 15 Gram(s) Oral once  dextrose 5%. 1000 milliLiter(s) (50 mL/Hr) IV Continuous <Continuous>  dextrose 5%. 1000 milliLiter(s) (100 mL/Hr) IV Continuous <Continuous>  dextrose 50% Injectable 25 Gram(s) IV Push once  dextrose 50% Injectable 12.5 Gram(s) IV Push once  dextrose 50% Injectable 25 Gram(s) IV Push once  glucagon  Injectable 1 milliGRAM(s) IntraMuscular once  hydrALAZINE 25 milliGRAM(s) Oral three times a day  insulin lispro (ADMELOG) corrective regimen sliding scale   SubCutaneous three times a day before meals  insulin lispro (ADMELOG) corrective regimen sliding scale   SubCutaneous at bedtime  lisinopril 5 milliGRAM(s) Oral daily  metoprolol tartrate 100 milliGRAM(s) Oral two times a day  rivaroxaban 15 milliGRAM(s) Oral with dinner    MEDICATIONS  (PRN):  acetaminophen     Tablet .. 650 milliGRAM(s) Oral every 6 hours PRN Mild Pain (1 - 3), Moderate Pain (4 - 6)  fluticasone propionate 50 MICROgram(s)/spray Nasal Spray 1 Spray(s) Both Nostrils two times a day PRN allergic rhinitis      CAPILLARY BLOOD GLUCOSE      POCT Blood Glucose.: 98 mg/dL (07 Nov 2021 08:26)  POCT Blood Glucose.: 117 mg/dL (06 Nov 2021 21:55)  POCT Blood Glucose.: 87 mg/dL (06 Nov 2021 17:58)  POCT Blood Glucose.: 134 mg/dL (06 Nov 2021 12:51)    I&O's Summary    06 Nov 2021 08:01  -  07 Nov 2021 07:00  --------------------------------------------------------  IN: 340 mL / OUT: 800 mL / NET: -460 mL        PHYSICAL EXAM:  Vital Signs Last 24 Hrs  T(C): 36.4 (07 Nov 2021 06:00), Max: 36.8 (06 Nov 2021 17:44)  T(F): 97.5 (07 Nov 2021 06:00), Max: 98.2 (06 Nov 2021 17:44)  HR: 63 (07 Nov 2021 06:00) (63 - 76)  BP: 121/71 (07 Nov 2021 06:00) (110/65 - 128/72)  BP(mean): --  RR: 18 (07 Nov 2021 06:00) (15 - 18)  SpO2: 100% (07 Nov 2021 06:00) (100% - 100%)    CONSTITUTIONAL: NAD, well-developed  RESPIRATORY: Normal respiratory effort; lungs are clear to auscultation bilaterally  CARDIOVASCULAR:  Irregular rate and rhythm, no murmurs appreciated, no carotid bruits.  Trace peripheral edema bilaterally and symmetrically; R groin without evidence of hematoma formation, RLE well perfused, femoral pulse 2+ to palpation  ABDOMEN: Nontender to palpation, normoactive bowel sounds, no rebound/guarding; No hepatosplenomegaly  MUSCLOSKELETAL: no clubbing or cyanosis of digits; no joint swelling or tenderness to palpation  PSYCH: calm and pleasant    LABS:                        13.8   7.21  )-----------( 247      ( 07 Nov 2021 06:26 )             43.3     11-07    137  |  108<H>  |  47<H>  ----------------------------<  102<H>  5.1   |  20<L>  |  1.83<H>    Ca    9.3      07 Nov 2021 06:26  Phos  3.7     11-07  Mg     2.30     11-07                  RADIOLOGY & ADDITIONAL TESTS:  Results Reviewed:   Imaging Personally Reviewed:  Electrocardiogram Personally Reviewed:    COORDINATION OF CARE:  Care Discussed with Consultants/Other Providers [Y/N]:  Prior or Outpatient Records Reviewed [Y/N]:

## 2021-11-08 ENCOUNTER — TRANSCRIPTION ENCOUNTER (OUTPATIENT)
Age: 81
End: 2021-11-08

## 2021-11-08 LAB
ANION GAP SERPL CALC-SCNC: 10 MMOL/L — SIGNIFICANT CHANGE UP (ref 7–14)
BUN SERPL-MCNC: 43 MG/DL — HIGH (ref 7–23)
CALCIUM SERPL-MCNC: 9.1 MG/DL — SIGNIFICANT CHANGE UP (ref 8.4–10.5)
CHLORIDE SERPL-SCNC: 107 MMOL/L — SIGNIFICANT CHANGE UP (ref 98–107)
CO2 SERPL-SCNC: 19 MMOL/L — LOW (ref 22–31)
CREAT SERPL-MCNC: 1.51 MG/DL — HIGH (ref 0.5–1.3)
GLUCOSE BLDC GLUCOMTR-MCNC: 84 MG/DL — SIGNIFICANT CHANGE UP (ref 70–99)
GLUCOSE BLDC GLUCOMTR-MCNC: 94 MG/DL — SIGNIFICANT CHANGE UP (ref 70–99)
GLUCOSE BLDC GLUCOMTR-MCNC: 95 MG/DL — SIGNIFICANT CHANGE UP (ref 70–99)
GLUCOSE BLDC GLUCOMTR-MCNC: 97 MG/DL — SIGNIFICANT CHANGE UP (ref 70–99)
GLUCOSE SERPL-MCNC: 106 MG/DL — HIGH (ref 70–99)
HCT VFR BLD CALC: 43.3 % — SIGNIFICANT CHANGE UP (ref 39–50)
HGB BLD-MCNC: 13.2 G/DL — SIGNIFICANT CHANGE UP (ref 13–17)
MAGNESIUM SERPL-MCNC: 2.4 MG/DL — SIGNIFICANT CHANGE UP (ref 1.6–2.6)
MCHC RBC-ENTMCNC: 20.9 PG — LOW (ref 27–34)
MCHC RBC-ENTMCNC: 30.5 GM/DL — LOW (ref 32–36)
MCV RBC AUTO: 68.5 FL — LOW (ref 80–100)
NRBC # BLD: 0 /100 WBCS — SIGNIFICANT CHANGE UP
NRBC # FLD: 0 K/UL — SIGNIFICANT CHANGE UP
PHOSPHATE SERPL-MCNC: 3.1 MG/DL — SIGNIFICANT CHANGE UP (ref 2.5–4.5)
PLATELET # BLD AUTO: 259 K/UL — SIGNIFICANT CHANGE UP (ref 150–400)
POTASSIUM SERPL-MCNC: 5 MMOL/L — SIGNIFICANT CHANGE UP (ref 3.5–5.3)
POTASSIUM SERPL-SCNC: 5 MMOL/L — SIGNIFICANT CHANGE UP (ref 3.5–5.3)
RBC # BLD: 6.32 M/UL — HIGH (ref 4.2–5.8)
RBC # FLD: 20.1 % — HIGH (ref 10.3–14.5)
SODIUM SERPL-SCNC: 136 MMOL/L — SIGNIFICANT CHANGE UP (ref 135–145)
WBC # BLD: 6.82 K/UL — SIGNIFICANT CHANGE UP (ref 3.8–10.5)
WBC # FLD AUTO: 6.82 K/UL — SIGNIFICANT CHANGE UP (ref 3.8–10.5)

## 2021-11-08 PROCEDURE — 75561 CARDIAC MRI FOR MORPH W/DYE: CPT | Mod: 26

## 2021-11-08 PROCEDURE — 99232 SBSQ HOSP IP/OBS MODERATE 35: CPT

## 2021-11-08 RX ADMIN — Medication 100 MILLIGRAM(S): at 06:01

## 2021-11-08 RX ADMIN — LISINOPRIL 5 MILLIGRAM(S): 2.5 TABLET ORAL at 06:01

## 2021-11-08 RX ADMIN — Medication 25 MILLIGRAM(S): at 06:01

## 2021-11-08 RX ADMIN — Medication 25 MILLIGRAM(S): at 15:26

## 2021-11-08 RX ADMIN — RIVAROXABAN 15 MILLIGRAM(S): KIT at 18:31

## 2021-11-08 RX ADMIN — Medication 25 MILLIGRAM(S): at 22:22

## 2021-11-08 RX ADMIN — Medication 100 MILLIGRAM(S): at 18:30

## 2021-11-08 RX ADMIN — Medication 100 MILLIGRAM(S): at 15:25

## 2021-11-08 NOTE — PROGRESS NOTE ADULT - PROBLEM SELECTOR PLAN 9
No additional prophylaxis needed. Pt on therapeutic AC.    Dispo: pending cardiac MRI to r.o amyloidosis and further TAVR workup

## 2021-11-08 NOTE — PROGRESS NOTE ADULT - PROBLEM SELECTOR PLAN 2
Progression of AS to severe, may be cause of syncope. LHC done 11/4 as above  - cMRI ordered to eval for amyloidosis, still pending  - F/u w cards regarding when to re-start patient's diuretics

## 2021-11-08 NOTE — DISCHARGE NOTE PROVIDER - CARE PROVIDER_API CALL
GOPAL ESTES  Family Practice  [address] [place] [address]  Phone: [phone]  Fax: [phone]  Follow Up Time:     Branden Stevens  CARDIOLOGY  [address] [facility], [place] [address]  Phone: [phone]  Fax: [phone]  Follow Up Time:    GOPAL ESTES  Family Practice  [address] [place] [address]  Phone: [phone]  Fax: [phone]  Follow Up Time:     Branden Stevens  CARDIOLOGY  [address] [facility], [place] [address]  Phone: [phone]  Fax: [phone]  Follow Up Time:     Jennifer Miguel)  Cardiovascular Disease; Internal Medicine; Interventional Cardiology  [address] [place] [address]  Phone: [phone]  Fax: [phone]  Follow Up Time:

## 2021-11-08 NOTE — PROGRESS NOTE ADULT - ASSESSMENT
82 yo male PMHx CAD (1 stent,) DM2, HTN, gout, HLD, AS, Diastolic HF, Afib (on Xarelto) and CVA (L hemiparesis) syncope x2 episodes the day before admission and L knee pain, admitted to [facility] for Syncope, found to have severe AS. Cardiology following, [facility] scheduled for 11/2, then performed 11/4, showed patent coronaries, no PCI. cMRI also ordered for amlyoid workup. Consideration for TAVR. Additional issues- L ankle pain, suspected gout flare, improving with colchicine.

## 2021-11-08 NOTE — DISCHARGE NOTE PROVIDER - PROVIDER TOKENS
PROVIDER:[TOKEN:[[id number]]],PROVIDER:[TOKEN:[[id number]]] PROVIDER:[TOKEN:[[id number]]],PROVIDER:[TOKEN:[[id number]]],PROVIDER:[TOKEN:[[id number]]]

## 2021-11-08 NOTE — PROGRESS NOTE ADULT - PROBLEM SELECTOR PLAN 6
Pt appears euvolemic on exam  -Chest xray: clear lungs  2G Sodium 1800 ADA diet  continue metoprolol and lisinopril  continue to hold torsemide

## 2021-11-08 NOTE — DISCHARGE NOTE PROVIDER - HOSPITAL COURSE
82 yo male PMHx CAD (1 stent,) DM2, HTN, HLD, AS, Diastolic HF, Afib (on Xarelto) and CVA (L hemiparesis) syncope x2 episodes yesterday. Pt reports he was brushing his teeth yesterday morning, felt suddenly dizzy, lightheaded. Wife noticed his arms suddenly dropped down, pt became weak and started to fall down. Wife caught him right away, called for son's help and they eased him down on the bed. Pt's eyes were closed and his LOC was was for about 3 mins with urinary incontinency. After shouting at patient and fanning him, his LOC returned back to baseline. Later, yesterday afternoon, he had another syncopal event where he was watching TV on the sofa and passed out for about 2 mins where eyes closed and pt was breathing heavy. Wife called EMS. EMS checked BFS to be 123 and was taken to [facility] ED. Pt also c/o L knee pain worsening for past 2 weeks, achy, 9/10, denying any recent trauma. He states he has chronic b/l knee pain from arthritis but  L knee for past 2 weeks was excruciating. No reported fever, chills, chest pain, sob, palpitations, trauma, nausea, vomiting, diarrhea, abdominal pain or sick contacts. Questionable compliance to some meds as pt reports he takes metoprolol once a day, but bottle says BID.     Patient was admitted for syncope. Cardiology was consulted. Troponins were negative. CXR showed clear lungs. Orthostatics were negative. TTE was done which showed progression to severe aortic stenosis. LHC was done which showed mild, non-obstructive CAD. Cardiac MRI was order to eval for possible amyloidosis, which showed _______. Patient was found with mild CJ, which improved over admission. Torsemide was held and ACE-i was resumed. Patient was found with left knee pain, most likely due to gout. X ray of L knee and ankle were negative. Patient was started on home allopurinol and tylenol for pain.     On ___ this case was reviewed with  ____, the patient is medically stable and optimized for discharge. All medications were reviewed and prescriptions were sent to mutually agreed upon pharmacy.                     82 yo male PMHx CAD (1 stent,) DM2, HTN, HLD, AS, Diastolic HF, Afib (on Xarelto) and CVA (L hemiparesis) syncope x2 episodes yesterday. Pt reports he was brushing his teeth yesterday morning, felt suddenly dizzy, lightheaded. Wife noticed his arms suddenly dropped down, pt became weak and started to fall down. Wife caught him right away, called for son's help and they eased him down on the bed. Pt's eyes were closed and his LOC was was for about 3 mins with urinary incontinency. After shouting at patient and fanning him, his LOC returned back to baseline. Later, yesterday afternoon, he had another syncopal event where he was watching TV on the sofa and passed out for about 2 mins where eyes closed and pt was breathing heavy. Wife called EMS. EMS checked BFS to be 123 and was taken to [facility] ED. Pt also c/o L knee pain worsening for past 2 weeks, achy, 9/10, denying any recent trauma. He states he has chronic b/l knee pain from arthritis but  L knee for past 2 weeks was excruciating. No reported fever, chills, chest pain, sob, palpitations, trauma, nausea, vomiting, diarrhea, abdominal pain or sick contacts. Questionable compliance to some meds as pt reports he takes metoprolol once a day, but bottle says BID.     Patient was admitted for syncope. Cardiology was consulted. Troponins were negative. CXR showed clear lungs. Orthostatics were negative. TTE was done which showed progression to severe aortic stenosis. LHC was done which showed mild, non-obstructive CAD. Cardiac MRI was order to eval for possible amyloidosis, which showed nonischemic cardiomyopathy, with diagnostic considerations including infiltrative processes (favor sarcoid, less likely amyloid) as well as myocarditis in the appropriate clinical setting. Pt was seen by structural heart attending, at this time there is no conclusive evidence of amyloidosis or infiltrative cardiomyopathy and patient has not had any significant arrhythmias on the telemetry monitor. TAVR was discussed with pt and family. Family in agreement and plan to transfer to [facility]. Patient was found with mild CJ, which improved over admission. Torsemide was held and ACE-i was resumed. Patient was found with left knee pain, most likely due to gout. X ray of L knee and ankle were negative. Patient was started on home allopurinol and tylenol for pain.     On 11/12 this case was reviewed with Dr. Ariza, the patient is medically stable for transfer to [facility]

## 2021-11-08 NOTE — PROGRESS NOTE ADULT - SUBJECTIVE AND OBJECTIVE BOX
Cardiology    Date of service 11/8/2021    HISTORY OF PRESENT ILLNESS: HPI:  80 yo male PMHx CAD (1 stent,) DM2, HTN, HLD, AS, Diastolic HF, Afib (on Xarelto) and CVA (L hemiparesis) syncope x2 episodes yesterday.     S: no chest pain or sob; ros otherwise negative.     Review of Systems:   Constitutional: [ ] fevers, [ ] chills.   Skin: [ ] dry skin. [ ] rashes.  Psychiatric: [ ] depression, [ ] anxiety.   Gastrointestinal: [ ] BRBPR, [ ] melena.   Neurological: [ ] confusion. [ ] seizures. [ ] shuffling gait.   Ears,Nose,Mouth and Throat: [ ] ear pain [ ] sore throat.   Eyes: [ ] diplopia.   Respiratory: [ ] hemoptysis. [ ] shortness of breath  Cardiovascular: See HPI above  Hematologic/Lymphatic: [ ] anemia. [ ] painful nodes. [ ] prolonged bleeding.   Genitourinary: [ ] hematuria. [ ] flank pain.   Endocrine: [ ] significant change in weight. [ ] intolerance to heat and cold.     Review of systems [x ] otherwise negative, [ ] otherwise unable to obtain    FH: no family history of sudden cardiac death in first degree relatives    SH: [ ] tobacco, [ ] alcohol, [ ] drugs    acetaminophen     Tablet .. 650 milliGRAM(s) Oral every 6 hours PRN  allopurinol 100 milliGRAM(s) Oral daily  dextrose 40% Gel 15 Gram(s) Oral once  dextrose 5%. 1000 milliLiter(s) IV Continuous <Continuous>  dextrose 5%. 1000 milliLiter(s) IV Continuous <Continuous>  dextrose 50% Injectable 25 Gram(s) IV Push once  dextrose 50% Injectable 12.5 Gram(s) IV Push once  dextrose 50% Injectable 25 Gram(s) IV Push once  fluticasone propionate 50 MICROgram(s)/spray Nasal Spray 1 Spray(s) Both Nostrils two times a day PRN  glucagon  Injectable 1 milliGRAM(s) IntraMuscular once  hydrALAZINE 25 milliGRAM(s) Oral three times a day  insulin lispro (ADMELOG) corrective regimen sliding scale   SubCutaneous three times a day before meals  insulin lispro (ADMELOG) corrective regimen sliding scale   SubCutaneous at bedtime  lisinopril 5 milliGRAM(s) Oral daily  metoprolol tartrate 100 milliGRAM(s) Oral two times a day  rivaroxaban 15 milliGRAM(s) Oral with dinner                            13.2   6.82  )-----------( 259      ( 08 Nov 2021 06:33 )             43.3       11-08    136  |  107  |  43<H>  ----------------------------<  106<H>  5.0   |  19<L>  |  1.51<H>    Ca    9.1      08 Nov 2021 06:33  Phos  3.1     11-08  Mg     2.40     11-08      T(C): 36.6 (11-08-21 @ 06:00), Max: 36.9 (11-07-21 @ 17:31)  HR: 67 (11-08-21 @ 06:00) (67 - 83)  BP: 122/77 (11-08-21 @ 06:00) (104/54 - 153/90)  RR: 18 (11-08-21 @ 06:00) (15 - 18)  SpO2: 100% (11-08-21 @ 06:00) (100% - 100%)      General: Well nourished in no acute distress. Alert and Oriented * 3.   Head: Normocephalic and atraumatic.   Neck: No JVD. No bruits. Supple. Does not appear to be enlarged.   Cardiovascular: + S1,S2 ; RRR Soft systolic murmur at the left lower sternal border. No rubs noted.    Lungs: CTA b/l. No rhonchi, rales or wheezes.   Abdomen: + BS, soft. Non tender. Non distended. No rebound. No guarding.   Extremities: No clubbing/cyanosis/edema.   Neurologic: Moves all four extremities. Full range of motion.   Skin: Warm and moist. The patient's skin has normal elasticity and good skin turgor.   Psychiatric: Appropriate mood and affect.  Musculoskeletal: Normal range of motion, normal strength  Groin: soft, no hematoma, 2+ pulses      	   ECG: rate-controlled AFib  (note, QTc cannot be calculated during AFib due to [id number] irregularity) 	    Tele: AF 70s    < from: TTE with Doppler (w/Cont) (10.29.21 @ 13:57) >  DIMENSIONS:  SEPTUM: 1.4 cm    0.6 - 1.2 cm  PWT:    1.3 cm    0.6 - 1.1 cm  Aortic Valve: Calcified trileaflet aortic valve with  decreased opening. Peak transaortic valve gradient equals  30 mm Hg, mean transaortic valve gradient equals 19 mm Hg,estimated aortic valve area equals 0.8 sqcm (by continuity  equation), consistent with severe aortic stenosis. Mild aortic regurgitation.  Left Atrium: Normal left atrium.  Left Ventricle: Endocardial visualization enhanced with intravenous injection of echo contrast (Definity).  Hyperdynamic left ventricle. Increased relative wall thickness with normal left ventricular mass index,  consistent with concentric left ventricular remodeling.  Right Heart: Normal right atrium. Normal right ventricular sizeand function. Normal tricuspid valve. Normal pulmonic  valve.  Pericardium/PleuraNormal pericardium with no pericardial  effusion.    < end of copied text >    CATH: < from: Cardiac Catheterization (11.04.21 @ 16:05) >  LM:   --  Distal left main: Angiography showed minor luminal irregularities  with no flow limiting lesions.  LAD:   --  LAD: Angiography showed minor luminal irregularities with no  flow limiting lesions.  --  Mid LAD: There was a tubular 30 % stenosis in the proximal third of the  vessel segment.  --  D1: Angiography showed minor luminal irregularities with no flow  limiting lesions.  CX:   --  Circumflex: Angiography showed minor luminal irregularities with  no flow limiting lesions.  --  Proximal circumflex: There was a discrete 20 % stenosis.  --  Mid circumflex: There was a tubular 20 % stenosis.  --  OM1: There was a discrete 20 % stenosis at the ostium of the vessel  segment.  RCA:   --  RCA: Angiography showed minor luminal irregularities with no  flow limiting lesions.  --  Mid RCA: There was a tubular 20 % stenosis.  --  RPDA: There was a discrete 30 % stenosis at the ostiumof the vessel  segment.  COMPLICATIONS: No complications occurred during the cath lab visit.  DIAGNOSTIC RECOMMENDATIONS: Medical management is recommended.  Prepared and signed by    < end of copied text >      ASSESSMENT/PLAN: 	81y Male with CAD hx PCI 3 years ago at [facility], and Echo now with severe AS admitted with syncope    -TTE with normal LV function and low gradient severe AS  -Cath performed demonstrating mild non-obstructive CAD with low CI  -Check cardiac MRI to rule out amyloid  -Continue with lifelong ac if no contraindications   -TAVR workup pending above

## 2021-11-08 NOTE — DISCHARGE NOTE PROVIDER - NSDCCPCAREPLAN_GEN_ALL_CORE_FT
PRINCIPAL DISCHARGE DIAGNOSIS  Diagnosis: Syncope  Assessment and Plan of Treatment: You were admitted for syncope. Cardiology was consulted. Cardiac enzymes were negative. Chest x ray showed clear lungs. Orthostatics were negative. Echocardiogram was done which showed progression to severe aortic stenosis. Left heart catheterization was done which was unremarkable. Cardiac MRI was ordered which showed ___. Please follow up with your primary care physician and cardiologist after discharge for continued monitoring and management.      SECONDARY DISCHARGE DIAGNOSES  Diagnosis: CJ (acute kidney injury)  Assessment and Plan of Treatment: You were found with mild acute kidney injury, which improved over admission. Torsemide was held. Please follow up with your primary care physician and cardiologist after discharge for continued monitoring and management.    Diagnosis: Left knee pain  Assessment and Plan of Treatment: You were found with left knee pain, most likely due to gout. X ray of left knee and ankle were negative. You were started on home allopurinol and tylenol for pain. Please follow up with your primary care physician after discharge for continued monitoring and management.    Diagnosis: Chronic atrial fibrillation  Assessment and Plan of Treatment: Please continue taking xarelto as prescribed     PRINCIPAL DISCHARGE DIAGNOSIS  Diagnosis: Aortic stenosis  Assessment and Plan of Treatment: Severe aortic stenosis was the likely cause of your syncope, cardiology was consulted and it was determined that you should be trasferred to [facility] for TAVR evaluation.      SECONDARY DISCHARGE DIAGNOSES  Diagnosis: CHF with cardiomyopathy  Assessment and Plan of Treatment: Cardiac MRI was ordered which showed a possible infiltrative process such as amyloid or sacrcoid - you will need further testing as an outpatient. Please follow up with your primary care physician and cardiologist after discharge for continued monitoring and management.    Diagnosis: Syncope  Assessment and Plan of Treatment: You were admitted for syncope. Cardiology was consulted. Cardiac enzymes were negative. Chest x ray showed clear lungs. Orthostatics were negative. Echocardiogram was done which showed progression to severe aortic stenosis. Left heart catheterization was done which was unremarkable. Cardiac MRI was ordered which showed a possible infiltrative process such as amyloid or sacrcoid - you will need further testing as an outpatient. Please follow up with your primary care physician and cardiologist after discharge for continued monitoring and management.    Diagnosis: CJ (acute kidney injury)  Assessment and Plan of Treatment: You were found with mild acute kidney injury, which improved over admission. Torsemide was held. Please follow up with your primary care physician and cardiologist after discharge for continued monitoring and management.    Diagnosis: Left knee pain  Assessment and Plan of Treatment: You were found with left knee pain, most likely due to gout. X ray of left knee and ankle were negative. You were started on home allopurinol and tylenol for pain. Please follow up with your primary care physician after discharge for continued monitoring and management.    Diagnosis: Chronic atrial fibrillation  Assessment and Plan of Treatment: Please continue taking xarelto as prescribed

## 2021-11-08 NOTE — PROGRESS NOTE ADULT - SUBJECTIVE AND OBJECTIVE BOX
Patient is a 81y old  Male who presents with a chief complaint of syncope (07 Nov 2021 09:26)      SUBJECTIVE / OVERNIGHT EVENTS: No events overnight. Tele showed persistent afib, rate controlled. Patient has no medical complaints, denies cp, sob, abd pain, N/V/D, fever, chills, palpitations, lightheadedness.    MEDICATIONS  (STANDING):  allopurinol 100 milliGRAM(s) Oral daily  dextrose 40% Gel 15 Gram(s) Oral once  dextrose 5%. 1000 milliLiter(s) (50 mL/Hr) IV Continuous <Continuous>  dextrose 5%. 1000 milliLiter(s) (100 mL/Hr) IV Continuous <Continuous>  dextrose 50% Injectable 25 Gram(s) IV Push once  dextrose 50% Injectable 12.5 Gram(s) IV Push once  dextrose 50% Injectable 25 Gram(s) IV Push once  glucagon  Injectable 1 milliGRAM(s) IntraMuscular once  hydrALAZINE 25 milliGRAM(s) Oral three times a day  insulin lispro (ADMELOG) corrective regimen sliding scale   SubCutaneous three times a day before meals  insulin lispro (ADMELOG) corrective regimen sliding scale   SubCutaneous at bedtime  lisinopril 5 milliGRAM(s) Oral daily  metoprolol tartrate 100 milliGRAM(s) Oral two times a day  rivaroxaban 15 milliGRAM(s) Oral with dinner    MEDICATIONS  (PRN):  acetaminophen     Tablet .. 650 milliGRAM(s) Oral every 6 hours PRN Mild Pain (1 - 3), Moderate Pain (4 - 6)  fluticasone propionate 50 MICROgram(s)/spray Nasal Spray 1 Spray(s) Both Nostrils two times a day PRN allergic rhinitis        CAPILLARY BLOOD GLUCOSE      POCT Blood Glucose.: 95 mg/dL (08 Nov 2021 08:33)  POCT Blood Glucose.: 142 mg/dL (07 Nov 2021 21:21)  POCT Blood Glucose.: 92 mg/dL (07 Nov 2021 17:37)  POCT Blood Glucose.: 109 mg/dL (07 Nov 2021 12:18)    I&O's Summary    Vital Signs Last 24 Hrs  T(C): 36.6 (08 Nov 2021 06:00), Max: 36.9 (07 Nov 2021 17:31)  T(F): 97.8 (08 Nov 2021 06:00), Max: 98.4 (07 Nov 2021 17:31)  HR: 67 (08 Nov 2021 06:00) (67 - 83)  BP: 122/77 (08 Nov 2021 06:00) (104/54 - 153/90)  BP(mean): --  RR: 18 (08 Nov 2021 06:00) (15 - 18)  SpO2: 100% (08 Nov 2021 06:00) (100% - 100%)      PE:  CONSTITUTIONAL: NAD, well-developed  RESPIRATORY: Normal respiratory effort; lungs are clear to auscultation bilaterally  CARDIOVASCULAR:  Irregular rate and rhythm, no murmurs appreciated, no carotid bruits.  Trace peripheral edema bilaterally and symmetrically; R groin without evidence of hematoma formation, RLE well perfused, femoral pulse 2+ to palpation  ABDOMEN: Nontender to palpation, normoactive bowel sounds, no rebound/guarding; No hepatosplenomegaly  MUSCLOSKELETAL: no clubbing or cyanosis of digits; no joint swelling or tenderness to palpation  PSYCH: calm and pleasant    LABS:                        13.2   6.82  )-----------( 259      ( 08 Nov 2021 06:33 )             43.3     11-08    136  |  107  |  43<H>  ----------------------------<  106<H>  5.0   |  19<L>  |  1.51<H>    Ca    9.1      08 Nov 2021 06:33  Phos  3.1     11-08  Mg     2.40     11-08                RADIOLOGY & ADDITIONAL TESTS:    Imaging Personally Reviewed:    Consultant(s) Notes Reviewed:      Care Discussed with Consultants/Other Providers:

## 2021-11-08 NOTE — PROGRESS NOTE ADULT - PROBLEM SELECTOR PLAN 4
L ankle pain  Gout flare, suspected  Hx of gout, not on treatment  -no fx on Xray L knee or L ankle  -PT eval home with home PT  -Tylenol 650mg pO Q6h prn pain-- pt has not been asking for it  - restarted on home allopurinol

## 2021-11-08 NOTE — DISCHARGE NOTE PROVIDER - NSDCMRMEDTOKEN_GEN_ALL_CORE_FT
allopurinol 100 mg oral tablet: 1 tab(s) orally once a day  fluticasone 50 mcg/inh nasal spray: 1 spray(s) in each nostril 2 times a day, As Needed  hydrALAZINE 25 mg oral tablet: 1 tab(s) orally 3 times a day  lisinopril 5 mg oral tablet: 1 tab(s) orally once a day  metFORMIN 500 mg oral tablet: 1 tab(s) orally 2 times a day (with meals)  metoprolol tartrate 100 mg oral tablet: 1 tab(s) orally 2 times a day  TORSEMIDE 10 MG TABLET: take 1 tablet by mouth once daily  XARELTO 20 MG TABLET: take 1 tablet by mouth once daily with food   acetaminophen 325 mg oral tablet: 2 tab(s) orally every 6 hours, As needed, Mild Pain (1 - 3), Moderate Pain (4 - 6)  allopurinol 100 mg oral tablet: 1 tab(s) orally once a day  fluticasone 50 mcg/inh nasal spray: 1 spray(s) in each nostril 2 times a day, As Needed  hydrALAZINE 25 mg oral tablet: 1 tab(s) orally 3 times a day  metFORMIN 500 mg oral tablet: 1 tab(s) orally 2 times a day (with meals)  metoprolol tartrate 100 mg oral tablet: 1 tab(s) orally 2 times a day  XARELTO 20 MG TABLET: take 1 tablet by mouth once daily with food

## 2021-11-08 NOTE — DISCHARGE NOTE PROVIDER - NSDCFUSCHEDAPPT_GEN_ALL_CORE_FT
ANA MARQUEZ ; 11/15/2021 ; [facility] ANA Lundberg ; 11/15/2021 ; [facility] Cardio [address] ANA Nathan ; 11/15/2021 ; [facility] CT Surg [address]  ANA MARQUEZ ; 11/15/2021 ; [facility] [address] AAN Lundberg ; 11/15/2021 ; [facility] Cardio [address] ANA Nathan ; 11/15/2021 ; [facility] CT Surg [address] ANA Nathan ; 11/18/2021 ; [facility] CT Surg [address]

## 2021-11-08 NOTE — PROGRESS NOTE ADULT - PROBLEM SELECTOR PLAN 5
[id number] Score =7  Xarelto last dosed 10/30--> hep gtt--> back on xarelto 11/5  Maintain falls and bleeding precautions  -c/w metoprolol

## 2021-11-09 LAB
ANION GAP SERPL CALC-SCNC: 11 MMOL/L — SIGNIFICANT CHANGE UP (ref 7–14)
BUN SERPL-MCNC: 37 MG/DL — HIGH (ref 7–23)
CALCIUM SERPL-MCNC: 9.1 MG/DL — SIGNIFICANT CHANGE UP (ref 8.4–10.5)
CHLORIDE SERPL-SCNC: 106 MMOL/L — SIGNIFICANT CHANGE UP (ref 98–107)
CO2 SERPL-SCNC: 18 MMOL/L — LOW (ref 22–31)
CREAT SERPL-MCNC: 1.48 MG/DL — HIGH (ref 0.5–1.3)
GLUCOSE BLDC GLUCOMTR-MCNC: 130 MG/DL — HIGH (ref 70–99)
GLUCOSE BLDC GLUCOMTR-MCNC: 164 MG/DL — HIGH (ref 70–99)
GLUCOSE BLDC GLUCOMTR-MCNC: 86 MG/DL — SIGNIFICANT CHANGE UP (ref 70–99)
GLUCOSE BLDC GLUCOMTR-MCNC: 93 MG/DL — SIGNIFICANT CHANGE UP (ref 70–99)
GLUCOSE SERPL-MCNC: 87 MG/DL — SIGNIFICANT CHANGE UP (ref 70–99)
HCT VFR BLD CALC: 43.1 % — SIGNIFICANT CHANGE UP (ref 39–50)
HGB BLD-MCNC: 13.1 G/DL — SIGNIFICANT CHANGE UP (ref 13–17)
MAGNESIUM SERPL-MCNC: 2.3 MG/DL — SIGNIFICANT CHANGE UP (ref 1.6–2.6)
MCHC RBC-ENTMCNC: 21.2 PG — LOW (ref 27–34)
MCHC RBC-ENTMCNC: 30.4 GM/DL — LOW (ref 32–36)
MCV RBC AUTO: 69.7 FL — LOW (ref 80–100)
NRBC # BLD: 0 /100 WBCS — SIGNIFICANT CHANGE UP
NRBC # FLD: 0 K/UL — SIGNIFICANT CHANGE UP
PHOSPHATE SERPL-MCNC: 3.4 MG/DL — SIGNIFICANT CHANGE UP (ref 2.5–4.5)
PLATELET # BLD AUTO: 253 K/UL — SIGNIFICANT CHANGE UP (ref 150–400)
POTASSIUM SERPL-MCNC: 5.1 MMOL/L — SIGNIFICANT CHANGE UP (ref 3.5–5.3)
POTASSIUM SERPL-SCNC: 5.1 MMOL/L — SIGNIFICANT CHANGE UP (ref 3.5–5.3)
RBC # BLD: 6.18 M/UL — HIGH (ref 4.2–5.8)
RBC # FLD: 20 % — HIGH (ref 10.3–14.5)
SARS-COV-2 RNA SPEC QL NAA+PROBE: SIGNIFICANT CHANGE UP
SODIUM SERPL-SCNC: 135 MMOL/L — SIGNIFICANT CHANGE UP (ref 135–145)
WBC # BLD: 8.23 K/UL — SIGNIFICANT CHANGE UP (ref 3.8–10.5)
WBC # FLD AUTO: 8.23 K/UL — SIGNIFICANT CHANGE UP (ref 3.8–10.5)

## 2021-11-09 PROCEDURE — 99232 SBSQ HOSP IP/OBS MODERATE 35: CPT

## 2021-11-09 RX ADMIN — Medication 100 MILLIGRAM(S): at 06:37

## 2021-11-09 RX ADMIN — Medication 25 MILLIGRAM(S): at 06:37

## 2021-11-09 RX ADMIN — Medication 25 MILLIGRAM(S): at 22:09

## 2021-11-09 RX ADMIN — RIVAROXABAN 15 MILLIGRAM(S): KIT at 17:20

## 2021-11-09 RX ADMIN — Medication 100 MILLIGRAM(S): at 17:20

## 2021-11-09 RX ADMIN — Medication 100 MILLIGRAM(S): at 11:48

## 2021-11-09 RX ADMIN — LISINOPRIL 5 MILLIGRAM(S): 2.5 TABLET ORAL at 06:37

## 2021-11-09 RX ADMIN — Medication 1: at 12:19

## 2021-11-09 NOTE — PROGRESS NOTE ADULT - ASSESSMENT
80 yo male PMHx CAD (1 stent,) DM2, HTN, gout, HLD, AS, Diastolic HF, Afib (on Xarelto) and CVA (L hemiparesis) syncope x2 episodes the day before admission and L knee pain, admitted to [facility] for Syncope, found to have severe AS. Cardiology following, [facility] scheduled for 11/2, then performed 11/4, showed patent coronaries, no PCI. cMRI also ordered for amlyoid workup. Consideration for TAVR. Additional issues- L ankle pain, suspected gout flare, improving with colchicine.

## 2021-11-09 NOTE — PROGRESS NOTE ADULT - PROBLEM SELECTOR PLAN 5
[id number] Score =7  Xarelto last dosed 10/30--> hep gtt--> back on xarelto 11/5  Maintain falls and bleeding precautions  -c/w metoprolol

## 2021-11-09 NOTE — PROGRESS NOTE ADULT - PROBLEM SELECTOR PLAN 2
Progression of AS to severe, may be cause of syncope. LHC done 11/4 w/ patent coronaries  - cMRI performed to eval for amyloidosis, awaiting read  - F/u w cards regarding when to re-start patient's diuretics

## 2021-11-09 NOTE — PROGRESS NOTE ADULT - SUBJECTIVE AND OBJECTIVE BOX
Patient is a 81y old  Male who presents with a chief complaint of syncope (09 Nov 2021 12:48)      SUBJECTIVE / OVERNIGHT EVENTS: no events overnight. NSR on telemetry. Patient has no medical complaints at this time. Denies sob, cp, palpitations, N/V/D, fever or chills.    MEDICATIONS  (STANDING):  allopurinol 100 milliGRAM(s) Oral daily  dextrose 40% Gel 15 Gram(s) Oral once  dextrose 5%. 1000 milliLiter(s) (50 mL/Hr) IV Continuous <Continuous>  dextrose 5%. 1000 milliLiter(s) (100 mL/Hr) IV Continuous <Continuous>  dextrose 50% Injectable 25 Gram(s) IV Push once  dextrose 50% Injectable 12.5 Gram(s) IV Push once  dextrose 50% Injectable 25 Gram(s) IV Push once  glucagon  Injectable 1 milliGRAM(s) IntraMuscular once  hydrALAZINE 25 milliGRAM(s) Oral three times a day  insulin lispro (ADMELOG) corrective regimen sliding scale   SubCutaneous three times a day before meals  insulin lispro (ADMELOG) corrective regimen sliding scale   SubCutaneous at bedtime  lisinopril 5 milliGRAM(s) Oral daily  metoprolol tartrate 100 milliGRAM(s) Oral two times a day  rivaroxaban 15 milliGRAM(s) Oral with dinner    MEDICATIONS  (PRN):  acetaminophen     Tablet .. 650 milliGRAM(s) Oral every 6 hours PRN Mild Pain (1 - 3), Moderate Pain (4 - 6)  fluticasone propionate 50 MICROgram(s)/spray Nasal Spray 1 Spray(s) Both Nostrils two times a day PRN allergic rhinitis        CAPILLARY BLOOD GLUCOSE      POCT Blood Glucose.: 164 mg/dL (09 Nov 2021 12:13)  POCT Blood Glucose.: 86 mg/dL (09 Nov 2021 08:32)  POCT Blood Glucose.: 97 mg/dL (08 Nov 2021 22:00)  POCT Blood Glucose.: 94 mg/dL (08 Nov 2021 18:03)  POCT Blood Glucose.: 84 mg/dL (08 Nov 2021 14:36)    I&O's Summary    Vital Signs Last 24 Hrs  T(C): 36.5 (09 Nov 2021 13:01), Max: 36.7 (09 Nov 2021 06:20)  T(F): 97.7 (09 Nov 2021 13:01), Max: 98 (09 Nov 2021 06:20)  HR: 75 (09 Nov 2021 13:01) (68 - 77)  BP: 89/55 (09 Nov 2021 13:01) (89/55 - 147/77)  BP(mean): --  RR: 18 (09 Nov 2021 13:01) (17 - 18)  SpO2: 99% (09 Nov 2021 13:01) (99% - 100%)    PE:  CONSTITUTIONAL: NAD, well-developed  RESPIRATORY: Normal respiratory effort; lungs are clear to auscultation bilaterally  CARDIOVASCULAR:  Irregular rate and rhythm, no murmurs appreciated, no carotid bruits.  Trace peripheral edema bilaterally and symmetrically; R groin without evidence of hematoma formation, RLE well perfused, femoral pulse 2+ to palpation  ABDOMEN: Nontender to palpation, normoactive bowel sounds, no rebound/guarding; No hepatosplenomegaly  MUSCLOSKELETAL: no clubbing or cyanosis of digits; no joint swelling or tenderness to palpation  PSYCH: calm and pleasant      LABS:                        13.1   8.23  )-----------( 253      ( 09 Nov 2021 06:57 )             43.1     11-09    135  |  106  |  37<H>  ----------------------------<  87  5.1   |  18<L>  |  1.48<H>    Ca    9.1      09 Nov 2021 06:57  Phos  3.4     11-09  Mg     2.30     11-09                RADIOLOGY & ADDITIONAL TESTS:    Imaging Personally Reviewed:    Consultant(s) Notes Reviewed:      Care Discussed with Consultants/Other Providers:

## 2021-11-09 NOTE — PROGRESS NOTE ADULT - SUBJECTIVE AND OBJECTIVE BOX
Cardiology    Date of service 11/9/2021    HISTORY OF PRESENT ILLNESS: HPI:  82 yo male PMHx CAD (1 stent,) DM2, HTN, HLD, AS, Diastolic HF, Afib (on Xarelto) and CVA (L hemiparesis) syncope x2 episodes yesterday.     S: no chest pain or sob; ros otherwise negative.     Review of Systems:   Constitutional: [ ] fevers, [ ] chills.   Skin: [ ] dry skin. [ ] rashes.  Psychiatric: [ ] depression, [ ] anxiety.   Gastrointestinal: [ ] BRBPR, [ ] melena.   Neurological: [ ] confusion. [ ] seizures. [ ] shuffling gait.   Ears,Nose,Mouth and Throat: [ ] ear pain [ ] sore throat.   Eyes: [ ] diplopia.   Respiratory: [ ] hemoptysis. [ ] shortness of breath  Cardiovascular: See HPI above  Hematologic/Lymphatic: [ ] anemia. [ ] painful nodes. [ ] prolonged bleeding.   Genitourinary: [ ] hematuria. [ ] flank pain.   Endocrine: [ ] significant change in weight. [ ] intolerance to heat and cold.     Review of systems [x ] otherwise negative, [ ] otherwise unable to obtain    FH: no family history of sudden cardiac death in first degree relatives    SH: [ ] tobacco, [ ] alcohol, [ ] drugs      acetaminophen     Tablet .. 650 milliGRAM(s) Oral every 6 hours PRN  allopurinol 100 milliGRAM(s) Oral daily  dextrose 40% Gel 15 Gram(s) Oral once  dextrose 5%. 1000 milliLiter(s) IV Continuous <Continuous>  dextrose 5%. 1000 milliLiter(s) IV Continuous <Continuous>  dextrose 50% Injectable 25 Gram(s) IV Push once  dextrose 50% Injectable 12.5 Gram(s) IV Push once  dextrose 50% Injectable 25 Gram(s) IV Push once  fluticasone propionate 50 MICROgram(s)/spray Nasal Spray 1 Spray(s) Both Nostrils two times a day PRN  glucagon  Injectable 1 milliGRAM(s) IntraMuscular once  hydrALAZINE 25 milliGRAM(s) Oral three times a day  insulin lispro (ADMELOG) corrective regimen sliding scale   SubCutaneous three times a day before meals  insulin lispro (ADMELOG) corrective regimen sliding scale   SubCutaneous at bedtime  lisinopril 5 milliGRAM(s) Oral daily  metoprolol tartrate 100 milliGRAM(s) Oral two times a day  rivaroxaban 15 milliGRAM(s) Oral with dinner                            13.1   8.23  )-----------( 253      ( 09 Nov 2021 06:57 )             43.1       11-09    135  |  106  |  37<H>  ----------------------------<  87  5.1   |  18<L>  |  1.48<H>    Ca    9.1      09 Nov 2021 06:57  Phos  3.4     11-09  Mg     2.30     11-09              T(C): 36.7 (11-09-21 @ 06:20), Max: 36.7 (11-09-21 @ 06:20)  HR: 72 (11-09-21 @ 06:20) (68 - 77)  BP: 122/63 (11-09-21 @ 06:20) (110/68 - 147/77)  RR: 18 (11-09-21 @ 06:20) (17 - 18)  SpO2: 100% (11-09-21 @ 06:20) (99% - 100%)  Wt(kg): --    I&O's Summary        General: Well nourished in no acute distress. Alert and Oriented * 3.   Head: Normocephalic and atraumatic.   Neck: No JVD. No bruits. Supple. Does not appear to be enlarged.   Cardiovascular: + S1,S2 ; RRR 2/6 systolic murmur at the left lower sternal border. No rubs noted.    Lungs: CTA b/l. No rhonchi, rales or wheezes.   Abdomen: + BS, soft. Non tender. Non distended. No rebound. No guarding.   Extremities: No clubbing/cyanosis/edema.   Neurologic: Moves all four extremities. Full range of motion.   Skin: Warm and moist. The patient's skin has normal elasticity and good skin turgor.   Psychiatric: Appropriate mood and affect.  Musculoskeletal: Normal range of motion, normal strength  Groin: soft, no hematoma, 2+ pulses      	   ECG: rate-controlled AFib  (note, QTc cannot be calculated during AFib due to [id number] irregularity) 	    Tele: AF     < from: TTE with Doppler (w/Cont) (10.29.21 @ 13:57) >  DIMENSIONS:  SEPTUM: 1.4 cm    0.6 - 1.2 cm  PWT:    1.3 cm    0.6 - 1.1 cm  Aortic Valve: Calcified trileaflet aortic valve with  decreased opening. Peak transaortic valve gradient equals  30 mm Hg, mean transaortic valve gradient equals 19 mm Hg,estimated aortic valve area equals 0.8 sqcm (by continuity  equation), consistent with severe aortic stenosis. Mild aortic regurgitation.  Left Atrium: Normal left atrium.  Left Ventricle: Endocardial visualization enhanced with intravenous injection of echo contrast (Definity).  Hyperdynamic left ventricle. Increased relative wall thickness with normal left ventricular mass index,  consistent with concentric left ventricular remodeling.  Right Heart: Normal right atrium. Normal right ventricular sizeand function. Normal tricuspid valve. Normal pulmonic  valve.  Pericardium/PleuraNormal pericardium with no pericardial  effusion.    < end of copied text >    CATH: < from: Cardiac Catheterization (11.04.21 @ 16:05) >  LM:   --  Distal left main: Angiography showed minor luminal irregularities  with no flow limiting lesions.  LAD:   --  LAD: Angiography showed minor luminal irregularities with no  flow limiting lesions.  --  Mid LAD: There was a tubular 30 % stenosis in the proximal third of the  vessel segment.  --  D1: Angiography showed minor luminal irregularities with no flow  limiting lesions.  CX:   --  Circumflex: Angiography showed minor luminal irregularities with  no flow limiting lesions.  --  Proximal circumflex: There was a discrete 20 % stenosis.  --  Mid circumflex: There was a tubular 20 % stenosis.  --  OM1: There was a discrete 20 % stenosis at the ostium of the vessel  segment.  RCA:   --  RCA: Angiography showed minor luminal irregularities with no  flow limiting lesions.  --  Mid RCA: There was a tubular 20 % stenosis.  --  RPDA: There was a discrete 30 % stenosis at the ostiumof the vessel  segment.  COMPLICATIONS: No complications occurred during the cath lab visit.  DIAGNOSTIC RECOMMENDATIONS: Medical management is recommended.  Prepared and signed by    < end of copied text >      ASSESSMENT/PLAN: 	81y Male with CAD hx PCI 3 years ago at [facility], and Echo now with severe AS admitted with syncope    -TTE with normal LV function and low gradient severe AS  -Cath performed demonstrating mild non-obstructive CAD with low CI  -Cardiac MRI performed - awaiting results   -Continue with lifelong ac if no contraindications   -TAVR workup pending MRI results    Jennifer Miguel MD

## 2021-11-10 LAB
ANION GAP SERPL CALC-SCNC: 11 MMOL/L — SIGNIFICANT CHANGE UP (ref 7–14)
BUN SERPL-MCNC: 39 MG/DL — HIGH (ref 7–23)
CALCIUM SERPL-MCNC: 9.1 MG/DL — SIGNIFICANT CHANGE UP (ref 8.4–10.5)
CHLORIDE SERPL-SCNC: 107 MMOL/L — SIGNIFICANT CHANGE UP (ref 98–107)
CO2 SERPL-SCNC: 18 MMOL/L — LOW (ref 22–31)
CREAT SERPL-MCNC: 1.48 MG/DL — HIGH (ref 0.5–1.3)
GLUCOSE BLDC GLUCOMTR-MCNC: 100 MG/DL — HIGH (ref 70–99)
GLUCOSE BLDC GLUCOMTR-MCNC: 112 MG/DL — HIGH (ref 70–99)
GLUCOSE BLDC GLUCOMTR-MCNC: 124 MG/DL — HIGH (ref 70–99)
GLUCOSE BLDC GLUCOMTR-MCNC: 84 MG/DL — SIGNIFICANT CHANGE UP (ref 70–99)
GLUCOSE BLDC GLUCOMTR-MCNC: 90 MG/DL — SIGNIFICANT CHANGE UP (ref 70–99)
GLUCOSE SERPL-MCNC: 107 MG/DL — HIGH (ref 70–99)
HCT VFR BLD CALC: 41.2 % — SIGNIFICANT CHANGE UP (ref 39–50)
HGB BLD-MCNC: 12.7 G/DL — LOW (ref 13–17)
MAGNESIUM SERPL-MCNC: 2.2 MG/DL — SIGNIFICANT CHANGE UP (ref 1.6–2.6)
MCHC RBC-ENTMCNC: 21 PG — LOW (ref 27–34)
MCHC RBC-ENTMCNC: 30.8 GM/DL — LOW (ref 32–36)
MCV RBC AUTO: 68 FL — LOW (ref 80–100)
NRBC # BLD: 0 /100 WBCS — SIGNIFICANT CHANGE UP
NRBC # FLD: 0 K/UL — SIGNIFICANT CHANGE UP
PHOSPHATE SERPL-MCNC: 3.4 MG/DL — SIGNIFICANT CHANGE UP (ref 2.5–4.5)
PLATELET # BLD AUTO: 236 K/UL — SIGNIFICANT CHANGE UP (ref 150–400)
POTASSIUM SERPL-MCNC: 4.7 MMOL/L — SIGNIFICANT CHANGE UP (ref 3.5–5.3)
POTASSIUM SERPL-SCNC: 4.7 MMOL/L — SIGNIFICANT CHANGE UP (ref 3.5–5.3)
RBC # BLD: 6.06 M/UL — HIGH (ref 4.2–5.8)
RBC # FLD: 19.9 % — HIGH (ref 10.3–14.5)
SODIUM SERPL-SCNC: 136 MMOL/L — SIGNIFICANT CHANGE UP (ref 135–145)
WBC # BLD: 6.47 K/UL — SIGNIFICANT CHANGE UP (ref 3.8–10.5)
WBC # FLD AUTO: 6.47 K/UL — SIGNIFICANT CHANGE UP (ref 3.8–10.5)

## 2021-11-10 PROCEDURE — 99233 SBSQ HOSP IP/OBS HIGH 50: CPT

## 2021-11-10 RX ADMIN — RIVAROXABAN 15 MILLIGRAM(S): KIT at 17:38

## 2021-11-10 RX ADMIN — Medication 100 MILLIGRAM(S): at 17:37

## 2021-11-10 RX ADMIN — Medication 25 MILLIGRAM(S): at 22:00

## 2021-11-10 RX ADMIN — Medication 100 MILLIGRAM(S): at 14:13

## 2021-11-10 RX ADMIN — Medication 25 MILLIGRAM(S): at 14:13

## 2021-11-10 NOTE — PROGRESS NOTE ADULT - PROBLEM SELECTOR PLAN 2
Progression of AS to severe, may be cause of syncope. LHC done 11/4 showed mild non obstructive CAD  - cMRI showed patchy infiltration w/ Ddx including sarcoid vs amyloid  - CXR clear lungs, no LN, obtain CT chest non cont and serum ACE levels  - ongoing discussion w/ Dr. Miguel (interventional cardiology) regarding further plan  - F/u w cards regarding when to re-start patient's diuretics

## 2021-11-10 NOTE — PROGRESS NOTE ADULT - ASSESSMENT
82 yo male PMHx CAD (1 stent,) DM2, HTN, gout, HLD, AS, Diastolic HF, Afib (on Xarelto) and CVA (L hemiparesis) syncope x2 episodes the day before admission and L knee pain, admitted to [facility] for Syncope, found to have severe AS. Cardiology following, [facility] scheduled for 11/2, then performed 11/4, showed patent coronaries, no PCI. cMRI also ordered for amlyoid workup. Consideration for TAVR. Additional issues- L ankle pain, suspected gout flare, improving with colchicine.

## 2021-11-10 NOTE — PROGRESS NOTE ADULT - PROBLEM SELECTOR PLAN 5
[id number] Score =7  Xarelto last dosed 10/30--> hep gtt--> back on xarelto 11/5  Maintain falls and bleeding precautions  -c/w metoprolol

## 2021-11-10 NOTE — PROGRESS NOTE ADULT - SUBJECTIVE AND OBJECTIVE BOX
CARDIOLOGY ATTENDING    tele: chronic AF, narrow QRS, no events    he denies palpitations, syncope, nor angina, ROS otherwise -      DATE OF SERVICE - 11-10-21     Review of Systems:   Constitutional: [ ] fevers, [ ] chills.   Skin: [ ] dry skin. [ ] rashes.  Psychiatric: [ ] depression, [ ] anxiety.   Gastrointestinal: [ ] BRBPR, [ ] melena.   Neurological: [ ] confusion. [ ] seizures. [ ] shuffling gait.   Ears,Nose,Mouth and Throat: [ ] ear pain [ ] sore throat.   Eyes: [ ] diplopia.   Respiratory: [ ] hemoptysis. [ ] shortness of breath  Cardiovascular: See HPI above  Hematologic/Lymphatic: [ ] anemia. [ ] painful nodes. [ ] prolonged bleeding.   Genitourinary: [ ] hematuria. [ ] flank pain.   Endocrine: [ ] significant change in weight. [ ] intolerance to heat and cold.     Review of systems [ x] otherwise negative, [ ] otherwise unable to obtain    FH: no family history of sudden cardiac death in first degree relatives    SH: [ ] tobacco, [ ] alcohol, [ ] drugs    acetaminophen     Tablet .. 650 milliGRAM(s) Oral every 6 hours PRN  allopurinol 100 milliGRAM(s) Oral daily  dextrose 40% Gel 15 Gram(s) Oral once  dextrose 5%. 1000 milliLiter(s) IV Continuous <Continuous>  dextrose 5%. 1000 milliLiter(s) IV Continuous <Continuous>  dextrose 50% Injectable 25 Gram(s) IV Push once  dextrose 50% Injectable 12.5 Gram(s) IV Push once  dextrose 50% Injectable 25 Gram(s) IV Push once  fluticasone propionate 50 MICROgram(s)/spray Nasal Spray 1 Spray(s) Both Nostrils two times a day PRN  glucagon  Injectable 1 milliGRAM(s) IntraMuscular once  hydrALAZINE 25 milliGRAM(s) Oral three times a day  insulin lispro (ADMELOG) corrective regimen sliding scale   SubCutaneous three times a day before meals  insulin lispro (ADMELOG) corrective regimen sliding scale   SubCutaneous at bedtime  lisinopril 5 milliGRAM(s) Oral daily  metoprolol tartrate 100 milliGRAM(s) Oral two times a day  rivaroxaban 15 milliGRAM(s) Oral with dinner                            12.7   6.47  )-----------( 236      ( 10 Nov 2021 06:20 )             41.2       11-10    136  |  107  |  39<H>  ----------------------------<  107<H>  4.7   |  18<L>  |  1.48<H>    Ca    9.1      10 Nov 2021 06:20  Phos  3.4     11-10  Mg     2.20     11-10              T(C): 36.7 (11-10-21 @ 06:00), Max: 36.8 (11-09-21 @ 17:04)  HR: 60 (11-10-21 @ 06:00) (59 - 81)  BP: 98/55 (11-10-21 @ 06:00) (89/55 - 118/60)  RR: 17 (11-10-21 @ 06:00) (17 - 18)  SpO2: 100% (11-10-21 @ 06:00) (99% - 100%)  Wt(kg): --    I&O's Summary      General: Well nourished in no acute distress. Alert and Oriented * 3.   Head: Normocephalic and atraumatic.   Neck: No JVD. No bruits. Supple. Does not appear to be enlarged.   Cardiovascular: + S1,S2 ; IRR Soft systolic murmur at the left lower sternal border. No rubs noted.    Lungs: CTA b/l. No rhonchi, rales or wheezes.   Abdomen: + BS, soft. Non tender. Non distended. No rebound. No guarding.   Extremities: No clubbing/cyanosis/edema.   Neurologic: Moves all four extremities. Full range of motion.   Skin: Warm and moist. The patient's skin has normal elasticity and good skin turgor.   Psychiatric: Appropriate mood and affect.  Musculoskeletal: Normal range of motion, normal strength      A/P) 82 y/o male PMH CAD s/p PCI (2018), DM, HTN, hyperlipidemia, PAF, stroke a/w syncope and found to have severe AS. Echo shows normal LVEF, and preop cath demonstrated mild non-obstructive CAD    -f/u cardiac MRI  -TAVR workup in progress  -will follow  -continue lifelong a/c for AF with elevated chads-vasc

## 2021-11-10 NOTE — PROGRESS NOTE ADULT - SUBJECTIVE AND OBJECTIVE BOX
Patient is a 81y old  Male who presents with a chief complaint of syncope (10 Nov 2021 11:22)      SUBJECTIVE / OVERNIGHT EVENTS: no events overnight. No major events on telemetry. Pt denies cp, palpitations, sob, lightheade    MEDICATIONS  (STANDING):  allopurinol 100 milliGRAM(s) Oral daily  dextrose 40% Gel 15 Gram(s) Oral once  dextrose 5%. 1000 milliLiter(s) (50 mL/Hr) IV Continuous <Continuous>  dextrose 5%. 1000 milliLiter(s) (100 mL/Hr) IV Continuous <Continuous>  dextrose 50% Injectable 25 Gram(s) IV Push once  dextrose 50% Injectable 12.5 Gram(s) IV Push once  dextrose 50% Injectable 25 Gram(s) IV Push once  glucagon  Injectable 1 milliGRAM(s) IntraMuscular once  hydrALAZINE 25 milliGRAM(s) Oral three times a day  insulin lispro (ADMELOG) corrective regimen sliding scale   SubCutaneous three times a day before meals  insulin lispro (ADMELOG) corrective regimen sliding scale   SubCutaneous at bedtime  lisinopril 5 milliGRAM(s) Oral daily  metoprolol tartrate 100 milliGRAM(s) Oral two times a day  rivaroxaban 15 milliGRAM(s) Oral with dinner    MEDICATIONS  (PRN):  acetaminophen     Tablet .. 650 milliGRAM(s) Oral every 6 hours PRN Mild Pain (1 - 3), Moderate Pain (4 - 6)  fluticasone propionate 50 MICROgram(s)/spray Nasal Spray 1 Spray(s) Both Nostrils two times a day PRN allergic rhinitis        CAPILLARY BLOOD GLUCOSE      POCT Blood Glucose.: 112 mg/dL (10 Nov 2021 12:31)  POCT Blood Glucose.: 90 mg/dL (10 Nov 2021 08:49)  POCT Blood Glucose.: 130 mg/dL (09 Nov 2021 21:45)  POCT Blood Glucose.: 93 mg/dL (09 Nov 2021 18:00)    I&O's Summary      PHYSICAL EXAM:  GENERAL: NAD, well-developed  HEAD:  Atraumatic, Normocephalic  EYES: EOMI, PERRLA, conjunctiva and sclera clear  NECK: Supple, No JVD  CHEST/LUNG: Clear to auscultation bilaterally; No wheeze  HEART: Regular rate and rhythm; No murmurs, rubs, or gallops  ABDOMEN: Soft, Nontender, Nondistended; Bowel sounds present  EXTREMITIES:  2+ Peripheral Pulses, No clubbing, cyanosis, or edema  PSYCH: AAOx3  NEUROLOGY: non-focal  SKIN: No rashes or lesions    LABS:                        12.7   6.47  )-----------( 236      ( 10 Nov 2021 06:20 )             41.2     11-10    136  |  107  |  39<H>  ----------------------------<  107<H>  4.7   |  18<L>  |  1.48<H>    Ca    9.1      10 Nov 2021 06:20  Phos  3.4     11-10  Mg     2.20     11-10                RADIOLOGY & ADDITIONAL TESTS:    Imaging Personally Reviewed:    Consultant(s) Notes Reviewed:      Care Discussed with Consultants/Other Providers:   Patient is a 81y old  Male who presents with a chief complaint of syncope (10 Nov 2021 11:22)      SUBJECTIVE / OVERNIGHT EVENTS: no events overnight. No major events on telemetry. Pt denies cp, palpitations, sob, lightheadedness cough.    MEDICATIONS  (STANDING):  allopurinol 100 milliGRAM(s) Oral daily  dextrose 40% Gel 15 Gram(s) Oral once  dextrose 5%. 1000 milliLiter(s) (50 mL/Hr) IV Continuous <Continuous>  dextrose 5%. 1000 milliLiter(s) (100 mL/Hr) IV Continuous <Continuous>  dextrose 50% Injectable 25 Gram(s) IV Push once  dextrose 50% Injectable 12.5 Gram(s) IV Push once  dextrose 50% Injectable 25 Gram(s) IV Push once  glucagon  Injectable 1 milliGRAM(s) IntraMuscular once  hydrALAZINE 25 milliGRAM(s) Oral three times a day  insulin lispro (ADMELOG) corrective regimen sliding scale   SubCutaneous three times a day before meals  insulin lispro (ADMELOG) corrective regimen sliding scale   SubCutaneous at bedtime  lisinopril 5 milliGRAM(s) Oral daily  metoprolol tartrate 100 milliGRAM(s) Oral two times a day  rivaroxaban 15 milliGRAM(s) Oral with dinner    MEDICATIONS  (PRN):  acetaminophen     Tablet .. 650 milliGRAM(s) Oral every 6 hours PRN Mild Pain (1 - 3), Moderate Pain (4 - 6)  fluticasone propionate 50 MICROgram(s)/spray Nasal Spray 1 Spray(s) Both Nostrils two times a day PRN allergic rhinitis        CAPILLARY BLOOD GLUCOSE      POCT Blood Glucose.: 112 mg/dL (10 Nov 2021 12:31)  POCT Blood Glucose.: 90 mg/dL (10 Nov 2021 08:49)  POCT Blood Glucose.: 130 mg/dL (09 Nov 2021 21:45)  POCT Blood Glucose.: 93 mg/dL (09 Nov 2021 18:00)    I&O's Summary    Vital Signs Last 24 Hrs  T(C): 36.7 (10 Nov 2021 06:00), Max: 36.8 (09 Nov 2021 17:04)  T(F): 98 (10 Nov 2021 06:00), Max: 98.3 (09 Nov 2021 17:04)  HR: 60 (10 Nov 2021 06:00) (59 - 81)  BP: 98/55 (10 Nov 2021 06:00) (95/56 - 118/60)  BP(mean): --  RR: 17 (10 Nov 2021 06:00) (17 - 18)  SpO2: 100% (10 Nov 2021 06:00) (99% - 100%)    PHYSICAL EXAM:    CONSTITUTIONAL: NAD, well-developed  RESPIRATORY: Normal respiratory effort; lungs are clear to auscultation bilaterally  CARDIOVASCULAR:  Irregular rate and rhythm, no murmurs appreciated, no carotid bruits.  Trace peripheral edema bilaterally and symmetrically; R groin without evidence of hematoma formation, RLE well perfused, femoral pulse 2+ to palpation  ABDOMEN: Nontender to palpation, normoactive bowel sounds, no rebound/guarding; No hepatosplenomegaly  MUSCLOSKELETAL: no clubbing or cyanosis of digits; no joint swelling or tenderness to palpation  PSYCH: calm and pleasant      LABS:                        12.7   6.47  )-----------( 236      ( 10 Nov 2021 06:20 )             41.2     11-10    136  |  107  |  39<H>  ----------------------------<  107<H>  4.7   |  18<L>  |  1.48<H>    Ca    9.1      10 Nov 2021 06:20  Phos  3.4     11-10  Mg     2.20     11-10                RADIOLOGY & ADDITIONAL TESTS:    Imaging Personally Reviewed:    Consultant(s) Notes Reviewed:      Care Discussed with Consultants/Other Providers: Dr. Miguel (interventional cardiologist)

## 2021-11-10 NOTE — PROGRESS NOTE ADULT - PROBLEM SELECTOR PLAN 9
No additional prophylaxis needed. Pt on therapeutic AC.    Dispo: further TAVR workup    Discussed case w/ ACP and patient

## 2021-11-11 LAB
ANION GAP SERPL CALC-SCNC: 10 MMOL/L — SIGNIFICANT CHANGE UP (ref 7–14)
BUN SERPL-MCNC: 35 MG/DL — HIGH (ref 7–23)
CALCIUM SERPL-MCNC: 8.9 MG/DL — SIGNIFICANT CHANGE UP (ref 8.4–10.5)
CHLORIDE SERPL-SCNC: 106 MMOL/L — SIGNIFICANT CHANGE UP (ref 98–107)
CO2 SERPL-SCNC: 18 MMOL/L — LOW (ref 22–31)
CREAT SERPL-MCNC: 1.4 MG/DL — HIGH (ref 0.5–1.3)
GLUCOSE BLDC GLUCOMTR-MCNC: 104 MG/DL — HIGH (ref 70–99)
GLUCOSE BLDC GLUCOMTR-MCNC: 115 MG/DL — HIGH (ref 70–99)
GLUCOSE BLDC GLUCOMTR-MCNC: 83 MG/DL — SIGNIFICANT CHANGE UP (ref 70–99)
GLUCOSE BLDC GLUCOMTR-MCNC: 97 MG/DL — SIGNIFICANT CHANGE UP (ref 70–99)
GLUCOSE SERPL-MCNC: 100 MG/DL — HIGH (ref 70–99)
HCT VFR BLD CALC: 41.3 % — SIGNIFICANT CHANGE UP (ref 39–50)
HGB BLD-MCNC: 12.7 G/DL — LOW (ref 13–17)
MAGNESIUM SERPL-MCNC: 2.1 MG/DL — SIGNIFICANT CHANGE UP (ref 1.6–2.6)
MCHC RBC-ENTMCNC: 21.2 PG — LOW (ref 27–34)
MCHC RBC-ENTMCNC: 30.8 GM/DL — LOW (ref 32–36)
MCV RBC AUTO: 68.8 FL — LOW (ref 80–100)
NRBC # BLD: 0 /100 WBCS — SIGNIFICANT CHANGE UP
NRBC # FLD: 0 K/UL — SIGNIFICANT CHANGE UP
PHOSPHATE SERPL-MCNC: 3.3 MG/DL — SIGNIFICANT CHANGE UP (ref 2.5–4.5)
PLATELET # BLD AUTO: 205 K/UL — SIGNIFICANT CHANGE UP (ref 150–400)
POTASSIUM SERPL-MCNC: 4.7 MMOL/L — SIGNIFICANT CHANGE UP (ref 3.5–5.3)
POTASSIUM SERPL-SCNC: 4.7 MMOL/L — SIGNIFICANT CHANGE UP (ref 3.5–5.3)
RBC # BLD: 6 M/UL — HIGH (ref 4.2–5.8)
RBC # FLD: 20.1 % — HIGH (ref 10.3–14.5)
SODIUM SERPL-SCNC: 134 MMOL/L — LOW (ref 135–145)
WBC # BLD: 6.47 K/UL — SIGNIFICANT CHANGE UP (ref 3.8–10.5)
WBC # FLD AUTO: 6.47 K/UL — SIGNIFICANT CHANGE UP (ref 3.8–10.5)

## 2021-11-11 PROCEDURE — 99233 SBSQ HOSP IP/OBS HIGH 50: CPT

## 2021-11-11 PROCEDURE — 71250 CT THORAX DX C-: CPT | Mod: 26

## 2021-11-11 RX ADMIN — Medication 25 MILLIGRAM(S): at 05:44

## 2021-11-11 RX ADMIN — Medication 25 MILLIGRAM(S): at 21:59

## 2021-11-11 RX ADMIN — RIVAROXABAN 15 MILLIGRAM(S): KIT at 17:34

## 2021-11-11 RX ADMIN — Medication 100 MILLIGRAM(S): at 13:52

## 2021-11-11 RX ADMIN — Medication 25 MILLIGRAM(S): at 13:52

## 2021-11-11 RX ADMIN — Medication 100 MILLIGRAM(S): at 17:34

## 2021-11-11 RX ADMIN — LISINOPRIL 5 MILLIGRAM(S): 2.5 TABLET ORAL at 05:44

## 2021-11-11 RX ADMIN — Medication 100 MILLIGRAM(S): at 05:44

## 2021-11-11 NOTE — PROGRESS NOTE ADULT - PROBLEM SELECTOR PLAN 9
No additional prophylaxis needed. Pt on therapeutic AC.    Dispo: further TAVR workup    Discussed case w/ ACP, patient and wife Elysia 11/11

## 2021-11-11 NOTE — CONSULT NOTE ADULT - SUBJECTIVE AND OBJECTIVE BOX
Cardiology    HISTORY OF PRESENT ILLNESS: HPI:       82 yo male PMHx CAD (1 stent,) DM2, HTN, HLD, AS, Diastolic HF, Afib (on Xarelto) and CVA (L hemiparesis) syncope x2 episodes yesterday. Pt reports he was brushing his teeth yesterday morning, felt suddenly dizzy, lightheaded. Wife noticed his arms suddenly dropped down, pt became weak and started to fall down. Wife caught him right away, called for son's help and they eased him down on the bed. Pt's eyes were closed and his LOC was was for about 3 mins with urinary incontinency. After shouting at patient and fanning him, his LOC returned back to baseline. Later, yesterday afternoon, he had another syncopal event where he was watching TV on the sofa and passed out for about 2 mins where eyes closed and pt was breathing heavy. Wife called EMS. EMS checked BFS to be 123 and was taken to [facility] ED. Pt also c/o L knee pain worsening for past 2 weeks, achy, 9/10, denying any recent trauma. He states he has chronic b/l knee pain from arthritis but  L knee for past 2 weeks was excruciating. No reported fever, chills, chest pain, sob, palpitations, trauma, nausea, vomiting, diarrhea, abdominal pain or sick contacts.    Questionable compliance to some meds as pt reports he takes metoprolol once a day, but bottle says BID.  (28 Oct 2021 08:11)    Date of Service 10/30 - patient has no prior history of syncope, this is a new symptom.  He sees Dr Branden [facility] ([facility]) for General Cardiology, where he is followed for a history of stroke, CAD w/ PCI, HTN and DM.  On his last echocardiogram he had moderate aortic valve stenosis.  On his current echocardiogram, severe aortic valve stenosis was identified, and cardiology was consulted to initiate a pre-surgical workup.  He states he is not very active, but does not experience angina doing his ADLs or running errands.  He does not have orthopnea/PND or leg swelling.  A 10 pt ROS is otherwise negative.    PAST MEDICAL & SURGICAL HISTORY:  AF (atrial fibrillation)    HTN (hypertension)    HLD (hyperlipidemia)    Gout    CVA (cerebrovascular accident)  L hemiparesis 2019    Stented coronary artery  x1      MEDICATIONS  (STANDING):  allopurinol 100 milliGRAM(s) Oral daily  dextrose 40% Gel 15 Gram(s) Oral once  dextrose 5%. 1000 milliLiter(s) (50 mL/Hr) IV Continuous <Continuous>  dextrose 5%. 1000 milliLiter(s) (100 mL/Hr) IV Continuous <Continuous>  dextrose 50% Injectable 25 Gram(s) IV Push once  dextrose 50% Injectable 12.5 Gram(s) IV Push once  dextrose 50% Injectable 25 Gram(s) IV Push once  glucagon  Injectable 1 milliGRAM(s) IntraMuscular once  hydrALAZINE 25 milliGRAM(s) Oral three times a day  insulin lispro (ADMELOG) corrective regimen sliding scale   SubCutaneous three times a day before meals  insulin lispro (ADMELOG) corrective regimen sliding scale   SubCutaneous at bedtime  metoprolol tartrate 100 milliGRAM(s) Oral two times a day  rivaroxaban 15 milliGRAM(s) Oral with dinner    Allergies    No Known Allergies    Intolerances    FAMILY HISTORY:  Family history of stroke (Sibling)    Non-contributary for premature coronary disease or sudden cardiac death    SOCIAL HISTORY:    [x ] Non-smoker  [ ] Smoker  [ ] Alcohol    PHYSICAL EXAM:  T(C): 36.7 (10-30-21 @ 17:10), Max: 36.9 (10-29-21 @ 22:00)  HR: 111 (10-30-21 @ 17:10) (87 - 111)  BP: 127/84 (10-30-21 @ 17:10) (127/84 - 147/85)  RR: 17 (10-30-21 @ 17:10) (16 - 18)  SpO2: 100% (10-30-21 @ 17:10) (100% - 100%)  Wt(kg): --    Appearance: Normal appearing adult male in no acute distress, nondiaphoretic  HEENT:   Normal oral mucosa, PERRL, EOMI	  Lymphatic: No lymphadenopathy , no edema  Cardiovascular: Normal S1 S2, No JVD, loud late-peaking systolic murmur at the upper chest, soft to near-absent S2.  Carotid [id number] present , Peripheral pulses palpable 2+ bilaterally  Respiratory: Lungs clear to auscultation, normal effort 	  Gastrointestinal:  Soft, Non-tender, + BS	  Skin: No rashes, No ecchymoses, No cyanosis, warm to touch  Musculoskeletal: Normal range of motion, normal strength  Psychiatry:  Mood & affect appropriate   	    ECG: rate-controlled AFib  (note, QTc cannot be calculated during AFib due to [id number] irregularity) 	    Echo:  < from: TTE with Doppler (w/Cont) (10.29.21 @ 13:57) >  DIMENSIONS:  SEPTUM: 1.4 cm    0.6 - 1.2 cm  PWT:    1.3 cm    0.6 - 1.1 cm  Aortic Valve: Calcified trileaflet aortic valve with  decreased opening. Peak transaortic valve gradient equals  30 mm Hg, mean transaortic valve gradient equals 19 mm Hg,estimated aortic valve area equals 0.8 sqcm (by continuity  equation), consistent with severe aortic stenosis. Mild aortic regurgitation.  Left Atrium: Normal left atrium.  Left Ventricle: Endocardial visualization enhanced with intravenous injection of echo contrast (Definity).  Hyperdynamic left ventricle. Increased relative wall thickness with normal left ventricular mass index,  consistent with concentric left ventricular remodeling.  Right Heart: Normal right atrium. Normal right ventricular sizeand function. Normal tricuspid valve. Normal pulmonic  valve.  Pericardium/PleuraNormal pericardium with no pericardial  effusion.    < end of copied text >    LABS:	 	                          13.2   9.68  )-----------( 190      ( 30 Oct 2021 06:43 )             43.2     10-30    136  |  101  |  38<H>  ----------------------------<  111<H>  4.2   |  22  |  1.56<H>    Ca    9.3      30 Oct 2021 06:43  Phos  3.4     10-30  Mg     2.20     10-30    ASSESSMENT/PLAN: 	81y Male with progession of aortic valve stenosis clinically and on echocardiogram, and presentation with 'red flag' symptom of syncope during minimal exertion.  An expedited aortic valve workup is indicated.  His valve gradients are not typical (mean >40mmHg), so he has paradoxical low-flow / low-gradient AS in the setting of normal ejection fraction, and normotensive state.  The valve is calcified, making this diagnosis very likely.    Will order a dobutamine stress echo to confirm.  Will discuss w/ interventional cardiology re: coronary angiography.  Monitor on telemetry. His AFib is chronic and he has no prior history of long pauses, but we are under surveillance for this as a possible cause of syncope.  Maintain K 4-4.5, Mg 2.  Will follow.       Eamon Maria M.D.  Cardiac Electrophysiology    office [phone]  pager [phone]
saw Mr.Elysia, and details of the history were obtained from the patient and his wife I spoke on the phone.  Patient reports that at home he walks with a cane and sometimes uses a walker he is independent in his activities of daily living, he is able to walk up to his backyard and has not had any major symptoms but his activity is significantly limited to within the house.  He has had episodes of syncope at home 2 of them the one that led him to be admitted to the hospital at this time.  Is not entirely certain about the prodromal symptoms.  But he does not recall having any chest pain palpitations dyspnea prior to syncope.  I reviewed the records from the hospital including echocardiogram and personally reviewed all the images and discussed the MRI information with the radiologist.  At this time there is no conclusive evidence of amyloidosis or infiltrative cardiomyopathy.  Patient has not had any significant arrhythmias on the telemetry monitor.  At this time I discussed with the patient the treatment options for severe aortic stenosis, including surgical AVR and TAVR procedure.  His wife was part of the discussion that we had, she was present on the phone at the time of my discussion with the patient.  I discussed with them that TAVR will probably the best suitable option for his aortic valve condition, I also went over the procedure details, risks benefits, need for further testing the CTA and heart team discussion with cardiothoracic surgeons.  At this time patient and his wife both stated that they would like to pursue further work-up and treatment as outpatients.  I will arrange for patient to come to [facility] valve clinic early next week, have the CT with TAVR protocol performed the same day, at the same time will have meeting with cardiothoracic surgeon and tentatively book him of for TAVR procedure on Thursday next week.  Patient has been educated about importance of monitoring his symptoms and report to us or to the emergency room if there are any interim changes.  I discussed the plan of care with the patient and his wife and the primary team.

## 2021-11-11 NOTE — PROGRESS NOTE ADULT - SUBJECTIVE AND OBJECTIVE BOX
Patient is a 81y old  Male who presents with a chief complaint of syncope (11 Nov 2021 11:41)      SUBJECTIVE / OVERNIGHT EVENTS: No events overnight. No major events on telemetry. Denies cp, palpitations, N/V/D, abd pain, sob.    MEDICATIONS  (STANDING):  allopurinol 100 milliGRAM(s) Oral daily  dextrose 40% Gel 15 Gram(s) Oral once  dextrose 5%. 1000 milliLiter(s) (50 mL/Hr) IV Continuous <Continuous>  dextrose 5%. 1000 milliLiter(s) (100 mL/Hr) IV Continuous <Continuous>  dextrose 50% Injectable 25 Gram(s) IV Push once  dextrose 50% Injectable 12.5 Gram(s) IV Push once  dextrose 50% Injectable 25 Gram(s) IV Push once  glucagon  Injectable 1 milliGRAM(s) IntraMuscular once  hydrALAZINE 25 milliGRAM(s) Oral three times a day  insulin lispro (ADMELOG) corrective regimen sliding scale   SubCutaneous three times a day before meals  insulin lispro (ADMELOG) corrective regimen sliding scale   SubCutaneous at bedtime  lisinopril 5 milliGRAM(s) Oral daily  metoprolol tartrate 100 milliGRAM(s) Oral two times a day  rivaroxaban 15 milliGRAM(s) Oral with dinner    MEDICATIONS  (PRN):  acetaminophen     Tablet .. 650 milliGRAM(s) Oral every 6 hours PRN Mild Pain (1 - 3), Moderate Pain (4 - 6)  fluticasone propionate 50 MICROgram(s)/spray Nasal Spray 1 Spray(s) Both Nostrils two times a day PRN allergic rhinitis        CAPILLARY BLOOD GLUCOSE      POCT Blood Glucose.: 104 mg/dL (11 Nov 2021 08:39)  POCT Blood Glucose.: 124 mg/dL (10 Nov 2021 22:23)  POCT Blood Glucose.: 100 mg/dL (10 Nov 2021 17:57)  POCT Blood Glucose.: 84 mg/dL (10 Nov 2021 17:56)  POCT Blood Glucose.: 112 mg/dL (10 Nov 2021 12:31)    Vital Signs Last 24 Hrs  T(C): 36.7 (11 Nov 2021 05:45), Max: 37.1 (10 Nov 2021 22:00)  T(F): 98.1 (11 Nov 2021 05:45), Max: 98.7 (10 Nov 2021 22:00)  HR: 60 (11 Nov 2021 05:45) (60 - 80)  BP: 133/82 (11 Nov 2021 05:45) (107/68 - 152/83)  BP(mean): --  RR: 16 (11 Nov 2021 05:45) (16 - 18)  SpO2: 100% (11 Nov 2021 05:45) (99% - 100%)      PHYSICAL EXAM:    CONSTITUTIONAL: NAD, well-developed  RESPIRATORY: Normal respiratory effort; lungs are clear to auscultation bilaterally  CARDIOVASCULAR:  Irregular rate and rhythm, Soft systolic murmur at the left lower sternal border.  Trace peripheral edema bilaterally and symmetrically;   ABDOMEN: Nontender to palpation, normoactive bowel sounds, no rebound/guarding; No hepatosplenomegaly  MUSCLOSKELETAL: no clubbing or cyanosis of digits; no joint swelling or tenderness to palpation  PSYCH: calm and pleasant      LABS:                        12.7   6.47  )-----------( 205      ( 11 Nov 2021 07:43 )             41.3     11-11    134<L>  |  106  |  35<H>  ----------------------------<  100<H>  4.7   |  18<L>  |  1.40<H>    Ca    8.9      11 Nov 2021 07:43  Phos  3.3     11-11  Mg     2.10     11-11                RADIOLOGY & ADDITIONAL TESTS:    Imaging Personally Reviewed:    Consultant(s) Notes Reviewed:      Care Discussed with Consultants/Other Providers:

## 2021-11-11 NOTE — PROGRESS NOTE ADULT - ASSESSMENT
80 yo male PMHx CAD (1 stent,) DM2, HTN, gout, HLD, AS, Diastolic HF, Afib (on Xarelto) and CVA (L hemiparesis) syncope x2 episodes the day before admission and L knee pain, admitted to [facility] for Syncope, found to have severe AS. Cardiology following, [facility] scheduled for 11/2, then performed 11/4, showed patent coronaries, no PCI. cMRI also ordered for amlyoid workup. Consideration for TAVR. Additional issues- L ankle pain, suspected gout flare, improving with colchicine.

## 2021-11-11 NOTE — PROGRESS NOTE ADULT - PROBLEM SELECTOR PLAN 5
[id number] Score =7  Xarelto last dosed 10/30--> hep gtt--> back on xarelto 11/5  Maintain falls and bleeding precautions  -c/w metoprolol

## 2021-11-11 NOTE — PROGRESS NOTE ADULT - SUBJECTIVE AND OBJECTIVE BOX
CARDIOLOGY    tele: chronic AF, narrow QRS, no events    he denies palpitations, syncope, nor angina, ROS otherwise -    DATE OF SERVICE - 11-11-21     Review of Systems:   Constitutional: [ ] fevers, [ ] chills.   Skin: [ ] dry skin. [ ] rashes.  Psychiatric: [ ] depression, [ ] anxiety.   Gastrointestinal: [ ] BRBPR, [ ] melena.   Neurological: [ ] confusion. [ ] seizures. [ ] shuffling gait.   Ears,Nose,Mouth and Throat: [ ] ear pain [ ] sore throat.   Eyes: [ ] diplopia.   Respiratory: [ ] hemoptysis. [ ] shortness of breath  Cardiovascular: See HPI above  Hematologic/Lymphatic: [ ] anemia. [ ] painful nodes. [ ] prolonged bleeding.   Genitourinary: [ ] hematuria. [ ] flank pain.   Endocrine: [ ] significant change in weight. [ ] intolerance to heat and cold.     Review of systems [x ] otherwise negative, [ ] otherwise unable to obtain    FH: no family history of sudden cardiac death in first degree relatives    SH: [ ] tobacco, [ ] alcohol, [ ] drugs    acetaminophen     Tablet .. 650 milliGRAM(s) Oral every 6 hours PRN  allopurinol 100 milliGRAM(s) Oral daily  dextrose 40% Gel 15 Gram(s) Oral once  dextrose 5%. 1000 milliLiter(s) IV Continuous <Continuous>  dextrose 5%. 1000 milliLiter(s) IV Continuous <Continuous>  dextrose 50% Injectable 25 Gram(s) IV Push once  dextrose 50% Injectable 12.5 Gram(s) IV Push once  dextrose 50% Injectable 25 Gram(s) IV Push once  fluticasone propionate 50 MICROgram(s)/spray Nasal Spray 1 Spray(s) Both Nostrils two times a day PRN  glucagon  Injectable 1 milliGRAM(s) IntraMuscular once  hydrALAZINE 25 milliGRAM(s) Oral three times a day  insulin lispro (ADMELOG) corrective regimen sliding scale   SubCutaneous three times a day before meals  insulin lispro (ADMELOG) corrective regimen sliding scale   SubCutaneous at bedtime  lisinopril 5 milliGRAM(s) Oral daily  metoprolol tartrate 100 milliGRAM(s) Oral two times a day  rivaroxaban 15 milliGRAM(s) Oral with dinner                            12.7   6.47  )-----------( 205      ( 11 Nov 2021 07:43 )             41.3       11-11    134<L>  |  106  |  35<H>  ----------------------------<  100<H>  4.7   |  18<L>  |  1.40<H>    Ca    8.9      11 Nov 2021 07:43  Phos  3.3     11-11  Mg     2.10     11-11      T(C): 36.7 (11-11-21 @ 05:45), Max: 37.1 (11-10-21 @ 22:00)  HR: 60 (11-11-21 @ 05:45) (60 - 80)  BP: 133/82 (11-11-21 @ 05:45) (107/68 - 152/83)  RR: 16 (11-11-21 @ 05:45) (16 - 18)  SpO2: 100% (11-11-21 @ 05:45) (99% - 100%)      General: Well nourished in no acute distress. Alert and Oriented * 3.   Head: Normocephalic and atraumatic.   Neck: No JVD. No bruits. Supple. Does not appear to be enlarged.   Cardiovascular: + S1,S2 ; RRR Soft systolic murmur at the left lower sternal border. No rubs noted.    Lungs: CTA b/l. No rhonchi, rales or wheezes.   Abdomen: + BS, soft. Non tender. Non distended. No rebound. No guarding.   Extremities: No clubbing/cyanosis/edema.   Neurologic: Moves all four extremities. Full range of motion.   Skin: Warm and moist. The patient's skin has normal elasticity and good skin turgor.   Psychiatric: Appropriate mood and affect.  Musculoskeletal: Normal range of motion, normal strength    DATA  < from: MR Cardiac w/wo IV Cont (11.08.21 @ 14:15) >  IMPRESSION:    1. Normal left ventricular size. Mildly depressed left ventricular function. LVEF: 51%. Multifocal patchy mid myocardial late gadolinium enhancement involving the basal to mid septal, inferoseptal and inferolateral walls. Findings compatible with nonischemic cardiomyopathy, with diagnostic considerations including infiltrative processes (favor sarcoid, less likely amyloid) as well asmyocarditis in the appropriate clinical setting.  2. Normal right ventricular size. Mildly depressed right ventricular function. RVEF: 50%.  3. Mild aortic insufficiency. Findings suggesting aortic stenosis with thickening of the aortic valve leaflets, decreased aortic valve opening and visual flow acceleration across the valve.  4. Mild right and moderate left atrial enlargement.    < end of copied text >      < from: Cardiac Catheterization (11.04.21 @ 16:05) >  VENTRICLES: No left ventriculogram was performed.  CORONARY VESSELS: The coronary circulation is right dominant.  LM:   --  Distal left main: Angiography showed minor luminal irregularities  with no flow limiting lesions.  LAD:   --  LAD: Angiography showed minor luminal irregularities with no  flow limiting lesions.  --  Mid LAD: There was a tubular 30 % stenosis in the proximal third of the  vessel segment.  --  D1: Angiography showed minor luminal irregularities with no flow  limiting lesions.  CX:   --  Circumflex: Angiography showed minor luminal irregularities with  no flow limiting lesions.  --  Proximal circumflex: There was a discrete 20 % stenosis.  --  Mid circumflex: There was a tubular 20 % stenosis.  --  OM1: There was a discrete 20 % stenosis at the ostium of the vessel  segment.  RCA:   --  RCA: Angiography showed minor luminal irregularities with no  flow limiting lesions.  --  Mid RCA: There was a tubular 20 % stenosis.  --  RPDA: There was a discrete 30 % stenosis at the ostiumof the vessel  segment.  COMPLICATIONS: No complications occurred during the cath lab visit.  DIAGNOSTIC RECOMMENDATIONS: Medical management is recommended.  Prepared and signed by  Ventura Brambila M.D.  Signed 11/04/2021 18:39:26    < end of copied text >        A/P) 82 y/o male PMH CAD s/p PCI (2018), DM, HTN, hyperlipidemia, PAF, stroke a/w syncope and found to have severe AS. Echo shows normal LVEF, and preop cath demonstrated mild non-obstructive CAD    -cardiac MRI noted  -TAVR workup in progress  -continue lifelong a/c for AF with elevated chads-vasc

## 2021-11-12 ENCOUNTER — TRANSCRIPTION ENCOUNTER (OUTPATIENT)
Age: 81
End: 2021-11-12

## 2021-11-12 DIAGNOSIS — E87.5 HYPERKALEMIA: ICD-10-CM

## 2021-11-12 DIAGNOSIS — I35.0 NONRHEUMATIC AORTIC (VALVE) STENOSIS: ICD-10-CM

## 2021-11-12 PROBLEM — I63.9 CEREBRAL INFARCTION, UNSPECIFIED: Chronic | Status: ACTIVE | Noted: 2021-10-28

## 2021-11-12 PROBLEM — Z00.00 ENCOUNTER FOR PREVENTIVE HEALTH EXAMINATION: Status: ACTIVE | Noted: 2021-11-12

## 2021-11-12 LAB
ANION GAP SERPL CALC-SCNC: 12 MMOL/L — SIGNIFICANT CHANGE UP (ref 7–14)
ANION GAP SERPL CALC-SCNC: 9 MMOL/L — SIGNIFICANT CHANGE UP (ref 7–14)
BUN SERPL-MCNC: 45 MG/DL — HIGH (ref 7–23)
BUN SERPL-MCNC: 49 MG/DL — HIGH (ref 7–23)
CALCIUM SERPL-MCNC: 9 MG/DL — SIGNIFICANT CHANGE UP (ref 8.4–10.5)
CALCIUM SERPL-MCNC: 9.4 MG/DL — SIGNIFICANT CHANGE UP (ref 8.4–10.5)
CHLORIDE SERPL-SCNC: 104 MMOL/L — SIGNIFICANT CHANGE UP (ref 98–107)
CHLORIDE SERPL-SCNC: 106 MMOL/L — SIGNIFICANT CHANGE UP (ref 98–107)
CO2 SERPL-SCNC: 20 MMOL/L — LOW (ref 22–31)
CO2 SERPL-SCNC: 20 MMOL/L — LOW (ref 22–31)
CREAT SERPL-MCNC: 1.71 MG/DL — HIGH (ref 0.5–1.3)
CREAT SERPL-MCNC: 1.83 MG/DL — HIGH (ref 0.5–1.3)
GLUCOSE BLDC GLUCOMTR-MCNC: 104 MG/DL — HIGH (ref 70–99)
GLUCOSE BLDC GLUCOMTR-MCNC: 135 MG/DL — HIGH (ref 70–99)
GLUCOSE BLDC GLUCOMTR-MCNC: 184 MG/DL — HIGH (ref 70–99)
GLUCOSE BLDC GLUCOMTR-MCNC: 274 MG/DL — HIGH (ref 70–99)
GLUCOSE BLDC GLUCOMTR-MCNC: 83 MG/DL — SIGNIFICANT CHANGE UP (ref 70–99)
GLUCOSE BLDC GLUCOMTR-MCNC: 85 MG/DL — SIGNIFICANT CHANGE UP (ref 70–99)
GLUCOSE BLDC GLUCOMTR-MCNC: 95 MG/DL — SIGNIFICANT CHANGE UP (ref 70–99)
GLUCOSE SERPL-MCNC: 104 MG/DL — HIGH (ref 70–99)
GLUCOSE SERPL-MCNC: 92 MG/DL — SIGNIFICANT CHANGE UP (ref 70–99)
HCT VFR BLD CALC: 42.2 % — SIGNIFICANT CHANGE UP (ref 39–50)
HGB BLD-MCNC: 13.1 G/DL — SIGNIFICANT CHANGE UP (ref 13–17)
MAGNESIUM SERPL-MCNC: 2.2 MG/DL — SIGNIFICANT CHANGE UP (ref 1.6–2.6)
MAGNESIUM SERPL-MCNC: 2.3 MG/DL — SIGNIFICANT CHANGE UP (ref 1.6–2.6)
MCHC RBC-ENTMCNC: 21.2 PG — LOW (ref 27–34)
MCHC RBC-ENTMCNC: 31 GM/DL — LOW (ref 32–36)
MCV RBC AUTO: 68.3 FL — LOW (ref 80–100)
NRBC # BLD: 0 /100 WBCS — SIGNIFICANT CHANGE UP
NRBC # FLD: 0 K/UL — SIGNIFICANT CHANGE UP
PHOSPHATE SERPL-MCNC: 3.5 MG/DL — SIGNIFICANT CHANGE UP (ref 2.5–4.5)
PHOSPHATE SERPL-MCNC: 3.9 MG/DL — SIGNIFICANT CHANGE UP (ref 2.5–4.5)
PLATELET # BLD AUTO: 209 K/UL — SIGNIFICANT CHANGE UP (ref 150–400)
POTASSIUM SERPL-MCNC: 5.3 MMOL/L — SIGNIFICANT CHANGE UP (ref 3.5–5.3)
POTASSIUM SERPL-MCNC: 5.8 MMOL/L — HIGH (ref 3.5–5.3)
POTASSIUM SERPL-SCNC: 5.3 MMOL/L — SIGNIFICANT CHANGE UP (ref 3.5–5.3)
POTASSIUM SERPL-SCNC: 5.8 MMOL/L — HIGH (ref 3.5–5.3)
RBC # BLD: 6.18 M/UL — HIGH (ref 4.2–5.8)
RBC # FLD: 20.6 % — HIGH (ref 10.3–14.5)
SODIUM SERPL-SCNC: 135 MMOL/L — SIGNIFICANT CHANGE UP (ref 135–145)
SODIUM SERPL-SCNC: 136 MMOL/L — SIGNIFICANT CHANGE UP (ref 135–145)
WBC # BLD: 7.56 K/UL — SIGNIFICANT CHANGE UP (ref 3.8–10.5)
WBC # FLD AUTO: 7.56 K/UL — SIGNIFICANT CHANGE UP (ref 3.8–10.5)

## 2021-11-12 PROCEDURE — 93010 ELECTROCARDIOGRAM REPORT: CPT

## 2021-11-12 PROCEDURE — 99233 SBSQ HOSP IP/OBS HIGH 50: CPT

## 2021-11-12 RX ORDER — ACETAMINOPHEN 500 MG
2 TABLET ORAL
Qty: 0 | Refills: 0 | DISCHARGE
Start: 2021-11-12

## 2021-11-12 RX ORDER — LISINOPRIL 2.5 MG/1
1 TABLET ORAL
Qty: 0 | Refills: 0 | DISCHARGE

## 2021-11-12 RX ORDER — DEXTROSE 50 % IN WATER 50 %
50 SYRINGE (ML) INTRAVENOUS ONCE
Refills: 0 | Status: COMPLETED | OUTPATIENT
Start: 2021-11-12 | End: 2021-11-12

## 2021-11-12 RX ORDER — INSULIN HUMAN 100 [IU]/ML
5 INJECTION, SOLUTION SUBCUTANEOUS ONCE
Refills: 0 | Status: DISCONTINUED | OUTPATIENT
Start: 2021-11-12 | End: 2021-11-12

## 2021-11-12 RX ORDER — INSULIN HUMAN 100 [IU]/ML
5 INJECTION, SOLUTION SUBCUTANEOUS ONCE
Refills: 0 | Status: COMPLETED | OUTPATIENT
Start: 2021-11-12 | End: 2021-11-12

## 2021-11-12 RX ADMIN — Medication 25 MILLIGRAM(S): at 06:15

## 2021-11-12 RX ADMIN — INSULIN HUMAN 5 UNIT(S): 100 INJECTION, SOLUTION SUBCUTANEOUS at 09:51

## 2021-11-12 RX ADMIN — RIVAROXABAN 15 MILLIGRAM(S): KIT at 17:18

## 2021-11-12 RX ADMIN — Medication 25 MILLIGRAM(S): at 22:03

## 2021-11-12 RX ADMIN — LISINOPRIL 5 MILLIGRAM(S): 2.5 TABLET ORAL at 06:16

## 2021-11-12 RX ADMIN — Medication 100 MILLIGRAM(S): at 14:03

## 2021-11-12 RX ADMIN — Medication 100 MILLIGRAM(S): at 17:18

## 2021-11-12 RX ADMIN — Medication 50 MILLILITER(S): at 09:53

## 2021-11-12 RX ADMIN — Medication 100 MILLIGRAM(S): at 06:16

## 2021-11-12 NOTE — PROVIDER CONTACT NOTE (OTHER) - ACTION/TREATMENT ORDERED:
due for metoprolol now.
gave medication as per orders. Continue to monitor
held scheduled hydralazine. no orders at this time.
gave metoprolol on schedule
no orders at this time
awaiting orders.
no orders at this time

## 2021-11-12 NOTE — PROGRESS NOTE ADULT - SUBJECTIVE AND OBJECTIVE BOX
CARDIOLOGY    tele: chronic AF, narrow QRS, no events    S: no chest pain or sob; ros otherwise negative.     DATE OF SERVICE - 11-12-21     Review of Systems:   Constitutional: [ ] fevers, [ ] chills.   Skin: [ ] dry skin. [ ] rashes.  Psychiatric: [ ] depression, [ ] anxiety.   Gastrointestinal: [ ] BRBPR, [ ] melena.   Neurological: [ ] confusion. [ ] seizures. [ ] shuffling gait.   Ears,Nose,Mouth and Throat: [ ] ear pain [ ] sore throat.   Eyes: [ ] diplopia.   Respiratory: [ ] hemoptysis. [ ] shortness of breath  Cardiovascular: See HPI above  Hematologic/Lymphatic: [ ] anemia. [ ] painful nodes. [ ] prolonged bleeding.   Genitourinary: [ ] hematuria. [ ] flank pain.   Endocrine: [ ] significant change in weight. [ ] intolerance to heat and cold.     Review of systems [x ] otherwise negative, [ ] otherwise unable to obtain    FH: no family history of sudden cardiac death in first degree relatives    SH: [ ] tobacco, [ ] alcohol, [ ] drugs      acetaminophen     Tablet .. 650 milliGRAM(s) Oral every 6 hours PRN  allopurinol 100 milliGRAM(s) Oral daily  dextrose 40% Gel 15 Gram(s) Oral once  dextrose 5%. 1000 milliLiter(s) IV Continuous <Continuous>  dextrose 5%. 1000 milliLiter(s) IV Continuous <Continuous>  dextrose 50% Injectable 25 Gram(s) IV Push once  dextrose 50% Injectable 12.5 Gram(s) IV Push once  dextrose 50% Injectable 25 Gram(s) IV Push once  fluticasone propionate 50 MICROgram(s)/spray Nasal Spray 1 Spray(s) Both Nostrils two times a day PRN  glucagon  Injectable 1 milliGRAM(s) IntraMuscular once  hydrALAZINE 25 milliGRAM(s) Oral three times a day  insulin lispro (ADMELOG) corrective regimen sliding scale   SubCutaneous three times a day before meals  insulin lispro (ADMELOG) corrective regimen sliding scale   SubCutaneous at bedtime  metoprolol tartrate 100 milliGRAM(s) Oral two times a day  rivaroxaban 15 milliGRAM(s) Oral with dinner                            13.1   7.56  )-----------( 209      ( 12 Nov 2021 07:04 )             42.2       11-12    136  |  104  |  49<H>  ----------------------------<  104<H>  5.8<H>   |  20<L>  |  1.83<H>    Ca    9.4      12 Nov 2021 07:04  Phos  3.5     11-12  Mg     2.20     11-12              T(C): 36.6 (11-12-21 @ 06:15), Max: 36.7 (11-11-21 @ 21:58)  HR: 54 (11-12-21 @ 06:15) (54 - 86)  BP: 136/66 (11-12-21 @ 06:15) (101/44 - 136/66)  RR: 16 (11-12-21 @ 06:15) (15 - 17)  SpO2: 100% (11-12-21 @ 06:15) (99% - 100%)  Wt(kg): --    I&O's Summary      General: Well nourished in no acute distress. Alert and Oriented * 3.   Head: Normocephalic and atraumatic.   Neck: No JVD. No bruits. Supple. Does not appear to be enlarged.   Cardiovascular: + S1,S2 ; RRR Soft systolic murmur at the left lower sternal border. No rubs noted.    Lungs: CTA b/l. No rhonchi, rales or wheezes.   Abdomen: + BS, soft. Non tender. Non distended. No rebound. No guarding.   Extremities: No clubbing/cyanosis/edema.   Neurologic: Moves all four extremities. Full range of motion.   Skin: Warm and moist. The patient's skin has normal elasticity and good skin turgor.   Psychiatric: Appropriate mood and affect.  Musculoskeletal: Normal range of motion, normal strength    DATA  < from: MR Cardiac w/wo IV Cont (11.08.21 @ 14:15) >  IMPRESSION:    1. Normal left ventricular size. Mildly depressed left ventricular function. LVEF: 51%. Multifocal patchy mid myocardial late gadolinium enhancement involving the basal to mid septal, inferoseptal and inferolateral walls. Findings compatible with nonischemic cardiomyopathy, with diagnostic considerations including infiltrative processes (favor sarcoid, less likely amyloid) as well asmyocarditis in the appropriate clinical setting.  2. Normal right ventricular size. Mildly depressed right ventricular function. RVEF: 50%.  3. Mild aortic insufficiency. Findings suggesting aortic stenosis with thickening of the aortic valve leaflets, decreased aortic valve opening and visual flow acceleration across the valve.  4. Mild right and moderate left atrial enlargement.    < end of copied text >      < from: Cardiac Catheterization (11.04.21 @ 16:05) >  VENTRICLES: No left ventriculogram was performed.  CORONARY VESSELS: The coronary circulation is right dominant.  LM:   --  Distal left main: Angiography showed minor luminal irregularities  with no flow limiting lesions.  LAD:   --  LAD: Angiography showed minor luminal irregularities with no  flow limiting lesions.  --  Mid LAD: There was a tubular 30 % stenosis in the proximal third of the  vessel segment.  --  D1: Angiography showed minor luminal irregularities with no flow  limiting lesions.  CX:   --  Circumflex: Angiography showed minor luminal irregularities with  no flow limiting lesions.  --  Proximal circumflex: There was a discrete 20 % stenosis.  --  Mid circumflex: There was a tubular 20 % stenosis.  --  OM1: There was a discrete 20 % stenosis at the ostium of the vessel  segment.  RCA:   --  RCA: Angiography showed minor luminal irregularities with no  flow limiting lesions.  --  Mid RCA: There was a tubular 20 % stenosis.  --  RPDA: There was a discrete 30 % stenosis at the ostiumof the vessel  segment.  COMPLICATIONS: No complications occurred during the cath lab visit.  DIAGNOSTIC RECOMMENDATIONS: Medical management is recommended.  Prepared and signed by  Ventura Brambila M.D.  Signed 11/04/2021 18:39:26    < end of copied text >        A/P) 82 y/o male PMH CAD s/p PCI (2018), DM, HTN, hyperlipidemia, PAF, stroke a/w syncope and found to have severe AS. Echo shows normal LVEF, and preop cath demonstrated mild non-obstructive CAD    -continue with lifelong ac for cva prevention   -MRI results noted  -pt. with slight increase in Cr - workup per primary team   -discussed with TAVR team - plan for TAVR next week  -transfer to [facility] for TAVR    Jennifer Miguel MD

## 2021-11-12 NOTE — PROVIDER CONTACT NOTE (OTHER) - SITUATION
PT  currently but jumped to 130s.
PT heart rate jumped to 140s
Heart rate 111
patient BP high 165/98.
patient bp 90/58 and HR went john to 44 on tele monitor.
422C, Elysia  Bp was 89/55. Retook it and was 95/56. Pt asymptomatic.
pt heart rate 106

## 2021-11-12 NOTE — PROGRESS NOTE ADULT - SUBJECTIVE AND OBJECTIVE BOX
Patient is a 81y old  Male who presents with a chief complaint of syncope (12 Nov 2021 11:57)      SUBJECTIVE / OVERNIGHT EVENTS: No events overnight. No major events on telemetry. Pt denies cp, sob, palpitations, lightheadedness.    MEDICATIONS  (STANDING):  allopurinol 100 milliGRAM(s) Oral daily  dextrose 40% Gel 15 Gram(s) Oral once  dextrose 5%. 1000 milliLiter(s) (50 mL/Hr) IV Continuous <Continuous>  dextrose 5%. 1000 milliLiter(s) (100 mL/Hr) IV Continuous <Continuous>  dextrose 50% Injectable 25 Gram(s) IV Push once  dextrose 50% Injectable 12.5 Gram(s) IV Push once  dextrose 50% Injectable 25 Gram(s) IV Push once  glucagon  Injectable 1 milliGRAM(s) IntraMuscular once  hydrALAZINE 25 milliGRAM(s) Oral three times a day  insulin lispro (ADMELOG) corrective regimen sliding scale   SubCutaneous three times a day before meals  insulin lispro (ADMELOG) corrective regimen sliding scale   SubCutaneous at bedtime  metoprolol tartrate 100 milliGRAM(s) Oral two times a day  rivaroxaban 15 milliGRAM(s) Oral with dinner    MEDICATIONS  (PRN):  acetaminophen     Tablet .. 650 milliGRAM(s) Oral every 6 hours PRN Mild Pain (1 - 3), Moderate Pain (4 - 6)  fluticasone propionate 50 MICROgram(s)/spray Nasal Spray 1 Spray(s) Both Nostrils two times a day PRN allergic rhinitis        CAPILLARY BLOOD GLUCOSE      POCT Blood Glucose.: 95 mg/dL (12 Nov 2021 12:03)  POCT Blood Glucose.: 184 mg/dL (12 Nov 2021 11:04)  POCT Blood Glucose.: 274 mg/dL (12 Nov 2021 10:20)  POCT Blood Glucose.: 135 mg/dL (12 Nov 2021 09:50)  POCT Blood Glucose.: 85 mg/dL (12 Nov 2021 08:52)  POCT Blood Glucose.: 97 mg/dL (11 Nov 2021 21:48)  POCT Blood Glucose.: 115 mg/dL (11 Nov 2021 17:45)    I&O's Summary    Vital Signs Last 24 Hrs  T(C): 36.6 (12 Nov 2021 06:15), Max: 36.7 (11 Nov 2021 21:58)  T(F): 97.9 (12 Nov 2021 06:15), Max: 98 (11 Nov 2021 21:58)  HR: 54 (12 Nov 2021 06:15) (54 - 86)  BP: 136/66 (12 Nov 2021 06:15) (101/44 - 136/66)  BP(mean): --  RR: 16 (12 Nov 2021 06:15) (15 - 17)  SpO2: 100% (12 Nov 2021 06:15) (99% - 100%)    PHYSICAL EXAM:    CONSTITUTIONAL: NAD, well-developed  RESPIRATORY: Normal respiratory effort; lungs are clear to auscultation bilaterally  CARDIOVASCULAR:  Irregular rate and rhythm, Soft systolic murmur at the left lower sternal border.  Trace peripheral edema bilaterally and symmetrically;   ABDOMEN: Nontender to palpation, normoactive bowel sounds, no rebound/guarding; No hepatosplenomegaly  MUSCLOSKELETAL: no clubbing or cyanosis of digits; no joint swelling or tenderness to palpation  PSYCH: calm and pleasant    LABS:                        13.1   7.56  )-----------( 209      ( 12 Nov 2021 07:04 )             42.2     11-12    136  |  104  |  49<H>  ----------------------------<  104<H>  5.8<H>   |  20<L>  |  1.83<H>    Ca    9.4      12 Nov 2021 07:04  Phos  3.5     11-12  Mg     2.20     11-12                RADIOLOGY & ADDITIONAL TESTS:    Imaging Personally Reviewed:    Consultant(s) Notes Reviewed:      Care Discussed with Consultants/Other Providers: Dr. Miguel (cardiology)

## 2021-11-12 NOTE — PROGRESS NOTE ADULT - PROBLEM SELECTOR PLAN 2
Progression of AS to severe, may be cause of syncope. LHC done 11/4 showed mild non obstructive CAD  - cMRI showed patchy infiltration w/ Ddx including sarcoid vs amyloid  - CXR clear lungs, no LN, obtain CT chest non cont and serum ACE levels  - ongoing discussion w/ Dr. Miguel (interventional cardiology) regarding further plan  - F/u w cards regarding when to re-start patient's diuretics Progression of AS to severe, may be cause of syncope. LHC done 11/4 showed mild non obstructive CAD  - cMRI showed patchy infiltration w/ Ddx including sarcoid vs amyloid  - CXR clear lungs, no LN, CT chest showed no mediastinal LN, ace levels pending  - plan to transfer patient to CT surgery at [facility] today  - hold diuretics given CJ, discuss with cards when to resume diuretics

## 2021-11-12 NOTE — PROGRESS NOTE ADULT - PROBLEM SELECTOR PLAN 5
[id number] Score =7  Xarelto last dosed 10/30--> hep gtt--> back on xarelto 11/5  Maintain falls and bleeding precautions  -c/w metoprolol L ankle pain  Gout flare, suspected  Hx of gout, not on treatment  -no fx on Xray L knee or L ankle  -PT eval home with home PT  -Tylenol 650mg pO Q6h prn pain-- pt has not been asking for it  - restarted on home allopurinol

## 2021-11-12 NOTE — DISCHARGE NOTE NURSING/CASE MANAGEMENT/SOCIAL WORK - NSDCPEPTCAREGIVEDUMATLIST _GEN_ALL_CORE
Heart Failure/Stroke/Influenza Vaccination Heart Failure/Stroke/Influenza Vaccination/Rivaroxaban/Xarelto

## 2021-11-12 NOTE — PROVIDER CONTACT NOTE (OTHER) - BACKGROUND
came in for syncope
pt came in for syncope
admitted due to syncope
admitted with syncope
PT came in for syncope
PT came in for syncope

## 2021-11-12 NOTE — PROGRESS NOTE ADULT - ASSESSMENT
80 yo male PMHx CAD (1 stent,) DM2, HTN, gout, HLD, AS, Diastolic HF, Afib (on Xarelto) and CVA (L hemiparesis) syncope x2 episodes the day before admission and L knee pain, admitted to [facility] for Syncope, found to have severe AS. Cardiology following, [facility] scheduled for 11/2, then performed 11/4, showed patent coronaries, no PCI. cMRI also ordered for amlyoid workup. Consideration for TAVR. Additional issues- L ankle pain, suspected gout flare, improving with colchicine.

## 2021-11-12 NOTE — PROGRESS NOTE ADULT - PROBLEM SELECTOR PLAN 3
Creatinine improved from admission, ACE and diuretics held, 1.4-1.5 may be new baseline  Creat 1.2-1.3 Dec 2020  Ace resumed 11/5. Torsemide continues to be held slightly worsening possibly 2/2 ACE-i  Creat 1.2-1.3 Dec 2020  hold lisinopril, cont to hold torsemide - appears euvolemic on exam K+ 5.8, non hemolyzed  possibly 2/2 reinitiating ACE-i  hold lisinopril  s/p Insulin 5 units and d50  obtain EKG K+ 5.8, non hemolyzed  possibly 2/2 reinitiating ACE-i  hold lisinopril  s/p Insulin 5 units and d50  obtain EKG, pending repeat BMP in 2 hours

## 2021-11-12 NOTE — DISCHARGE NOTE NURSING/CASE MANAGEMENT/SOCIAL WORK - NSDPDISTO_GEN_ALL_CORE
protonix      Last Written Prescription Date: unknown  Last Fill Quantity: 0,  # refills: 0   Last Office Visit with [facility], [facility] or [facility] prescribing provider: 4/7/17                                                Alternate Care Site

## 2021-11-12 NOTE — PROGRESS NOTE ADULT - PROBLEM SELECTOR PLAN 7
Monitor BP daily  DASH diet  continue metoprolol.  Resume ACEI  11/5 Monitor BP daily  DASH diet  continue metoprolol.  hold lisinopril given CJ Pt appears euvolemic on exam  -Chest xray: clear lungs  2G Sodium 1800 ADA diet  continue metoprolol   hold lisinopril  continue to hold torsemide

## 2021-11-12 NOTE — PROVIDER CONTACT NOTE (OTHER) - DATE AND TIME:
28-Oct-2021 19:22
31-Oct-2021 19:01
12-Nov-2021 12:25
09-Nov-2021 14:30
30-Oct-2021 14:37
30-Oct-2021 14:57
30-Oct-2021 17:11

## 2021-11-12 NOTE — PROGRESS NOTE ADULT - PROBLEM SELECTOR PLAN 6
Pt appears euvolemic on exam  -Chest xray: clear lungs  2G Sodium 1800 ADA diet  continue metoprolol and lisinopril  continue to hold torsemide Pt appears euvolemic on exam  -Chest xray: clear lungs  2G Sodium 1800 ADA diet  continue metoprolol   hold lisinopril  continue to hold torsemide [id number] Score =7  Xarelto last dosed 10/30--> hep gtt--> back on xarelto 11/5  Maintain falls and bleeding precautions  -c/w metoprolol

## 2021-11-12 NOTE — PROGRESS NOTE ADULT - PROBLEM SELECTOR PLAN 4
L ankle pain  Gout flare, suspected  Hx of gout, not on treatment  -no fx on Xray L knee or L ankle  -PT eval home with home PT  -Tylenol 650mg pO Q6h prn pain-- pt has not been asking for it  - restarted on home allopurinol slightly worsening possibly 2/2 ACE-i  Creat 1.2-1.3 Dec 2020  hold lisinopril, cont to hold torsemide - appears euvolemic on exam

## 2021-11-12 NOTE — PROGRESS NOTE ADULT - PROBLEM SELECTOR PLAN 9
No additional prophylaxis needed. Pt on therapeutic AC.    Dispo: further TAVR workup    Discussed case w/ ACP, patient and wife Elysia 11/11 No additional prophylaxis needed. Pt on therapeutic AC.    Dispo: transfer to [facility] for further TAVR workup    Discussed case w/ ACP, patient, wife Elysia ph: [phone] and son Elder ph: [phone] 11/12

## 2021-11-12 NOTE — DISCHARGE NOTE NURSING/CASE MANAGEMENT/SOCIAL WORK - PATIENT PORTAL LINK FT
You can access the FollowMyHealth Patient Portal offered by Health system by registering at the following website: [email or web address]. By joining [email or web address]’s FollowMyHealth portal, you will also be able to view your health information using other applications (apps) compatible with our system.

## 2021-11-12 NOTE — PROVIDER CONTACT NOTE (OTHER) - ASSESSMENT
asymptomatic
pt asymptomatic
asymptomatic up in chair
pt asymptomatic

## 2021-11-13 LAB
ANION GAP SERPL CALC-SCNC: 10 MMOL/L — SIGNIFICANT CHANGE UP (ref 7–14)
BUN SERPL-MCNC: 48 MG/DL — HIGH (ref 7–23)
CALCIUM SERPL-MCNC: 9.1 MG/DL — SIGNIFICANT CHANGE UP (ref 8.4–10.5)
CHLORIDE SERPL-SCNC: 107 MMOL/L — SIGNIFICANT CHANGE UP (ref 98–107)
CO2 SERPL-SCNC: 18 MMOL/L — LOW (ref 22–31)
CREAT SERPL-MCNC: 1.7 MG/DL — HIGH (ref 0.5–1.3)
GLUCOSE BLDC GLUCOMTR-MCNC: 121 MG/DL — HIGH (ref 70–99)
GLUCOSE BLDC GLUCOMTR-MCNC: 127 MG/DL — HIGH (ref 70–99)
GLUCOSE BLDC GLUCOMTR-MCNC: 90 MG/DL — SIGNIFICANT CHANGE UP (ref 70–99)
GLUCOSE BLDC GLUCOMTR-MCNC: 93 MG/DL — SIGNIFICANT CHANGE UP (ref 70–99)
GLUCOSE BLDC GLUCOMTR-MCNC: 95 MG/DL — SIGNIFICANT CHANGE UP (ref 70–99)
GLUCOSE SERPL-MCNC: 86 MG/DL — SIGNIFICANT CHANGE UP (ref 70–99)
HCT VFR BLD CALC: 41.6 % — SIGNIFICANT CHANGE UP (ref 39–50)
HGB BLD-MCNC: 12.9 G/DL — LOW (ref 13–17)
MAGNESIUM SERPL-MCNC: 2.2 MG/DL — SIGNIFICANT CHANGE UP (ref 1.6–2.6)
MCHC RBC-ENTMCNC: 21.3 PG — LOW (ref 27–34)
MCHC RBC-ENTMCNC: 31 GM/DL — LOW (ref 32–36)
MCV RBC AUTO: 68.5 FL — LOW (ref 80–100)
NRBC # BLD: 0 /100 WBCS — SIGNIFICANT CHANGE UP
NRBC # FLD: 0 K/UL — SIGNIFICANT CHANGE UP
PHOSPHATE SERPL-MCNC: 3.3 MG/DL — SIGNIFICANT CHANGE UP (ref 2.5–4.5)
PLATELET # BLD AUTO: 179 K/UL — SIGNIFICANT CHANGE UP (ref 150–400)
POTASSIUM SERPL-MCNC: 4.9 MMOL/L — SIGNIFICANT CHANGE UP (ref 3.5–5.3)
POTASSIUM SERPL-SCNC: 4.9 MMOL/L — SIGNIFICANT CHANGE UP (ref 3.5–5.3)
RBC # BLD: 6.07 M/UL — HIGH (ref 4.2–5.8)
RBC # FLD: 20.4 % — HIGH (ref 10.3–14.5)
SODIUM SERPL-SCNC: 135 MMOL/L — SIGNIFICANT CHANGE UP (ref 135–145)
WBC # BLD: 6.73 K/UL — SIGNIFICANT CHANGE UP (ref 3.8–10.5)
WBC # FLD AUTO: 6.73 K/UL — SIGNIFICANT CHANGE UP (ref 3.8–10.5)

## 2021-11-13 PROCEDURE — 99232 SBSQ HOSP IP/OBS MODERATE 35: CPT

## 2021-11-13 RX ADMIN — Medication 100 MILLIGRAM(S): at 17:18

## 2021-11-13 RX ADMIN — Medication 100 MILLIGRAM(S): at 05:09

## 2021-11-13 RX ADMIN — Medication 100 MILLIGRAM(S): at 13:40

## 2021-11-13 RX ADMIN — Medication 25 MILLIGRAM(S): at 23:21

## 2021-11-13 RX ADMIN — Medication 25 MILLIGRAM(S): at 05:10

## 2021-11-13 RX ADMIN — RIVAROXABAN 15 MILLIGRAM(S): KIT at 17:18

## 2021-11-13 RX ADMIN — Medication 25 MILLIGRAM(S): at 13:40

## 2021-11-13 NOTE — PROGRESS NOTE ADULT - PROBLEM SELECTOR PLAN 2
Progression of AS to severe, may be cause of syncope. LHC done 11/4 showed mild non obstructive CAD  - cMRI showed patchy infiltration w/ Ddx including sarcoid vs amyloid  - CXR clear lungs, no LN, CT chest showed no mediastinal LN, ace levels pending  - plan to transfer patient to CT surgery at [facility]   - hold diuretics given CJ, discuss with cards when to resume diuretics

## 2021-11-13 NOTE — PROGRESS NOTE ADULT - SUBJECTIVE AND OBJECTIVE BOX
Patient is a 81y old  Male who presents with a chief complaint of syncope (13 Nov 2021 07:13)      SUBJECTIVE / OVERNIGHT EVENTS: No events overnight. no major events on telemetry. Has no medical complaints at this time. Denies cp, sob, lightheadedness, palpitations, f/v/n/v/d.    MEDICATIONS  (STANDING):  allopurinol 100 milliGRAM(s) Oral daily  dextrose 40% Gel 15 Gram(s) Oral once  dextrose 5%. 1000 milliLiter(s) (50 mL/Hr) IV Continuous <Continuous>  dextrose 5%. 1000 milliLiter(s) (100 mL/Hr) IV Continuous <Continuous>  dextrose 50% Injectable 25 Gram(s) IV Push once  dextrose 50% Injectable 12.5 Gram(s) IV Push once  dextrose 50% Injectable 25 Gram(s) IV Push once  glucagon  Injectable 1 milliGRAM(s) IntraMuscular once  hydrALAZINE 25 milliGRAM(s) Oral three times a day  insulin lispro (ADMELOG) corrective regimen sliding scale   SubCutaneous three times a day before meals  insulin lispro (ADMELOG) corrective regimen sliding scale   SubCutaneous at bedtime  metoprolol tartrate 100 milliGRAM(s) Oral two times a day  rivaroxaban 15 milliGRAM(s) Oral with dinner    MEDICATIONS  (PRN):  acetaminophen     Tablet .. 650 milliGRAM(s) Oral every 6 hours PRN Mild Pain (1 - 3), Moderate Pain (4 - 6)  fluticasone propionate 50 MICROgram(s)/spray Nasal Spray 1 Spray(s) Both Nostrils two times a day PRN allergic rhinitis        CAPILLARY BLOOD GLUCOSE      POCT Blood Glucose.: 127 mg/dL (13 Nov 2021 11:58)  POCT Blood Glucose.: 95 mg/dL (13 Nov 2021 08:34)  POCT Blood Glucose.: 93 mg/dL (13 Nov 2021 08:33)  POCT Blood Glucose.: 104 mg/dL (12 Nov 2021 21:32)  POCT Blood Glucose.: 83 mg/dL (12 Nov 2021 17:54)    I&O's Summary    Vital Signs Last 24 Hrs  T(C): 36.6 (13 Nov 2021 05:07), Max: 36.6 (12 Nov 2021 14:06)  T(F): 97.9 (13 Nov 2021 05:07), Max: 97.9 (12 Nov 2021 22:02)  HR: 80 (13 Nov 2021 05:07) (67 - 80)  BP: 119/89 (13 Nov 2021 05:07) (96/68 - 119/89)  BP(mean): --  RR: 18 (13 Nov 2021 05:07) (17 - 18)  SpO2: 94% (13 Nov 2021 05:07) (94% - 100%)    PHYSICAL EXAM:    CONSTITUTIONAL: NAD, well-developed  RESPIRATORY: Normal respiratory effort; lungs are clear to auscultation bilaterally  CARDIOVASCULAR:  Irregular rate and rhythm, Soft systolic murmur at the left lower sternal border.  Trace peripheral edema bilaterally and symmetrically;   ABDOMEN: Nontender to palpation, normoactive bowel sounds, no rebound/guarding; No hepatosplenomegaly  MUSCLOSKELETAL: no clubbing or cyanosis of digits; no joint swelling or tenderness to palpation  PSYCH: calm and pleasant    LABS:                        12.9   6.73  )-----------( 179      ( 13 Nov 2021 07:49 )             41.6     11-13    135  |  107  |  48<H>  ----------------------------<  86  4.9   |  18<L>  |  1.70<H>    Ca    9.1      13 Nov 2021 07:49  Phos  3.3     11-13  Mg     2.20     11-13                RADIOLOGY & ADDITIONAL TESTS:    Imaging Personally Reviewed:    Consultant(s) Notes Reviewed:      Care Discussed with Consultants/Other Providers:

## 2021-11-13 NOTE — PROGRESS NOTE ADULT - PROBLEM SELECTOR PROBLEM 9
Need for prophylactic measure

## 2021-11-13 NOTE — PROGRESS NOTE ADULT - PROBLEM SELECTOR PLAN 6
[id number] Score =7  Xarelto last dosed 10/30--> hep gtt--> back on xarelto 11/5  Maintain falls and bleeding precautions  -c/w metoprolol

## 2021-11-13 NOTE — PROGRESS NOTE ADULT - SUBJECTIVE AND OBJECTIVE BOX
CARDIOLOGY    tele: chronic AF, narrow QRS, no events    pt seen and examined, no complaints, ROS - .     DATE OF SERVICE - 11-13-21     Review of Systems:   Constitutional: [ ] fevers, [ ] chills.   Skin: [ ] dry skin. [ ] rashes.  Psychiatric: [ ] depression, [ ] anxiety.   Gastrointestinal: [ ] BRBPR, [ ] melena.   Neurological: [ ] confusion. [ ] seizures. [ ] shuffling gait.   Ears,Nose,Mouth and Throat: [ ] ear pain [ ] sore throat.   Eyes: [ ] diplopia.   Respiratory: [ ] hemoptysis. [ ] shortness of breath  Cardiovascular: See HPI above  Hematologic/Lymphatic: [ ] anemia. [ ] painful nodes. [ ] prolonged bleeding.   Genitourinary: [ ] hematuria. [ ] flank pain.   Endocrine: [ ] significant change in weight. [ ] intolerance to heat and cold.     Review of systems [x ] otherwise negative, [ ] otherwise unable to obtain    FH: no family history of sudden cardiac death in first degree relatives    SH: [ ] tobacco, [ ] alcohol, [ ] drugs           acetaminophen     Tablet .. 650 milliGRAM(s) Oral every 6 hours PRN  allopurinol 100 milliGRAM(s) Oral daily  dextrose 40% Gel 15 Gram(s) Oral once  dextrose 5%. 1000 milliLiter(s) IV Continuous <Continuous>  dextrose 5%. 1000 milliLiter(s) IV Continuous <Continuous>  dextrose 50% Injectable 25 Gram(s) IV Push once  dextrose 50% Injectable 12.5 Gram(s) IV Push once  dextrose 50% Injectable 25 Gram(s) IV Push once  fluticasone propionate 50 MICROgram(s)/spray Nasal Spray 1 Spray(s) Both Nostrils two times a day PRN  glucagon  Injectable 1 milliGRAM(s) IntraMuscular once  hydrALAZINE 25 milliGRAM(s) Oral three times a day  insulin lispro (ADMELOG) corrective regimen sliding scale   SubCutaneous three times a day before meals  insulin lispro (ADMELOG) corrective regimen sliding scale   SubCutaneous at bedtime  metoprolol tartrate 100 milliGRAM(s) Oral two times a day  rivaroxaban 15 milliGRAM(s) Oral with dinner                            13.1   7.56  )-----------( 209      ( 12 Nov 2021 07:04 )             42.2       Hemoglobin: 13.1 g/dL (11-12 @ 07:04)  Hemoglobin: 12.7 g/dL (11-11 @ 07:43)  Hemoglobin: 12.7 g/dL (11-10 @ 06:20)  Hemoglobin: 13.1 g/dL (11-09 @ 06:57)      11-12    135  |  106  |  45<H>  ----------------------------<  92  5.3   |  20<L>  |  1.71<H>    Ca    9.0      12 Nov 2021 17:22  Phos  3.9     11-12  Mg     2.30     11-12      Creatinine Trend: 1.71<--, 1.83<--, 1.40<--, 1.48<--, 1.48<--, 1.51<--    COAGS:           T(C): 36.6 (11-13-21 @ 05:07), Max: 36.6 (11-12-21 @ 14:06)  HR: 80 (11-13-21 @ 05:07) (58 - 80)  BP: 119/89 (11-13-21 @ 05:07) (90/58 - 119/89)  RR: 18 (11-13-21 @ 05:07) (17 - 18)  SpO2: 94% (11-13-21 @ 05:07) (94% - 100%)  Wt(kg): --    I&O's Summary    General: Well nourished in no acute distress. Alert and Oriented * 3.   Head: Normocephalic and atraumatic.   Neck: No JVD. No bruits. Supple. Does not appear to be enlarged.   Cardiovascular: + S1,S2 ; RRR Soft systolic murmur at the left lower sternal border. No rubs noted.    Lungs: CTA b/l. No rhonchi, rales or wheezes.   Abdomen: + BS, soft. Non tender. Non distended. No rebound. No guarding.   Extremities: No clubbing/cyanosis/edema.   Neurologic: Moves all four extremities. Full range of motion.   Skin: Warm and moist. The patient's skin has normal elasticity and good skin turgor.   Psychiatric: Appropriate mood and affect.  Musculoskeletal: Normal range of motion, normal strength    DATA  < from: MR Cardiac w/wo IV Cont (11.08.21 @ 14:15) >  IMPRESSION:    1. Normal left ventricular size. Mildly depressed left ventricular function. LVEF: 51%. Multifocal patchy mid myocardial late gadolinium enhancement involving the basal to mid septal, inferoseptal and inferolateral walls. Findings compatible with nonischemic cardiomyopathy, with diagnostic considerations including infiltrative processes (favor sarcoid, less likely amyloid) as well asmyocarditis in the appropriate clinical setting.  2. Normal right ventricular size. Mildly depressed right ventricular function. RVEF: 50%.  3. Mild aortic insufficiency. Findings suggesting aortic stenosis with thickening of the aortic valve leaflets, decreased aortic valve opening and visual flow acceleration across the valve.  4. Mild right and moderate left atrial enlargement.    < end of copied text >      < from: Cardiac Catheterization (11.04.21 @ 16:05) >  VENTRICLES: No left ventriculogram was performed.  CORONARY VESSELS: The coronary circulation is right dominant.  LM:   --  Distal left main: Angiography showed minor luminal irregularities  with no flow limiting lesions.  LAD:   --  LAD: Angiography showed minor luminal irregularities with no  flow limiting lesions.  --  Mid LAD: There was a tubular 30 % stenosis in the proximal third of the  vessel segment.  --  D1: Angiography showed minor luminal irregularities with no flow  limiting lesions.  CX:   --  Circumflex: Angiography showed minor luminal irregularities with  no flow limiting lesions.  --  Proximal circumflex: There was a discrete 20 % stenosis.  --  Mid circumflex: There was a tubular 20 % stenosis.  --  OM1: There was a discrete 20 % stenosis at the ostium of the vessel  segment.  RCA:   --  RCA: Angiography showed minor luminal irregularities with no  flow limiting lesions.  --  Mid RCA: There was a tubular 20 % stenosis.  --  RPDA: There was a discrete 30 % stenosis at the ostiumof the vessel  segment.  COMPLICATIONS: No complications occurred during the cath lab visit.  DIAGNOSTIC RECOMMENDATIONS: Medical management is recommended.  Prepared and signed by  Ventura Brambila M.D.  Signed 11/04/2021 18:39:26    < end of copied text >        A/P) 80 y/o male PMH CAD s/p PCI (2018), DM, HTN, hyperlipidemia, PAF, stroke a/w syncope and found to have severe AS. Echo shows normal LVEF, and preop cath demonstrated mild non-obstructive CAD    -continue with lifelong ac for cva prevention   -MRI results noted  -pt. with slight increase in Cr - workup per primary team   -discussed with TAVR team - plan for TAVR next week  - structural heart note , will need to transfer to [facility] for TAVR

## 2021-11-13 NOTE — PROGRESS NOTE ADULT - ASSESSMENT
80 yo male PMHx CAD (1 stent,) DM2, HTN, gout, HLD, AS, Diastolic HF, Afib (on Xarelto) and CVA (L hemiparesis) syncope x2 episodes the day before admission and L knee pain, admitted to [facility] for Syncope, found to have severe AS. Cardiology following, [facility] scheduled for 11/2, then performed 11/4, showed patent coronaries, no PCI. cMRI also ordered for amlyoid workup. Consideration for TAVR. Additional issues- L ankle pain, suspected gout flare, improving with colchicine.

## 2021-11-13 NOTE — PROGRESS NOTE ADULT - PROBLEM SELECTOR PLAN 4
slightly worsening possibly 2/2 ACE-i  Creat 1.2-1.3 Dec 2020  hold lisinopril, cont to hold torsemide - appears euvolemic on exam

## 2021-11-13 NOTE — PROGRESS NOTE ADULT - PROBLEM SELECTOR PLAN 9
No additional prophylaxis needed. Pt on therapeutic AC.    Dispo: transfer to [facility] for further TAVR workup    Discussed case w/ ACP, patient, wife Elysia ph: [phone] and son Elder ph: [phone] 11/12

## 2021-11-13 NOTE — PROGRESS NOTE ADULT - PROBLEM SELECTOR PLAN 7
Pt appears euvolemic on exam  -Chest xray: clear lungs  2G Sodium 1800 ADA diet  continue metoprolol   hold lisinopril  continue to hold torsemide

## 2021-11-14 ENCOUNTER — INPATIENT (INPATIENT)
Facility: HOSPITAL | Age: 81
LOS: 5 days | Discharge: HOME HEALTH | DRG: 267 | End: 2021-11-20
Attending: THORACIC SURGERY (CARDIOTHORACIC VASCULAR SURGERY) | Admitting: THORACIC SURGERY (CARDIOTHORACIC VASCULAR SURGERY)
Payer: COMMERCIAL

## 2021-11-14 VITALS
WEIGHT: 165.79 LBS | SYSTOLIC BLOOD PRESSURE: 148 MMHG | HEIGHT: 64 IN | TEMPERATURE: 98 F | RESPIRATION RATE: 18 BRPM | HEART RATE: 74 BPM | DIASTOLIC BLOOD PRESSURE: 78 MMHG | OXYGEN SATURATION: 98 %

## 2021-11-14 VITALS
TEMPERATURE: 98 F | DIASTOLIC BLOOD PRESSURE: 65 MMHG | SYSTOLIC BLOOD PRESSURE: 131 MMHG | OXYGEN SATURATION: 98 % | RESPIRATION RATE: 18 BRPM | HEART RATE: 81 BPM

## 2021-11-14 DIAGNOSIS — Z95.5 PRESENCE OF CORONARY ANGIOPLASTY IMPLANT AND GRAFT: Chronic | ICD-10-CM

## 2021-11-14 DIAGNOSIS — I25.10 ATHEROSCLEROTIC HEART DISEASE OF NATIVE CORONARY ARTERY WITHOUT ANGINA PECTORIS: ICD-10-CM

## 2021-11-14 DIAGNOSIS — I35.0 NONRHEUMATIC AORTIC (VALVE) STENOSIS: ICD-10-CM

## 2021-11-14 DIAGNOSIS — N17.9 ACUTE KIDNEY FAILURE, UNSPECIFIED: ICD-10-CM

## 2021-11-14 LAB
ANION GAP SERPL CALC-SCNC: 11 MMOL/L — SIGNIFICANT CHANGE UP (ref 7–14)
BUN SERPL-MCNC: 46 MG/DL — HIGH (ref 7–23)
CALCIUM SERPL-MCNC: 9 MG/DL — SIGNIFICANT CHANGE UP (ref 8.4–10.5)
CHLORIDE SERPL-SCNC: 105 MMOL/L — SIGNIFICANT CHANGE UP (ref 98–107)
CO2 SERPL-SCNC: 18 MMOL/L — LOW (ref 22–31)
CREAT SERPL-MCNC: 1.68 MG/DL — HIGH (ref 0.5–1.3)
GLUCOSE BLDC GLUCOMTR-MCNC: 121 MG/DL — HIGH (ref 70–99)
GLUCOSE BLDC GLUCOMTR-MCNC: 137 MG/DL — HIGH (ref 70–99)
GLUCOSE BLDC GLUCOMTR-MCNC: 86 MG/DL — SIGNIFICANT CHANGE UP (ref 70–99)
GLUCOSE BLDC GLUCOMTR-MCNC: 95 MG/DL — SIGNIFICANT CHANGE UP (ref 70–99)
GLUCOSE SERPL-MCNC: 97 MG/DL — SIGNIFICANT CHANGE UP (ref 70–99)
HCT VFR BLD CALC: 39.7 % — SIGNIFICANT CHANGE UP (ref 39–50)
HGB BLD-MCNC: 12.7 G/DL — LOW (ref 13–17)
MAGNESIUM SERPL-MCNC: 2.2 MG/DL — SIGNIFICANT CHANGE UP (ref 1.6–2.6)
MCHC RBC-ENTMCNC: 21.6 PG — LOW (ref 27–34)
MCHC RBC-ENTMCNC: 32 GM/DL — SIGNIFICANT CHANGE UP (ref 32–36)
MCV RBC AUTO: 67.4 FL — LOW (ref 80–100)
NRBC # BLD: 0 /100 WBCS — SIGNIFICANT CHANGE UP
NRBC # FLD: 0 K/UL — SIGNIFICANT CHANGE UP
PHOSPHATE SERPL-MCNC: 3.4 MG/DL — SIGNIFICANT CHANGE UP (ref 2.5–4.5)
PLATELET # BLD AUTO: 176 K/UL — SIGNIFICANT CHANGE UP (ref 150–400)
POTASSIUM SERPL-MCNC: 4.8 MMOL/L — SIGNIFICANT CHANGE UP (ref 3.5–5.3)
POTASSIUM SERPL-SCNC: 4.8 MMOL/L — SIGNIFICANT CHANGE UP (ref 3.5–5.3)
RBC # BLD: 5.89 M/UL — HIGH (ref 4.2–5.8)
RBC # FLD: 20.7 % — HIGH (ref 10.3–14.5)
SARS-COV-2 RNA SPEC QL NAA+PROBE: SIGNIFICANT CHANGE UP
SODIUM SERPL-SCNC: 134 MMOL/L — LOW (ref 135–145)
WBC # BLD: 5.86 K/UL — SIGNIFICANT CHANGE UP (ref 3.8–10.5)
WBC # FLD AUTO: 5.86 K/UL — SIGNIFICANT CHANGE UP (ref 3.8–10.5)

## 2021-11-14 PROCEDURE — 99223 1ST HOSP IP/OBS HIGH 75: CPT

## 2021-11-14 PROCEDURE — 99232 SBSQ HOSP IP/OBS MODERATE 35: CPT

## 2021-11-14 RX ADMIN — Medication 100 MILLIGRAM(S): at 05:10

## 2021-11-14 RX ADMIN — Medication 25 MILLIGRAM(S): at 05:10

## 2021-11-14 RX ADMIN — Medication 25 MILLIGRAM(S): at 21:51

## 2021-11-14 RX ADMIN — Medication 25 MILLIGRAM(S): at 13:16

## 2021-11-14 RX ADMIN — Medication 100 MILLIGRAM(S): at 17:38

## 2021-11-14 RX ADMIN — Medication 100 MILLIGRAM(S): at 11:20

## 2021-11-14 RX ADMIN — RIVAROXABAN 15 MILLIGRAM(S): KIT at 17:38

## 2021-11-14 NOTE — PROGRESS NOTE ADULT - PROBLEM SELECTOR PLAN 8
No additional prophylaxis needed. Pt on therapeutic AC.    Dispo: transfer to [facility] for further TAVR workup    Discussed case w/ ACP, patient, wife Elysia ph: [phone] and son Elder ph: [phone] 11/12
continue Xarelto, metoprolol
No additional prophylaxis needed.  Pt on Xarelto
continue Xarelto, metoprolol
Monitor BP daily  DASH diet  continue metoprolol.  hold lisinopril given CJ
Monitor BP daily  DASH diet  continue metoprolol.  hold lisinopril given CJ

## 2021-11-14 NOTE — H&P ADULT - NSHPSOCIALHISTORY_GEN_ALL_CORE
Social History:  : Pt , lives with spouse. Denies smoking, drinking or drugs. Baseline: ambulates with walker up 1 block. Fatigues easily

## 2021-11-14 NOTE — PROGRESS NOTE ADULT - SUBJECTIVE AND OBJECTIVE BOX
CARDIOLOGY    tele: chronic AF, narrow QRS, no events    S: no chest pain or sob; ros otherwise negative.     DATE OF SERVICE - 11-14-21     Review of Systems:   Constitutional: [ ] fevers, [ ] chills.   Skin: [ ] dry skin. [ ] rashes.  Psychiatric: [ ] depression, [ ] anxiety.   Gastrointestinal: [ ] BRBPR, [ ] melena.   Neurological: [ ] confusion. [ ] seizures. [ ] shuffling gait.   Ears,Nose,Mouth and Throat: [ ] ear pain [ ] sore throat.   Eyes: [ ] diplopia.   Respiratory: [ ] hemoptysis. [ ] shortness of breath  Cardiovascular: See HPI above  Hematologic/Lymphatic: [ ] anemia. [ ] painful nodes. [ ] prolonged bleeding.   Genitourinary: [ ] hematuria. [ ] flank pain.   Endocrine: [ ] significant change in weight. [ ] intolerance to heat and cold.     Review of systems [x ] otherwise negative, [ ] otherwise unable to obtain    FH: no family history of sudden cardiac death in first degree relatives    SH: [ ] tobacco, [ ] alcohol, [ ] drugs    acetaminophen     Tablet .. 650 milliGRAM(s) Oral every 6 hours PRN  allopurinol 100 milliGRAM(s) Oral daily  dextrose 40% Gel 15 Gram(s) Oral once  dextrose 5%. 1000 milliLiter(s) IV Continuous <Continuous>  dextrose 5%. 1000 milliLiter(s) IV Continuous <Continuous>  dextrose 50% Injectable 25 Gram(s) IV Push once  dextrose 50% Injectable 12.5 Gram(s) IV Push once  dextrose 50% Injectable 25 Gram(s) IV Push once  fluticasone propionate 50 MICROgram(s)/spray Nasal Spray 1 Spray(s) Both Nostrils two times a day PRN  glucagon  Injectable 1 milliGRAM(s) IntraMuscular once  hydrALAZINE 25 milliGRAM(s) Oral three times a day  insulin lispro (ADMELOG) corrective regimen sliding scale   SubCutaneous three times a day before meals  insulin lispro (ADMELOG) corrective regimen sliding scale   SubCutaneous at bedtime  metoprolol tartrate 100 milliGRAM(s) Oral two times a day  rivaroxaban 15 milliGRAM(s) Oral with dinner                            12.7   5.86  )-----------( 176      ( 14 Nov 2021 07:39 )             39.7       11-14    134<L>  |  105  |  46<H>  ----------------------------<  97  4.8   |  18<L>  |  1.68<H>    Ca    9.0      14 Nov 2021 07:39  Phos  3.4     11-14  Mg     2.20     11-14      T(C): 36.6 (11-14-21 @ 10:23), Max: 36.6 (11-13-21 @ 22:30)  HR: 75 (11-14-21 @ 10:23) (60 - 76)  BP: 102/52 (11-14-21 @ 10:23) (102/52 - 133/73)  RR: 18 (11-14-21 @ 10:23) (17 - 18)  SpO2: 100% (11-14-21 @ 10:23) (98% - 100%)    General: Well nourished in no acute distress. Alert and Oriented * 3.   Head: Normocephalic and atraumatic.   Neck: No JVD. No bruits. Supple. Does not appear to be enlarged.   Cardiovascular: + S1,S2 ; RRR Soft systolic murmur at the left lower sternal border. No rubs noted.    Lungs: CTA b/l. No rhonchi, rales or wheezes.   Abdomen: + BS, soft. Non tender. Non distended. No rebound. No guarding.   Extremities: No clubbing/cyanosis/edema.   Neurologic: Moves all four extremities. Full range of motion.   Skin: Warm and moist. The patient's skin has normal elasticity and good skin turgor.   Psychiatric: Appropriate mood and affect.  Musculoskeletal: Normal range of motion, normal strength    DATA  < from: MR Cardiac w/wo IV Cont (11.08.21 @ 14:15) >  IMPRESSION:    1. Normal left ventricular size. Mildly depressed left ventricular function. LVEF: 51%. Multifocal patchy mid myocardial late gadolinium enhancement involving the basal to mid septal, inferoseptal and inferolateral walls. Findings compatible with nonischemic cardiomyopathy, with diagnostic considerations including infiltrative processes (favor sarcoid, less likely amyloid) as well asmyocarditis in the appropriate clinical setting.  2. Normal right ventricular size. Mildly depressed right ventricular function. RVEF: 50%.  3. Mild aortic insufficiency. Findings suggesting aortic stenosis with thickening of the aortic valve leaflets, decreased aortic valve opening and visual flow acceleration across the valve.  4. Mild right and moderate left atrial enlargement.    < end of copied text >      < from: Cardiac Catheterization (11.04.21 @ 16:05) >  VENTRICLES: No left ventriculogram was performed.  CORONARY VESSELS: The coronary circulation is right dominant.  LM:   --  Distal left main: Angiography showed minor luminal irregularities  with no flow limiting lesions.  LAD:   --  LAD: Angiography showed minor luminal irregularities with no  flow limiting lesions.  --  Mid LAD: There was a tubular 30 % stenosis in the proximal third of the  vessel segment.  --  D1: Angiography showed minor luminal irregularities with no flow  limiting lesions.  CX:   --  Circumflex: Angiography showed minor luminal irregularities with  no flow limiting lesions.  --  Proximal circumflex: There was a discrete 20 % stenosis.  --  Mid circumflex: There was a tubular 20 % stenosis.  --  OM1: There was a discrete 20 % stenosis at the ostium of the vessel  segment.  RCA:   --  RCA: Angiography showed minor luminal irregularities with no  flow limiting lesions.  --  Mid RCA: There was a tubular 20 % stenosis.  --  RPDA: There was a discrete 30 % stenosis at the ostiumof the vessel  segment.  COMPLICATIONS: No complications occurred during the cath lab visit.  DIAGNOSTIC RECOMMENDATIONS: Medical management is recommended.  Prepared and signed by  Ventura Brambila M.D.  Signed 11/04/2021 18:39:26    < end of copied text >        A/P) 80 y/o male PMH CAD s/p PCI (2018), DM, HTN, hyperlipidemia, PAF, stroke a/w syncope and found to have severe AS. Echo shows normal LVEF, and preop cath demonstrated mild non-obstructive CAD    -continue with lifelong ac for cva prevention   -MRI results noted  -pt. with slight increase in Cr - workup per primary team   -discussed with TAVR team - plan for TAVR next week  -transfer to [facility] for TAVR

## 2021-11-14 NOTE — H&P ADULT - ASSESSMENT
82 yo male PMHx CAD (1 stent,) DM2, HTN, HLD, AS, Diastolic HF, Afib (on Xarelto) and CVA (L hemiparesis) syncope x2 episodes.  Found to have severe AS, CAD ( 11/4 s/p cardiac cath  LHC: mid LM of 30%, Lcx distal of 20%, RPDA of 50%) CJ, transferred to [facility] for surgical evaluation for TAVR

## 2021-11-14 NOTE — PROGRESS NOTE ADULT - PROBLEM SELECTOR PLAN 1
Progression of AS to severe, may be cause of syncope. LHC done 11/4 showed mild non obstructive CAD  - cMRI showed patchy infiltration w/ Ddx including sarcoid vs amyloid  - CXR clear lungs, no LN, CT chest showed no mediastinal LN, ace levels pending  - plan to transfer patient to CT surgery at [facility]  - hold diuretics given CJ, discuss with cards when to resume diuretics

## 2021-11-14 NOTE — PROGRESS NOTE ADULT - SUBJECTIVE AND OBJECTIVE BOX
Patient is a 81y old  Male who presents with a chief complaint of syncope (13 Nov 2021 13:04)      SUBJECTIVE / OVERNIGHT EVENTS: No events overnight. No major events on telemetry. Patient denies cp, lightheadedness, palpitations, sob. Reports no medical complaints today.    MEDICATIONS  (STANDING):  allopurinol 100 milliGRAM(s) Oral daily  dextrose 40% Gel 15 Gram(s) Oral once  dextrose 5%. 1000 milliLiter(s) (50 mL/Hr) IV Continuous <Continuous>  dextrose 5%. 1000 milliLiter(s) (100 mL/Hr) IV Continuous <Continuous>  dextrose 50% Injectable 25 Gram(s) IV Push once  dextrose 50% Injectable 12.5 Gram(s) IV Push once  dextrose 50% Injectable 25 Gram(s) IV Push once  glucagon  Injectable 1 milliGRAM(s) IntraMuscular once  hydrALAZINE 25 milliGRAM(s) Oral three times a day  insulin lispro (ADMELOG) corrective regimen sliding scale   SubCutaneous three times a day before meals  insulin lispro (ADMELOG) corrective regimen sliding scale   SubCutaneous at bedtime  metoprolol tartrate 100 milliGRAM(s) Oral two times a day  rivaroxaban 15 milliGRAM(s) Oral with dinner    MEDICATIONS  (PRN):  acetaminophen     Tablet .. 650 milliGRAM(s) Oral every 6 hours PRN Mild Pain (1 - 3), Moderate Pain (4 - 6)  fluticasone propionate 50 MICROgram(s)/spray Nasal Spray 1 Spray(s) Both Nostrils two times a day PRN allergic rhinitis        CAPILLARY BLOOD GLUCOSE      POCT Blood Glucose.: 95 mg/dL (14 Nov 2021 08:30)  POCT Blood Glucose.: 121 mg/dL (13 Nov 2021 21:52)  POCT Blood Glucose.: 90 mg/dL (13 Nov 2021 17:47)    I&O's Summary    Vital Signs Last 24 Hrs  T(C): 36.6 (14 Nov 2021 10:23), Max: 36.6 (13 Nov 2021 22:30)  T(F): 97.9 (14 Nov 2021 10:23), Max: 97.9 (14 Nov 2021 05:09)  HR: 75 (14 Nov 2021 10:23) (60 - 76)  BP: 102/52 (14 Nov 2021 10:23) (102/52 - 133/73)  BP(mean): --  RR: 18 (14 Nov 2021 10:23) (17 - 18)  SpO2: 100% (14 Nov 2021 10:23) (98% - 100%)        PHYSICAL EXAM:    CONSTITUTIONAL: NAD, well-developed  RESPIRATORY: Normal respiratory effort; lungs are clear to auscultation bilaterally  CARDIOVASCULAR:  Irregular rate and rhythm, Soft systolic murmur at the left lower sternal border.  Trace peripheral edema bilaterally and symmetrically;   ABDOMEN: Nontender to palpation, normoactive bowel sounds, no rebound/guarding; No hepatosplenomegaly  MUSCLOSKELETAL: no clubbing or cyanosis of digits; no joint swelling or tenderness to palpation  PSYCH: calm and pleasant      LABS:                        12.7   5.86  )-----------( 176      ( 14 Nov 2021 07:39 )             39.7     11-14    134<L>  |  105  |  46<H>  ----------------------------<  97  4.8   |  18<L>  |  1.68<H>    Ca    9.0      14 Nov 2021 07:39  Phos  3.4     11-14  Mg     2.20     11-14                RADIOLOGY & ADDITIONAL TESTS:    Imaging Personally Reviewed:    Consultant(s) Notes Reviewed:      Care Discussed with Consultants/Other Providers:

## 2021-11-14 NOTE — PROGRESS NOTE ADULT - PROBLEM SELECTOR PLAN 4
[id number] Score =7  Xarelto last dosed 10/30--> hep gtt--> back on xarelto 11/5  Maintain falls and bleeding precautions  -c/w metoprolol

## 2021-11-14 NOTE — H&P ADULT - HISTORY OF PRESENT ILLNESS
80 yo male PMHx CAD (1 stent,) DM2, HTN, HLD, AS, Diastolic HF, Afib (on Xarelto) and CVA (L hemiparesis) syncope x2 episodes yesterday. Pt reports he was brushing his teeth yesterday morning, felt suddenly dizzy, lightheaded.  Syncope with  incontinence, unresponsive 3 min.Wife called EMS. EMS checked BFS to be 123 and was taken to [facility] ED on 10/27/21. CT Head w/o con: No acute intracranial hemorrhage, mass effect, or acute displaced calvarium fracture. Involutional and chronic microvascular ischemic gliotic changes. Interval indeterminate age small right cerebellar infarct, appears remote. Cardiology was consulted for AS   .On his last echocardiogram he had moderate aortic valve stenosis.  On his current echocardiogram, severe aortic valve stenosis was identified, and cardiology was consulted to initiate a pre-surgical workup.  His hospital stay was significant for a gout flair of his L ankle and CJ.  FARIHA was deferred  as TTE showed severe AS.    11/4 s/p cardiac cath  LHC: mid LM of 30%, Lcx distal of 20%, RPDA of 50%. RFA access site.    MRI to r/o amyloid: Cardiac MRI: Normal left ventricular size. Mildly depressed left ventricular function. LVEF: 51%. Multifocal patchy mid myocardial late gadolinium enhancement involving the basal to mid septal, inferoseptal and inferolateral walls. Findings compatible with nonischemic  cardiomyopathy, with diagnostic considerations including infiltrative processes (favor sarcoid, less likely amyloid) as well as myocarditis in the appropriate clinical setting. Normal right ventricular size. Mildly depressed right ventricular function. RVEF: 50%. Mild aortic insufficiency. Findings suggesting aortic stenosis with thickening of the aortic valve leaflets, decreased aortic valve opening and visual flow acceleration across the valve. Mild right and moderate left atrial enlargement.  The patient was transferred for TAVR evaluation and further w/u.        80 yo male PMHx CAD (1 stent,) DM2, HTN, HLD, AS, Diastolic HF, Afib (on Xarelto) and CVA (L hemiparesis) syncope x2 episodes yesterday. Pt reports he was brushing his teeth yesterday morning, felt suddenly dizzy, lightheaded.  Syncope with  incontinence, unresponsive 3 min.Wife called EMS. EMS checked BFS to be 123 and was taken to [facility] ED on 10/27/21. CT Head w/o con: No acute intracranial hemorrhage, mass effect, or acute displaced calvarium fracture. Involutional and chronic microvascular ischemic gliotic changes. Interval indeterminate age small right cerebellar infarct, appears remote. Cardiology was consulted for AS   .  On his current echocardiogram, severe aortic valve stenosis was identified, and cardiology was consulted to initiate a pre-surgical workup.  His hospital stay was significant for a gout flair of his L ankle and CJ.  FARIHA was deferred  as TTE showed severe AS.    11/4 s/p cardiac cath  LHC: mid LM of 30%, Lcx distal of 20%, RPDA of 50%. RFA access site.    MRI to r/o amyloid: Cardiac MRI: Normal left ventricular size. Mildly depressed left ventricular function. LVEF: 51%. Multifocal patchy mid myocardial late gadolinium enhancement involving the basal to mid septal, inferoseptal and inferolateral walls. Findings compatible with nonischemic  cardiomyopathy, with diagnostic considerations including infiltrative processes (favor sarcoid, less likely amyloid) as well as myocarditis in the appropriate clinical setting. Normal right ventricular size. Mildly depressed right ventricular function. RVEF: 50%. Mild aortic insufficiency. Findings suggesting aortic stenosis with thickening of the aortic valve leaflets, decreased aortic valve opening and visual flow acceleration across the valve. Mild right and moderate left atrial enlargement.  The patient was transferred for TAVR evaluation and further w/u.

## 2021-11-14 NOTE — PROGRESS NOTE ADULT - PROVIDER SPECIALTY LIST ADULT
Cardiology
Hospitalist
Cardiology
Hospitalist

## 2021-11-14 NOTE — H&P ADULT - NSHPREVIEWOFSYSTEMS_GEN_ALL_CORE
REVIEW OF SYSTEMS:  CONSTITUTIONAL: No fever, weight loss, or fatigue  EYES: No eye pain, visual disturbances, or discharge  ENMT:  No difficulty hearing, tinnitus, vertigo; No sinus or throat pain  NECK: No pain or stiffness  RESPIRATORY: Has non productive cough,No wheezing, chills or hemoptysis; No shortness of breath  CARDIOVASCULAR: No chest pain,No palpitations, dizziness, or leg swelling  GASTROINTESTINAL: No abdominal or epigastric pain. No nausea, vomiting, or hematemesis; No diarrhea ,has No melena  GENITOURINARY: No dysuria, frequency, hematuria, or incontinence  NEUROLOGICAL: No headaches, memory loss, loss of strength, numbness, or tremors  SKIN: No itching, burning, rashes, or lesions   LYMPH NODES: No enlarged glands  ENDOCRINE: No heat or cold intolerance; No hair loss  MUSCULOSKELETAL: No joint pain or swelling; No muscle, back, or extremity pain  PSYCHIATRIC: No depression, anxiety, mood swings, or difficulty sleeping  HEME/LYMPH: No easy bruising, or bleeding gums  ALLERGY: No hives or eczema

## 2021-11-14 NOTE — PROGRESS NOTE ADULT - ATTENDING COMMENTS
Patient seen and examined.  Agree with above.   Check cardiac MRI  Further cardiac workup pending above    Jennifer Miguel MD
Patient seen and examined.  Agree with above.   Check cardiac MRI  Further workup including possible TAVR pending above    Jennifer Miguel MD
Patient seen and examined.  Agree with above.   Follow up MRI   Further workup pending above    Jennifer Miguel MD
Patient seen and examined.  Agree with above.   MRI results noted   Awaiting TAVR evaluation   Further workup pending above    Jennifer Miguel MD
Patient seen and examined.  Agree with above.   TTE with severe AS  Recommend AVR evaluation for symptomatic AS  Will plan on cath this week    Jennifer Miguel MD
seen and agree w/ PA.  awaiting xfer for TAVR, with structural heart team coordinating. no new complaints.
seen and agree w/ PA.  awaiting xfer to [facility] for TAVR workup. no new complaints at this time. will follow at [facility].
Patient seen and examined.  Agree with above.   Will need to repeat COVID testing per cath lab protocol  Discussed with echo attending, no need for FARIHA at this time as TTE demonstrates severe AS  Cath tomorrow pending above  TAVR workup to begin after cath    Jennifer Miguel MD

## 2021-11-14 NOTE — H&P ADULT - NSHPLABSRESULTS_GEN_ALL_CORE
EKG reviewed personally: Afib @ 94 bpm, LVH,                         14.4   9.73  )-----------( 200      ( 27 Oct 2021 21:32 )             46.3     10-27    138  |  100  |  40<H>  ----------------------------<  119<H>  4.4   |  23  |  2.07<H>    Ca    9.8      27 Oct 2021 23:21  Phos  3.5     10-27  Mg     2.00     10-27    TPro  8.5<H>  /  Alb  3.9  /  TBili  0.4  /  DBili  x   /  AST  20  /  ALT  12  /  AlkPhos  81  10-27      LIVER FUNCTIONS - ( 27 Oct 2021 23:21 )  Alb: 3.9 g/dL / Pro: 8.5 g/dL / ALK PHOS: 81 U/L / ALT: 12 U/L / AST: 20 U/L / GGT: x

## 2021-11-14 NOTE — PROGRESS NOTE ADULT - PROBLEM SELECTOR PROBLEM 1
Syncope and collapse
Aortic stenosis
Syncope and collapse
Syncope and collapse

## 2021-11-14 NOTE — PROGRESS NOTE ADULT - PROBLEM SELECTOR PROBLEM 8
HTN (hypertension)
History of stroke
Need for prophylactic measure
History of stroke
Need for prophylactic measure
HTN (hypertension)

## 2021-11-14 NOTE — H&P ADULT - NSHPPHYSICALEXAM_GEN_ALL_CORE
General: NAD  HEENT:  NC/AT  Respiratory: clear to auscultation bilaterally, no wheeze, no rhonchi, no crackles noted  Cardiovascular: + irregular rate and rhythm, S1 and S2 audible, + ANAI murmur, gallop or rub noted  GI: bowel sounds present x4, abdomen soft, non tender, non distended  Peripheral Vascular:  1+ edema, 2+ peripheral pulses, no clubbing, cyanosis , +varicosities/PVD noted  Musculoskeletal: 5/5 strength bilateral upper and lower extremities  Psychiatric: Normal mood, normal affect observed  Neuro: alert and oriented x 4, gait steady, speech clear, no focal deficits noted  Skin: Right groin procedure site CDI.  no bleeding, no hematoma, site soft, non tender, positive pedal pulses bilaterally General: NAD  HEENT:  NC/AT  Respiratory: clear to auscultation bilaterally, no wheeze, no rhonchi, no crackles noted  Cardiovascular: + irregular rate and rhythm, + murmur  GI: bowel sounds present x4, abdomen soft, non tender, non distended  Peripheral Vascular: trace edema, 2+ peripheral pulses, no clubbing, cyanosis ,   Musculoskeletal: 5/5 strength bilateral upper and lower extremities  Psychiatric: Normal mood, normal affect observed  Neuro: alert and oriented x 4, gait steady, speech clear, no focal deficits noted  Skin: Right groin procedure site CDI.  no bleeding, no hematoma, site soft, non tender, positive pedal pulses bilaterally

## 2021-11-14 NOTE — PROGRESS NOTE ADULT - ASSESSMENT
82 yo male PMHx CAD (1 stent,) DM2, HTN, gout, HLD, AS, Diastolic HF, Afib (on Xarelto) and CVA (L hemiparesis) syncope x2 episodes the day before admission and L knee pain, admitted to [facility] for Syncope, found to have severe AS. Cardiology following, [facility] scheduled for 11/2, then performed 11/4, showed patent coronaries, no PCI. cMRI w/ questionable infilitrative dx, CT chest negative for LN. Additional issues- L ankle pain, suspected gout flare, improving with colchicine. Pending transfer to [facility] for further TAVR evaluation.

## 2021-11-14 NOTE — H&P ADULT - ATTENDING COMMENTS
Pt seen and examined.  Acute systolic HF, severe AS, mild CAD.  For TAVR eval.  risks/benefits d/w pt.  Agree to proceed

## 2021-11-15 ENCOUNTER — APPOINTMENT (OUTPATIENT)
Dept: CARDIOLOGY | Facility: CLINIC | Age: 81
End: 2021-11-15

## 2021-11-15 ENCOUNTER — APPOINTMENT (OUTPATIENT)
Dept: CARDIOTHORACIC SURGERY | Facility: CLINIC | Age: 81
End: 2021-11-15

## 2021-11-15 DIAGNOSIS — I35.0 NONRHEUMATIC AORTIC (VALVE) STENOSIS: ICD-10-CM

## 2021-11-15 LAB
A1C WITH ESTIMATED AVERAGE GLUCOSE RESULT: 6 % — HIGH (ref 4–5.6)
ACE SERPL-CCNC: 10 U/L — LOW (ref 14–82)
ALBUMIN SERPL ELPH-MCNC: 3.8 G/DL — SIGNIFICANT CHANGE UP (ref 3.3–5)
ALP SERPL-CCNC: 69 U/L — SIGNIFICANT CHANGE UP (ref 40–120)
ALT FLD-CCNC: 52 U/L — HIGH (ref 10–45)
ANION GAP SERPL CALC-SCNC: 12 MMOL/L — SIGNIFICANT CHANGE UP (ref 5–17)
APPEARANCE UR: CLEAR — SIGNIFICANT CHANGE UP
APTT BLD: 38.3 SEC — HIGH (ref 27.5–35.5)
AST SERPL-CCNC: 37 U/L — SIGNIFICANT CHANGE UP (ref 10–40)
BASOPHILS # BLD AUTO: 0 K/UL — SIGNIFICANT CHANGE UP (ref 0–0.2)
BASOPHILS NFR BLD AUTO: 0 % — SIGNIFICANT CHANGE UP (ref 0–2)
BILIRUB SERPL-MCNC: 0.3 MG/DL — SIGNIFICANT CHANGE UP (ref 0.2–1.2)
BILIRUB UR-MCNC: NEGATIVE — SIGNIFICANT CHANGE UP
BLD GP AB SCN SERPL QL: NEGATIVE — SIGNIFICANT CHANGE UP
BUN SERPL-MCNC: 41 MG/DL — HIGH (ref 7–23)
CALCIUM SERPL-MCNC: 9.1 MG/DL — SIGNIFICANT CHANGE UP (ref 8.4–10.5)
CHLORIDE SERPL-SCNC: 104 MMOL/L — SIGNIFICANT CHANGE UP (ref 96–108)
CO2 SERPL-SCNC: 20 MMOL/L — LOW (ref 22–31)
COLOR SPEC: SIGNIFICANT CHANGE UP
CREAT SERPL-MCNC: 1.62 MG/DL — HIGH (ref 0.5–1.3)
DIFF PNL FLD: NEGATIVE — SIGNIFICANT CHANGE UP
EOSINOPHIL # BLD AUTO: 0.24 K/UL — SIGNIFICANT CHANGE UP (ref 0–0.5)
EOSINOPHIL NFR BLD AUTO: 3.5 % — SIGNIFICANT CHANGE UP (ref 0–6)
ESTIMATED AVERAGE GLUCOSE: 126 MG/DL — HIGH (ref 68–114)
GLUCOSE BLDC GLUCOMTR-MCNC: 129 MG/DL — HIGH (ref 70–99)
GLUCOSE BLDC GLUCOMTR-MCNC: 131 MG/DL — HIGH (ref 70–99)
GLUCOSE BLDC GLUCOMTR-MCNC: 81 MG/DL — SIGNIFICANT CHANGE UP (ref 70–99)
GLUCOSE BLDC GLUCOMTR-MCNC: 88 MG/DL — SIGNIFICANT CHANGE UP (ref 70–99)
GLUCOSE BLDC GLUCOMTR-MCNC: 91 MG/DL — SIGNIFICANT CHANGE UP (ref 70–99)
GLUCOSE SERPL-MCNC: 92 MG/DL — SIGNIFICANT CHANGE UP (ref 70–99)
GLUCOSE UR QL: NEGATIVE — SIGNIFICANT CHANGE UP
HCT VFR BLD CALC: 39.8 % — SIGNIFICANT CHANGE UP (ref 39–50)
HGB BLD-MCNC: 12.3 G/DL — LOW (ref 13–17)
INR BLD: 1.45 RATIO — HIGH (ref 0.88–1.16)
KETONES UR-MCNC: NEGATIVE — SIGNIFICANT CHANGE UP
LEUKOCYTE ESTERASE UR-ACNC: NEGATIVE — SIGNIFICANT CHANGE UP
LYMPHOCYTES # BLD AUTO: 2.26 K/UL — SIGNIFICANT CHANGE UP (ref 1–3.3)
LYMPHOCYTES # BLD AUTO: 33.3 % — SIGNIFICANT CHANGE UP (ref 13–44)
MCHC RBC-ENTMCNC: 21.4 PG — LOW (ref 27–34)
MCHC RBC-ENTMCNC: 30.9 GM/DL — LOW (ref 32–36)
MCV RBC AUTO: 69.3 FL — LOW (ref 80–100)
MONOCYTES # BLD AUTO: 0.71 K/UL — SIGNIFICANT CHANGE UP (ref 0–0.9)
MONOCYTES NFR BLD AUTO: 10.5 % — SIGNIFICANT CHANGE UP (ref 2–14)
MRSA PCR RESULT.: SIGNIFICANT CHANGE UP
NEUTROPHILS # BLD AUTO: 3.51 K/UL — SIGNIFICANT CHANGE UP (ref 1.8–7.4)
NEUTROPHILS NFR BLD AUTO: 51.8 % — SIGNIFICANT CHANGE UP (ref 43–77)
NITRITE UR-MCNC: NEGATIVE — SIGNIFICANT CHANGE UP
NT-PROBNP SERPL-SCNC: 2839 PG/ML — HIGH (ref 0–300)
PA ADP PRP-ACNC: 221 PRU — SIGNIFICANT CHANGE UP (ref 194–417)
PH UR: 6 — SIGNIFICANT CHANGE UP (ref 5–8)
PLATELET # BLD AUTO: 187 K/UL — SIGNIFICANT CHANGE UP (ref 150–400)
POTASSIUM SERPL-MCNC: 4.9 MMOL/L — SIGNIFICANT CHANGE UP (ref 3.5–5.3)
POTASSIUM SERPL-SCNC: 4.9 MMOL/L — SIGNIFICANT CHANGE UP (ref 3.5–5.3)
PROT SERPL-MCNC: 7.3 G/DL — SIGNIFICANT CHANGE UP (ref 6–8.3)
PROT UR-MCNC: ABNORMAL
PROTHROM AB SERPL-ACNC: 17.1 SEC — HIGH (ref 10.6–13.6)
RBC # BLD: 5.74 M/UL — SIGNIFICANT CHANGE UP (ref 4.2–5.8)
RBC # FLD: 20.5 % — HIGH (ref 10.3–14.5)
RH IG SCN BLD-IMP: POSITIVE — SIGNIFICANT CHANGE UP
S AUREUS DNA NOSE QL NAA+PROBE: SIGNIFICANT CHANGE UP
SARS-COV-2 RNA SPEC QL NAA+PROBE: SIGNIFICANT CHANGE UP
SODIUM SERPL-SCNC: 136 MMOL/L — SIGNIFICANT CHANGE UP (ref 135–145)
SP GR SPEC: 1.02 — SIGNIFICANT CHANGE UP (ref 1.01–1.02)
T4 FREE SERPL-MCNC: 1.5 NG/DL — SIGNIFICANT CHANGE UP (ref 0.9–1.8)
TSH SERPL-MCNC: 2.04 UIU/ML — SIGNIFICANT CHANGE UP (ref 0.27–4.2)
UROBILINOGEN FLD QL: NEGATIVE — SIGNIFICANT CHANGE UP
WBC # BLD: 6.78 K/UL — SIGNIFICANT CHANGE UP (ref 3.8–10.5)
WBC # FLD AUTO: 6.78 K/UL — SIGNIFICANT CHANGE UP (ref 3.8–10.5)

## 2021-11-15 PROCEDURE — 93880 EXTRACRANIAL BILAT STUDY: CPT | Mod: 26

## 2021-11-15 PROCEDURE — 75572 CT HRT W/3D IMAGE: CPT | Mod: 26

## 2021-11-15 PROCEDURE — 71045 X-RAY EXAM CHEST 1 VIEW: CPT | Mod: 26

## 2021-11-15 PROCEDURE — 99232 SBSQ HOSP IP/OBS MODERATE 35: CPT

## 2021-11-15 PROCEDURE — 99221 1ST HOSP IP/OBS SF/LOW 40: CPT

## 2021-11-15 PROCEDURE — 93010 ELECTROCARDIOGRAM REPORT: CPT

## 2021-11-15 RX ORDER — SODIUM CHLORIDE 9 MG/ML
3 INJECTION INTRAMUSCULAR; INTRAVENOUS; SUBCUTANEOUS EVERY 8 HOURS
Refills: 0 | Status: DISCONTINUED | OUTPATIENT
Start: 2021-11-15 | End: 2021-11-20

## 2021-11-15 RX ORDER — PANTOPRAZOLE SODIUM 20 MG/1
40 TABLET, DELAYED RELEASE ORAL
Refills: 0 | Status: DISCONTINUED | OUTPATIENT
Start: 2021-11-15 | End: 2021-11-20

## 2021-11-15 RX ORDER — INSULIN LISPRO 100/ML
VIAL (ML) SUBCUTANEOUS
Refills: 0 | Status: DISCONTINUED | OUTPATIENT
Start: 2021-11-15 | End: 2021-11-20

## 2021-11-15 RX ORDER — ALLOPURINOL 300 MG
100 TABLET ORAL DAILY
Refills: 0 | Status: DISCONTINUED | OUTPATIENT
Start: 2021-11-14 | End: 2021-11-20

## 2021-11-15 RX ORDER — ACETAMINOPHEN 500 MG
650 TABLET ORAL EVERY 6 HOURS
Refills: 0 | Status: DISCONTINUED | OUTPATIENT
Start: 2021-11-14 | End: 2021-11-20

## 2021-11-15 RX ORDER — FLUTICASONE PROPIONATE 50 MCG
1 SPRAY, SUSPENSION NASAL EVERY 12 HOURS
Refills: 0 | Status: DISCONTINUED | OUTPATIENT
Start: 2021-11-14 | End: 2021-11-20

## 2021-11-15 RX ORDER — HYDRALAZINE HCL 50 MG
25 TABLET ORAL THREE TIMES A DAY
Refills: 0 | Status: DISCONTINUED | OUTPATIENT
Start: 2021-11-14 | End: 2021-11-20

## 2021-11-15 RX ORDER — METOPROLOL TARTRATE 50 MG
100 TABLET ORAL
Refills: 0 | Status: DISCONTINUED | OUTPATIENT
Start: 2021-11-14 | End: 2021-11-18

## 2021-11-15 RX ORDER — SODIUM CHLORIDE 9 MG/ML
1000 INJECTION INTRAMUSCULAR; INTRAVENOUS; SUBCUTANEOUS
Refills: 0 | Status: DISCONTINUED | OUTPATIENT
Start: 2021-11-15 | End: 2021-11-17

## 2021-11-15 RX ADMIN — SODIUM CHLORIDE 60 MILLILITER(S): 9 INJECTION INTRAMUSCULAR; INTRAVENOUS; SUBCUTANEOUS at 13:21

## 2021-11-15 RX ADMIN — SODIUM CHLORIDE 3 MILLILITER(S): 9 INJECTION INTRAMUSCULAR; INTRAVENOUS; SUBCUTANEOUS at 13:09

## 2021-11-15 RX ADMIN — Medication 100 MILLIGRAM(S): at 17:54

## 2021-11-15 RX ADMIN — Medication 25 MILLIGRAM(S): at 21:28

## 2021-11-15 RX ADMIN — Medication 100 MILLIGRAM(S): at 05:59

## 2021-11-15 RX ADMIN — SODIUM CHLORIDE 3 MILLILITER(S): 9 INJECTION INTRAMUSCULAR; INTRAVENOUS; SUBCUTANEOUS at 21:26

## 2021-11-15 RX ADMIN — PANTOPRAZOLE SODIUM 40 MILLIGRAM(S): 20 TABLET, DELAYED RELEASE ORAL at 06:03

## 2021-11-15 RX ADMIN — Medication 1 SPRAY(S): at 17:54

## 2021-11-15 RX ADMIN — Medication 25 MILLIGRAM(S): at 13:19

## 2021-11-15 RX ADMIN — Medication 25 MILLIGRAM(S): at 05:59

## 2021-11-15 RX ADMIN — Medication 100 MILLIGRAM(S): at 13:19

## 2021-11-15 RX ADMIN — SODIUM CHLORIDE 3 MILLILITER(S): 9 INJECTION INTRAMUSCULAR; INTRAVENOUS; SUBCUTANEOUS at 05:50

## 2021-11-15 RX ADMIN — Medication 1 SPRAY(S): at 05:58

## 2021-11-15 NOTE — PROGRESS NOTE ADULT - ASSESSMENT
Agree with above assessment and plan as outlined above.    - CTS / structural heart f/u    Umer Child MD, FACC  BEEPER [phone]

## 2021-11-15 NOTE — PROGRESS NOTE ADULT - ASSESSMENT
80 yo male PMHx CAD (1 stent,) DM2, HTN, HLD, AS, Diastolic HF, Afib (on Xarelto) and CVA (L hemiparesis) syncope x2 episodes.  Found to have severe AS, CAD ( 11/4 s/p cardiac cath  LHC: mid LM of 30%, Lcx distal of 20%, RPDA of 50%) CJ, transferred to [facility] for surgical evaluation for TAVR  11/15 VSS for CTA today carotids pending likely  TAVR  Thursday

## 2021-11-15 NOTE — CONSULT NOTE ADULT - PROBLEM SELECTOR RECOMMENDATION 9
Patient has no known history of kidney disease. Patient will need Cardiac CT today in Preparation for TAVR this coming Thursday. I contacted Dr. Pawan Momin to consult on patient and help guide therapy with regard to renal protection and IV contrast.

## 2021-11-15 NOTE — CONSULT NOTE ADULT - SUBJECTIVE AND OBJECTIVE BOX
[facility]  [phone]  NEPHROLOGY      INITIAL CONSULT NOTE  --------------------------------------------------------------------------------  HPI:         80 yo male PMHx CAD (1 stent,) DM2, HTN, HLD, AS, Diastolic HF, Afib (on Xarelto) and CVA (L hemiparesis) syncope x2 episodes yesterday. Pt reports he was brushing his teeth yesterday morning, felt suddenly dizzy, lightheaded.  Syncope with  incontinence, unresponsive 3 min.Wife called EMS. EMS checked BFS to be 123 and was taken to [facility] ED on 10/27/21. CT Head w/o con: No acute intracranial hemorrhage, mass effect, or acute displaced calvarium fracture. Involutional and chronic microvascular ischemic gliotic changes. Interval indeterminate age small right cerebellar infarct, appears remote. Cardiology was consulted for AS   .  On his current echocardiogram, severe aortic valve stenosis was identified, and cardiology was consulted to initiate a pre-surgical workup.  His hospital stay was significant for a gout flair of his L ankle and CJ.  FARIHA was deferred  as TTE showed severe AS.    11/4 s/p cardiac cath  LHC: mid LM of 30%, Lcx distal of 20%, RPDA of 50%. RFA access site.    MRI to r/o amyloid: Cardiac MRI: Normal left ventricular size. Mildly depressed left ventricular function. LVEF: 51%. Multifocal patchy mid myocardial late gadolinium enhancement involving the basal to mid septal, inferoseptal and inferolateral walls. Findings compatible with nonischemic  cardiomyopathy, with diagnostic considerations including infiltrative processes (favor sarcoid, less likely amyloid) as well as myocarditis in the appropriate clinical setting. Normal right ventricular size. Mildly depressed right ventricular function. RVEF: 50%. Mild aortic insufficiency. Findings suggesting aortic stenosis with thickening of the aortic valve leaflets, decreased aortic valve opening and visual flow acceleration across the valve. Mild right and moderate left atrial enlargement.  The patient was transferred for TAVR evaluation and further w/u. noticed with cr 1.6  renal consult called.         PAST HISTORY  --------------------------------------------------------------------------------  PAST MEDICAL & SURGICAL HISTORY:  AF (atrial fibrillation)    HTN (hypertension)    HLD (hyperlipidemia)    Gout    CVA (cerebrovascular accident)  L hemiparesis 2019    Stented coronary artery  x1      FAMILY HISTORY:  Family history of stroke (Sibling)      PAST SOCIAL HISTORY:  past tobacco as per pt       ALLERGIES & MEDICATIONS  --------------------------------------------------------------------------------  Allergies    No Known Allergies    Intolerances      Standing Inpatient Medications  allopurinol 100 milliGRAM(s) Oral daily  fluticasone propionate 50 MICROgram(s)/spray Nasal Spray 1 Spray(s) Both Nostrils every 12 hours  hydrALAZINE 25 milliGRAM(s) Oral three times a day  insulin lispro (ADMELOG) corrective regimen sliding scale   SubCutaneous Before meals and at bedtime  metoprolol tartrate 100 milliGRAM(s) Oral two times a day  pantoprazole    Tablet 40 milliGRAM(s) Oral before breakfast  sodium chloride 0.9% lock flush 3 milliLiter(s) IV Push every 8 hours    PRN Inpatient Medications  acetaminophen     Tablet .. 650 milliGRAM(s) Oral every 6 hours PRN      REVIEW OF SYSTEMS  --------------------------------------------------------------------------------  Gen: No  fevers/chills denies allergies to shellfish   Skin: No rashes  Head/Eyes/Ears/Mouth: No headache; Normal hearing;  No sinus pain/discomfort, sore throat  Respiratory: No dyspnea, cough, wheezing, hemoptysis  CV: No chest pain, orthopnea  GI: No abdominal pain, diarrhea, nausea, vomiting, melena  : No dysuria, decrease urination or hesitancy urinating  hematuria, nocturia  MSK: No joint pain/swelling; no back pain  Neuro: No dizziness/lightheadedness  also with no edema       VITALS/PHYSICAL EXAM  --------------------------------------------------------------------------------  T(C): 36.4 (11-15-21 @ 05:55), Max: 36.8 (11-14-21 @ 21:52)  HR: 70 (11-15-21 @ 05:55) (66 - 81)  BP: 117/65 (11-15-21 @ 05:55) (112/69 - 148/78)  RR: 18 (11-15-21 @ 05:55) (17 - 18)  SpO2: 98% (11-15-21 @ 05:55) (98% - 98%)  Wt(kg): --    Weight (kg): 75.2 (11-14-21 @ 23:23)      11-14-21 @ 07:01  -  11-15-21 @ 07:00  --------------------------------------------------------  IN: 0 mL / OUT: 250 mL / NET: -250 mL    11-15-21 @ 07:01  -  11-15-21 @ 12:20  --------------------------------------------------------  IN: 240 mL / OUT: 300 mL / NET: -60 mL      Physical Exam:  	Gen: Non toxic comfortable appearing   	no jvd   	Pulm: decrease bs  no rales or ronchi or wheezing  	CV: RRR, S1S2; no rub IV/VI M   	Back: No CVA tenderness  	Abd: +BS, soft, nontender/nondistended  	: No suprapubic tenderness  	UE: Warm, no cyanosis  no clubbing,  no edema  	LE: Warm, no cyanosis  no clubbing, no edema  	Neuro: alert and oriented. speech coherent   	  	Skin: Warm, no decrease skin turgor   	Vascular access:    LABS/STUDIES  --------------------------------------------------------------------------------              12.3   6.78  >-----------<  187      [11-15-21 @ 07:09]              39.8     136  |  104  |  41  ----------------------------<  92      [11-15-21 @ 07:09]  4.9   |  20  |  1.62        Ca     9.1     [11-15-21 @ 07:09]      Mg     2.20     [11-14-21 @ 07:39]      Phos  3.4     [11-14-21 @ 07:39]    TPro  7.3  /  Alb  3.8  /  TBili  0.3  /  DBili  x   /  AST  37  /  ALT  52  /  AlkPhos  69  [11-15-21 @ 07:09]    PT/INR: PT 17.1 , INR 1.45       [11-15-21 @ 07:07]  PTT: 38.3       [11-15-21 @ 07:07]      Creatinine Trend:  SCr 1.62 [11-15 @ 07:09]  SCr 1.68 [11-14 @ 07:39]  SCr 1.70 [11-13 @ 07:49]  SCr 1.71 [11-12 @ 17:22]  SCr 1.83 [11-12 @ 07:04]    Urinalysis - [11-15-21 @ 07:09]      Color Light Yellow / Appearance Clear / SG 1.020 / pH 6.0      Gluc Negative / Ketone Negative  / Bili Negative / Urobili Negative       Blood Negative / Protein Trace / Leuk Est Negative / Nitrite Negative      RBC 6 / WBC 0 / Hyaline 1 / Gran  / Sq Epi  / Non Sq Epi 0 / Bacteria Negative      Iron 16, TIBC 228, %sat --      [12-02-20 @ 06:43]  Ferritin 750      [12-13-20 @ 10:13]  HbA1c 7.2      [05-07-18 @ 06:59]  TSH 2.04      [11-15-21 @ 09:27]  Lipid: chol 77, TG 52, HDL 40, LDL --      [12-11-20 @ 07:32]      
Structural Heart Team    HPI:    82 yo male PMHx CAD (1 stent,) DM2, HTN, HLD, AS, Diastolic HF, Afib (on Xarelto) and CVA (L hemiparesis) syncope x2 episodes yesterday. Pt reports he was brushing his teeth yesterday morning, felt suddenly dizzy, lightheaded.  Syncope with  incontinence, unresponsive 3 min.Wife called EMS. EMS checked BFS to be 123 and was taken to [facility] ED on 10/27/21. CT Head w/o con: No acute intracranial hemorrhage, mass effect, or acute displaced calvarium fracture. Involutional and chronic microvascular ischemic gliotic changes. Interval indeterminate age small right cerebellar infarct, appears remote. Cardiology was consulted for AS   .  On his current echocardiogram, severe aortic valve stenosis was identified, and cardiology was consulted to initiate a pre-surgical workup.  His hospital stay was significant for a gout flair of his L ankle and CJ.  FARIHA was deferred  as TTE showed severe AS.    The patient was evaluated while resting comfortably in bed. He states that he has had a few episodes of near syncope with activity over the past week or so, stating he never lost consciousness He noted that over the past few months, he has been getting worsening SOB and SANCHEZ. He notes that he has been walking shorter distances, and not going out as much because of his SOB. He denies any CP, but will on occasion get fatigued, or get pedal edema. He sleeps with 2 pillows, and has never woken up with SOB, needing him to sit upright. He lives at home with his wife and daughter, and is independant in his ADL's, though he will need help at time with his shoes. He generally ambulates with a cane or walker. He was never a smoker, and has social ETOH. It is noted that he has had a bump in his creatinine, and has no renal disease which was previously known.    NYHA Class III  STS Risk: AVR 3.35%      11-14 @ 07:01  -  11-15 @ 07:00  --------------------------------------------------------  IN: 0 mL / OUT: 250 mL / NET: -250 mL    11-15 @ 07:01  -  11-15 @ 10:00  --------------------------------------------------------  IN: 240 mL / OUT: 0 mL / NET: 240 mL        PAST MEDICAL & SURGICAL HISTORY:  AF (atrial fibrillation)    HTN (hypertension)    HLD (hyperlipidemia)    Gout    CVA (cerebrovascular accident)  L hemiparesis 2019    Stented coronary artery  x1        FAMILY HISTORY:  Family history of stroke (Sibling)        Never Smoker      No Known Allergies    allopurinol 100 milliGRAM(s) Oral daily  fluticasone propionate 50 MICROgram(s)/spray Nasal Spray 1 Spray(s) Both Nostrils every 12 hours  hydrALAZINE 25 milliGRAM(s) Oral three times a day  insulin lispro (ADMELOG) corrective regimen sliding scale   SubCutaneous Before meals and at bedtime  metoprolol tartrate 100 milliGRAM(s) Oral two times a day  pantoprazole    Tablet 40 milliGRAM(s) Oral before breakfast  sodium chloride 0.9% lock flush 3 milliLiter(s) IV Push every 8 hours      T(C): 36.4 (11-15-21 @ 05:55), Max: 36.8 (11-14-21 @ 21:52)  HR: 70 (11-15-21 @ 05:55) (66 - 81)  BP: 117/65 (11-15-21 @ 05:55) (102/52 - 148/78)  RR: 18 (11-15-21 @ 05:55) (17 - 18)  SpO2: 98% (11-15-21 @ 05:55) (98% - 100%)  Wt(kg): --      Review of Symptoms:  General: Alert, Follows commands  Respiratory:  + SOB, + SANCHEZ, No Cough  Cardiac: Denies CP, Denies Palpitations  Gastrointestinal: Denies Pain, Denies N/V  Extremities: + Pedal Edema, No Joint pain  Vascular: Negative  Neurological: Negative  Endocrine: negative      Physical Exam:  Gen: A/Ox3, NAD, Follows all commands  HEENT: No JVD, Neck Supple, Trachea midline, No masses  Pulmonary: CTAB,  No accessory muscle use for respiration  Cardiac: S1S2, RRR, III/VI ANAI,  No Gallops/Rubs  ECG: SR  Gastrointestinal: Soft, NT/ND, + Bowel Sounds  Extremities: No Edema,  No joint pain or swelling +PMSx4  Vascular: 1+ pulses B/L, No Bruits  Neurological: Non Focal, = motor and sensory      Laboratory:                        12.3   6.78  )-----------( 187      ( 15 Nov 2021 07:09 )             39.8    11-15    136  |  104  |  41<H>  ----------------------------<  92  4.9   |  20<L>  |  1.62<H>    Ca    9.1      15 Nov 2021 07:09  Phos  3.4     11-14  Mg     2.20     11-14    TPro  7.3  /  Alb  3.8  /  TBili  0.3  /  DBili  x   /  AST  37  /  ALT  52<H>  /  AlkPhos  69  11-15   PT/INR - ( 15 Nov 2021 07:07 )   PT: 17.1 sec;   INR: 1.45 ratio         PTT - ( 15 Nov 2021 07:07 )  PTT:38.3 sec    Pro-BNP:  Serum Pro-Brain Natriuretic Peptide (11.15.21 @ 07:09)    Serum Pro-Brain Natriuretic Peptide: 2839 pg/mL    Transthoracic Echo:  < from: TTE with Doppler (w/Cont) (10.29.21 @ 13:57) >  OBSERVATIONS:  Mitral Valve: Mitral annular calcification, otherwise  normal mitralvalve. Mild mitral regurgitation.  Aortic Root: Normal aortic root.  Aortic Valve: Calcified trileaflet aortic valve with  decreased opening. Peak transaortic valve gradient equals  30 mm Hg, mean transaortic valve gradient equals 19 mm Hg,  estimated aortic valve area equals 0.8 sqcm (by continuity  equation), consistent with severe aortic stenosis. Mild  aortic regurgitation.  Left Atrium: Normal left atrium.  Left Ventricle: Endocardial visualization enhanced with  intravenous injection of echo contrast (Definity).  Hyperdynamic left ventricle. Increased relative wall  thickness with normal left ventricular mass index,  consistent with concentric left ventricular remodeling.  Right Heart: Normal right atrium. Normal right ventricular  sizeand function. Normal tricuspid valve. Normal pulmonic  valve.  Pericardium/PleuraNormal pericardium with no pericardial  effusion.  ------------------------------------------------------------------------  CONCLUSIONS:  1. Mitral annular calcification, otherwise normal mitral  valve. Mild mitral regurgitation.  2. Calcified trileaflet aortic valve with decreased  opening. Peak transaortic valve gradient equals 30 mm Hg,  mean transaortic valve gradient equals 19 mm Hg, estimated  aortic valve area equals 0.8 sqcm (by continuity equation),  consistent with severe aortic stenosis. Mild aortic  regurgitation.  3. Increased relative wall thickness with normal left  ventricular mass index, consistent with concentric left  ventricular remodeling.  4. Endocardial visualization enhanced with intravenous  injection of echo contrast (Definity). Hyperdynamic left  ventricle.  5. Normal right ventricular size and function.    < end of copied text >      Cardiac Cath:  < from: Cardiac Catheterization (11.04.21 @ 16:05) >  VENTRICLES: No left ventriculogram was performed.  CORONARY VESSELS: The coronary circulation is right dominant.  LM:   --  Distal left main: Angiography showed minor luminal irregularities  with no flow limiting lesions.  LAD:   --  LAD: Angiography showed minor luminal irregularities with no  flow limiting lesions.  --  Mid LAD: There was a tubular 30 % stenosis in the proximal third of the  vessel segment.  --  D1: Angiography showed minor luminal irregularities with no flow  limiting lesions.  CX:   --  Circumflex: Angiography showed minor luminal irregularities with  no flow limiting lesions.  --  Proximal circumflex: There was a discrete 20 % stenosis.  --  Mid circumflex: There was a tubular 20 % stenosis.  --  OM1: There was a discrete 20 % stenosis at the ostium of the vessel  segment.  RCA:   --  RCA: Angiography showed minor luminal irregularities with no  flow limiting lesions.  --  Mid RCA: There was a tubular 20 % stenosis.  --  RPDA: There was a discrete 30 % stenosis at the ostiumof the vessel  segment.  COMPLICATIONS: No complications occurred during the cath lab visit.  DIAGNOSTIC RECOMMENDATIONS: Medical management is recommended.  Prepared and signed by  Ventura Brambila M.D.  Signed 11/04/2021 18:39:26  HEMODYNAMIC TABLES  Pressures:  Baseline  Pressures:  - HR: 91  Pressures:  - Rhythm:  Pressures:  -- Aortic Pressure (S/D/M): 186/90/129  Pressures:  -- Pulmonary Artery (S/D/M): 56/27/39  Pressures:  -- Pulmonary Capillary Wedge: 25/23/19  Pressures:  -- Right Atrium (a/v/M): 7/8/5  Pressures:  -- Right Ventricle (s/edp): 56/8/--    < end of copied text >

## 2021-11-15 NOTE — CONSULT NOTE ADULT - ASSESSMENT
81 Male with Near Syncope, Severe Aortic Stenosis, Acute Kidney Injury
 82 yo male PMHx CAD (1 stent,) DM2, HTN, HLD, AS, Diastolic HF, Afib (on Xarelto) and CVA (L hemiparesis) syncope x2 episodes yesterday. Pt reports he was brushing his teeth yesterday morning, felt suddenly dizzy, lightheaded.  Syncope with  incontinence, unresponsive 3 min.Wife called EMS. EMS checked BFS to be 123 and was taken to [facility] ED on 10/27/21. CT Head w/o con: No acute intracranial hemorrhage, mass effect, or acute displaced calvarium fracture. Involutional and chronic microvascular ischemic gliotic changes. Interval indeterminate age small right cerebellar infarct, appears remote. Cardiology was consulted for AS   .  On his current echocardiogram, severe aortic valve stenosis was identified, and cardiology was consulted to initiate a pre-surgical workup.  His hospital stay was significant for a gout flair of his L ankle and CJ.  FARIHA was deferred  as TTE showed severe AS.    11/4 s/p cardiac cath  LHC: mid LM of 30%, Lcx distal of 20%, RPDA of 50%. RFA access site.    MRI to r/o amyloid: Cardiac MRI: Normal left ventricular size. Mildly depressed left ventricular function. LVEF: 51%. Multifocal patchy mid myocardial late gadolinium enhancement involving the basal to mid septal, inferoseptal and inferolateral walls. Findings compatible with nonischemic  cardiomyopathy, with diagnostic considerations including infiltrative processes (favor sarcoid, less likely amyloid) as well as myocarditis in the appropriate clinical setting. Normal right ventricular size. Mildly depressed right ventricular function. RVEF: 50%. Mild aortic insufficiency. Findings suggesting aortic stenosis with thickening of the aortic valve leaflets, decreased aortic valve opening and visual flow acceleration across the valve. Mild right and moderate left atrial enlargement.  The patient was transferred for TAVR evaluation and further w/u. noticed with cr 1.6     1- CJ  on ckd III likely   2- severe AS   3- HTN       cr is overall improved from recent cr upto 2 to 1.6   baseline cr likely now   needs ct scan with iv contrast will stand at risk for worsening renal function with iv contrast. this was d/w pt he understands  NS ivf moses procedure ordered. IVL to be placed  hydralazine and metoprolol to cont

## 2021-11-15 NOTE — CONSULT NOTE ADULT - PROBLEM SELECTOR RECOMMENDATION 2
Patient transferred from [facility] for urgent TAVR, which will be done this coming Thursday. He will undergo a Cardiac CT today, after he has been seen by Dr. Momin. I discussed with the TEch in CT, and they will do CT today after he is seen. He already had a cardiac catheterization, and I will review TTE with Dr. Lopez and Dr. Tomas. I will order spirometry and carotids for tomorrow[place]    [place]  [id number]

## 2021-11-16 LAB
ALBUMIN SERPL ELPH-MCNC: 3.3 G/DL — SIGNIFICANT CHANGE UP (ref 3.3–5)
ALP SERPL-CCNC: 65 U/L — SIGNIFICANT CHANGE UP (ref 40–120)
ALT FLD-CCNC: 40 U/L — SIGNIFICANT CHANGE UP (ref 10–45)
ANION GAP SERPL CALC-SCNC: 11 MMOL/L — SIGNIFICANT CHANGE UP (ref 5–17)
ANION GAP SERPL CALC-SCNC: 12 MMOL/L — SIGNIFICANT CHANGE UP (ref 5–17)
APTT BLD: 108 SEC — HIGH (ref 27.5–35.5)
AST SERPL-CCNC: 28 U/L — SIGNIFICANT CHANGE UP (ref 10–40)
BILIRUB SERPL-MCNC: 0.3 MG/DL — SIGNIFICANT CHANGE UP (ref 0.2–1.2)
BUN SERPL-MCNC: 30 MG/DL — HIGH (ref 7–23)
BUN SERPL-MCNC: 30 MG/DL — HIGH (ref 7–23)
CALCIUM SERPL-MCNC: 8.5 MG/DL — SIGNIFICANT CHANGE UP (ref 8.4–10.5)
CALCIUM SERPL-MCNC: 8.5 MG/DL — SIGNIFICANT CHANGE UP (ref 8.4–10.5)
CHLORIDE SERPL-SCNC: 109 MMOL/L — HIGH (ref 96–108)
CHLORIDE SERPL-SCNC: 109 MMOL/L — HIGH (ref 96–108)
CO2 SERPL-SCNC: 17 MMOL/L — LOW (ref 22–31)
CO2 SERPL-SCNC: 17 MMOL/L — LOW (ref 22–31)
COVID-19 NUCLEOCAPSID GAM AB INTERP: POSITIVE
COVID-19 NUCLEOCAPSID TOTAL GAM ANTIBODY RESULT: 200 INDEX — HIGH
COVID-19 SPIKE DOMAIN AB INTERP: POSITIVE
COVID-19 SPIKE DOMAIN ANTIBODY RESULT: >250 U/ML — HIGH
CREAT SERPL-MCNC: 1.37 MG/DL — HIGH (ref 0.5–1.3)
CREAT SERPL-MCNC: 1.43 MG/DL — HIGH (ref 0.5–1.3)
GLUCOSE BLDC GLUCOMTR-MCNC: 103 MG/DL — HIGH (ref 70–99)
GLUCOSE BLDC GLUCOMTR-MCNC: 132 MG/DL — HIGH (ref 70–99)
GLUCOSE BLDC GLUCOMTR-MCNC: 91 MG/DL — SIGNIFICANT CHANGE UP (ref 70–99)
GLUCOSE BLDC GLUCOMTR-MCNC: 93 MG/DL — SIGNIFICANT CHANGE UP (ref 70–99)
GLUCOSE SERPL-MCNC: 90 MG/DL — SIGNIFICANT CHANGE UP (ref 70–99)
GLUCOSE SERPL-MCNC: 92 MG/DL — SIGNIFICANT CHANGE UP (ref 70–99)
HCT VFR BLD CALC: 37.2 % — LOW (ref 39–50)
HCT VFR BLD CALC: 39.1 % — SIGNIFICANT CHANGE UP (ref 39–50)
HGB BLD-MCNC: 11.6 G/DL — LOW (ref 13–17)
HGB BLD-MCNC: 12.1 G/DL — LOW (ref 13–17)
MCHC RBC-ENTMCNC: 21.1 PG — LOW (ref 27–34)
MCHC RBC-ENTMCNC: 21.5 PG — LOW (ref 27–34)
MCHC RBC-ENTMCNC: 30.9 GM/DL — LOW (ref 32–36)
MCHC RBC-ENTMCNC: 31.2 GM/DL — LOW (ref 32–36)
MCV RBC AUTO: 67.5 FL — LOW (ref 80–100)
MCV RBC AUTO: 69.4 FL — LOW (ref 80–100)
NRBC # BLD: 0 /100 WBCS — SIGNIFICANT CHANGE UP (ref 0–0)
NRBC # BLD: 0 /100 WBCS — SIGNIFICANT CHANGE UP (ref 0–0)
NT-PROBNP SERPL-SCNC: 3655 PG/ML — HIGH (ref 0–300)
PLATELET # BLD AUTO: 155 K/UL — SIGNIFICANT CHANGE UP (ref 150–400)
PLATELET # BLD AUTO: 164 K/UL — SIGNIFICANT CHANGE UP (ref 150–400)
POTASSIUM SERPL-MCNC: 4.3 MMOL/L — SIGNIFICANT CHANGE UP (ref 3.5–5.3)
POTASSIUM SERPL-MCNC: 4.3 MMOL/L — SIGNIFICANT CHANGE UP (ref 3.5–5.3)
POTASSIUM SERPL-SCNC: 4.3 MMOL/L — SIGNIFICANT CHANGE UP (ref 3.5–5.3)
POTASSIUM SERPL-SCNC: 4.3 MMOL/L — SIGNIFICANT CHANGE UP (ref 3.5–5.3)
PROT SERPL-MCNC: 6.5 G/DL — SIGNIFICANT CHANGE UP (ref 6–8.3)
RBC # BLD: 5.51 M/UL — SIGNIFICANT CHANGE UP (ref 4.2–5.8)
RBC # BLD: 5.63 M/UL — SIGNIFICANT CHANGE UP (ref 4.2–5.8)
RBC # FLD: 20.2 % — HIGH (ref 10.3–14.5)
RBC # FLD: 20.5 % — HIGH (ref 10.3–14.5)
SARS-COV-2 IGG+IGM SERPL QL IA: 200 INDEX — HIGH
SARS-COV-2 IGG+IGM SERPL QL IA: >250 U/ML — HIGH
SARS-COV-2 IGG+IGM SERPL QL IA: POSITIVE
SARS-COV-2 IGG+IGM SERPL QL IA: POSITIVE
SODIUM SERPL-SCNC: 137 MMOL/L — SIGNIFICANT CHANGE UP (ref 135–145)
SODIUM SERPL-SCNC: 138 MMOL/L — SIGNIFICANT CHANGE UP (ref 135–145)
WBC # BLD: 6.12 K/UL — SIGNIFICANT CHANGE UP (ref 3.8–10.5)
WBC # BLD: 8.61 K/UL — SIGNIFICANT CHANGE UP (ref 3.8–10.5)
WBC # FLD AUTO: 6.12 K/UL — SIGNIFICANT CHANGE UP (ref 3.8–10.5)
WBC # FLD AUTO: 8.61 K/UL — SIGNIFICANT CHANGE UP (ref 3.8–10.5)

## 2021-11-16 PROCEDURE — 99231 SBSQ HOSP IP/OBS SF/LOW 25: CPT

## 2021-11-16 PROCEDURE — 93926 LOWER EXTREMITY STUDY: CPT | Mod: 26,RT

## 2021-11-16 RX ORDER — HEPARIN SODIUM 5000 [USP'U]/ML
800 INJECTION INTRAVENOUS; SUBCUTANEOUS
Qty: 25000 | Refills: 0 | Status: DISCONTINUED | OUTPATIENT
Start: 2021-11-16 | End: 2021-11-18

## 2021-11-16 RX ADMIN — HEPARIN SODIUM 8 UNIT(S)/HR: 5000 INJECTION INTRAVENOUS; SUBCUTANEOUS at 14:04

## 2021-11-16 RX ADMIN — Medication 100 MILLIGRAM(S): at 13:07

## 2021-11-16 RX ADMIN — SODIUM CHLORIDE 3 MILLILITER(S): 9 INJECTION INTRAMUSCULAR; INTRAVENOUS; SUBCUTANEOUS at 13:13

## 2021-11-16 RX ADMIN — HEPARIN SODIUM 6.5 UNIT(S)/HR: 5000 INJECTION INTRAVENOUS; SUBCUTANEOUS at 22:53

## 2021-11-16 RX ADMIN — SODIUM CHLORIDE 3 MILLILITER(S): 9 INJECTION INTRAMUSCULAR; INTRAVENOUS; SUBCUTANEOUS at 21:46

## 2021-11-16 RX ADMIN — PANTOPRAZOLE SODIUM 40 MILLIGRAM(S): 20 TABLET, DELAYED RELEASE ORAL at 05:43

## 2021-11-16 RX ADMIN — SODIUM CHLORIDE 3 MILLILITER(S): 9 INJECTION INTRAMUSCULAR; INTRAVENOUS; SUBCUTANEOUS at 05:41

## 2021-11-16 RX ADMIN — Medication 1 SPRAY(S): at 05:43

## 2021-11-16 RX ADMIN — Medication 1 SPRAY(S): at 17:04

## 2021-11-16 RX ADMIN — Medication 25 MILLIGRAM(S): at 05:43

## 2021-11-16 RX ADMIN — Medication 100 MILLIGRAM(S): at 17:04

## 2021-11-16 RX ADMIN — Medication 100 MILLIGRAM(S): at 05:43

## 2021-11-16 RX ADMIN — Medication 25 MILLIGRAM(S): at 13:07

## 2021-11-16 RX ADMIN — Medication 25 MILLIGRAM(S): at 21:29

## 2021-11-16 NOTE — PROGRESS NOTE ADULT - ASSESSMENT
80 yo male PMHx CAD (1 stent,) DM2, HTN, HLD, AS, Diastolic HF, Afib (on Xarelto) and CVA (L hemiparesis) syncope x2 episodes.  Found to have severe AS, CAD ( 11/4 s/p cardiac cath  LHC: mid LM of 30%, Lcx distal of 20%, RPDA of 50%) CJ, transferred to [facility] for surgical evaluation for TAVR  11/15 VSS for CTA today carotids pending likely  TAVR  Thursday 11/16 US r groin, r/o pseudoaneurysm.  If negative will start heparin for a fib  TAVR thursday

## 2021-11-16 NOTE — PROGRESS NOTE ADULT - ASSESSMENT
82 yo male PMHx CAD (1 stent,) DM2, HTN, HLD, AS, Diastolic HF, Afib (on Xarelto) and CVA (L hemiparesis) syncope x2 episodes yesterday. Pt reports he was brushing his teeth yesterday morning, felt suddenly dizzy, lightheaded.  Syncope with  incontinence, unresponsive 3 min.Wife called EMS. EMS checked BFS to be 123 and was taken to [facility] ED on 10/27/21. CT Head w/o con: No acute intracranial hemorrhage, mass effect, or acute displaced calvarium fracture. Involutional and chronic microvascular ischemic gliotic changes. Interval indeterminate age small right cerebellar infarct, appears remote. Cardiology was consulted for AS   .  On his current echocardiogram, severe aortic valve stenosis was identified, and cardiology was consulted to initiate a pre-surgical workup.  His hospital stay was significant for a gout flair of his L ankle and CJ.  FARIHA was deferred  as TTE showed severe AS.    11/4 s/p cardiac cath  LHC: mid LM of 30%, Lcx distal of 20%, RPDA of 50%. RFA access site.    MRI to r/o amyloid: Cardiac MRI: Normal left ventricular size. Mildly depressed left ventricular function. LVEF: 51%. Multifocal patchy mid myocardial late gadolinium enhancement involving the basal to mid septal, inferoseptal and inferolateral walls. Findings compatible with nonischemic  cardiomyopathy, with diagnostic considerations including infiltrative processes (favor sarcoid, less likely amyloid) as well as myocarditis in the appropriate clinical setting. Normal right ventricular size. Mildly depressed right ventricular function. RVEF: 50%. Mild aortic insufficiency. Findings suggesting aortic stenosis with thickening of the aortic valve leaflets, decreased aortic valve opening and visual flow acceleration across the valve. Mild right and moderate left atrial enlargement.  The patient was transferred for TAVR evaluation and further w/u. noticed with cr 1.6     1- CJ  on ckd III likely   2- severe AS   3- HTN       cr is overall improved from recently cr upto 2   baseline cr likely now   hydralazine and metoprolol to cont   acidosis trend bicarb

## 2021-11-17 ENCOUNTER — TRANSCRIPTION ENCOUNTER (OUTPATIENT)
Age: 81
End: 2021-11-17

## 2021-11-17 LAB
ALBUMIN SERPL ELPH-MCNC: 3.1 G/DL — LOW (ref 3.3–5)
ALP SERPL-CCNC: 66 U/L — SIGNIFICANT CHANGE UP (ref 40–120)
ALT FLD-CCNC: 41 U/L — SIGNIFICANT CHANGE UP (ref 10–45)
ANION GAP SERPL CALC-SCNC: 11 MMOL/L — SIGNIFICANT CHANGE UP (ref 5–17)
APTT BLD: 147.5 SEC — CRITICAL HIGH (ref 27.5–35.5)
APTT BLD: 82 SEC — HIGH (ref 27.5–35.5)
APTT BLD: 85.5 SEC — HIGH (ref 27.5–35.5)
AST SERPL-CCNC: 33 U/L — SIGNIFICANT CHANGE UP (ref 10–40)
BILIRUB SERPL-MCNC: 0.3 MG/DL — SIGNIFICANT CHANGE UP (ref 0.2–1.2)
BLD GP AB SCN SERPL QL: NEGATIVE — SIGNIFICANT CHANGE UP
BUN SERPL-MCNC: 26 MG/DL — HIGH (ref 7–23)
CALCIUM SERPL-MCNC: 8.5 MG/DL — SIGNIFICANT CHANGE UP (ref 8.4–10.5)
CHLORIDE SERPL-SCNC: 108 MMOL/L — SIGNIFICANT CHANGE UP (ref 96–108)
CO2 SERPL-SCNC: 18 MMOL/L — LOW (ref 22–31)
CREAT SERPL-MCNC: 1.44 MG/DL — HIGH (ref 0.5–1.3)
GLUCOSE BLDC GLUCOMTR-MCNC: 125 MG/DL — HIGH (ref 70–99)
GLUCOSE BLDC GLUCOMTR-MCNC: 92 MG/DL — SIGNIFICANT CHANGE UP (ref 70–99)
GLUCOSE BLDC GLUCOMTR-MCNC: 96 MG/DL — SIGNIFICANT CHANGE UP (ref 70–99)
GLUCOSE BLDC GLUCOMTR-MCNC: 96 MG/DL — SIGNIFICANT CHANGE UP (ref 70–99)
GLUCOSE SERPL-MCNC: 90 MG/DL — SIGNIFICANT CHANGE UP (ref 70–99)
HCT VFR BLD CALC: 36 % — LOW (ref 39–50)
HGB BLD-MCNC: 11.3 G/DL — LOW (ref 13–17)
MCHC RBC-ENTMCNC: 21.6 PG — LOW (ref 27–34)
MCHC RBC-ENTMCNC: 31.4 GM/DL — LOW (ref 32–36)
MCV RBC AUTO: 68.7 FL — LOW (ref 80–100)
NRBC # BLD: 0 /100 WBCS — SIGNIFICANT CHANGE UP (ref 0–0)
PLATELET # BLD AUTO: 148 K/UL — LOW (ref 150–400)
POTASSIUM SERPL-MCNC: 4.4 MMOL/L — SIGNIFICANT CHANGE UP (ref 3.5–5.3)
POTASSIUM SERPL-SCNC: 4.4 MMOL/L — SIGNIFICANT CHANGE UP (ref 3.5–5.3)
PROT SERPL-MCNC: 6.5 G/DL — SIGNIFICANT CHANGE UP (ref 6–8.3)
RBC # BLD: 5.24 M/UL — SIGNIFICANT CHANGE UP (ref 4.2–5.8)
RBC # FLD: 20.3 % — HIGH (ref 10.3–14.5)
RH IG SCN BLD-IMP: POSITIVE — SIGNIFICANT CHANGE UP
SARS-COV-2 RNA SPEC QL NAA+PROBE: SIGNIFICANT CHANGE UP
SODIUM SERPL-SCNC: 137 MMOL/L — SIGNIFICANT CHANGE UP (ref 135–145)
WBC # BLD: 7.62 K/UL — SIGNIFICANT CHANGE UP (ref 3.8–10.5)
WBC # FLD AUTO: 7.62 K/UL — SIGNIFICANT CHANGE UP (ref 3.8–10.5)

## 2021-11-17 PROCEDURE — 99232 SBSQ HOSP IP/OBS MODERATE 35: CPT

## 2021-11-17 RX ORDER — CHLORHEXIDINE GLUCONATE 213 G/1000ML
1 SOLUTION TOPICAL ONCE
Refills: 0 | Status: COMPLETED | OUTPATIENT
Start: 2021-11-17 | End: 2021-11-17

## 2021-11-17 RX ORDER — MUPIROCIN 20 MG/G
1 OINTMENT TOPICAL EVERY 12 HOURS
Refills: 0 | Status: DISCONTINUED | OUTPATIENT
Start: 2021-11-17 | End: 2021-11-20

## 2021-11-17 RX ORDER — CEFUROXIME AXETIL 250 MG
1500 TABLET ORAL ONCE
Refills: 0 | Status: COMPLETED | OUTPATIENT
Start: 2021-11-17 | End: 2021-11-18

## 2021-11-17 RX ORDER — CHLORHEXIDINE GLUCONATE 213 G/1000ML
30 SOLUTION TOPICAL ONCE
Refills: 0 | Status: DISCONTINUED | OUTPATIENT
Start: 2021-11-17 | End: 2021-11-20

## 2021-11-17 RX ADMIN — PANTOPRAZOLE SODIUM 40 MILLIGRAM(S): 20 TABLET, DELAYED RELEASE ORAL at 05:46

## 2021-11-17 RX ADMIN — CHLORHEXIDINE GLUCONATE 1 APPLICATION(S): 213 SOLUTION TOPICAL at 20:41

## 2021-11-17 RX ADMIN — Medication 25 MILLIGRAM(S): at 05:45

## 2021-11-17 RX ADMIN — HEPARIN SODIUM 5 UNIT(S)/HR: 5000 INJECTION INTRAVENOUS; SUBCUTANEOUS at 17:48

## 2021-11-17 RX ADMIN — SODIUM CHLORIDE 3 MILLILITER(S): 9 INJECTION INTRAMUSCULAR; INTRAVENOUS; SUBCUTANEOUS at 05:48

## 2021-11-17 RX ADMIN — SODIUM CHLORIDE 3 MILLILITER(S): 9 INJECTION INTRAMUSCULAR; INTRAVENOUS; SUBCUTANEOUS at 21:04

## 2021-11-17 RX ADMIN — Medication 25 MILLIGRAM(S): at 13:21

## 2021-11-17 RX ADMIN — Medication 25 MILLIGRAM(S): at 21:05

## 2021-11-17 RX ADMIN — Medication 100 MILLIGRAM(S): at 05:45

## 2021-11-17 RX ADMIN — SODIUM CHLORIDE 3 MILLILITER(S): 9 INJECTION INTRAMUSCULAR; INTRAVENOUS; SUBCUTANEOUS at 12:52

## 2021-11-17 RX ADMIN — Medication 1 SPRAY(S): at 05:45

## 2021-11-17 RX ADMIN — HEPARIN SODIUM 5 UNIT(S)/HR: 5000 INJECTION INTRAVENOUS; SUBCUTANEOUS at 11:01

## 2021-11-17 RX ADMIN — Medication 100 MILLIGRAM(S): at 17:44

## 2021-11-17 RX ADMIN — MUPIROCIN 1 APPLICATION(S): 20 OINTMENT TOPICAL at 17:44

## 2021-11-17 RX ADMIN — Medication 1 SPRAY(S): at 17:45

## 2021-11-17 RX ADMIN — Medication 100 MILLIGRAM(S): at 11:03

## 2021-11-17 NOTE — PROGRESS NOTE ADULT - ASSESSMENT
80 yo male PMHx CAD (1 stent,) DM2, HTN, HLD, AS, Diastolic HF, Afib (on Xarelto) and CVA (L hemiparesis) syncope x2 episodes.  Found to have severe AS, CAD ( 11/4 s/p cardiac cath  LHC: mid LM of 30%, Lcx distal of 20%, RPDA of 50%) CJ, transferred to [facility] for surgical evaluation for TAVR  11/15 VSS for CTA today carotids pending likely  TAVR  Thursday 11/16 US r groin, r/o pseudoaneurysm.  If negative will start heparin for a fib, TAVR thursday  Preop TAVR in AM d/c hep gtt at midnight   send covid and type an cross STAT

## 2021-11-17 NOTE — PROGRESS NOTE ADULT - ASSESSMENT
80 yo male PMHx CAD (1 stent,) DM2, HTN, HLD, AS, Diastolic HF, Afib (on Xarelto) and CVA (L hemiparesis) syncope x2 episodes yesterday. Pt reports he was brushing his teeth yesterday morning, felt suddenly dizzy, lightheaded.  Syncope with  incontinence, unresponsive 3 min.Wife called EMS. EMS checked BFS to be 123 and was taken to [facility] ED on 10/27/21. CT Head w/o con: No acute intracranial hemorrhage, mass effect, or acute displaced calvarium fracture. Involutional and chronic microvascular ischemic gliotic changes. Interval indeterminate age small right cerebellar infarct, appears remote. Cardiology was consulted for AS   .  On his current echocardiogram, severe aortic valve stenosis was identified, and cardiology was consulted to initiate a pre-surgical workup.  His hospital stay was significant for a gout flair of his L ankle and CJ.  FARIHA was deferred  as TTE showed severe AS.    11/4 s/p cardiac cath  LHC: mid LM of 30%, Lcx distal of 20%, RPDA of 50%. RFA access site.    MRI to r/o amyloid: Cardiac MRI: Normal left ventricular size. Mildly depressed left ventricular function. LVEF: 51%. Multifocal patchy mid myocardial late gadolinium enhancement involving the basal to mid septal, inferoseptal and inferolateral walls. Findings compatible with nonischemic  cardiomyopathy, with diagnostic considerations including infiltrative processes (favor sarcoid, less likely amyloid) as well as myocarditis in the appropriate clinical setting. Normal right ventricular size. Mildly depressed right ventricular function. RVEF: 50%. Mild aortic insufficiency. Findings suggesting aortic stenosis with thickening of the aortic valve leaflets, decreased aortic valve opening and visual flow acceleration across the valve. Mild right and moderate left atrial enlargement.  The patient was transferred for TAVR evaluation and further w/u. noticed with cr 1.6     1- ckd III  2- severe AS   3- HTN       cr is overall improved from recently cr upto 2   baseline cr likely now   hydralazine and metoprolol to cont   acidosis trend bicarb  moses procedure gentle ivf

## 2021-11-17 NOTE — PROGRESS NOTE ADULT - PROBLEM SELECTOR PLAN 1
TAVR evaluation completed, scheduled for Thursday 11/18 with Dr. Tomas and Dr. Sanchez  AFib: cont heparin gtt  CKD: Cr stable post CT  NPO after midnight    [id number]

## 2021-11-18 ENCOUNTER — APPOINTMENT (OUTPATIENT)
Dept: CARDIOTHORACIC SURGERY | Facility: HOSPITAL | Age: 81
End: 2021-11-18

## 2021-11-18 LAB
ALBUMIN SERPL ELPH-MCNC: 3.1 G/DL — LOW (ref 3.3–5)
ALBUMIN SERPL ELPH-MCNC: 3.4 G/DL — SIGNIFICANT CHANGE UP (ref 3.3–5)
ALP SERPL-CCNC: 67 U/L — SIGNIFICANT CHANGE UP (ref 40–120)
ALP SERPL-CCNC: 71 U/L — SIGNIFICANT CHANGE UP (ref 40–120)
ALT FLD-CCNC: 29 U/L — SIGNIFICANT CHANGE UP (ref 10–45)
ALT FLD-CCNC: 32 U/L — SIGNIFICANT CHANGE UP (ref 10–45)
ANION GAP SERPL CALC-SCNC: 11 MMOL/L — SIGNIFICANT CHANGE UP (ref 5–17)
ANION GAP SERPL CALC-SCNC: 12 MMOL/L — SIGNIFICANT CHANGE UP (ref 5–17)
ANISOCYTOSIS BLD QL: SLIGHT — SIGNIFICANT CHANGE UP
AST SERPL-CCNC: 22 U/L — SIGNIFICANT CHANGE UP (ref 10–40)
AST SERPL-CCNC: 26 U/L — SIGNIFICANT CHANGE UP (ref 10–40)
BASOPHILS # BLD AUTO: 0 K/UL — SIGNIFICANT CHANGE UP (ref 0–0.2)
BASOPHILS NFR BLD AUTO: 0 % — SIGNIFICANT CHANGE UP (ref 0–2)
BILIRUB SERPL-MCNC: 0.5 MG/DL — SIGNIFICANT CHANGE UP (ref 0.2–1.2)
BILIRUB SERPL-MCNC: 0.5 MG/DL — SIGNIFICANT CHANGE UP (ref 0.2–1.2)
BUN SERPL-MCNC: 18 MG/DL — SIGNIFICANT CHANGE UP (ref 7–23)
BUN SERPL-MCNC: 22 MG/DL — SIGNIFICANT CHANGE UP (ref 7–23)
BURR CELLS BLD QL SMEAR: PRESENT — SIGNIFICANT CHANGE UP
CALCIUM SERPL-MCNC: 8.5 MG/DL — SIGNIFICANT CHANGE UP (ref 8.4–10.5)
CALCIUM SERPL-MCNC: 8.6 MG/DL — SIGNIFICANT CHANGE UP (ref 8.4–10.5)
CHLORIDE SERPL-SCNC: 104 MMOL/L — SIGNIFICANT CHANGE UP (ref 96–108)
CHLORIDE SERPL-SCNC: 106 MMOL/L — SIGNIFICANT CHANGE UP (ref 96–108)
CO2 SERPL-SCNC: 18 MMOL/L — LOW (ref 22–31)
CO2 SERPL-SCNC: 18 MMOL/L — LOW (ref 22–31)
CREAT SERPL-MCNC: 1.27 MG/DL — SIGNIFICANT CHANGE UP (ref 0.5–1.3)
CREAT SERPL-MCNC: 1.35 MG/DL — HIGH (ref 0.5–1.3)
DACRYOCYTES BLD QL SMEAR: SLIGHT — SIGNIFICANT CHANGE UP
ELLIPTOCYTES BLD QL SMEAR: SLIGHT — SIGNIFICANT CHANGE UP
EOSINOPHIL # BLD AUTO: 0.07 K/UL — SIGNIFICANT CHANGE UP (ref 0–0.5)
EOSINOPHIL NFR BLD AUTO: 0.9 % — SIGNIFICANT CHANGE UP (ref 0–6)
GIANT PLATELETS BLD QL SMEAR: PRESENT — SIGNIFICANT CHANGE UP
GLUCOSE BLDC GLUCOMTR-MCNC: 106 MG/DL — HIGH (ref 70–99)
GLUCOSE BLDC GLUCOMTR-MCNC: 111 MG/DL — HIGH (ref 70–99)
GLUCOSE BLDC GLUCOMTR-MCNC: 83 MG/DL — SIGNIFICANT CHANGE UP (ref 70–99)
GLUCOSE BLDC GLUCOMTR-MCNC: 91 MG/DL — SIGNIFICANT CHANGE UP (ref 70–99)
GLUCOSE SERPL-MCNC: 89 MG/DL — SIGNIFICANT CHANGE UP (ref 70–99)
GLUCOSE SERPL-MCNC: 92 MG/DL — SIGNIFICANT CHANGE UP (ref 70–99)
HCT VFR BLD CALC: 36.3 % — LOW (ref 39–50)
HCT VFR BLD CALC: 38.8 % — LOW (ref 39–50)
HGB BLD-MCNC: 11.4 G/DL — LOW (ref 13–17)
HGB BLD-MCNC: 11.9 G/DL — LOW (ref 13–17)
HGB FREE PLAS-MCNC: 3 MG/DL — SIGNIFICANT CHANGE UP (ref 0–4.9)
LYMPHOCYTES # BLD AUTO: 2.1 K/UL — SIGNIFICANT CHANGE UP (ref 1–3.3)
LYMPHOCYTES # BLD AUTO: 28.4 % — SIGNIFICANT CHANGE UP (ref 13–44)
MACROCYTES BLD QL: SLIGHT — SIGNIFICANT CHANGE UP
MANUAL SMEAR VERIFICATION: SIGNIFICANT CHANGE UP
MCHC RBC-ENTMCNC: 21.3 PG — LOW (ref 27–34)
MCHC RBC-ENTMCNC: 21.6 PG — LOW (ref 27–34)
MCHC RBC-ENTMCNC: 30.7 GM/DL — LOW (ref 32–36)
MCHC RBC-ENTMCNC: 31.4 GM/DL — LOW (ref 32–36)
MCV RBC AUTO: 68.8 FL — LOW (ref 80–100)
MCV RBC AUTO: 69.4 FL — LOW (ref 80–100)
METAMYELOCYTES # FLD: 0.9 % — HIGH (ref 0–0)
MICROCYTES BLD QL: SLIGHT — SIGNIFICANT CHANGE UP
MONOCYTES # BLD AUTO: 0.58 K/UL — SIGNIFICANT CHANGE UP (ref 0–0.9)
MONOCYTES NFR BLD AUTO: 7.8 % — SIGNIFICANT CHANGE UP (ref 2–14)
NEUTROPHILS # BLD AUTO: 4.46 K/UL — SIGNIFICANT CHANGE UP (ref 1.8–7.4)
NEUTROPHILS NFR BLD AUTO: 60.3 % — SIGNIFICANT CHANGE UP (ref 43–77)
NRBC # BLD: 0 /100 WBCS — SIGNIFICANT CHANGE UP (ref 0–0)
NRBC # BLD: 1 /100 — HIGH (ref 0–0)
PLAT MORPH BLD: NORMAL — SIGNIFICANT CHANGE UP
PLATELET # BLD AUTO: 134 K/UL — LOW (ref 150–400)
PLATELET # BLD AUTO: 144 K/UL — LOW (ref 150–400)
POIKILOCYTOSIS BLD QL AUTO: SLIGHT — SIGNIFICANT CHANGE UP
POLYCHROMASIA BLD QL SMEAR: SLIGHT — SIGNIFICANT CHANGE UP
POTASSIUM SERPL-MCNC: 4.5 MMOL/L — SIGNIFICANT CHANGE UP (ref 3.5–5.3)
POTASSIUM SERPL-MCNC: 5.1 MMOL/L — SIGNIFICANT CHANGE UP (ref 3.5–5.3)
POTASSIUM SERPL-SCNC: 4.5 MMOL/L — SIGNIFICANT CHANGE UP (ref 3.5–5.3)
POTASSIUM SERPL-SCNC: 5.1 MMOL/L — SIGNIFICANT CHANGE UP (ref 3.5–5.3)
PROT SERPL-MCNC: 6.6 G/DL — SIGNIFICANT CHANGE UP (ref 6–8.3)
PROT SERPL-MCNC: 6.7 G/DL — SIGNIFICANT CHANGE UP (ref 6–8.3)
RBC # BLD: 5.28 M/UL — SIGNIFICANT CHANGE UP (ref 4.2–5.8)
RBC # BLD: 5.59 M/UL — SIGNIFICANT CHANGE UP (ref 4.2–5.8)
RBC # FLD: 20.3 % — HIGH (ref 10.3–14.5)
RBC # FLD: 20.8 % — HIGH (ref 10.3–14.5)
RBC BLD AUTO: ABNORMAL
SCHISTOCYTES BLD QL AUTO: SLIGHT — SIGNIFICANT CHANGE UP
SMUDGE CELLS # BLD: PRESENT — SIGNIFICANT CHANGE UP
SODIUM SERPL-SCNC: 133 MMOL/L — LOW (ref 135–145)
SODIUM SERPL-SCNC: 136 MMOL/L — SIGNIFICANT CHANGE UP (ref 135–145)
TARGETS BLD QL SMEAR: SLIGHT — SIGNIFICANT CHANGE UP
VARIANT LYMPHS # BLD: 1.7 % — SIGNIFICANT CHANGE UP (ref 0–6)
WBC # BLD: 7 K/UL — SIGNIFICANT CHANGE UP (ref 3.8–10.5)
WBC # BLD: 7.4 K/UL — SIGNIFICANT CHANGE UP (ref 3.8–10.5)
WBC # FLD AUTO: 7 K/UL — SIGNIFICANT CHANGE UP (ref 3.8–10.5)
WBC # FLD AUTO: 7.4 K/UL — SIGNIFICANT CHANGE UP (ref 3.8–10.5)

## 2021-11-18 PROCEDURE — 93355 ECHO TRANSESOPHAGEAL (TEE): CPT

## 2021-11-18 PROCEDURE — 93010 ELECTROCARDIOGRAM REPORT: CPT

## 2021-11-18 PROCEDURE — 33361 REPLACE AORTIC VALVE PERQ: CPT | Mod: 62,Q0

## 2021-11-18 PROCEDURE — 33361 REPLACE AORTIC VALVE PERQ: CPT | Mod: Q0,62

## 2021-11-18 RX ORDER — CEFUROXIME AXETIL 250 MG
1500 TABLET ORAL EVERY 8 HOURS
Refills: 0 | Status: COMPLETED | OUTPATIENT
Start: 2021-11-18 | End: 2021-11-19

## 2021-11-18 RX ORDER — ASPIRIN/CALCIUM CARB/MAGNESIUM 324 MG
81 TABLET ORAL DAILY
Refills: 0 | Status: DISCONTINUED | OUTPATIENT
Start: 2021-11-18 | End: 2021-11-20

## 2021-11-18 RX ADMIN — Medication 100 MILLIGRAM(S): at 05:28

## 2021-11-18 RX ADMIN — MUPIROCIN 1 APPLICATION(S): 20 OINTMENT TOPICAL at 16:52

## 2021-11-18 RX ADMIN — Medication 100 MILLIGRAM(S): at 22:03

## 2021-11-18 RX ADMIN — Medication 100 MILLIGRAM(S): at 11:27

## 2021-11-18 RX ADMIN — PANTOPRAZOLE SODIUM 40 MILLIGRAM(S): 20 TABLET, DELAYED RELEASE ORAL at 05:28

## 2021-11-18 RX ADMIN — MUPIROCIN 1 APPLICATION(S): 20 OINTMENT TOPICAL at 05:28

## 2021-11-18 RX ADMIN — SODIUM CHLORIDE 3 MILLILITER(S): 9 INJECTION INTRAMUSCULAR; INTRAVENOUS; SUBCUTANEOUS at 05:27

## 2021-11-18 RX ADMIN — Medication 25 MILLIGRAM(S): at 22:03

## 2021-11-18 RX ADMIN — Medication 100 MILLIGRAM(S): at 21:13

## 2021-11-18 RX ADMIN — Medication 25 MILLIGRAM(S): at 16:46

## 2021-11-18 RX ADMIN — Medication 100 MILLIGRAM(S): at 16:52

## 2021-11-18 RX ADMIN — Medication 25 MILLIGRAM(S): at 05:28

## 2021-11-18 RX ADMIN — SODIUM CHLORIDE 3 MILLILITER(S): 9 INJECTION INTRAMUSCULAR; INTRAVENOUS; SUBCUTANEOUS at 21:54

## 2021-11-18 NOTE — PROGRESS NOTE ADULT - ASSESSMENT
82 yo male PMHx CAD (1 stent,) DM2, HTN, HLD, AS, Diastolic HF, Afib (on Xarelto) and CVA (L hemiparesis) syncope x2 episodes.  Found to have severe AS, CAD ( 11/4 s/p cardiac cath  LHC: mid LM of 30%, Lcx distal of 20%, RPDA of 50%) CJ, transferred to [facility] for surgical evaluation for TAVR  11/15 VSS for CTA today carotids pending likely  TAVR  Thursday 11/16 US r groin, r/o pseudoaneurysm.  If negative will start heparin for a fib, TAVR thursday 11/18: s/p Amrit TAVR (RFA Perclose/LFA Perclose x2). No pacing wire. Start ASA 81mg tonight at 9pm, resume Eliquis tomorrow.  82 yo male PMHx CAD (1 stent,) DM2, HTN, HLD, AS, Diastolic HF, Afib (on Xarelto) and CVA (L hemiparesis) syncope x2 episodes.  Found to have severe AS, CAD ( 11/4 s/p cardiac cath  LHC: mid LM of 30%, Lcx distal of 20%, RPDA of 50%) CJ, transferred to [facility] for surgical evaluation for TAVR  11/15 VSS for CTA today carotids pending likely  TAVR  Thursday 11/16 US r groin, r/o pseudoaneurysm.  If negative will start heparin for a fib, TAVR thursday 11/18: s/p Amrit TAVR (RFA Perclose/LFA Perclose x2). No pacing wire. Start ASA 81mg tonight at 9pm, resume AC tomorrow (NOAC)

## 2021-11-18 NOTE — PRE-ANESTHESIA EVALUATION ADULT - NSDENTALSD_ENT_ALL_CORE
Message  Telephone call placed to Theodore Mera re: CATHY  Reports some lightheadedness, believes fluid intake is adequate  Encouraged 80 oz/day to help with lightheadedness  No other adverse side effects  Feels somewhat hungry and wanted to clarify if she is able to utilize the snacks discussed  Explained she needs to limit these to 2x/day  She will contact me if needed prior to next appointment  Active Problems    1  Body mass index (BMI) of 34 0 to 34 9 in adult (V85 34) (Z68 34)   2  Encounter for gynecological examination (V72 31) (Z01 419)   3  Fatigue (780 79) (R53 83)   4  Obesity (278 00) (E66 9)   5  Post-term Pregnancy - Antepartum Condition Or Prior Complicated Delivery (749 98)   6  Screening for diabetes mellitus (V77 1) (Z13 1)   7  Screening for hypertension (V81 1) (Z13 6)   8  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

## 2021-11-18 NOTE — PRE-ANESTHESIA EVALUATION ADULT - NSANTHPMHFT_GEN_ALL_CORE
82 yo male PMHx CAD (1 stent,) DM2, HTN, HLD, AS, Diastolic HF, Afib (on Xarelto) and CVA (L hemiparesis) syncope x2

## 2021-11-18 NOTE — CHART NOTE - NSCHARTNOTEFT_GEN_A_CORE
81 yr old male received to [facility] PACU s/p Amrit TAVR (RFA Perclose/LFA Perclose x2). Patient awake, alert, oriented. VSS as charted, no events on tele, no pain. Bilateral groin sites stable. No pacing wire. Start ASA 81mg tonight at 9pm, resume Eliquis tmrw. Will continue to monitor.    Anca Macias NP  Invasive Cardiology  [phone]

## 2021-11-18 NOTE — PROGRESS NOTE ADULT - PROBLEM SELECTOR PLAN 1
s/p TAVR 11/18  s/p Amrit TAVR (RFA Perclose/LFA Perclose x2). No pacing wire. Start ASA 81mg tonight at 9pm, resume Eliquis tomorrow  POD#1 TTE  daily EKG s/p TAVR 11/18  s/p Amrit TAVR (RFA Perclose/LFA Perclose x2). No pacing wire. Start ASA 81mg tonight at 9pm, resume NOAC tomorrow  POD#1 TTE  daily EKG

## 2021-11-18 NOTE — BRIEF OPERATIVE NOTE - NSICDXBRIEFPROCEDURE_GEN_ALL_CORE_FT
PROCEDURES:  Percutaneous transcatheter aortic valve replacement 18-Nov-2021 15:09:36 Transfemoral TAVR (26mm Evolut) Klaus De Leon   PROCEDURES:  Percutaneous transcatheter aortic valve replacement 18-Nov-2021 15:09:36 Transfemoral TAVR (26mm Amrit) Klaus De Leon

## 2021-11-19 LAB
ANION GAP SERPL CALC-SCNC: 13 MMOL/L — SIGNIFICANT CHANGE UP (ref 5–17)
BUN SERPL-MCNC: 20 MG/DL — SIGNIFICANT CHANGE UP (ref 7–23)
CALCIUM SERPL-MCNC: 8.8 MG/DL — SIGNIFICANT CHANGE UP (ref 8.4–10.5)
CHLORIDE SERPL-SCNC: 101 MMOL/L — SIGNIFICANT CHANGE UP (ref 96–108)
CO2 SERPL-SCNC: 19 MMOL/L — LOW (ref 22–31)
CREAT SERPL-MCNC: 1.44 MG/DL — HIGH (ref 0.5–1.3)
GLUCOSE BLDC GLUCOMTR-MCNC: 103 MG/DL — HIGH (ref 70–99)
GLUCOSE BLDC GLUCOMTR-MCNC: 111 MG/DL — HIGH (ref 70–99)
GLUCOSE BLDC GLUCOMTR-MCNC: 119 MG/DL — HIGH (ref 70–99)
GLUCOSE BLDC GLUCOMTR-MCNC: 153 MG/DL — HIGH (ref 70–99)
GLUCOSE SERPL-MCNC: 97 MG/DL — SIGNIFICANT CHANGE UP (ref 70–99)
HCT VFR BLD CALC: 40.9 % — SIGNIFICANT CHANGE UP (ref 39–50)
HGB BLD-MCNC: 12.8 G/DL — LOW (ref 13–17)
MCHC RBC-ENTMCNC: 21.6 PG — LOW (ref 27–34)
MCHC RBC-ENTMCNC: 31.3 GM/DL — LOW (ref 32–36)
MCV RBC AUTO: 69.1 FL — LOW (ref 80–100)
NRBC # BLD: 0 /100 WBCS — SIGNIFICANT CHANGE UP (ref 0–0)
PLATELET # BLD AUTO: 122 K/UL — LOW (ref 150–400)
POTASSIUM SERPL-MCNC: 5.1 MMOL/L — SIGNIFICANT CHANGE UP (ref 3.5–5.3)
POTASSIUM SERPL-SCNC: 5.1 MMOL/L — SIGNIFICANT CHANGE UP (ref 3.5–5.3)
RBC # BLD: 5.92 M/UL — HIGH (ref 4.2–5.8)
RBC # FLD: 21.1 % — HIGH (ref 10.3–14.5)
SODIUM SERPL-SCNC: 133 MMOL/L — LOW (ref 135–145)
WBC # BLD: 10.05 K/UL — SIGNIFICANT CHANGE UP (ref 3.8–10.5)
WBC # FLD AUTO: 10.05 K/UL — SIGNIFICANT CHANGE UP (ref 3.8–10.5)

## 2021-11-19 PROCEDURE — 93010 ELECTROCARDIOGRAM REPORT: CPT

## 2021-11-19 PROCEDURE — 99232 SBSQ HOSP IP/OBS MODERATE 35: CPT

## 2021-11-19 PROCEDURE — 93306 TTE W/DOPPLER COMPLETE: CPT | Mod: 26

## 2021-11-19 RX ORDER — METOPROLOL TARTRATE 50 MG
25 TABLET ORAL
Refills: 0 | Status: DISCONTINUED | OUTPATIENT
Start: 2021-11-19 | End: 2021-11-20

## 2021-11-19 RX ORDER — RIVAROXABAN 15 MG-20MG
20 KIT ORAL DAILY
Refills: 0 | Status: DISCONTINUED | OUTPATIENT
Start: 2021-11-19 | End: 2021-11-20

## 2021-11-19 RX ORDER — SODIUM CHLORIDE 9 MG/ML
1000 INJECTION INTRAMUSCULAR; INTRAVENOUS; SUBCUTANEOUS ONCE
Refills: 0 | Status: DISCONTINUED | OUTPATIENT
Start: 2021-11-19 | End: 2021-11-19

## 2021-11-19 RX ADMIN — Medication 25 MILLIGRAM(S): at 13:59

## 2021-11-19 RX ADMIN — Medication 81 MILLIGRAM(S): at 12:25

## 2021-11-19 RX ADMIN — Medication 1: at 12:23

## 2021-11-19 RX ADMIN — Medication 100 MILLIGRAM(S): at 05:42

## 2021-11-19 RX ADMIN — Medication 25 MILLIGRAM(S): at 21:35

## 2021-11-19 RX ADMIN — MUPIROCIN 1 APPLICATION(S): 20 OINTMENT TOPICAL at 05:44

## 2021-11-19 RX ADMIN — Medication 650 MILLIGRAM(S): at 21:48

## 2021-11-19 RX ADMIN — SODIUM CHLORIDE 3 MILLILITER(S): 9 INJECTION INTRAMUSCULAR; INTRAVENOUS; SUBCUTANEOUS at 13:02

## 2021-11-19 RX ADMIN — Medication 1 SPRAY(S): at 17:24

## 2021-11-19 RX ADMIN — Medication 650 MILLIGRAM(S): at 05:10

## 2021-11-19 RX ADMIN — Medication 650 MILLIGRAM(S): at 12:26

## 2021-11-19 RX ADMIN — Medication 650 MILLIGRAM(S): at 04:36

## 2021-11-19 RX ADMIN — Medication 1 SPRAY(S): at 05:44

## 2021-11-19 RX ADMIN — Medication 100 MILLIGRAM(S): at 12:23

## 2021-11-19 RX ADMIN — Medication 650 MILLIGRAM(S): at 22:20

## 2021-11-19 RX ADMIN — Medication 25 MILLIGRAM(S): at 21:34

## 2021-11-19 RX ADMIN — PANTOPRAZOLE SODIUM 40 MILLIGRAM(S): 20 TABLET, DELAYED RELEASE ORAL at 05:44

## 2021-11-19 RX ADMIN — SODIUM CHLORIDE 3 MILLILITER(S): 9 INJECTION INTRAMUSCULAR; INTRAVENOUS; SUBCUTANEOUS at 21:26

## 2021-11-19 RX ADMIN — Medication 25 MILLIGRAM(S): at 05:43

## 2021-11-19 RX ADMIN — MUPIROCIN 1 APPLICATION(S): 20 OINTMENT TOPICAL at 17:26

## 2021-11-19 RX ADMIN — SODIUM CHLORIDE 3 MILLILITER(S): 9 INJECTION INTRAMUSCULAR; INTRAVENOUS; SUBCUTANEOUS at 05:40

## 2021-11-19 RX ADMIN — Medication 25 MILLIGRAM(S): at 10:14

## 2021-11-19 RX ADMIN — RIVAROXABAN 20 MILLIGRAM(S): KIT at 12:25

## 2021-11-19 RX ADMIN — Medication 650 MILLIGRAM(S): at 13:44

## 2021-11-19 NOTE — PROGRESS NOTE ADULT - ASSESSMENT
80 yo male PMHx CAD (1 stent,) DM2, HTN, HLD, AS, Diastolic HF, Afib (on Xarelto) and CVA (L hemiparesis) syncope x2 episodes.  Found to have severe AS, CAD ( 11/4 s/p cardiac cath  LHC: mid LM of 30%, Lcx distal of 20%, RPDA of 50%) CJ, transferred to [facility] for surgical evaluation for TAVR  11/15 VSS for CTA today carotids pending likely  TAVR  Thursday 11/16 US r groin, r/o pseudoaneurysm.  If negative will start heparin for a fib, TAVR thursday 11/18: s/p Amrit TAVR (RFA Perclose/LFA Perclose x2). No pacing wire. Start ASA 81mg tonight at 9pm, resume AC tomorrow (NOAC)  11/19 VSS Xarelto resumed ECHO completed today  discharge home Saturday

## 2021-11-19 NOTE — PROGRESS NOTE ADULT - PROBLEM SELECTOR PLAN 2
elevated creatinine   Dr Arboleda consulted

## 2021-11-19 NOTE — PROGRESS NOTE ADULT - PROBLEM SELECTOR PLAN 1
s/p TAVR 11/18  s/p Amrit TAVR (RFA Perclose/LFA Perclose x2  .aspirin enteric coated 81 milliGRAM(s) Oral daily  metoprolol tartrate 25 milliGRAM(s) Oral two times a day  Xarelto 20 mg qd resumed 11/19 11/19  TTE completed   daily EKG  Home Saturday with CONNOR

## 2021-11-19 NOTE — PROGRESS NOTE ADULT - PROBLEM SELECTOR PROBLEM 2
CJ (acute kidney injury)

## 2021-11-19 NOTE — PROGRESS NOTE ADULT - PROBLEM SELECTOR PROBLEM 1
Aortic stenosis
Severe aortic stenosis
Aortic stenosis

## 2021-11-20 ENCOUNTER — TRANSCRIPTION ENCOUNTER (OUTPATIENT)
Age: 81
End: 2021-11-20

## 2021-11-20 VITALS
DIASTOLIC BLOOD PRESSURE: 77 MMHG | OXYGEN SATURATION: 100 % | TEMPERATURE: 98 F | SYSTOLIC BLOOD PRESSURE: 126 MMHG | RESPIRATION RATE: 18 BRPM | HEART RATE: 65 BPM

## 2021-11-20 LAB
ANION GAP SERPL CALC-SCNC: 8 MMOL/L — SIGNIFICANT CHANGE UP (ref 5–17)
ANISOCYTOSIS BLD QL: SIGNIFICANT CHANGE UP
BASOPHILS # BLD AUTO: 0.03 K/UL — SIGNIFICANT CHANGE UP (ref 0–0.2)
BASOPHILS NFR BLD AUTO: 0.3 % — SIGNIFICANT CHANGE UP (ref 0–2)
BUN SERPL-MCNC: 25 MG/DL — HIGH (ref 7–23)
CALCIUM SERPL-MCNC: 8 MG/DL — LOW (ref 8.4–10.5)
CHLORIDE SERPL-SCNC: 103 MMOL/L — SIGNIFICANT CHANGE UP (ref 96–108)
CO2 SERPL-SCNC: 20 MMOL/L — LOW (ref 22–31)
CREAT SERPL-MCNC: 1.57 MG/DL — HIGH (ref 0.5–1.3)
ELLIPTOCYTES BLD QL SMEAR: SLIGHT — SIGNIFICANT CHANGE UP
EOSINOPHIL # BLD AUTO: 0.22 K/UL — SIGNIFICANT CHANGE UP (ref 0–0.5)
EOSINOPHIL NFR BLD AUTO: 2.2 % — SIGNIFICANT CHANGE UP (ref 0–6)
GLUCOSE BLDC GLUCOMTR-MCNC: 100 MG/DL — HIGH (ref 70–99)
GLUCOSE BLDC GLUCOMTR-MCNC: 109 MG/DL — HIGH (ref 70–99)
GLUCOSE SERPL-MCNC: 118 MG/DL — HIGH (ref 70–99)
HCT VFR BLD CALC: 31.4 % — LOW (ref 39–50)
HGB BLD-MCNC: 10 G/DL — LOW (ref 13–17)
IMM GRANULOCYTES NFR BLD AUTO: 0.4 % — SIGNIFICANT CHANGE UP (ref 0–1.5)
LYMPHOCYTES # BLD AUTO: 2.2 K/UL — SIGNIFICANT CHANGE UP (ref 1–3.3)
LYMPHOCYTES # BLD AUTO: 21.8 % — SIGNIFICANT CHANGE UP (ref 13–44)
MACROCYTES BLD QL: SLIGHT — SIGNIFICANT CHANGE UP
MANUAL SMEAR VERIFICATION: SIGNIFICANT CHANGE UP
MCHC RBC-ENTMCNC: 21.9 PG — LOW (ref 27–34)
MCHC RBC-ENTMCNC: 31.8 GM/DL — LOW (ref 32–36)
MCV RBC AUTO: 68.7 FL — LOW (ref 80–100)
MONOCYTES # BLD AUTO: 0.96 K/UL — HIGH (ref 0–0.9)
MONOCYTES NFR BLD AUTO: 9.5 % — SIGNIFICANT CHANGE UP (ref 2–14)
NEUTROPHILS # BLD AUTO: 6.65 K/UL — SIGNIFICANT CHANGE UP (ref 1.8–7.4)
NEUTROPHILS NFR BLD AUTO: 65.8 % — SIGNIFICANT CHANGE UP (ref 43–77)
NRBC # BLD: 0 /100 WBCS — SIGNIFICANT CHANGE UP (ref 0–0)
OVALOCYTES BLD QL SMEAR: SLIGHT — SIGNIFICANT CHANGE UP
PLAT MORPH BLD: NORMAL — SIGNIFICANT CHANGE UP
PLATELET # BLD AUTO: 90 K/UL — LOW (ref 150–400)
PLATELET COUNT - ESTIMATE: ABNORMAL
POIKILOCYTOSIS BLD QL AUTO: SIGNIFICANT CHANGE UP
POLYCHROMASIA BLD QL SMEAR: SLIGHT — SIGNIFICANT CHANGE UP
POTASSIUM SERPL-MCNC: 4.3 MMOL/L — SIGNIFICANT CHANGE UP (ref 3.5–5.3)
POTASSIUM SERPL-SCNC: 4.3 MMOL/L — SIGNIFICANT CHANGE UP (ref 3.5–5.3)
RBC # BLD: 4.57 M/UL — SIGNIFICANT CHANGE UP (ref 4.2–5.8)
RBC # FLD: 20 % — HIGH (ref 10.3–14.5)
RBC BLD AUTO: ABNORMAL
SCHISTOCYTES BLD QL AUTO: SLIGHT — SIGNIFICANT CHANGE UP
SODIUM SERPL-SCNC: 131 MMOL/L — LOW (ref 135–145)
TARGETS BLD QL SMEAR: SLIGHT — SIGNIFICANT CHANGE UP
WBC # BLD: 10.1 K/UL — SIGNIFICANT CHANGE UP (ref 3.8–10.5)
WBC # FLD AUTO: 10.1 K/UL — SIGNIFICANT CHANGE UP (ref 3.8–10.5)

## 2021-11-20 PROCEDURE — 93010 ELECTROCARDIOGRAM REPORT: CPT

## 2021-11-20 RX ORDER — PANTOPRAZOLE SODIUM 20 MG/1
1 TABLET, DELAYED RELEASE ORAL
Qty: 30 | Refills: 0
Start: 2021-11-20 | End: 2021-12-19

## 2021-11-20 RX ORDER — ASPIRIN/CALCIUM CARB/MAGNESIUM 324 MG
1 TABLET ORAL
Qty: 0 | Refills: 0 | DISCHARGE
Start: 2021-11-20

## 2021-11-20 RX ORDER — METOPROLOL TARTRATE 50 MG
1 TABLET ORAL
Qty: 60 | Refills: 0
Start: 2021-11-20 | End: 2021-12-19

## 2021-11-20 RX ADMIN — SODIUM CHLORIDE 3 MILLILITER(S): 9 INJECTION INTRAMUSCULAR; INTRAVENOUS; SUBCUTANEOUS at 05:07

## 2021-11-20 RX ADMIN — RIVAROXABAN 20 MILLIGRAM(S): KIT at 11:50

## 2021-11-20 RX ADMIN — Medication 100 MILLIGRAM(S): at 11:50

## 2021-11-20 RX ADMIN — Medication 81 MILLIGRAM(S): at 11:50

## 2021-11-20 RX ADMIN — Medication 25 MILLIGRAM(S): at 05:34

## 2021-11-20 RX ADMIN — MUPIROCIN 1 APPLICATION(S): 20 OINTMENT TOPICAL at 05:35

## 2021-11-20 RX ADMIN — Medication 25 MILLIGRAM(S): at 14:08

## 2021-11-20 RX ADMIN — PANTOPRAZOLE SODIUM 40 MILLIGRAM(S): 20 TABLET, DELAYED RELEASE ORAL at 05:34

## 2021-11-20 RX ADMIN — SODIUM CHLORIDE 3 MILLILITER(S): 9 INJECTION INTRAMUSCULAR; INTRAVENOUS; SUBCUTANEOUS at 13:32

## 2021-11-20 RX ADMIN — Medication 1 SPRAY(S): at 05:35

## 2021-11-20 NOTE — DISCHARGE NOTE PROVIDER - NSDCPNSUBOBJ_GEN_ALL_CORE
VITAL SIGNS    Subjective: Denies CP, palpitation, SOB, SANCHEZ, HA, dizziness, N/V/D, fever or chills.  No acute event noted overnight.     Telemetry: Aflutter        Vital Signs Last 24 Hrs  T(C): 36.6 (21 @ 12:33), Max: 36.7 (21 @ 04:44)  T(F): 97.8 (21 @ 12:33), Max: 98 (21 @ 04:44)  HR: 65 (21 @ 12:33) (65 - 82)  BP: 126/77 (21 @ 12:33) (108/59 - 126/77)  RR: 18 (21 @ 12:33) (18 - 18)  SpO2: 100% (21 @ 12:33) (95% - 100%)           @ 07:01  -   @ 07:00  --------------------------------------------------------  IN: 820 mL / OUT: 1550 mL / NET: -730 mL     @ 07:01  -   @ 13:25  --------------------------------------------------------  IN: 240 mL / OUT: 0 mL / NET: 240 mL    Daily     Daily Weight in k.1 (2021 08:18)    CAPILLARY BLOOD GLUCOSE    POCT Blood Glucose.: 109 mg/dL (2021 11:37)  POCT Blood Glucose.: 100 mg/dL (2021 07:16)  POCT Blood Glucose.: 119 mg/dL (2021 21:35)  POCT Blood Glucose.: 111 mg/dL (2021 16:33)        PHYSICAL EXAM    Neurology: alert and oriented x 3, nonfocal, no gross deficits    CV: (+) S1 and S2, No murmurs, rubs, gallops or clicks     Lungs: CTA B/L     Abdomen: soft, nontender, nondistended, positive bowel sounds, (+) Flatus; (+) BM     :  Voiding               Extremities:  B/L LE (+) trace edema; negative calf tenderness; (+) 2 DP palpable; B/L groin sites C/D/I.        acetaminophen Tablet .. 650 milliGRAM(s) Oral every 6 hours PRN  allopurinol 100 milliGRAM(s) Oral daily  aspirin enteric coated 81 milliGRAM(s) Oral daily  chlorhexidine 0.12% Liquid 30 milliLiter(s) Swish and Spit once  fluticasone propionate 50 MICROgram(s)/spray Nasal Spray 1 Spray(s) Both Nostrils every 12 hours  hydrALAZINE 25 milliGRAM(s) Oral three times a day  insulin lispro (ADMELOG) corrective regimen sliding scale   SubCutaneous Before meals and at bedtime  metoprolol tartrate 25 milliGRAM(s) Oral two times a day  mupirocin 2% Ointment 1 Application(s) Topical every 12 hours  pantoprazole    Tablet 40 milliGRAM(s) Oral before breakfast  rivaroxaban 20 milliGRAM(s) Oral daily  sodium chloride 0.9% lock flush 3 milliLiter(s) IV Push every 8 hours    Physical Therapy Rec:   Home  [  ]   Home w/ PT  [ X ]  Rehab  [  ]    Discussed with Cardiothoracic Team at AM rounds.    Spent 35 min face to face encounter with patient and discharge note.

## 2021-11-20 NOTE — PROGRESS NOTE ADULT - ASSESSMENT
Patient seen and examined, agree with above assessment and plan as transcribed above.    - D/C planning per CTS    Umer Child MD, [facility]  BEEPER [phone]

## 2021-11-20 NOTE — DISCHARGE NOTE PROVIDER - NSDCFUADDAPPT_GEN_ALL_CORE_FT
1. Please schedule an appointment with Dr. Tomas in 7-10 days for a post op follow up visit, please call the office to schedule an appointment at [phone].   2. Please schedule an appointment with your Cardiologist Dr. Child in 2 weeks, please call the office to schedule an appointment.   3. Please schedule an appointment with your PCP 1-2 weeks, please call the office to schedule an appointment.   4. Follow up with Structural Team in 4 weeks post TAVR procedure at the [facility] office for a post op follow up visit and a repeat echocardiogram at the time of the visit.  Please call the [facility] office to schedule an appointment at [phone].   5. Please schedule an appointment with your Nephrologist Dr. Momin in 2 weeks for evaluate renal function, please call the office to schedule an appointment at [phone].

## 2021-11-20 NOTE — DISCHARGE NOTE PROVIDER - CARE PROVIDERS DIRECT ADDRESSES
[email or web address],DirectAddress_Unknown,DirectAddress_Unknown,DirectAddress_Unknown  used

## 2021-11-20 NOTE — DISCHARGE NOTE PROVIDER - PROVIDER TOKENS
PROVIDER:[TOKEN:[[id number]],FOLLOWUP:[1 week]],PROVIDER:[TOKEN:[[id number]],FOLLOWUP:[2 weeks]],PROVIDER:[TOKEN:[[id number]],FOLLOWUP:[1 month]],PROVIDER:[TOKEN:[[id number]],FOLLOWUP:[2 weeks]]

## 2021-11-20 NOTE — DISCHARGE NOTE PROVIDER - NSDCCPCAREPLAN_GEN_ALL_CORE_FT
PRINCIPAL DISCHARGE DIAGNOSIS  Diagnosis: S/p TAVR (transcatheter aortic valve replacement), bioprosthetic  Assessment and Plan of Treatment:   1. Daily Shower   2. Weight yourself daily and notify any weight gain greater than 2-3 pounds in 24 hours.  3. Regular DASH diet - low fat, low cholesterol, no added salt.  4. Notify MD and Dentist the need of antibiotic prophylaxis before any dental procedure to prevent infection of your new valve.   5. Follow Post op TAVR Do's and Don'ts discharge instructions.   6. No heavy lifting or straining x 2 day.   7. Call / Notify MD any fever greater than 101.0  8. Increase Activity as tolerated.   9. Check your groin site for bleeding and/or swelling daily following procedure and call your doctor immediately if it occurs or if you experience increased pain at the site.  10. Resume home medication as prescribed and instructed in discharge paperwork.

## 2021-11-20 NOTE — DISCHARGE NOTE PROVIDER - NSDCACTIVITY_GEN_ALL_CORE
Stairs allowed/Driving allowed/Walking - Indoors allowed/No heavy lifting/straining/Walking - Outdoors allowed/Follow Instructions Provided by your Surgical Team

## 2021-11-20 NOTE — PROGRESS NOTE ADULT - REASON FOR ADMISSION
Syncope  x 2

## 2021-11-20 NOTE — DISCHARGE NOTE PROVIDER - NSDCMRMEDTOKEN_GEN_ALL_CORE_FT
acetaminophen 325 mg oral tablet: 2 tab(s) orally every 6 hours, As needed, Mild Pain (1 - 3), Moderate Pain (4 - 6)  allopurinol 100 mg oral tablet: 1 tab(s) orally once a day  aspirin 81 mg oral delayed release tablet: 1 tab(s) orally once a day  fluticasone 50 mcg/inh nasal spray: 1 spray(s) in each nostril 2 times a day, As Needed  hydrALAZINE 25 mg oral tablet: 1 tab(s) orally 3 times a day  metFORMIN 500 mg oral tablet: 1 tab(s) orally 2 times a day (with meals)  metoprolol tartrate 25 mg oral tablet: 1 tab(s) orally 2 times a day  pantoprazole 40 mg oral delayed release tablet: 1 tab(s) orally once a day (before a meal)  XARELTO 20 MG TABLET: take 1 tablet by mouth once daily with food

## 2021-11-20 NOTE — PROGRESS NOTE ADULT - PROVIDER SPECIALTY LIST ADULT
Nephrology
CT Surgery
Cardiology
Nephrology
Structural Heart
Cardiology
Cardiology
Structural Heart
Structural Heart
CT Surgery

## 2021-11-20 NOTE — DISCHARGE NOTE PROVIDER - HOSPITAL COURSE
82 yo male PMHx CAD (1 stent,) DM2, HTN, HLD, AS, Diastolic HF, Afib (on Xarelto) and CVA (L hemiparesis) syncope x2 episodes.  Found to have severe AS, CAD (11/4 s/p cardiac cath LHC: mid LM of 30%, Lcx distal of 20%, RPDA of 50%) CJ, transferred to [facility] for surgical evaluation for TAVR  11/15 VSS for CTA today carotids pending likely TAVR Thursday 11/16 US r groin, r/o pseudoaneurysm.  If negative will start heparin for a fib, TAVR Thursday    On 11/18: s/p Amrit TAVR (RFA Perclose/LFA Perclose x2). No pacing wire.   Post op Course:   Start ASA 81mg tonight at 9pm, resume AC tomorrow (NOAC)  11/19 VSS Xarelto resumed ECHO completed today discharge home Saturday.  11/20 VVS; Continue on current medication regimen.  Post op TTE revealed: Transcatheter aortic valve replacement.  The valve is well seated.  No aortic transvalvular or paravalvular  regurgitation seen. Patient medically cleared by Dr. Tomas for discharge home today with OT.

## 2021-11-20 NOTE — DISCHARGE NOTE NURSING/CASE MANAGEMENT/SOCIAL WORK - PATIENT PORTAL LINK FT
You can access the FollowMyHealth Patient Portal offered by [facility] by registering at the following website: [email or web address]. By joining [employer]’s FollowMyHealth portal, you will also be able to view your health information using other applications (apps) compatible with our system.

## 2021-11-20 NOTE — PROGRESS NOTE ADULT - SUBJECTIVE AND OBJECTIVE BOX
VITAL SIGNS    Telemetry:  a fib 70    Vital Signs Last 24 Hrs  T(C): 36.4 (21 @ 04:48), Max: 36.7 (11-15-21 @ 19:26)  T(F): 97.6 (21 @ 04:48), Max: 98 (11-15-21 @ 19:26)  HR: 77 (21 @ 04:48) (77 - 91)  BP: 143/78 (21 @ 04:48) (112/64 - 143/78)  RR: 18 (21 @ 04:48) (18 - 18)  SpO2: 95% (21 @ 04:48) (95% - 97%)                   11-15 @ 07:01  -   @ 07:00  --------------------------------------------------------  IN: 840 mL / OUT: 1200 mL / NET: -360 mL     @ 07:01  -   @ 12:51  --------------------------------------------------------  IN: 240 mL / OUT: 300 mL / NET: -60 mL          Daily     Daily Weight in k.4 (2021 07:28)            CAPILLARY BLOOD GLUCOSE      POCT Blood Glucose.: 132 mg/dL (2021 11:44)  POCT Blood Glucose.: 93 mg/dL (2021 07:42)  POCT Blood Glucose.: 131 mg/dL (15 Nov 2021 21:34)  POCT Blood Glucose.: 81 mg/dL (15 Nov 2021 17:49)  POCT Blood Glucose.: 88 mg/dL (15 Nov 2021 17:48)                Pacing Wires           Coumadin    [ ] YES          [x  ]      NO                                   PHYSICAL EXAM        Neurology: alert and oriented x 3, nonfocal, no gross deficits  CV : s1 s2 RRR    Lungs: cta  Abdomen: soft, nontender, nondistended, positive bowel sound                   :    voiding      Extremities:      -edema   /  -   calve tenderness , R groin          acetaminophen     Tablet .. 650 milliGRAM(s) Oral every 6 hours PRN  allopurinol 100 milliGRAM(s) Oral daily  fluticasone propionate 50 MICROgram(s)/spray Nasal Spray 1 Spray(s) Both Nostrils every 12 hours  hydrALAZINE 25 milliGRAM(s) Oral three times a day  insulin lispro (ADMELOG) corrective regimen sliding scale   SubCutaneous Before meals and at bedtime  metoprolol tartrate 100 milliGRAM(s) Oral two times a day  pantoprazole    Tablet 40 milliGRAM(s) Oral before breakfast  sodium chloride 0.9% lock flush 3 milliLiter(s) IV Push every 8 hours  sodium chloride 0.9%. 1000 milliLiter(s) IV Continuous <Continuous>                    Physical Therapy Rec:   Home  [  ]   Home w/ PT  [  ]  Rehab  [  ]  Discussed with Cardiothoracic Team at AM rounds.
*****Structural Heart Team*****    Subjective:    The patient is resting comfortably in a chair, offering no complaints.      PAST MEDICAL & SURGICAL HISTORY:  AF (atrial fibrillation)    HTN (hypertension)    HLD (hyperlipidemia)    Gout    CVA (cerebrovascular accident)  L hemiparesis 2019    Stented coronary artery  x1          T(C): 37 (11-19-21 @ 11:59), Max: 37 (11-19-21 @ 11:59)  HR: 87 (11-19-21 @ 11:59) (71 - 119)  BP: 129/69 (11-19-21 @ 11:59) (103/65 - 173/95)  RR: 18 (11-19-21 @ 11:59) (16 - 20)  SpO2: 92% (11-19-21 @ 11:59) (90% - 98%)  Wt(kg): --  11-18 @ 07:01  -  11-19 @ 07:00  --------------------------------------------------------  IN: 0 mL / OUT: 1200 mL / NET: -1200 mL    11-19 @ 07:01  -  11-19 @ 13:15  --------------------------------------------------------  IN: 400 mL / OUT: 100 mL / NET: 300 mL      MEDICATIONS  (STANDING):  allopurinol 100 milliGRAM(s) Oral daily  aspirin enteric coated 81 milliGRAM(s) Oral daily  chlorhexidine 0.12% Liquid 30 milliLiter(s) Swish and Spit once  fluticasone propionate 50 MICROgram(s)/spray Nasal Spray 1 Spray(s) Both Nostrils every 12 hours  hydrALAZINE 25 milliGRAM(s) Oral three times a day  insulin lispro (ADMELOG) corrective regimen sliding scale   SubCutaneous Before meals and at bedtime  metoprolol tartrate 25 milliGRAM(s) Oral two times a day  mupirocin 2% Ointment 1 Application(s) Topical every 12 hours  pantoprazole    Tablet 40 milliGRAM(s) Oral before breakfast  rivaroxaban 20 milliGRAM(s) Oral daily  sodium chloride 0.9% lock flush 3 milliLiter(s) IV Push every 8 hours    MEDICATIONS  (PRN):  acetaminophen     Tablet .. 650 milliGRAM(s) Oral every 6 hours PRN Mild Pain (1 - 3)      Review of Symptoms:  Constitutional: Awake, Alert, Follows commands  Respiratory:  Currently denies  Cardiac: Denies CP, Denies Palpitations  Gastrointestinal: Denies Pain, Denies N/V, tolerating po intake  Vascular: Negative  Extremities: No Edema, No joint pain or swelling  Neurological: Negative  Endocrine: No heat or cold intolerance, No excessive thirst  Heme/Onc: Negative    Exam:  General: A/Ox3, MELISA, NAD  HEENT: Supple, No JVD, Trachea midline, no masses  Pulmonary: CTAB, = Chest Excursion, no accessory muscle use  Cor: S1S2, RRR, No murmur noted  ECG: AFib  Groin/Wound: B/L soft, Mild tnderness to Right groin  Gastrointestinal: Soft, NT/ND, + Bowel Sounds  Neuro: = motor and sensory B/L, No focal deficits  Vascular: 1+ pulses B/L, No edema  Extremities: No joint pain or swelling  Skin: Warm/Dry/Normal color, Normal turgor, no rashes                          12.8   10.05 )-----------( 122      ( 19 Nov 2021 05:27 )             40.9   11-19    133<L>  |  101  |  20  ----------------------------<  97  5.1   |  19<L>  |  1.44<H>    Ca    8.8      19 Nov 2021 05:27    TPro  6.7  /  Alb  3.4  /  TBili  0.5  /  DBili  x   /  AST  26  /  ALT  29  /  AlkPhos  71  11-18  PTT - ( 17 Nov 2021 20:44 )  PTT:85.5 sec    Imaging Reviewed:    Post Op TTE: Pending      Assesment/Plan:  81 M, s/p TAVR for Severe Symptomatic Aortic Stenosis, Chronic AFib, Chronic Diastolic Heart Failure  1.) S/P TAVR: ASA 81 mg po daily, Continue to Monitor Groins. Ambulate as tolerated. Follow up post op TTE  2.) Chronic AFib: Rate controlled. Metoprolol restarted. Rivaroxaban restarted for anticoagulation.  3.) Patient will be discharged on an MCOT for a period of 30days to monitor for rhythm changes post TAVR, which was discussed at length with the patient, and any questions were answered.  4.) Chronic Diastolic Heart Failure: Monitor Daily weight. Hydralazine and Metoprolol restarted.  5.) Discharge Plan: Patient is to follow up with Dr. Tomas in 1 week post discharge. HE should then follow up with the [facility]  in 30 days, with a repeat Transthoracic Echo to be done at that visit.    [address]  [id number]
C A [facility]  **********************************     DATE OF SERVICE: 11-16-21    Denies chest pain or shortness of breath.   Review of systems otherwise (-)  	    MEDICATIONS  (STANDING):  allopurinol 100 milliGRAM(s) Oral daily  fluticasone propionate 50 MICROgram(s)/spray Nasal Spray 1 Spray(s) Both Nostrils every 12 hours  heparin  Infusion 800 Unit(s)/Hr (8 mL/Hr) IV Continuous <Continuous>  hydrALAZINE 25 milliGRAM(s) Oral three times a day  insulin lispro (ADMELOG) corrective regimen sliding scale   SubCutaneous Before meals and at bedtime  metoprolol tartrate 100 milliGRAM(s) Oral two times a day  pantoprazole    Tablet 40 milliGRAM(s) Oral before breakfast  sodium chloride 0.9% lock flush 3 milliLiter(s) IV Push every 8 hours  sodium chloride 0.9%. 1000 milliLiter(s) (60 mL/Hr) IV Continuous <Continuous>    MEDICATIONS  (PRN):  acetaminophen     Tablet .. 650 milliGRAM(s) Oral every 6 hours PRN Mild Pain (1 - 3)      LABS:                          11.6   6.12  )-----------( 155      ( 16 Nov 2021 05:53 )             37.2     Hemoglobin: 11.6 g/dL (11-16 @ 05:53)  Hemoglobin: 12.3 g/dL (11-15 @ 07:09)  Hemoglobin: 12.7 g/dL (11-14 @ 07:39)  Hemoglobin: 12.9 g/dL (11-13 @ 07:49)  Hemoglobin: 13.1 g/dL (11-12 @ 07:04)    11-16    137  |  109<H>  |  30<H>  ----------------------------<  92  4.3   |  17<L>  |  1.43<H>    Ca    8.5      16 Nov 2021 05:53    TPro  6.5  /  Alb  3.3  /  TBili  0.3  /  DBili  x   /  AST  28  /  ALT  40  /  AlkPhos  65  11-16    Creatinine Trend: 1.43<--, 1.62<--, 1.68<--, 1.70<--, 1.71<--, 1.83<--             11-15-21 @ 07:01  -  11-16-21 @ 07:00  --------------------------------------------------------  IN: 840 mL / OUT: 1200 mL / NET: -360 mL    11-16-21 @ 07:01  -  11-16-21 @ 14:07  --------------------------------------------------------  IN: 240 mL / OUT: 300 mL / NET: -60 mL        PHYSICAL EXAM  Vital Signs Last 24 Hrs  T(C): 36.6 (16 Nov 2021 13:30), Max: 36.7 (15 Nov 2021 19:26)  T(F): 97.8 (16 Nov 2021 13:30), Max: 98 (15 Nov 2021 19:26)  HR: 81 (16 Nov 2021 13:30) (77 - 91)  BP: 137/72 (16 Nov 2021 13:30) (112/64 - 143/78)  BP(mean): 94 (15 Nov 2021 17:58) (94 - 94)  RR: 18 (16 Nov 2021 13:30) (18 - 18)  SpO2: 96% (16 Nov 2021 13:30) (95% - 96%)      Gen: Appears well in NAD  HEENT:  (-)icterus (-)pallor  CV: N S1 S2 1/6 ANAI (+)2 Pulses B/l  Resp:  Clear to auscultation B/L, normal effort  GI: (+) BS Soft, NT, ND  Lymph:  (-)Edema, (-)obvious lymphadenopathy  Skin: Warm to touch, Normal turgor  Psych: Appropriate mood and affect      TELEMETRY: AF 60-90    < from: MR Cardiac w/wo IV Cont (11.08.21 @ 14:15) >  IMPRESSION:    1. Normal left ventricular size. Mildly depressed left ventricular function. LVEF: 51%. Multifocal patchy mid myocardial late gadolinium enhancement involving the basal to mid septal, inferoseptal and inferolateral walls. Findings compatible with nonischemic cardiomyopathy, with diagnostic considerations including infiltrative processes (favor sarcoid, less likely amyloid) as well asmyocarditis in the appropriate clinical setting.  2. Normal right ventricular size. Mildly depressed right ventricular function. RVEF: 50%.  3. Mild aortic insufficiency. Findings suggesting aortic stenosis with thickening of the aortic valve leaflets, decreased aortic valve opening and visual flow acceleration across the valve.  4. Mild right and moderate left atrial enlargement.    < end of copied text >      < from: Cardiac Catheterization (11.04.21 @ 16:05) >  VENTRICLES: No left ventriculogram was performed.  CORONARY VESSELS: The coronary circulation is right dominant.  LM:   --  Distal left main: Angiography showed minor luminal irregularities  with no flow limiting lesions.  LAD:   --  LAD: Angiography showed minor luminal irregularities with no  flow limiting lesions.  --  Mid LAD: There was a tubular 30 % stenosis in the proximal third of the  vessel segment.  --  D1: Angiography showed minor luminal irregularities with no flow  limiting lesions.  CX:   --  Circumflex: Angiography showed minor luminal irregularities with  no flow limiting lesions.  --  Proximal circumflex: There was a discrete 20 % stenosis.  --  Mid circumflex: There was a tubular 20 % stenosis.  --  OM1: There was a discrete 20 % stenosis at the ostium of the vessel  segment.  RCA:   --  RCA: Angiography showed minor luminal irregularities with no  flow limiting lesions.  --  Mid RCA: There was a tubular 20 % stenosis.  --  RPDA: There was a discrete 30 % stenosis at the ostiumof the vessel  segment.  COMPLICATIONS: No complications occurred during the cath lab visit.  DIAGNOSTIC RECOMMENDATIONS: Medical management is recommended.  Prepared and signed by  Ventura Brambila M.D.  Signed 11/04/2021 18:39:26    < end of copied text >      A/P) 82 y/o male PMH CAD s/p PCI (2018), DM, HTN, hyperlipidemia, PAF, stroke a/w syncope and found to have severe AS. Echo shows normal LVEF, and preop cath demonstrated mild non-obstructive CAD. Transferred to [facility] for TAVR eval.    - recommend lifelong ac for cva prevention - started on hep gtt  - MRI results noted  - CJ management per renal  - Structural heart f/u appreciated - TAVR eval, f/u cardiac CT    Freddie Olmos PA-C  Pager: [phone]        
C A R D I O [facility] O G Y  **********************************     DATE OF SERVICE: 11-19-21    Denies chest pain or shortness of breath.   Review of systems otherwise (-)  	    MEDICATIONS  (STANDING):  allopurinol 100 milliGRAM(s) Oral daily  aspirin enteric coated 81 milliGRAM(s) Oral daily  chlorhexidine 0.12% Liquid 30 milliLiter(s) Swish and Spit once  fluticasone propionate 50 MICROgram(s)/spray Nasal Spray 1 Spray(s) Both Nostrils every 12 hours  hydrALAZINE 25 milliGRAM(s) Oral three times a day  insulin lispro (ADMELOG) corrective regimen sliding scale   SubCutaneous Before meals and at bedtime  metoprolol tartrate 25 milliGRAM(s) Oral two times a day  mupirocin 2% Ointment 1 Application(s) Topical every 12 hours  pantoprazole    Tablet 40 milliGRAM(s) Oral before breakfast  rivaroxaban 20 milliGRAM(s) Oral daily  sodium chloride 0.9% lock flush 3 milliLiter(s) IV Push every 8 hours    MEDICATIONS  (PRN):  acetaminophen     Tablet .. 650 milliGRAM(s) Oral every 6 hours PRN Mild Pain (1 - 3)      LABS:                          12.8   10.05 )-----------( 122      ( 19 Nov 2021 05:27 )             40.9     Hemoglobin: 12.8 g/dL (11-19 @ 05:27)  Hemoglobin: 11.9 g/dL (11-18 @ 15:12)  Hemoglobin: 11.4 g/dL (11-18 @ 04:49)  Hemoglobin: 11.3 g/dL (11-17 @ 06:21)  Hemoglobin: 12.1 g/dL (11-16 @ 19:55)    11-19    133<L>  |  101  |  20  ----------------------------<  97  5.1   |  19<L>  |  1.44<H>    Ca    8.8      19 Nov 2021 05:27    TPro  6.7  /  Alb  3.4  /  TBili  0.5  /  DBili  x   /  AST  26  /  ALT  29  /  AlkPhos  71  11-18    Creatinine Trend: 1.44<--, 1.27<--, 1.35<--, 1.44<--, 1.43<--, 1.62<--             11-18-21 @ 07:01  -  11-19-21 @ 07:00  --------------------------------------------------------  IN: 0 mL / OUT: 1200 mL / NET: -1200 mL    11-19-21 @ 07:01  -  11-19-21 @ 13:18  --------------------------------------------------------  IN: 400 mL / OUT: 100 mL / NET: 300 mL        PHYSICAL EXAM  Vital Signs Last 24 Hrs  T(C): 37 (19 Nov 2021 11:59), Max: 37 (19 Nov 2021 11:59)  T(F): 98.6 (19 Nov 2021 11:59), Max: 98.6 (19 Nov 2021 11:59)  HR: 87 (19 Nov 2021 11:59) (71 - 119)  BP: 129/69 (19 Nov 2021 11:59) (103/65 - 173/95)  BP(mean): --  RR: 18 (19 Nov 2021 11:59) (16 - 20)  SpO2: 92% (19 Nov 2021 11:59) (90% - 98%)      Gen: Appears well in NAD  HEENT:  (-)icterus (-)pallor  CV: N S1 S2 1/6 ANAI (+)2 Pulses B/l  Resp:  Clear to auscultation B/L, normal effort  GI: (+) BS Soft, NT, ND  Lymph:  (-)Edema, (-)obvious lymphadenopathy  Skin: Warm to touch, Normal turgor  Psych: Appropriate mood and affect      TELEMETRY: AF     < from: MR Cardiac w/wo IV Cont (11.08.21 @ 14:15) >  IMPRESSION:    1. Normal left ventricular size. Mildly depressed left ventricular function. LVEF: 51%. Multifocal patchy mid myocardial late gadolinium enhancement involving the basal to mid septal, inferoseptal and inferolateral walls. Findings compatible with nonischemic cardiomyopathy, with diagnostic considerations including infiltrative processes (favor sarcoid, less likely amyloid) as well asmyocarditis in the appropriate clinical setting.  2. Normal right ventricular size. Mildly depressed right ventricular function. RVEF: 50%.  3. Mild aortic insufficiency. Findings suggesting aortic stenosis with thickening of the aortic valve leaflets, decreased aortic valve opening and visual flow acceleration across the valve.  4. Mild right and moderate left atrial enlargement.    < end of copied text >      < from: Cardiac Catheterization (11.04.21 @ 16:05) >  VENTRICLES: No left ventriculogram was performed.  CORONARY VESSELS: The coronary circulation is right dominant.  LM:   --  Distal left main: Angiography showed minor luminal irregularities  with no flow limiting lesions.  LAD:   --  LAD: Angiography showed minor luminal irregularities with no  flow limiting lesions.  --  Mid LAD: There was a tubular 30 % stenosis in the proximal third of the  vessel segment.  --  D1: Angiography showed minor luminal irregularities with no flow  limiting lesions.  CX:   --  Circumflex: Angiography showed minor luminal irregularities with  no flow limiting lesions.  --  Proximal circumflex: There was a discrete 20 % stenosis.  --  Mid circumflex: There was a tubular 20 % stenosis.  --  OM1: There was a discrete 20 % stenosis at the ostium of the vessel  segment.  RCA:   --  RCA: Angiography showed minor luminal irregularities with no  flow limiting lesions.  --  Mid RCA: There was a tubular 20 % stenosis.  --  RPDA: There was a discrete 30 % stenosis at the ostiumof the vessel  segment.  COMPLICATIONS: No complications occurred during the cath lab visit.  DIAGNOSTIC RECOMMENDATIONS: Medical management is recommended.  Prepared and signed by  Ventura Brambila M.D.  Signed 11/04/2021 18:39:26    < end of copied text >      A/P) 80 y/o male PMH CAD s/p PCI (2018), DM, HTN, hyperlipidemia, PAF, stroke a/w syncope and found to have severe AS. Echo shows normal LVEF, and preop cath demonstrated mild non-obstructive CAD. Transferred to [facility] for TAVR eval.    - Monitor telemetry and EKG post TAVR - EKG pending today  - Recommend lifelong ac for cva prevention - transitioned to Xarelto  - Rate control with metoprolol  - Renal eval noted  - Cardiac MRI noted  - Structural heart f/u appreciated - s/p TAVR  - F/u post op TTE    Freddie Olmos PA-C  Pager: [phone]        
This patient has been implanted with a Transcatheter Aortic Valve Implant under the following NCT/ARNALDO:    TAVR:  Commercial Implant NCT [id number], ARNALOD N/A and will be placed into the TVT Registry.      [address]  [id number]
    LABS:                11.9                 133  | 18   | 18           7.40  >-----------< 134     ------------------------< 89                    38.8                 5.1  | 104  | 1.27                                         Ca 8.5   Mg x     Ph x              VITAL SIGNS    Telemetry: afib 70     Daily Height in cm: 162.6 (18 Nov 2021 07:25)    Daily       Vital Signs Last 24 Hrs  T(C): 36.7 (11-18-21 @ 16:42), Max: 36.7 (11-18-21 @ 16:42)  T(F): 98 (11-18-21 @ 16:42), Max: 98 (11-18-21 @ 16:42)  HR: 93 (11-18-21 @ 16:42) (70 - 96)  BP: 173/95 (11-18-21 @ 16:42) (103/65 - 173/95)  RR: 18 (11-18-21 @ 16:42) (18 - 20)  SpO2: 98% (11-18-21 @ 16:42) (90% - 98%)             I&O's Detail    17 Nov 2021 07:01  -  18 Nov 2021 07:00  --------------------------------------------------------  IN:    Heparin: 80 mL    Oral Fluid: 720 mL  Total IN: 800 mL    OUT:    Voided (mL): 1300 mL  Total OUT: 1300 mL    Total NET: -500 mL      18 Nov 2021 07:01  -  18 Nov 2021 16:46  --------------------------------------------------------  IN:  Total IN: 0 mL    OUT:    Oral Fluid: 0 mL    Voided (mL): 600 mL  Total OUT: 600 mL    Total NET: -600 mL                    GLUCOSE  CAPILLARY BLOOD GLUCOSE      POCT Blood Glucose.: 91 mg/dL (18 Nov 2021 16:39)  POCT Blood Glucose.: 106 mg/dL (18 Nov 2021 11:22)  POCT Blood Glucose.: 83 mg/dL (18 Nov 2021 08:22)  POCT Blood Glucose.: 125 mg/dL (17 Nov 2021 21:59)                  PHYSICAL EXAM      General: NAD, well appearing, in no distress  Neurology: A&O x3, non focal, no neuro deficits. Moves all extremities to command.  CV : s1 s2 RRR, no murmurs, gallops, clicks.   Lungs: clear to auscultation  Abdomen: soft, nontender, nondistended, positive bowel sounds  :    voiding      Extremities:    no  edema. + pedal pulses      Incision: L/R b/l groin sites stable  Skin: intact, no lesions        MEDICATIONS  acetaminophen     Tablet .. 650 milliGRAM(s) Oral every 6 hours PRN  allopurinol 100 milliGRAM(s) Oral daily  aspirin enteric coated 81 milliGRAM(s) Oral daily  cefuroxime  IVPB 1500 milliGRAM(s) IV Intermittent every 8 hours  cefuroxime  IVPB 1500 milliGRAM(s) IV Intermittent once  chlorhexidine 0.12% Liquid 30 milliLiter(s) Swish and Spit once  fluticasone propionate 50 MICROgram(s)/spray Nasal Spray 1 Spray(s) Both Nostrils every 12 hours  hydrALAZINE 25 milliGRAM(s) Oral three times a day  insulin lispro (ADMELOG) corrective regimen sliding scale   SubCutaneous Before meals and at bedtime  metoprolol tartrate 100 milliGRAM(s) Oral two times a day  mupirocin 2% Ointment 1 Application(s) Topical every 12 hours  pantoprazole    Tablet 40 milliGRAM(s) Oral before breakfast  sodium chloride 0.9% lock flush 3 milliLiter(s) IV Push every 8 hours        
HPI/Interval Hx    Sitting OOB-Chair, no acute events overnight. Offering no complaints. Pre-op TAVR, scheduled for  with Dr. Tomas and Dr. Sanchez    MEDICATIONS  (STANDING):  allopurinol 100 milliGRAM(s) Oral daily  fluticasone propionate 50 MICROgram(s)/spray Nasal Spray 1 Spray(s) Both Nostrils every 12 hours  heparin  Infusion 800 Unit(s)/Hr (5 mL/Hr) IV Continuous <Continuous>  hydrALAZINE 25 milliGRAM(s) Oral three times a day  insulin lispro (ADMELOG) corrective regimen sliding scale   SubCutaneous Before meals and at bedtime  metoprolol tartrate 100 milliGRAM(s) Oral two times a day  pantoprazole    Tablet 40 milliGRAM(s) Oral before breakfast  sodium chloride 0.9% lock flush 3 milliLiter(s) IV Push every 8 hours  sodium chloride 0.9%. 1000 milliLiter(s) (60 mL/Hr) IV Continuous <Continuous>    MEDICATIONS  (PRN):  acetaminophen     Tablet .. 650 milliGRAM(s) Oral every 6 hours PRN Mild Pain (1 - 3)      PAST MEDICAL & SURGICAL HISTORY:  AF (atrial fibrillation)    HTN (hypertension)    HLD (hyperlipidemia)    Gout    CVA (cerebrovascular accident)  L hemiparesis 2019    Stented coronary artery  x1        Review of Systems  CONSTITUTIONAL: No weakness, fevers or chills  EYES/ENT: No visual changes;  No vertigo or throat pain   NECK: No pain or stiffness  RESPIRATORY: No cough, wheezing, hemoptysis; No shortness of breath  CARDIOVASCULAR: No chest pain or palpitations  GASTROINTESTINAL: No abdominal or epigastric pain. No nausea, vomiting, or hematemesis; No diarrhea or constipation. No melena or hematochezia.  GENITOURINARY: No dysuria, frequency or hematuria  NEUROLOGICAL: No numbness or weakness  SKIN: No itching, burning, rashes, or lesions   All other review of systems is negative unless indicated above.    Physical Exam  General: A/ox 3, No acute Distress  Neck: Supple, NO JVD  Cardiac: S1 S2, II/VI RUSB/LUSB   Pulmonary: CTAB, Breathing unlabored, No Rhonchi/Rales/Wheezing  Abdomen: Soft, Non -tender, +BS x 4 quads  Extremities: No Rashes, No edema  Neuro: A/o x 3, No focal deficits    Vital Signs Last 24 Hrs  T(C): 36.4 (2021 04:49), Max: 36.7 (2021 21:08)  T(F): 97.5 (2021 04:49), Max: 98 (2021 21:08)  HR: 72 (2021 04:49) (67 - 81)  BP: 136/71 (2021 04:49) (135/79 - 159/91)  BP(mean): 98 (2021 17:01) (98 - 98)  RR: 18 (2021 04:49) (18 - 18)  SpO2: 98% (2021 04:49) (96% - 99%)                        11.3   7.62  )-----------( 148      ( 2021 06:21 )             36.0     11-17    137  |  108  |  26<H>  ----------------------------<  90  4.4   |  18<L>  |  1.44<H>    Ca    8.5      2021 06:26    TPro  6.5  /  Alb  3.1<L>  /  TBili  0.3  /  DBili  x   /  AST  33  /  ALT  41  /  AlkPhos  66  11-17      Telemetry: AFib 70-90    Other  < from: TTE with Doppler (w/Cont) (10.. @ 13:57) >  Patient name: ANA MARQUEZ  YOB: 1940   Age: 81 (M)   MR#: [id number]  Study Date: 10/29/2021  Location: 422Sonographer: MAILCAR Wood  Study quality: Technically good  Referring Physician: Yojana Muller MD  Blood Pressure: 121/54mmHg  Height: 168 cm  Weight: 78 kg  BSA: 1.9 m2  ------------------------------------------------------------------------  PROCEDURE: Transthoracic echocardiogram with 2-D, M-Mode  and complete spectral and color flow Doppler.  Intravenous ultrasound enhancing agent was administered for  improved left ventricular endocardial border definition.  Following the intravenous injection of ultrasound enhancing  agent, harmonic imaging was performed.  INDICATION: Syncope and collapse (R55)  ------------------------------------------------------------------------  DIMENSIONS:  Dimensions:     Normal Values:  LA:     4.9 cm    2.0 - 4.0 cm  Ao:     2.8 cm    2.0 - 3.8 cm  SEPTUM: 1.4 cm    0.6 - 1.2 cm  PWT:    1.3 cm    0.6 - 1.1 cm  LVIDd:  3.5 cm 3.0 - 5.6 cm  LVIDs:    ---     1.8 - 4.0 cm  Derived Variables:  LVMI: 87 g/m2  RWT: 0.74  ------------------------------------------------------------------------  OBSERVATIONS:  Mitral Valve: Mitral annular calcification, otherwise  normal mitralvalve. Mild mitral regurgitation.  Aortic Root: Normal aortic root.  Aortic Valve: Calcified trileaflet aortic valve with  decreased opening. Peak transaortic valve gradient equals  30 mm Hg, mean transaortic valve gradient equals 19 mm Hg,  estimated aortic valve area equals 0.8 sqcm (by continuity  equation), consistent with severe aortic stenosis. Mild  aortic regurgitation.  Left Atrium: Normal left atrium.  Left Ventricle: Endocardial visualization enhanced with  intravenous injection of echo contrast (Definity).  Hyperdynamic left ventricle. Increased relative wall  thickness with normal left ventricular mass index,  consistent with concentric left ventricular remodeling.  Right Heart: Normal right atrium. Normal right ventricular  sizeand function. Normal tricuspid valve. Normal pulmonic  valve.  Pericardium/PleuraNormal pericardium with no pericardial  effusion.  ------------------------------------------------------------------------  CONCLUSIONS:  1. Mitral annular calcification, otherwise normal mitral  valve. Mild mitral regurgitation.  2. Calcified trileaflet aortic valve with decreased  opening. Peak transaortic valve gradient equals 30 mm Hg,  mean transaortic valve gradient equals 19 mm Hg, estimated  aortic valve area equals 0.8 sqcm (by continuity equation),  consistent with severe aortic stenosis. Mild aortic  regurgitation.  3. Increased relative wall thickness with normal left  ventricular mass index, consistent with concentric left  ventricular remodeling.  4. Endocardial visualization enhanced with intravenous  injection of echo contrast (Definity). Hyperdynamic left  ventricle.  5. Normal right ventricular size and function.  ------------------------------------------------------------------------  Confirmed on  10/29/2021 - 16:03:50 by Zandra Jordan M.D. RPVI  ------------------------------------------------------------------------    < end of copied text >    < from: Cardiac Catheterization (21 @ 16:05) >  [facility]  [id number] [place] [address] [id number] [phone]  Cath Lab Report -- Comprehensive Report  Patient: ANA MARQUEZ  Study date: 2021  Account number: [id number]  MR number: [id number]  : 1940  Gender: Male  Race: O  Case Physician(s):  Ventura Brambila M.D.  Fellow:  DARELL Zaragoza MD  Referring Physician:  Jennifer Miguel M.D.  INDICATIONS: Aortic valve stenosis.  HISTORY: Syncope x2 episodes. There is a history of atrial fibrillation (on  Xarelto). 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 The patient has a history of coronary artery disease. The  patient has hypertension, oral hypoglycemic-treated diabetes, renal  failure (not requiring dialysis), and medication-treated dyslipidemia.  PRIOR DIAGNOSTIC TEST RESULTS: Echocardiography (transthoracic) performed (  10/29/2021): severe aortic valve stenosis, mild aortic valvular  insufficiency, and mild mitral valvular regurgitation.  PROCEDURE:  --  Sonosite - Diagnostic.  --  Right heart catheterization.  --  Left coronary angiography.  --  Right coronary angiography.  TECHNIQUE: The risks and alternatives of the procedures and conscious  sedation were explained to the patientand informed consent was obtained.  Cardiac catheterization performed electively.  Sonosite - Diagnostic. Right common femoral vein and artery were identified  and access was obtained using ultrasound guidance. Right femoral vein  access. Local anesthetic given. The puncture site was infiltrated with 2 %  lidocaine. A 7fr Sheath Palmer was inserted in the vessel. Right femoral  artery access. Local anesthetic given. The puncture site was infiltrated  with 2 % lidocaine. A 4fr Sheath Palmer was inserted in the vessel.  Right heart catheterization. The procedure was performed utilizing a 7fr  [place] Grace catheter. Left coronary artery angiography. The vessel was  injected utilizing a 4 Fr JL 4.0 catheter. Right coronary artery  angiography. The vessel was injected utilizing a 4 Fr JR 4.0 catheter.  RADIATION EXPOSURE: 9.1 min.  CONTRAST GIVEN: Visipaque 27 ml.  MEDICATIONS GIVEN: Labetalol (Trandate), 20 mg, IV. 2% Lidocaine, 10 ml,  subcutaneously. 0.9% Normal saline, 27 ml, IV.  VENTRICLES: No left ventriculogram was performed.  CORONARY VESSELS: The coronary circulation is right dominant.  LM:   --  Distal left main: Angiography showed minor luminal irregularities  with no flow limiting lesions.  LAD:   --  LAD: Angiography showed minor luminal irregularities with no  flow limiting lesions.  --  Mid LAD: There was a tubular 30 % stenosis in the proximal third of the  vessel segment.  --  D1: Angiography showed minor luminal irregularities with no flow  limiting lesions.  CX:   --  Circumflex: Angiography showed minor luminal irregularities with  no flow limiting lesions.  --  Proximal circumflex: There was a discrete 20 % stenosis.  --  Mid circumflex: There was a tubular 20 % stenosis.  --  OM1: There was a discrete 20 % stenosis at the ostium of the vessel  segment.  RCA:   --  RCA: Angiography showed minor luminal irregularities with no  flow limiting lesions.  --  Mid RCA: There was a tubular 20 % stenosis.  --  RPDA: There was a discrete 30 % stenosis at the ostiumof the vessel  segment.  COMPLICATIONS: No complications occurred during the cath lab visit.  DIAGNOSTIC RECOMMENDATIONS: Medical management is recommended.  Prepared and signed by  Ventura Brambila M.D.  Signed 2021 18:39:26    < end of copied text >  
C A R D I O L O G Y  **********************************     DATE OF SERVICE: 11-15-21    Patient denies chest pain or shortness of breath.   Review of systems otherwise (-)  	  MEDICATIONS:  MEDICATIONS  (STANDING):  allopurinol 100 milliGRAM(s) Oral daily  fluticasone propionate 50 MICROgram(s)/spray Nasal Spray 1 Spray(s) Both Nostrils every 12 hours  hydrALAZINE 25 milliGRAM(s) Oral three times a day  insulin lispro (ADMELOG) corrective regimen sliding scale   SubCutaneous Before meals and at bedtime  metoprolol tartrate 100 milliGRAM(s) Oral two times a day  pantoprazole    Tablet 40 milliGRAM(s) Oral before breakfast  sodium chloride 0.9% lock flush 3 milliLiter(s) IV Push every 8 hours  sodium chloride 0.9%. 1000 milliLiter(s) (60 mL/Hr) IV Continuous <Continuous>      LABS:	 	    CARDIAC MARKERS:                                12.3   6.78  )-----------( 187      ( 15 Nov 2021 07:09 )             39.8     Hemoglobin: 12.3 g/dL (11-15 @ 07:09)  Hemoglobin: 12.7 g/dL (11-14 @ 07:39)  Hemoglobin: 12.9 g/dL (11-13 @ 07:49)  Hemoglobin: 13.1 g/dL (11-12 @ 07:04)  Hemoglobin: 12.7 g/dL (11-11 @ 07:43)      11-15    136  |  104  |  41<H>  ----------------------------<  92  4.9   |  20<L>  |  1.62<H>    Ca    9.1      15 Nov 2021 07:09  Phos  3.4     11-14  Mg     2.20     11-14    TPro  7.3  /  Alb  3.8  /  TBili  0.3  /  DBili  x   /  AST  37  /  ALT  52<H>  /  AlkPhos  69  11-15    Creatinine Trend: 1.62<--, 1.68<--, 1.70<--, 1.71<--, 1.83<--, 1.40<--    COAGS:   PT/INR - ( 15 Nov 2021 07:07 )   PT: 17.1 sec;   INR: 1.45 ratio         PTT - ( 15 Nov 2021 07:07 )  PTT:38.3 sec    proBNP: Serum Pro-Brain Natriuretic Peptide: 2839 pg/mL (11-15 @ 07:09)    Lipid Profile:   HgA1c:   TSH: Thyroid Stimulating Hormone, Serum: 2.04 uIU/mL (11-15 @ 09:27)        PHYSICAL EXAM:  T(C): 36.6 (11-15-21 @ 13:24), Max: 36.8 (11-14-21 @ 21:52)  HR: 86 (11-15-21 @ 13:24) (67 - 86)  BP: 131/78 (11-15-21 @ 13:24) (117/65 - 148/78)  RR: 18 (11-15-21 @ 13:24) (17 - 18)  SpO2: 97% (11-15-21 @ 13:24) (97% - 98%)  Wt(kg): --  I&O's Summary    14 Nov 2021 07:01  -  15 Nov 2021 07:00  --------------------------------------------------------  IN: 0 mL / OUT: 250 mL / NET: -250 mL    15 Nov 2021 07:01  -  15 Nov 2021 15:36  --------------------------------------------------------  IN: 600 mL / OUT: 300 mL / NET: 300 mL        Weight (kg): 75.2 (11-14 @ 23:23)    Gen: Appears well in NAD  HEENT:  (-)icterus (-)pallor  CV: N S1 S2 1/6 ANAI (+)2 Pulses B/l  Resp:  Clear to auscultation B/L, normal effort  GI: (+) BS Soft, NT, ND  Lymph:  (-)Edema, (-)obvious lymphadenopathy  Skin: Warm to touch, Normal turgor  Psych: Appropriate mood and affect      TELEMETRY: AF 50-70s 	      < from: MR Cardiac w/wo IV Cont (11.08.21 @ 14:15) >  IMPRESSION:    1. Normal left ventricular size. Mildly depressed left ventricular function. LVEF: 51%. Multifocal patchy mid myocardial late gadolinium enhancement involving the basal to mid septal, inferoseptal and inferolateral walls. Findings compatible with nonischemic cardiomyopathy, with diagnostic considerations including infiltrative processes (favor sarcoid, less likely amyloid) as well asmyocarditis in the appropriate clinical setting.  2. Normal right ventricular size. Mildly depressed right ventricular function. RVEF: 50%.  3. Mild aortic insufficiency. Findings suggesting aortic stenosis with thickening of the aortic valve leaflets, decreased aortic valve opening and visual flow acceleration across the valve.  4. Mild right and moderate left atrial enlargement.    < end of copied text >      < from: Cardiac Catheterization (11.04.21 @ 16:05) >  VENTRICLES: No left ventriculogram was performed.  CORONARY VESSELS: The coronary circulation is right dominant.  LM:   --  Distal left main: Angiography showed minor luminal irregularities  with no flow limiting lesions.  LAD:   --  LAD: Angiography showed minor luminal irregularities with no  flow limiting lesions.  --  Mid LAD: There was a tubular 30 % stenosis in the proximal third of the  vessel segment.  --  D1: Angiography showed minor luminal irregularities with no flow  limiting lesions.  CX:   --  Circumflex: Angiography showed minor luminal irregularities with  no flow limiting lesions.  --  Proximal circumflex: There was a discrete 20 % stenosis.  --  Mid circumflex: There was a tubular 20 % stenosis.  --  OM1: There was a discrete 20 % stenosis at the ostium of the vessel  segment.  RCA:   --  RCA: Angiography showed minor luminal irregularities with no  flow limiting lesions.  --  Mid RCA: There was a tubular 20 % stenosis.  --  RPDA: There was a discrete 30 % stenosis at the ostiumof the vessel  segment.  COMPLICATIONS: No complications occurred during the cath lab visit.  DIAGNOSTIC RECOMMENDATIONS: Medical management is recommended.  Prepared and signed by  Ventura Brambila M.D.  Signed 11/04/2021 18:39:26    < end of copied text >      A/P) 80 y/o male PMH CAD s/p PCI (2018), DM, HTN, hyperlipidemia, PAF, stroke a/w syncope and found to have severe AS. Echo shows normal LVEF, and preop cath demonstrated mild non-obstructive CAD. Transferred to [facility] for TAVR eval.    - recommend lifelong ac for cva prevention   - MRI results noted  - CJ management per renal  - Structural heart f/u appreciated - TAVR eval, pending Cardiac CT    Freddie Olmos PA-C  Pager: [phone]        
C A R D I O L O G Y  **********************************     DATE OF SERVICE: 11-17-21    Denies chest pain or shortness of breath.   Review of systems otherwise (-)  	    MEDICATIONS  (STANDING):  allopurinol 100 milliGRAM(s) Oral daily  cefuroxime  IVPB 1500 milliGRAM(s) IV Intermittent once  chlorhexidine 0.12% Liquid 30 milliLiter(s) Swish and Spit once  chlorhexidine 4% Liquid 1 Application(s) Topical once  fluticasone propionate 50 MICROgram(s)/spray Nasal Spray 1 Spray(s) Both Nostrils every 12 hours  heparin  Infusion 800 Unit(s)/Hr (5 mL/Hr) IV Continuous <Continuous>  hydrALAZINE 25 milliGRAM(s) Oral three times a day  insulin lispro (ADMELOG) corrective regimen sliding scale   SubCutaneous Before meals and at bedtime  metoprolol tartrate 100 milliGRAM(s) Oral two times a day  mupirocin 2% Ointment 1 Application(s) Topical every 12 hours  pantoprazole    Tablet 40 milliGRAM(s) Oral before breakfast  sodium chloride 0.9% lock flush 3 milliLiter(s) IV Push every 8 hours    MEDICATIONS  (PRN):  acetaminophen     Tablet .. 650 milliGRAM(s) Oral every 6 hours PRN Mild Pain (1 - 3)      LABS:                          11.3   7.62  )-----------( 148      ( 17 Nov 2021 06:21 )             36.0     Hemoglobin: 11.3 g/dL (11-17 @ 06:21)  Hemoglobin: 12.1 g/dL (11-16 @ 19:55)  Hemoglobin: 11.6 g/dL (11-16 @ 05:53)  Hemoglobin: 12.3 g/dL (11-15 @ 07:09)  Hemoglobin: 12.7 g/dL (11-14 @ 07:39)    11-17    137  |  108  |  26<H>  ----------------------------<  90  4.4   |  18<L>  |  1.44<H>    Ca    8.5      17 Nov 2021 06:26    TPro  6.5  /  Alb  3.1<L>  /  TBili  0.3  /  DBili  x   /  AST  33  /  ALT  41  /  AlkPhos  66  11-17    Creatinine Trend: 1.44<--, 1.43<--, 1.62<--, 1.68<--, 1.70<--, 1.71<--   PTT - ( 17 Nov 2021 14:21 )  PTT:82.0 sec          11-16-21 @ 07:01  -  11-17-21 @ 07:00  --------------------------------------------------------  IN: 610.5 mL / OUT: 1450 mL / NET: -839.5 mL    11-17-21 @ 07:01  -  11-17-21 @ 15:59  --------------------------------------------------------  IN: 480 mL / OUT: 0 mL / NET: 480 mL        PHYSICAL EXAM  Vital Signs Last 24 Hrs  T(C): 36.5 (17 Nov 2021 13:18), Max: 36.7 (16 Nov 2021 21:08)  T(F): 97.7 (17 Nov 2021 13:18), Max: 98 (16 Nov 2021 21:08)  HR: 79 (17 Nov 2021 13:18) (67 - 79)  BP: 113/62 (17 Nov 2021 13:18) (113/62 - 159/91)  BP(mean): 98 (16 Nov 2021 17:01) (98 - 98)  RR: 18 (17 Nov 2021 13:18) (18 - 18)  SpO2: 98% (17 Nov 2021 13:18) (98% - 99%)      Gen: Appears well in NAD  HEENT:  (-)icterus (-)pallor  CV: N S1 S2 1/6 ANAI (+)2 Pulses B/l  Resp:  Clear to auscultation B/L, normal effort  GI: (+) BS Soft, NT, ND  Lymph:  (-)Edema, (-)obvious lymphadenopathy  Skin: Warm to touch, Normal turgor  Psych: Appropriate mood and affect      TELEMETRY: AF 60-90    < from: MR Cardiac w/wo IV Cont (11.08.21 @ 14:15) >  IMPRESSION:    1. Normal left ventricular size. Mildly depressed left ventricular function. LVEF: 51%. Multifocal patchy mid myocardial late gadolinium enhancement involving the basal to mid septal, inferoseptal and inferolateral walls. Findings compatible with nonischemic cardiomyopathy, with diagnostic considerations including infiltrative processes (favor sarcoid, less likely amyloid) as well asmyocarditis in the appropriate clinical setting.  2. Normal right ventricular size. Mildly depressed right ventricular function. RVEF: 50%.  3. Mild aortic insufficiency. Findings suggesting aortic stenosis with thickening of the aortic valve leaflets, decreased aortic valve opening and visual flow acceleration across the valve.  4. Mild right and moderate left atrial enlargement.    < end of copied text >      < from: Cardiac Catheterization (11.04.21 @ 16:05) >  VENTRICLES: No left ventriculogram was performed.  CORONARY VESSELS: The coronary circulation is right dominant.  LM:   --  Distal left main: Angiography showed minor luminal irregularities  with no flow limiting lesions.  LAD:   --  LAD: Angiography showed minor luminal irregularities with no  flow limiting lesions.  --  Mid LAD: There was a tubular 30 % stenosis in the proximal third of the  vessel segment.  --  D1: Angiography showed minor luminal irregularities with no flow  limiting lesions.  CX:   --  Circumflex: Angiography showed minor luminal irregularities with  no flow limiting lesions.  --  Proximal circumflex: There was a discrete 20 % stenosis.  --  Mid circumflex: There was a tubular 20 % stenosis.  --  OM1: There was a discrete 20 % stenosis at the ostium of the vessel  segment.  RCA:   --  RCA: Angiography showed minor luminal irregularities with no  flow limiting lesions.  --  Mid RCA: There was a tubular 20 % stenosis.  --  RPDA: There was a discrete 30 % stenosis at the ostiumof the vessel  segment.  COMPLICATIONS: No complications occurred during the cath lab visit.  DIAGNOSTIC RECOMMENDATIONS: Medical management is recommended.  Prepared and signed by  Ventura Brambila M.D.  Signed 11/04/2021 18:39:26    < end of copied text >      A/P) 82 y/o male PMH CAD s/p PCI (2018), DM, HTN, hyperlipidemia, PAF, stroke a/w syncope and found to have severe AS. Echo shows normal LVEF, and preop cath demonstrated mild non-obstructive CAD. Transferred to [facility] for TAVR eval.    - Recommend lifelong ac for cva prevention - on hep gtt  - Rate control with metoprolol  - Renal eval noted  - Structural heart f/u appreciated - TAVR tomorrow    Freddie Olmos PA-C  Pager: [phone]        
C A R D I O L O G Y  **********************************     DATE OF SERVICE: 11-18-21    Denies chest pain or shortness of breath.   Review of systems otherwise (-)  	    MEDICATIONS  (STANDING):  allopurinol 100 milliGRAM(s) Oral daily  aspirin enteric coated 81 milliGRAM(s) Oral daily  cefuroxime  IVPB 1500 milliGRAM(s) IV Intermittent every 8 hours  cefuroxime  IVPB 1500 milliGRAM(s) IV Intermittent once  chlorhexidine 0.12% Liquid 30 milliLiter(s) Swish and Spit once  fluticasone propionate 50 MICROgram(s)/spray Nasal Spray 1 Spray(s) Both Nostrils every 12 hours  hydrALAZINE 25 milliGRAM(s) Oral three times a day  insulin lispro (ADMELOG) corrective regimen sliding scale   SubCutaneous Before meals and at bedtime  metoprolol tartrate 100 milliGRAM(s) Oral two times a day  mupirocin 2% Ointment 1 Application(s) Topical every 12 hours  pantoprazole    Tablet 40 milliGRAM(s) Oral before breakfast  sodium chloride 0.9% lock flush 3 milliLiter(s) IV Push every 8 hours    MEDICATIONS  (PRN):  acetaminophen     Tablet .. 650 milliGRAM(s) Oral every 6 hours PRN Mild Pain (1 - 3)      LABS:                          11.4   7.00  )-----------( 144      ( 18 Nov 2021 04:49 )             36.3     Hemoglobin: 11.4 g/dL (11-18 @ 04:49)  Hemoglobin: 11.3 g/dL (11-17 @ 06:21)  Hemoglobin: 12.1 g/dL (11-16 @ 19:55)  Hemoglobin: 11.6 g/dL (11-16 @ 05:53)  Hemoglobin: 12.3 g/dL (11-15 @ 07:09)    11-18    136  |  106  |  22  ----------------------------<  92  4.5   |  18<L>  |  1.35<H>    Ca    8.6      18 Nov 2021 04:49    TPro  6.6  /  Alb  3.1<L>  /  TBili  0.5  /  DBili  x   /  AST  22  /  ALT  32  /  AlkPhos  67  11-18    Creatinine Trend: 1.35<--, 1.44<--, 1.43<--, 1.62<--, 1.68<--, 1.70<--   PTT - ( 17 Nov 2021 20:44 )  PTT:85.5 sec          11-17-21 @ 07:01  -  11-18-21 @ 07:00  --------------------------------------------------------  IN: 800 mL / OUT: 1300 mL / NET: -500 mL    11-18-21 @ 07:01  -  11-18-21 @ 15:21  --------------------------------------------------------  IN: 0 mL / OUT: 600 mL / NET: -600 mL        PHYSICAL EXAM  Vital Signs Last 24 Hrs  T(C): 36.1 (18 Nov 2021 14:55), Max: 36.6 (17 Nov 2021 19:45)  T(F): 96.9 (18 Nov 2021 14:55), Max: 97.9 (17 Nov 2021 19:45)  HR: 87 (18 Nov 2021 15:10) (70 - 96)  BP: 118/73 (18 Nov 2021 15:10) (103/65 - 166/90)  BP(mean): --  RR: 18 (18 Nov 2021 15:10) (18 - 20)  SpO2: 95% (18 Nov 2021 15:10) (90% - 98%)        Gen: Appears well in NAD  HEENT:  (-)icterus (-)pallor  CV: N S1 S2 1/6 ANAI (+)2 Pulses B/l  Resp:  Clear to auscultation B/L, normal effort  GI: (+) BS Soft, NT, ND  Lymph:  (-)Edema, (-)obvious lymphadenopathy  Skin: Warm to touch, Normal turgor  Psych: Appropriate mood and affect      TELEMETRY: AF 50-90s    < from: MR Cardiac w/wo IV Cont (11.08.21 @ 14:15) >  IMPRESSION:    1. Normal left ventricular size. Mildly depressed left ventricular function. LVEF: 51%. Multifocal patchy mid myocardial late gadolinium enhancement involving the basal to mid septal, inferoseptal and inferolateral walls. Findings compatible with nonischemic cardiomyopathy, with diagnostic considerations including infiltrative processes (favor sarcoid, less likely amyloid) as well asmyocarditis in the appropriate clinical setting.  2. Normal right ventricular size. Mildly depressed right ventricular function. RVEF: 50%.  3. Mild aortic insufficiency. Findings suggesting aortic stenosis with thickening of the aortic valve leaflets, decreased aortic valve opening and visual flow acceleration across the valve.  4. Mild right and moderate left atrial enlargement.    < end of copied text >      < from: Cardiac Catheterization (11.04.21 @ 16:05) >  VENTRICLES: No left ventriculogram was performed.  CORONARY VESSELS: The coronary circulation is right dominant.  LM:   --  Distal left main: Angiography showed minor luminal irregularities  with no flow limiting lesions.  LAD:   --  LAD: Angiography showed minor luminal irregularities with no  flow limiting lesions.  --  Mid LAD: There was a tubular 30 % stenosis in the proximal third of the  vessel segment.  --  D1: Angiography showed minor luminal irregularities with no flow  limiting lesions.  CX:   --  Circumflex: Angiography showed minor luminal irregularities with  no flow limiting lesions.  --  Proximal circumflex: There was a discrete 20 % stenosis.  --  Mid circumflex: There was a tubular 20 % stenosis.  --  OM1: There was a discrete 20 % stenosis at the ostium of the vessel  segment.  RCA:   --  RCA: Angiography showed minor luminal irregularities with no  flow limiting lesions.  --  Mid RCA: There was a tubular 20 % stenosis.  --  RPDA: There was a discrete 30 % stenosis at the ostiumof the vessel  segment.  COMPLICATIONS: No complications occurred during the cath lab visit.  DIAGNOSTIC RECOMMENDATIONS: Medical management is recommended.  Prepared and signed by  Ventura Brambila M.D.  Signed 11/04/2021 18:39:26    < end of copied text >      A/P) 82 y/o male PMH CAD s/p PCI (2018), DM, HTN, hyperlipidemia, PAF, stroke a/w syncope and found to have severe AS. Echo shows normal LVEF, and preop cath demonstrated mild non-obstructive CAD. Transferred to [facility] for TAVR eval.    - Recommend lifelong ac for cva prevention - on hep gtt  - Rate control with metoprolol  - Renal eval noted  - Cardiac MRI noted  - Structural heart f/u appreciated - TAVR pending today    Freddie Olmos PA-C  Pager: [phone]        
C A R D I O L O G Y  **********************************     DATE OF SERVICE: 11-20-21    pt seen and examined, no complaints, ROS - .          acetaminophen     Tablet .. 650 milliGRAM(s) Oral every 6 hours PRN  allopurinol 100 milliGRAM(s) Oral daily  aspirin enteric coated 81 milliGRAM(s) Oral daily  chlorhexidine 0.12% Liquid 30 milliLiter(s) Swish and Spit once  fluticasone propionate 50 MICROgram(s)/spray Nasal Spray 1 Spray(s) Both Nostrils every 12 hours  hydrALAZINE 25 milliGRAM(s) Oral three times a day  insulin lispro (ADMELOG) corrective regimen sliding scale   SubCutaneous Before meals and at bedtime  metoprolol tartrate 25 milliGRAM(s) Oral two times a day  mupirocin 2% Ointment 1 Application(s) Topical every 12 hours  pantoprazole    Tablet 40 milliGRAM(s) Oral before breakfast  rivaroxaban 20 milliGRAM(s) Oral daily  sodium chloride 0.9% lock flush 3 milliLiter(s) IV Push every 8 hours                            10.0   10.10 )-----------( 90       ( 20 Nov 2021 05:18 )             31.4       Hemoglobin: 10.0 g/dL (11-20 @ 05:18)  Hemoglobin: 12.8 g/dL (11-19 @ 05:27)  Hemoglobin: 11.9 g/dL (11-18 @ 15:12)  Hemoglobin: 11.4 g/dL (11-18 @ 04:49)  Hemoglobin: 11.3 g/dL (11-17 @ 06:21)      11-20    131<L>  |  103  |  25<H>  ----------------------------<  118<H>  4.3   |  20<L>  |  1.57<H>    Ca    8.0<L>      20 Nov 2021 05:17    TPro  6.7  /  Alb  3.4  /  TBili  0.5  /  DBili  x   /  AST  26  /  ALT  29  /  AlkPhos  71  11-18    Creatinine Trend: 1.57<--, 1.44<--, 1.27<--, 1.35<--, 1.44<--, 1.43<--    COAGS:           T(C): 36.7 (11-20-21 @ 04:44), Max: 37 (11-19-21 @ 11:59)  HR: 67 (11-20-21 @ 04:44) (67 - 87)  BP: 108/59 (11-20-21 @ 04:44) (106/62 - 129/69)  RR: 18 (11-20-21 @ 04:44) (16 - 18)  SpO2: 97% (11-20-21 @ 04:44) (92% - 97%)  Wt(kg): --    I&O's Summary    19 Nov 2021 07:01  -  20 Nov 2021 07:00  --------------------------------------------------------  IN: 820 mL / OUT: 1550 mL / NET: -730 mL      Gen: Appears well in NAD  HEENT:  (-)icterus (-)pallor  CV: N S1 S2 1/6 ANAI (+)2 Pulses B/l  Resp:  Clear to auscultation B/L, normal effort  GI: (+) BS Soft, NT, ND  Lymph:  (-)Edema, (-)obvious lymphadenopathy  Skin: Warm to touch, Normal turgor  Psych: Appropriate mood and affect      TELEMETRY: AF     < from: MR Cardiac w/wo IV Cont (11.08.21 @ 14:15) >  IMPRESSION:    1. Normal left ventricular size. Mildly depressed left ventricular function. LVEF: 51%. Multifocal patchy mid myocardial late gadolinium enhancement involving the basal to mid septal, inferoseptal and inferolateral walls. Findings compatible with nonischemic cardiomyopathy, with diagnostic considerations including infiltrative processes (favor sarcoid, less likely amyloid) as well asmyocarditis in the appropriate clinical setting.  2. Normal right ventricular size. Mildly depressed right ventricular function. RVEF: 50%.  3. Mild aortic insufficiency. Findings suggesting aortic stenosis with thickening of the aortic valve leaflets, decreased aortic valve opening and visual flow acceleration across the valve.  4. Mild right and moderate left atrial enlargement.    < end of copied text >      < from: Cardiac Catheterization (11.04.21 @ 16:05) >  VENTRICLES: No left ventriculogram was performed.  CORONARY VESSELS: The coronary circulation is right dominant.  LM:   --  Distal left main: Angiography showed minor luminal irregularities  with no flow limiting lesions.  LAD:   --  LAD: Angiography showed minor luminal irregularities with no  flow limiting lesions.  --  Mid LAD: There was a tubular 30 % stenosis in the proximal third of the  vessel segment.  --  D1: Angiography showed minor luminal irregularities with no flow  limiting lesions.  CX:   --  Circumflex: Angiography showed minor luminal irregularities with  no flow limiting lesions.  --  Proximal circumflex: There was a discrete 20 % stenosis.  --  Mid circumflex: There was a tubular 20 % stenosis.  --  OM1: There was a discrete 20 % stenosis at the ostium of the vessel  segment.  RCA:   --  RCA: Angiography showed minor luminal irregularities with no  flow limiting lesions.  --  Mid RCA: There was a tubular 20 % stenosis.  --  RPDA: There was a discrete 30 % stenosis at the ostiumof the vessel  segment.  COMPLICATIONS: No complications occurred during the cath lab visit.  DIAGNOSTIC RECOMMENDATIONS: Medical management is recommended.  Prepared and signed by  Ventura Brambila M.D.  Signed 11/04/2021 18:39:26    < end of copied text >      A/P) 80 y/o male PMH CAD s/p PCI (2018), DM, HTN, hyperlipidemia, PAF, stroke a/w syncope and found to have severe AS. Echo shows normal LVEF, and preop cath demonstrated mild non-obstructive CAD. Transferred to [facility] for TAVR eval.  s/p TAVR    - Tele stable. Rate controlled Afib   - A/C with Xarelto for afib.   - Rate control with metoprolol  - Renal eval noted  - Cardiac MRI noted  - Structural heart f/u appreciated    - post op ECHO noted         
[facility]   [phone]  RENAL FOLLOW UP NOTE  --------------------------------------------------------------------------------  Chief Complaint:    24 hour events/subjective:    seen earlier.   denies sob     PAST HISTORY  --------------------------------------------------------------------------------  No significant changes to PMH, PSH, FHx, SHx, unless otherwise noted    ALLERGIES & MEDICATIONS  --------------------------------------------------------------------------------  Allergies    No Known Allergies    Intolerances      Standing Inpatient Medications  allopurinol 100 milliGRAM(s) Oral daily  cefuroxime  IVPB 1500 milliGRAM(s) IV Intermittent once  chlorhexidine 0.12% Liquid 30 milliLiter(s) Swish and Spit once  fluticasone propionate 50 MICROgram(s)/spray Nasal Spray 1 Spray(s) Both Nostrils every 12 hours  heparin  Infusion 800 Unit(s)/Hr IV Continuous <Continuous>  hydrALAZINE 25 milliGRAM(s) Oral three times a day  insulin lispro (ADMELOG) corrective regimen sliding scale   SubCutaneous Before meals and at bedtime  metoprolol tartrate 100 milliGRAM(s) Oral two times a day  mupirocin 2% Ointment 1 Application(s) Topical every 12 hours  pantoprazole    Tablet 40 milliGRAM(s) Oral before breakfast  sodium chloride 0.9% lock flush 3 milliLiter(s) IV Push every 8 hours    PRN Inpatient Medications  acetaminophen     Tablet .. 650 milliGRAM(s) Oral every 6 hours PRN      REVIEW OF SYSTEMS  --------------------------------------------------------------------------------    Gen: denies fevers/chills,  CVS: denies chest pain/palpitations  Resp: denies SOB/Cough  GI: Denies N/V/Abd pain  : Denies dysuria      VITALS/PHYSICAL EXAM  --------------------------------------------------------------------------------  T(C): 36.5 (11-17-21 @ 13:18), Max: 36.7 (11-16-21 @ 21:08)  HR: 96 (11-17-21 @ 17:42) (72 - 96)  BP: 121/70 (11-17-21 @ 17:42) (113/62 - 159/91)  RR: 18 (11-17-21 @ 13:18) (18 - 18)  SpO2: 98% (11-17-21 @ 13:18) (98% - 99%)  Wt(kg): --        11-16-21 @ 07:01  -  11-17-21 @ 07:00  --------------------------------------------------------  IN: 610.5 mL / OUT: 1450 mL / NET: -839.5 mL    11-17-21 @ 07:01  -  11-17-21 @ 21:01  --------------------------------------------------------  IN: 780 mL / OUT: 1000 mL / NET: -220 mL      Physical Exam:  	  Gen: Non toxic comfortable appearing   	no jvd   	Pulm: decrease bs  no rales or ronchi or wheezing  	CV: RRR, S1S2; no rub IV/VI M   	Back: No CVA tenderness  	Abd: +BS, soft, nontender/nondistended  	: No suprapubic tenderness  	UE: Warm, no cyanosis  no clubbing,  no edema  	LE: Warm, no cyanosis  no clubbing, no edema  	Neuro: alert and oriented. speech coherent       LABS/STUDIES  --------------------------------------------------------------------------------              11.3   7.62  >-----------<  148      [11-17-21 @ 06:21]              36.0     137  |  108  |  26  ----------------------------<  90      [11-17-21 @ 06:26]  4.4   |  18  |  1.44        Ca     8.5     [11-17-21 @ 06:26]    TPro  6.5  /  Alb  3.1  /  TBili  0.3  /  DBili  x   /  AST  33  /  ALT  41  /  AlkPhos  66  [11-17-21 @ 06:26]      PTT: 82.0       [11-17-21 @ 14:21]      Creatinine Trend:  SCr 1.44 [11-17 @ 06:26]  SCr 1.43 [11-16 @ 05:53]  SCr 1.62 [11-15 @ 07:09]  SCr 1.68 [11-14 @ 07:39]  SCr 1.70 [11-13 @ 07:49]              Urinalysis - [11-15-21 @ 07:09]      Color Light Yellow / Appearance Clear / SG 1.020 / pH 6.0      Gluc Negative / Ketone Negative  / Bili Negative / Urobili Negative       Blood Negative / Protein Trace / Leuk Est Negative / Nitrite Negative      RBC 6 / WBC 0 / Hyaline 1 / Gran  / Sq Epi  / Non Sq Epi 0 / Bacteria Negative      Iron 16, TIBC 228, %sat --      [12-02-20 @ 06:43]  Ferritin 750      [12-13-20 @ 10:13]  HbA1c 7.2      [05-07-18 @ 06:59]  TSH 2.04      [11-15-21 @ 09:27]  Lipid: chol 77, TG 52, HDL 40, LDL --      [12-11-20 @ 07:32]      
[facility]   [phone]  RENAL FOLLOW UP NOTE  --------------------------------------------------------------------------------  Chief Complaint:    24 hour events/subjective:    seen earlier   states has no sob no cough     PAST HISTORY  --------------------------------------------------------------------------------  No significant changes to PMH, PSH, FHx, SHx, unless otherwise noted    ALLERGIES & MEDICATIONS  --------------------------------------------------------------------------------  Allergies    No Known Allergies    Intolerances      Standing Inpatient Medications  allopurinol 100 milliGRAM(s) Oral daily  fluticasone propionate 50 MICROgram(s)/spray Nasal Spray 1 Spray(s) Both Nostrils every 12 hours  heparin  Infusion 800 Unit(s)/Hr IV Continuous <Continuous>  hydrALAZINE 25 milliGRAM(s) Oral three times a day  insulin lispro (ADMELOG) corrective regimen sliding scale   SubCutaneous Before meals and at bedtime  metoprolol tartrate 100 milliGRAM(s) Oral two times a day  pantoprazole    Tablet 40 milliGRAM(s) Oral before breakfast  sodium chloride 0.9% lock flush 3 milliLiter(s) IV Push every 8 hours  sodium chloride 0.9%. 1000 milliLiter(s) IV Continuous <Continuous>    PRN Inpatient Medications  acetaminophen     Tablet .. 650 milliGRAM(s) Oral every 6 hours PRN      REVIEW OF SYSTEMS  --------------------------------------------------------------------------------    Gen: denies fevers/chills,  CVS: denies chest pain/palpitations  Resp: denies SOB/Cough  GI: Denies N/V/Abd pain  : Denies dysuria        VITALS/PHYSICAL EXAM  --------------------------------------------------------------------------------  T(C): 36.6 (11-16-21 @ 13:30), Max: 36.6 (11-16-21 @ 13:30)  HR: 67 (11-16-21 @ 17:01) (67 - 81)  BP: 135/79 (11-16-21 @ 17:01) (135/79 - 143/78)  RR: 18 (11-16-21 @ 13:30) (18 - 18)  SpO2: 96% (11-16-21 @ 13:30) (95% - 96%)  Wt(kg): --  Height (cm): 162.6 (11-14-21 @ 23:39)  Weight (kg): 75.2 (11-14-21 @ 23:39)  BMI (kg/m2): 28.4 (11-14-21 @ 23:39)  BSA (m2): 1.81 (11-14-21 @ 23:39)      11-15-21 @ 07:01  -  11-16-21 @ 07:00  --------------------------------------------------------  IN: 840 mL / OUT: 1200 mL / NET: -360 mL    11-16-21 @ 07:01 - 11-16-21 @ 20:47  --------------------------------------------------------  IN: 520 mL / OUT: 400 mL / NET: 120 mL      Physical Exam:  	    Gen: Non toxic comfortable appearing   	no jvd   	Pulm: decrease bs  no rales or ronchi or wheezing  	CV: RRR, S1S2; no rub IV/VI M   	Abd: +BS, soft, nontender/nondistended  	: No suprapubic tenderness  	UE: Warm, no cyanosis  no clubbing,  no edema  	LE: Warm, no cyanosis  no clubbing, no edema  	Neuro: alert and oriented. speech coherent       LABS/STUDIES  --------------------------------------------------------------------------------              12.1   8.61  >-----------<  164      [11-16-21 @ 19:55]              39.1     137  |  109  |  30  ----------------------------<  92      [11-16-21 @ 05:53]  4.3   |  17  |  1.43        Ca     8.5     [11-16-21 @ 05:53]    TPro  6.5  /  Alb  3.3  /  TBili  0.3  /  DBili  x   /  AST  28  /  ALT  40  /  AlkPhos  65  [11-16-21 @ 05:53]    PT/INR: PT 17.1 , INR 1.45       [11-15-21 @ 07:07]  PTT: 108.0      [11-16-21 @ 19:55]      Creatinine Trend:  SCr 1.43 [11-16 @ 05:53]  SCr 1.62 [11-15 @ 07:09]  SCr 1.68 [11-14 @ 07:39]  SCr 1.70 [11-13 @ 07:49]  SCr 1.71 [11-12 @ 17:22]              Urinalysis - [11-15-21 @ 07:09]      Color Light Yellow / Appearance Clear / SG 1.020 / pH 6.0      Gluc Negative / Ketone Negative  / Bili Negative / Urobili Negative       Blood Negative / Protein Trace / Leuk Est Negative / Nitrite Negative      RBC 6 / WBC 0 / Hyaline 1 / Gran  / Sq Epi  / Non Sq Epi 0 / Bacteria Negative      Iron 16, TIBC 228, %sat --      [12-02-20 @ 06:43]  Ferritin 750      [12-13-20 @ 10:13]  HbA1c 7.2      [05-07-18 @ 06:59]  TSH 2.04      [11-15-21 @ 09:27]  Lipid: chol 77, TG 52, HDL 40, LDL --      [12-11-20 @ 07:32]      
Subjective " hello I am ok"    VITAL SIGNS    Telemetry:  Afib 101    Vital Signs Last 24 Hrs  T(C): 37 (21 @ 11:59), Max: 37 (21 @ 11:59)  T(F): 98.6 (21 @ 11:59), Max: 98.6 (21 @ 11:59)  HR: 87 (21 @ 11:59) (71 - 119)  BP: 129/69 (21 @ 11:59) (103/65 - 173/95)  RR: 18 (21 @ 11:59) (16 - 20)  SpO2: 92% (21 @ 11:59) (90% - 98%)            @ 07:01  -   @ 07:00  --------------------------------------------------------  IN: 0 mL / OUT: 1200 mL / NET: -1200 mL     @ 07:01  -   @ 13:38  --------------------------------------------------------  IN: 400 mL / OUT: 100 mL / NET: 300 mL  Daily Weight in k.8 (2021 08:00)                        12.8   10.05 )-----------( 122      ( 2021 05:27 )             40.9       133<L>  |  101  |  20  ----------------------------<  97  5.1   |  19<L>  |  1.44<H>    Ca    8.8      2021 05:27    TPro  6.7  /  Alb  3.4  /  TBili  0.5  /  DBili  x   /  AST  26  /  ALT  29  /  AlkPhos  71        MEDICATIONS  (STANDING):  allopurinol 100 milliGRAM(s) Oral daily  aspirin enteric coated 81 milliGRAM(s) Oral daily  chlorhexidine 0.12% Liquid 30 milliLiter(s) Swish and Spit once  fluticasone propionate 50 MICROgram(s)/spray Nasal Spray 1 Spray(s) Both Nostrils every 12 hours  hydrALAZINE 25 milliGRAM(s) Oral three times a day  insulin lispro (ADMELOG) corrective regimen sliding scale   SubCutaneous Before meals and at bedtime  metoprolol tartrate 25 milliGRAM(s) Oral two times a day  mupirocin 2% Ointment 1 Application(s) Topical every 12 hours  pantoprazole    Tablet 40 milliGRAM(s) Oral before breakfast  rivaroxaban 20 milliGRAM(s) Oral daily  sodium chloride 0.9% lock flush 3 milliLiter(s) IV Push every 8 hours    MEDICATIONS  (PRN):  acetaminophen     Tablet .. 650 milliGRAM(s) Oral every 6 hours PRN Mild Pain (1 - 3)      CAPILLARY BLOOD GLUCOSE      POCT Blood Glucose.: 153 mg/dL (2021 11:28)  POCT Blood Glucose.: 103 mg/dL (2021 08:24)  POCT Blood Glucose.: 111 mg/dL (2021 21:42)  POCT Blood Glucose.: 91 mg/dL (2021 16:39)                                      PHYSICAL EXAM  Neurology: alert and oriented x 3, nonfocal, no gross deficits    CV :S1S2  Lungs:B/l CTA on roomair     Abdomen: soft, nontender, nondistended, positive bowel sounds, last bowel movement     : voids             Extremities:    equal strength throughout    B/ll e warm well perfused   Groin sites benign CDI no hematoma no eccymosis                                            Discussed with Cardiothoracic Team at AM rounds.
  Subjective ' hello I am ok"  VITAL SIGNS    Telemetry:  afib 60    Vital Signs Last 24 Hrs  T(C): 36.4 (11-15-21 @ 05:55), Max: 36.8 (21 @ 21:52)  T(F): 97.6 (11-15-21 @ 05:55), Max: 98.2 (21 @ 21:52)  HR: 70 (11-15-21 @ 05:55) (66 - 81)  BP: 117/65 (11-15-21 @ 05:55) (112/69 - 148/78)  RR: 18 (11-15-21 @ 05:55) (17 - 18)  SpO2: 98% (11-15-21 @ 05:55) (98% - 98%)                                 12.3   6.78  )-----------( 187      ( 15 Nov 2021 07:09 )             39.8     11-15    136  |  104  |  41<H>  ----------------------------<  92  4.9   |  20<L>  |  1.62<H>    Ca    9.1      15 Nov 2021 07:09  Phos  3.4       Mg     2.20         TPro  7.3  /  Alb  3.8  /  TBili  0.3  /  DBili  x   /  AST  37  /  ALT  52<H>  /  AlkPhos  69  11-15    11-14 @ 07:  -  11-15 @ 07:00  --------------------------------------------------------  IN: 0 mL / OUT: 250 mL / NET: -250 mL    11-15 @ 07:01  -  11-15 @ 11:30  --------------------------------------------------------  IN: 240 mL / OUT: 300 mL / NET: -60 mL          Daily Height in cm: 162.56 (2021 23:23)    Daily Weight in k.2 (15 Nov 2021 08:36)      MEDICATIONS  (STANDING):  allopurinol 100 milliGRAM(s) Oral daily  fluticasone propionate 50 MICROgram(s)/spray Nasal Spray 1 Spray(s) Both Nostrils every 12 hours  hydrALAZINE 25 milliGRAM(s) Oral three times a day  insulin lispro (ADMELOG) corrective regimen sliding scale   SubCutaneous Before meals and at bedtime  metoprolol tartrate 100 milliGRAM(s) Oral two times a day  pantoprazole    Tablet 40 milliGRAM(s) Oral before breakfast  sodium chloride 0.9% lock flush 3 milliLiter(s) IV Push every 8 hours    MEDICATIONS  (PRN):  acetaminophen     Tablet .. 650 milliGRAM(s) Oral every 6 hours PRN Mild Pain (1 - 3)    CAPILLARY BLOOD GLUCOSE      POCT Blood Glucose.: 91 mg/dL (15 Nov 2021 07:23)  POCT Blood Glucose.: 121 mg/dL (2021 21:35)  POCT Blood Glucose.: 86 mg/dL (2021 17:43)  POCT Blood Glucose.: 137 mg/dL (2021 12:50)                                    PHYSICAL EXAM        Neurology: alert and oriented x 3, nonfocal, no gross deficits    CV :C3I2OHT  Lungs:B/l CTA  on room air     Abdomen: soft, nontender, nondistended, positive bowel sounds, last bowel movement     : voids               Extremities:   equal strength throughout    B/lle warm well perfused + DP                                            Discussed with Cardiothoracic Team at AM rounds.
Cardiac Surgery Pre-op Note:  CC: Patient is a 81y old  Male who presents with a chief complaint of Syncope  x 2       Referring Physician:                                                                                             Surgeon:  Procedure: (Date) (Procedure)  TAVR   Allergies    No Known Allergies    Intolerances      HPI:       82 yo male PMHx CAD (1 stent,) DM2, HTN, HLD, AS, Diastolic HF, Afib (on Xarelto) and CVA (L hemiparesis) syncope x2 episodes yesterday. Pt reports he was brushing his teeth yesterday morning, felt suddenly dizzy, lightheaded.  Syncope with  incontinence, unresponsive 3 min.Wife called EMS. EMS checked BFS to be 123 and was taken to [facility] ED on 10/27/21. CT Head w/o con: No acute intracranial hemorrhage, mass effect, or acute displaced calvarium fracture. Involutional and chronic microvascular ischemic gliotic changes. Interval indeterminate age small right cerebellar infarct, appears remote. Cardiology was consulted for AS   .  On his current echocardiogram, severe aortic valve stenosis was identified, and cardiology was consulted to initiate a pre-surgical workup.  His hospital stay was significant for a gout flair of his L ankle and CJ.  FARIHA was deferred  as TTE showed severe AS.    11/4 s/p cardiac cath  LHC: mid LM of 30%, Lcx distal of 20%, RPDA of 50%. RFA access site.    MRI to r/o amyloid: Cardiac MRI: Normal left ventricular size. Mildly depressed left ventricular function. LVEF: 51%. Multifocal patchy mid myocardial late gadolinium enhancement involving the basal to mid septal, inferoseptal and inferolateral walls. Findings compatible with nonischemic  cardiomyopathy, with diagnostic considerations including infiltrative processes (favor sarcoid, less likely amyloid) as well as myocarditis in the appropriate clinical setting. Normal right ventricular size. Mildly depressed right ventricular function. RVEF: 50%. Mild aortic insufficiency. Findings suggesting aortic stenosis with thickening of the aortic valve leaflets, decreased aortic valve opening and visual flow acceleration across the valve. Mild right and moderate left atrial enlargement.  The patient was transferred for TAVR evaluation and further w/u.       PAST MEDICAL & SURGICAL HISTORY:  AF (atrial fibrillation)    HTN (hypertension)    HLD (hyperlipidemia)    Gout    CVA (cerebrovascular accident)  L hemiparesis 2019    Stented coronary artery  x1        MEDICATIONS  (STANDING):  allopurinol 100 milliGRAM(s) Oral daily  fluticasone propionate 50 MICROgram(s)/spray Nasal Spray 1 Spray(s) Both Nostrils every 12 hours  heparin  Infusion 800 Unit(s)/Hr (5 mL/Hr) IV Continuous <Continuous>  hydrALAZINE 25 milliGRAM(s) Oral three times a day  insulin lispro (ADMELOG) corrective regimen sliding scale   SubCutaneous Before meals and at bedtime  metoprolol tartrate 100 milliGRAM(s) Oral two times a day  mupirocin 2% Ointment 1 Application(s) Topical every 12 hours  pantoprazole    Tablet 40 milliGRAM(s) Oral before breakfast  sodium chloride 0.9% lock flush 3 milliLiter(s) IV Push every 8 hours    MEDICATIONS  (PRN):  acetaminophen     Tablet .. 650 milliGRAM(s) Oral every 6 hours PRN Mild Pain (1 - 3)      Labs:                        11.3   7.62  )-----------( 148      ( 17 Nov 2021 06:21 )             36.0     11-17    137  |  108  |  26<H>  ----------------------------<  90  4.4   |  18<L>  |  1.44<H>    Ca    8.5      17 Nov 2021 06:26    TPro  6.5  /  Alb  3.1<L>  /  TBili  0.3  /  DBili  x   /  AST  33  /  ALT  41  /  AlkPhos  66  11-17    PTT - ( 17 Nov 2021 06:21 )  PTT:147.5 sec    Blood Type: ABO Interpretation: O (11-15 @ 07:37)    HGB A1C:   Prealbumin:   Pro-BNP: Serum Pro-Brain Natriuretic Peptide: 3655 pg/mL (11-16 @ 05:53)    Thyroid Panel: 11-15 @ 09:27/2.04  1.5/--/--    MRSA: MRSA PCR Result.: Penny (11-15 @ 09:12)   / MSSA:           Gen: WN/WD NAD  Neuro: AAOx3, nonfocal  Pulm: CTA B/L  CV: RRR, S1S2 +systolic murmur  Abd: Soft, NT, ND +BS  Ext: No edema, + peripheral pulses

## 2021-11-20 NOTE — DISCHARGE NOTE PROVIDER - CARE PROVIDER_API CALL
Maurice Tomas)  Surgery; Thoracic Surgery  [address] [place] [address]  Phone: [phone]  Fax: [phone]  Follow Up Time: 1 week    Umer Child)  Cardiovascular Disease  [address] [facility] [address]  [place] [address]  Phone: [phone]  Fax: [phone]  Follow Up Time: 2 weeks    Alen Justice)  Cardiology; Internal Medicine; Interventional Cardiology  [address] [place] [address]  Phone: [phone]  Fax: [phone]  Follow Up Time: 1 month    Pawan Momin ()  Nephrology  [address] [facility] [facility] [address]  [place] [address]  Phone: [phone]  Fax: [phone]  Follow Up Time: 2 weeks

## 2021-11-20 NOTE — DISCHARGE NOTE NURSING/CASE MANAGEMENT/SOCIAL WORK - NSDCFUADDAPPT_GEN_ALL_CORE_FT
1. Please schedule an appointment with Dr. Tomas in 7-10 days for a post op follow up visit, please call the office to schedule an appointment at [phone].   2. Please schedule an appointment with your Cardiologist Dr. Child in 2 weeks, please call the office to schedule an appointment.   3. Please schedule an appointment with your PCP 1-2 weeks, please call the office to schedule an appointment.   4. Follow up with Structural Team in 4 weeks post TAVR procedure at the [facility] office for a post op follow up visit and a repeat echocardiogram at the time of the visit.  Please call the [facility] office to schedule an appointment at [phone].   5. Please schedule an appointment with your Nephrologist Dr. Momin in 2 weeks for evaluate renal function, please call the office to schedule an appointment at [phone].

## 2021-11-21 ENCOUNTER — TRANSCRIPTION ENCOUNTER (OUTPATIENT)
Age: 81
End: 2021-11-21

## 2021-11-21 ENCOUNTER — INPATIENT (INPATIENT)
Facility: HOSPITAL | Age: 81
LOS: 1 days | Discharge: HOME | DRG: 229 | End: 2021-11-23
Attending: THORACIC SURGERY (CARDIOTHORACIC VASCULAR SURGERY) | Admitting: THORACIC SURGERY (CARDIOTHORACIC VASCULAR SURGERY)
Payer: COMMERCIAL

## 2021-11-21 VITALS
HEIGHT: 64 IN | RESPIRATION RATE: 18 BRPM | OXYGEN SATURATION: 97 % | WEIGHT: 173.94 LBS | SYSTOLIC BLOOD PRESSURE: 114 MMHG | DIASTOLIC BLOOD PRESSURE: 53 MMHG | TEMPERATURE: 97 F | HEART RATE: 49 BPM

## 2021-11-21 DIAGNOSIS — I48.4 ATYPICAL ATRIAL FLUTTER: ICD-10-CM

## 2021-11-21 DIAGNOSIS — Z95.5 PRESENCE OF CORONARY ANGIOPLASTY IMPLANT AND GRAFT: Chronic | ICD-10-CM

## 2021-11-21 LAB
ALBUMIN SERPL ELPH-MCNC: 3.7 G/DL — SIGNIFICANT CHANGE UP (ref 3.3–5)
ALP SERPL-CCNC: 72 U/L — SIGNIFICANT CHANGE UP (ref 40–120)
ALT FLD-CCNC: 24 U/L — SIGNIFICANT CHANGE UP (ref 10–45)
ANION GAP SERPL CALC-SCNC: 13 MMOL/L — SIGNIFICANT CHANGE UP (ref 5–17)
APTT BLD: 40.1 SEC — HIGH (ref 27.5–35.5)
AST SERPL-CCNC: 24 U/L — SIGNIFICANT CHANGE UP (ref 10–40)
BASOPHILS # BLD AUTO: 0.04 K/UL — SIGNIFICANT CHANGE UP (ref 0–0.2)
BASOPHILS NFR BLD AUTO: 0.5 % — SIGNIFICANT CHANGE UP (ref 0–2)
BILIRUB SERPL-MCNC: 0.4 MG/DL — SIGNIFICANT CHANGE UP (ref 0.2–1.2)
BLD GP AB SCN SERPL QL: NEGATIVE — SIGNIFICANT CHANGE UP
BUN SERPL-MCNC: 31 MG/DL — HIGH (ref 7–23)
CALCIUM SERPL-MCNC: 9.1 MG/DL — SIGNIFICANT CHANGE UP (ref 8.4–10.5)
CHLORIDE SERPL-SCNC: 99 MMOL/L — SIGNIFICANT CHANGE UP (ref 96–108)
CO2 SERPL-SCNC: 21 MMOL/L — LOW (ref 22–31)
CREAT SERPL-MCNC: 1.94 MG/DL — HIGH (ref 0.5–1.3)
EOSINOPHIL # BLD AUTO: 0.34 K/UL — SIGNIFICANT CHANGE UP (ref 0–0.5)
EOSINOPHIL NFR BLD AUTO: 4 % — SIGNIFICANT CHANGE UP (ref 0–6)
GLUCOSE SERPL-MCNC: 126 MG/DL — HIGH (ref 70–99)
HCT VFR BLD CALC: 36.2 % — LOW (ref 39–50)
HGB BLD-MCNC: 11.5 G/DL — LOW (ref 13–17)
IMM GRANULOCYTES NFR BLD AUTO: 0.2 % — SIGNIFICANT CHANGE UP (ref 0–1.5)
INR BLD: 1.79 RATIO — HIGH (ref 0.88–1.16)
LYMPHOCYTES # BLD AUTO: 1.83 K/UL — SIGNIFICANT CHANGE UP (ref 1–3.3)
LYMPHOCYTES # BLD AUTO: 21.5 % — SIGNIFICANT CHANGE UP (ref 13–44)
MCHC RBC-ENTMCNC: 21.5 PG — LOW (ref 27–34)
MCHC RBC-ENTMCNC: 31.8 GM/DL — LOW (ref 32–36)
MCV RBC AUTO: 67.7 FL — LOW (ref 80–100)
MONOCYTES # BLD AUTO: 0.93 K/UL — HIGH (ref 0–0.9)
MONOCYTES NFR BLD AUTO: 10.9 % — SIGNIFICANT CHANGE UP (ref 2–14)
NEUTROPHILS # BLD AUTO: 5.37 K/UL — SIGNIFICANT CHANGE UP (ref 1.8–7.4)
NEUTROPHILS NFR BLD AUTO: 62.9 % — SIGNIFICANT CHANGE UP (ref 43–77)
NRBC # BLD: 0 /100 WBCS — SIGNIFICANT CHANGE UP (ref 0–0)
NT-PROBNP SERPL-SCNC: 5302 PG/ML — HIGH (ref 0–300)
PLATELET # BLD AUTO: 113 K/UL — LOW (ref 150–400)
POTASSIUM SERPL-MCNC: 5.3 MMOL/L — SIGNIFICANT CHANGE UP (ref 3.5–5.3)
POTASSIUM SERPL-SCNC: 5.3 MMOL/L — SIGNIFICANT CHANGE UP (ref 3.5–5.3)
PROT SERPL-MCNC: 7.6 G/DL — SIGNIFICANT CHANGE UP (ref 6–8.3)
PROTHROM AB SERPL-ACNC: 20.9 SEC — HIGH (ref 10.6–13.6)
RBC # BLD: 5.35 M/UL — SIGNIFICANT CHANGE UP (ref 4.2–5.8)
RBC # FLD: 21.2 % — HIGH (ref 10.3–14.5)
RH IG SCN BLD-IMP: POSITIVE — SIGNIFICANT CHANGE UP
SODIUM SERPL-SCNC: 133 MMOL/L — LOW (ref 135–145)
WBC # BLD: 8.53 K/UL — SIGNIFICANT CHANGE UP (ref 3.8–10.5)
WBC # FLD AUTO: 8.53 K/UL — SIGNIFICANT CHANGE UP (ref 3.8–10.5)

## 2021-11-21 PROCEDURE — 71045 X-RAY EXAM CHEST 1 VIEW: CPT | Mod: 26

## 2021-11-21 PROCEDURE — 99223 1ST HOSP IP/OBS HIGH 75: CPT

## 2021-11-21 PROCEDURE — 99291 CRITICAL CARE FIRST HOUR: CPT

## 2021-11-21 PROCEDURE — 93010 ELECTROCARDIOGRAM REPORT: CPT

## 2021-11-21 RX ORDER — SODIUM CHLORIDE 9 MG/ML
3 INJECTION INTRAMUSCULAR; INTRAVENOUS; SUBCUTANEOUS EVERY 8 HOURS
Refills: 0 | Status: DISCONTINUED | OUTPATIENT
Start: 2021-11-21 | End: 2021-11-23

## 2021-11-21 RX ORDER — ATORVASTATIN CALCIUM 80 MG/1
20 TABLET, FILM COATED ORAL AT BEDTIME
Refills: 0 | Status: DISCONTINUED | OUTPATIENT
Start: 2021-11-21 | End: 2021-11-23

## 2021-11-21 RX ORDER — CALCIUM GLUCONATE 100 MG/ML
1 VIAL (ML) INTRAVENOUS ONCE
Refills: 0 | Status: COMPLETED | OUTPATIENT
Start: 2021-11-21 | End: 2021-11-21

## 2021-11-21 RX ORDER — SODIUM CHLORIDE 9 MG/ML
1000 INJECTION INTRAMUSCULAR; INTRAVENOUS; SUBCUTANEOUS
Refills: 0 | Status: DISCONTINUED | OUTPATIENT
Start: 2021-11-21 | End: 2021-11-22

## 2021-11-21 RX ORDER — ASPIRIN/CALCIUM CARB/MAGNESIUM 324 MG
81 TABLET ORAL DAILY
Refills: 0 | Status: DISCONTINUED | OUTPATIENT
Start: 2021-11-21 | End: 2021-11-23

## 2021-11-21 RX ORDER — GLUCAGON INJECTION, SOLUTION 0.5 MG/.1ML
3 INJECTION, SOLUTION SUBCUTANEOUS ONCE
Refills: 0 | Status: COMPLETED | OUTPATIENT
Start: 2021-11-21 | End: 2021-11-21

## 2021-11-21 RX ORDER — PANTOPRAZOLE SODIUM 20 MG/1
40 TABLET, DELAYED RELEASE ORAL
Refills: 0 | Status: DISCONTINUED | OUTPATIENT
Start: 2021-11-21 | End: 2021-11-23

## 2021-11-21 RX ADMIN — SODIUM CHLORIDE 3 MILLILITER(S): 9 INJECTION INTRAMUSCULAR; INTRAVENOUS; SUBCUTANEOUS at 23:04

## 2021-11-21 RX ADMIN — GLUCAGON INJECTION, SOLUTION 3 MILLIGRAM(S): 0.5 INJECTION, SOLUTION SUBCUTANEOUS at 22:45

## 2021-11-21 RX ADMIN — Medication 100 GRAM(S): at 22:45

## 2021-11-21 NOTE — ED ADULT NURSE NOTE - OBJECTIVE STATEMENT
82 yo male with pmh HTN, HLD, halter monitor presents to ED c/o dizziness and weakness starting this evening. Pt reports cardiologist called pt regarding heart rhythm on halter monitor and was advised to come to ED for further evaluation. Pt denies cp, sob, palpitations, numbness/tingling, loss of sensation, h/a, vision changes, n/v/d, diaphoresis. Upon assessment, pt a&ox3, nad, breathing spontaneous and nonlabored, skin warm and appropriate color, no diaphoresis noted, able to move all extremities and follow commands, speaking full sentences, mild b/l LLE swelling noted, positive peripheral pulses noted, halter monitor in place. Pt placed on CM, afib/aflutter on CM, HR ranging from 27-45. MD Foster aware and at bedside. Cardiology paged to bedside. Pt safety and comfort provided. 80 yo male with pmh CAD (s/p stents), HTN, HLD, HF, Afib (on xarelto), CVA (L side weakness) halter monitor presents to ED c/o dizziness and weakness starting this evening. Pt reports cardiologist called pt regarding heart rhythm on halter monitor and was advised to come to ED for further evaluation. Pt denies cp, sob, palpitations, numbness/tingling, loss of sensation, h/a, vision changes, n/v/d, diaphoresis. Upon assessment, pt a&ox3, nad, breathing spontaneous and nonlabored, skin warm and appropriate color, no diaphoresis noted, able to move all extremities and follow commands, speaking full sentences, mild b/l LLE swelling noted, positive peripheral pulses noted, halter monitor in place. Pt placed on CM, afib/aflutter on CM, HR ranging from 27-45. MD Yee aware and at bedside. Cardiology paged to bedside. Pt safety and comfort provided.

## 2021-11-21 NOTE — ED PROVIDER NOTE - NS ED ROS FT
CONST: no fevers, no chills, no trauma  EYES: no pain, no blurry vision   ENT: no sore throat, + epistaxis, no rhinorrhea  CV: no chest pain, no palpitations, no orthopnea, no extremity pain or swelling  RESP: no shortness of breath, no cough, no sputum, no pleurisy, no wheezing  ABD: no abdominal pain, no nausea, no vomiting, no diarrhea, no black or bloody stool  : no dysuria, no hematuria, no frequency, no urgency  MSK: no back pain, no neck pain, no extremity pain  NEURO: no headache, no sensory disturbances, no focal weakness, + dizziness  HEME: no easy bleeding or bruising  SKIN: no diaphoresis, no rash

## 2021-11-21 NOTE — ED PROVIDER NOTE - PHYSICAL EXAMINATION
Const: Well-nourished, Well-developed, appearing stated age.  Eyes: no conjunctival injection, and symmetrical lids.  HEENT: Head NCAT, no lesions. Atraumatic external nose and ears.   Neck: Symmetric, trachea midline.   CVS: +S1/S2, Peripheral pulses 2+ and equal in all extremities.  RESP: Unlabored respiratory effort. Clear to auscultation bilaterally.  GI: Nontender/Nondistended, No CVA tenderness b/l.   MSK: Normocephalic/Atraumatic, Lower Extremities w/o calf tenderness or edema b/l.   Skin: Warm, dry and intact.   Neuro: CNs II-XII grossly intact. Motor & Sensation grossly intact.  Psych: Awake, Alert, & Oriented (AAO) x3. Appropriate mood and affect.

## 2021-11-21 NOTE — ED CLERICAL - NS ED CLERK NOTE PRE-ARRIVAL INFORMATION; ADDITIONAL PRE-ARRIVAL INFORMATION
This patient is enrolled in the [facility] program and has undergone a cardiac surgery procedure within the last 30 days and has active care navigation.   This patient can be followed up by the care navigation team within 24 hours. To arrange close follow-up or to obtain additional clinical information about this patient, please call the contact number above.   Please call the cardiac surgery team once patient is registered at [phone] for consultation PRIOR to disposition decision.  The patient recently underwent a cardiac surgery procedure and the team can assist in acute medical management.

## 2021-11-21 NOTE — H&P ADULT - NSHPPHYSICALEXAM_GEN_ALL_CORE
alert and oriented   No JVD, supple   clear kamari , no wheeze, no rales   Irreg Irreg   soft, + bs , non tender  - edema , + pulses   left groin soft , ecchymosis

## 2021-11-21 NOTE — H&P ADULT - BIRTH SEX
Patient notified via phone (consent in Snapshot)(specific names of STD tests not mentioned) of STD tests/TSH results and of MD  Recommendations.  Future lab orders placed.  Rx's for levothyroxine 100 mcg and 137 mcg pended to send to pt's preferred pharmacy with Dr Fletcher's approval.     Male

## 2021-11-21 NOTE — ED ADULT NURSE REASSESSMENT NOTE - NS ED NURSE REASSESS COMMENT FT1
Per cardiology team at bedside/ASIM Multani, they will establish IV access and obtain lab work upstairs. Pt transported upstairs with cardiology team/CTU ASIM Multani, CTU RN, ED Tech on telemonitoring CTU/cardiology team at bedside. Per cardiology team at bedside/ASIM Multani, they will establish IV access and obtain lab work upstairs. Pt transported upstairs with cardiology team/CTU ASIM Multani, CTU RN, ED Tech on telemonitoring

## 2021-11-21 NOTE — H&P ADULT - HISTORY OF PRESENT ILLNESS
81M PMHx CAD (1 stent,) DM2, HTN, HLD, AS, Diastolic HF, Afib (on Xarelto) and CVA (L hemiparesis) - recent TAVR w Dr. Tomas - DCed one day ago on a holter monitor. Pt presenting today with CC of one episode of dizziness. On holter monitor - arrythmia noted and pt was called to come back in. Pt feels back at baseline. No sob, orthopnea, chest pain, LE edema.

## 2021-11-21 NOTE — ED PROVIDER NOTE - ATTENDING CONTRIBUTION TO CARE
------------ATTENDING NOTE------------  pt w/ wife called back to ED c/o abnormal heart rhythm on home monitor, found to be in variable block slow A flutter on ED arrival, pt otherwise asymptomatic, they describes recent [facility] discharge yesterday after cardiology admission for severe AS/diastolic HF, pt otherwise HD stable and no chest pain/discomfort or sob/dyspnea, no fevers, no increased edema, awaiting labs/imaging and close reassessments -->  - Gurdeep Yee MD   --------------------------------------------- ------------ATTENDING NOTE------------  pt w/ wife called back to ED c/o abnormal heart rhythm on home monitor, found to be in variable block slow A flutter on ED arrival, pt otherwise asymptomatic, they describes recent [facility] discharge yesterday after cardiology admission for severe AS/diastolic HF, pt otherwise HD stable and no chest pain/discomfort or sob/dyspnea, no fevers, no increased edema, awaiting labs/imaging and close reassessments --> to ICU prior to results w/ team.  - Gurdeep Yee MD   ---------------------------------------------

## 2021-11-21 NOTE — H&P ADULT - PROBLEM SELECTOR PLAN 1
admit to ICU   hold BB, s/p glucogan 3 mg IV x 1,   A/C with xarelto   EPS consult  in am for +/- PPM    GI / DVT prophylaxis.   keep K>4, mag >2.0   no need for any TVP placement

## 2021-11-21 NOTE — ED ADULT TRIAGE NOTE - CHIEF COMPLAINT QUOTE
called in MD to be evaluated based on heart monitor reading  reports dizziness at home and bradycardic upon arrival

## 2021-11-21 NOTE — H&P ADULT - NSHPLABSRESULTS_GEN_ALL_CORE
aspirin enteric coated 81 milliGRAM(s) Oral daily  atorvastatin 20 milliGRAM(s) Oral at bedtime  pantoprazole    Tablet 40 milliGRAM(s) Oral before breakfast  sodium chloride 0.9% lock flush 3 milliLiter(s) IV Push every 8 hours  sodium chloride 0.9%. 1000 milliLiter(s) IV Continuous <Continuous>                            11.5   8.53  )-----------( 113      ( 21 Nov 2021 23:04 )             36.2       Hemoglobin: 11.5 g/dL (11-21 @ 23:04)  Hemoglobin: 10.0 g/dL (11-20 @ 05:18)  Hemoglobin: 12.8 g/dL (11-19 @ 05:27)  Hemoglobin: 11.9 g/dL (11-18 @ 15:12)  Hemoglobin: 11.4 g/dL (11-18 @ 04:49)      11-21    133<L>  |  99  |  31<H>  ----------------------------<  126<H>  5.3   |  21<L>  |  1.94<H>    Ca    9.1      21 Nov 2021 23:04    TPro  7.6  /  Alb  3.7  /  TBili  0.4  /  DBili  x   /  AST  24  /  ALT  24  /  AlkPhos  72  11-21    Creatinine Trend: 1.94<--, 1.57<--, 1.44<--, 1.27<--, 1.35<--, 1.44<--    COAGS: PT/INR - ( 21 Nov 2021 23:04 )   PT: 20.9 sec;   INR: 1.79 ratio         PTT - ( 21 Nov 2021 23:04 )  PTT:40.1 sec          T(C): 36.6 (11-22-21 @ 00:00), Max: 36.6 (11-22-21 @ 00:00)  HR: 69 (11-22-21 @ 02:00) (44 - 70)  BP: 117/83 (11-22-21 @ 02:00) (106/59 - 197/76)  RR: 21 (11-22-21 @ 02:00) (14 - 33)  SpO2: 95% (11-22-21 @ 02:00) (95% - 100%)  Wt(kg): --    I&O's Summary    21 Nov 2021 07:01  -  22 Nov 2021 02:08  --------------------------------------------------------  IN: 200 mL / OUT: 0 mL / NET: 200 mL

## 2021-11-21 NOTE — H&P ADULT - ASSESSMENT
81M PMHx CAD (1 stent,) DM2, HTN, HLD, AS, Diastolic HF, Afib (on Xarelto) and CVA (L hemiparesis) - recent TAVR w Dr. Tomas - DCed one day ago on a holter monitor. Pt presenting today with CC of one episode of dizziness. On holter monitor - arrythmia noted and pt was called to come back in. Pt feels back at baseline. No sob, orthopnea, chest pain, LE edema.  Admitted for afib with pauses

## 2021-11-22 DIAGNOSIS — I48.92 UNSPECIFIED ATRIAL FLUTTER: ICD-10-CM

## 2021-11-22 LAB
APPEARANCE UR: CLEAR — SIGNIFICANT CHANGE UP
BACTERIA # UR AUTO: NEGATIVE — SIGNIFICANT CHANGE UP
BILIRUB UR-MCNC: NEGATIVE — SIGNIFICANT CHANGE UP
BLD GP AB SCN SERPL QL: NEGATIVE — SIGNIFICANT CHANGE UP
COLOR SPEC: SIGNIFICANT CHANGE UP
DIFF PNL FLD: NEGATIVE — SIGNIFICANT CHANGE UP
EPI CELLS # UR: 0 /HPF — SIGNIFICANT CHANGE UP
GAS PNL BLDA: SIGNIFICANT CHANGE UP
GLUCOSE BLDC GLUCOMTR-MCNC: 106 MG/DL — HIGH (ref 70–99)
GLUCOSE BLDC GLUCOMTR-MCNC: 121 MG/DL — HIGH (ref 70–99)
GLUCOSE UR QL: NEGATIVE — SIGNIFICANT CHANGE UP
HYALINE CASTS # UR AUTO: 2 /LPF — SIGNIFICANT CHANGE UP (ref 0–2)
KETONES UR-MCNC: NEGATIVE — SIGNIFICANT CHANGE UP
LEUKOCYTE ESTERASE UR-ACNC: NEGATIVE — SIGNIFICANT CHANGE UP
MAGNESIUM SERPL-MCNC: 2 MG/DL — SIGNIFICANT CHANGE UP (ref 1.6–2.6)
MRSA PCR RESULT.: SIGNIFICANT CHANGE UP
NITRITE UR-MCNC: NEGATIVE — SIGNIFICANT CHANGE UP
PH UR: 6 — SIGNIFICANT CHANGE UP (ref 5–8)
PHOSPHATE SERPL-MCNC: 3 MG/DL — SIGNIFICANT CHANGE UP (ref 2.5–4.5)
PROT UR-MCNC: ABNORMAL
RBC CASTS # UR COMP ASSIST: 1 /HPF — SIGNIFICANT CHANGE UP (ref 0–4)
RH IG SCN BLD-IMP: POSITIVE — SIGNIFICANT CHANGE UP
S AUREUS DNA NOSE QL NAA+PROBE: SIGNIFICANT CHANGE UP
SARS-COV-2 RNA SPEC QL NAA+PROBE: SIGNIFICANT CHANGE UP
SP GR SPEC: 1.01 — SIGNIFICANT CHANGE UP (ref 1.01–1.02)
TSH SERPL-MCNC: 4.81 UIU/ML — HIGH (ref 0.27–4.2)
UROBILINOGEN FLD QL: NEGATIVE — SIGNIFICANT CHANGE UP
WBC UR QL: 1 /HPF — SIGNIFICANT CHANGE UP (ref 0–5)

## 2021-11-22 PROCEDURE — 99292 CRITICAL CARE ADDL 30 MIN: CPT | Mod: 25

## 2021-11-22 PROCEDURE — 36620 INSERTION CATHETER ARTERY: CPT

## 2021-11-22 PROCEDURE — 99291 CRITICAL CARE FIRST HOUR: CPT | Mod: 25

## 2021-11-22 PROCEDURE — 93010 ELECTROCARDIOGRAM REPORT: CPT

## 2021-11-22 PROCEDURE — 33274 TCAT INSJ/RPL PERM LDLS PM: CPT | Mod: Q0

## 2021-11-22 PROCEDURE — 99239 HOSP IP/OBS DSCHRG MGMT >30: CPT | Mod: 25

## 2021-11-22 RX ORDER — METOPROLOL TARTRATE 50 MG
25 TABLET ORAL EVERY 12 HOURS
Refills: 0 | Status: DISCONTINUED | OUTPATIENT
Start: 2021-11-22 | End: 2021-11-23

## 2021-11-22 RX ORDER — ALLOPURINOL 300 MG
100 TABLET ORAL DAILY
Refills: 0 | Status: DISCONTINUED | OUTPATIENT
Start: 2021-11-22 | End: 2021-11-23

## 2021-11-22 RX ORDER — INSULIN LISPRO 100/ML
VIAL (ML) SUBCUTANEOUS
Refills: 0 | Status: DISCONTINUED | OUTPATIENT
Start: 2021-11-22 | End: 2021-11-23

## 2021-11-22 RX ORDER — DOPAMINE HYDROCHLORIDE 40 MG/ML
3 INJECTION, SOLUTION, CONCENTRATE INTRAVENOUS
Qty: 400 | Refills: 0 | Status: DISCONTINUED | OUTPATIENT
Start: 2021-11-22 | End: 2021-11-22

## 2021-11-22 RX ORDER — RIVAROXABAN 15 MG-20MG
15 KIT ORAL
Refills: 0 | Status: DISCONTINUED | OUTPATIENT
Start: 2021-11-23 | End: 2021-11-23

## 2021-11-22 RX ORDER — FLUTICASONE PROPIONATE 50 MCG
1 SPRAY, SUSPENSION NASAL
Refills: 0 | Status: DISCONTINUED | OUTPATIENT
Start: 2021-11-22 | End: 2021-11-23

## 2021-11-22 RX ADMIN — ATORVASTATIN CALCIUM 20 MILLIGRAM(S): 80 TABLET, FILM COATED ORAL at 21:49

## 2021-11-22 RX ADMIN — Medication 25 MILLIGRAM(S): at 18:19

## 2021-11-22 RX ADMIN — DOPAMINE HYDROCHLORIDE 8.88 MICROGRAM(S)/KG/MIN: 40 INJECTION, SOLUTION, CONCENTRATE INTRAVENOUS at 09:04

## 2021-11-22 RX ADMIN — SODIUM CHLORIDE 3 MILLILITER(S): 9 INJECTION INTRAMUSCULAR; INTRAVENOUS; SUBCUTANEOUS at 13:43

## 2021-11-22 RX ADMIN — PANTOPRAZOLE SODIUM 40 MILLIGRAM(S): 20 TABLET, DELAYED RELEASE ORAL at 06:07

## 2021-11-22 RX ADMIN — Medication 100 MILLIGRAM(S): at 11:41

## 2021-11-22 RX ADMIN — SODIUM CHLORIDE 3 MILLILITER(S): 9 INJECTION INTRAMUSCULAR; INTRAVENOUS; SUBCUTANEOUS at 06:01

## 2021-11-22 RX ADMIN — Medication 81 MILLIGRAM(S): at 11:42

## 2021-11-22 NOTE — CONSULT NOTE ADULT - ASSESSMENT
81M history of CAD s/p PCI 2018, AS s/p TAVR 11/2021, Afib (on xarelto) c/b CVA (L hemiparesis) who was discharged couple days ago post TAVR on MCOT. Experienced dizziness and MCOT showed slow afib (HR 30s) so presented back to hospital and admitted to CTU with dopamine gtt. EP consulted to assess need for PPM.     #Slow atrial fibrillation   -Telemetry showing patient mostly in 50-60s, occasional pauses <3 seconds. Asymptomatic when seen today. Continue to hold BB.  81M history of CAD s/p PCI 2018, AS s/p TAVR 11/2021, Afib (on xarelto) c/b CVA (L hemiparesis) who was discharged couple days ago post TAVR on MCOT. Experienced dizziness and MCOT showed slow afib (HR 30s) so presented back to hospital and admitted to CTU with dopamine gtt. EP consulted to assess need for PPM.     #Slow atrial fibrillation   -Telemetry showing patient mostly in 50-60s, occasional pauses <3 seconds. Asymptomatic when seen today. Continue to hold BB.   -Plan is for MICRA implantation.     Nathanael Reynolds MD  Cardiology Fellow - PGY 4  For all New Consults and Questions:  [email or web address]   Login: [email or web address]

## 2021-11-22 NOTE — CONSULT NOTE ADULT - ATTENDING COMMENTS
The patient's last dose of metoprolol succinate (25mg) was over 24 hours ago and he is still having pauses up to 4 seconds and has required Dopamine for heart rate and blood pressure support, He has persistent AF and had a TAVR about one month ago. He meets criteria for a pacemaker and since he would hopefully only need intermittent pacing, a MICRA VR would have most benefits and least risks

## 2021-11-22 NOTE — PROGRESS NOTE ADULT - SUBJECTIVE AND OBJECTIVE BOX
ANA MARQUEZ  MRN-[id number]  Patient is a 81y old  Male who presents with a chief complaint of afib with pauses. (2021 13:40)    HPI:   81M PMHx CAD (1 stent,) DM2, HTN, HLD, AS, Diastolic HF, Afib (on Xarelto) and CVA (L hemiparesis) - recent TAVR w Dr. Thom Marrero one day ago on a holter monitor. Pt presenting today with CC of one episode of dizziness. On holter monitor - arrythmia noted and pt was called to come back in. Pt feels back at baseline. No sob, orthopnea, chest pain, LE edema.   (2021 23:04)      Surgery/Hospital course:   TAVR    readmitted for afib w/ pauses     Today/Overnight:    Vital Signs Last 24 Hrs  T(C): 36.6 (2021 16:00), Max: 36.9 (2021 04:00)  T(F): 97.9 (2021 16:00), Max: 98.4 (2021 04:00)  HR: 69 (2021 18:00) (44 - 86)  BP: 175/70 (2021 15:50) (106/59 - 197/76)  BP(mean): 101 (2021 15:50) (75 - 109)  RR: 16 (2021 18:00) (13 - 33)  SpO2: 100% (2021 18:00) (93% - 100%)  ============================I/O===========================  I&O's Detail    2021 07:01  -  2021 07:00  --------------------------------------------------------  IN:    IV PiggyBack: 50 mL    Oral Fluid: 50 mL    sodium chloride 0.9%: 450 mL  Total IN: 550 mL    OUT:    Voided (mL): 475 mL  Total OUT: 475 mL    Total NET: 75 mL      2021 07:01  -  2021 18:33  --------------------------------------------------------  IN:    DOPamine Infusion: 58.4 mL  Total IN: 58.4 mL    OUT:    Voided (mL): 1050 mL  Total OUT: 1050 mL    Total NET: -991.6 mL        ============================ LABS =========================                        11.5   8.53  )-----------( 113      ( 2021 23:04 )             36.2     11-    133<L>  |  99  |  31<H>  ----------------------------<  126<H>  5.3   |  21<L>  |  1.94<H>    Ca    9.1      2021 23:04  Phos  3.0     11-  Mg     2.0         TPro  7.6  /  Alb  3.7  /  TBili  0.4  /  DBili  x   /  AST  24  /  ALT  24  /  AlkPhos  72  11-    LIVER FUNCTIONS - ( 2021 23:04 )  Alb: 3.7 g/dL / Pro: 7.6 g/dL / ALK PHOS: 72 U/L / ALT: 24 U/L / AST: 24 U/L / GGT: x           PT/INR - ( 2021 23:04 )   PT: 20.9 sec;   INR: 1.79 ratio         PTT - ( 2021 23:04 )  PTT:40.1 sec    Urinalysis Basic - ( 2021 03:53 )    Color: Light Yellow / Appearance: Clear / S.014 / pH: x  Gluc: x / Ketone: Negative  / Bili: Negative / Urobili: Negative   Blood: x / Protein: Trace / Nitrite: Negative   Leuk Esterase: Negative / RBC: 1 /hpf / WBC 1 /HPF   Sq Epi: x / Non Sq Epi: 0 /hpf / Bacteria: Negative      ======================Micro/Rad/Cardio=================  CXR: Reviewed  Echo: Reviewed  ======================================================  PAST MEDICAL & SURGICAL HISTORY:  AF (atrial fibrillation)    HTN (hypertension)    HLD (hyperlipidemia)    Gout    CVA (cerebrovascular accident)  L hemiparesis 2019    Stented coronary artery  x1      ========================ASSESSMENT ================  Atrial flutter s/p Percutaneous transcatheter aortic valve replacement   Hypertension   Hyperlipidemia   A fib   Gout   Thrombocytopenia   Diabetes mellitus 2       Plan:  ====================== NEUROLOGY=====================  Hx of CVA  Continue close monitoring of neuro status     ==================== RESPIRATORY======================  Stable on RA, SpO2 93% - 100%  Encourage incentive spirometry, continue pulse ox monitoring, follow ABGs     ====================CARDIOVASCULAR==================  Admitted for afib with pauses/aflutter on , for PPM placement with EP today   Atrial flutter s/p Percutaneous transcatheter aortic valve replacement   Hypertension c/w lopressor for rate control   Continue hemodynamic monitoring, tele monitoring   ASA/Lipitor for CAD     metoprolol tartrate 25 milliGRAM(s) Oral every 12 hours  atorvastatin 20 milliGRAM(s) Oral at bedtime  aspirin enteric coated 81 milliGRAM(s) Oral daily    ===================HEMATOLOGIC/ONC ===================  Thrombocytopenia, continue to monitor H&H/Plts     ===================== RENAL =========================  Continue monitoring I&Os, BUN/Cr, and urine output   Replete lytes PRN. Keep K> 4 and Mg >2     ==================== GASTROINTESTINAL===================  Tolerating a DASH/TLC diet     GI prophylaxis, pantoprazole    Tablet 40 milliGRAM(s) Oral before breakfast  sodium chloride 0.9% lock flush 3 milliLiter(s) IV Push every 8 hours    =======================    ENDOCRINE  =====================  Hx of DM2, continue glycemic control with ademlog sliding scale     insulin lispro (ADMELOG) corrective regimen sliding scale   SubCutaneous Before meals and at bedtime    ========================INFECTIOUS DISEASE================  Afebrile, WBC within normal limits  Continue trending WBC and monitoring fever curve     Patient requires continuous monitoring with bedside rhythm monitoring, pulse oximetry monitoring, and continuous central venous and arterial pressure monitoring; and intermittent blood gas analysis.  Care plan discussed with ICU care team.    Patient remained critical, at risk for life threatening decompensation.   I have spent 35 minutes providing acute care with multiple re-evaluations throughout the evening.     By signing my name below, I, Edyta Philippe, attest that this documentation has been prepared under the direction and in the presence of Nerissa Sandhu NP.  Electronically signed: Efren Lundberg Santpal Chawla, personally performed the services described in this documentation. All medical record entries made by the scribe were at my direction and in my presence. I have reviewed the chart and agree that the record reflects my personal performance and is accurate and complete  Electronically signed: Nerissa Sandhu NP, 21 @ 18:33       ANA MARQUEZ  MRN-[id number]  Patient is a 81y old  Male who presents with a chief complaint of afib with pauses. (2021 13:40)    HPI:   81M PMHx CAD (1 stent,) DM2, HTN, HLD, AS, Diastolic HF, Afib (on Xarelto) and CVA (L hemiparesis) - recent TAVR w Dr. Thom Marrero one day ago on a holter monitor. Pt presenting today with CC of one episode of dizziness. On holter monitor - arrythmia noted and pt was called to come back in. Pt feels back at baseline. No sob, orthopnea, chest pain, LE edema.   (2021 23:04)      Surgery/Hospital course:   TAVR    readmitted for afib w/ pauses     Today/Overnight:  Right groin stitch removed last night with some oozing   pressure applied ,   post removal follow up assessment protocol followed.       Vital Signs Last 24 Hrs  T(C): 36.6 (2021 16:00), Max: 36.9 (2021 04:00)  T(F): 97.9 (2021 16:00), Max: 98.4 (2021 04:00)  HR: 69 (2021 18:00) (44 - 86)  BP: 175/70 (2021 15:50) (106/59 - 197/76)  BP(mean): 101 (2021 15:50) (75 - 109)  RR: 16 (2021 18:00) (13 - 33)  SpO2: 100% (2021 18:00) (93% - 100%)  ============================I/O===========================  I&O's Detail    2021 07:01  -  2021 07:00  --------------------------------------------------------  IN:    IV PiggyBack: 50 mL    Oral Fluid: 50 mL    sodium chloride 0.9%: 450 mL  Total IN: 550 mL    OUT:    Voided (mL): 475 mL  Total OUT: 475 mL    Total NET: 75 mL      2021 07:01  -  2021 18:33  --------------------------------------------------------  IN:    DOPamine Infusion: 58.4 mL  Total IN: 58.4 mL    OUT:    Voided (mL): 1050 mL  Total OUT: 1050 mL    Total NET: -991.6 mL        ============================ LABS =========================                        11.5   8.53  )-----------( 113      ( 2021 23:04 )             36.2     11-    133<L>  |  99  |  31<H>  ----------------------------<  126<H>  5.3   |  21<L>  |  1.94<H>    Ca    9.1      2021 23:04  Phos  3.0     11-  Mg     2.0     -    TPro  7.6  /  Alb  3.7  /  TBili  0.4  /  DBili  x   /  AST  24  /  ALT  24  /  AlkPhos  72  11-21    LIVER FUNCTIONS - ( 2021 23:04 )  Alb: 3.7 g/dL / Pro: 7.6 g/dL / ALK PHOS: 72 U/L / ALT: 24 U/L / AST: 24 U/L / GGT: x           PT/INR - ( 2021 23:04 )   PT: 20.9 sec;   INR: 1.79 ratio         PTT - ( 2021 23:04 )  PTT:40.1 sec    Urinalysis Basic - ( 2021 03:53 )    Color: Light Yellow / Appearance: Clear / S.014 / pH: x  Gluc: x / Ketone: Negative  / Bili: Negative / Urobili: Negative   Blood: x / Protein: Trace / Nitrite: Negative   Leuk Esterase: Negative / RBC: 1 /hpf / WBC 1 /HPF   Sq Epi: x / Non Sq Epi: 0 /hpf / Bacteria: Negative      ======================Micro/Rad/Cardio=================  CXR: Reviewed  Echo: Reviewed  ======================================================  PAST MEDICAL & SURGICAL HISTORY:  AF (atrial fibrillation)    HTN (hypertension)    HLD (hyperlipidemia)    Gout    CVA (cerebrovascular accident)  L hemiparesis 2019    Stented coronary artery  x1      ========================ASSESSMENT ================  Atrial flutter s/p Percutaneous transcatheter aortic valve replacement   Hypertension   Hyperlipidemia   A fib   Gout   Thrombocytopenia   Diabetes mellitus 2       Plan:  ====================== NEUROLOGY=====================  Hx of CVA  Continue close monitoring of neuro status     ==================== RESPIRATORY======================  Stable on RA, SpO2 93% - 100%  Encourage incentive spirometry, continue pulse ox monitoring, follow ABGs     ====================CARDIOVASCULAR==================  Admitted for afib with pauses/aflutter on , s/p PPM placement M  Atrial flutter s/p Percutaneous transcatheter aortic valve replacement   Hypertension c/w lopressor for rate control   Continue hemodynamic monitoring, tele monitoring   ASA/Lipitor for CAD     metoprolol tartrate 25 milliGRAM(s) Oral every 12 hours  atorvastatin 20 milliGRAM(s) Oral at bedtime  aspirin enteric coated 81 milliGRAM(s) Oral daily    ===================HEMATOLOGIC/ONC ===================  Thrombocytopenia, continue to monitor H&H/Plts     ===================== RENAL =========================  Continue monitoring I&Os, BUN/Cr, and urine output   Replete lytes PRN. Keep K> 4 and Mg >2     ==================== GASTROINTESTINAL===================  Tolerating a DASH/TLC diet     GI prophylaxis, pantoprazole    Tablet 40 milliGRAM(s) Oral before breakfast  sodium chloride 0.9% lock flush 3 milliLiter(s) IV Push every 8 hours    =======================    ENDOCRINE  =====================  Hx of DM2, continue glycemic control with ademlog sliding scale     insulin lispro (ADMELOG) corrective regimen sliding scale   SubCutaneous Before meals and at bedtime    ========================INFECTIOUS DISEASE================  Afebrile, WBC within normal limits  Continue trending WBC and monitoring fever curve     Patient requires continuous monitoring with bedside rhythm monitoring, pulse oximetry monitoring, and continuous central venous and arterial pressure monitoring; and intermittent blood gas analysis.  Care plan discussed with ICU care team.    Patient remained critical, at risk for life threatening decompensation.   I have spent 35 minutes providing acute care with multiple re-evaluations throughout the evening.     By signing my name below, I, Edyta Philippe, attest that this documentation has been prepared under the direction and in the presence of Nerissa Sandhu NP.  Electronically signed: Efren Lundberg, Santpal Edson, personally performed the services described in this documentation. All medical record entries made by the scribe were at my direction and in my presence. I have reviewed the chart and agree that the record reflects my personal performance and is accurate and complete  Electronically signed: Nerissa Sandhu NP, 21 @ 18:33

## 2021-11-22 NOTE — PROGRESS NOTE ADULT - SUBJECTIVE AND OBJECTIVE BOX
ANA MARQUEZ  MRN-[id number]  Patient is a 81y old  Male who presents with a chief complaint of afib with pauses. (2021 23:04)    HPI:   81M PMHx CAD (1 stent,) DM2, HTN, HLD, AS, Diastolic HF, Afib (on Xarelto) and CVA (L hemiparesis) - recent TAVR w Dr. Tomas - [place] one day ago on a holter monitor. Pt presenting today with CC of one episode of dizziness. On holter monitor - arrythmia noted and pt was called to come back in. Pt feels back at baseline. No sob, orthopnea, chest pain, LE edema.   (2021 23:04)      Surgery/Hospital Course:   TAVR    readmitted for afib w/ pauses     Today:  Plan to PPM placement with EP.     REVIEW OF SYSTEMS:  Gen: No fever  EYES/ENT: No visual changes;  No vertigo or throat pain   NECK: No pain   RES:  No shortness of breath or Cough  CARD: No chest pain   GI: No abdominal pain  : No dysuria  NEURO: No weakness  SKIN: No itching, rashes     ICU Vital Signs Last 24 Hrs  T(C): 36.6 (2021 08:00), Max: 36.9 (2021 04:00)  T(F): 97.9 (2021 08:00), Max: 98.4 (2021 04:00)  HR: 54 (2021 08:00) (44 - 85)  BP: 123/64 (2021 08:00) (106/59 - 197/76)  BP(mean): 88 (2021 08:00) (75 - 109)  ABP: --  ABP(mean): --  RR: 19 (2021 08:00) (13 - 33)  SpO2: 100% (2021 08:00) (93% - 100%)      Physical Exam:  Gen:  Awake, alert   CNS: non focal 	  Neck: no JVD  RES : clear , no wheezing              CVS: irregular rhythm. Normal S1/S2  Abd: Soft, non-distended. Bowel sounds present.  Skin: No rash.  Ext:  no edema    ============================I/O===========================   I&O's Detail    2021 07:01  -  2021 07:00  --------------------------------------------------------  IN:    IV PiggyBack: 50 mL    Oral Fluid: 50 mL    sodium chloride 0.9%: 450 mL  Total IN: 550 mL    OUT:    Voided (mL): 475 mL  Total OUT: 475 mL    Total NET: 75 mL        ============================ LABS =========================                        11.5   8.53  )-----------( 113      ( 2021 23:04 )             36.2     11-    133<L>  |  99  |  31<H>  ----------------------------<  126<H>  5.3   |  21<L>  |  1.94<H>    Ca    9.1      2021 23:04  Phos  3.0     11-  Mg     2.0     -    TPro  7.6  /  Alb  3.7  /  TBili  0.4  /  DBili  x   /  AST  24  /  ALT  24  /  AlkPhos  72  11-21    LIVER FUNCTIONS - ( 2021 23:04 )  Alb: 3.7 g/dL / Pro: 7.6 g/dL / ALK PHOS: 72 U/L / ALT: 24 U/L / AST: 24 U/L / GGT: x           PT/INR - ( 2021 23:04 )   PT: 20.9 sec;   INR: 1.79 ratio         PTT - ( 2021 23:04 )  PTT:40.1 sec    Urinalysis Basic - ( 2021 03:53 )    Color: Light Yellow / Appearance: Clear / S.014 / pH: x  Gluc: x / Ketone: Negative  / Bili: Negative / Urobili: Negative   Blood: x / Protein: Trace / Nitrite: Negative   Leuk Esterase: Negative / RBC: 1 /hpf / WBC 1 /HPF   Sq Epi: x / Non Sq Epi: 0 /hpf / Bacteria: Negative      ======================Micro/Rad/Cardio=================  CXR: Reviewed  Echo:Reviewed  ======================================================  PAST MEDICAL & SURGICAL HISTORY:  AF (atrial fibrillation)    HTN (hypertension)    HLD (hyperlipidemia)    Gout    CVA (cerebrovascular accident)  L hemiparesis 2019    Stented coronary artery  x1      ====================ASSESSMENT ==============  80 y/o M PMHx CAD (1 stent,) DM2, HTN, HLD, AS, Diastolic HF, Afib (on Xarelto) and CVA (L hemiparesis) - recent TAVR w Dr. Tomas on . Admitted for afib with pauses/aflutter on , for PPM placement.     Plan:  ====================== NEUROLOGY=====================  Continue close monitoring of neuro status     ==================== RESPIRATORY======================  Supplemental O2 via 2L NC   Encourage incentive spirometry, continue pulse ox monitoring, follow ABGs     ====================CARDIOVASCULAR==================  Admitted for afib with pauses/aflutter on , for PPM placement with EP today   Continue hemodynamic monitoring, tele monitoring     For CAD,   aspirin enteric coated 81 milliGRAM(s) Oral daily    ===================HEMATOLOGIC/ONC ===================  Monitor H&H/Plts      ===================== RENAL =========================  Continue monitoring urine output, I&OS, BUN/Cr   Replete lytes PRN. Keep K> 4 and Mg >2    ==================== GASTROINTESTINAL===================  NPO for PPM     GI prophylaxis  pantoprazole    Tablet 40 milliGRAM(s) Oral before breakfast    sodium chloride 0.9% lock flush 3 milliLiter(s) IV Push every 8 hours    =======================    ENDOCRINE  =====================  Hx of DM2, continue glycemic control with   insulin lispro (ADMELOG) corrective regimen sliding scale   SubCutaneous Before meals and at bedtime    ?Gout management with   allopurinol 100 milliGRAM(s) Oral daily    ========================INFECTIOUS DISEASE================  Afebrile, WBC downtrending 10.10->8.53   Continue trending WBC and monitoring fever curve       Patient requires continuous monitoring with bedside rhythm monitoring, pulse ox monitoring, and intermittent blood gas analysis. Care plan discussed with ICU care team. Patient remained critical and at risk for life threatening decompensation.     By signing my name below, I, Claire Cook, attest that this documentation has been prepared under the direction and in the presence of LANE Berry   Electronically signed: Efren Smyth, 21 @ 08:32    I, Julia Barron, personally performed the services described in this documentation. all medical record entries made by the talatibe were at my direction and in my presence. I have reviewed the chart and agree that the record reflects my personal performance and is accurate and complete  Electronically signed: LANE Berry        ANA MARQUEZ  MRN-[id number]  Patient is a 81y old  Male who presents with a chief complaint of afib with pauses. (2021 23:04)    HPI:   81M PMHx CAD (1 stent,) DM2, HTN, HLD, AS, Diastolic HF, Afib (on Xarelto) and CVA (L hemiparesis) - recent TAVR w Dr. Tomas - [place] one day ago on a holter monitor. Pt presenting today with CC of one episode of dizziness. On holter monitor - arrythmia noted and pt was called to come back in. Pt feels back at baseline. No sob, orthopnea, chest pain, LE edema.   (2021 23:04)      Surgery/Hospital Course:   TAVR    readmitted for afib w/ pauses     Today:  Plan to PPM placement with EP.     REVIEW OF SYSTEMS:  Gen: No fever  EYES/ENT: No visual changes;  No vertigo or throat pain   NECK: No pain   RES:  No shortness of breath or Cough  CARD: No chest pain   GI: No abdominal pain  : No dysuria  NEURO: No weakness  SKIN: No itching, rashes     ICU Vital Signs Last 24 Hrs  T(C): 36.6 (2021 08:00), Max: 36.9 (2021 04:00)  T(F): 97.9 (2021 08:00), Max: 98.4 (2021 04:00)  HR: 54 (2021 08:00) (44 - 85)  BP: 123/64 (2021 08:00) (106/59 - 197/76)  BP(mean): 88 (2021 08:00) (75 - 109)  ABP: --  ABP(mean): --  RR: 19 (2021 08:00) (13 - 33)  SpO2: 100% (2021 08:00) (93% - 100%)      Physical Exam:  Gen:  Awake, alert   CNS: non focal 	  Neck: no JVD  RES : clear , no wheezing              CVS: irregular rhythm. Normal S1/S2  Abd: Soft, non-distended. Bowel sounds present.  Skin: No rash.  Ext:  no edema    ============================I/O===========================   I&O's Detail    2021 07:01  -  2021 07:00  --------------------------------------------------------  IN:    IV PiggyBack: 50 mL    Oral Fluid: 50 mL    sodium chloride 0.9%: 450 mL  Total IN: 550 mL    OUT:    Voided (mL): 475 mL  Total OUT: 475 mL    Total NET: 75 mL        ============================ LABS =========================                        11.5   8.53  )-----------( 113      ( 2021 23:04 )             36.2     11-    133<L>  |  99  |  31<H>  ----------------------------<  126<H>  5.3   |  21<L>  |  1.94<H>    Ca    9.1      2021 23:04  Phos  3.0     11-  Mg     2.0     -    TPro  7.6  /  Alb  3.7  /  TBili  0.4  /  DBili  x   /  AST  24  /  ALT  24  /  AlkPhos  72  11-21    LIVER FUNCTIONS - ( 2021 23:04 )  Alb: 3.7 g/dL / Pro: 7.6 g/dL / ALK PHOS: 72 U/L / ALT: 24 U/L / AST: 24 U/L / GGT: x           PT/INR - ( 2021 23:04 )   PT: 20.9 sec;   INR: 1.79 ratio         PTT - ( 2021 23:04 )  PTT:40.1 sec    Urinalysis Basic - ( 2021 03:53 )    Color: Light Yellow / Appearance: Clear / S.014 / pH: x  Gluc: x / Ketone: Negative  / Bili: Negative / Urobili: Negative   Blood: x / Protein: Trace / Nitrite: Negative   Leuk Esterase: Negative / RBC: 1 /hpf / WBC 1 /HPF   Sq Epi: x / Non Sq Epi: 0 /hpf / Bacteria: Negative      ======================Micro/Rad/Cardio=================  CXR: Reviewed  Echo:Reviewed  ======================================================  PAST MEDICAL & SURGICAL HISTORY:  AF (atrial fibrillation)    HTN (hypertension)    HLD (hyperlipidemia)    Gout    CVA (cerebrovascular accident)  L hemiparesis 2019    Stented coronary artery  x1      ====================ASSESSMENT ==============  82 y/o M PMHx CAD (1 stent,) DM2, HTN, HLD, AS, Diastolic HF, Afib (on Xarelto) and CVA (L hemiparesis) - recent TAVR w Dr. Tomas on . Admitted for afib with pauses/aflutter on , for PPM placement.     Plan:  ====================== NEUROLOGY=====================  Continue close monitoring of neuro status     ==================== RESPIRATORY======================  Supplemental O2 via 2L NC   Encourage incentive spirometry, continue pulse ox monitoring, follow ABGs     ====================CARDIOVASCULAR==================  Admitted for afib with pauses/aflutter on , for PPM placement with EP today   On Dopamine gtt for chronotropic effect.  Continue hemodynamic monitoring, tele monitoring     For CAD,   aspirin enteric coated 81 milliGRAM(s) Oral daily    ===================HEMATOLOGIC/ONC ===================  Monitor H&H/Plts      ===================== RENAL =========================  Continue monitoring urine output, I&OS, BUN/Cr   Replete lytes PRN. Keep K> 4 and Mg >2    ==================== GASTROINTESTINAL===================  NPO for PPM     GI prophylaxis  pantoprazole    Tablet 40 milliGRAM(s) Oral before breakfast    sodium chloride 0.9% lock flush 3 milliLiter(s) IV Push every 8 hours    =======================    ENDOCRINE  =====================  Hx of DM2, continue glycemic control with   insulin lispro (ADMELOG) corrective regimen sliding scale   SubCutaneous Before meals and at bedtime    ?Gout management with   allopurinol 100 milliGRAM(s) Oral daily    ========================INFECTIOUS DISEASE================  Afebrile, WBC downtrending 10.10->8.53   Continue trending WBC and monitoring fever curve       Patient requires continuous monitoring with bedside rhythm monitoring, pulse ox monitoring, and intermittent blood gas analysis. Care plan discussed with ICU care team. Patient remained critical and at risk for life threatening decompensation.     By signing my name below, Claire URENA, attest that this documentation has been prepared under the direction and in the presence of LANE Berry   Electronically signed: Efren Smyth, 21 @ 08:32    Julia URENA, personally performed the services described in this documentation. all medical record entries made by the scribe were at my direction and in my presence. I have reviewed the chart and agree that the record reflects my personal performance and is accurate and complete  Electronically signed: LANE Berry

## 2021-11-22 NOTE — CONSULT NOTE ADULT - SUBJECTIVE AND OBJECTIVE BOX
Patient seen and evaluated at bedside    Chief Complaint: Slow atrial fibrillation.     HPI:   81M PMHx CAD (1 stent,) DM2, HTN, HLD, AS, Diastolic HF, Afib (on Xarelto) and CVA (L hemiparesis) - recent TAVR w Dr. Tomas on 11/18  - DCed on a holter monitor. Pt presented back to hospital with CC of one episode of dizziness. On holter monitor - arrythmia noted and pt was called to come back in. Pt feels back at baseline. No sob, orthopnea, chest pain, LE edema.     Was admitted to CTU. Given glucagon to empirically cover for BB toxicity (d/c'd on Metoprolol tartrate 25mg BID).         PMHx:   No pertinent past medical history    AF (atrial fibrillation)    HTN (hypertension)    HLD (hyperlipidemia)    Gout    CVA (cerebrovascular accident)        PSHx:   No significant past surgical history    Stented coronary artery        Allergies:  No Known Allergies      Home Meds: As per admission medication reconcilliation.     Current Medications:   allopurinol 100 milliGRAM(s) Oral daily  aspirin enteric coated 81 milliGRAM(s) Oral daily  atorvastatin 20 milliGRAM(s) Oral at bedtime  DOPamine Infusion 3 MICROgram(s)/kG/Min IV Continuous <Continuous>  fluticasone propionate 50 MICROgram(s)/spray Nasal Spray 1 Spray(s) Both Nostrils two times a day PRN  insulin lispro (ADMELOG) corrective regimen sliding scale   SubCutaneous Before meals and at bedtime  pantoprazole    Tablet 40 milliGRAM(s) Oral before breakfast  sodium chloride 0.9% lock flush 3 milliLiter(s) IV Push every 8 hours      FAMILY HISTORY:  Family history of stroke (Sibling)        Social History: [place]    REVIEW OF SYSTEMS:  Constitutional:     [x ] negative [ ] fevers [ ] chills [ ] weight loss [ ] weight gain  HEENT:                  [x ] negative [ ] dry eyes [ ] eye irritation [ ] postnasal drip [ ] nasal congestion  CV:                         [ x] negative  [ ] chest pain [ ] orthopnea [ ] palpitations [ ] murmur  Resp:                     [x ] negative [ ] cough [ ] shortness of breath [ ] dyspnea [ ] wheezing [ ] sputum [ ]hemoptysis  GI:                          [ x] negative [ ] nausea [ ] vomiting [ ] diarrhea [ ] constipation [ ] abd pain [ ] dysphagia   :                        [ x] negative [ ] dysuria [ ] nocturia [ ] hematuria [ ] increased urinary frequency  Musculoskeletal: [x ] negative [ ] back pain [ ] myalgias [ ] arthralgias [ ] fracture  Skin:                       [ x] negative [ ] rash [ ] itch  Neurological:        [ x] negative [ ] headache [ ] dizziness [ ] syncope [ ] weakness [ ] numbness  Psychiatric:           [ x] negative [ ] anxiety [ ] depression  Endocrine:            [ x] negative [ ] diabetes [ ] thyroid problem  Heme/Lymph:      [ x] negative [ ] anemia [ ] bleeding problem  Allergic/Immune: [ x] negative [ ] itchy eyes [ ] nasal discharge [ ] hives [ ] angioedema    [ x] All other systems negative  [ ] Unable to assess ROS due to      Physical Exam:  T(F): 97.6 (11-22), Max: 98.4 (11-22)  HR: 66 (11-22) (44 - 86)  BP: 123/64 (11-22) (106/59 - 197/76)  RR: 23 (11-22)  SpO2: 97% (11-22)  General: Alert, no acute distress, appears comfortable   HEENT: No scleral icterus, EOMI, no facial dysmorphia, no external ear lesions   Cardiac: irregular rhythm, 2/6 ANAI  Pulmonary: Clear breath sounds throughout, no wheezing, no stridor, no crackles   Abdomen: Nondistended, nontender, appears soft   Skin: no obvious rash or lesions   Extremities: no LE edema  Neurological: Moving all 4 extremities, no overt focal deficits noted   Psych: normal mood and affect     Cardiovascular Diagnostic Testing:    ECG: Personally reviewed:  Atrial fibrillation HR 72.     Echo: Personally reviewed:  Conclusions:  1. Mitral annular calcification, otherwise normal mitral  valve. Mild mitral regurgitation.  2. Transcatheter aortic valve replacement.  The valve is  well seated.  No aortic transvalvular or paravalvular  regurgitation seen.  3. Asymmetric septal hypertrophy.  4. Endocardial visualization enhanced with intravenous  injection of Ultrasonic Enhancing Agent (Definity).  Hyperdynamic left ventricular systolic function. No  segmental wall motion abnormalities. Peak left ventricular  outflow tract gradient equals 27 mm Hg, mean gradient is  equal to 1 mm Hg, LVOT velocity time integral equals 14 cm,  consistent with mild LVOT obstruction.      CXR: Personally reviewed    Labs: Personally reviewed                        11.5   8.53  )-----------( 113      ( 21 Nov 2021 23:04 )             36.2     11-21    133<L>  |  99  |  31<H>  ----------------------------<  126<H>  5.3   |  21<L>  |  1.94<H>    Ca    9.1      21 Nov 2021 23:04  Phos  3.0     11-21  Mg     2.0     11-21    TPro  7.6  /  Alb  3.7  /  TBili  0.4  /  DBili  x   /  AST  24  /  ALT  24  /  AlkPhos  72  11-21    PT/INR - ( 21 Nov 2021 23:04 )   PT: 20.9 sec;   INR: 1.79 ratio         PTT - ( 21 Nov 2021 23:04 )  PTT:40.1 sec  Serum Pro-Brain Natriuretic Peptide: 5302 pg/mL (11-21 @ 23:04)  Serum Pro-Brain Natriuretic Peptide: 3655 pg/mL (11-16 @ 05:53)        Thyroid Stimulating Hormone, Serum: 4.81 uIU/mL (11-22 @ 03:25)

## 2021-11-23 ENCOUNTER — TRANSCRIPTION ENCOUNTER (OUTPATIENT)
Age: 81
End: 2021-11-23

## 2021-11-23 VITALS — TEMPERATURE: 97 F | HEART RATE: 70 BPM | RESPIRATION RATE: 19 BRPM | OXYGEN SATURATION: 98 %

## 2021-11-23 LAB
ALBUMIN SERPL ELPH-MCNC: 3.1 G/DL — LOW (ref 3.3–5)
ALP SERPL-CCNC: 66 U/L — SIGNIFICANT CHANGE UP (ref 40–120)
ALT FLD-CCNC: 16 U/L — SIGNIFICANT CHANGE UP (ref 10–45)
ANION GAP SERPL CALC-SCNC: 14 MMOL/L — SIGNIFICANT CHANGE UP (ref 5–17)
AST SERPL-CCNC: 23 U/L — SIGNIFICANT CHANGE UP (ref 10–40)
BILIRUB SERPL-MCNC: 0.6 MG/DL — SIGNIFICANT CHANGE UP (ref 0.2–1.2)
BUN SERPL-MCNC: 21 MG/DL — SIGNIFICANT CHANGE UP (ref 7–23)
CALCIUM SERPL-MCNC: 8.7 MG/DL — SIGNIFICANT CHANGE UP (ref 8.4–10.5)
CHLORIDE SERPL-SCNC: 101 MMOL/L — SIGNIFICANT CHANGE UP (ref 96–108)
CO2 SERPL-SCNC: 20 MMOL/L — LOW (ref 22–31)
CREAT SERPL-MCNC: 1.22 MG/DL — SIGNIFICANT CHANGE UP (ref 0.5–1.3)
GAS PNL BLDA: SIGNIFICANT CHANGE UP
GLUCOSE BLDC GLUCOMTR-MCNC: 107 MG/DL — HIGH (ref 70–99)
GLUCOSE BLDC GLUCOMTR-MCNC: 149 MG/DL — HIGH (ref 70–99)
GLUCOSE SERPL-MCNC: 127 MG/DL — HIGH (ref 70–99)
HCT VFR BLD CALC: 32.4 % — LOW (ref 39–50)
HGB BLD-MCNC: 10.3 G/DL — LOW (ref 13–17)
MAGNESIUM SERPL-MCNC: 2 MG/DL — SIGNIFICANT CHANGE UP (ref 1.6–2.6)
MCHC RBC-ENTMCNC: 21.8 PG — LOW (ref 27–34)
MCHC RBC-ENTMCNC: 31.8 GM/DL — LOW (ref 32–36)
MCV RBC AUTO: 68.5 FL — LOW (ref 80–100)
NRBC # BLD: 0 /100 WBCS — SIGNIFICANT CHANGE UP (ref 0–0)
PHOSPHATE SERPL-MCNC: 3.6 MG/DL — SIGNIFICANT CHANGE UP (ref 2.5–4.5)
PLATELET # BLD AUTO: 92 K/UL — LOW (ref 150–400)
POTASSIUM SERPL-MCNC: 4.6 MMOL/L — SIGNIFICANT CHANGE UP (ref 3.5–5.3)
POTASSIUM SERPL-SCNC: 4.6 MMOL/L — SIGNIFICANT CHANGE UP (ref 3.5–5.3)
PROT SERPL-MCNC: 6.8 G/DL — SIGNIFICANT CHANGE UP (ref 6–8.3)
RBC # BLD: 4.73 M/UL — SIGNIFICANT CHANGE UP (ref 4.2–5.8)
RBC # FLD: 19.9 % — HIGH (ref 10.3–14.5)
SODIUM SERPL-SCNC: 135 MMOL/L — SIGNIFICANT CHANGE UP (ref 135–145)
WBC # BLD: 9.18 K/UL — SIGNIFICANT CHANGE UP (ref 3.8–10.5)
WBC # FLD AUTO: 9.18 K/UL — SIGNIFICANT CHANGE UP (ref 3.8–10.5)

## 2021-11-23 PROCEDURE — 71045 X-RAY EXAM CHEST 1 VIEW: CPT | Mod: 26

## 2021-11-23 PROCEDURE — 99239 HOSP IP/OBS DSCHRG MGMT >30: CPT

## 2021-11-23 PROCEDURE — 93010 ELECTROCARDIOGRAM REPORT: CPT

## 2021-11-23 RX ORDER — ATORVASTATIN CALCIUM 80 MG/1
1 TABLET, FILM COATED ORAL
Qty: 30 | Refills: 0
Start: 2021-11-23 | End: 2021-12-22

## 2021-11-23 RX ORDER — RIVAROXABAN 15 MG-20MG
20 KIT ORAL
Refills: 0 | Status: DISCONTINUED | OUTPATIENT
Start: 2021-11-23 | End: 2021-11-23

## 2021-11-23 RX ORDER — ACETAMINOPHEN 500 MG
1000 TABLET ORAL ONCE
Refills: 0 | Status: COMPLETED | OUTPATIENT
Start: 2021-11-23 | End: 2021-11-23

## 2021-11-23 RX ORDER — ASPIRIN/CALCIUM CARB/MAGNESIUM 324 MG
1 TABLET ORAL
Qty: 30 | Refills: 0
Start: 2021-11-23 | End: 2021-12-22

## 2021-11-23 RX ORDER — METOPROLOL TARTRATE 50 MG
1 TABLET ORAL
Qty: 30 | Refills: 0
Start: 2021-11-23 | End: 2021-12-22

## 2021-11-23 RX ORDER — HYDRALAZINE HCL 50 MG
25 TABLET ORAL EVERY 8 HOURS
Refills: 0 | Status: DISCONTINUED | OUTPATIENT
Start: 2021-11-23 | End: 2021-11-23

## 2021-11-23 RX ADMIN — Medication 1000 MILLIGRAM(S): at 01:15

## 2021-11-23 RX ADMIN — SODIUM CHLORIDE 3 MILLILITER(S): 9 INJECTION INTRAMUSCULAR; INTRAVENOUS; SUBCUTANEOUS at 00:48

## 2021-11-23 RX ADMIN — Medication 25 MILLIGRAM(S): at 06:36

## 2021-11-23 RX ADMIN — Medication 100 MILLIGRAM(S): at 11:14

## 2021-11-23 RX ADMIN — SODIUM CHLORIDE 3 MILLILITER(S): 9 INJECTION INTRAMUSCULAR; INTRAVENOUS; SUBCUTANEOUS at 06:49

## 2021-11-23 RX ADMIN — Medication 81 MILLIGRAM(S): at 11:14

## 2021-11-23 RX ADMIN — Medication 400 MILLIGRAM(S): at 01:00

## 2021-11-23 RX ADMIN — Medication 25 MILLIGRAM(S): at 10:14

## 2021-11-23 RX ADMIN — PANTOPRAZOLE SODIUM 40 MILLIGRAM(S): 20 TABLET, DELAYED RELEASE ORAL at 06:37

## 2021-11-23 NOTE — DISCHARGE NOTE PROVIDER - NSDCACTIVITY_GEN_ALL_CORE
Bathing allowed/Do not drive or operate machinery/No heavy lifting/straining/Follow Instructions Provided by your Surgical Team

## 2021-11-23 NOTE — DISCHARGE NOTE PROVIDER - NSDCCPCAREPLAN_GEN_ALL_CORE_FT
PRINCIPAL DISCHARGE DIAGNOSIS  Diagnosis: Atrial fibrillation  Assessment and Plan of Treatment: Slow atrial fibrillation with pauses

## 2021-11-23 NOTE — PROGRESS NOTE ADULT - SUBJECTIVE AND OBJECTIVE BOX
ANA MARQUEZ  MRN-[id number]  Patient is a 81y old  Male who presents with a chief complaint of afib with pauses. (2021 10:30)    HPI:   81M PMHx CAD (1 stent,) DM2, HTN, HLD, AS, Diastolic HF, Afib (on Xarelto) and CVA (L hemiparesis) - recent TAVR w Dr. Tomas - DCed one day ago on a holter monitor. Pt presenting today with CC of one episode of dizziness. On holter monitor - arrythmia noted and pt was called to come back in. Pt feels back at baseline. No sob, orthopnea, chest pain, LE edema.   (2021 23:04)      Surgery/Hospital Course:  Clinically stable for discharge home w/ follow-up with EP and Structural Heart  Today:    REVIEW OF SYSTEMS:  Gen: No fever  EYES/ENT: No visual changes;  No vertigo or throat pain   NECK: No pain   RES:  No shortness of breath or Cough  CARD: No chest pain   GI: No abdominal pain  : No dysuria  NEURO: No weakness  SKIN: No itching, rashes     ICU Vital Signs Last 24 Hrs  T(C): 36.1 (2021 08:00), Max: 36.6 (2021 16:00)  T(F): 96.9 (2021 08:00), Max: 97.9 (2021 16:00)  HR: 62 (2021 10:00) (62 - 81)  BP: 130/61 (2021 22:00) (119/57 - 175/70)  BP(mean): 87 (2021 22:00) (80 - 109)  ABP: 132/57 (2021 10:00) (97/79 - 211/86)  ABP(mean): 83 (2021 10:00) (72 - 134)  RR: 20 (2021 10:00) (14 - 34)  SpO2: 100% (2021 10:00) (91% - 100%)      Physical Exam:  Gen:  Awake, alert   CNS: non focal 	  Neck: no JVD  RES : clear , no wheezing              CVS: Regular  rhythm. Normal S1/S2  Abd: Soft, non-distended. Bowel sounds present.  Skin: No rash.  Ext:  no edema    ============================I/O===========================   I&O's Detail    2021 07:01  -  2021 07:00  --------------------------------------------------------  IN:    DOPamine Infusion: 58.4 mL    IV PiggyBack: 100 mL    Oral Fluid: 100 mL  Total IN: 258.4 mL    OUT:    Voided (mL): 1600 mL  Total OUT: 1600 mL    Total NET: -1341.6 mL      2021 07:01  -  2021 12:00  --------------------------------------------------------  IN:  Total IN: 0 mL    OUT:    Voided (mL): 150 mL  Total OUT: 150 mL    Total NET: -150 mL        ============================ LABS =========================                        10.3   9.18  )-----------( 92       ( 2021 00:28 )             32.4     11-23    135  |  101  |  21  ----------------------------<  127<H>  4.6   |  20<L>  |  1.22    Ca    8.7      2021 00:28  Phos  3.6       Mg     2.0         TPro  6.8  /  Alb  3.1<L>  /  TBili  0.6  /  DBili  x   /  AST  23  /  ALT  16  /  AlkPhos  66  23    LIVER FUNCTIONS - ( 2021 00:28 )  Alb: 3.1 g/dL / Pro: 6.8 g/dL / ALK PHOS: 66 U/L / ALT: 16 U/L / AST: 23 U/L / GGT: x           PT/INR - ( 2021 23:04 )   PT: 20.9 sec;   INR: 1.79 ratio         PTT - ( 2021 23:04 )  PTT:40.1 sec  ABG - ( 2021 00:16 )  pH, Arterial: 7.45  pH, Blood: x     /  pCO2: 35    /  pO2: 177   / HCO3: 24    / Base Excess: 0.5   /  SaO2: 99.2              Urinalysis Basic - ( 2021 03:53 )    Color: Light Yellow / Appearance: Clear / S.014 / pH: x  Gluc: x / Ketone: Negative  / Bili: Negative / Urobili: Negative   Blood: x / Protein: Trace / Nitrite: Negative   Leuk Esterase: Negative / RBC: 1 /hpf / WBC 1 /HPF   Sq Epi: x / Non Sq Epi: 0 /hpf / Bacteria: Negative      ======================Micro/Rad/Cardio=================  Culture: Reviewed   CXR: Reviewed  Echo:Reviewed  ======================================================  PAST MEDICAL & SURGICAL HISTORY:  AF (atrial fibrillation)    HTN (hypertension)    HLD (hyperlipidemia)    Gout    CVA (cerebrovascular accident)  L hemiparesis 2019    Stented coronary artery  x1      ====================ASSESSMENT ==============                Plan:  ====================== NEUROLOGY=====================    ==================== RESPIRATORY======================  Mechanical Ventilation:      ====================CARDIOVASCULAR==================  hydrALAZINE 25 milliGRAM(s) Oral every 8 hours  metoprolol tartrate 25 milliGRAM(s) Oral every 12 hours    ===================HEMATOLOGIC/ONC ===================  Monitor H&H/Plts    aspirin enteric coated 81 milliGRAM(s) Oral daily  rivaroxaban 20 milliGRAM(s) Oral with dinner    ===================== RENAL =========================  Continue monitoring urine output, I&OS, BUN/Cr     ==================== GASTROINTESTINAL===================  pantoprazole    Tablet 40 milliGRAM(s) Oral before breakfast  sodium chloride 0.9% lock flush 3 milliLiter(s) IV Push every 8 hours    =======================    ENDOCRINE  =====================  allopurinol 100 milliGRAM(s) Oral daily  atorvastatin 20 milliGRAM(s) Oral at bedtime  insulin lispro (ADMELOG) corrective regimen sliding scale   SubCutaneous Before meals and at bedtime    ========================INFECTIOUS DISEASE================      Patient requires continuous monitoring with bedside rhythm monitoring, pulse ox monitoring, and intermittent blood gas analysis. Care plan discussed with ICU care team. Patient remained critical and at risk for life threatening decompensation.     By signing my name below, IOumou Tiffany, attest that this documentation has been prepared under the direction and in the presence of _____  Electronically signed: Katherine Coello Scribe, 21 @ 12:00    I, ____ , personally performed the services described in this documentation. all medical record entries made by the maxim were at my direction and in my presence. I have reviewed the chart and agree that the record reflects my personal performance and is accurate and complete  Electronically signed: ______       ANA MARQUEZ  MRN-[id number]  Patient is a 81y old  Male who presents with a chief complaint of afib with pauses. (2021 10:30)    HPI:   81M PMHx CAD (1 stent,) DM2, HTN, HLD, AS, Diastolic HF, Afib (on Xarelto) and CVA (L hemiparesis) - recent TAVR w Dr. Tomas - [place] one day ago on a holter monitor. Pt presenting today with CC of one episode of dizziness. On holter monitor - arrythmia noted and pt was called to come back in. Pt feels back at baseline. No sob, orthopnea, chest pain, LE edema.   (2021 23:04)      Surgery/Hospital Course:   TAVR, Readmit with bradycardia   PPM    Today:  Clinically stable for discharge home w/ follow-up with EP and Structural Heart    REVIEW OF SYSTEMS:  Gen: No fever  EYES/ENT: No visual changes;  No vertigo or throat pain   NECK: No pain   RES:  No shortness of breath or Cough  CARD: No chest pain   GI: No abdominal pain  : No dysuria  NEURO: No weakness  SKIN: No itching, rashes     ICU Vital Signs Last 24 Hrs  T(C): 36.1 (2021 08:00), Max: 36.6 (2021 16:00)  T(F): 96.9 (2021 08:00), Max: 97.9 (2021 16:00)  HR: 62 (2021 10:00) (62 - 81)  BP: 130/61 (2021 22:00) (119/57 - 175/70)  BP(mean): 87 (2021 22:00) (80 - 109)  ABP: 132/57 (2021 10:00) (97/79 - 211/86)  ABP(mean): 83 (2021 10:00) (72 - 134)  RR: 20 (2021 10:00) (14 - 34)  SpO2: 100% (2021 10:00) (91% - 100%)      Physical Exam:  Gen:  Awake, alert   CNS: non focal 	  Neck: no JVD  RES : clear , no wheezing              CVS: Regular  rhythm. Normal S1/S2  Abd: Soft, non-distended. Bowel sounds present.  Skin: No rash.  Ext:  no edema    ============================I/O===========================   I&O's Detail    2021 07:01  -  2021 07:00  --------------------------------------------------------  IN:    DOPamine Infusion: 58.4 mL    IV PiggyBack: 100 mL    Oral Fluid: 100 mL  Total IN: 258.4 mL    OUT:    Voided (mL): 1600 mL  Total OUT: 1600 mL    Total NET: -1341.6 mL      2021 07:01  -  2021 12:00  --------------------------------------------------------  IN:  Total IN: 0 mL    OUT:    Voided (mL): 150 mL  Total OUT: 150 mL    Total NET: -150 mL        ============================ LABS =========================                        10.3   9.18  )-----------( 92       ( 2021 00:28 )             32.4     11-23    135  |  101  |  21  ----------------------------<  127<H>  4.6   |  20<L>  |  1.22    Ca    8.7      2021 00:28  Phos  3.6       Mg     2.0         TPro  6.8  /  Alb  3.1<L>  /  TBili  0.6  /  DBili  x   /  AST  23  /  ALT  16  /  AlkPhos  66      LIVER FUNCTIONS - ( 2021 00:28 )  Alb: 3.1 g/dL / Pro: 6.8 g/dL / ALK PHOS: 66 U/L / ALT: 16 U/L / AST: 23 U/L / GGT: x           PT/INR - ( 2021 23:04 )   PT: 20.9 sec;   INR: 1.79 ratio         PTT - ( 2021 23:04 )  PTT:40.1 sec  ABG - ( 2021 00:16 )  pH, Arterial: 7.45  pH, Blood: x     /  pCO2: 35    /  pO2: 177   / HCO3: 24    / Base Excess: 0.5   /  SaO2: 99.2              Urinalysis Basic - ( 2021 03:53 )    Color: Light Yellow / Appearance: Clear / S.014 / pH: x  Gluc: x / Ketone: Negative  / Bili: Negative / Urobili: Negative   Blood: x / Protein: Trace / Nitrite: Negative   Leuk Esterase: Negative / RBC: 1 /hpf / WBC 1 /HPF   Sq Epi: x / Non Sq Epi: 0 /hpf / Bacteria: Negative      ======================Micro/Rad/Cardio=================  CXR: Reviewed  Echo:Reviewed  ======================================================  PAST MEDICAL & SURGICAL HISTORY:  AF (atrial fibrillation)    HTN (hypertension)    HLD (hyperlipidemia)    Gout    CVA (cerebrovascular accident)  L hemiparesis     Stented coronary artery  x1      ====================ASSESSMENT ==============  Atrial flutter s/p Percutaneous transcatheter aortic valve replacement   Hypertension   Hyperlipidemia   A fib   Gout   Thrombocytopenia   Diabetes mellitus 2       Plan:  ====================== NEUROLOGY=====================  Nonfocal, continue to monitor neuro status per ICU protocol.   ==================== RESPIRATORY======================  Comfortable on room air, SpO2 >95%  Continue to monitor SpO2 via pulse oximetry  Encourage bedside spirometry     ====================CARDIOVASCULAR==================  Admitted for afib with pauses/aflutter on , s/p PPM placement M  Atrial flutter s/p Percutaneous transcatheter aortic valve replacement   Blood pressure management w/ hydralazine  Rate control w/ lopressor   Continue hemodynamic monitoring, tele monitoring   ASA/Lipitor for CAD     hydrALAZINE 25 milliGRAM(s) Oral every 8 hours  metoprolol tartrate 25 milliGRAM(s) Oral every 12 hours  atorvastatin 20 milliGRAM(s) Oral at bedtime  aspirin enteric coated 81 milliGRAM(s) Oral daily  ===================HEMATOLOGIC/ONC ===================  Thrombocytopenia, Monitor H&H/Plts      VTE prophylaxis, rivaroxaban 20 milliGRAM(s) Oral with dinner  ===================== RENAL =========================  Continue to monitor I/Os, BUN/Creatinine, and urine output  Replete lytes PRN. Keep K> 4 and Mg >2   ==================== GASTROINTESTINAL===================  Tolerating PO DASH/TLC diet.   Continue Protonix for stress ulcer prophylaxis.     pantoprazole    Tablet 40 milliGRAM(s) Oral before breakfast  sodium chloride 0.9% lock flush 3 milliLiter(s) IV Push every 8 hours  =======================    ENDOCRINE  =====================  Hx of DM2, continue glycemic control with ademlog sliding scale   Hx of gout, c/w allopurinol     allopurinol 100 milliGRAM(s) Oral daily  insulin lispro (ADMELOG) corrective regimen sliding scale   SubCutaneous Before meals and at bedtime  ========================INFECTIOUS DISEASE================  Afebrile, WBC within normal limits  Continue trending WBC and monitoring fever curve       Patient requires continuous monitoring with bedside rhythm monitoring, pulse ox monitoring, and intermittent blood gas analysis. Care plan discussed with ICU care team. Patient remained critical and at risk for life threatening decompensation.     By signing my name below, I, Katherine Coello, attest that this documentation has been prepared under the direction and in the presence of _____  Electronically signed: Katherine Coello Scribe, 21 @ 12:00    I, ____ , personally performed the services described in this documentation. all medical record entries made by the maxim were at my direction and in my presence. I have reviewed the chart and agree that the record reflects my personal performance and is accurate and complete  Electronically signed: ______

## 2021-11-23 NOTE — DISCHARGE NOTE NURSING/CASE MANAGEMENT/SOCIAL WORK - PATIENT PORTAL LINK FT
You can access the FollowMyHealth Patient Portal offered by [facility] by registering at the following website: [email or web address]. By joining [facility]’s FollowMyHealth portal, you will also be able to view your health information using other applications (apps) compatible with our system.

## 2021-11-23 NOTE — PROGRESS NOTE ADULT - SUBJECTIVE AND OBJECTIVE BOX
24H hour events: afib occasionally V paced over night     MEDICATIONS:  aspirin enteric coated 81 milliGRAM(s) Oral daily  hydrALAZINE 25 milliGRAM(s) Oral every 8 hours  metoprolol tartrate 25 milliGRAM(s) Oral every 12 hours  rivaroxaban 20 milliGRAM(s) Oral with dinner  pantoprazole    Tablet 40 milliGRAM(s) Oral before breakfast  allopurinol 100 milliGRAM(s) Oral daily  atorvastatin 20 milliGRAM(s) Oral at bedtime  insulin lispro (ADMELOG) corrective regimen sliding scale   SubCutaneous Before meals and at bedtime    fluticasone propionate 50 MICROgram(s)/spray Nasal Spray 1 Spray(s) Both Nostrils two times a day PRN  sodium chloride 0.9% lock flush 3 milliLiter(s) IV Push every 8 hours      REVIEW OF SYSTEMS:  See HPI, otherwise ROS negative.    PHYSICAL EXAM:  T(C): 36.1 (11-23-21 @ 12:00), Max: 36.6 (11-22-21 @ 16:00)  HR: 70 (11-23-21 @ 12:00) (62 - 81)  BP: 130/61 (11-22-21 @ 22:00) (119/57 - 175/70)  RR: 19 (11-23-21 @ 12:00) (14 - 34)  SpO2: 98% (11-23-21 @ 12:00) (91% - 100%)  Wt(kg): --  I&O's Summary    22 Nov 2021 07:01  -  23 Nov 2021 07:00  --------------------------------------------------------  IN: 258.4 mL / OUT: 1600 mL / NET: -1341.6 mL    23 Nov 2021 07:01  -  23 Nov 2021 13:10  --------------------------------------------------------  IN: 0 mL / OUT: 150 mL / NET: -150 mL    Appearance: Alert. NAD	  Cardiovascular: +S1S2 RRR no m/g/r  Respiratory: CTA B/L	  Psychiatry: A & O x 3, Mood & affect appropriate  Gastrointestinal:  Soft, NT. ND. +BS	  Skin: right groin flat no bleeding   Neurologic: Non-focal  Extremities: No edema BLE  Vascular: Peripheral pulses palpable 2+ bilaterally    LABS:	 	    CBC Full  -  ( 23 Nov 2021 00:28 )  WBC Count : 9.18 K/uL  Hemoglobin : 10.3 g/dL  Hematocrit : 32.4 %  Platelet Count - Automated : 92 K/uL  Mean Cell Volume : 68.5 fl  Mean Cell Hemoglobin : 21.8 pg  Mean Cell Hemoglobin Concentration : 31.8 gm/dL  Auto Neutrophil # : x  Auto Lymphocyte # : x  Auto Monocyte # : x  Auto Eosinophil # : x  Auto Basophil # : x  Auto Neutrophil % : x  Auto Lymphocyte % : x  Auto Monocyte % : x  Auto Eosinophil % : x  Auto Basophil % : x    11-23    135  |  101  |  21  ----------------------------<  127<H>  4.6   |  20<L>  |  1.22  11-21    133<L>  |  99  |  31<H>  ----------------------------<  126<H>  5.3   |  21<L>  |  1.94<H>    Ca    8.7      23 Nov 2021 00:28  Ca    9.1      21 Nov 2021 23:04  Phos  3.6     11-23  Phos  3.0     11-21  Mg     2.0     11-23  Mg     2.0     11-21    TPro  6.8  /  Alb  3.1<L>  /  TBili  0.6  /  DBili  x   /  AST  23  /  ALT  16  /  AlkPhos  66  11-23  TPro  7.6  /  Alb  3.7  /  TBili  0.4  /  DBili  x   /  AST  24  /  ALT  24  /  AlkPhos  72  11-21      proBNP: Serum Pro-Brain Natriuretic Peptide: 5302 pg/mL (11-21 @ 23:04)    Lipid Profile:   HgA1c:   TSH:       CARDIAC MARKERS:          TELEMETRY:   	    ECG:  	    RADIOLOGY:    	  ASSESSMENT/PLAN: 	     24H hour events: afib occasionally V paced over night     MEDICATIONS:  aspirin enteric coated 81 milliGRAM(s) Oral daily  hydrALAZINE 25 milliGRAM(s) Oral every 8 hours  metoprolol tartrate 25 milliGRAM(s) Oral every 12 hours  rivaroxaban 20 milliGRAM(s) Oral with dinner  pantoprazole    Tablet 40 milliGRAM(s) Oral before breakfast  allopurinol 100 milliGRAM(s) Oral daily  atorvastatin 20 milliGRAM(s) Oral at bedtime  insulin lispro (ADMELOG) corrective regimen sliding scale   SubCutaneous Before meals and at bedtime    fluticasone propionate 50 MICROgram(s)/spray Nasal Spray 1 Spray(s) Both Nostrils two times a day PRN  sodium chloride 0.9% lock flush 3 milliLiter(s) IV Push every 8 hours      REVIEW OF SYSTEMS:  See HPI, otherwise ROS negative.    PHYSICAL EXAM:  T(C): 36.1 (21 @ 12:00), Max: 36.6 (21 @ 16:00)  HR: 70 (21 @ 12:00) (62 - 81)  BP: 130/61 (21 @ 22:00) (119/57 - 175/70)  RR: 19 (21 @ 12:00) (14 - 34)  SpO2: 98% (21 @ 12:00) (91% - 100%)  Wt(kg): --  I&O's Summary    2021 07:  -  2021 07:00  --------------------------------------------------------  IN: 258.4 mL / OUT: 1600 mL / NET: -1341.6 mL    2021 07:  -  2021 13:10  --------------------------------------------------------  IN: 0 mL / OUT: 150 mL / NET: -150 mL    Appearance: Alert. NAD	  Cardiovascular: +S1S2 RRR no m/g/r  Respiratory: CTA B/L	  Psychiatry: A & O x 3, Mood & affect appropriate  Gastrointestinal:  Soft, NT. ND. +BS	  Skin: right groin flat no bleeding   Neurologic: Non-focal  Extremities: No edema BLE  Vascular: Peripheral pulses palpable 2+ bilaterally    LABS:	 	    CBC Full  -  ( 2021 00:28 )  WBC Count : 9.18 K/uL  Hemoglobin : 10.3 g/dL  Hematocrit : 32.4 %  Platelet Count - Automated : 92 K/uL  Mean Cell Volume : 68.5 fl  Mean Cell Hemoglobin : 21.8 pg  Mean Cell Hemoglobin Concentration : 31.8 gm/dL  Auto Neutrophil # : x  Auto Lymphocyte # : x  Auto Monocyte # : x  Auto Eosinophil # : x  Auto Basophil # : x  Auto Neutrophil % : x  Auto Lymphocyte % : x  Auto Monocyte % : x  Auto Eosinophil % : x  Auto Basophil % : x        135  |  101  |  21  ----------------------------<  127<H>  4.6   |  20<L>  |  1.22      133<L>  |  99  |  31<H>  ----------------------------<  126<H>  5.3   |  21<L>  |  1.94<H>    Ca    8.7      2021 00:28  Ca    9.1      2021 23:04  Phos  3.6       Phos  3.0       Mg     2.0       Mg     2.0         TPro  6.8  /  Alb  3.1<L>  /  TBili  0.6  /  DBili  x   /  AST  23  /  ALT  16  /  AlkPhos  66    TPro  7.6  /  Alb  3.7  /  TBili  0.4  /  DBili  x   /  AST  24  /  ALT  24  /  AlkPhos  72        proBNP: Serum Pro-Brain Natriuretic Peptide: 5302 pg/mL ( @ 23:04)    TSH: Thyroid Stimulating Hormone, Serum: 4.81 uIU/mL     TELEMETRY: AFIB 50 -60 'S occasional v pacing    	    EC bpm afib     RADIOLOGY:  < from: Xray Chest 1 View- PORTABLE-Routine (21 @ 02:56) >  EXAM:  XR CHEST PORTABLE ROUTINE 1V                            PROCEDURE DATE:  2021    INTERPRETATION:  A single chest x-ray was obtained on the right and the 2021.    INDICATION: Status post cardiac surgery.    IMPRESSION:  The heart is slightly enlarged. Status post transaortic valve replacement. A MICRA apparatus is overlying the left hemithorax. The lungs appear to be clear. No pleural effusion. No pneumothorax.

## 2021-11-23 NOTE — DISCHARGE NOTE PROVIDER - CARE PROVIDERS DIRECT ADDRESSES
[email or web address],[email or web address],DirectAddress_Unknown

## 2021-11-23 NOTE — DISCHARGE NOTE PROVIDER - CARE PROVIDER_API CALL
Maurice Tomas)  Surgery; Thoracic Surgery  [address] [place] [address]  Phone: [phone]  Fax: [phone]  Follow Up Time: 1 week    Alen Justice)  Cardiology; Internal Medicine; Interventional Cardiology  [address] [place] [address]  Phone: [phone]  Fax: [phone]  Follow Up Time: 1 month    Seble Blanton; PhD)  Cardiac Electrophysiology; Cardiovascular Disease; Internal Medicine  [facility] - Dept of Cardiology, [address] [place] [address]  Phone: [phone]  Fax: [phone]  Scheduled Appointment: 12/07/2021 09:40 AM

## 2021-11-23 NOTE — DISCHARGE NOTE PROVIDER - PROVIDER TOKENS
PROVIDER:[TOKEN:[[id number]],FOLLOWUP:[1 week]],PROVIDER:[TOKEN:[[id number]],FOLLOWUP:[1 month]],PROVIDER:[TOKEN:[[id number]],SCHEDULEDAPPT:[12/07/2021],SCHEDULEDAPPTTIME:[09:40 AM]]

## 2021-11-23 NOTE — DISCHARGE NOTE PROVIDER - HOSPITAL COURSE
81M PMHx CAD (1 stent,) DM2, HTN, HLD, AS, Diastolic HF, Afib (on Xarelto) and CVA (L hemiparesis) - recent TAVR w Dr. Tomas - [place] one day ago on a holter monitor. Pt presenting today with CC of one episode of dizziness. On holter monitor - arrythmia noted (afib, bradycardia with pauses) and pt was called to come back in. Pt feels back at baseline. No sob, orthopnea, chest pain, LE edema.    On 11/21, Patient was started on Dopamine for chronotropy. On 11/22, patient underwent a Micra PPM with EP without issue. Right groin stitch was d/c'd with minimal oozing, pressure applied with improvement. Per EP, patient is to start on Toprol-XL 25 QD until followup.    Today on 11/23, patient is in good spirits. Has minimal discomfort in right groin. Patient is hemodynamically and clinically stable for discharge home with follow-up with EP and Structural Heart. no

## 2021-11-23 NOTE — DISCHARGE NOTE PROVIDER - NSDCPNSUBOBJ_GEN_ALL_CORE
Patient complaining of mild right groin discomfort. Otherwise has no complaints at this time. Denies fever, chills, N/V, chest pain, SOB, palpitations.    Physical Exam:  General: Pleasant appearing in NAD.  Neuro: AAO x4, no deficits noted. Strength equal b/l throughout.  Skin: Has some ecchymosis of left and right groins, greater on left. No hematoma noted by palpation.  Head: NCAT.  Eyes: Pupils equal and reactive.  Neck: No JVD.  CV: Intermittently paced, rhythm irregular.  Pulm: CTABL, no wheezing, rhonchi  : Atraumatic.  GI: Abd soft, NT, ND, positive BS throughout  Vasc: Groins soft without bleeding or hematoma. Palpable DP pulses d/l. Warm and well-perfused.  MSK: Equal ROM throughout.

## 2021-11-23 NOTE — DISCHARGE NOTE PROVIDER - NSDCHOSPICE_GEN_A_CORE
[FreeTextEntry1] : Ms. Brown presents for initial evaluation and management of alcoholism, HTN, dyslipidemia, LBBB, dementia, chronic systolic heart failure secondary to nonischemic cardiomyopathy, and moderate nonobstructive CAD.  She was previously followed by Theodore Torres MD.  She was admitted to [facility] on 05/30/21 after a mechanical fall which resulted in a right hip fracture.  In the course of the preoperative work up she had an echocardiogram which revealed an EF of 30%, LVH, mid and apical segment akinesis, mild-to-moderate MR, aortic sclerosis, mild AI, and moderate TR.  She then went on to have an invasive coronary angiogram on 06/01/21 which revealed a moderate mid LAD lesion and otherwise mild diffuse disease.  Although there is no indication from 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 She denies chest pain but has complaints of SOB.  
No

## 2021-11-23 NOTE — DISCHARGE NOTE PROVIDER - NSDCCPTREATMENT_GEN_ALL_CORE_FT
PRINCIPAL PROCEDURE  Procedure: Insertion, pacemaker, ventricular, permanent  Findings and Treatment: Micra PPM

## 2021-11-23 NOTE — DISCHARGE NOTE PROVIDER - NSDCMRMEDTOKEN_GEN_ALL_CORE_FT
acetaminophen 325 mg oral tablet: 2 tab(s) orally every 6 hours, As needed, Mild Pain (1 - 3), Moderate Pain (4 - 6)  allopurinol 100 mg oral tablet: 1 tab(s) orally once a day  aspirin 81 mg oral delayed release tablet: 1 tab(s) orally once a day  atorvastatin 20 mg oral tablet: 1 tab(s) orally once a day (at bedtime)  fluticasone 50 mcg/inh nasal spray: 1 spray(s) in each nostril 2 times a day, As Needed  hydrALAZINE 25 mg oral tablet: 1 tab(s) orally 3 times a day  metFORMIN 500 mg oral tablet: 1 tab(s) orally 2 times a day (with meals)  Metoprolol Succinate ER 25 mg oral tablet, extended release: 1 tab(s) orally once a day  pantoprazole 40 mg oral delayed release tablet: 1 tab(s) orally once a day (before a meal)  XARELTO 20 MG TABLET: take 1 tablet by mouth once daily with food

## 2021-12-07 ENCOUNTER — APPOINTMENT (OUTPATIENT)
Dept: ELECTROPHYSIOLOGY | Facility: CLINIC | Age: 81
End: 2021-12-07
Payer: MEDICARE

## 2021-12-07 ENCOUNTER — NON-APPOINTMENT (OUTPATIENT)
Age: 81
End: 2021-12-07

## 2021-12-07 VITALS — WEIGHT: 170 LBS | HEIGHT: 64.5 IN | HEART RATE: 75 BPM | OXYGEN SATURATION: 100 % | BODY MASS INDEX: 28.67 KG/M2

## 2021-12-07 VITALS — DIASTOLIC BLOOD PRESSURE: 87 MMHG | SYSTOLIC BLOOD PRESSURE: 133 MMHG

## 2021-12-07 DIAGNOSIS — Z95.0 PRESENCE OF CARDIAC PACEMAKER: ICD-10-CM

## 2021-12-07 DIAGNOSIS — I44.30 UNSPECIFIED ATRIOVENTRICULAR BLOCK: ICD-10-CM

## 2021-12-07 DIAGNOSIS — I48.20 CHRONIC ATRIAL FIBRILLATION, UNSP: ICD-10-CM

## 2021-12-07 DIAGNOSIS — Z95.2 PRESENCE OF PROSTHETIC HEART VALVE: ICD-10-CM

## 2021-12-07 PROCEDURE — 99024 POSTOP FOLLOW-UP VISIT: CPT

## 2021-12-07 PROCEDURE — 93000 ELECTROCARDIOGRAM COMPLETE: CPT

## 2021-12-07 RX ORDER — METFORMIN HYDROCHLORIDE 500 MG/1
500 TABLET, COATED ORAL
Refills: 0 | Status: ACTIVE | COMMUNITY

## 2021-12-07 RX ORDER — ASPIRIN 81 MG
81 TABLET, DELAYED RELEASE (ENTERIC COATED) ORAL
Refills: 0 | Status: ACTIVE | COMMUNITY

## 2021-12-07 RX ORDER — HYDRALAZINE HYDROCHLORIDE 25 MG/1
25 TABLET ORAL 3 TIMES DAILY
Refills: 0 | Status: ACTIVE | COMMUNITY

## 2021-12-07 RX ORDER — RIVAROXABAN 20 MG/1
20 TABLET, FILM COATED ORAL
Refills: 0 | Status: ACTIVE | COMMUNITY

## 2021-12-08 ENCOUNTER — APPOINTMENT (OUTPATIENT)
Dept: CARDIOTHORACIC SURGERY | Facility: CLINIC | Age: 81
End: 2021-12-08
Payer: MEDICARE

## 2021-12-08 VITALS
BODY MASS INDEX: 27.32 KG/M2 | OXYGEN SATURATION: 99 % | TEMPERATURE: 98 F | DIASTOLIC BLOOD PRESSURE: 82 MMHG | HEART RATE: 80 BPM | WEIGHT: 170 LBS | RESPIRATION RATE: 14 BRPM | SYSTOLIC BLOOD PRESSURE: 137 MMHG | HEIGHT: 66 IN

## 2021-12-08 DIAGNOSIS — Z95.3 PRESENCE OF XENOGENIC HEART VALVE: ICD-10-CM

## 2021-12-08 PROCEDURE — 99213 OFFICE O/P EST LOW 20 MIN: CPT

## 2021-12-08 RX ORDER — ATORVASTATIN CALCIUM 20 MG/1
20 TABLET, FILM COATED ORAL
Qty: 1 | Refills: 3 | Status: ACTIVE | COMMUNITY
Start: 2021-12-08

## 2021-12-08 RX ORDER — ALLOPURINOL 100 MG/1
100 TABLET ORAL DAILY
Qty: 30 | Refills: 0 | Status: ACTIVE | COMMUNITY
Start: 2021-12-08

## 2021-12-08 RX ORDER — METOPROLOL TARTRATE 25 MG/1
25 TABLET, FILM COATED ORAL TWICE DAILY
Qty: 180 | Refills: 0 | Status: COMPLETED | COMMUNITY
End: 2021-12-08

## 2021-12-08 NOTE — PHYSICAL EXAM
[Sclera] : the sclera and conjunctiva were normal [Neck Appearance] : the appearance of the neck was normal [Jugular Venous Distention Increased] : there was no jugular-venous distention [Respiration, Rhythm And Depth] : normal respiratory rhythm and effort [Exaggerated Use Of Accessory Muscles For Inspiration] : no accessory muscle use [Auscultation Breath Sounds / Voice Sounds] : lungs were clear to auscultation bilaterally [Apical Impulse] : the apical impulse was normal [Heart Rate And Rhythm] : heart rate was normal and rhythm regular [Heart Sounds] : normal S1 and S2 [Bowel Sounds] : normal bowel sounds [Abdomen Soft] : soft [Abdomen Tenderness] : non-tender [No CVA Tenderness] : no ~M costovertebral angle tenderness [Involuntary Movements] : no involuntary movements were seen [Skin Color & Pigmentation] : normal skin color and pigmentation [Skin Turgor] : normal skin turgor [] : no rash [No Focal Deficits] : no focal deficits [Oriented To Time, Place, And Person] : oriented to person, place, and time [Impaired Insight] : insight and judgment were intact [Affect] : the affect was normal [Mood] : the mood was normal

## 2021-12-09 PROBLEM — Z95.3 S/P TAVR (TRANSCATHETER AORTIC VALVE REPLACEMENT), BIOPROSTHETIC: Status: ACTIVE | Noted: 2021-12-09

## 2021-12-09 NOTE — END OF VISIT
[FreeTextEntry3] : Written by Reymundo Raygoza NP, acting as a scribe for Dr. Tomas\par “The documentation recorded by the scribe accurately reflects the service I personally performed and the decisions made by me.” Signature Maurice Tomas MD.

## 2021-12-09 NOTE — CONSULT LETTER
[Dear  ___] : Dear  [unfilled], [Courtesy Letter:] : I had the pleasure of seeing your patient, [unfilled], in my office today. [Please see my note below.] : Please see my note below. [Sincerely,] : Sincerely, [FreeTextEntry2] : Dr. Umer Child [FreeTextEntry3] : Maurice Tomas MD\par Department of Cardiovascular &Thoracic Surgery\par \par Cardiovascular &Thoracic Surgery\par [facility].

## 2021-12-09 NOTE — ASSESSMENT
[FreeTextEntry1] : Today on exam, patient's lungs are clear bilaterally,  bilateral groins are clean, dry and intact. There is no hematoma. No peripheral edema noted on exam.. SBE antibiotic prophylaxis discussed at length.  Patient instructed to continue current medication regimen and followup with cardiology.\par \par Plan:\par 1) Follow up with cardiologist and Dr. Blanton of EP\par 2) TTE in one month with cardiologist\par 3) Call with any questions or concerns\par

## 2021-12-09 NOTE — HISTORY OF PRESENT ILLNESS
[FreeTextEntry1] : 81 year old female s/p Amrit TAVR (RFA Perclose/LFA Perclose x2) on 11/18/2021.  Post op TTE revealed: \par Transcatheter aortic valve replacement.  The valve is well seated.  No aortic transvalvular or paravalvular \par regurgitation seen. DC home with [facility] 11/20, return to ED 11/21 with c/o dizziness with afib/bradycardia with pauses noted on MCOT. sp Micra PPM with EP 11/22 with Dr. Blanton, right groin stitch DC. DC to follow up with cardiologist Dr. Child, Dr. Blanton of EP and structural heart. \par \par Presents today with his wife [place] and reports feeling well.  Wife also notes improvement in his energy level since TAVR. Followed up with Dr. Blanton yesterday sp PPM. Follows Dr. Branden Stevens for cardiology. Reports his legs still feel a little weak at times.  Denies, CP, SOB, palpitations, dizziness, cough, fever or chills. legs still weak. Eating and drinking, + BM

## 2021-12-20 ENCOUNTER — TRANSCRIPTION ENCOUNTER (OUTPATIENT)
Age: 81
End: 2021-12-20

## 2021-12-22 PROCEDURE — 80053 COMPREHEN METABOLIC PANEL: CPT

## 2021-12-22 PROCEDURE — C1769: CPT

## 2021-12-22 PROCEDURE — 82330 ASSAY OF CALCIUM: CPT

## 2021-12-22 PROCEDURE — 36415 COLL VENOUS BLD VENIPUNCTURE: CPT

## 2021-12-22 PROCEDURE — 85027 COMPLETE CBC AUTOMATED: CPT

## 2021-12-22 PROCEDURE — 83036 HEMOGLOBIN GLYCOSYLATED A1C: CPT

## 2021-12-22 PROCEDURE — U0005: CPT

## 2021-12-22 PROCEDURE — 71045 X-RAY EXAM CHEST 1 VIEW: CPT

## 2021-12-22 PROCEDURE — C1898: CPT

## 2021-12-22 PROCEDURE — 83880 ASSAY OF NATRIURETIC PEPTIDE: CPT

## 2021-12-22 PROCEDURE — 86901 BLOOD TYPING SEROLOGIC RH(D): CPT

## 2021-12-22 PROCEDURE — 87641 MR-STAPH DNA AMP PROBE: CPT

## 2021-12-22 PROCEDURE — 82947 ASSAY GLUCOSE BLOOD QUANT: CPT

## 2021-12-22 PROCEDURE — C1894: CPT

## 2021-12-22 PROCEDURE — U0003: CPT

## 2021-12-22 PROCEDURE — C1889: CPT

## 2021-12-22 PROCEDURE — 86850 RBC ANTIBODY SCREEN: CPT

## 2021-12-22 PROCEDURE — 85018 HEMOGLOBIN: CPT

## 2021-12-22 PROCEDURE — 86923 COMPATIBILITY TEST ELECTRIC: CPT

## 2021-12-22 PROCEDURE — 82962 GLUCOSE BLOOD TEST: CPT

## 2021-12-22 PROCEDURE — 83051 HEMOGLOBIN PLASMA: CPT

## 2021-12-22 PROCEDURE — 93005 ELECTROCARDIOGRAM TRACING: CPT

## 2021-12-22 PROCEDURE — 84443 ASSAY THYROID STIM HORMONE: CPT

## 2021-12-22 PROCEDURE — 84132 ASSAY OF SERUM POTASSIUM: CPT

## 2021-12-22 PROCEDURE — 99285 EMERGENCY DEPT VISIT HI MDM: CPT | Mod: 25

## 2021-12-22 PROCEDURE — C1887: CPT

## 2021-12-22 PROCEDURE — 87640 STAPH A DNA AMP PROBE: CPT

## 2021-12-22 PROCEDURE — 94640 AIRWAY INHALATION TREATMENT: CPT

## 2021-12-22 PROCEDURE — 82435 ASSAY OF BLOOD CHLORIDE: CPT

## 2021-12-22 PROCEDURE — 85610 PROTHROMBIN TIME: CPT

## 2021-12-22 PROCEDURE — 84295 ASSAY OF SERUM SODIUM: CPT

## 2021-12-22 PROCEDURE — 84439 ASSAY OF FREE THYROXINE: CPT

## 2021-12-22 PROCEDURE — 33274 TCAT INSJ/RPL PERM LDLS PM: CPT | Mod: Q0

## 2021-12-22 PROCEDURE — 85576 BLOOD PLATELET AGGREGATION: CPT

## 2021-12-22 PROCEDURE — 83605 ASSAY OF LACTIC ACID: CPT

## 2021-12-22 PROCEDURE — 85025 COMPLETE CBC W/AUTO DIFF WBC: CPT

## 2021-12-22 PROCEDURE — 86769 SARS-COV-2 COVID-19 ANTIBODY: CPT

## 2021-12-22 PROCEDURE — 76000 FLUOROSCOPY <1 HR PHYS/QHP: CPT

## 2021-12-22 PROCEDURE — 94010 BREATHING CAPACITY TEST: CPT

## 2021-12-22 PROCEDURE — C1786: CPT

## 2021-12-22 PROCEDURE — 85730 THROMBOPLASTIN TIME PARTIAL: CPT

## 2021-12-22 PROCEDURE — 81001 URINALYSIS AUTO W/SCOPE: CPT

## 2021-12-22 PROCEDURE — 93926 LOWER EXTREMITY STUDY: CPT

## 2021-12-22 PROCEDURE — 82803 BLOOD GASES ANY COMBINATION: CPT

## 2021-12-22 PROCEDURE — 80048 BASIC METABOLIC PNL TOTAL CA: CPT

## 2021-12-22 PROCEDURE — 93880 EXTRACRANIAL BILAT STUDY: CPT

## 2021-12-22 PROCEDURE — 86900 BLOOD TYPING SEROLOGIC ABO: CPT

## 2021-12-22 PROCEDURE — 93355 ECHO TRANSESOPHAGEAL (TEE): CPT

## 2021-12-22 PROCEDURE — 83735 ASSAY OF MAGNESIUM: CPT

## 2021-12-22 PROCEDURE — C8929: CPT

## 2021-12-22 PROCEDURE — C1760: CPT

## 2021-12-22 PROCEDURE — 75572 CT HRT W/3D IMAGE: CPT

## 2021-12-22 PROCEDURE — 85014 HEMATOCRIT: CPT

## 2021-12-22 PROCEDURE — 84100 ASSAY OF PHOSPHORUS: CPT

## 2021-12-29 ENCOUNTER — APPOINTMENT (OUTPATIENT)
Dept: CARDIOTHORACIC SURGERY | Facility: CLINIC | Age: 81
End: 2021-12-29

## 2022-01-28 NOTE — CONSULT NOTE ADULT - REASON FOR ADMISSION
Chief complaint: left ankle   Accompanied by mom    aline playing basketball at around 1pm   Went up for a jump shot and rolled ankle when he got done  Patient stopped playing  Pain got worse at the rest of the afternoon    Problem list, Medication list, Allergies, and Medical/Social/Surgical histories reviewed in EPIC and updated as appropriate.    Patient declined wheelchair    ROS:  Musculoskeletal:  See HPI.  Constitutional, HEENT, cardiovascular, pulmonary, skin systems are negative, except as otherwise noted.      OBJECTIVE:  Blood pressure 122/74, pulse 60, temperature 98.5  F (36.9  C), temperature source Tympanic, weight 62.1 kg (137 lb), SpO2 100 %.  Patient is alert and NAD.  EYES: conjunctiva clear  Ankle Exam (left):  Inspection:swelling around the lateral malleolus  Palpation:tender over lateral malleolus no tenderness on navicular bone or 5th metatarsal. No proximal fibular tenderness. No calf tenderness. Achilles tendon is intact  Cap refill intact.    Good doralis pedis.  Neurovascularly Intact Distally.     XR to my review no fracture no foreign body official radiology pending.     ASSESSMENT:  No diagnosis found.    ICD-10-CM    1. Pain in joint involving ankle and foot, left  M25.572 XR Ankle Left G/E 3 Views     Orthopedic  Referral         PLAN:  xrays no fracture to my personal review   ASO brace given.  Weightbearing only as tolerated. Patient has crutches and walking boot at home   Activity modification  Referred to orthopedics for follow up   OTC pain control as needed. Proper dosing and contraindications discussed.  Patient aware to avoid NSAIDs if with kidney disease or ulcers.   Ice, elevate, slowly increase activity level with active range of motion exercises encouraged.  Follow up encouraged if no relief despite treatment plan.   Patient voiced understanding.        Nina Wild MD      
Syncope  x 2
Syncope  x 2

## 2022-03-05 NOTE — PATIENT PROFILE ADULT - NSPROHMDIABETMGMTSTRAT_GEN_A_NUR
Pt advised this nurse that he does not have a malcolm catheter in place when he is at home and that he straight caths TID. Pt reports that he has plenty of they appropriate supplies for performing straight caths at home. Malcolm to be removed prior to discharge. Berneda Rinne, NP updated on the need to remove malcolm prior to discharge. medication therapy

## 2022-03-11 NOTE — ED PROVIDER NOTE - MUSCULOSKELETAL, MLM
1. Have you been to the ER, urgent care clinic since your last visit? Hospitalized since your last visit? Yes When: 2-27-22 Reason for visit: elevated BP    2. Have you seen or consulted any other health care providers outside of the [address] [place] [address] since your last visit? Include any pap smears or colon screening.  Yes Where: [facility]     Wants to discuss BP and dizziness Spine appears normal, range of motion is not limited, no muscle or joint tenderness

## 2022-04-04 ENCOUNTER — APPOINTMENT (OUTPATIENT)
Dept: ELECTROPHYSIOLOGY | Facility: CLINIC | Age: 82
End: 2022-04-04
Payer: MEDICARE

## 2022-04-04 ENCOUNTER — NON-APPOINTMENT (OUTPATIENT)
Age: 82
End: 2022-04-04

## 2022-04-04 VITALS — DIASTOLIC BLOOD PRESSURE: 96 MMHG | SYSTOLIC BLOOD PRESSURE: 162 MMHG | HEART RATE: 80 BPM | OXYGEN SATURATION: 100 %

## 2022-04-04 PROCEDURE — 93000 ELECTROCARDIOGRAM COMPLETE: CPT | Mod: 59

## 2022-04-04 PROCEDURE — 93279 PRGRMG DEV EVAL PM/LDLS PM: CPT

## 2022-06-20 NOTE — PROGRESS NOTE ADULT - PROBLEM SELECTOR PLAN 2
Admit: 2022    Name:  Junior Found  Room:  [address] [place] [address]  MRN:    [id number]    Critical Care Daily Progress Note for 2022     Admitted with respiratory failure    Interval History:     Transfered to the ICU on  for worsening respiratory status and increasing oxygen requirement. Placed on Vapotherm. Currently on 100% 40 L in addition to a nonrebreather      Patient had to be intubated yesterday because of persistent hypoxia on Vapotherm. Underwent bronc this morning.   Bloody secretions +      Patient currently awake, on SBT-low volumes        Scheduled Meds:   vancomycin  1,000 mg IntraVENous Q24H    famotidine (PEPCID) injection  20 mg IntraVENous Daily    cefepime  2,000 mg IntraVENous Q12H    ipratropium-albuterol  1 ampule Inhalation Q4H    lidocaine 1 % injection  5 mL IntraDERmal Once    mupirocin   Nasal BID    chlorhexidine  15 mL Mouth/Throat BID    sodium chloride flush  10 mL IntraVENous 2 times per day    apixaban  2.5 mg Oral BID    atorvastatin  40 mg Oral Nightly    carbidopa-levodopa  1 tablet Oral 4x Daily    DULoxetine  60 mg Oral Daily    budesonide-formoterol  2 puff Inhalation BID    lamoTRIgine  50 mg Oral BID    montelukast  10 mg Oral Daily    propafenone  150 mg Oral 3 times per day    vancomycin (VANCOCIN) intermittent dosing (placeholder)   Other RX Placeholder       Continuous Infusions:   fentaNYL 50 mcg/hr (22 0947)    sodium chloride      propofol 20 mcg/kg/min (22 1056)    sodium chloride         PRN Meds:  carboxymethylcellulose PF, fentaNYL **AND** fentaNYL, sodium chloride (Inhalant), albuterol, sodium chloride, midazolam, fentanNYL, sodium chloride flush, sodium chloride, potassium chloride, magnesium sulfate, promethazine **OR** ondansetron, acetaminophen **OR** acetaminophen, albuterol                  Objective:     Temp  Av.9 °F (37.2 °C)  Min: 98.6 °F (37 °C)  Max: 99.3 °F (37.4 °C)  Pulse  Av.5  Min: 93 Max: 117  BP  Min: 102/38  Max: 136/52  SpO2  Av.9 %  Min: 90 %  Max: 100 %  FiO2   Av.7 %  Min: 30 %  Max: 40 %  Patient Vitals for the past 4 hrs:   BP Temp Temp src Pulse Resp SpO2 Height   22 1242 -- -- -- -- -- -- 5' 6\" (1.676 m)   22 1200 (!) 115/48 -- -- (!) 104 20 96 % --   22 1150 -- -- -- (!) 105 20 96 % --   22 1149 -- -- -- (!) 105 20 96 % --   22 1100 (!) 129/49 99.3 °F (37.4 °C) Bladder (!) 109 20 96 % --   22 1000 (!) 104/45 -- -- (!) 117 20 96 % --         Intake/Output Summary (Last 24 hours) at 2022 1319  Last data filed at 2022 0830  Gross per 24 hour   Intake 3111.68 ml   Output 510 ml   Net 2601.68 ml       Physical Exam:    General Appearance: Intubated, awake on SBT  Eyes: pupils equal, round, and reactive to light, extraocular eye movements intact, conjunctivae normal and sclera anicteric   Neck: neck supple and non tender without mass, no thyromegaly, no thyroid nodules and no cervical adenopathy   Pulmonary/Chest: Accessory muscle use plus. No wheezing.   Bilateral rhonchi  Cardiovascular: normal rate, regular rhythm, normal S1 and S2, no murmurs, rubs, clicks or gallops, distal pulses intact, no carotid bruits, no murmurs, no gallops, no carotid bruits and no JVD   Abdomen: obese, soft, non-tender, non-distended, normal bowel sounds, no masses or organomegaly   Extremities: No edema or cyanosis  Musculoskeletal: normal range of motion, no joint swelling, deformity or tenderness   Neurologic: Awake    Lab Data:  CBC:   Recent Labs     22  0559 22  0550 22  0440   WBC 19.7* 15.0* 13.3*   RBC 3.11* 3.03* 2.58*   HGB 8.7* 8.4* 8.0*   HCT 26.9* 26.7* 22.3*   MCV 86.4 88.1 86.6   RDW 13.9 14.0 14.0    220 235     BMP:   Recent Labs     22  0559 22  0549 22  0440    139 134*   K 4.2 3.8 3.7    105 101   CO2 26 18* 20*   BUN 42* 35* 31*   CREATININE 2.0* 1.8* 1.8*     BNP: No results for input(s): BNP in the last 72 hours. PT/INR:   Recent Labs     06/19/22  1004   PROTIME 19.0*   INR 1.61*     APTT:No results for input(s): APTT in the last 72 hours. CARDIAC ENZYMES:   No results for input(s): CKMB, CKMBINDEX, TROPONINI in the last 72 hours. Invalid input(s): CKTOTAL;3  FASTING LIPID PANEL:  Lab Results   Component Value Date    CHOL 147 09/27/2021    HDL 49 09/27/2021    HDL 72 12/20/2011    TRIG 194 09/27/2021     LIVER PROFILE:   Recent Labs     06/18/22  0559 06/19/22  0549   AST 11* 13*   ALT <5* <5*   BILITOT 0.4 0.3   ALKPHOS 92 85         IR PICC WO SQ PORT/PUMP > 5 YEARS   Final Result      XR CHEST PORTABLE   Final Result   Supportive tubing projects in stable position. Partial regression of atelectasis in the right lung base. Persisting   consolidation or or atelectasis in the left lung base. XR CHEST PORTABLE   Final Result   1. Endotracheal tube terminates in appropriate position above the nirali. 2.  Enteric tube terminates in the body of the stomach. 3.  Basilar opacities again demonstrated, right greater than left. Interval   improved aeration of the left lung base. XR CHEST PORTABLE   Final Result   Increasing opacity in the right base, lobar atelectasis versus accumulating   right pleural effusion. Combination of both entities or underlying pneumonia   also possible. Persisting left basilar airspace disease and small pleural effusion. XR CHEST PORTABLE   Final Result   Hazy bilateral lobe airspace opacities and small left pleural effusion. This   could represent atelectasis or pneumonia in the appropriate clinical setting. CT CHEST WO CONTRAST   Final Result   1. Respiratory motion in the lower lungs. The right base has linear and   dependent atelectasis. The left does have atelectatic changes but could also   have some patchy opacities from pneumonia or aspiration at the left base.       2.  There is no pleural effusion or pneumothorax. 3.  Narrowing of the AP diameter of the trachea with bulging of the   membranous portion due to the gas distended but nondilated esophagus. XR CHEST PORTABLE   Final Result   1. Bibasilar heterogeneous opacities may represent subsegmental atelectasis   and/or mild pulmonary edema. Underlying infection is difficult to exclude. Short-term follow-up PA and lateral chest radiographs may be helpful for   further evaluation. 2.  Cardiomegaly.          Fluoroscopy modified barium swallow with video    (Results Pending)   FL ESOPHAGRAM    (Results Pending)         Assessment & Plan:     Patient Active Problem List    Diagnosis Date Noted    Shortness of breath 06/17/2022    Aspiration pneumonia of left lower lobe (HCC)     Sensorineural hearing loss, bilateral 04/07/2022    Laceration of left lower extremity     Pulmonary nodules     C. difficile colitis 10/07/2021    Chest pain 09/27/2021    Pulmonary HTN ([address]     Mitral valve insufficiency     Sialadenitis     Colonic pseudoobstruction     Chronic kidney disease 03/01/2020    Partial symptomatic epilepsy with complex partial seizures, not intractable, without status epilepticus ([address] 80/51/2057    Diastolic congestive heart failure ([address] 01/17/2019    Acute respiratory failure with hypoxia (HCC)     COPD (chronic obstructive pulmonary disease) ([address] 12/10/2018    PAF [facility]oxysmal atrial fibrillation) ([facility])     Septicemia ([address] 09/08/2018    Severe obstructive sleep apnea     Hepatic steatosis     Diverticulosis     Blind left eye     Parkinson disease ([address] 05/22/2018    CAD (coronary artery disease)     Morbid obesity with BMI of 40.0-44.9, adult ([address] 02/10/2017    Type 2 diabetes mellitus with stage 4 chronic kidney disease, without long-term current use of insulin ([address] 02/10/2017    Moderate persistent asthma 08/09/2016    Chronic midline low back pain with right-sided sciatica 08/09/2016    cont current thearpy

## 2022-06-21 NOTE — PROGRESS NOTE ADULT - PROBLEM SELECTOR PROBLEM 4
Upper Endoscopy and Colonoscopy   WHAT YOU NEED TO KNOW:   An upper endoscopy is also called an upper gastrointestinal (GI) endoscopy, or an esophagogastroduodenoscopy (EGD)  It is a procedure to examine the inside of your esophagus, stomach, and duodenum (first part of the small intestine) with a scope  You may feel bloated, gassy, or have some abdominal discomfort after your procedure  Your throat may be sore for 24 to 36 hours  You may burp or pass gas from air that is still inside your body  A colonoscopy is a procedure to examine the inside of your colon (intestine) with a scope  Polyps or tissue growths may have been removed during your colonoscopy  It is normal to feel bloated and to have some abdominal discomfort  You should be passing gas  If you have hemorrhoids or you had polyps removed, you may have a small amount of bleeding  DISCHARGE INSTRUCTIONS:   Seek care immediately if:   You have sudden, severe abdominal pain  You have problems swallowing  You have a large amount of black, sticky bowel movements or blood in your bowel movements  You have sudden trouble breathing  You feel weak, lightheaded, or faint or your heart beats faster than normal for you  Contact your healthcare provider if:   You have a fever and chills  You have nausea or are vomiting  Your abdomen is bloated or feels full and hard  You have abdominal pain  You have a large amount of black, sticky bowel movements or blood in your bowel movements  You have not had a bowel movement for 3 days after your procedure  You have rash or hives  You have questions or concerns about your procedure  Activity:   ·       Do not lift, strain, or run for 24 hours after your procedure  ·       Rest after your procedure  You have been given medicine to relax you  Do not drive or make important decisions until the day after your procedure   Return to your normal activity as directed  ·       Relieve gas and discomfort from bloating by lying on your right side with a heating pad on your abdomen  You may need to take short walks to help the gas move out  Eat small meals until bloating is relieved  Follow up with your healthcare provider as directed: Write down your questions so you remember to ask them during your visits  If you take a blood thinner, please review the specific instructions from your endoscopist about when you should resume it  These can be found in the Recommendation and Your Medication list sections of this After Visit Summary  CJ (acute kidney injury)

## 2022-07-05 ENCOUNTER — APPOINTMENT (OUTPATIENT)
Dept: ELECTROPHYSIOLOGY | Facility: CLINIC | Age: 82
End: 2022-07-05

## 2022-07-05 ENCOUNTER — NON-APPOINTMENT (OUTPATIENT)
Age: 82
End: 2022-07-05

## 2022-07-05 PROCEDURE — 93294 REM INTERROG EVL PM/LDLS PM: CPT

## 2022-07-05 PROCEDURE — 93296 REM INTERROG EVL PM/IDS: CPT

## 2022-08-18 NOTE — H&P ADULT - PROBLEM SELECTOR PLAN 2
Yes Monitor CBC  Hg currently 11.8  Repeat CBC ordered  Clear liquid diet for now  Continue Protonix 40mg IVP BID  GI consult for likely endoscopy +/- colonoscopy  Hold Xarelto for now

## 2022-08-24 NOTE — ED ADULT NURSE NOTE - NS ED NURSE LEVEL OF CONSCIOUSNESS AFFECT
adequate initiation of the pharyngeal swallow, adequate laryngeal elevation, adequate tongue base retraction, adequate pharyngeal constriction Calm

## 2022-10-04 ENCOUNTER — APPOINTMENT (OUTPATIENT)
Dept: ELECTROPHYSIOLOGY | Facility: CLINIC | Age: 82
End: 2022-10-04

## 2022-10-06 ENCOUNTER — APPOINTMENT (OUTPATIENT)
Dept: ELECTROPHYSIOLOGY | Facility: CLINIC | Age: 82
End: 2022-10-06

## 2022-10-06 ENCOUNTER — NON-APPOINTMENT (OUTPATIENT)
Age: 82
End: 2022-10-06

## 2022-10-06 PROCEDURE — 93296 REM INTERROG EVL PM/IDS: CPT

## 2022-10-06 PROCEDURE — 93294 REM INTERROG EVL PM/LDLS PM: CPT

## 2022-12-08 ENCOUNTER — INPATIENT (INPATIENT)
Facility: HOSPITAL | Age: 82
LOS: 3 days | Discharge: HOME HEALTH | End: 2022-12-12
Attending: STUDENT IN AN ORGANIZED HEALTH CARE EDUCATION/TRAINING PROGRAM | Admitting: STUDENT IN AN ORGANIZED HEALTH CARE EDUCATION/TRAINING PROGRAM

## 2022-12-08 VITALS
DIASTOLIC BLOOD PRESSURE: 76 MMHG | HEART RATE: 84 BPM | RESPIRATION RATE: 16 BRPM | SYSTOLIC BLOOD PRESSURE: 140 MMHG | OXYGEN SATURATION: 100 %

## 2022-12-08 DIAGNOSIS — I10 ESSENTIAL (PRIMARY) HYPERTENSION: ICD-10-CM

## 2022-12-08 DIAGNOSIS — R47.1 DYSARTHRIA AND ANARTHRIA: ICD-10-CM

## 2022-12-08 DIAGNOSIS — I63.9 CEREBRAL INFARCTION, UNSPECIFIED: ICD-10-CM

## 2022-12-08 DIAGNOSIS — M10.9 GOUT, UNSPECIFIED: ICD-10-CM

## 2022-12-08 DIAGNOSIS — E78.5 HYPERLIPIDEMIA, UNSPECIFIED: ICD-10-CM

## 2022-12-08 DIAGNOSIS — I48.91 UNSPECIFIED ATRIAL FIBRILLATION: ICD-10-CM

## 2022-12-08 DIAGNOSIS — Z29.9 ENCOUNTER FOR PROPHYLACTIC MEASURES, UNSPECIFIED: ICD-10-CM

## 2022-12-08 DIAGNOSIS — Z95.5 PRESENCE OF CORONARY ANGIOPLASTY IMPLANT AND GRAFT: Chronic | ICD-10-CM

## 2022-12-08 LAB
ALBUMIN SERPL ELPH-MCNC: 3.8 G/DL — SIGNIFICANT CHANGE UP (ref 3.3–5)
ALP SERPL-CCNC: 107 U/L — SIGNIFICANT CHANGE UP (ref 40–120)
ALT FLD-CCNC: 41 U/L — SIGNIFICANT CHANGE UP (ref 4–41)
ANION GAP SERPL CALC-SCNC: 14 MMOL/L — SIGNIFICANT CHANGE UP (ref 7–14)
APPEARANCE UR: CLEAR — SIGNIFICANT CHANGE UP
APTT BLD: 42 SEC — HIGH (ref 27–36.3)
AST SERPL-CCNC: 36 U/L — SIGNIFICANT CHANGE UP (ref 4–40)
B PERT DNA SPEC QL NAA+PROBE: SIGNIFICANT CHANGE UP
B PERT+PARAPERT DNA PNL SPEC NAA+PROBE: SIGNIFICANT CHANGE UP
BACTERIA # UR AUTO: NEGATIVE — SIGNIFICANT CHANGE UP
BASOPHILS # BLD AUTO: 0.13 K/UL — SIGNIFICANT CHANGE UP (ref 0–0.2)
BASOPHILS NFR BLD AUTO: 1.7 % — SIGNIFICANT CHANGE UP (ref 0–2)
BILIRUB SERPL-MCNC: 0.7 MG/DL — SIGNIFICANT CHANGE UP (ref 0.2–1.2)
BILIRUB UR-MCNC: NEGATIVE — SIGNIFICANT CHANGE UP
BORDETELLA PARAPERTUSSIS (RAPRVP): SIGNIFICANT CHANGE UP
BUN SERPL-MCNC: 27 MG/DL — HIGH (ref 7–23)
C PNEUM DNA SPEC QL NAA+PROBE: SIGNIFICANT CHANGE UP
CALCIUM SERPL-MCNC: 9.2 MG/DL — SIGNIFICANT CHANGE UP (ref 8.4–10.5)
CHLORIDE SERPL-SCNC: 101 MMOL/L — SIGNIFICANT CHANGE UP (ref 98–107)
CO2 SERPL-SCNC: 21 MMOL/L — LOW (ref 22–31)
COLOR SPEC: SIGNIFICANT CHANGE UP
CREAT SERPL-MCNC: 1.35 MG/DL — HIGH (ref 0.5–1.3)
DIFF PNL FLD: NEGATIVE — SIGNIFICANT CHANGE UP
EGFR: 53 ML/MIN/1.73M2 — LOW
EOSINOPHIL # BLD AUTO: 0.33 K/UL — SIGNIFICANT CHANGE UP (ref 0–0.5)
EOSINOPHIL NFR BLD AUTO: 4.4 % — SIGNIFICANT CHANGE UP (ref 0–6)
EPI CELLS # UR: 0 /HPF — SIGNIFICANT CHANGE UP (ref 0–5)
FLUAV SUBTYP SPEC NAA+PROBE: SIGNIFICANT CHANGE UP
FLUBV RNA SPEC QL NAA+PROBE: SIGNIFICANT CHANGE UP
GLUCOSE BLDC GLUCOMTR-MCNC: 97 MG/DL — SIGNIFICANT CHANGE UP (ref 70–99)
GLUCOSE SERPL-MCNC: 111 MG/DL — HIGH (ref 70–99)
GLUCOSE UR QL: NEGATIVE — SIGNIFICANT CHANGE UP
HADV DNA SPEC QL NAA+PROBE: SIGNIFICANT CHANGE UP
HCOV 229E RNA SPEC QL NAA+PROBE: SIGNIFICANT CHANGE UP
HCOV HKU1 RNA SPEC QL NAA+PROBE: SIGNIFICANT CHANGE UP
HCOV NL63 RNA SPEC QL NAA+PROBE: SIGNIFICANT CHANGE UP
HCOV OC43 RNA SPEC QL NAA+PROBE: SIGNIFICANT CHANGE UP
HCT VFR BLD CALC: 46.4 % — SIGNIFICANT CHANGE UP (ref 39–50)
HGB BLD-MCNC: 14.8 G/DL — SIGNIFICANT CHANGE UP (ref 13–17)
HMPV RNA SPEC QL NAA+PROBE: SIGNIFICANT CHANGE UP
HPIV1 RNA SPEC QL NAA+PROBE: SIGNIFICANT CHANGE UP
HPIV2 RNA SPEC QL NAA+PROBE: SIGNIFICANT CHANGE UP
HPIV3 RNA SPEC QL NAA+PROBE: SIGNIFICANT CHANGE UP
HPIV4 RNA SPEC QL NAA+PROBE: SIGNIFICANT CHANGE UP
IANC: 3.95 K/UL — SIGNIFICANT CHANGE UP (ref 1.8–7.4)
INR BLD: 2.64 RATIO — HIGH (ref 0.88–1.16)
KETONES UR-MCNC: NEGATIVE — SIGNIFICANT CHANGE UP
LEUKOCYTE ESTERASE UR-ACNC: NEGATIVE — SIGNIFICANT CHANGE UP
LYMPHOCYTES # BLD AUTO: 2.1 K/UL — SIGNIFICANT CHANGE UP (ref 1–3.3)
LYMPHOCYTES # BLD AUTO: 27.8 % — SIGNIFICANT CHANGE UP (ref 13–44)
M PNEUMO DNA SPEC QL NAA+PROBE: SIGNIFICANT CHANGE UP
MCHC RBC-ENTMCNC: 22.6 PG — LOW (ref 27–34)
MCHC RBC-ENTMCNC: 31.9 GM/DL — LOW (ref 32–36)
MCV RBC AUTO: 70.7 FL — LOW (ref 80–100)
MONOCYTES # BLD AUTO: 0.39 K/UL — SIGNIFICANT CHANGE UP (ref 0–0.9)
MONOCYTES NFR BLD AUTO: 5.2 % — SIGNIFICANT CHANGE UP (ref 2–14)
NEUTROPHILS # BLD AUTO: 4.28 K/UL — SIGNIFICANT CHANGE UP (ref 1.8–7.4)
NEUTROPHILS NFR BLD AUTO: 56.5 % — SIGNIFICANT CHANGE UP (ref 43–77)
NITRITE UR-MCNC: NEGATIVE — SIGNIFICANT CHANGE UP
PH UR: 8 — SIGNIFICANT CHANGE UP (ref 5–8)
PLATELET # BLD AUTO: 135 K/UL — LOW (ref 150–400)
POTASSIUM SERPL-MCNC: 4.2 MMOL/L — SIGNIFICANT CHANGE UP (ref 3.5–5.3)
POTASSIUM SERPL-SCNC: 4.2 MMOL/L — SIGNIFICANT CHANGE UP (ref 3.5–5.3)
PROT SERPL-MCNC: 8.3 G/DL — SIGNIFICANT CHANGE UP (ref 6–8.3)
PROT UR-MCNC: ABNORMAL
PROTHROM AB SERPL-ACNC: 30.9 SEC — HIGH (ref 10.5–13.4)
RAPID RVP RESULT: SIGNIFICANT CHANGE UP
RBC # BLD: 6.56 M/UL — HIGH (ref 4.2–5.8)
RBC # FLD: 20.9 % — HIGH (ref 10.3–14.5)
RBC CASTS # UR COMP ASSIST: 2 /HPF — SIGNIFICANT CHANGE UP (ref 0–4)
RSV RNA SPEC QL NAA+PROBE: SIGNIFICANT CHANGE UP
RV+EV RNA SPEC QL NAA+PROBE: SIGNIFICANT CHANGE UP
SARS-COV-2 RNA SPEC QL NAA+PROBE: SIGNIFICANT CHANGE UP
SODIUM SERPL-SCNC: 136 MMOL/L — SIGNIFICANT CHANGE UP (ref 135–145)
SP GR SPEC: 1.05 — SIGNIFICANT CHANGE UP (ref 1.01–1.05)
TROPONIN T, HIGH SENSITIVITY RESULT: 22 NG/L — SIGNIFICANT CHANGE UP
TROPONIN T, HIGH SENSITIVITY RESULT: 23 NG/L — SIGNIFICANT CHANGE UP
UROBILINOGEN FLD QL: SIGNIFICANT CHANGE UP
WBC # BLD: 7.57 K/UL — SIGNIFICANT CHANGE UP (ref 3.8–10.5)
WBC # FLD AUTO: 7.57 K/UL — SIGNIFICANT CHANGE UP (ref 3.8–10.5)
WBC UR QL: 0 /HPF — SIGNIFICANT CHANGE UP (ref 0–5)

## 2022-12-08 PROCEDURE — 99291 CRITICAL CARE FIRST HOUR: CPT

## 2022-12-08 PROCEDURE — 70496 CT ANGIOGRAPHY HEAD: CPT | Mod: 26,MA

## 2022-12-08 PROCEDURE — 70498 CT ANGIOGRAPHY NECK: CPT | Mod: 26,MA

## 2022-12-08 PROCEDURE — 99223 1ST HOSP IP/OBS HIGH 75: CPT

## 2022-12-08 PROCEDURE — 71045 X-RAY EXAM CHEST 1 VIEW: CPT | Mod: 26

## 2022-12-08 RX ORDER — DEXTROSE 50 % IN WATER 50 %
12.5 SYRINGE (ML) INTRAVENOUS ONCE
Refills: 0 | Status: DISCONTINUED | OUTPATIENT
Start: 2022-12-08 | End: 2022-12-12

## 2022-12-08 RX ORDER — DEXTROSE 50 % IN WATER 50 %
25 SYRINGE (ML) INTRAVENOUS ONCE
Refills: 0 | Status: DISCONTINUED | OUTPATIENT
Start: 2022-12-08 | End: 2022-12-12

## 2022-12-08 RX ORDER — RIVAROXABAN 15 MG-20MG
20 KIT ORAL
Refills: 0 | Status: DISCONTINUED | OUTPATIENT
Start: 2022-12-09 | End: 2022-12-12

## 2022-12-08 RX ORDER — GLUCAGON INJECTION, SOLUTION 0.5 MG/.1ML
1 INJECTION, SOLUTION SUBCUTANEOUS ONCE
Refills: 0 | Status: DISCONTINUED | OUTPATIENT
Start: 2022-12-08 | End: 2022-12-12

## 2022-12-08 RX ORDER — INSULIN LISPRO 100/ML
VIAL (ML) SUBCUTANEOUS
Refills: 0 | Status: DISCONTINUED | OUTPATIENT
Start: 2022-12-08 | End: 2022-12-12

## 2022-12-08 RX ORDER — LANOLIN ALCOHOL/MO/W.PET/CERES
3 CREAM (GRAM) TOPICAL AT BEDTIME
Refills: 0 | Status: DISCONTINUED | OUTPATIENT
Start: 2022-12-08 | End: 2022-12-12

## 2022-12-08 RX ORDER — ONDANSETRON 8 MG/1
4 TABLET, FILM COATED ORAL EVERY 8 HOURS
Refills: 0 | Status: DISCONTINUED | OUTPATIENT
Start: 2022-12-08 | End: 2022-12-12

## 2022-12-08 RX ORDER — ALLOPURINOL 300 MG
100 TABLET ORAL DAILY
Refills: 0 | Status: DISCONTINUED | OUTPATIENT
Start: 2022-12-09 | End: 2022-12-12

## 2022-12-08 RX ORDER — SODIUM CHLORIDE 9 MG/ML
1000 INJECTION, SOLUTION INTRAVENOUS
Refills: 0 | Status: DISCONTINUED | OUTPATIENT
Start: 2022-12-08 | End: 2022-12-12

## 2022-12-08 RX ORDER — ATORVASTATIN CALCIUM 80 MG/1
40 TABLET, FILM COATED ORAL AT BEDTIME
Refills: 0 | Status: DISCONTINUED | OUTPATIENT
Start: 2022-12-08 | End: 2022-12-12

## 2022-12-08 RX ORDER — FLUTICASONE PROPIONATE 50 MCG
1 SPRAY, SUSPENSION NASAL
Qty: 0 | Refills: 0 | DISCHARGE

## 2022-12-08 RX ORDER — METOPROLOL TARTRATE 50 MG
25 TABLET ORAL DAILY
Refills: 0 | Status: DISCONTINUED | OUTPATIENT
Start: 2022-12-08 | End: 2022-12-12

## 2022-12-08 RX ORDER — INSULIN LISPRO 100/ML
VIAL (ML) SUBCUTANEOUS AT BEDTIME
Refills: 0 | Status: DISCONTINUED | OUTPATIENT
Start: 2022-12-08 | End: 2022-12-12

## 2022-12-08 RX ORDER — METFORMIN HYDROCHLORIDE 850 MG/1
1 TABLET ORAL
Qty: 0 | Refills: 0 | DISCHARGE

## 2022-12-08 RX ORDER — DEXTROSE 50 % IN WATER 50 %
15 SYRINGE (ML) INTRAVENOUS ONCE
Refills: 0 | Status: DISCONTINUED | OUTPATIENT
Start: 2022-12-08 | End: 2022-12-12

## 2022-12-08 RX ORDER — ACETAMINOPHEN 500 MG
650 TABLET ORAL EVERY 6 HOURS
Refills: 0 | Status: DISCONTINUED | OUTPATIENT
Start: 2022-12-08 | End: 2022-12-12

## 2022-12-08 RX ORDER — KETOROLAC TROMETHAMINE 30 MG/ML
15 SYRINGE (ML) INJECTION EVERY 6 HOURS
Refills: 0 | Status: DISCONTINUED | OUTPATIENT
Start: 2022-12-08 | End: 2022-12-08

## 2022-12-08 NOTE — ED PROVIDER NOTE - CLINICAL SUMMARY MEDICAL DECISION MAKING FREE TEXT BOX
pt with concern for SVA given leg weakness and slurring of speech, although unclear time of onset; plan to get imaging as per neurology recommendations and re-eval; primary neurologic vs. toxic/metabolic cause

## 2022-12-08 NOTE — CONSULT NOTE ADULT - SUBJECTIVE AND OBJECTIVE BOX
INCOMPLETE     HPI: Patient is an 83 yo R-H male w/ pmhx of Afib on Xarelto (last dose this morning around 8am), HTN, DMII (not on medications), CVA vs TIA 4 years ago, presents to [facility] ED as code stroke for slurred speech and weakness in both legs. LKW 22:30 on 12/7. Patient was in normal state of health at that time but when he awoke around 7 am this morning, he told his wife that he was having difficulty getting out of bed due to weakness/imbalance of his legs. Around that time wife noticed slurred speech when patient would talk.     At baseline patient ambulates with a cane independently & is able to perform his ADLs. No toxic habits such as smoking, EtOH. Used to be on statin medications but no longer. No antiplatelet agents.    REVIEW OF SYSTEMS    A 10-system ROS was performed and is negative except for those items noted above and/or in the HPI.    PAST MEDICAL & SURGICAL HISTORY:  AF (atrial fibrillation)      HTN (hypertension)      HLD (hyperlipidemia)      Gout      CVA (cerebrovascular accident)  L hemiparesis 2019      Stented coronary artery  x1        FAMILY HISTORY:  Family history of stroke (Sibling)      SOCIAL HISTORY:   T/E/D:   Occupation:   Lives with:     MEDICATIONS (HOME):  Home Medications:  acetaminophen 325 mg oral tablet: 2 tab(s) orally every 6 hours, As needed, Mild Pain (1 - 3), Moderate Pain (4 - 6) (12 Nov 2021 16:52)  allopurinol 100 mg oral tablet: 1 tab(s) orally once a day (28 Oct 2021 08:35)  fluticasone 50 mcg/inh nasal spray: 1 spray(s) in each nostril 2 times a day, As Needed (28 Oct 2021 08:34)  hydrALAZINE 25 mg oral tablet: 1 tab(s) orally 3 times a day (28 Oct 2021 08:35)  metFORMIN 500 mg oral tablet: 1 tab(s) orally 2 times a day (with meals) (28 Oct 2021 08:34)  XARELTO 20 MG TABLET: take 1 tablet by mouth once daily with food (28 Oct 2021 08:35)    MEDICATIONS  (STANDING):    MEDICATIONS  (PRN):    ALLERGIES/INTOLERANCES:  Allergies  No Known Allergies    Intolerances    VITALS & EXAMINATION:  Vital Signs Last 24 Hrs  T(C): --  T(F): --  HR: 84 (08 Dec 2022 11:58) (84 - 84)  BP: 140/76 (08 Dec 2022 11:58) (140/76 - 140/76)  BP(mean): --  RR: 16 (08 Dec 2022 11:58) (16 - 16)  SpO2: 100% (08 Dec 2022 11:58) (100% - 100%)    Parameters below as of 08 Dec 2022 11:58  Patient On (Oxygen Delivery Method): room air        General:  Constitutional: Male, appears stated age, in no apparent distress including pain  Head: Normocephalic & Atraumatic.    Neurological:  MS: Eyes open. Awake, alert, oriented to person, place, situation, time. Follows all commands.    Language: Speech is clear, fluent with good repetition & comprehension.    CNs: PERRL (R = 3mm, L = 3mm). VFF. EOMI no nystagmus. V1-3 intact to LT b/l. No facial asymmetry b/l, full eye closure strength b/l. Hearing grossly normal (rubbing fingers) b/l. Tongue midline, normal movements, no atrophy.     Motor: Normal muscle bulk & tone. No noticeable tremor. No drift in UE or LE b/l.	     Sensation: Mildly decreased sensation to LT on RLE.     Cortical: Extinction on DSS (neglect): none    Reflexes:              Biceps(C5)       BR(C6)     Triceps(C7)               Patellar(L4)    Achilles(S1)    Plantar Resp  R	              2	          2	             2		                              2		    2		      Down   L	              2	          2	             2		                              2		    2		      Down     Coordination: intact rapid-alt movements. No dysmetria to FTN/HTS    Gait: Cautious ambulation with a cane & 1-2 person assistance.       LABORATORY:  CBC   Chem       LFTs   Coagulopathy   Lipid Panel   A1c   Cardiac enzymes     U/A   CSF  Immunological  Other    STUDIES & IMAGING:  Studies (EKG, EEG, EMG, etc):     Radiology (XR, CT, MR, U/S, TTE/FARIHA): INCOMPLETE     HPI: Patient is an 81 yo R-H male w/ pmhx of Afib on Xarelto (last dose this morning around 8am), HTN, DMII (not on medications), CVA vs TIA 4 years ago, presents to [facility] ED as code stroke for slurred speech and weakness in both legs. LKW 22:30 on 12/7. Patient was in normal state of health at that time but when he awoke around 7 am this morning, he told his wife that he was having difficulty getting out of bed due to weakness/imbalance of his legs. Around that time wife noticed slurred speech when patient would talk.     At baseline patient ambulates with a cane independently & is able to perform his ADLs. No toxic habits such as smoking, EtOH. Used to be on statin medications but no longer. No antiplatelet agents.    REVIEW OF SYSTEMS    A 10-system ROS was performed and is negative except for those items noted above and/or in the HPI.    PAST MEDICAL & SURGICAL HISTORY:  AF (atrial fibrillation)      HTN (hypertension)      HLD (hyperlipidemia)      Gout      CVA (cerebrovascular accident)  L hemiparesis 2019      Stented coronary artery  x1        FAMILY HISTORY:  Family history of stroke (Sibling)      SOCIAL HISTORY:   T/E/D:   Occupation:   Lives with:     MEDICATIONS (HOME):  Home Medications:  acetaminophen 325 mg oral tablet: 2 tab(s) orally every 6 hours, As needed, Mild Pain (1 - 3), Moderate Pain (4 - 6) (12 Nov 2021 16:52)  allopurinol 100 mg oral tablet: 1 tab(s) orally once a day (28 Oct 2021 08:35)  fluticasone 50 mcg/inh nasal spray: 1 spray(s) in each nostril 2 times a day, As Needed (28 Oct 2021 08:34)  hydrALAZINE 25 mg oral tablet: 1 tab(s) orally 3 times a day (28 Oct 2021 08:35)  metFORMIN 500 mg oral tablet: 1 tab(s) orally 2 times a day (with meals) (28 Oct 2021 08:34)  XARELTO 20 MG TABLET: take 1 tablet by mouth once daily with food (28 Oct 2021 08:35)    MEDICATIONS  (STANDING):    MEDICATIONS  (PRN):    ALLERGIES/INTOLERANCES:  Allergies  No Known Allergies    Intolerances    VITALS & EXAMINATION:  Vital Signs Last 24 Hrs  T(C): --  T(F): --  HR: 84 (08 Dec 2022 11:58) (84 - 84)  BP: 140/76 (08 Dec 2022 11:58) (140/76 - 140/76)  BP(mean): --  RR: 16 (08 Dec 2022 11:58) (16 - 16)  SpO2: 100% (08 Dec 2022 11:58) (100% - 100%)    Parameters below as of 08 Dec 2022 11:58  Patient On (Oxygen Delivery Method): room air        General:  Constitutional: Male, appears stated age, in no apparent distress including pain  Head: Normocephalic & Atraumatic.    Neurological:  MS: Eyes open. Awake, alert, oriented to person, place, situation, time. Follows all commands.    Language: Speech is clear, fluent with good repetition & comprehension.    CNs: PERRL (R = 3mm, L = 3mm). VFF. EOMI no nystagmus. V1-3 intact to LT b/l. No facial asymmetry b/l, full eye closure strength b/l. Hearing grossly normal (rubbing fingers) b/l. Tongue midline, normal movements, no atrophy.     Motor: Normal muscle bulk & tone. No noticeable tremor. No drift in UE or LE b/l.	     Sensation: Mildly decreased sensation to LT on RLE.     Cortical: Extinction on DSS (neglect): none    Reflexes:              Biceps(C5)       BR(C6)     Triceps(C7)               Patellar(L4)    Achilles(S1)    Plantar Resp  R	              2	          2	             2		                              2		    2		      Down   L	              2	          2	             2		                              2		    2		      Down     Coordination: intact rapid-alt movements. No dysmetria to FTN/HTS    Gait: Cautious ambulation with a cane & 1-2 person assistance.       LABORATORY:  CBC   Chem       LFTs   Coagulopathy   Lipid Panel   A1c   Cardiac enzymes     U/A   CSF  Immunological  Other    STUDIES & IMAGING:  Studies (EKG, EEG, EMG, etc):     Radiology (XR, CT, MR, U/S, TTE/FARIHA):    CTH: to my eye chronic infarcts of R thalamus, L thalamocapsular region, and L MCA. Moderate periventricular white matter disease all to my eye. Pending official reading.  HPI: Patient is an 83 yo R-H male w/ pmhx of Afib on Xarelto (last dose this morning around 8am), HTN, DMII (not on medications), CVA vs TIA 4 years ago, presents to [facility] ED as code stroke for slurred speech and weakness in both legs. LKW 22:30 on 12/7. Patient was in normal state of health at that time but when he awoke around 7 am this morning, he told his wife that he was having difficulty getting out of bed due to weakness/imbalance of his legs. Around that time wife noticed slurred speech when patient would talk. The last time this happened was 4 years ago when patient was told he had a "mild stroke," as per wife at bedside. Yesterday patient reports feeling a little lightheaded but no room spinning dizziness. Denies HA, changes in vision, blurry vision, diplopia, dizziness, N/V, numbness or tingling. Denies trauma/injury. Denies recent infection or sick contacts. Denies recent travel history. At baseline patient ambulates with a cane independently & is able to perform his ADLs. No toxic habits such as smoking, EtOH. Used to be on statin medications but no longer. No antiplatelet agents.    REVIEW OF SYSTEMS    A 10-system ROS was performed and is negative except for those items noted above and/or in the HPI.    PAST MEDICAL & SURGICAL HISTORY:  AF (atrial fibrillation)      HTN (hypertension)      HLD (hyperlipidemia)      Gout      CVA (cerebrovascular accident)  L hemiparesis 2019      Stented coronary artery  x1        FAMILY HISTORY:  Family history of stroke (Sibling)      SOCIAL HISTORY:   T/E/D:   Occupation:   Lives with:     MEDICATIONS (HOME):  Home Medications:  acetaminophen 325 mg oral tablet: 2 tab(s) orally every 6 hours, As needed, Mild Pain (1 - 3), Moderate Pain (4 - 6) (12 Nov 2021 16:52)  allopurinol 100 mg oral tablet: 1 tab(s) orally once a day (28 Oct 2021 08:35)  fluticasone 50 mcg/inh nasal spray: 1 spray(s) in each nostril 2 times a day, As Needed (28 Oct 2021 08:34)  hydrALAZINE 25 mg oral tablet: 1 tab(s) orally 3 times a day (28 Oct 2021 08:35)  metFORMIN 500 mg oral tablet: 1 tab(s) orally 2 times a day (with meals) (28 Oct 2021 08:34)  XARELTO 20 MG TABLET: take 1 tablet by mouth once daily with food (28 Oct 2021 08:35)    MEDICATIONS  (STANDING):    MEDICATIONS  (PRN):    ALLERGIES/INTOLERANCES:  Allergies  No Known Allergies    Intolerances    VITALS & EXAMINATION:  Vital Signs Last 24 Hrs  T(C): --  T(F): --  HR: 84 (08 Dec 2022 11:58) (84 - 84)  BP: 140/76 (08 Dec 2022 11:58) (140/76 - 140/76)  BP(mean): --  RR: 16 (08 Dec 2022 11:58) (16 - 16)  SpO2: 100% (08 Dec 2022 11:58) (100% - 100%)    Parameters below as of 08 Dec 2022 11:58  Patient On (Oxygen Delivery Method): room air        General:  Constitutional: Male, appears stated age, in no apparent distress including pain  Head: Normocephalic & Atraumatic.    Neurological:  MS: Eyes open. Awake, alert, oriented to person, place, situation, time. Follows all commands.    Language: Speech is mildly dysarthric, fluent with good repetition & comprehension.    CNs: PERRL (R = 3mm, L = 3mm). VFF. EOMI no nystagmus. V1-3 intact to LT b/l. No facial asymmetry b/l, full eye closure strength b/l. Hearing grossly normal (rubbing fingers) b/l. Tongue midline, normal movements, no atrophy.     Motor: Normal muscle bulk & tone. No noticeable tremor. No drift in UE or LE b/l.	     Sensation: Mildly decreased sensation to LT on RLE initially, then on reexamination patient admits that it basically feels the same.     Cortical: Extinction on DSS (neglect): none    Reflexes:              Biceps(C5)       BR(C6)     Triceps(C7)               Patellar(L4)    Achilles(S1)    Plantar Resp  R	              2	          2	             2		  3                      Withdrawal  L	              2	          2	             2		  3                      Withdrawal    Coordination: intact rapid-alt movements. No dysmetria to FTN/HTS    Gait: Cautious ambulation with a cane & 1-2 person assistance.       LABORATORY:  CBC   Chem       LFTs   Coagulopathy   Lipid Panel   A1c   Cardiac enzymes     U/A   CSF  Immunological  Other    STUDIES & IMAGING:  Studies (EKG, EEG, EMG, etc):     Radiology (XR, CT, MR, U/S, TTE/FARIHA):    CTH:     VENTRICLES AND SULCI: Ventricles and sulci are unremarkable for patient   age.  INTRA-AXIAL: No intracranial mass, acute hemorrhage, or midline shift is   present. There is non-specific moderate patchy and confluent decreased   attenuation in the white matter likely microvascular disease. Bilateral   thalamic chronic lacunar infarcts. Chronic appearing right cerebellar   infarction which is new since the prior study.  EXTRA-AXIAL: No extra-axial fluid collection is present.  INTRACRANIAL HEMORRHAGE: None.    VISUALIZED SINUSES: No air-fluid levels are identified.  VISUALIZED MASTOIDS:  Clear  CALVARIUM:  Intact      IMPRESSION:  No acute intracranial hemorrhage. Moderate chronic microvascular ischemic   changes and chronic lacunar infarcts as above. Chronic appearing small   infarction in the right cerebellar hemisphere which is new since the   prior study.    Notification to clinician of alert:  Physician assistant Freddie Romero was notified about the   noncontrast head CT findings at 12:24 PM on 12/8/2022 with readback   confirmation. The opportunity for questions was provided and all   questions asked were answered.    --- End of Report ---      CTA H/N w/ IV contrast:     HPI: Patient is an 83 yo R-H male w/ pmhx of Afib on Xarelto (last dose this morning around 8am), HTN, DMII (not on medications), CVA vs TIA 4 years ago, presents to [facility] ED as code stroke for slurred speech and weakness in both legs. LKW 22:30 on 12/7. Patient was in normal state of health at that time but when he awoke around 7 am this morning, he told his wife that he was having difficulty getting out of bed due to weakness/imbalance of his legs. Around that time wife noticed slurred speech when patient would talk. The last time this happened was 4 years ago when patient was told he had a "mild stroke," as per wife at bedside. Yesterday patient reports feeling a little lightheaded but no room spinning dizziness. Denies HA, changes in vision, blurry vision, diplopia, dizziness, N/V, numbness or tingling. Denies trauma/injury. Denies recent infection or sick contacts. Denies recent travel history. At baseline patient ambulates with a cane independently & is able to perform his ADLs. No toxic habits such as smoking, EtOH. Used to be on statin medications but no longer. No antiplatelet agents.    REVIEW OF SYSTEMS    A 10-system ROS was performed and is negative except for those items noted above and/or in the HPI.    PAST MEDICAL & SURGICAL HISTORY:  AF (atrial fibrillation)      HTN (hypertension)      HLD (hyperlipidemia)      Gout      CVA (cerebrovascular accident)  L hemiparesis 2019      Stented coronary artery  x1        FAMILY HISTORY:  Family history of stroke (Sibling)      SOCIAL HISTORY:   T/E/D:   Occupation:   Lives with:     MEDICATIONS (HOME):  Home Medications:  acetaminophen 325 mg oral tablet: 2 tab(s) orally every 6 hours, As needed, Mild Pain (1 - 3), Moderate Pain (4 - 6) (12 Nov 2021 16:52)  allopurinol 100 mg oral tablet: 1 tab(s) orally once a day (28 Oct 2021 08:35)  fluticasone 50 mcg/inh nasal spray: 1 spray(s) in each nostril 2 times a day, As Needed (28 Oct 2021 08:34)  hydrALAZINE 25 mg oral tablet: 1 tab(s) orally 3 times a day (28 Oct 2021 08:35)  metFORMIN 500 mg oral tablet: 1 tab(s) orally 2 times a day (with meals) (28 Oct 2021 08:34)  XARELTO 20 MG TABLET: take 1 tablet by mouth once daily with food (28 Oct 2021 08:35)    MEDICATIONS  (STANDING):    MEDICATIONS  (PRN):    ALLERGIES/INTOLERANCES:  Allergies  No Known Allergies    Intolerances    VITALS & EXAMINATION:  Vital Signs Last 24 Hrs  T(C): --  T(F): --  HR: 84 (08 Dec 2022 11:58) (84 - 84)  BP: 140/76 (08 Dec 2022 11:58) (140/76 - 140/76)  BP(mean): --  RR: 16 (08 Dec 2022 11:58) (16 - 16)  SpO2: 100% (08 Dec 2022 11:58) (100% - 100%)    Parameters below as of 08 Dec 2022 11:58  Patient On (Oxygen Delivery Method): room air        General:  Constitutional: Male, appears stated age, in no apparent distress including pain  Head: Normocephalic & Atraumatic.    Neurological:  MS: Eyes open. Awake, alert, oriented to person, place, situation, time. Follows all commands.    Language: Speech is mildly dysarthric, fluent with good repetition & comprehension.    CNs: PERRL (R = 3mm, L = 3mm). VFF. EOMI no nystagmus. V1-3 intact to LT b/l. No facial asymmetry b/l, full eye closure strength b/l. Hearing grossly normal (rubbing fingers) b/l. Tongue midline, normal movements, no atrophy.     Motor: Normal muscle bulk & tone. No noticeable tremor. No drift in UE or LE b/l. UE grossly 5/5 throughout b/l. LE w/ HF 4+/5 b/l, DF/PF 5/5. 	     Sensation: Mildly decreased sensation to LT on RLE initially, then on reexamination patient admits that it basically feels the same.     Cortical: Extinction on DSS (neglect): none    Reflexes:              Biceps(C5)       BR(C6)     Triceps(C7)               Patellar(L4)    Achilles(S1)    Plantar Resp  R	              2	          2	             2		  3                      Withdrawal  L	              2	          2	             2		  3                      Withdrawal    Coordination: intact rapid-alt movements. No dysmetria to FTN/HTS    Gait: Cautious ambulation with a cane & 1-2 person assistance.       LABORATORY:  CBC   Chem       LFTs   Coagulopathy   Lipid Panel   A1c   Cardiac enzymes     U/A   CSF  Immunological  Other    STUDIES & IMAGING:  Studies (EKG, EEG, EMG, etc):     Radiology (XR, CT, MR, U/S, TTE/FARIHA):    CTH:     VENTRICLES AND SULCI: Ventricles and sulci are unremarkable for patient   age.  INTRA-AXIAL: No intracranial mass, acute hemorrhage, or midline shift is   present. There is non-specific moderate patchy and confluent decreased   attenuation in the white matter likely microvascular disease. Bilateral   thalamic chronic lacunar infarcts. Chronic appearing right cerebellar   infarction which is new since the prior study.  EXTRA-AXIAL: No extra-axial fluid collection is present.  INTRACRANIAL HEMORRHAGE: None.    VISUALIZED SINUSES: No air-fluid levels are identified.  VISUALIZED MASTOIDS:  Clear  CALVARIUM:  Intact      IMPRESSION:  No acute intracranial hemorrhage. Moderate chronic microvascular ischemic   changes and chronic lacunar infarcts as above. Chronic appearing small   infarction in the right cerebellar hemisphere which is new since the   prior study.    Notification to clinician of alert:  Physician assistant Freddie Romero was notified about the   noncontrast head CT findings at 12:24 PM on 12/8/2022 with readback   confirmation. The opportunity for questions was provided and all   questions asked were answered.    --- End of Report ---      CTA H/N w/ IV contrast:     HPI: Patient is an 81 yo R-H male w/ pmhx of Afib on Xarelto (last dose this morning around 8am), HTN, DMII (not on medications), CVA vs TIA 4 years ago, CAD (no antiplatelets), PPM placed 1 year ago, presents to [facility] ED as code stroke for slurred speech and weakness in both legs. LKW 22:30 on 12/7. Patient was in normal state of health at that time but when he awoke around 7 am this morning, he told his wife that he was having difficulty getting out of bed due to weakness/imbalance of his legs. Around that time wife noticed slurred speech when patient would talk. The last time this happened was 4 years ago when patient was told he had a "mild stroke," as per wife at bedside. Yesterday patient reports feeling a little lightheaded but no room spinning dizziness. Denies HA, changes in vision, blurry vision, diplopia, dizziness, N/V, numbness or tingling. Denies trauma/injury. Denies recent infection or sick contacts. Denies recent travel history. At baseline patient ambulates with a cane independently & is able to perform his ADLs. No toxic habits such as smoking, EtOH. Used to be on statin medications but no longer. No antiplatelet agents.    REVIEW OF SYSTEMS    A 10-system ROS was performed and is negative except for those items noted above and/or in the HPI.    PAST MEDICAL & SURGICAL HISTORY:  AF (atrial fibrillation)      HTN (hypertension)      HLD (hyperlipidemia)      Gout      CVA (cerebrovascular accident)  L hemiparesis 2019      Stented coronary artery  x1        FAMILY HISTORY:  Family history of stroke (Sibling)      SOCIAL HISTORY:   T/E/D:   Occupation:   Lives with:     MEDICATIONS (HOME):  Home Medications:  acetaminophen 325 mg oral tablet: 2 tab(s) orally every 6 hours, As needed, Mild Pain (1 - 3), Moderate Pain (4 - 6) (12 Nov 2021 16:52)  allopurinol 100 mg oral tablet: 1 tab(s) orally once a day (28 Oct 2021 08:35)  fluticasone 50 mcg/inh nasal spray: 1 spray(s) in each nostril 2 times a day, As Needed (28 Oct 2021 08:34)  hydrALAZINE 25 mg oral tablet: 1 tab(s) orally 3 times a day (28 Oct 2021 08:35)  metFORMIN 500 mg oral tablet: 1 tab(s) orally 2 times a day (with meals) (28 Oct 2021 08:34)  XARELTO 20 MG TABLET: take 1 tablet by mouth once daily with food (28 Oct 2021 08:35)    MEDICATIONS  (STANDING):    MEDICATIONS  (PRN):    ALLERGIES/INTOLERANCES:  Allergies  No Known Allergies    Intolerances    VITALS & EXAMINATION:  Vital Signs Last 24 Hrs  T(C): --  T(F): --  HR: 84 (08 Dec 2022 11:58) (84 - 84)  BP: 140/76 (08 Dec 2022 11:58) (140/76 - 140/76)  BP(mean): --  RR: 16 (08 Dec 2022 11:58) (16 - 16)  SpO2: 100% (08 Dec 2022 11:58) (100% - 100%)    Parameters below as of 08 Dec 2022 11:58  Patient On (Oxygen Delivery Method): room air        General:  Constitutional: Male, appears stated age, in no apparent distress including pain  Head: Normocephalic & Atraumatic.    Neurological:  MS: Eyes open. Awake, alert, oriented to person, place, situation, time. Follows all commands.    Language: Speech is mildly dysarthric, fluent with good repetition & comprehension.    CNs: PERRL (R = 3mm, L = 3mm). VFF. EOMI no nystagmus. V1-3 intact to LT b/l. No facial asymmetry b/l, full eye closure strength b/l. Hearing grossly normal (rubbing fingers) b/l. Tongue midline, normal movements, no atrophy.     Motor: Normal muscle bulk & tone. No noticeable tremor. No drift in UE or LE b/l. UE grossly 5/5 throughout b/l. LE w/ HF 4+/5 b/l, DF/PF 5/5. 	     Sensation: Mildly decreased sensation to LT on RLE initially, then on reexamination patient admits that it basically feels the same.     Cortical: Extinction on DSS (neglect): none    Reflexes:              Biceps(C5)       BR(C6)     Triceps(C7)               Patellar(L4)    Achilles(S1)    Plantar Resp  R	              2	          2	             2		  3                      Withdrawal  L	              2	          2	             2		  3                      Withdrawal    Coordination: intact rapid-alt movements. No dysmetria to FTN/HTS    Gait: Cautious ambulation with a cane & 1-2 person assistance.       LABORATORY:  CBC   Chem       LFTs   Coagulopathy   Lipid Panel   A1c   Cardiac enzymes     U/A   CSF  Immunological  Other    STUDIES & IMAGING:  Studies (EKG, EEG, EMG, etc):     Radiology (XR, CT, MR, U/S, TTE/FARIHA):    CTH:     VENTRICLES AND SULCI: Ventricles and sulci are unremarkable for patient   age.  INTRA-AXIAL: No intracranial mass, acute hemorrhage, or midline shift is   present. There is non-specific moderate patchy and confluent decreased   attenuation in the white matter likely microvascular disease. Bilateral   thalamic chronic lacunar infarcts. Chronic appearing right cerebellar   infarction which is new since the prior study.  EXTRA-AXIAL: No extra-axial fluid collection is present.  INTRACRANIAL HEMORRHAGE: None.    VISUALIZED SINUSES: No air-fluid levels are identified.  VISUALIZED MASTOIDS:  Clear  CALVARIUM:  Intact      IMPRESSION:  No acute intracranial hemorrhage. Moderate chronic microvascular ischemic   changes and chronic lacunar infarcts as above. Chronic appearing small   infarction in the right cerebellar hemisphere which is new since the   prior study.    Notification to clinician of alert:  Physician assistant Freddie Romero was notified about the   noncontrast head CT findings at 12:24 PM on 12/8/2022 with readback   confirmation. The opportunity for questions was provided and all   questions asked were answered.    --- End of Report ---      CTA H/N w/ IV contrast:     HPI: Patient is an 81 yo R-H male w/ pmhx of Afib on Xarelto (last dose this morning around 8am), HTN, DMII (not on medications), CVA vs TIA 4 years ago, CAD (no antiplatelets), PPM placed 1 year ago, presents to [facility] ED as code stroke for slurred speech and weakness in both legs. LKW 22:30 on 12/7. Patient was in normal state of health at that time but when he awoke around 7 am this morning, he told his wife that he was having difficulty getting out of bed due to weakness/imbalance of his legs. Around that time wife noticed slurred speech when patient would talk. The last time this happened was 4 years ago when patient was told he had a "mild stroke," as per wife at bedside. Yesterday patient reports feeling a little lightheaded but no room spinning dizziness. Denies HA, changes in vision, blurry vision, diplopia, dizziness, N/V, numbness or tingling. Denies trauma/injury. Denies recent infection or sick contacts. Denies recent travel history. At baseline patient ambulates with a cane independently & is able to perform his ADLs. No toxic habits such as smoking, EtOH. Used to be on statin medications but no longer. No antiplatelet agents.    Spoke with primary team regarding recommendations, as below.    REVIEW OF SYSTEMS    A 10-system ROS was performed and is negative except for those items noted above and/or in the HPI.    PAST MEDICAL & SURGICAL HISTORY:  AF (atrial fibrillation)      HTN (hypertension)      HLD (hyperlipidemia)      Gout      CVA (cerebrovascular accident)  L hemiparesis 2019      Stented coronary artery  x1        FAMILY HISTORY:  Family history of stroke (Sibling)      SOCIAL HISTORY:   T/E/D:   Occupation:   Lives with:     MEDICATIONS (HOME):  Home Medications:  acetaminophen 325 mg oral tablet: 2 tab(s) orally every 6 hours, As needed, Mild Pain (1 - 3), Moderate Pain (4 - 6) (12 Nov 2021 16:52)  allopurinol 100 mg oral tablet: 1 tab(s) orally once a day (28 Oct 2021 08:35)  fluticasone 50 mcg/inh nasal spray: 1 spray(s) in each nostril 2 times a day, As Needed (28 Oct 2021 08:34)  hydrALAZINE 25 mg oral tablet: 1 tab(s) orally 3 times a day (28 Oct 2021 08:35)  metFORMIN 500 mg oral tablet: 1 tab(s) orally 2 times a day (with meals) (28 Oct 2021 08:34)  XARELTO 20 MG TABLET: take 1 tablet by mouth once daily with food (28 Oct 2021 08:35)    MEDICATIONS  (STANDING):    MEDICATIONS  (PRN):    ALLERGIES/INTOLERANCES:  Allergies  No Known Allergies    Intolerances    VITALS & EXAMINATION:  Vital Signs Last 24 Hrs  T(C): --  T(F): --  HR: 84 (08 Dec 2022 11:58) (84 - 84)  BP: 140/76 (08 Dec 2022 11:58) (140/76 - 140/76)  BP(mean): --  RR: 16 (08 Dec 2022 11:58) (16 - 16)  SpO2: 100% (08 Dec 2022 11:58) (100% - 100%)    Parameters below as of 08 Dec 2022 11:58  Patient On (Oxygen Delivery Method): room air        General:  Constitutional: Male, appears stated age, in no apparent distress including pain  Head: Normocephalic & Atraumatic.    Neurological:  MS: Eyes open. Awake, alert, oriented to person, place, situation, time. Follows all commands.    Language: Speech is mildly dysarthric, fluent with good repetition & comprehension.    CNs: PERRL (R = 3mm, L = 3mm). VFF. EOMI no nystagmus. V1-3 intact to LT b/l. No facial asymmetry b/l, full eye closure strength b/l. Hearing grossly normal (rubbing fingers) b/l. Tongue midline, normal movements, no atrophy.     Motor: Normal muscle bulk & tone. No noticeable tremor. No drift in UE or LE b/l. UE grossly 5/5 throughout b/l. LE w/ HF 4+/5 b/l, DF/PF 5/5. 	     Sensation: Mildly decreased sensation to LT on RLE initially, then on reexamination patient admits that it basically feels the same.     Cortical: Extinction on DSS (neglect): none    Reflexes:              Biceps(C5)       BR(C6)     Triceps(C7)               Patellar(L4)    Achilles(S1)    Plantar Resp  R	              2	          2	             2		  3                      Withdrawal  L	              2	          2	             2		  3                      Withdrawal    Coordination: intact rapid-alt movements. No dysmetria to FTN/HTS    Gait: Cautious ambulation with a cane & 1-2 person assistance.       LABORATORY:  CBC   Chem       LFTs   Coagulopathy   Lipid Panel   A1c   Cardiac enzymes     U/A   CSF  Immunological  Other    STUDIES & IMAGING:  Studies (EKG, EEG, EMG, etc):     Radiology (XR, CT, MR, U/S, TTE/FARIHA):    CTH:     VENTRICLES AND SULCI: Ventricles and sulci are unremarkable for patient   age.  INTRA-AXIAL: No intracranial mass, acute hemorrhage, or midline shift is   present. There is non-specific moderate patchy and confluent decreased   attenuation in the white matter likely microvascular disease. Bilateral   thalamic chronic lacunar infarcts. Chronic appearing right cerebellar   infarction which is new since the prior study.  EXTRA-AXIAL: No extra-axial fluid collection is present.  INTRACRANIAL HEMORRHAGE: None.    VISUALIZED SINUSES: No air-fluid levels are identified.  VISUALIZED MASTOIDS:  Clear  CALVARIUM:  Intact      IMPRESSION:  No acute intracranial hemorrhage. Moderate chronic microvascular ischemic   changes and chronic lacunar infarcts as above. Chronic appearing small   infarction in the right cerebellar hemisphere which is new since the   prior study.    Notification to clinician of alert:  Physician assistant Freddie Romero was notified about the   noncontrast head CT findings at 12:24 PM on 12/8/2022 with readback   confirmation. The opportunity for questions was provided and all   questions asked were answered.    --- End of Report ---        CTA [place] OF DIAS:    ANTERIOR CIRCULATION    ICA  CAVERNOUS, SUPRACLINOID, BIFURCATION SEGMENTS: Patent without flow   limiting stenosis.    ANTERIOR CEREBRAL ARTERIES: Bilateral A1, and A2 anterior cerebral   arteries are unremarkable in course and caliber without flow limiting   stenosis.    MIDDLE CEREBRAL ARTERIES: Patent bilateral M1, M2, and distal MCA   branches without flow limiting stenosis.    POSTERIOR CIRCULATION:    VERTEBRAL ARTERIES: Patent without flow limiting stenosis    BASILAR ARTERY: Patent no flow limiting stenosis.    POSTERIOR CEREBRAL ARTERIES: Patent. Focal severe stenosis in the P1   segment of the left posterior cerebral artery.      CTA NECK:    GREAT VESSELS: Visualized segments are patent, no flow limiting stenosis.    COMMON CAROTID ARTERIES:  RIGHT: Patent without flow limiting stenosis  LEFT: Patent without flow limiting stenosis    INTERNAL CAROTID ARTERIES:  RIGHT: Patent no evidence for any hemodynamically significant stenosis at   the ICA origin by NASCET criteria.  LEFT: Patent noevidence for any hemodynamically significant stenosis at   the ICA origin by NASCET criteria.    VERTEBRAL ARTERIES:    RIGHT: No flow within the V1 right vertebral artery segment with   reconstitution at the V2 segment.  LEFT: No flow within the V1 and proximal V2 left vertebral artery   segments with reconstitution distally.    SOFT TISSUES: Unremarkable    BONES: Degenerative changes of the cervical spine.    IMPRESSION:    CTA COW: No large vessel occlusion. 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    CTA NECK: Patent, ECAs, ICAs, no  hemodynamically significant stenosis at    ICA origins by NASCET criteria.  No flow within the proximal vertebral arteries bilaterally with distal   reconstitution which is new since the prior study.    --- End of Report ---

## 2022-12-08 NOTE — ED ADULT NURSE REASSESSMENT NOTE - NS ED NURSE REASSESS COMMENT FT1
Break coverage RN: Received patient in 10A, admitted to telemetry, waiting for bed assignment. Patient denies any pain/discomfort at this time. Side rails up and safety maintained. Fall precaution in place.

## 2022-12-08 NOTE — STROKE CODE NOTE - MRS SCORE
(3) Moderate disability. Requires some help, but able to walk unassisted. (2) Slight disablitiy.  Able to look after own affairs without assistance, but unable to carry out all previous activities.

## 2022-12-08 NOTE — ED ADULT TRIAGE NOTE - CHIEF COMPLAINT QUOTE
brought in by family for slurred speech, feeling off balance and SOB, LKW ~8am, per family, pt still not speaking clear and pt states unable to walk. Hx of afib, pacemaker. Code stroke initiated, brought directly to CT scan.

## 2022-12-08 NOTE — H&P ADULT - NSHPPHYSICALEXAM_GEN_ALL_CORE
PHYSICAL EXAM:  GENERAL: NAD, comfortable appearing  HEAD:  Atraumatic, Normocephalic  EYES: EOMI, PERRL, conjunctiva and sclera clear  NECK: Supple, No JVD  CHEST/LUNG: Clear to auscultation bilaterally; No wheezes, rales or rhonchi  HEART: Regular rate and rhythm; No murmurs, rubs, or gallops, (+)S1, S2  ABDOMEN: Soft, Nontender, Nondistended; Normal Bowel sounds   EXTREMITIES:  2+ Peripheral Pulses, No clubbing, cyanosis, or edema  PSYCH: normal mood and affect  NEUROLOGY: AAOx3, CN II-XII intact, Able to move bilateral LE and UE.  SKIN: No rashes or lesions

## 2022-12-08 NOTE — ED PROVIDER NOTE - NEUROLOGICAL LEVEL OF CONSCIOUSNESS
Strength is 5/5 in all 4 extremities but when standing to walk pt having difficulty walking at baseline (slower, more tenuous).  Normally pt walks with a cane.

## 2022-12-08 NOTE — CONSULT NOTE ADULT - ASSESSMENT
INCOMPLETE     NIHSS: 2  mRS: 2    Patient is not a tPA candidate because they are outside of the appropriate window of treatment.  Patient is not a mechanical thrombectomy candidate because no evidence of LVO.    Impression: Dysarthria & gait imbalance of unclear localization/etiology at this time. Possibly due to diffuse cerebral dysfunction from toxic metabolic or infectious etiologies. However, given risk factors also in the differential is brain dysfunction from acute ischemic stroke of posterior circulation presumably from cardioembolic mechanism from atrial fibrillation.     Recommendations    Imaging  [] MRI brain w/o contrast unlikely to , but if patient is to remain inpatient can consider obtaining.   [] TTE, though less likely to     Meds  [] C/w home Xarelto   [] Star high dose statin (Atorvastatin 40-80 mg) if LDL > 70     Labs  [] CBC, CMP, Coags, Troponin  [] HbA1C and Lipid Panel    Other  [] Telemonitoring;  Neurochecks and Vital signs q4hr while inpatient  [] Permissive HTN up to 220/120 mmHg for first 24 hours after the symptom onset followed by gradual normotension.   [] HgA1C goals <7.0 BGM goals 140-180  [] PT/OT evaluation  [] NPO until clears Dysphagia screen, otherwise Swallow evaluation  [] Upon discharge, patient should follow up with Dr. Fontaine:  [phone] [address] [place] [address]     Patient case discussed with stroke fellow, Dr. Goetz, under supervision of stroke attending, Dr. Fontaine. Patient to be seen on morning rounds.     Please call with questions: [phone]  83 yo R-H male w/ pmhx of Afib on Xarelto (last dose this morning around 8am), HTN, DMII (not on medications), CVA vs TIA 4 years ago, presents to [facility] ED as code stroke for slurred speech and weakness in both legs. LKW 22:30 on 12/7. Patient was in normal state of health at that time but when he awoke around 7 am this morning, he told his wife that he was having difficulty getting out of bed due to weakness/imbalance of his legs. Around that time wife noticed slurred speech when patient would talk. The last time this happened was 4 years ago when patient was told he had a "mild stroke," as per wife at bedside. Exam as above. CTH w/o acute findings but multiple chronic infarcts as above. CTA H/N w/ IV contrast pending final read.    NIHSS: 2  mRS: 2    Patient is not a tPA candidate because they are outside of the appropriate window of treatment.  Patient is not a mechanical thrombectomy candidate because no evidence of LVO.    Impression: Dysarthria & gait imbalance of unclear localization/etiology at this time. Possibly due to diffuse cerebral dysfunction from toxic metabolic or infectious etiologies, potentially causing recrudescence of old infarct. However, given risk factors also in the differential is brain dysfunction from acute ischemic stroke of posterior circulation presumably from cardioembolic mechanism from atrial fibrillation.     Recommendations    Imaging  [] MRI brain w/o contrast unlikely to , but if patient is to remain inpatient can consider obtaining.   [] TTE, though less likely to     Meds  [] C/w home Xarelto   [] Start high dose statin (Atorvastatin 40-80 mg) if LDL > 70     Labs  [] CBC, CMP, Coags, Troponin  [] HbA1C and Lipid Panel    Other  [] Telemonitoring;  Neurochecks and Vital signs q4hr while inpatient  [] Permissive HTN up to 220/120 mmHg for first 24 hours after the symptom onset followed by gradual normotension.   [] HgA1C goals <7.0 BGM goals 140-180  [] PT/OT evaluation  [] NPO until clears Dysphagia screen, otherwise Swallow evaluation  [] Upon discharge, patient should follow up with Dr. Fontaine:  [phone] [address] [place] [address]     Patient case discussed with stroke fellow, Dr. Goetz, under supervision of stroke attending, Dr. Fontaine. Patient to be seen on morning rounds.     Please call with questions: [phone]  83 yo R-H male w/ pmhx of Afib on Xarelto (last dose this morning around 8am), HTN, DMII (not on medications), CVA vs TIA 4 years ago, presents to [facility] ED as code stroke for slurred speech and weakness in both legs. LKW 22:30 on 12/7. Patient was in normal state of health at that time but when he awoke around 7 am this morning, he told his wife that he was having difficulty getting out of bed due to weakness/imbalance of his legs. Around that time wife noticed slurred speech when patient would talk. The last time this happened was 4 years ago when patient was told he had a "mild stroke," as per wife at bedside. Exam as above. CTH w/o acute findings but multiple chronic infarcts as above. CTA H/N w/ IV contrast to my eye with severe stenosis vs occlusion of L v1 with reconstitution at V2; pending final read.    NIHSS: 2 --> 1 (dysarthria)  mRS: 2    Patient is not a tPA candidate because they are outside of the appropriate window of treatment.  Patient is not a mechanical thrombectomy candidate because no evidence of LVO.    Impression: Dysarthria & gait imbalance of unclear localization/etiology at this time. Possibly due to diffuse cerebral dysfunction from toxic metabolic or infectious etiologies, potentially causing recrudescence of old infarct. However, given risk factors also in the differential is brain dysfunction from acute ischemic stroke of posterior circulation presumably from cardioembolic mechanism from atrial fibrillation.     Recommendations    Imaging  [] MRI brain w/o contrast unlikely to , but if patient is to remain inpatient can consider obtaining.   [] TTE, though less likely to     Meds  [] C/w home Xarelto   [] Start high dose statin (Atorvastatin 40-80 mg) if LDL > 70     Labs  [] CBC, CMP, Coags, Troponin  [] HbA1C and Lipid Panel    Other  [] Toxic metabolic/infectious workup per primary team  [] Telemonitoring;  Neurochecks and Vital signs q4hr while inpatient  [] Permissive HTN up to 220/120 mmHg for first 24 hours after the symptom onset followed by gradual normotension.   [] HgA1C goals <7.0 BGM goals 140-180  [] PT/OT evaluation  [] NPO until clears Dysphagia screen, otherwise Swallow evaluation  [] Upon discharge, patient should follow up with Dr. Fontaine:  [phone] [address] [place] [address]     Patient case discussed with stroke fellow, Dr. Goetz, under supervision of stroke attending, Dr. Fontaine. Patient to be seen on morning rounds.     Please call with questions: [phone]  83 yo R-H male w/ pmhx of Afib on Xarelto (last dose this morning around 8am), HTN, DMII (not on medications), CVA vs TIA 4 years ago, presents to [facility] ED as code stroke for slurred speech and weakness in both legs. LKW 22:30 on 12/7. Patient was in normal state of health at that time but when he awoke around 7 am this morning, he told his wife that he was having difficulty getting out of bed due to weakness/imbalance of his legs. Around that time wife noticed slurred speech when patient would talk. The last time this happened was 4 years ago when patient was told he had a "mild stroke," as per wife at bedside. Exam as above. CTH w/o acute findings but multiple chronic infarcts as above. CTA H/N w/ IV contrast to my eye with severe stenosis vs occlusion of L v1 with reconstitution at V2; pending final read.    NIHSS: 2 --> 1 (dysarthria)  mRS: 2    Patient is not a tPA candidate because they are outside of the appropriate window of treatment.  Patient is not a mechanical thrombectomy candidate because no evidence of LVO.    Impression: Dysarthria & gait imbalance of unclear localization/etiology at this time. Possibly due to diffuse cerebral dysfunction from toxic metabolic or infectious etiologies, potentially causing recrudescence of old infarct. However, given risk factors also in the differential is brain dysfunction from acute ischemic stroke of posterior circulation presumably from cardioembolic mechanism from atrial fibrillation.     Recommendations    Imaging  [] MRI brain w/o contrast unlikely to , but if patient is to remain inpatient can consider obtaining.   [] TTE, though less likely to   [] Interrogate pacemaker    Meds  [] C/w home Xarelto   [] Start high dose statin (Atorvastatin 40-80 mg) if LDL > 70     Labs  [] CBC, CMP, Coags, Troponin  [] HbA1C and Lipid Panel    Other  [] Toxic metabolic/infectious workup per primary team  [] Telemonitoring;  Neurochecks and Vital signs q4hr while inpatient  [] Permissive HTN up to 220/120 mmHg for first 24 hours after the symptom onset followed by gradual normotension.   [] HgA1C goals <7.0 BGM goals 140-180  [] PT/OT evaluation  [] NPO until clears Dysphagia screen, otherwise Swallow evaluation  [] Upon discharge, patient should follow up with Dr. Fontaine:  [phone] [address] [place] [address]     Patient case discussed with stroke fellow, Dr. Goetz, under supervision of stroke attending, Dr. Fontaine. Patient to be seen on morning rounds.     Please call with questions: [phone]  81 yo R-H male w/ pmhx of Afib on Xarelto (last dose this morning around 8am), HTN, DMII (not on medications), CVA vs TIA 4 years ago, presents to [facility] ED as code stroke for slurred speech and weakness in both legs. LKW 22:30 on 12/7. Patient was in normal state of health at that time but when he awoke around 7 am this morning, he told his wife that he was having difficulty getting out of bed due to weakness/imbalance of his legs. Around that time wife noticed slurred speech when patient would talk. The last time this happened was 4 years ago when patient was told he had a "mild stroke," as per wife at bedside. Exam as above. CTH w/o acute findings but multiple chronic infarcts as above. CTA H/N w/ IV contrast to my eye with severe stenosis vs occlusion of L v1 with reconstitution at V2; pending final read.    NIHSS: 2 --> 1 (dysarthria)  mRS: 2    Patient is not a tPA candidate because they are outside of the appropriate window of treatment.  Patient is not a mechanical thrombectomy candidate because no evidence of LVO.    Impression: Dysarthria & gait imbalance of unclear localization/etiology at this time. Possibly due to diffuse cerebral dysfunction from toxic metabolic or infectious etiologies, potentially causing recrudescence of old infarct. However, given risk factors also in the differential is brain dysfunction from acute ischemic stroke of posterior circulation presumably from cardioembolic mechanism from atrial fibrillation.     Recommendations    Imaging  [] MRI brain w/o contrast  [] TTE, though less likely to   [] Interrogate pacemaker    Meds  [] C/w home Xarelto   [] Start high dose statin (Atorvastatin 40-80 mg) if LDL > 70     Labs  [] CBC, CMP, Coags, Troponin  [] HbA1C and Lipid Panel    Other  [] Toxic metabolic/infectious workup per primary team  [] Telemonitoring;  Neurochecks and Vital signs q4hr while inpatient  [] Permissive HTN up to 220/120 mmHg for first 24 hours after the symptom onset followed by gradual normotension.   [] HgA1C goals <7.0 BGM goals 140-180  [] PT/OT evaluation  [] NPO until clears Dysphagia screen, otherwise Swallow evaluation  [] Upon discharge, patient should follow up with Dr. Fontaine:  [phone] [address] [place] [address]     Patient case discussed with stroke fellow, Dr. Goetz, under supervision of stroke attending, Dr. Fontaine. Patient to be seen on morning rounds.     Please call with questions: [phone]  83 yo R-H male w/ pmhx of Afib on Xarelto (last dose this morning around 8am), HTN, DMII (not on medications), CVA vs TIA 4 years ago, presents to [facility] ED as code stroke for slurred speech and weakness in both legs. LKW 22:30 on 12/7. Patient was in normal state of health at that time but when he awoke around 7 am this morning, he told his wife that he was having difficulty getting out of bed due to weakness/imbalance of his legs. Around that time wife noticed slurred speech when patient would talk. The last time this happened was 4 years ago when patient was told he had a "mild stroke," as per wife at bedside. Exam as above. CTH w/o acute findings but multiple chronic infarcts as above. CTA H/N w/ IV contrast to my eye with severe stenosis vs occlusion of L v1 with reconstitution at V2; pending final read.    NIHSS: 2 --> 1 (dysarthria)  mRS: 2    Patient is not a tPA candidate because they are outside of the appropriate window of treatment.  Patient is not a mechanical thrombectomy candidate because no evidence of LVO.    Impression: Dysarthria & gait imbalance of unclear localization/etiology at this time. Possibly due to diffuse cerebral dysfunction from toxic metabolic or infectious etiologies, potentially causing recrudescence of old infarct. However, given risk factors also in the differential is brain dysfunction from acute ischemic stroke of posterior circulation presumably from cardioembolic mechanism from atrial fibrillation.     Recommendations    Imaging  [] Expeditious MRI brain w/o contrast  [] TTE, though less likely to   [] Interrogate pacemaker    Meds  [] C/w home Xarelto   [] Start high dose statin (Atorvastatin 40-80 mg) if LDL > 70     Labs  [] CBC, CMP, Coags, Troponin  [] HbA1C and Lipid Panel    Other  [] Toxic metabolic/infectious workup per primary team  [] Telemonitoring;  Neurochecks and Vital signs q4hr while inpatient  [] Permissive HTN up to 220/120 mmHg for first 24 hours after the symptom onset followed by gradual normotension.   [] HgA1C goals <7.0 BGM goals 140-180  [] PT/OT evaluation  [] NPO until clears Dysphagia screen, otherwise Swallow evaluation  [] Upon discharge, patient should follow up with Dr. Fontaine:  [phone] [address] [place] [address]     Patient case discussed with stroke fellow, Dr. Goetz, under supervision of stroke attending, Dr. Fontaine. Patient to be seen on morning rounds.     Please call with questions: [phone]

## 2022-12-08 NOTE — H&P ADULT - ASSESSMENT
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 Patient admitted for stroke work up.

## 2022-12-08 NOTE — H&P ADULT - NSHPLABSRESULTS_GEN_ALL_CORE
136  |  101  |  27<H>  ----------------------------<  111<H>  4.2   |  21<L>  |  1.35<H>    Ca    9.2      08 Dec 2022 12:10  Mg     1.80         TPro  8.3  /  Alb  3.8  /  TBili  0.7  /  DBili  x   /  AST  36  /  ALT  41  /  AlkPhos  107                        14.8   7.57  )-----------( 135      ( 08 Dec 2022 12:10 )             46.4   LIVER FUNCTIONS - ( 08 Dec 2022 12:10 )  Alb: 3.8 g/dL / Pro: 8.3 g/dL / ALK PHOS: 107 U/L / ALT: 41 U/L / AST: 36 U/L / GGT: x             PT/INR - ( 08 Dec 2022 12:10 )   PT: 30.9 sec;   INR: 2.64 ratio         PTT - ( 08 Dec 2022 12:10 )  PTT:42.0 sec    Urinalysis Basic - ( 08 Dec 2022 16:05 )    Color: Light Yellow / Appearance: Clear / S.050 / pH: x  Gluc: x / Ketone: Negative  / Bili: Negative / Urobili: <2 mg/dL   Blood: x / Protein: 30 mg/dL / Nitrite: Negative   Leuk Esterase: Negative / RBC: 2 /HPF / WBC 0 /HPF   Sq Epi: x / Non Sq Epi: 0 /HPF / Bacteria: Negative    EKG: Afib, .    Image:

## 2022-12-08 NOTE — H&P ADULT - HISTORY OF PRESENT ILLNESS
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 LKW 22:30 on 12/7. Patient was in normal state of health at that time but when he awoke around 7 am this morning, he told his wife that he was having difficulty getting out of bed due to weakness/imbalance of his legs. Around that time wife noticed slurred speech when patient would talk. The last time this happened was 4 years ago when patient was told he had a "mild stroke," as per wife at bedside. Yesterday patient reports feeling a little lightheaded but no room spinning dizziness. Denies HA, changes in vision, blurry vision, diplopia, dizziness, N/V, numbness or tingling. Denies trauma/injury. Denies recent infection or sick contacts. Denies recent travel history. At baseline patient ambulates with a cane independently & is able to perform his ADLs. No toxic habits such as smoking, EtOH.  In the ED, 140/76, Hgb 14,  (chronic), Cr 1.35 (baseline). CT with acute stroke. CTA with severe stenosis of posterior circulation. good, to achieve stated therapy goals

## 2022-12-08 NOTE — H&P ADULT - PROBLEM SELECTOR PLAN 2
Rate controlled  -Continue metoprolol (unlikely going to drop BP significantly) and would certainly keep Heart rate down (B>R)  -C/w Xarelto

## 2022-12-08 NOTE — ED ADULT NURSE NOTE - INTERVENTIONS DEFINITIONS
[place] to call system/Call bell, personal items and telephone within reach/Instruct patient to call for assistance/Non-slip footwear when patient is off stretcher/Physically safe environment: no spills, clutter or unnecessary equipment/Stretcher in lowest position, wheels locked, appropriate side rails in place/Monitor gait and stability/Monitor for mental status changes and reorient to person, place, and time/Review medications for side effects contributing to fall risk/Reinforce activity limits and safety measures with patient and family

## 2022-12-08 NOTE — CONSULT NOTE ADULT - CRITICAL CARE ATTENDING COMMENT
code stroke called in ED and neurology emergently assessed patient.   Briefly   81 yo R-H male w/  Afib on Xarelto (last dose this morning around 8am), HTN, DMII (not on medications), CVA vs TIA 4 years ago, presents to [facility] ED as code stroke for slurred speech and weakness in both legs. LKW 22:30 on 12/7. Patient was in normal state of health at that time but when he awoke around 7 am this morning, he told his wife that he was having difficulty getting out of bed due to weakness/imbalance of his legs. Around that time wife noticed slurred speech when patient would talk. The last time this happened was 4 years ago when patient was told he had a "mild stroke," as per wife at bedside. Exam as above.   CTH w/o acute findings but multiple chronic infarcts. R cerebellar new since last study   CTA H/N : L PCA severe stenosis noted. NO flow in B/L proximal VA.  + VBI     NIHSS: 2 --> 1 (dysarthria)  mRS: 2    Patient is not a tPA candidate because they are outside of the appropriate window of treatment.  Patient is not a mechanical thrombectomy candidate because no evidence of LVO.    Impression: Dysarthria & gait imbalance of unclear localization/etiology at this time. Possibly due to diffuse cerebral dysfunction from toxic metabolic or infectious etiologies, potentially causing recrudescence of old infarct. However, given risk factors also in the differential is brain dysfunction from acute ischemic stroke of posterior circulation presumably from cardioembolic mechanism from atrial fibrillation.   + VBI  r/o stroke     Recommendations    - Expeditious MRI brain w/o contrast if PPM compatible vs repeat CTH  - Interrogate pacemaker  - C/w home Xarelto   - Start high dose statin (Atorvastatin 40-80 mg) if LDL > 70   - HbA1C and Lipid Panel  - Toxic metabolic/infectious workup per primary team  - Telemonitoring;  Neurochecks and Vital signs q4hr while inpatient  - Permissive HTN up to 220/120 mmHg for first 24 hours after the symptom onset followed by gradual normotension.   - HgA1C goals <7.0 BGM goals 140-180  - PT/OT evaluation  - NPO until clears Dysphagia screen, otherwise Swallow evaluation  - Upon discharge, patient should follow up with Dr. Fontaine:  [phone] [address] [place] [address]     Nicolas Fontaine MD  Vascular Neurology  Office: [phone]   Please call with questions: [phone]

## 2022-12-08 NOTE — ED ADULT NURSE NOTE - OBJECTIVE STATEMENT
Chris RN: pt received in rm 8 AAO x 3. pt last known normal at 1030pm last night when he went to bed. pt woke up around 7 am and by 8am pt was c/o weakness to b/l extremities. pts wife reports noting at that time pt having slurred speech. pt denies sob, chest pain, n/v/d, fevers, chills. respirations even and unlabored. no facial dropping, slurred speech or drooling noted. 18g iv placed to left wrist. pt placed on CM. report endorsed to primary RN joseph.

## 2022-12-08 NOTE — H&P ADULT - PROBLEM SELECTOR PLAN 1
Currently back to baseline mental stautus and neurological function. Unsure etiology but suspect TIA vs toxic-metabolic causes  -Will get MRI of brain. 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  -TTE and Tele  -c/w Xarelto  -Permissive HTN to to 220/120 mmHg for first 24 hours   -Lipid panel in AM  -A1C pending  -Start Atorvastatin 40mg for now  -Will reach out to Neuro regarding anti-platelets at this point Currently back to baseline mental stautus and neurological function. Unsure etiology but suspect TIA vs toxic-metabolic causes  -Will get MRI of brain. 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  -TTE and Tele  -c/w Xarelto. No ASA per neuro  -Permissive HTN to to 220/120 mmHg for first 24 hours   -Lipid panel in AM  -A1C pending  -Start Atorvastatin 40mg for now

## 2022-12-08 NOTE — ED PROVIDER NOTE - ATTENDING CONTRIBUTION TO CARE
Dr. 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  Time of onset unclear.  No recent illness.  On exam pt chronically-ill appearing but in NAD, heart irregular, lungs CTAB, abd NTND, extremities without swelling, skin without rash.  Strength is 5/5 in all 4 extremities but when standing to walk pt having difficulty walking at baseline (slower, more tenuous).  Normally pt walks with a cane.

## 2022-12-09 LAB
A1C WITH ESTIMATED AVERAGE GLUCOSE RESULT: 6.3 % — HIGH (ref 4–5.6)
ALBUMIN SERPL ELPH-MCNC: 3.7 G/DL — SIGNIFICANT CHANGE UP (ref 3.3–5)
ALP SERPL-CCNC: 102 U/L — SIGNIFICANT CHANGE UP (ref 40–120)
ALT FLD-CCNC: 34 U/L — SIGNIFICANT CHANGE UP (ref 4–41)
ANION GAP SERPL CALC-SCNC: 9 MMOL/L — SIGNIFICANT CHANGE UP (ref 7–14)
AST SERPL-CCNC: 30 U/L — SIGNIFICANT CHANGE UP (ref 4–40)
BILIRUB SERPL-MCNC: 0.6 MG/DL — SIGNIFICANT CHANGE UP (ref 0.2–1.2)
BUN SERPL-MCNC: 23 MG/DL — SIGNIFICANT CHANGE UP (ref 7–23)
CALCIUM SERPL-MCNC: 9.2 MG/DL — SIGNIFICANT CHANGE UP (ref 8.4–10.5)
CHLORIDE SERPL-SCNC: 103 MMOL/L — SIGNIFICANT CHANGE UP (ref 98–107)
CHOLEST SERPL-MCNC: 117 MG/DL — SIGNIFICANT CHANGE UP
CO2 SERPL-SCNC: 24 MMOL/L — SIGNIFICANT CHANGE UP (ref 22–31)
CREAT SERPL-MCNC: 1.33 MG/DL — HIGH (ref 0.5–1.3)
EGFR: 54 ML/MIN/1.73M2 — LOW
ESTIMATED AVERAGE GLUCOSE: 134 — SIGNIFICANT CHANGE UP
GLUCOSE BLDC GLUCOMTR-MCNC: 124 MG/DL — HIGH (ref 70–99)
GLUCOSE BLDC GLUCOMTR-MCNC: 132 MG/DL — HIGH (ref 70–99)
GLUCOSE BLDC GLUCOMTR-MCNC: 98 MG/DL — SIGNIFICANT CHANGE UP (ref 70–99)
GLUCOSE SERPL-MCNC: 99 MG/DL — SIGNIFICANT CHANGE UP (ref 70–99)
HCT VFR BLD CALC: 47.2 % — SIGNIFICANT CHANGE UP (ref 39–50)
HDLC SERPL-MCNC: 42 MG/DL — SIGNIFICANT CHANGE UP
HGB BLD-MCNC: 14.5 G/DL — SIGNIFICANT CHANGE UP (ref 13–17)
INR BLD: 1.69 RATIO — HIGH (ref 0.88–1.16)
LIPID PNL WITH DIRECT LDL SERPL: 58 MG/DL — SIGNIFICANT CHANGE UP
MAGNESIUM SERPL-MCNC: 2.1 MG/DL — SIGNIFICANT CHANGE UP (ref 1.6–2.6)
MCHC RBC-ENTMCNC: 22.2 PG — LOW (ref 27–34)
MCHC RBC-ENTMCNC: 30.7 GM/DL — LOW (ref 32–36)
MCV RBC AUTO: 72.2 FL — LOW (ref 80–100)
NON HDL CHOLESTEROL: 75 MG/DL — SIGNIFICANT CHANGE UP
NRBC # BLD: 0 /100 WBCS — SIGNIFICANT CHANGE UP (ref 0–0)
NRBC # FLD: 0 K/UL — SIGNIFICANT CHANGE UP (ref 0–0)
PLATELET # BLD AUTO: 129 K/UL — LOW (ref 150–400)
POTASSIUM SERPL-MCNC: 4.4 MMOL/L — SIGNIFICANT CHANGE UP (ref 3.5–5.3)
POTASSIUM SERPL-SCNC: 4.4 MMOL/L — SIGNIFICANT CHANGE UP (ref 3.5–5.3)
PROT SERPL-MCNC: 8 G/DL — SIGNIFICANT CHANGE UP (ref 6–8.3)
PROTHROM AB SERPL-ACNC: 19.7 SEC — HIGH (ref 10.5–13.4)
RBC # BLD: 6.54 M/UL — HIGH (ref 4.2–5.8)
RBC # FLD: 20.4 % — HIGH (ref 10.3–14.5)
SODIUM SERPL-SCNC: 136 MMOL/L — SIGNIFICANT CHANGE UP (ref 135–145)
TRIGL SERPL-MCNC: 87 MG/DL — SIGNIFICANT CHANGE UP
WBC # BLD: 7.74 K/UL — SIGNIFICANT CHANGE UP (ref 3.8–10.5)
WBC # FLD AUTO: 7.74 K/UL — SIGNIFICANT CHANGE UP (ref 3.8–10.5)

## 2022-12-09 PROCEDURE — 93306 TTE W/DOPPLER COMPLETE: CPT | Mod: 26

## 2022-12-09 PROCEDURE — 99233 SBSQ HOSP IP/OBS HIGH 50: CPT

## 2022-12-09 RX ORDER — HYDRALAZINE HCL 50 MG
10 TABLET ORAL EVERY 8 HOURS
Refills: 0 | Status: DISCONTINUED | OUTPATIENT
Start: 2022-12-09 | End: 2022-12-11

## 2022-12-09 RX ADMIN — Medication 25 MILLIGRAM(S): at 05:13

## 2022-12-09 RX ADMIN — Medication 10 MILLIGRAM(S): at 20:49

## 2022-12-09 RX ADMIN — ATORVASTATIN CALCIUM 40 MILLIGRAM(S): 80 TABLET, FILM COATED ORAL at 22:49

## 2022-12-09 RX ADMIN — RIVAROXABAN 20 MILLIGRAM(S): KIT at 20:06

## 2022-12-09 RX ADMIN — Medication 100 MILLIGRAM(S): at 13:42

## 2022-12-09 NOTE — PROCEDURE NOTE - ADDITIONAL PROCEDURE DETAILS
PPM is MRI compatible with 1.5 or 3.0 T   Cariology form completed , please fax to MRI [phone]  MRI staff to call [facility] to reprogram to MRI mode for MRI

## 2022-12-09 NOTE — PHYSICAL THERAPY INITIAL EVALUATION ADULT - GROSSLY INTACT, SENSORY
If you receive a survey via e-mail or mail, please take the time to complete and return as your feedback is very helpful to our practice.     Please visit [email or web address] and click on My [facility]. You can set up an account immediately. You can have access to your medical information, pay bills, send non-urgent message's,request refills and more.     Please visit [email or web address] and click on My [facility]. You can set up an account immediately. You can have access to your medical information, pay bills, send non-urgent message's,request refills and more.   The components of a brain-healthy lifestyle:     * Regular Exercise.Either walking or a stationary recumbent bike(30 minutes daily).   * Maintain a normal BMI(Body Mass Index).  * Healthy diet, including 4-5 servings of fruits and vegetables daily.  * Only 1 serving of red meat daily.  * Mental Stimulation.  * Active treatment for any type of sleep disorder.  * Stress Management and/or treatment for anxiety/depression.   * An active social life.   * Vitamin D 4000 units daily.  * Glass of alcohol at night.   * Drink coffee.  *Fresh Berries.      
Grossly Intact

## 2022-12-09 NOTE — PROGRESS NOTE ADULT - SUBJECTIVE AND OBJECTIVE BOX
[facility] Division of Hospital Medicine  Angelina Black MD  Pager (M-F, 8A-5P): [phone]  Other Times:  [phone]      SUBJECTIVE / OVERNIGHT EVENTS: Pt with ongoing dysarthria (as per wife who spoke with him on the phone today). Pt feels like his speech has improved. Ongoing LUE weakness (present from prior TIA in 2018).     MEDICATIONS  (STANDING):  allopurinol 100 milliGRAM(s) Oral daily  atorvastatin 40 milliGRAM(s) Oral at bedtime  dextrose 5%. 1000 milliLiter(s) (100 mL/Hr) IV Continuous <Continuous>  dextrose 5%. 1000 milliLiter(s) (50 mL/Hr) IV Continuous <Continuous>  dextrose 50% Injectable 25 Gram(s) IV Push once  dextrose 50% Injectable 12.5 Gram(s) IV Push once  dextrose 50% Injectable 25 Gram(s) IV Push once  glucagon  Injectable 1 milliGRAM(s) IntraMuscular once  insulin lispro (ADMELOG) corrective regimen sliding scale   SubCutaneous three times a day before meals  insulin lispro (ADMELOG) corrective regimen sliding scale   SubCutaneous at bedtime  metoprolol succinate ER 25 milliGRAM(s) Oral daily  rivaroxaban 20 milliGRAM(s) Oral with dinner    MEDICATIONS  (PRN):  acetaminophen     Tablet .. 650 milliGRAM(s) Oral every 6 hours PRN Temp greater or equal to 38C (100.4F), Mild Pain (1 - 3)  aluminum hydroxide/magnesium hydroxide/simethicone Suspension 30 milliLiter(s) Oral every 4 hours PRN Dyspepsia  dextrose Oral Gel 15 Gram(s) Oral once PRN Blood Glucose LESS THAN 70 milliGRAM(s)/deciliter  melatonin 3 milliGRAM(s) Oral at bedtime PRN Insomnia  ondansetron Injectable 4 milliGRAM(s) IV Push every 8 hours PRN Nausea and/or Vomiting      I&O's Summary      PHYSICAL EXAM:  Vital Signs Last 24 Hrs  T(C): 36.6 (09 Dec 2022 05:10), Max: 36.7 (08 Dec 2022 14:41)  T(F): 97.9 (09 Dec 2022 05:10), Max: 98.1 (08 Dec 2022 19:37)  HR: 68 (09 Dec 2022 05:10) (68 - 97)  BP: 153/88 (09 Dec 2022 05:10) (151/89 - 154/111)  BP(mean): 132 (08 Dec 2022 22:23) (132 - 132)  RR: 20 (09 Dec 2022 05:10) (16 - 20)  SpO2: 100% (09 Dec 2022 05:10) (99% - 100%)    Parameters below as of 09 Dec 2022 05:10  Patient On (Oxygen Delivery Method): room air      CONSTITUTIONAL: NAD  EYES: PERRLA; conjunctiva and sclera clear  ENMT: Moist oral mucosa, no pharyngeal injection or exudates; poor dentition  RESPIRATORY: Normal respiratory effort; lungs are clear to auscultation bilaterally  CARDIOVASCULAR: Regular rate and rhythm, normal S1 and S2, no murmur/rub/gallop; No lower extremity edema; Peripheral pulses are 2+ bilaterally  ABDOMEN: Nontender to palpation, normoactive bowel sounds, no rebound/guarding; No hepatosplenomegaly  PSYCH: A+O to person, place, and time; affect appropriate  NEURO: LUE with decreased  strength and 4+/5 extension/flexion compared to 5/5 strength of RUE. No weakness of the LEs. Symmetric smile. Possible R deviation of the tongue (subtle). Otherwise CNII-12 grossly in tact. Did not assess gait.  SKIN: No rashes; no palpable lesions    LABS:                        14.5   7.74  )-----------( 129      ( 09 Dec 2022 06:55 )             47.2         136  |  103  |  23  ----------------------------<  99  4.4   |  24  |  1.33<H>    Ca    9.2      09 Dec 2022 06:55  Mg     2.10     12    TPro  8.0  /  Alb  3.7  /  TBili  0.6  /  DBili  x   /  AST  30  /  ALT  34  /  AlkPhos  102  12-09    PT/INR - ( 09 Dec 2022 06:55 )   PT: 19.7 sec;   INR: 1.69 ratio         PTT - ( 08 Dec 2022 12:10 )  PTT:42.0 sec      Urinalysis Basic - ( 08 Dec 2022 16:05 )    Color: Light Yellow / Appearance: Clear / S.050 / pH: x  Gluc: x / Ketone: Negative  / Bili: Negative / Urobili: <2 mg/dL   Blood: x / Protein: 30 mg/dL / Nitrite: Negative   Leuk Esterase: Negative / RBC: 2 /HPF / WBC 0 /HPF   Sq Epi: x / Non Sq Epi: 0 /HPF / Bacteria: Negative          RADIOLOGY & ADDITIONAL TESTS:  Results Reviewed:   Imaging Personally Reviewed:  Electrocardiogram Personally Reviewed:    COORDINATION OF CARE:  Care Discussed with Consultants/Other Providers [Y/N]: Neuro  Prior or Outpatient Records Reviewed [Y/N]:

## 2022-12-09 NOTE — PROGRESS NOTE ADULT - SUBJECTIVE AND OBJECTIVE BOX
Neurology Progress Note    S: Patient seen and examined. No new events overnight. patient denied CP, SOB, HA or pain.     Medication:  acetaminophen     Tablet .. 650 milliGRAM(s) Oral every 6 hours PRN  allopurinol 100 milliGRAM(s) Oral daily  aluminum hydroxide/magnesium hydroxide/simethicone Suspension 30 milliLiter(s) Oral every 4 hours PRN  atorvastatin 40 milliGRAM(s) Oral at bedtime  dextrose 5%. 1000 milliLiter(s) IV Continuous <Continuous>  dextrose 5%. 1000 milliLiter(s) IV Continuous <Continuous>  dextrose 50% Injectable 25 Gram(s) IV Push once  dextrose 50% Injectable 12.5 Gram(s) IV Push once  dextrose 50% Injectable 25 Gram(s) IV Push once  dextrose Oral Gel 15 Gram(s) Oral once PRN  glucagon  Injectable 1 milliGRAM(s) IntraMuscular once  insulin lispro (ADMELOG) corrective regimen sliding scale   SubCutaneous three times a day before meals  insulin lispro (ADMELOG) corrective regimen sliding scale   SubCutaneous at bedtime  melatonin 3 milliGRAM(s) Oral at bedtime PRN  metoprolol succinate ER 25 milliGRAM(s) Oral daily  ondansetron Injectable 4 milliGRAM(s) IV Push every 8 hours PRN  rivaroxaban 20 milliGRAM(s) Oral with dinner      Vitals:  Vital Signs Last 24 Hrs  T(C): 36.6 (09 Dec 2022 05:10), Max: 36.7 (08 Dec 2022 14:41)  T(F): 97.9 (09 Dec 2022 05:10), Max: 98.1 (08 Dec 2022 19:37)  HR: 68 (09 Dec 2022 05:10) (68 - 121)  BP: 153/88 (09 Dec 2022 05:10) (140/76 - 163/96)  BP(mean): 132 (08 Dec 2022 22:23) (132 - 132)  RR: 20 (09 Dec 2022 05:10) (16 - 20)  SpO2: 100% (09 Dec 2022 05:10) (99% - 100%)    Parameters below as of 09 Dec 2022 05:10  Patient On (Oxygen Delivery Method): room air        General Exam:   General Appearance: Appropriately dressed and in no acute distress       Head: Normocephalic, atraumatic and no dysmorphic features  Ear, Nose, and Throat: Moist mucous membranes  CVS: S1S2+  Resp: No SOB, no wheeze or rhonchi  Abd: soft NTND  Extremities: No edema, no cyanosis  Skin: No bruises, no rashes     Neurological Exam:  Mental Status: Awake, alert and oriented x 3.  Able to follow simple and complex verbal commands. Able to name and repeat. fluent speech. No obvious aphasia or dysarthria noted.   Cranial Nerves: PERRL, EOMI, VFFC, sensation V1-V3 intact,  no obvious facial asymmetry , equal elevation of palate, scm/trap 5/5, tongue is midline on protrusion. no obvious papilledema on fundoscopic exam. Hearing is grossly intact.   Motor: Normal muscle bulk & tone. No noticeable tremor. No drift in UE or LE b/l. UE grossly 5/5 throughout b/l. LE w/ HF 4+/5 b/l, DF/PF 5/5. 	   Sensation: Intact to light touch and pinprick throughout. no right/left confusion. no extinction to tactile on DSS.    Reflexes: 1+ throughout at biceps, brachioradialis, triceps, patellars and ankles bilaterally and equal. No clonus. R toe and L toe were both downgoing.  Coordination: No dysmetria on FNF   Gait: deferred     I personally reviewed the below data/images/labs:      CBC Full  -  ( 09 Dec 2022 06:55 )  WBC Count : 7.74 K/uL  RBC Count : 6.54 M/uL  Hemoglobin : 14.5 g/dL  Hematocrit : 47.2 %  Platelet Count - Automated : 129 K/uL  Mean Cell Volume : 72.2 fL  Mean Cell Hemoglobin : 22.2 pg  Mean Cell Hemoglobin Concentration : 30.7 gm/dL  Auto Neutrophil # : x  Auto Lymphocyte # : x  Auto Monocyte # : x  Auto Eosinophil # : x  Auto Basophil # : x  Auto Neutrophil % : x  Auto Lymphocyte % : x  Auto Monocyte % : x  Auto Eosinophil % : x  Auto Basophil % : x        136  |  103  |  23  ----------------------------<  99  4.4   |  24  |  1.33<H>    Ca    9.2      09 Dec 2022 06:55  Mg     2.10         TPro  8.0  /  Alb  3.7  /  TBili  0.6  /  DBili  x   /  AST  30  /  ALT  34  /  AlkPhos  102  12-09    LIVER FUNCTIONS - ( 09 Dec 2022 06:55 )  Alb: 3.7 g/dL / Pro: 8.0 g/dL / ALK PHOS: 102 U/L / ALT: 34 U/L / AST: 30 U/L / GGT: x           PT/INR - ( 09 Dec 2022 06:55 )   PT: 19.7 sec;   INR: 1.69 ratio         PTT - ( 08 Dec 2022 12:10 )  PTT:42.0 sec  Urinalysis Basic - ( 08 Dec 2022 16:05 )    Color: Light Yellow / Appearance: Clear / S.050 / pH: x  Gluc: x / Ketone: Negative  / Bili: Negative / Urobili: <2 mg/dL   Blood: x / Protein: 30 mg/dL / Nitrite: Negative   Leuk Esterase: Negative / RBC: 2 /HPF / WBC 0 /HPF   Sq Epi: x / Non Sq Epi: 0 /HPF / Bacteria: Negative        VENTRICLES AND SULCI: Ventricles and sulci are unremarkable for patient   age.  INTRA-AXIAL: No intracranial mass, acute hemorrhage, or midline shift is   present. There is non-specific moderate patchy and confluent decreased   attenuation in the white matter likely microvascular disease. Bilateral   thalamic chronic lacunar infarcts. Chronic appearing right cerebellar   infarction which is new since the prior study.  EXTRA-AXIAL: No extra-axial fluid collection is present.  INTRACRANIAL HEMORRHAGE: None.    VISUALIZED SINUSES: No air-fluid levels are identified.  VISUALIZED MASTOIDS:  Clear  CALVARIUM:  Intact      IMPRESSION:  No acute intracranial hemorrhage. Moderate chronic microvascular ischemic   changes and chronic lacunar infarcts as above. Chronic appearing small   infarction in the right cerebellar hemisphere which is new since the   prior study.    Notification to clinician of alert:  Physician assistant Freddie Romero was notified about the   noncontrast head CT findings at 12:24 PM on 2022 with readback   confirmation. The opportunity for questions was provided and all   questions asked were answered.    --- End of Report ---        CTA Native OF DIAS:    ANTERIOR CIRCULATION    ICA  CAVERNOUS, SUPRACLINOID, BIFURCATION SEGMENTS: Patent without flow   limiting stenosis.    ANTERIOR CEREBRAL ARTERIES: Bilateral A1, and A2 anterior cerebral   arteries are unremarkable in course and caliber without flow limiting   stenosis.    MIDDLE CEREBRAL ARTERIES: Patent bilateral M1, M2, and distal MCA   branches without flow limiting stenosis.    POSTERIOR CIRCULATION:    VERTEBRAL ARTERIES: Patent without flow limiting stenosis    BASILAR ARTERY: Patent no flow limiting stenosis.    POSTERIOR CEREBRAL ARTERIES: Patent. Focal severe stenosis in the P1   segment of the left posterior cerebral artery.      CTA NECK:    GREAT VESSELS: Visualized segments are patent, no flow limiting stenosis.    COMMON CAROTID ARTERIES:  RIGHT: Patent without flow limiting stenosis  LEFT: Patent without flow limiting stenosis    INTERNAL CAROTID ARTERIES:  RIGHT: Patent no evidence for any hemodynamically significant stenosis at   the ICA origin by NASCET criteria.  LEFT: Patent noevidence for any hemodynamically significant stenosis at   the ICA origin by NASCET criteria.    VERTEBRAL ARTERIES:    RIGHT: No flow within the V1 right vertebral artery segment with   reconstitution at the V2 segment.  LEFT: No flow within the V1 and proximal V2 left vertebral artery   segments with reconstitution distally.    SOFT TISSUES: Unremarkable    BONES: Degenerative changes of the cervical spine.    IMPRESSION:    CTA COW: No large vessel occlusion. 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    CTA NECK: Patent, ECAs, ICAs, no  hemodynamically significant stenosis at    ICA origins by NASCET criteria.  No flow within the proximal vertebral arteries bilaterally with distal   reconstitution which is new since the prior study.    --- End of Report ---

## 2022-12-09 NOTE — PATIENT PROFILE ADULT - FALL HARM RISK - HARM RISK INTERVENTIONS

## 2022-12-10 LAB
ANION GAP SERPL CALC-SCNC: 13 MMOL/L — SIGNIFICANT CHANGE UP (ref 7–14)
ANION GAP SERPL CALC-SCNC: 7 MMOL/L — SIGNIFICANT CHANGE UP (ref 7–14)
BUN SERPL-MCNC: 20 MG/DL — SIGNIFICANT CHANGE UP (ref 7–23)
BUN SERPL-MCNC: 21 MG/DL — SIGNIFICANT CHANGE UP (ref 7–23)
CALCIUM SERPL-MCNC: 9 MG/DL — SIGNIFICANT CHANGE UP (ref 8.4–10.5)
CALCIUM SERPL-MCNC: 9.4 MG/DL — SIGNIFICANT CHANGE UP (ref 8.4–10.5)
CHLORIDE SERPL-SCNC: 103 MMOL/L — SIGNIFICANT CHANGE UP (ref 98–107)
CHLORIDE SERPL-SCNC: 105 MMOL/L — SIGNIFICANT CHANGE UP (ref 98–107)
CO2 SERPL-SCNC: 24 MMOL/L — SIGNIFICANT CHANGE UP (ref 22–31)
CO2 SERPL-SCNC: 27 MMOL/L — SIGNIFICANT CHANGE UP (ref 22–31)
CREAT SERPL-MCNC: 1.24 MG/DL — SIGNIFICANT CHANGE UP (ref 0.5–1.3)
CREAT SERPL-MCNC: 1.31 MG/DL — HIGH (ref 0.5–1.3)
EGFR: 55 ML/MIN/1.73M2 — LOW
EGFR: 59 ML/MIN/1.73M2 — LOW
GLUCOSE BLDC GLUCOMTR-MCNC: 104 MG/DL — HIGH (ref 70–99)
GLUCOSE BLDC GLUCOMTR-MCNC: 106 MG/DL — HIGH (ref 70–99)
GLUCOSE BLDC GLUCOMTR-MCNC: 106 MG/DL — HIGH (ref 70–99)
GLUCOSE BLDC GLUCOMTR-MCNC: 86 MG/DL — SIGNIFICANT CHANGE UP (ref 70–99)
GLUCOSE BLDC GLUCOMTR-MCNC: 94 MG/DL — SIGNIFICANT CHANGE UP (ref 70–99)
GLUCOSE SERPL-MCNC: 98 MG/DL — SIGNIFICANT CHANGE UP (ref 70–99)
GLUCOSE SERPL-MCNC: 99 MG/DL — SIGNIFICANT CHANGE UP (ref 70–99)
HCT VFR BLD CALC: 50 % — SIGNIFICANT CHANGE UP (ref 39–50)
HGB BLD-MCNC: 15.5 G/DL — SIGNIFICANT CHANGE UP (ref 13–17)
MAGNESIUM SERPL-MCNC: 2.2 MG/DL — SIGNIFICANT CHANGE UP (ref 1.6–2.6)
MAGNESIUM SERPL-MCNC: 2.2 MG/DL — SIGNIFICANT CHANGE UP (ref 1.6–2.6)
MCHC RBC-ENTMCNC: 22.2 PG — LOW (ref 27–34)
MCHC RBC-ENTMCNC: 31 GM/DL — LOW (ref 32–36)
MCV RBC AUTO: 71.7 FL — LOW (ref 80–100)
NRBC # BLD: 0 /100 WBCS — SIGNIFICANT CHANGE UP (ref 0–0)
NRBC # FLD: 0 K/UL — SIGNIFICANT CHANGE UP (ref 0–0)
PHOSPHATE SERPL-MCNC: 3.5 MG/DL — SIGNIFICANT CHANGE UP (ref 2.5–4.5)
PHOSPHATE SERPL-MCNC: 3.5 MG/DL — SIGNIFICANT CHANGE UP (ref 2.5–4.5)
PLATELET # BLD AUTO: 140 K/UL — LOW (ref 150–400)
POTASSIUM SERPL-MCNC: 4.2 MMOL/L — SIGNIFICANT CHANGE UP (ref 3.5–5.3)
POTASSIUM SERPL-MCNC: 4.6 MMOL/L — SIGNIFICANT CHANGE UP (ref 3.5–5.3)
POTASSIUM SERPL-SCNC: 4.2 MMOL/L — SIGNIFICANT CHANGE UP (ref 3.5–5.3)
POTASSIUM SERPL-SCNC: 4.6 MMOL/L — SIGNIFICANT CHANGE UP (ref 3.5–5.3)
RBC # BLD: 6.97 M/UL — HIGH (ref 4.2–5.8)
RBC # FLD: 21 % — HIGH (ref 10.3–14.5)
SODIUM SERPL-SCNC: 139 MMOL/L — SIGNIFICANT CHANGE UP (ref 135–145)
SODIUM SERPL-SCNC: 140 MMOL/L — SIGNIFICANT CHANGE UP (ref 135–145)
WBC # BLD: 8.06 K/UL — SIGNIFICANT CHANGE UP (ref 3.8–10.5)
WBC # FLD AUTO: 8.06 K/UL — SIGNIFICANT CHANGE UP (ref 3.8–10.5)

## 2022-12-10 PROCEDURE — 93010 ELECTROCARDIOGRAM REPORT: CPT

## 2022-12-10 PROCEDURE — 99233 SBSQ HOSP IP/OBS HIGH 50: CPT

## 2022-12-10 RX ADMIN — Medication 25 MILLIGRAM(S): at 05:08

## 2022-12-10 RX ADMIN — ATORVASTATIN CALCIUM 40 MILLIGRAM(S): 80 TABLET, FILM COATED ORAL at 21:53

## 2022-12-10 RX ADMIN — Medication 10 MILLIGRAM(S): at 21:53

## 2022-12-10 RX ADMIN — Medication 100 MILLIGRAM(S): at 14:12

## 2022-12-10 RX ADMIN — Medication 10 MILLIGRAM(S): at 05:08

## 2022-12-10 RX ADMIN — Medication 10 MILLIGRAM(S): at 14:14

## 2022-12-10 RX ADMIN — RIVAROXABAN 20 MILLIGRAM(S): KIT at 17:07

## 2022-12-10 NOTE — PROGRESS NOTE ADULT - SUBJECTIVE AND OBJECTIVE BOX
[facility] Division of Hospital Medicine  Cathy Mcneal DO  Pager (M-F, 5L-5P): [phone]      Patient is a 82y old  Male who presents with a chief complaint of Weakness (09 Dec 2022 09:58)      SUBJECTIVE / OVERNIGHT EVENTS: no overnight events   ADDITIONAL REVIEW OF SYSTEMS:    MEDICATIONS  (STANDING):  allopurinol 100 milliGRAM(s) Oral daily  atorvastatin 40 milliGRAM(s) Oral at bedtime  dextrose 5%. 1000 milliLiter(s) (100 mL/Hr) IV Continuous <Continuous>  dextrose 5%. 1000 milliLiter(s) (50 mL/Hr) IV Continuous <Continuous>  dextrose 50% Injectable 25 Gram(s) IV Push once  dextrose 50% Injectable 12.5 Gram(s) IV Push once  dextrose 50% Injectable 25 Gram(s) IV Push once  glucagon  Injectable 1 milliGRAM(s) IntraMuscular once  hydrALAZINE 10 milliGRAM(s) Oral every 8 hours  insulin lispro (ADMELOG) corrective regimen sliding scale   SubCutaneous three times a day before meals  insulin lispro (ADMELOG) corrective regimen sliding scale   SubCutaneous at bedtime  metoprolol succinate ER 25 milliGRAM(s) Oral daily  rivaroxaban 20 milliGRAM(s) Oral with dinner    MEDICATIONS  (PRN):  acetaminophen     Tablet .. 650 milliGRAM(s) Oral every 6 hours PRN Temp greater or equal to 38C (100.4F), Mild Pain (1 - 3)  aluminum hydroxide/magnesium hydroxide/simethicone Suspension 30 milliLiter(s) Oral every 4 hours PRN Dyspepsia  dextrose Oral Gel 15 Gram(s) Oral once PRN Blood Glucose LESS THAN 70 milliGRAM(s)/deciliter  melatonin 3 milliGRAM(s) Oral at bedtime PRN Insomnia  ondansetron Injectable 4 milliGRAM(s) IV Push every 8 hours PRN Nausea and/or Vomiting      CAPILLARY BLOOD GLUCOSE      POCT Blood Glucose.: 94 mg/dL (10 Dec 2022 08:30)  POCT Blood Glucose.: 106 mg/dL (10 Dec 2022 02:50)  POCT Blood Glucose.: 132 mg/dL (09 Dec 2022 21:49)  POCT Blood Glucose.: 98 mg/dL (09 Dec 2022 13:20)  POCT Blood Glucose.: 124 mg/dL (09 Dec 2022 09:33)    I&O's Summary    09 Dec 2022 07:01  -  10 Dec 2022 07:00  --------------------------------------------------------  IN: 100 mL / OUT: 200 mL / NET: -100 mL        PHYSICAL EXAM:  Vital Signs Last 24 Hrs  T(C): 36.7 (10 Dec 2022 05:08), Max: 36.7 (10 Dec 2022 05:08)  T(F): 98 (10 Dec 2022 05:08), Max: 98 (10 Dec 2022 05:08)  HR: 67 (10 Dec 2022 05:08) (67 - 120)  BP: 114/78 (10 Dec 2022 05:08) (114/78 - 182/117)  BP(mean): --  RR: 18 (10 Dec 2022 05:08) (18 - 20)  SpO2: 98% (10 Dec 2022 05:08) (98% - 100%)    Parameters below as of 10 Dec 2022 05:08  Patient On (Oxygen Delivery Method): room air      CONSTITUTIONAL: NAD, well-developed, well-groomed  EYES: EOMI; conjunctiva and sclera clear  ENMT: Moist oral mucosa, no pharyngeal injection or exudates; normal dentition  NECK: Supple, no palpable masses; no thyromegaly  RESPIRATORY: Normal respiratory effort; lungs are clear to auscultation bilaterally  CARDIOVASCULAR: Regular rate and rhythm, normal S1 and S2, no murmur/rub/gallop; No lower extremity edema; Peripheral pulses are 2+ bilaterally  ABDOMEN: Nontender to palpation, normoactive bowel sounds, no rebound/guarding; No hepatosplenomegaly  MUSKULOSKELETAL:  no clubbing or cyanosis of digits; no joint swelling or tenderness to palpation  PSYCH: A+O to person, place, and time; affect appropriate  NEUROLOGY: CN 2-12 are intact and symmetric; no gross sensory deficits;   SKIN: No rashes; no palpable lesions    LABS:                        15.5   8.06  )-----------( 140      ( 10 Dec 2022 06:54 )             50.0     12-10    140  |  103  |  20  ----------------------------<  98  4.2   |  24  |  1.24    Ca    9.4      10 Dec 2022 06:54  Phos  3.5     12-10  Mg     2.20     12-10    TPro  8.0  /  Alb  3.7  /  TBili  0.6  /  DBili  x   /  AST  30  /  ALT  34  /  AlkPhos  102  12-09    PT/INR - ( 09 Dec 2022 06:55 )   PT: 19.7 sec;   INR: 1.69 ratio         PTT - ( 08 Dec 2022 12:10 )  PTT:42.0 sec      Urinalysis Basic - ( 08 Dec 2022 16:05 )    Color: Light Yellow / Appearance: Clear / S.050 / pH: x  Gluc: x / Ketone: Negative  / Bili: Negative / Urobili: <2 mg/dL   Blood: x / Protein: 30 mg/dL / Nitrite: Negative   Leuk Esterase: Negative / RBC: 2 /HPF / WBC 0 /HPF   Sq Epi: x / Non Sq Epi: 0 /HPF / Bacteria: Negative          RADIOLOGY & ADDITIONAL TESTS:  Results Reviewed:   Imaging Personally Reviewed:  Electrocardiogram Personally Reviewed:    COORDINATION OF CARE:  Care Discussed with Consultants/Other Providers [Y/N]: Y  Prior or Outpatient Records Reviewed [Y/N]: Y   [facility] Division of Hospital Medicine  Cathy Mcneal DO  Pager (M-F, 8A-5P): [phone]      Patient is a 82y old  Male who presents with a chief complaint of Weakness (09 Dec 2022 09:58)      SUBJECTIVE / OVERNIGHT EVENTS: Pt seen at bedside, per RN pt with episode of confusion this morning, but BP was noted to be low prior to eval. Pt seen around 10:30AM with wife, McGee at bedside. At that time, pt alert and oriented x4, feeling well. No complaints, asking about MRI and when he can go home  Pt seen in hallway ambulating later.   ADDITIONAL REVIEW OF SYSTEMS: no cp/sob     MEDICATIONS  (STANDING):  allopurinol 100 milliGRAM(s) Oral daily  atorvastatin 40 milliGRAM(s) Oral at bedtime  dextrose 5%. 1000 milliLiter(s) (100 mL/Hr) IV Continuous <Continuous>  dextrose 5%. 1000 milliLiter(s) (50 mL/Hr) IV Continuous <Continuous>  dextrose 50% Injectable 25 Gram(s) IV Push once  dextrose 50% Injectable 12.5 Gram(s) IV Push once  dextrose 50% Injectable 25 Gram(s) IV Push once  glucagon  Injectable 1 milliGRAM(s) IntraMuscular once  hydrALAZINE 10 milliGRAM(s) Oral every 8 hours  insulin lispro (ADMELOG) corrective regimen sliding scale   SubCutaneous three times a day before meals  insulin lispro (ADMELOG) corrective regimen sliding scale   SubCutaneous at bedtime  metoprolol succinate ER 25 milliGRAM(s) Oral daily  rivaroxaban 20 milliGRAM(s) Oral with dinner    MEDICATIONS  (PRN):  acetaminophen     Tablet .. 650 milliGRAM(s) Oral every 6 hours PRN Temp greater or equal to 38C (100.4F), Mild Pain (1 - 3)  aluminum hydroxide/magnesium hydroxide/simethicone Suspension 30 milliLiter(s) Oral every 4 hours PRN Dyspepsia  dextrose Oral Gel 15 Gram(s) Oral once PRN Blood Glucose LESS THAN 70 milliGRAM(s)/deciliter  melatonin 3 milliGRAM(s) Oral at bedtime PRN Insomnia  ondansetron Injectable 4 milliGRAM(s) IV Push every 8 hours PRN Nausea and/or Vomiting      CAPILLARY BLOOD GLUCOSE      POCT Blood Glucose.: 94 mg/dL (10 Dec 2022 08:30)  POCT Blood Glucose.: 106 mg/dL (10 Dec 2022 02:50)  POCT Blood Glucose.: 132 mg/dL (09 Dec 2022 21:49)  POCT Blood Glucose.: 98 mg/dL (09 Dec 2022 13:20)  POCT Blood Glucose.: 124 mg/dL (09 Dec 2022 09:33)    I&O's Summary    09 Dec 2022 07:01  -  10 Dec 2022 07:00  --------------------------------------------------------  IN: 100 mL / OUT: 200 mL / NET: -100 mL        PHYSICAL EXAM:  Vital Signs Last 24 Hrs  T(C): 36.7 (10 Dec 2022 05:08), Max: 36.7 (10 Dec 2022 05:08)  T(F): 98 (10 Dec 2022 05:08), Max: 98 (10 Dec 2022 05:08)  HR: 67 (10 Dec 2022 05:08) (67 - 120)  BP: 114/78 (10 Dec 2022 05:08) (114/78 - 182/117)  BP(mean): --  RR: 18 (10 Dec 2022 05:08) (18 - 20)  SpO2: 98% (10 Dec 2022 05:08) (98% - 100%)    Parameters below as of 10 Dec 2022 05:08  Patient On (Oxygen Delivery Method): room air      CONSTITUTIONAL: NAD  EYES: EOMI; conjunctiva and sclera clear  ENMT: Moist oral mucosa, no pharyngeal injection or exudates; poor dentition  RESPIRATORY: Normal respiratory effort; lungs are clear to auscultation bilaterally  CARDIOVASCULAR: Regular rate and rhythm, normal S1 and S2, no murmurs  ABDOMEN: Nontender to palpation, normoactive bowel sounds, no rebound/guarding; No hepatosplenomegaly  PSYCH: calm, cooperative   NEURO: nonfocal, normal gait, CN grossly intact   SKIN: No rashes; no palpable lesions    LABS:                        15.5   8.06  )-----------( 140      ( 10 Dec 2022 06:54 )             50.0     12-10    140  |  103  |  20  ----------------------------<  98  4.2   |  24  |  1.24    Ca    9.4      10 Dec 2022 06:54  Phos  3.5     12-10  Mg     2.20     12-10    TPro  8.0  /  Alb  3.7  /  TBili  0.6  /  DBili  x   /  AST  30  /  ALT  34  /  AlkPhos  102  12-09    PT/INR - ( 09 Dec 2022 06:55 )   PT: 19.7 sec;   INR: 1.69 ratio         PTT - ( 08 Dec 2022 12:10 )  PTT:42.0 sec      Urinalysis Basic - ( 08 Dec 2022 16:05 )    Color: Light Yellow / Appearance: Clear / S.050 / pH: x  Gluc: x / Ketone: Negative  / Bili: Negative / Urobili: <2 mg/dL   Blood: x / Protein: 30 mg/dL / Nitrite: Negative   Leuk Esterase: Negative / RBC: 2 /HPF / WBC 0 /HPF   Sq Epi: x / Non Sq Epi: 0 /HPF / Bacteria: Negative          RADIOLOGY & ADDITIONAL TESTS:  < from: Transthoracic Echocardiogram (22 @ 17:04) >  CONCLUSIONS:  1. Bioprosthetic aortic valve replacement. Peak transaortic  valve gradient equals 8 mm Hg, mean transaortic valve  gradient equals 4 mm Hg, which is probably normal in the  presence of a prosthetic valve.  2. Mildly dilated left atrium.  LA volume index = 35 cc/m2.  3. Increased relative wall thickness with normal left  ventricular mass index, consistent with concentric left  ventricular remodeling.  4. Endocardium not well visualized; grossly normal left  ventricular systolic function.  5. Normal right ventricular size and function.  ------------------------------------------------------------------------  Confirmed on  2022 - 18:29:45 by Zandra Jordan M.D. RPVI  ------------------------------------------------------------------------    < end of copied text >      COORDINATION OF CARE:  Care Discussed with Consultants/Other Providers [Y/N]: Y  Prior or Outpatient Records Reviewed [Y/N]: Y

## 2022-12-10 NOTE — PROVIDER CONTACT NOTE (OTHER) - BACKGROUND
Quality 226: Preventive Care And Screening: Tobacco Use: Screening And Cessation Intervention: Patient screened for tobacco use and is an ex/non-smoker Patient admitted for dysarthria, code stroke in ED; PMHX gout, HLD, afib, htn Quality 130: Documentation Of Current Medications In The Medical Record: Current Medications Documented Quality 431: Preventive Care And Screening: Unhealthy Alcohol Use - Screening: Patient not identified as an unhealthy alcohol user when screened for unhealthy alcohol use using a systematic screening method Detail Level: Detailed

## 2022-12-11 LAB
ANION GAP SERPL CALC-SCNC: 12 MMOL/L — SIGNIFICANT CHANGE UP (ref 7–14)
BUN SERPL-MCNC: 18 MG/DL — SIGNIFICANT CHANGE UP (ref 7–23)
CALCIUM SERPL-MCNC: 8.7 MG/DL — SIGNIFICANT CHANGE UP (ref 8.4–10.5)
CHLORIDE SERPL-SCNC: 103 MMOL/L — SIGNIFICANT CHANGE UP (ref 98–107)
CO2 SERPL-SCNC: 23 MMOL/L — SIGNIFICANT CHANGE UP (ref 22–31)
CREAT SERPL-MCNC: 1.25 MG/DL — SIGNIFICANT CHANGE UP (ref 0.5–1.3)
CULTURE RESULTS: SIGNIFICANT CHANGE UP
EGFR: 58 ML/MIN/1.73M2 — LOW
GLUCOSE BLDC GLUCOMTR-MCNC: 113 MG/DL — HIGH (ref 70–99)
GLUCOSE BLDC GLUCOMTR-MCNC: 128 MG/DL — HIGH (ref 70–99)
GLUCOSE BLDC GLUCOMTR-MCNC: 82 MG/DL — SIGNIFICANT CHANGE UP (ref 70–99)
GLUCOSE BLDC GLUCOMTR-MCNC: 85 MG/DL — SIGNIFICANT CHANGE UP (ref 70–99)
GLUCOSE SERPL-MCNC: 83 MG/DL — SIGNIFICANT CHANGE UP (ref 70–99)
HCT VFR BLD CALC: 44.1 % — SIGNIFICANT CHANGE UP (ref 39–50)
HGB BLD-MCNC: 13.9 G/DL — SIGNIFICANT CHANGE UP (ref 13–17)
MAGNESIUM SERPL-MCNC: 2 MG/DL — SIGNIFICANT CHANGE UP (ref 1.6–2.6)
MCHC RBC-ENTMCNC: 22.5 PG — LOW (ref 27–34)
MCHC RBC-ENTMCNC: 31.5 GM/DL — LOW (ref 32–36)
MCV RBC AUTO: 71.5 FL — LOW (ref 80–100)
NRBC # BLD: 0 /100 WBCS — SIGNIFICANT CHANGE UP (ref 0–0)
NRBC # FLD: 0 K/UL — SIGNIFICANT CHANGE UP (ref 0–0)
PHOSPHATE SERPL-MCNC: 3.4 MG/DL — SIGNIFICANT CHANGE UP (ref 2.5–4.5)
PLATELET # BLD AUTO: 129 K/UL — LOW (ref 150–400)
POTASSIUM SERPL-MCNC: 3.9 MMOL/L — SIGNIFICANT CHANGE UP (ref 3.5–5.3)
POTASSIUM SERPL-SCNC: 3.9 MMOL/L — SIGNIFICANT CHANGE UP (ref 3.5–5.3)
RBC # BLD: 6.17 M/UL — HIGH (ref 4.2–5.8)
RBC # FLD: 20.3 % — HIGH (ref 10.3–14.5)
SODIUM SERPL-SCNC: 138 MMOL/L — SIGNIFICANT CHANGE UP (ref 135–145)
SPECIMEN SOURCE: SIGNIFICANT CHANGE UP
WBC # BLD: 7.04 K/UL — SIGNIFICANT CHANGE UP (ref 3.8–10.5)
WBC # FLD AUTO: 7.04 K/UL — SIGNIFICANT CHANGE UP (ref 3.8–10.5)

## 2022-12-11 PROCEDURE — 99233 SBSQ HOSP IP/OBS HIGH 50: CPT

## 2022-12-11 RX ORDER — HYDRALAZINE HCL 50 MG
25 TABLET ORAL EVERY 8 HOURS
Refills: 0 | Status: DISCONTINUED | OUTPATIENT
Start: 2022-12-11 | End: 2022-12-12

## 2022-12-11 RX ADMIN — ATORVASTATIN CALCIUM 40 MILLIGRAM(S): 80 TABLET, FILM COATED ORAL at 21:17

## 2022-12-11 RX ADMIN — Medication 25 MILLIGRAM(S): at 05:43

## 2022-12-11 RX ADMIN — Medication 100 MILLIGRAM(S): at 13:29

## 2022-12-11 RX ADMIN — Medication 10 MILLIGRAM(S): at 05:42

## 2022-12-11 RX ADMIN — Medication 25 MILLIGRAM(S): at 21:30

## 2022-12-11 RX ADMIN — Medication 25 MILLIGRAM(S): at 13:33

## 2022-12-11 RX ADMIN — RIVAROXABAN 20 MILLIGRAM(S): KIT at 17:46

## 2022-12-11 NOTE — PROVIDER CONTACT NOTE (OTHER) - ACTION/TREATMENT ORDERED:
Blood pressure done. Provider came down and assessed patient. Patient more alert and back to baseline. Patient is pending MRI.
Will continue to monitor
Acp notified had beats of vtach last night heart rate back down to normal. No new orders; will continue to monitor on tele.
As per ACP Mauro, EKG + BMP obtained. No new orders; will continue to monitor on tele.
As per ACP osman recheck orthos @ 10:00 AM post breakfast.
Manual BP.

## 2022-12-11 NOTE — PROVIDER CONTACT NOTE (OTHER) - ASSESSMENT
Pt orthostatic BP positive
pt sleeping in bed asymptomatic
Patient in no acute distress. Denies chest pain, headache, dizziness at this time.
Patient talking garbled and not making sense. Neuro checks done again.
tele showed 13 seconds monomorphic vtach; pt sleeping in bed asymptomatic
Patient asymptomatic.
Pt orthostatic BP positive

## 2022-12-11 NOTE — PROVIDER CONTACT NOTE (OTHER) - SITUATION
Pt orthostatic BP positive
/117
tele showed 13 seconds monomorphic vtach
patient had 3 beats vtach
Patient's heart rate went up to 150.
Pt orthostatic BP positive
Patient's daughter says her father seems more confused.

## 2022-12-11 NOTE — PROGRESS NOTE ADULT - SUBJECTIVE AND OBJECTIVE BOX
[facility] Division of Hospital Medicine  Cathy Mcneal DO  Pager (M-F, 0F-5P): [phone]      Patient is a 82y old  Male who presents with a chief complaint of Weakness (11 Dec 2022 10:10)      SUBJECTIVE / OVERNIGHT EVENTS: no overnight events, pt seen at bedside after using bathroom. overall feeling well, pending MRI  ADDITIONAL REVIEW OF SYSTEMS:    MEDICATIONS  (STANDING):  allopurinol 100 milliGRAM(s) Oral daily  atorvastatin 40 milliGRAM(s) Oral at bedtime  dextrose 5%. 1000 milliLiter(s) (100 mL/Hr) IV Continuous <Continuous>  dextrose 5%. 1000 milliLiter(s) (50 mL/Hr) IV Continuous <Continuous>  dextrose 50% Injectable 25 Gram(s) IV Push once  dextrose 50% Injectable 12.5 Gram(s) IV Push once  dextrose 50% Injectable 25 Gram(s) IV Push once  glucagon  Injectable 1 milliGRAM(s) IntraMuscular once  hydrALAZINE 25 milliGRAM(s) Oral every 8 hours  insulin lispro (ADMELOG) corrective regimen sliding scale   SubCutaneous three times a day before meals  insulin lispro (ADMELOG) corrective regimen sliding scale   SubCutaneous at bedtime  metoprolol succinate ER 25 milliGRAM(s) Oral daily  rivaroxaban 20 milliGRAM(s) Oral with dinner    MEDICATIONS  (PRN):  acetaminophen     Tablet .. 650 milliGRAM(s) Oral every 6 hours PRN Temp greater or equal to 38C (100.4F), Mild Pain (1 - 3)  aluminum hydroxide/magnesium hydroxide/simethicone Suspension 30 milliLiter(s) Oral every 4 hours PRN Dyspepsia  dextrose Oral Gel 15 Gram(s) Oral once PRN Blood Glucose LESS THAN 70 milliGRAM(s)/deciliter  melatonin 3 milliGRAM(s) Oral at bedtime PRN Insomnia  ondansetron Injectable 4 milliGRAM(s) IV Push every 8 hours PRN Nausea and/or Vomiting      CAPILLARY BLOOD GLUCOSE      POCT Blood Glucose.: 85 mg/dL (11 Dec 2022 08:21)  POCT Blood Glucose.: 86 mg/dL (10 Dec 2022 22:06)  POCT Blood Glucose.: 104 mg/dL (10 Dec 2022 17:07)  POCT Blood Glucose.: 106 mg/dL (10 Dec 2022 12:25)    I&O's Summary    10 Dec 2022 07:01  -  11 Dec 2022 07:00  --------------------------------------------------------  IN: 200 mL / OUT: 200 mL / NET: 0 mL        PHYSICAL EXAM:  Vital Signs Last 24 Hrs  T(C): 36.8 (11 Dec 2022 05:40), Max: 36.8 (11 Dec 2022 05:40)  T(F): 98.3 (11 Dec 2022 05:40), Max: 98.3 (11 Dec 2022 05:40)  HR: 88 (11 Dec 2022 10:33) (63 - 150)  BP: 131/86 (11 Dec 2022 10:33) (131/86 - 160/90)  BP(mean): --  RR: 17 (11 Dec 2022 10:33) (17 - 18)  SpO2: 98% (11 Dec 2022 10:33) (98% - 99%)    Parameters below as of 11 Dec 2022 10:33  Patient On (Oxygen Delivery Method): room air      CONSTITUTIONAL: NAD, well-developed, well-groomed  EYES: EOMI; conjunctiva and sclera clear  ENMT: Moist oral mucosa, no pharyngeal injection or exudates; normal dentition  NECK: Supple, no palpable masses; no thyromegaly  RESPIRATORY: Normal respiratory effort; lungs are clear to auscultation bilaterally  CARDIOVASCULAR: Regular rate and rhythm, normal S1 and S2, no murmur/rub/gallop; No lower extremity edema; Peripheral pulses are 2+ bilaterally  ABDOMEN: Nontender to palpation, normoactive bowel sounds, no rebound/guarding; No hepatosplenomegaly  MUSKULOSKELETAL:  no clubbing or cyanosis of digits; no joint swelling or tenderness to palpation  PSYCH: A+O to person, place, and time; affect appropriate  NEUROLOGY: CN 2-12 are intact and symmetric; no gross sensory deficits;   SKIN: No rashes; no palpable lesions    LABS:                        13.9   7.04  )-----------( 129      ( 11 Dec 2022 07:10 )             44.1     12-11    138  |  103  |  18  ----------------------------<  83  3.9   |  23  |  1.25    Ca    8.7      11 Dec 2022 07:10  Phos  3.4     12-11  Mg     2.00     12-11                Culture - Urine (collected 08 Dec 2022 16:05)  Source: Clean Catch Clean Catch (Midstream)  Final Report (11 Dec 2022 08:34):    <10,000 CFU/mL Normal Urogenital Seda        RADIOLOGY & ADDITIONAL TESTS:  Results Reviewed:   Imaging Personally Reviewed:  Electrocardiogram Personally Reviewed:    COORDINATION OF CARE:  Care Discussed with Consultants/Other Providers [Y/N]: Y  Prior or Outpatient Records Reviewed [Y/N]: Y   [facility] Division of Hospital Medicine  Cathy Mcneal DO  Pager (M-F, 4N-5P): [phone]      Patient is a 82y old  Male who presents with a chief complaint of Weakness (11 Dec 2022 10:10)      SUBJECTIVE / OVERNIGHT EVENTS: no overnight events, pt seen at bedside after using bathroom. overall feeling well, pending MRI  ADDITIONAL REVIEW OF SYSTEMS:    MEDICATIONS  (STANDING):  allopurinol 100 milliGRAM(s) Oral daily  atorvastatin 40 milliGRAM(s) Oral at bedtime  dextrose 5%. 1000 milliLiter(s) (100 mL/Hr) IV Continuous <Continuous>  dextrose 5%. 1000 milliLiter(s) (50 mL/Hr) IV Continuous <Continuous>  dextrose 50% Injectable 25 Gram(s) IV Push once  dextrose 50% Injectable 12.5 Gram(s) IV Push once  dextrose 50% Injectable 25 Gram(s) IV Push once  glucagon  Injectable 1 milliGRAM(s) IntraMuscular once  hydrALAZINE 25 milliGRAM(s) Oral every 8 hours  insulin lispro (ADMELOG) corrective regimen sliding scale   SubCutaneous three times a day before meals  insulin lispro (ADMELOG) corrective regimen sliding scale   SubCutaneous at bedtime  metoprolol succinate ER 25 milliGRAM(s) Oral daily  rivaroxaban 20 milliGRAM(s) Oral with dinner    MEDICATIONS  (PRN):  acetaminophen     Tablet .. 650 milliGRAM(s) Oral every 6 hours PRN Temp greater or equal to 38C (100.4F), Mild Pain (1 - 3)  aluminum hydroxide/magnesium hydroxide/simethicone Suspension 30 milliLiter(s) Oral every 4 hours PRN Dyspepsia  dextrose Oral Gel 15 Gram(s) Oral once PRN Blood Glucose LESS THAN 70 milliGRAM(s)/deciliter  melatonin 3 milliGRAM(s) Oral at bedtime PRN Insomnia  ondansetron Injectable 4 milliGRAM(s) IV Push every 8 hours PRN Nausea and/or Vomiting      CAPILLARY BLOOD GLUCOSE      POCT Blood Glucose.: 85 mg/dL (11 Dec 2022 08:21)  POCT Blood Glucose.: 86 mg/dL (10 Dec 2022 22:06)  POCT Blood Glucose.: 104 mg/dL (10 Dec 2022 17:07)  POCT Blood Glucose.: 106 mg/dL (10 Dec 2022 12:25)    I&O's Summary    10 Dec 2022 07:01  -  11 Dec 2022 07:00  --------------------------------------------------------  IN: 200 mL / OUT: 200 mL / NET: 0 mL        PHYSICAL EXAM:  Vital Signs Last 24 Hrs  T(C): 36.8 (11 Dec 2022 05:40), Max: 36.8 (11 Dec 2022 05:40)  T(F): 98.3 (11 Dec 2022 05:40), Max: 98.3 (11 Dec 2022 05:40)  HR: 88 (11 Dec 2022 10:33) (63 - 150)  BP: 131/86 (11 Dec 2022 10:33) (131/86 - 160/90)  BP(mean): --  RR: 17 (11 Dec 2022 10:33) (17 - 18)  SpO2: 98% (11 Dec 2022 10:33) (98% - 99%)    Parameters below as of 11 Dec 2022 10:33  Patient On (Oxygen Delivery Method): room air      CONSTITUTIONAL: NAD, elderly, sitting in bed   EYES: EOMI; conjunctiva and sclera clear  ENMT: Moist oral mucosa, no pharyngeal injection or exudates; poor dentition  RESPIRATORY: Normal respiratory effort; lungs are clear to auscultation bilaterally  CARDIOVASCULAR: Regular rate and rhythm, normal S1 and S2, no murmurs  ABDOMEN: Nontender to palpation, normoactive bowel sounds, soft   PSYCH: calm, cooperative   NEURO: nonfocal, normal gait, CN grossly intact   SKIN: No rashes; no palpable lesions      LABS:                        13.9   7.04  )-----------( 129      ( 11 Dec 2022 07:10 )             44.1     12-11    138  |  103  |  18  ----------------------------<  83  3.9   |  23  |  1.25    Ca    8.7      11 Dec 2022 07:10  Phos  3.4     12-11  Mg     2.00     12-11                Culture - Urine (collected 08 Dec 2022 16:05)  Source: Clean Catch Clean Catch (Midstream)  Final Report (11 Dec 2022 08:34):    <10,000 CFU/mL Normal Urogenital Seda        RADIOLOGY & ADDITIONAL TESTS:  Results Reviewed:   Imaging Personally Reviewed:  Electrocardiogram Personally Reviewed:    COORDINATION OF CARE:  Care Discussed with Consultants/Other Providers [Y/N]: Y  Prior or Outpatient Records Reviewed [Y/N]: Y

## 2022-12-11 NOTE — PROGRESS NOTE ADULT - SUBJECTIVE AND OBJECTIVE BOX
Neurology Progress Note    S: Patient seen and examined. No new events overnight. patient denied CP, SOB, HA or pain.     Medication:    MEDICATIONS  (STANDING):  allopurinol 100 milliGRAM(s) Oral daily  atorvastatin 40 milliGRAM(s) Oral at bedtime  dextrose 5%. 1000 milliLiter(s) (100 mL/Hr) IV Continuous <Continuous>  dextrose 5%. 1000 milliLiter(s) (50 mL/Hr) IV Continuous <Continuous>  dextrose 50% Injectable 25 Gram(s) IV Push once  dextrose 50% Injectable 12.5 Gram(s) IV Push once  dextrose 50% Injectable 25 Gram(s) IV Push once  glucagon  Injectable 1 milliGRAM(s) IntraMuscular once  hydrALAZINE 25 milliGRAM(s) Oral every 8 hours  insulin lispro (ADMELOG) corrective regimen sliding scale   SubCutaneous three times a day before meals  insulin lispro (ADMELOG) corrective regimen sliding scale   SubCutaneous at bedtime  metoprolol succinate ER 25 milliGRAM(s) Oral daily  rivaroxaban 20 milliGRAM(s) Oral with dinner    MEDICATIONS  (PRN):  acetaminophen     Tablet .. 650 milliGRAM(s) Oral every 6 hours PRN Temp greater or equal to 38C (100.4F), Mild Pain (1 - 3)  aluminum hydroxide/magnesium hydroxide/simethicone Suspension 30 milliLiter(s) Oral every 4 hours PRN Dyspepsia  dextrose Oral Gel 15 Gram(s) Oral once PRN Blood Glucose LESS THAN 70 milliGRAM(s)/deciliter  melatonin 3 milliGRAM(s) Oral at bedtime PRN Insomnia  ondansetron Injectable 4 milliGRAM(s) IV Push every 8 hours PRN Nausea and/or Vomiting        Vitals:    Vital Signs Last 24 Hrs  T(C): 36.8 (22 @ 05:40), Max: 36.8 (22 @ 05:40)  T(F): 98.3 (22 @ 05:40), Max: 98.3 (22 @ 05:40)  HR: 67 (22 @ 05:40) (63 - 67)  BP: 160/90 (22 @ 05:40) (147/77 - 160/90)  BP(mean): --  RR: 18 (22 @ 05:40) (18 - 18)  SpO2: 99% (22 @ 05:40) (99% - 99%)    Orthostatic VS  12-10-22 @ 10:00  Lying BP: 165/86 HR: 90  Sitting BP: 153/99 HR: 87  Standing BP: 118/87 HR: 117  Site: upper right arm  Mode: electronic  Orthostatic VS  12-10-22 @ 03:13  Lying BP: 179/118 HR: 60  Sitting BP: 161/100 HR: 120  Standing BP: 101/78 HR: 94  Site: upper right arm  Mode: electronic        General Exam:   General Appearance: Appropriately dressed and in no acute distress       Head: Normocephalic, atraumatic and no dysmorphic features  Ear, Nose, and Throat: Moist mucous membranes  CVS: S1S2+  Resp: No SOB, no wheeze or rhonchi  Abd: soft NTND  Extremities: No edema, no cyanosis  Skin: No bruises, no rashes     Neurological Exam:  Mental Status: Awake, alert and oriented x 3.  Able to follow simple and complex verbal commands. Able to name and repeat. fluent speech. No obvious aphasia or dysarthria noted.   Cranial Nerves: PERRL, EOMI, VFFC, sensation V1-V3 intact,  no obvious facial asymmetry , equal elevation of palate, scm/trap 5/5, tongue is midline on protrusion. no obvious papilledema on fundoscopic exam. Hearing is grossly intact.   Motor: Normal muscle bulk & tone. No noticeable tremor. No drift in UE or LE b/l. UE grossly 5/5 throughout b/l. LE w/ HF 4+/5 b/l, DF/PF 5/5. 	   Sensation: Intact to light touch and pinprick throughout. no right/left confusion. no extinction to tactile on DSS.    Reflexes: 1+ throughout at biceps, brachioradialis, triceps, patellars and ankles bilaterally and equal. No clonus. R toe and L toe were both downgoing.  Coordination: No dysmetria on FNF   Gait: deferred cane     I personally reviewed the below data/images/labs:    CBC Full  -  ( 11 Dec 2022 07:10 )  WBC Count : 7.04 K/uL  RBC Count : 6.17 M/uL  Hemoglobin : 13.9 g/dL  Hematocrit : 44.1 %  Platelet Count - Automated : 129 K/uL  Mean Cell Volume : 71.5 fL  Mean Cell Hemoglobin : 22.5 pg  Mean Cell Hemoglobin Concentration : 31.5 gm/dL  Auto Neutrophil # : x  Auto Lymphocyte # : x  Auto Monocyte # : x  Auto Eosinophil # : x  Auto Basophil # : x  Auto Neutrophil % : x  Auto Lymphocyte % : x  Auto Monocyte % : x  Auto Eosinophil % : x  Auto Basophil % : x    12-11    138  |  103  |  18  ----------------------------<  83  3.9   |  23  |  1.25    Ca    8.7      11 Dec 2022 07:10  Phos  3.4     12-  Mg     2.00     -      Urinalysis Basic - ( 08 Dec 2022 16:05 )    Color: Light Yellow / Appearance: Clear / S.050 / pH: x  Gluc: x / Ketone: Negative  / Bili: Negative / Urobili: <2 mg/dL   Blood: x / Protein: 30 mg/dL / Nitrite: Negative   Leuk Esterase: Negative / RBC: 2 /HPF / WBC 0 /HPF   Sq Epi: x / Non Sq Epi: 0 /HPF / Bacteria: Negative        VENTRICLES AND SULCI: Ventricles and sulci are unremarkable for patient   age.  INTRA-AXIAL: No intracranial mass, acute hemorrhage, or midline shift is   present. There is non-specific moderate patchy and confluent decreased   attenuation in the white matter likely microvascular disease. Bilateral   thalamic chronic lacunar infarcts. Chronic appearing right cerebellar   infarction which is new since the prior study.  EXTRA-AXIAL: No extra-axial fluid collection is present.  INTRACRANIAL HEMORRHAGE: None.    VISUALIZED SINUSES: No air-fluid levels are identified.  VISUALIZED MASTOIDS:  Clear  CALVARIUM:  Intact      IMPRESSION:  No acute intracranial hemorrhage. Moderate chronic microvascular ischemic   changes and chronic lacunar infarcts as above. Chronic appearing small   infarction in the right cerebellar hemisphere which is new since the   prior study.    Notification to clinician of alert:  Physician assistant Freddie Romero was notified about the   noncontrast head CT findings at 12:24 PM on 2022 with readback   confirmation. The opportunity for questions was provided and all   questions asked were answered.    --- End of Report ---        CTA [place] OF DIAS:    ANTERIOR CIRCULATION    ICA  CAVERNOUS, SUPRACLINOID, BIFURCATION SEGMENTS: Patent without flow   limiting stenosis.    ANTERIOR CEREBRAL ARTERIES: Bilateral A1, and A2 anterior cerebral   arteries are unremarkable in course and caliber without flow limiting   stenosis.    MIDDLE CEREBRAL ARTERIES: Patent bilateral M1, M2, and distal MCA   branches without flow limiting stenosis.    POSTERIOR CIRCULATION:    VERTEBRAL ARTERIES: Patent without flow limiting stenosis    BASILAR ARTERY: Patent no flow limiting stenosis.    POSTERIOR CEREBRAL ARTERIES: Patent. Focal severe stenosis in the P1   segment of the left posterior cerebral artery.      CTA NECK:    GREAT VESSELS: Visualized segments are patent, no flow limiting stenosis.    COMMON CAROTID ARTERIES:  RIGHT: Patent without flow limiting stenosis  LEFT: Patent without flow limiting stenosis    INTERNAL CAROTID ARTERIES:  RIGHT: Patent no evidence for any hemodynamically significant stenosis at   the ICA origin by NASCET criteria.  LEFT: Patent noevidence for any hemodynamically significant stenosis at   the ICA origin by NASCET criteria.    VERTEBRAL ARTERIES:    RIGHT: No flow within the V1 right vertebral artery segment with   reconstitution at the V2 segment.  LEFT: No flow within the V1 and proximal V2 left vertebral artery   segments with reconstitution distally.    SOFT TISSUES: Unremarkable    BONES: Degenerative changes of the cervical spine.    IMPRESSION:    CTA COW: No large vessel occlusion. 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    CTA NECK: Patent, ECAs, ICAs, no  hemodynamically significant stenosis at    ICA origins by NASCET criteria.  No flow within the proximal vertebral arteries bilaterally with distal   reconstitution which is new since the prior study.    --- End of Report ---

## 2022-12-11 NOTE — PROVIDER CONTACT NOTE (OTHER) - DATE AND TIME:
11-Dec-2022 10:36
10-Dec-2022 09:58
09-Dec-2022 16:40
10-Dec-2022 21:30
10-Dec-2022 10:04
11-Dec-2022 23:24
10-Dec-2022 04:00

## 2022-12-12 ENCOUNTER — TRANSCRIPTION ENCOUNTER (OUTPATIENT)
Age: 82
End: 2022-12-12

## 2022-12-12 VITALS
HEART RATE: 89 BPM | OXYGEN SATURATION: 98 % | TEMPERATURE: 97 F | SYSTOLIC BLOOD PRESSURE: 139 MMHG | RESPIRATION RATE: 17 BRPM | DIASTOLIC BLOOD PRESSURE: 90 MMHG

## 2022-12-12 LAB
GLUCOSE BLDC GLUCOMTR-MCNC: 100 MG/DL — HIGH (ref 70–99)
GLUCOSE BLDC GLUCOMTR-MCNC: 77 MG/DL — SIGNIFICANT CHANGE UP (ref 70–99)

## 2022-12-12 PROCEDURE — 99239 HOSP IP/OBS DSCHRG MGMT >30: CPT

## 2022-12-12 PROCEDURE — 70551 MRI BRAIN STEM W/O DYE: CPT | Mod: 26

## 2022-12-12 RX ORDER — LANOLIN ALCOHOL/MO/W.PET/CERES
1 CREAM (GRAM) TOPICAL
Qty: 0 | Refills: 0 | DISCHARGE
Start: 2022-12-12

## 2022-12-12 RX ORDER — ALLOPURINOL 300 MG
1 TABLET ORAL
Qty: 0 | Refills: 0 | DISCHARGE
Start: 2022-12-12

## 2022-12-12 RX ORDER — ATORVASTATIN CALCIUM 80 MG/1
1 TABLET, FILM COATED ORAL
Qty: 0 | Refills: 0 | DISCHARGE

## 2022-12-12 RX ORDER — HYDRALAZINE HCL 50 MG
1 TABLET ORAL
Qty: 0 | Refills: 0 | DISCHARGE

## 2022-12-12 RX ORDER — RIVAROXABAN 15 MG-20MG
0 KIT ORAL
Qty: 0 | Refills: 0 | DISCHARGE

## 2022-12-12 RX ORDER — ACETAMINOPHEN 500 MG
2 TABLET ORAL
Qty: 0 | Refills: 0 | DISCHARGE
Start: 2022-12-12

## 2022-12-12 RX ORDER — METOPROLOL TARTRATE 50 MG
1 TABLET ORAL
Qty: 0 | Refills: 0 | DISCHARGE
Start: 2022-12-12

## 2022-12-12 RX ORDER — ATORVASTATIN CALCIUM 80 MG/1
1 TABLET, FILM COATED ORAL
Qty: 0 | Refills: 0 | DISCHARGE
Start: 2022-12-12

## 2022-12-12 RX ORDER — HYDRALAZINE HCL 50 MG
1 TABLET ORAL
Qty: 0 | Refills: 0 | DISCHARGE
Start: 2022-12-12

## 2022-12-12 RX ORDER — RIVAROXABAN 15 MG-20MG
1 KIT ORAL
Qty: 0 | Refills: 0 | DISCHARGE
Start: 2022-12-12

## 2022-12-12 RX ORDER — ALLOPURINOL 300 MG
1 TABLET ORAL
Qty: 0 | Refills: 0 | DISCHARGE

## 2022-12-12 RX ADMIN — Medication 25 MILLIGRAM(S): at 13:31

## 2022-12-12 RX ADMIN — Medication 100 MILLIGRAM(S): at 12:09

## 2022-12-12 RX ADMIN — Medication 25 MILLIGRAM(S): at 05:13

## 2022-12-12 NOTE — PROGRESS NOTE ADULT - SUBJECTIVE AND OBJECTIVE BOX
Neurology Progress Note    S: Patient seen and examined. No new events overnight. patient denied CP, SOB, HA or pain.     Medication:      MEDICATIONS  (STANDING):  allopurinol 100 milliGRAM(s) Oral daily  atorvastatin 40 milliGRAM(s) Oral at bedtime  dextrose 5%. 1000 milliLiter(s) (100 mL/Hr) IV Continuous <Continuous>  dextrose 5%. 1000 milliLiter(s) (50 mL/Hr) IV Continuous <Continuous>  dextrose 50% Injectable 25 Gram(s) IV Push once  dextrose 50% Injectable 12.5 Gram(s) IV Push once  dextrose 50% Injectable 25 Gram(s) IV Push once  glucagon  Injectable 1 milliGRAM(s) IntraMuscular once  hydrALAZINE 25 milliGRAM(s) Oral every 8 hours  insulin lispro (ADMELOG) corrective regimen sliding scale   SubCutaneous three times a day before meals  insulin lispro (ADMELOG) corrective regimen sliding scale   SubCutaneous at bedtime  metoprolol succinate ER 25 milliGRAM(s) Oral daily  rivaroxaban 20 milliGRAM(s) Oral with dinner    MEDICATIONS  (PRN):  acetaminophen     Tablet .. 650 milliGRAM(s) Oral every 6 hours PRN Temp greater or equal to 38C (100.4F), Mild Pain (1 - 3)  aluminum hydroxide/magnesium hydroxide/simethicone Suspension 30 milliLiter(s) Oral every 4 hours PRN Dyspepsia  dextrose Oral Gel 15 Gram(s) Oral once PRN Blood Glucose LESS THAN 70 milliGRAM(s)/deciliter  melatonin 3 milliGRAM(s) Oral at bedtime PRN Insomnia  ondansetron Injectable 4 milliGRAM(s) IV Push every 8 hours PRN Nausea and/or Vomiting      Vitals:    Vital Signs Last 24 Hrs  T(C): 36.3 (22 @ 05:13), Max: 36.4 (22 @ 16:54)  T(F): 97.3 (22 @ 05:13), Max: 97.6 (22 @ 16:54)  HR: 89 (22 @ 05:13) (63 - 150)  BP: 139/90 (22 @ 05:13) (131/86 - 145/77)  BP(mean): --  RR: 17 (22 @ 05:13) (17 - 17)  SpO2: 98% (22 @ 05:13) (98% - 99%)    Orthostatic VS  12-10-22 @ 10:00  Lying BP: 165/86 HR: 90  Sitting BP: 153/99 HR: 87  Standing BP: 118/87 HR: 117  Site: upper right arm  Mode: electronic    Orthostatic VS  12-10-22 @ 10:00  Lying BP: 165/86 HR: 90  Sitting BP: 153/99 HR: 87  Standing BP: 118/87 HR: 117  Site: upper right arm  Mode: electronic  Orthostatic VS  12-10-22 @ 03:13  Lying BP: 179/118 HR: 60  Sitting BP: 161/100 HR: 120  Standing BP: 101/78 HR: 94  Site: upper right arm  Mode: electronic        General Exam:   General Appearance: Appropriately dressed and in no acute distress       Head: Normocephalic, atraumatic and no dysmorphic features  Ear, Nose, and Throat: Moist mucous membranes  CVS: S1S2+  Resp: No SOB, no wheeze or rhonchi  Abd: soft NTND  Extremities: No edema, no cyanosis  Skin: No bruises, no rashes     Neurological Exam:  Mental Status: Awake, alert and oriented x 3.  Able to follow simple and complex verbal commands. Able to name and repeat. fluent speech. No obvious aphasia or dysarthria noted.   Cranial Nerves: PERRL, EOMI, VFFC, sensation V1-V3 intact,  no obvious facial asymmetry , equal elevation of palate, scm/trap 5/5, tongue is midline on protrusion. no obvious papilledema on fundoscopic exam. Hearing is grossly intact.   Motor: Normal muscle bulk & tone. No noticeable tremor. No drift in UE or LE b/l. UE grossly 5/5 throughout b/l. LE w/ HF 4+/5 b/l, DF/PF 5/5. 	   Sensation: Intact to light touch and pinprick throughout. no right/left confusion. no extinction to tactile on DSS.    Reflexes: 1+ throughout at biceps, brachioradialis, triceps, patellars and ankles bilaterally and equal. No clonus. R toe and L toe were both downgoing.  Coordination: No dysmetria on FNF   Gait: deferred cane     I personally reviewed the below data/images/labs:  CBC Full  -  ( 11 Dec 2022 07:10 )  WBC Count : 7.04 K/uL  RBC Count : 6.17 M/uL  Hemoglobin : 13.9 g/dL  Hematocrit : 44.1 %  Platelet Count - Automated : 129 K/uL  Mean Cell Volume : 71.5 fL  Mean Cell Hemoglobin : 22.5 pg  Mean Cell Hemoglobin Concentration : 31.5 gm/dL  Auto Neutrophil # : x  Auto Lymphocyte # : x  Auto Monocyte # : x  Auto Eosinophil # : x  Auto Basophil # : x  Auto Neutrophil % : x  Auto Lymphocyte % : x  Auto Monocyte % : x  Auto Eosinophil % : x  Auto Basophil % : x       12-11    138  |  103  |  18  ----------------------------<  83  3.9   |  23  |  1.25    Ca    8.7      11 Dec 2022 07:10  Phos  3.4     12-11  Mg     2.00     12-      Urinalysis Basic - ( 08 Dec 2022 16:05 )    Color: Light Yellow / Appearance: Clear / S.050 / pH: x  Gluc: x / Ketone: Negative  / Bili: Negative / Urobili: <2 mg/dL   Blood: x / Protein: 30 mg/dL / Nitrite: Negative   Leuk Esterase: Negative / RBC: 2 /HPF / WBC 0 /HPF   Sq Epi: x / Non Sq Epi: 0 /HPF / Bacteria: Negative        VENTRICLES AND SULCI: Ventricles and sulci are unremarkable for patient   age.  INTRA-AXIAL: No intracranial mass, acute hemorrhage, or midline shift is   present. There is non-specific moderate patchy and confluent decreased   attenuation in the white matter likely microvascular disease. Bilateral   thalamic chronic lacunar infarcts. Chronic appearing right cerebellar   infarction which is new since the prior study.  EXTRA-AXIAL: No extra-axial fluid collection is present.  INTRACRANIAL HEMORRHAGE: None.    VISUALIZED SINUSES: No air-fluid levels are identified.  VISUALIZED MASTOIDS:  Clear  CALVARIUM:  Intact      IMPRESSION:  No acute intracranial hemorrhage. Moderate chronic microvascular ischemic   changes and chronic lacunar infarcts as above. Chronic appearing small   infarction in the right cerebellar hemisphere which is new since the   prior study.    Notification to clinician of alert:  Physician assistant Freddie Romero was notified about the   noncontrast head CT findings at 12:24 PM on 2022 with readback   confirmation. The opportunity for questions was provided and all   questions asked were answered.    --- End of Report ---        CTA [place] OF DIAS:    ANTERIOR CIRCULATION    ICA  CAVERNOUS, SUPRACLINOID, BIFURCATION SEGMENTS: Patent without flow   limiting stenosis.    ANTERIOR CEREBRAL ARTERIES: Bilateral A1, and A2 anterior cerebral   arteries are unremarkable in course and caliber without flow limiting   stenosis.    MIDDLE CEREBRAL ARTERIES: Patent bilateral M1, M2, and distal MCA   branches without flow limiting stenosis.    POSTERIOR CIRCULATION:    VERTEBRAL ARTERIES: Patent without flow limiting stenosis    BASILAR ARTERY: Patent no flow limiting stenosis.    POSTERIOR CEREBRAL ARTERIES: Patent. Focal severe stenosis in the P1   segment of the left posterior cerebral artery.      CTA NECK:    GREAT VESSELS: Visualized segments are patent, no flow limiting stenosis.    COMMON CAROTID ARTERIES:  RIGHT: Patent without flow limiting stenosis  LEFT: Patent without flow limiting stenosis    INTERNAL CAROTID ARTERIES:  RIGHT: Patent no evidence for any hemodynamically significant stenosis at   the ICA origin by NASCET criteria.  LEFT: Patent noevidence for any hemodynamically significant stenosis at   the ICA origin by NASCET criteria.    VERTEBRAL ARTERIES:    RIGHT: No flow within the V1 right vertebral artery segment with   reconstitution at the V2 segment.  LEFT: No flow within the V1 and proximal V2 left vertebral artery   segments with reconstitution distally.    SOFT TISSUES: Unremarkable    BONES: Degenerative changes of the cervical spine.    IMPRESSION:    CTA COW: No large vessel occlusion. 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    CTA NECK: Patent, ECAs, ICAs, no  hemodynamically significant stenosis at    ICA origins by NASCET criteria.  No flow within the proximal vertebral arteries bilaterally with distal   reconstitution which is new since the prior study.    --- End of Report ---

## 2022-12-12 NOTE — PROGRESS NOTE ADULT - PROBLEM SELECTOR PLAN 4
Check lipid in AM  -c/w atorvastatin 40mg qhs
-c/w atorvastatin 40mg qhs
Check lipid in AM  -c/w atorvastatin 40mg qhs
Check lipid in AM  -c/w atorvastatin 40mg qhs

## 2022-12-12 NOTE — DISCHARGE NOTE PROVIDER - NSDCCPCAREPLAN_GEN_ALL_CORE_FT
PRINCIPAL DISCHARGE DIAGNOSIS  Diagnosis: Acute cerebrovascular accident (CVA)  Assessment and Plan of Treatment: Continue physical therapy and skills learned for rehabilitation.    Follow up with your neurologist in 1-2 weeks for further medical management.    Continue to take your medications as prescribed, low salt, low fat, and diabetic diet.   - continue Xarelto    Consider joining a support group for stroke survivors for emotional support to prevent depression.      SECONDARY DISCHARGE DIAGNOSES  Diagnosis: Permanent atrial fibrillation  Assessment and Plan of Treatment: continue xarelto   follow up with your primary doctor / cardiologist    Diagnosis: HTN (hypertension)  Assessment and Plan of Treatment: Low sodium and fat diet, continue anti-hypertensive medications, and follow up with primary care physician.    Diagnosis: Hyperlipidemia  Assessment and Plan of Treatment: continue lipitor   follow up with primary care doctor

## 2022-12-12 NOTE — CHART NOTE - NSCHARTNOTEFT_GEN_A_CORE
discussed with wife - Family to transport patient home .   Medically cleared for discharge to home today 5

## 2022-12-12 NOTE — DISCHARGE NOTE PROVIDER - NSDCFUSCHEDAPPT_GEN_ALL_CORE_FT
[facility]  ELECTR[facility] [address]  Scheduled Appointment: 01/05/2023

## 2022-12-12 NOTE — PROGRESS NOTE ADULT - PROBLEM SELECTOR PLAN 3
Currently controlled.
Currently controlled. Permissive HTN
Currently controlled.
Currently controlled.

## 2022-12-12 NOTE — DISCHARGE NOTE NURSING/CASE MANAGEMENT/SOCIAL WORK - PATIENT PORTAL LINK FT
You can access the FollowMyHealth Patient Portal offered by [facility] by registering at the following website: [email or web address]. By joining [facility]’s FollowMyHealth portal, you will also be able to view your health information using other applications (apps) compatible with our system.

## 2022-12-12 NOTE — PROGRESS NOTE ADULT - PROBLEM SELECTOR PLAN 1
Currently back to baseline mental status and neurological function. Unsure etiology but suspect TIA vs toxic-metabolic causes  -f/u MRI brain  -PPM, no events recorded. Pt already seen by EP, form completed and faxed to MRI dept. MRI staff to call [employer] Rep to reprogram to MRI mode prior to imaging.   -TTE completed, no comment on PFO  -Tele w/afib 100s, briefly in 150s   -c/w Xarelto. No ASA per neuro  -s/p Permissive HTN   -LDL 58, A1c 6.3  -Started on Atorvastatin 40mg
Currently back to baseline mental status and neurological function. Unsure etiology but suspect TIA vs toxic-metabolic causes  -f/u MRI brain  -Pt w/PPM, no events recorded. Pt already seen by EP, form completed, MRI staff to call [employer] Rep to reprogram to MRI mode prior to imaging.   -TTE completed, no comment on PFO  -Tele w/afib 100s, briefly in 130s   -c/w Xarelto. No ASA per neuro  -Permissive HTN to to 220/120 mmHg for first 24 hours   -LDL 58, A1c 6.3  -Started on Atorvastatin 40mg
Currently back to baseline mental status and neurological function. Unsure etiology but suspect TIA vs toxic-metabolic causes  -MRI w/small R cerebellar infarcts   -TTE completed, no comment on PFO  -Tele w/afib 100s  -c/w Xarelto. No ASA per neuro  -s/p Permissive HTN   -LDL 58, A1c 6.3  -Started on Atorvastatin 40mg
Currently back to baseline mental stautus and neurological function. Unsure etiology but suspect TIA vs toxic-metabolic causes  -Will get MRI of brain. 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  -TTE and Tele  -c/w Xarelto. No ASA per neuro  -Permissive HTN to to 220/120 mmHg for first 24 hours   -Lipid panel in AM  -A1C pending  -Start Atorvastatin 40mg for now

## 2022-12-12 NOTE — DISCHARGE NOTE PROVIDER - ATTENDING DISCHARGE PHYSICAL EXAMINATION:
CONSTITUTIONAL: NAD, elderly  EYES: EOMI; conjunctiva and sclera clear  ENMT: Moist oral mucosa, no pharyngeal injection or exudates; poor dentition  RESPIRATORY: Normal respiratory effort; lungs are clear to auscultation bilaterally  CARDIOVASCULAR: Regular rate and rhythm, normal S1 and S2, no murmurs  ABDOMEN: Nontender to palpation, normoactive bowel sounds, soft   PSYCH: calm, cooperative   NEURO: nonfocal, normal gait, CN grossly intact   SKIN: No rashes; no palpable lesions

## 2022-12-12 NOTE — PROGRESS NOTE ADULT - PROBLEM SELECTOR PLAN 2
Rate controlled  -Continue metoprolol (unlikely going to drop BP significantly) and would certainly keep Heart rate down (B>R)  -C/w Xarelto
Rate controlled  -Continue metoprolol ER 25mg qD  -C/w Xarelto
Rate controlled  -Continue metoprolol (unlikely going to drop BP significantly) and would certainly keep Heart rate down (B>R)  -C/w Xarelto
Rate controlled  -Continue metoprolol ER 25mg qD  -C/w Xarelto

## 2022-12-12 NOTE — PROGRESS NOTE ADULT - PROBLEM SELECTOR PLAN 6
FENP: No IVF, Lytes PRN, Dyspahgia diet for now, Xarelto ppx    DC: Pending MRI    Wife, Cristy, updated at bedside on 12/10
FENP: No IVF, Lytes PRN, Dyspahgia diet for now, Xarelto ppx    DC: Pending MRI    Wife, Cristy, updated at bedside on 12/10
FENP: No IVF, Lytes PRN, Dyspahgia diet for now, Xarelto ppx
FENP: No IVF, Lytes PRN, Dyspahgia diet for now, Xarelto ppx    Dispo: Home today if cleared by stroke neuro    Wife, Cristy, updated at bedside on 12/10

## 2022-12-12 NOTE — DISCHARGE NOTE PROVIDER - NSDCMRMEDTOKEN_GEN_ALL_CORE_FT
acetaminophen 325 mg oral tablet: 2 tab(s) orally every 6 hours, As needed, Temp greater or equal to 38C (100.4F), Mild Pain (1 - 3)  allopurinol 100 mg oral tablet: 1 tab(s) orally once a day  aluminum hydroxide-magnesium hydroxide 200 mg-200 mg/5 mL oral suspension: 30 milliliter(s) orally every 4 hours, As needed, Dyspepsia  atorvastatin 40 mg oral tablet: 1 tab(s) orally once a day (at bedtime)  hydrALAZINE 25 mg oral tablet: 1 tab(s) orally every 8 hours  melatonin 3 mg oral tablet: 1 tab(s) orally once a day (at bedtime), As needed, Insomnia  metoprolol succinate 25 mg oral tablet, extended release: 1 tab(s) orally once a day  rivaroxaban 20 mg oral tablet: 1 tab(s) orally once a day (before a meal)

## 2022-12-12 NOTE — PROGRESS NOTE ADULT - ASSESSMENT
81 yo R-H male w/  Afib on Xarelto (last dose this morning around 8am), HTN, DMII (not on medications), CVA vs TIA 4 years ago, presents to [facility] ED as code stroke for slurred speech and weakness in both legs. LKW 22:30 on 12/7. Patient was in normal state of health at that time but when he awoke around 7 am this morning, he told his wife that he was having difficulty getting out of bed due to weakness/imbalance of his legs. Around that time wife noticed slurred speech when patient would talk. The last time this happened was 4 years ago when patient was told he had a "mild stroke," as per wife at bedside. Exam as above.   CTH w/o acute findings but multiple chronic infarcts. R cerebellar new since last study   CTA H/N : L PCA severe stenosis noted. NO flow in B/L proximal VA.  + VBI     NIHSS: 2 --> 1 (dysarthria)  mRS: 2    Patient is not a tPA candidate because they are outside of the appropriate window of treatment.  Patient is not a mechanical thrombectomy candidate because no evidence of LVO.    Impression: Dysarthria & gait imbalance of unclear localization/etiology at this time. Possibly due to diffuse cerebral dysfunction from toxic metabolic or infectious etiologies, potentially causing recrudescence of old infarct. However, given risk factors also in the differential is brain dysfunction from acute ischemic stroke of posterior circulation presumably from cardioembolic mechanism from atrial fibrillation.   + VBI  LDL 58  r/o stroke     Recommendations    - Expeditious MRI brain w/o contrast if PPM compatible vs repeat CTH  - Interrogate pacemaker  - C/w home Xarelto   - Start high dose statin (Atorvastatin 40-80 mg) if LDL > 70   - HbA1C    - Toxic metabolic/infectious workup per primary team  - Telemonitoring;  Neurochecks and Vital signs q4hr while inpatient  - Permissive HTN up to 220/120 mmHg for first 24 hours after the symptom onset followed by gradual normotension.   - HgA1C goals <7.0 BGM goals 140-180  - PT/OT evaluation  - NPO until clears Dysphagia screen, otherwise Swallow evaluation  - Upon discharge, patient should follow up with Dr. Fontaine:  [phone] [address] [facility] [place] [address]     Nicolas Fontaine MD  Vascular Neurology  Office: [phone]   Please call with questions: [phone].  
  83 yo R-H male w/  Afib on Xarelto (last dose this morning around 8am), HTN, DMII (not on medications), CVA vs TIA 4 years ago, presents to [facility] ED as code stroke for slurred speech and weakness in both legs. LKW 22:30 on 12/7. Patient was in normal state of health at that time but when he awoke around 7 am this morning, he told his wife that he was having difficulty getting out of bed due to weakness/imbalance of his legs. Around that time wife noticed slurred speech when patient would talk. The last time this happened was 4 years ago when patient was told he had a "mild stroke," as per wife at bedside. Exam as above.   CTH w/o acute findings but multiple chronic infarcts. R cerebellar new since last study   CTA H/N : L PCA severe stenosis noted. NO flow in B/L proximal VA.  + VBI     NIHSS: 2 --> 1 (dysarthria)  mRS: 2    Patient is not a tPA candidate because they are outside of the appropriate window of treatment.  Patient is not a mechanical thrombectomy candidate because no evidence of LVO.    Impression: +VBI and + orhtostatics contributing to gait dysfunciton     + VBI  LDL 58  A1c 6.3   + orthoistatics   r/o stroke     Recommendations  - trend orthostatics   - Expeditious MRI brain w/o contrast if PPM compatible vs repeat CTH  - Interrogate pacemaker  - C/w home Xarelto   - Start high dose statin (Atorvastatin 40-80 mg) if LDL > 70   - Toxic metabolic/infectious workup per primary team  - Telemonitoring;  Neurochecks and Vital signs q4hr while inpatient  -  l normotension.   - HgA1C goals <7.0 BGM goals 140-180  - PT/OT evaluation  - NPO until clears Dysphagia screen, otherwise Swallow evaluation  - Upon discharge, patient should follow up with Dr. Fontaine:  [phone] [address] [place] [address]   - OK planning after MRI   Nicolas Fontaine MD  Vascular Neurology  Office: [phone]   Please call with questions: [phone].  
  81 yo R-H male w/  Afib on Xarelto (last dose this morning around 8am), HTN, DMII (not on medications), CVA vs TIA 4 years ago, presents to [facility] ED as code stroke for slurred speech and weakness in both legs. LKW 22:30 on 12/7. Patient was in normal state of health at that time but when he awoke around 7 am this morning, he told his wife that he was having difficulty getting out of bed due to weakness/imbalance of his legs. Around that time wife noticed slurred speech when patient would talk. The last time this happened was 4 years ago when patient was told he had a "mild stroke," as per wife at bedside. Exam as above.   CTH w/o acute findings but multiple chronic infarcts. R cerebellar new since last study   CTA H/N : L PCA severe stenosis noted. NO flow in B/L proximal VA.  + VBI     NIHSS: 2 --> 1 (dysarthria)  mRS: 2    Patient is not a tPA candidate because they are outside of the appropriate window of treatment.  Patient is not a mechanical thrombectomy candidate because no evidence of LVO.    Impression: +VBI and + orhtostatics contributing to gait dysfunciton     + VBI  LDL 58  A1c 6.3   + orthoistatics   r/o stroke     Recommendations  - trend orthostatics   - Expeditious MRI brain w/o contrast if PPM compatible vs repeat CTH  - Interrogate pacemaker  - C/w home Xarelto   - Start high dose statin (Atorvastatin 40-80 mg) if LDL > 70   - Toxic metabolic/infectious workup per primary team  - Telemonitoring;  Neurochecks and Vital signs q4hr while inpatient  - Permissive HTN up to 220/120 mmHg for first 24 hours after the symptom onset followed by gradual normotension.   - HgA1C goals <7.0 BGM goals 140-180  - PT/OT evaluation  - NPO until clears Dysphagia screen, otherwise Swallow evaluation  - Upon discharge, patient should follow up with Dr. Fontaine:  [phone] [address] [place] [address]     Nicolas Fontaine MD  Vascular Neurology  Office: [phone]   Please call with questions: [phone].  
83 yo R-H male w/ pmhx of Afib on Xarelto (last dose this morning around 8am), severe aortic stenosis s/p TAVR in Nov 2021, PPM placed 2021, HTN, DMII (not on medications), CVA vs TIA 4 years ago (with residual RLE and RUE weakness), CAD (no antiplatelets, was d/c'd off ASA 2021), PPM placed (Nov 2021- [facility] [employer]) [facility] as code stroke for slurred speech and weakness in both legs. Patient admitted for stroke work up.
81 yo R-H male w/ pmhx of Afib on Xarelto (last dose this morning around 8am), severe aortic stenosis s/p TAVR in Nov 2021, PPM placed 2021, HTN, DMII (not on medications), CVA vs TIA 4 years ago (with residual RLE and RUE weakness), CAD (no antiplatelets, was d/c'd off ASA 2021), PPM placed (Nov 2021- VVI leadless [employer]tr[employer] pacemaker at [facility]) presents to [facility] ED as code stroke for slurred speech and weakness in both legs. Patient admitted for stroke work up, MRI w/acute small R cerebellar CVA
81 yo R-H male w/ pmhx of Afib on Xarelto (last dose this morning around 8am), severe aortic stenosis s/p TAVR in Nov 2021, PPM placed 2021, HTN, DMII (not on medications), CVA vs TIA 4 years ago (with residual RLE and RUE weakness), CAD (no antiplatelets, was d/c'd off ASA 2021), PPM placed (Nov 2021- VVI leadless Medtronic pacemaker at [facility]) presents to [facility] ED as code stroke for slurred speech and weakness in both legs. Patient admitted for stroke work up.
83 yo R-H male w/ pmhx of Afib on Xarelto (last dose this morning around 8am), severe aortic stenosis s/p TAVR in Nov 2021, PPM placed 2021, HTN, DMII (not on medications), CVA vs TIA 4 years ago (with residual RLE and RUE weakness), CAD (no antiplatelets, was d/c'd off ASA 2021), PPM placed (Nov 2021- VVI leadless [employer]tr[employer] pacemaker at [facility]) presents to [facility] ED as code stroke for slurred speech and weakness in both legs. Patient admitted for stroke work up.

## 2022-12-12 NOTE — PROGRESS NOTE ADULT - PROBLEM SELECTOR PLAN 5
No symptoms  -C/W allopurinol

## 2022-12-12 NOTE — DISCHARGE NOTE PROVIDER - CARE PROVIDER_API CALL
Nicolas Fontaine)  Neurology; Vascular Neurology  [address] [facility], [address]  [place] [address]  Phone: [phone]  Fax: [phone]  Follow Up Time: 1 week

## 2022-12-12 NOTE — DISCHARGE NOTE PROVIDER - HOSPITAL COURSE
83 yo R-H male w/ pmhx of Afib on Xarelto (last dose this morning around 8am), severe aortic stenosis s/p TAVR in Nov 2021, PPM placed 2021, HTN, DMII (not on medications), CVA vs TIA 4 years ago (with residual RLE and RUE weakness), CAD (no antiplatelets, was d/c'd off ASA 2021), PPM placed (Nov 2021- VVI leadless Medtronic pacemaker at [facility]) presents to [facility] ED as code stroke for slurred speech and weakness in both legs. Patient admitted for stroke work up, MRI w/acute small R cerebellar CVA    Problem/Plan - 1:  CVA (cerebrovascular accident).   Currently back to baseline mental status and neurological function.  -TTE completed, no comment on PFO  -Tele w/afib 100s. will c/w Xarelto. No ASA per neuro  -s/p Permissive HTN   -Started on Atorvastatin 40mg.  - Neurology consulted - Dr. Fontaine-   CTH w/o acute findings but multiple chronic infarcts. R cerebellar new since last study   CTA H/N : L PCA severe stenosis noted. NO flow in B/L proximal VA.  + VBI   NIHSS: 2 --> 1 (dysarthria)  mRS: 2  Patient is not a tPA candidate because they are outside of the appropriate window of treatment.  Patient is not a mechanical thrombectomy candidate because no evidence of LVO.  VBI and + orthostatics  contributing to gait dysfunction     -MRI w/small R cerebellar infarcts   - PPM interrogation -   Normal sensing pacing via iterative testing, excellent threshold capture, no events recorded, no reprogramming     - C/w home Xarelto   - Telemonitoring;  Neurochecks and Vital signs q4hr while inpatient  - HgA1C goals <7.0 BGM goals 140-180  - PT/OT evaluation recommend Rehab , Family prefers bringing patient home       - Upon discharge, patient should follow up with Dr. Fontaine:  [phone] [address] [facility] [address] [place] [address]      Nicolas Fontaine MD  Vascular Neurology  Office: [phone]       Problem/Plan - 2:  : AF (atrial fibrillation).    Rate controlled. Will  Continue metoprolol ER 25mg qD  -C/w Xarelto.    Problem/Plan - 3:   HTN (hypertension).    Currently controlled.    Problem/Plan - 4:  HLD (hyperlipidemia).   -c/w atorvastatin 40mg qhs.    Problem/Plan - 5:   Gout.   No symptoms  -C/W allopurinol.    Cleared by neurology for discharge home today on 12/12. Patient to follow up as outpt with Dr. Fontaine 81 yo R-H male w/ pmhx of Afib on Xarelto (last dose this morning around 8am), severe aortic stenosis s/p TAVR in Nov 2021, PPM placed 2021, HTN, DMII (not on medications), CVA vs TIA 4 years ago (with residual RLE and RUE weakness), CAD (no antiplatelets, was d/c'd off ASA 2021), PPM placed (Nov 2021- VVI leadless Medtronic pacemaker at [facility]) presents to [facility] ED as code stroke for slurred speech and weakness in both legs. Patient admitted for stroke work up, MRI w/acute small R cerebellar CVA    CVA (cerebrovascular accident).   Currently back to baseline mental status and neurological function.  -TTE completed, no comment on PFO  -Tele w/afib 100s. will c/w Xarelto. No ASA per neuro  -s/p Permissive HTN   -Started on Atorvastatin 40mg.  - Neurology consulted - Dr. Fontaine-   [facility] w/o acute findings but multiple chronic infarcts. R cerebellar new since last study   CTA H/N : L PCA severe stenosis noted. NO flow in B/L proximal VA.  + VBI   NIHSS: 2 --> 1 (dysarthria)  mRS: 2  Patient is not a tPA candidate because they are outside of the appropriate window of treatment.  Patient is not a mechanical thrombectomy candidate because no evidence of LVO.  VBI and + orthostatics  contributing to gait dysfunction     -MRI w/small R cerebellar infarcts   - PPM interrogation -   Normal sensing pacing via iterative testing, excellent threshold capture, no events recorded, no reprogramming     - C/w home Xarelto   - Telemonitoring;  Neurochecks and Vital signs q4hr while inpatient  - HgA1C goals <7.0 BGM goals 140-180  - PT/OT evaluation recommend Rehab , Family prefers bringing patient home       - Upon discharge, patient should follow up with Dr. Fontaine:  [phone] [address] [place] [address]     AF (atrial fibrillation). Rate controlled. Will  Continue metoprolol ER 25mg qD C/w Xarelto.    HTN (hypertension) c/w home meds, avoid hypotension     HLD (hyperlipidemia). c/w atorvastatin 40mg qhs.    Gout: c/w allopurinol.    Cleared by neurology for discharge home today on 12/12. Patient to follow up as outpt with Dr. Fontaine

## 2022-12-12 NOTE — PROGRESS NOTE ADULT - SUBJECTIVE AND OBJECTIVE BOX
[facility] Division of Hospital Medicine  Cathy Mcneal DO  Pager (M-F, 6D-5P): [phone]      Patient is a 82y old  Male who presents with a chief complaint of Weakness (12 Dec 2022 08:33)      SUBJECTIVE / OVERNIGHT EVENTS: no overnight events, pt seen at bedside. Overall feeling well, going down for MRI  ADDITIONAL REVIEW OF SYSTEMS: no cp/sob     MEDICATIONS  (STANDING):  allopurinol 100 milliGRAM(s) Oral daily  atorvastatin 40 milliGRAM(s) Oral at bedtime  dextrose 5%. 1000 milliLiter(s) (100 mL/Hr) IV Continuous <Continuous>  dextrose 5%. 1000 milliLiter(s) (50 mL/Hr) IV Continuous <Continuous>  dextrose 50% Injectable 25 Gram(s) IV Push once  dextrose 50% Injectable 12.5 Gram(s) IV Push once  dextrose 50% Injectable 25 Gram(s) IV Push once  glucagon  Injectable 1 milliGRAM(s) IntraMuscular once  hydrALAZINE 25 milliGRAM(s) Oral every 8 hours  insulin lispro (ADMELOG) corrective regimen sliding scale   SubCutaneous three times a day before meals  insulin lispro (ADMELOG) corrective regimen sliding scale   SubCutaneous at bedtime  metoprolol succinate ER 25 milliGRAM(s) Oral daily  rivaroxaban 20 milliGRAM(s) Oral with dinner    MEDICATIONS  (PRN):  acetaminophen     Tablet .. 650 milliGRAM(s) Oral every 6 hours PRN Temp greater or equal to 38C (100.4F), Mild Pain (1 - 3)  aluminum hydroxide/magnesium hydroxide/simethicone Suspension 30 milliLiter(s) Oral every 4 hours PRN Dyspepsia  dextrose Oral Gel 15 Gram(s) Oral once PRN Blood Glucose LESS THAN 70 milliGRAM(s)/deciliter  melatonin 3 milliGRAM(s) Oral at bedtime PRN Insomnia  ondansetron Injectable 4 milliGRAM(s) IV Push every 8 hours PRN Nausea and/or Vomiting      CAPILLARY BLOOD GLUCOSE      POCT Blood Glucose.: 100 mg/dL (12 Dec 2022 12:20)  POCT Blood Glucose.: 77 mg/dL (12 Dec 2022 08:35)  POCT Blood Glucose.: 128 mg/dL (11 Dec 2022 22:07)  POCT Blood Glucose.: 82 mg/dL (11 Dec 2022 17:24)    I&O's Summary      PHYSICAL EXAM:  Vital Signs Last 24 Hrs  T(C): 36.3 (12 Dec 2022 05:13), Max: 36.4 (11 Dec 2022 16:54)  T(F): 97.3 (12 Dec 2022 05:13), Max: 97.6 (11 Dec 2022 16:54)  HR: 89 (12 Dec 2022 05:13) (63 - 89)  BP: 139/90 (12 Dec 2022 05:13) (139/90 - 145/77)  BP(mean): --  RR: 17 (12 Dec 2022 05:13) (17 - 17)  SpO2: 98% (12 Dec 2022 05:13) (98% - 99%)    Parameters below as of 12 Dec 2022 05:13  Patient On (Oxygen Delivery Method): room air      CONSTITUTIONAL: NAD, elderly  EYES: EOMI; conjunctiva and sclera clear  ENMT: Moist oral mucosa, no pharyngeal injection or exudates; poor dentition  RESPIRATORY: Normal respiratory effort; lungs are clear to auscultation bilaterally  CARDIOVASCULAR: Regular rate and rhythm, normal S1 and S2, no murmurs  ABDOMEN: Nontender to palpation, normoactive bowel sounds, soft   PSYCH: calm, cooperative   NEURO: nonfocal, normal gait, CN grossly intact   SKIN: No rashes; no palpable lesions    LABS:                        13.9   7.04  )-----------( 129      ( 11 Dec 2022 07:10 )             44.1     12-11    138  |  103  |  18  ----------------------------<  83  3.9   |  23  |  1.25    Ca    8.7      11 Dec 2022 07:10  Phos  3.4     12-11  Mg     2.00     12-11                  RADIOLOGY & ADDITIONAL TESTS:    COORDINATION OF CARE:  Care Discussed with Consultants/Other Providers [Y/N]: Y  Prior or Outpatient Records Reviewed [Y/N]: WILLIE

## 2022-12-12 NOTE — CHART NOTE - NSCHARTNOTEFT_GEN_A_CORE
MRI brain w/o contrast imaging & report reviewed. Findings are as follows:    "There is mild diffuse parenchymal volume loss. There are T2 prolongation   signal abnormalities in the periventricular subcortical white matter   likely related to severe chronic microvascular ischemic changes.    Chronic lacunar infarcts right thalamus, posterior limb left internal   capsule.    Small acute infarcts noted in the right cerebellum.    Focal gradient susceptibility effect left cerebellum which may be related   to chronic microhemorrhage or cavernoma.    There is no acute parenchymal hemorrhage, or midline shift. There is no   extra-axial fluid collection.  There is no hydrocephalus.    Mild mucosal thickening paranasal sinuses.    IMPRESSION:  Small acute infarcts right cerebellum.    No acute intracranial hemorrhage    --- End of Report ---"    TTE w/ prosthetic aortic valve, mildly dilated LA, grossly normal LV function. Discussed recommendations with primary team.       Impression: Dysarthria & gait imbalance due to R cerebellar infarct most likely due to cardioembolism from atrial fibrillation.     Discussed recommendations with primary team.     Recommendations     [] C/w home Xarelto   [] High dose statin (Atorvastatin 40-80 mg) if LDL > 70   [] Telemonitoring; Neurochecks and Vital signs q4hr while inpatient  [] Long term BP goals between 110-140/<90 mmHg, avoid hypotension if possible  [] HgA1C goals <7.0 BGM goals 140-180  [] PT/OT - restorative rehab   [] C/w Pureed diet as tolerated, advance as indicated. Consider S/S f/u PRN.   [] Upon discharge, patient should follow up with Dr. Fontaine:  [phone] [address] [place] [address]     From neurovascular standpoint, no further inpatient workup required. Can follow up outpatient. Signing off, please reconsult PRN.     Patient case discussed with stroke fellow,  , under supervisio of stroke attending, Dr. Fontaine.    Please call with questions: [phone]

## 2023-01-04 ENCOUNTER — FORM ENCOUNTER (OUTPATIENT)
Age: 83
End: 2023-01-04

## 2023-01-05 ENCOUNTER — APPOINTMENT (OUTPATIENT)
Dept: ELECTROPHYSIOLOGY | Facility: CLINIC | Age: 83
End: 2023-01-05

## 2023-02-05 ENCOUNTER — FORM ENCOUNTER (OUTPATIENT)
Age: 83
End: 2023-02-05

## 2023-02-06 ENCOUNTER — APPOINTMENT (OUTPATIENT)
Dept: ELECTROPHYSIOLOGY | Facility: CLINIC | Age: 83
End: 2023-02-06

## 2023-02-10 ENCOUNTER — APPOINTMENT (OUTPATIENT)
Dept: ELECTROPHYSIOLOGY | Facility: CLINIC | Age: 83
End: 2023-02-10
Payer: MEDICARE

## 2023-02-10 ENCOUNTER — NON-APPOINTMENT (OUTPATIENT)
Age: 83
End: 2023-02-10

## 2023-02-10 PROCEDURE — 93296 REM INTERROG EVL PM/IDS: CPT

## 2023-02-10 PROCEDURE — 93294 REM INTERROG EVL PM/LDLS PM: CPT

## 2023-04-06 ENCOUNTER — NON-APPOINTMENT (OUTPATIENT)
Age: 83
End: 2023-04-06

## 2023-04-06 ENCOUNTER — APPOINTMENT (OUTPATIENT)
Dept: ELECTROPHYSIOLOGY | Facility: CLINIC | Age: 83
End: 2023-04-06
Payer: MEDICARE

## 2023-04-06 VITALS
WEIGHT: 174 LBS | HEART RATE: 89 BPM | BODY MASS INDEX: 27.97 KG/M2 | OXYGEN SATURATION: 100 % | DIASTOLIC BLOOD PRESSURE: 111 MMHG | HEIGHT: 66 IN | SYSTOLIC BLOOD PRESSURE: 178 MMHG

## 2023-04-06 PROCEDURE — 93000 ELECTROCARDIOGRAM COMPLETE: CPT | Mod: 59

## 2023-04-06 PROCEDURE — 93279 PRGRMG DEV EVAL PM/LDLS PM: CPT

## 2023-04-06 RX ORDER — PANTOPRAZOLE SODIUM 40 MG/1
40 GRANULE, DELAYED RELEASE ORAL
Refills: 0 | Status: DISCONTINUED | COMMUNITY
End: 2023-04-06

## 2023-04-24 NOTE — PATIENT PROFILE ADULT - DOMESTIC TRAVEL HIGH RISK QUESTION
Virtual Visit Details    Type of service:  Video Visit     Originating Location (pt. Location): Home  Distant Location (provider location):  Off-site  Platform used for Video Visit: Yamil Freire MD   No

## 2023-04-27 NOTE — PROGRESS NOTE ADULT - PROBLEM/PLAN-1
Patient notified medication was sent to pharmacy. Patient expressed understanding.   
DISPLAY PLAN FREE TEXT

## 2023-06-05 ENCOUNTER — FORM ENCOUNTER (OUTPATIENT)
Age: 83
End: 2023-06-05

## 2023-07-06 ENCOUNTER — APPOINTMENT (OUTPATIENT)
Dept: ELECTROPHYSIOLOGY | Facility: CLINIC | Age: 83
End: 2023-07-06
Payer: MEDICARE

## 2023-07-06 ENCOUNTER — NON-APPOINTMENT (OUTPATIENT)
Age: 83
End: 2023-07-06

## 2023-07-06 PROCEDURE — 93296 REM INTERROG EVL PM/IDS: CPT

## 2023-07-06 PROCEDURE — 93294 REM INTERROG EVL PM/LDLS PM: CPT

## 2023-07-19 NOTE — ED PROVIDER NOTE - MEDICAL DECISION MAKING DETAILS
Validate Note Data When Using Inventory: Yes 77 y.o male pmhx of afib on xarelto here with weakness and dark stools since 2am- concern for anemia from ? GI bleed due to AC- will obtain labs, type and screen, coags, admit.

## 2023-08-10 NOTE — DISCHARGE NOTE ADULT - NS MD DC PLAN IMMU FLU PROVIDE INFO
Regimen: venofer  Cycle: C1D3    Dr. Wil Huerta is supervising provider today.    Reviewed and verified Advanced Directives: No: Patient declined to create/provide document at this time     Nursing Assessment: A focused nursing assessment  was performed and the patient reports the following: Nausea: NO  Vomiting: NO  Cough: NO  Shortness of Breath: NO  Fatigue/Weakness: YES, patient states there has been some improvement    Pre-Treatment: - Patient has valid pre-authorization  - VS completed  - Premed orders are verified prior to administration  - Treatment parameters verified in patient protocol  - Patient is identified by first & last name, Date of birth that has been verified with the patient chairside.    Treatment: Refer to [facility] and MAR for line assessment and medication administration, Blood return confirmed before, during and after treatment administered and Infusion pump used for non-vesicant drugs    Post Treatment: Treatment tolerated well; no adverse reaction    Education: No new instructions needed    Next appointment scheduled:   Future Appointments   Date Time Provider Department Center   8/15/2023 10:45 AM [id number] INFUSION RN [place] [address] [facility]   8/17/2023 10:45 AM [id number] INFUSION RN [place] [address] [facility]   8/22/2023  9:45 AM [id number] INFUSION RN [facility] [facility]   9/5/2023  3:45 PM Inder Welch MD [facility]   9/11/2023  1:45 PM [facility]   11/8/2023  2:00 PM Bola Moe MD [address]     Patient instructed to call the office with any questions or concerns.    Patient Discharged: patient discharged to home per self, ambulatory    
Risks/benefits discussed with patient or patient surrogate

## 2023-08-17 NOTE — ED ADULT NURSE NOTE - FALL HARM RISK TYPE OF ASSESSMENT
Presents here today for her third and final Hep B vaccine.  Hepatitis B Vaccine (Recombinant), Engerix-B, 0.5 / mls given IM to her left deltoid.  No adverse reactions noted.  
Admission

## 2023-09-13 NOTE — CONSULT NOTE ADULT - CONSULT REQUESTED BY NAME
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   • Constipation 2023   • Unspecified mood (affective) disorder ([address]) 2023   • Medical clearance for psychiatric admission 2023   • Chronic kidney disease (CKD), stage IV (severe) ([address]) 2023   • Fall 2022   • Renal cell carcinoma ([address]) 2022   • Multiple rib fractures 2022   • Abnormal head CT 2022   • Tracheitis 11/15/2021   • Dementia with behavioral disturbance ([address])    • Recurrent falls 2020   • Long term current use of antiarrhythmic medical therapy 2020   • Pacemaker at end of battery life 2020   • Coagulation disorder ([address]) 2020   • Head injury 2020   • Hypertensive chronic kidney disease with stage 1 through stage 4 chronic kidney disease, or unspecified chronic kidney disease 2019   • Coronary artery disease involving native coronary artery of native heart without angina pectoris 2019   • Type 2 diabetes mellitus with kidney complication, with long-term current use of insulin ([address]) 2019   • Paroxysmal atrial fibrillation ([address]) 2018   • Mixed hyperlipidemia 2018   • Frontotemporal dementia ([address]) 2018   • JACQUELINE (acute kidney injury) ([address]) 2018   • Essential hypertension 2018   • Solitary kidney 2018   • B12 deficiency 10/13/2016   • Allergic rhinitis 2016   • Neuropathy of right foot 2015   • Piriformis syndrome 2014   • Benign prostate hyperplasia 10/27/2014   • Chronic lumbar radiculopathy 2014   • Sacral insufficiency fracture 2014   • Asthma 2014   • Hearing loss 05/07/2014   • GERD (gastroesophageal reflux disease) 05/07/2014   • Hiatal hernia 05/07/2014   • Seasonal allergies 05/07/2014   • Sleep apnea 09/12/2013   • Nephrosclerosis 09/12/2013   • Sacroiliitis ([address] ) 08/27/2013   • Elevated prostate specific antigen (PSA) 06/18/2013   • 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   • Sacral disorder 05/07/2013      LOS (days): 0  Geometric Mean LOS (GMLOS) (days): 2.70  Days to GMLOS:2.4     OBJECTIVE:    Risk of Unplanned Readmission Score: 40.3         Current admission status: Inpatient       Preferred Pharmacy:   [address], [place] - [address] [facility]  [address] [facility]  [address] [address]  Phone: [phone] Fax: [address] [place], [place] - [address] [facility] [address] [facility] [address] [facility]  Phone: [phone] Fax: [phone]    Primary Care Provider: Lianne Bridges MD    Primary Insurance: [facility]  Secondary Insurance:     ASSESSMENT:  [address] [place] [address]   Primary Phone: [phone] (Mobile)  Work Phone: [phone] Directives  Does patient have a [address]?: Yes  Does patient have Advance Directives?: Yes  Advance Directives: Living will  Primary Contact: Quinn Modi (Daughter) [phone]  Readmission Root Cause  30 Day Readmission: No    Patient Information  Admitted from[de-identified[facility] Facility [facility])  Mental Status: Alert, Confused  During Assessment patient was accompanied by: Not accompanied during assessment  Assessment information provided by[de-identified] Daughter  Primary Caregiver: Self  Support Systems: Self, Daughter, Private Caregivers, Other (Comment) ([address])  [place] of Residence: [facility] [address] do you live in?: [address] entry access options.  Select all that apply.: No steps to enter home  Type of Current Residence: Facility ([facility] [address])  Upon entering residence, is there a bedroom on the main floor (no further steps)?: Yes  Upon entering residence, is there a bathroom on the main floor (no further steps)?: Yes  In the last 12 months, was there a time when you were not able to pay the mortgage or rent on time?: No  In the last 12 months, how many places have you lived?: 2  In the last 12 months, was there a time when you did not have a steady place to sleep or slept in a shelter (including now)?: No  Homeless/housing insecurity resource given?: N/A  Living Arrangements: Lives w/ Spouse/significant other  Is patient a ?: No    Activities of Daily Living Prior to Admission  Functional Status: Assistance  Completes ADLs independently?: No  Level of ADL dependence: Assistance  Ambulates independently?: Yes  Does patient use assisted devices?: Yes  Assisted Devices (DME) used: Walker, Rollator  Does patient currently own DME?: Yes  What DME does the patient currently own?: Stephen Jernigan  Does patient have a history of Outpatient Therapy (PT/OT)?: Yes  Does the patient have a history of Short-Term Rehab?: Yes  Does patient have a history of HHC?: Yes  Does patient currently have [facility]?: No    Patient Information Continued  Income Source: Pension/jail  Does patient have prescription coverage?: Yes  Within the past 12 months, you worried that your food would run out before you got the money to buy more.: Never true  Within the past 12 months, the food you bought just didn't last and you didn't have money to get more.: Never true  Food insecurity resource given?: N/A  Does patient receive dialysis treatments?: No  Does patient have a history of substance abuse?: No  Does patient have a history of Mental Health Diagnosis?: Yes (Dementia with behavioral disturbance)  Is patient receiving treatment for mental health?: Yes  Has patient received inpatient treatment related to mental health in the last 2 years?: Yes (5/24/23 [address])    Means of Transportation  Means of Transport to [facility][de-identified] Family transport  In the past 12 months, has lack of transportation kept you from medical appointments or from getting medications?: No  In the past 12 months, has lack of transportation kept you from meetings, work, or from getting things needed for daily living?: No  Was application for public transport provided?: N/A    DISCHARGE DETAILS:    Discharge planning discussed with[de-identified] patient's dtr  Freedom of Choice: Yes     CM contacted family/caregiver?: Yes  Were Treatment Team discharge recommendations reviewed with patient/caregiver?: Yes  Did patient/caregiver verbalize understanding of patient care needs?: N/A- going to facility  Were patient/caregiver advised of the risks associated with not following Treatment Team discharge recommendations?: Yes    Contacts  Patient Contacts: Prachi Rowan- daughter  Relationship to Patient[de-identified] Family  Contact Method: Phone  Phone Number: [phone]  Reason/Outcome: Emergency Contact, Discharge Planning, Referral, Continuity of [address]         Is the patient interested in [address] [facility] at discharge?: No    DME Referral Provided  Referral made for DME?: No      CM spoke to pt's dtr Prachi Rowan, to discuss the role of CM. Pt resides at [facility] with his wife. Pt is an assist x1 for all ADL/iADLs. Pt ambulates with a walker. Pt's had 3-4 falls in the last 6 months. In speaking with Prachi Rowan, she is leaning towards having the pt return to [facility] when medically stable, rather than pursuing other rehab options. CM will follow up with family and care giver, post op but currently that is the d/c plan.     CM reviewed d/c planning process including the following: identifying help at home, patient preference for d/c planning needs, Discharge Lounge, [facility] Meds to Bed program, availability of treatment team to discuss questions or concerns patient and/or family may have regarding understanding CTU medications and recognizing signs and symptoms once discharged. CM also encouraged patient to follow up with all recommended appointments after discharge. Patient advised of importance for patient and family to participate in managing patient’s medical well being.

## 2023-10-05 ENCOUNTER — APPOINTMENT (OUTPATIENT)
Dept: ELECTROPHYSIOLOGY | Facility: CLINIC | Age: 83
End: 2023-10-05
Payer: MEDICARE

## 2023-10-05 ENCOUNTER — NON-APPOINTMENT (OUTPATIENT)
Age: 83
End: 2023-10-05

## 2023-10-05 PROCEDURE — 93294 REM INTERROG EVL PM/LDLS PM: CPT

## 2023-10-05 PROCEDURE — 93296 REM INTERROG EVL PM/IDS: CPT

## 2023-10-05 NOTE — PRE-ANESTHESIA EVALUATION ADULT - HEIGHT IN CM
INTERNAL MEDICINE PHYSICIAN DISCHARGE SUMMARY      Patient ID:  Jose Pastor  [id number]  25 year old  1997    ADMIT DATE  9/28/2023    DISCHARGE DATE  10/05/23    ADMITTING PHYSICIAN  Amy Delgado MD    Discharge Physician:  Kalyani Mcrae MD    ADMISSION DIAGNOSES  Intracerebral hemorrhage, nontraumatic (CMD) [I61.9]    DISCHARGE DIAGNOSES  Acute right frontal intraparenchymal hemorrhage  Reversible cerebral vasoconstriction syndrome (RCVS)  Cerebral edema with brain compression   MDMA use  Inappropriate sinus tachycardia   Acute kidney injury, resolved   Rhabdomyolysis   Type 2 MI      ADMISSION CONDITION  serious    DISCHARGE CONDITION  stable     HOSPITAL COURSE  Jose Pastor is a 25 year old male who was admitted to [facility] with neck and shoulder pain after ecstasy use. CT of the head showed a large right inferior frontal intraparenchymal hemorrhage with intraventricular extension and midline shift. Patient was subsequently admitted to the intensive care unit.  Multiple imagings were obtained, neurosurgical consultation was also requested.  Surgical intervention was not recommended given clinical and radiographic stability.  CTA with multifocal areas of narrowing. Patient subsequently underwent diagnostic angiogram which showed innumerable multifocal distal stenoses affecting the bilateral MCA, bilateral MAK and posterior circulation.  Findings consistent with RCVS.  Subsequent imaging showed stable but persistent bleeding.  Patient has been able to ambulate in the hallway without difficulty.  His stay was also further noted to be significant for inappropriate sinus tachycardia. After adequate time had elapsed since his last substance use, he continued to have persistent tachycardia with HR up to 170s with ambulation.  He was symptomatic with chest pain and dizziness. He did also have elevation of troponin. Evaluated by Cardiology. TTE done. Likely type II MI and no  162.56 further intervention was recommended at this time.  He was subsequently treated with CCB and beta-blocker both for cerebral vasospasm and inappropriate tachycardia.  Heart rate is now improved.  With ambulation, heart rate went up to about 110.  No palpitation or chest pain today.  Ok to discharge per Cardiology.  Patient will receive cardiac event monitor.  He will also have follow-up echocardiogram as an outpatient with Cardiology.  Patient is otherwise stable and discharged.      Consults:   IP Consult Orders (From admission, onward)     Start     Ordered    09/29/23 0902  Inpatient consult to Cardiology  ONE TIME        Provider:  Lorenzo Farrar MD    09/29/23 0902    09/28/23 1324  Inpatient consult to Neurosurgery  ONE TIME        Provider:  Van aSravia MD    09/28/23 1324    09/28/23 1246  Inpatient consult to Neuro Interventionalist  ONE TIME        Provider:  Alina Cruz MD    09/28/23 1246    09/28/23 1242  Inpatient consult to Neurology  ONE TIME        Provider:  Emmy Dias MD    09/28/23 1243                Diagnostic/Lab Studies:  Please review EMR for detailed lab/diagnostics.    Discharge Exam:  Blood pressure 103/59, pulse (!) 105, temperature 99 °F (37.2 °C), temperature source Oral, resp. rate 18, height 5' 6\" (1.676 m), weight 49.1 kg (108 lb 3.9 oz), SpO2 99 %.    Constitutional:  No acute distress, nontoxic appearance.  HENT:  Atraumatic, normocephalic.  Respiratory:  No respiratory distress, normal breath sounds.  Cardiovascular:  Tachycardia, regular rhythm  GI:  Soft, nondistended, normal bowel sounds, nontender.  Skin:  Well hydrated, no rash.  Extremities:  No clubbing, cyanosis, nor edema.  Neurology:  He is awake and alert. Appears disinhibited. Possible mild left sided weakness  Psychiatric:  Speech and behavior appropriate.    Discharge Medications:  For comprehensive medication list including new and discontinued medications, please refer to After Visit Summary.       Disposition:  Home    Patient Instructions:     Per AVS    Follow up with cardiology, Neurosurgeon as directed by these physicians.  If the appointment was not made, patient to call the number provided for scheduling the appointment.    Face-to-face encounter was conducted on day of discharge.  The above findings and plan of care were discussed with the patient and/or family.  Total time spent discharging the patient was 35 minutes of which greater than 50% was spent in counseling and coordinating care.     LACE(10 or more):High  Total Score: 12           5 LACE Length of Stay    3 LACE Acute Admission    3 LACE Charlson Comorbidity Index Evalution    1 LACE Visits to ED Previous 6 Months          Signed:    Kalyani Mcrae MD  [facility] Hospitalist

## 2023-10-31 NOTE — PROCEDURE NOTE - NSTIMEOUT_GEN_A_CORE
CC: Non-specific Interstitial Lung Disease     Today's Subjective & Objective Findings:  Patient seen and examined, observed at bedside this AM with Dr. López.  Partner present.  Last BM on 10/29.   BL EF/EE improving.   Weaning off 02 as being tolerated, up to 1-2hr at rest during the day,   Discussed increasing gabapentin to improve BL feet sensation.   No other complaints.     ROS --No dyspnea, head ache, chest or abd pain    Therapy-- engaging, motivated  Am therapy session rescheduled, to get a make up session as stated  Happy with the minimal progress with her UE motor function  Clinical notes to be sent, by SW, to health insurance seeking for an extension of acute rehab treatment  Improvement with UE noted, able to flex elbow and touch lips with fingers, gravity eliminated      Vital Signs Last 24 Hrs  T(C): 36.6 (31 Oct 2023 09:05), Max: 37 (30 Oct 2023 19:25)  T(F): 97.8 (31 Oct 2023 09:05), Max: 98.6 (30 Oct 2023 19:25)  HR: 78 (31 Oct 2023 09:05) (78 - 83)  BP: 111/71 (31 Oct 2023 09:05) (111/71 - 139/82)  BP(mean): --  RR: 16 (31 Oct 2023 09:05) (16 - 16)  SpO2: 96% (31 Oct 2023 09:05) (96% - 96%)        PHYSICAL EXAM:  Gen -  Comfortable,   In no acute distress IL NC oxy  HEENT - , nostrils are moist, no bleeding, mild icterus  Neck - Supple, No limited ROM, O2 via NC  Pulm - good   Cardiovascular - RRR, S1S2  Chest - good chest expansion,   Abdomen - Soft, non tender, +BS,   Extremities - No Cyanosis, no clubbing, 1+ edema to b/l feet, non pitting,    no calf tenderness, LUE Med line    Neuro-     Cognitive - awake, alert, fully oriented, engaging, speech normal      Motor -                    LEFT    UE - 4/5                    RIGHT UE - 4/5                     LEFT    LE - 2 +-/5 limited by leg edema                    RIGHT LE - 2+-/5  limitted by leg edema,, which is reducing      Sensory - Intact        Reflexes - DTR 1+      Coordination - FTN  impaired  due to weakness   MSK: soreness and weakness  Psychiatric - Mood stable, Affect WNL  Skin: Left lower abdomen ruptured blister 3.0 x 1.0, stage 2 pressure injury to right buttock 4 x1 and left buttock 3x1, stage 2 pressure injury ti right inner thigh 0.2 x 0.2, right groin wound 10.5 x 2.0, Left groin wound 5 x 5,    Hand swelling reducing    MMT--Able to contact B/L upper back muscles,   Elbow abduction and adduction, gravity eliminated 2+      LABS:                        9.5    7.62  )-----------( 268      ( 30 Oct 2023 06:30 )             30.5     10-30    138  |  103  |  18  ----------------------------<  101<H>  3.5   |  28  |  0.36<L>    Ca    9.2      30 Oct 2023 06:30    TPro  4.9<L>  /  Alb  2.2<L>  /  TBili  1.5<H>  /  DBili  x   /  AST  53<H>  /  ALT  80<H>  /  AlkPhos  276<H>  10-30      Urinalysis Basic - ( 30 Oct 2023 06:30 )    Color: x / Appearance: x / SG: x / pH: x  Gluc: 101 mg/dL / Ketone: x  / Bili: x / Urobili: x   Blood: x / Protein: x / Nitrite: x   Leuk Esterase: x / RBC: x / WBC x   Sq Epi: x / Non Sq Epi: x / Bacteria: x        MEDICATIONS  (STANDING):  apixaban 5 milliGRAM(s) Oral every 12 hours  artificial  tears Solution 1 Drop(s) Both EYES every 12 hours  ascorbic acid 500 milliGRAM(s) Oral daily  atovaquone  Suspension 1500 milliGRAM(s) Oral daily  bisacodyl 5 milliGRAM(s) Oral <User Schedule>  dextrose 5%. 1000 milliLiter(s) (100 mL/Hr) IV Continuous <Continuous>  dextrose 5%. 1000 milliLiter(s) (50 mL/Hr) IV Continuous <Continuous>  dextrose 50% Injectable 12.5 Gram(s) IV Push once  dextrose 50% Injectable 25 Gram(s) IV Push once  dextrose 50% Injectable 25 Gram(s) IV Push once  folic acid 1 milliGRAM(s) Oral daily  gabapentin 300 milliGRAM(s) Oral two times a day  glucagon  Injectable 1 milliGRAM(s) IntraMuscular once  hydrocortisone hemorrhoidal Suppository 1 Suppository(s) Rectal two times a day  influenza   Vaccine 0.5 milliLiter(s) IntraMuscular once  lactobacillus acidophilus 1 Tablet(s) Oral two times a day with meals  melatonin 6 milliGRAM(s) Oral at bedtime  methylPREDNISolone   Oral   methylPREDNISolone 56 milliGRAM(s) Oral daily  metoprolol tartrate 25 milliGRAM(s) Oral two times a day  multivitamin 1 Tablet(s) Oral daily  nystatin Powder 1 Application(s) Topical two times a day  pantoprazole    Tablet 40 milliGRAM(s) Oral before breakfast  psyllium Powder 1 Packet(s) Oral daily  sodium chloride 0.9% lock flush 5 milliLiter(s) IV Push two times a day  zinc oxide 40% Paste 1 Application(s) Topical three times a day    MEDICATIONS  (PRN):  albuterol/ipratropium for Nebulization 3 milliLiter(s) Nebulizer every 6 hours PRN Shortness of Breath and/or Wheezing  ALPRAZolam 0.25 milliGRAM(s) Oral every 6 hours PRN Anxiety  aluminum hydroxide/magnesium hydroxide/simethicone Suspension 30 milliLiter(s) Oral every 4 hours PRN Dyspepsia  dextrose Oral Gel 15 Gram(s) Oral once PRN Blood Glucose LESS THAN 70 milliGRAM(s)/deciliter  loperamide 2 milliGRAM(s) Oral three times a day PRN Diarrhea  polyethylene glycol 3350 17 Gram(s) Oral daily PRN Constipation  SIMETHICONE SOFTGELS 250 milliGRAM(s) 250 milliGRAM(s) Oral three times a day PRN for gas  sodium chloride 0.65% Nasal 1 Spray(s) Both Nostrils every 8 hours PRN Nasal Congestion   Patient's first and last name, , procedure, and correct site confirmed prior to the start of procedure. CC: Non-specific Interstitial Lung Disease     Today's Subjective & Objective Findings:  Patient seen and examined, observed at bedside this AM with Dr. López.  Partner present.  Last BM on 10/29.   BL elbow flexion/extension improving.   Weaning off 02 as being tolerated, up to 1-2hr at rest during the day,   Discussed increasing gabapentin to improve BL feet sensation.   Interval psych review and commencement of xanax for anxiety symptoms and d/w patient  No other complaints.     ROS --No dyspnea, head ache, chest or abd pain    Therapy-- engaging, motivated  Am therapy session rescheduled, to get a make up session as stated  Happy with the minimal progress with her UE motor function  Extension of acute rehab treatment duration denied by insurance, but SW working on alternative coverage arrangement with patient/Appeal of this decision       Vital Signs Last 24 Hrs  T(C): 36.6 (31 Oct 2023 09:05), Max: 37 (30 Oct 2023 19:25)  T(F): 97.8 (31 Oct 2023 09:05), Max: 98.6 (30 Oct 2023 19:25)  HR: 78 (31 Oct 2023 09:05) (78 - 83)  BP: 111/71 (31 Oct 2023 09:05) (111/71 - 139/82)  RR: 16 (31 Oct 2023 09:05) (16 - 16)  SpO2: 96% (31 Oct 2023 09:05) (96% - 96%)      PHYSICAL EXAM:  Gen -  Comfortable,   In no acute distress IL NC oxy  HEENT - , nostrils are moist, no bleeding, mild icterus  Neck - Supple, No limited ROM, O2 via NC  Pulm - good   Cardiovascular - RRR, S1S2  Chest - good chest expansion,   Abdomen - Soft, non tender, +BS,   Extremities - No Cyanosis, no clubbing, leg edema b/l feet,, non pitting,    no calf tenderness, LUE Med line    Neuro-     Cognitive - awake, alert, fully oriented, engaging, speech normal      Motor -                    LEFT    UE - 4/5                    RIGHT UE - 4/5                     LEFT    LE - 2 +-/5 limited by leg edema                    RIGHT LE - 2+-/5  limitted by leg edema,, which is reducing      Sensory - Intact        Reflexes - DTR 1+      Coordination - FTN  impaired  due to weakness   MSK: soreness and weakness  Psychiatric - Mood stable, Affect WNL  Skin: Left lower abdomen ruptured blister 3.0 x 1.0, stage 2 pressure injury to right buttock 4 x1 and left buttock 3x1, stage 2 pressure injury ti right inner thigh 0.2 x 0.2, right groin wound 10.5 x 2.0, Left groin wound 5 x 5,    Hand swelling reducing    MMT--Able to contact B/L upper back muscles,   Elbow abduction and adduction, gravity eliminated 2+      LABS:                        9.5    7.62  )-----------( 268      ( 30 Oct 2023 06:30 )             30.5     10-30    138  |  103  |  18  ----------------------------<  101<H>  3.5   |  28  |  0.36<L>    Ca    9.2      30 Oct 2023 06:30    TPro  4.9<L>  /  Alb  2.2<L>  /  TBili  1.5<H>  /  DBili  x   /  AST  53<H>  /  ALT  80<H>  /  AlkPhos  276<H>  10-30      Urinalysis Basic - ( 30 Oct 2023 06:30 )    Color: x / Appearance: x / SG: x / pH: x  Gluc: 101 mg/dL / Ketone: x  / Bili: x / Urobili: x   Blood: x / Protein: x / Nitrite: x   Leuk Esterase: x / RBC: x / WBC x   Sq Epi: x / Non Sq Epi: x / Bacteria: x        MEDICATIONS  (STANDING):  apixaban 5 milliGRAM(s) Oral every 12 hours  artificial  tears Solution 1 Drop(s) Both EYES every 12 hours  ascorbic acid 500 milliGRAM(s) Oral daily  atovaquone  Suspension 1500 milliGRAM(s) Oral daily  bisacodyl 5 milliGRAM(s) Oral <User Schedule>  dextrose 5%. 1000 milliLiter(s) (100 mL/Hr) IV Continuous <Continuous>  dextrose 5%. 1000 milliLiter(s) (50 mL/Hr) IV Continuous <Continuous>  dextrose 50% Injectable 12.5 Gram(s) IV Push once  dextrose 50% Injectable 25 Gram(s) IV Push once  dextrose 50% Injectable 25 Gram(s) IV Push once  folic acid 1 milliGRAM(s) Oral daily  gabapentin 300 milliGRAM(s) Oral two times a day  glucagon  Injectable 1 milliGRAM(s) IntraMuscular once  hydrocortisone hemorrhoidal Suppository 1 Suppository(s) Rectal two times a day  influenza   Vaccine 0.5 milliLiter(s) IntraMuscular once  lactobacillus acidophilus 1 Tablet(s) Oral two times a day with meals  melatonin 6 milliGRAM(s) Oral at bedtime  methylPREDNISolone   Oral   methylPREDNISolone 56 milliGRAM(s) Oral daily  metoprolol tartrate 25 milliGRAM(s) Oral two times a day  multivitamin 1 Tablet(s) Oral daily  nystatin Powder 1 Application(s) Topical two times a day  pantoprazole    Tablet 40 milliGRAM(s) Oral before breakfast  psyllium Powder 1 Packet(s) Oral daily  sodium chloride 0.9% lock flush 5 milliLiter(s) IV Push two times a day  zinc oxide 40% Paste 1 Application(s) Topical three times a day    MEDICATIONS  (PRN):  albuterol/ipratropium for Nebulization 3 milliLiter(s) Nebulizer every 6 hours PRN Shortness of Breath and/or Wheezing  ALPRAZolam 0.25 milliGRAM(s) Oral every 6 hours PRN Anxiety  aluminum hydroxide/magnesium hydroxide/simethicone Suspension 30 milliLiter(s) Oral every 4 hours PRN Dyspepsia  dextrose Oral Gel 15 Gram(s) Oral once PRN Blood Glucose LESS THAN 70 milliGRAM(s)/deciliter  loperamide 2 milliGRAM(s) Oral three times a day PRN Diarrhea  polyethylene glycol 3350 17 Gram(s) Oral daily PRN Constipation  SIMETHICONE SOFTGELS 250 milliGRAM(s) 250 milliGRAM(s) Oral three times a day PRN for gas  sodium chloride 0.65% Nasal 1 Spray(s) Both Nostrils every 8 hours PRN Nasal Congestion

## 2023-11-17 ENCOUNTER — INPATIENT (INPATIENT)
Facility: HOSPITAL | Age: 83
LOS: 3 days | Discharge: HOME HEALTH | End: 2023-11-21
Attending: INTERNAL MEDICINE | Admitting: INTERNAL MEDICINE
Payer: MEDICARE

## 2023-11-17 VITALS
RESPIRATION RATE: 18 BRPM | OXYGEN SATURATION: 100 % | TEMPERATURE: 99 F | SYSTOLIC BLOOD PRESSURE: 153 MMHG | HEART RATE: 106 BPM | DIASTOLIC BLOOD PRESSURE: 88 MMHG

## 2023-11-17 DIAGNOSIS — R55 SYNCOPE AND COLLAPSE: ICD-10-CM

## 2023-11-17 DIAGNOSIS — Z95.5 PRESENCE OF CORONARY ANGIOPLASTY IMPLANT AND GRAFT: Chronic | ICD-10-CM

## 2023-11-17 LAB
ALBUMIN SERPL ELPH-MCNC: 3.4 G/DL — SIGNIFICANT CHANGE UP (ref 3.3–5)
ALBUMIN SERPL ELPH-MCNC: 3.4 G/DL — SIGNIFICANT CHANGE UP (ref 3.3–5)
ALP SERPL-CCNC: 92 U/L — SIGNIFICANT CHANGE UP (ref 40–120)
ALP SERPL-CCNC: 92 U/L — SIGNIFICANT CHANGE UP (ref 40–120)
ALT FLD-CCNC: 30 U/L — SIGNIFICANT CHANGE UP (ref 4–41)
ALT FLD-CCNC: 30 U/L — SIGNIFICANT CHANGE UP (ref 4–41)
ANION GAP SERPL CALC-SCNC: 7 MMOL/L — SIGNIFICANT CHANGE UP (ref 7–14)
ANION GAP SERPL CALC-SCNC: 7 MMOL/L — SIGNIFICANT CHANGE UP (ref 7–14)
APTT BLD: 39.8 SEC — HIGH (ref 24.5–35.6)
APTT BLD: 39.8 SEC — HIGH (ref 24.5–35.6)
AST SERPL-CCNC: 35 U/L — SIGNIFICANT CHANGE UP (ref 4–40)
AST SERPL-CCNC: 35 U/L — SIGNIFICANT CHANGE UP (ref 4–40)
BASOPHILS # BLD AUTO: 0.02 K/UL — SIGNIFICANT CHANGE UP (ref 0–0.2)
BASOPHILS # BLD AUTO: 0.02 K/UL — SIGNIFICANT CHANGE UP (ref 0–0.2)
BASOPHILS NFR BLD AUTO: 0.2 % — SIGNIFICANT CHANGE UP (ref 0–2)
BASOPHILS NFR BLD AUTO: 0.2 % — SIGNIFICANT CHANGE UP (ref 0–2)
BILIRUB SERPL-MCNC: 0.8 MG/DL — SIGNIFICANT CHANGE UP (ref 0.2–1.2)
BILIRUB SERPL-MCNC: 0.8 MG/DL — SIGNIFICANT CHANGE UP (ref 0.2–1.2)
BUN SERPL-MCNC: 18 MG/DL — SIGNIFICANT CHANGE UP (ref 7–23)
BUN SERPL-MCNC: 18 MG/DL — SIGNIFICANT CHANGE UP (ref 7–23)
CALCIUM SERPL-MCNC: 9 MG/DL — SIGNIFICANT CHANGE UP (ref 8.4–10.5)
CALCIUM SERPL-MCNC: 9 MG/DL — SIGNIFICANT CHANGE UP (ref 8.4–10.5)
CHLORIDE SERPL-SCNC: 104 MMOL/L — SIGNIFICANT CHANGE UP (ref 98–107)
CHLORIDE SERPL-SCNC: 104 MMOL/L — SIGNIFICANT CHANGE UP (ref 98–107)
CO2 SERPL-SCNC: 27 MMOL/L — SIGNIFICANT CHANGE UP (ref 22–31)
CO2 SERPL-SCNC: 27 MMOL/L — SIGNIFICANT CHANGE UP (ref 22–31)
CREAT SERPL-MCNC: 1.31 MG/DL — HIGH (ref 0.5–1.3)
CREAT SERPL-MCNC: 1.31 MG/DL — HIGH (ref 0.5–1.3)
EGFR: 55 ML/MIN/1.73M2 — LOW
EGFR: 55 ML/MIN/1.73M2 — LOW
EOSINOPHIL # BLD AUTO: 0.03 K/UL — SIGNIFICANT CHANGE UP (ref 0–0.5)
EOSINOPHIL # BLD AUTO: 0.03 K/UL — SIGNIFICANT CHANGE UP (ref 0–0.5)
EOSINOPHIL NFR BLD AUTO: 0.3 % — SIGNIFICANT CHANGE UP (ref 0–6)
EOSINOPHIL NFR BLD AUTO: 0.3 % — SIGNIFICANT CHANGE UP (ref 0–6)
GLUCOSE SERPL-MCNC: 126 MG/DL — HIGH (ref 70–99)
GLUCOSE SERPL-MCNC: 126 MG/DL — HIGH (ref 70–99)
HCT VFR BLD CALC: 45.9 % — SIGNIFICANT CHANGE UP (ref 39–50)
HCT VFR BLD CALC: 45.9 % — SIGNIFICANT CHANGE UP (ref 39–50)
HGB BLD-MCNC: 14.3 G/DL — SIGNIFICANT CHANGE UP (ref 13–17)
HGB BLD-MCNC: 14.3 G/DL — SIGNIFICANT CHANGE UP (ref 13–17)
IANC: 8.32 K/UL — HIGH (ref 1.8–7.4)
IANC: 8.32 K/UL — HIGH (ref 1.8–7.4)
IMM GRANULOCYTES NFR BLD AUTO: 0.4 % — SIGNIFICANT CHANGE UP (ref 0–0.9)
IMM GRANULOCYTES NFR BLD AUTO: 0.4 % — SIGNIFICANT CHANGE UP (ref 0–0.9)
INR BLD: 1.26 RATIO — HIGH (ref 0.85–1.18)
INR BLD: 1.26 RATIO — HIGH (ref 0.85–1.18)
LYMPHOCYTES # BLD AUTO: 1.63 K/UL — SIGNIFICANT CHANGE UP (ref 1–3.3)
LYMPHOCYTES # BLD AUTO: 1.63 K/UL — SIGNIFICANT CHANGE UP (ref 1–3.3)
LYMPHOCYTES # BLD AUTO: 14.9 % — SIGNIFICANT CHANGE UP (ref 13–44)
LYMPHOCYTES # BLD AUTO: 14.9 % — SIGNIFICANT CHANGE UP (ref 13–44)
MAGNESIUM SERPL-MCNC: 2 MG/DL — SIGNIFICANT CHANGE UP (ref 1.6–2.6)
MAGNESIUM SERPL-MCNC: 2 MG/DL — SIGNIFICANT CHANGE UP (ref 1.6–2.6)
MCHC RBC-ENTMCNC: 23.1 PG — LOW (ref 27–34)
MCHC RBC-ENTMCNC: 23.1 PG — LOW (ref 27–34)
MCHC RBC-ENTMCNC: 31.2 GM/DL — LOW (ref 32–36)
MCHC RBC-ENTMCNC: 31.2 GM/DL — LOW (ref 32–36)
MCV RBC AUTO: 74.2 FL — LOW (ref 80–100)
MCV RBC AUTO: 74.2 FL — LOW (ref 80–100)
MONOCYTES # BLD AUTO: 0.9 K/UL — SIGNIFICANT CHANGE UP (ref 0–0.9)
MONOCYTES # BLD AUTO: 0.9 K/UL — SIGNIFICANT CHANGE UP (ref 0–0.9)
MONOCYTES NFR BLD AUTO: 8.2 % — SIGNIFICANT CHANGE UP (ref 2–14)
MONOCYTES NFR BLD AUTO: 8.2 % — SIGNIFICANT CHANGE UP (ref 2–14)
NEUTROPHILS # BLD AUTO: 8.32 K/UL — HIGH (ref 1.8–7.4)
NEUTROPHILS # BLD AUTO: 8.32 K/UL — HIGH (ref 1.8–7.4)
NEUTROPHILS NFR BLD AUTO: 76 % — SIGNIFICANT CHANGE UP (ref 43–77)
NEUTROPHILS NFR BLD AUTO: 76 % — SIGNIFICANT CHANGE UP (ref 43–77)
NRBC # BLD: 0 /100 WBCS — SIGNIFICANT CHANGE UP (ref 0–0)
NRBC # BLD: 0 /100 WBCS — SIGNIFICANT CHANGE UP (ref 0–0)
NRBC # FLD: 0 K/UL — SIGNIFICANT CHANGE UP (ref 0–0)
NRBC # FLD: 0 K/UL — SIGNIFICANT CHANGE UP (ref 0–0)
PLATELET # BLD AUTO: 111 K/UL — LOW (ref 150–400)
PLATELET # BLD AUTO: 111 K/UL — LOW (ref 150–400)
POTASSIUM SERPL-MCNC: 4.8 MMOL/L — SIGNIFICANT CHANGE UP (ref 3.5–5.3)
POTASSIUM SERPL-MCNC: 4.8 MMOL/L — SIGNIFICANT CHANGE UP (ref 3.5–5.3)
POTASSIUM SERPL-SCNC: 4.8 MMOL/L — SIGNIFICANT CHANGE UP (ref 3.5–5.3)
POTASSIUM SERPL-SCNC: 4.8 MMOL/L — SIGNIFICANT CHANGE UP (ref 3.5–5.3)
PROT SERPL-MCNC: 7.3 G/DL — SIGNIFICANT CHANGE UP (ref 6–8.3)
PROT SERPL-MCNC: 7.3 G/DL — SIGNIFICANT CHANGE UP (ref 6–8.3)
PROTHROM AB SERPL-ACNC: 14 SEC — HIGH (ref 9.5–13)
PROTHROM AB SERPL-ACNC: 14 SEC — HIGH (ref 9.5–13)
RBC # BLD: 6.19 M/UL — HIGH (ref 4.2–5.8)
RBC # BLD: 6.19 M/UL — HIGH (ref 4.2–5.8)
RBC # FLD: 19.3 % — HIGH (ref 10.3–14.5)
RBC # FLD: 19.3 % — HIGH (ref 10.3–14.5)
SODIUM SERPL-SCNC: 138 MMOL/L — SIGNIFICANT CHANGE UP (ref 135–145)
SODIUM SERPL-SCNC: 138 MMOL/L — SIGNIFICANT CHANGE UP (ref 135–145)
WBC # BLD: 10.94 K/UL — HIGH (ref 3.8–10.5)
WBC # BLD: 10.94 K/UL — HIGH (ref 3.8–10.5)
WBC # FLD AUTO: 10.94 K/UL — HIGH (ref 3.8–10.5)
WBC # FLD AUTO: 10.94 K/UL — HIGH (ref 3.8–10.5)

## 2023-11-17 PROCEDURE — 73030 X-RAY EXAM OF SHOULDER: CPT | Mod: 26,LT

## 2023-11-17 PROCEDURE — 93279 PRGRMG DEV EVAL PM/LDLS PM: CPT | Mod: 26

## 2023-11-17 PROCEDURE — 71046 X-RAY EXAM CHEST 2 VIEWS: CPT | Mod: 26

## 2023-11-17 PROCEDURE — 99223 1ST HOSP IP/OBS HIGH 75: CPT

## 2023-11-17 PROCEDURE — 99285 EMERGENCY DEPT VISIT HI MDM: CPT

## 2023-11-17 RX ORDER — METOPROLOL TARTRATE 50 MG
25 TABLET ORAL ONCE
Refills: 0 | Status: COMPLETED | OUTPATIENT
Start: 2023-11-17 | End: 2023-11-17

## 2023-11-17 RX ORDER — LIDOCAINE 4 G/100G
1 CREAM TOPICAL ONCE
Refills: 0 | Status: COMPLETED | OUTPATIENT
Start: 2023-11-17 | End: 2023-11-17

## 2023-11-17 RX ORDER — LANOLIN ALCOHOL/MO/W.PET/CERES
3 CREAM (GRAM) TOPICAL AT BEDTIME
Refills: 0 | Status: DISCONTINUED | OUTPATIENT
Start: 2023-11-17 | End: 2023-11-21

## 2023-11-17 RX ORDER — SODIUM CHLORIDE 9 MG/ML
1000 INJECTION INTRAMUSCULAR; INTRAVENOUS; SUBCUTANEOUS ONCE
Refills: 0 | Status: COMPLETED | OUTPATIENT
Start: 2023-11-17 | End: 2023-11-17

## 2023-11-17 RX ORDER — ACETAMINOPHEN 500 MG
650 TABLET ORAL EVERY 6 HOURS
Refills: 0 | Status: DISCONTINUED | OUTPATIENT
Start: 2023-11-17 | End: 2023-11-21

## 2023-11-17 RX ORDER — HYDRALAZINE HCL 50 MG
25 TABLET ORAL ONCE
Refills: 0 | Status: COMPLETED | OUTPATIENT
Start: 2023-11-17 | End: 2023-11-17

## 2023-11-17 RX ADMIN — SODIUM CHLORIDE 1000 MILLILITER(S): 9 INJECTION INTRAMUSCULAR; INTRAVENOUS; SUBCUTANEOUS at 12:39

## 2023-11-17 RX ADMIN — LIDOCAINE 1 PATCH: 4 CREAM TOPICAL at 13:49

## 2023-11-17 RX ADMIN — Medication 25 MILLIGRAM(S): at 23:46

## 2023-11-17 NOTE — H&P ADULT - NSHPPHYSICALEXAM_GEN_ALL_CORE
VITALS:   T(C): 36.7 (11-17-23 @ 22:45), Max: 37.1 (11-17-23 @ 10:53)  HR: 86 (11-17-23 @ 22:45) (85 - 106)  BP: 175/90 (11-17-23 @ 22:45) (139/99 - 175/90)  RR: 18 (11-17-23 @ 22:45) (16 - 18)  SpO2: 100% (11-17-23 @ 22:45) (98% - 100%)    GENERAL: No acute distress  EYES: EOMI, PERRLA; conjunctiva and sclera clear  ENMT: Moist oral mucosa  NECK: Supple, no palpable masses  RESPIRATORY: Lungs clear to ascultation b/l; unlabored respirations  CARDIOVASCULAR: Irregularly irregular, no murmurs/rubs/gallops  ABDOMEN: Soft, normal bowel sounds, nondistended, mild tenderness to palpation  MUSCULOSKELETAL: No joint swelling, limited ROM in left shoulder from pain  PSYCH: Affect appropriate  NEUROLOGY: AAOx3 (person, place, year), CNs grossly intact, Str 5/5 in upper and lower extremities, tongue midline, b/l facial sensation symmetric and intact  SKIN: No rashes; no palpable lesions

## 2023-11-17 NOTE — H&P ADULT - PROBLEM SELECTOR PLAN 2
Pt with abdominal tenderness, likely due to viral gastroenteritis, no recent abx use  - cont to monitor  - f/u GI PCR  - f/u US abdomen for other etiologies

## 2023-11-17 NOTE — H&P ADULT - ASSESSMENT
83M with PMH of HTN, AFib (on Xarelto), CVA (2022), CAD s/p stent, Aortic stenosis s/p TAVR, Medtronic PPM (2021), and Gout presenting after a syncopal episode, admitted for further work-up and management

## 2023-11-17 NOTE — H&P ADULT - PROBLEM SELECTOR PLAN 1
Pt presents with syncopal episode accompanied by dizziness and unresponsiveness. Pt denies tongue laceration nor urinary incontinence though daughter reports with a bit more confusion than baseline. Likely exacerbated by dehydration and fluid losses iso diarrhea and poor appetite lately  - EKG with Afib rhythm, no St elevations or depressions, QTc 426  - PPM interrogated without acute events  - f/u troponin  - f/u TSH  - f/u TTE  - f/u orthostatics Pt presents with syncopal episode accompanied by dizziness and unresponsiveness. Pt denies tongue laceration nor urinary incontinence though daughter reports with a bit more confusion than baseline. Likely exacerbated by dehydration and fluid losses iso diarrhea and poor appetite lately  - EKG with Afib rhythm, no St elevations or depressions, QTc 426  - PPM interrogated without acute events  - some continued confusion similar to prior acute CVA episode per daughter, f/u CTH  - f/u troponin  - f/u TSH  - f/u TTE  - f/u orthostatics

## 2023-11-17 NOTE — ED ADULT NURSE NOTE - CHIEF COMPLAINT QUOTE
Pt arrives via EMS from home s/p witnessed syncopal episode while seated. Denies hitting head. Family member reports LOC for <1 min. Similar sxs in the past, found to be a cardiac issue and had ppm placed. Arrives on 3L NC - 95% on RA on scene. [id number] on scene. PMHx: CVA (left-sided weakness), Afib (Xarelto), HTN, HLD.

## 2023-11-17 NOTE — ED ADULT TRIAGE NOTE - CHIEF COMPLAINT QUOTE
Pt arrives via EMS from home s/p witnessed syncopal episode while seated. Denies hitting head. Family member reports LOC for <1 min. Similar sxs in the past, found to be a cardiac issue and had ppm placed. Arrives on 3L NC - 95% on RA on scene. [id number] on scene. PMHx: CVA (left-sided weakness), Afib (Xarelto), HTN, HLD.

## 2023-11-17 NOTE — H&P ADULT - PROBLEM SELECTOR PLAN 3
Cr 1.31 on admission, unknown baseline  - likely pre-renal due to poor appetite and fluid losses from diarrhea  - s/p fluid resuscitation in ED  - monitor Cr  - f/u UA, urine lytes

## 2023-11-17 NOTE — ED PROVIDER NOTE - ATTENDING CONTRIBUTION TO CARE
Pt was seen and evaluated by me. Pt is a 84 y/o male with PMhx of HTN, A-fib on Xarelto, Gout, CVA, and CAD with stent, aortic stenosis s/p TAVR, and Medtronic PPM who presented to the ED for episode of syncope today. Pt was noted to have diarrhea last night and then this morning felt weak and when sat down for breakfast ended up passing out. Pt did not fall or hit his head. Pt notes having left shoulder pain but has been prior to syncope. Pt denies any fever, chills, nausea, vomiting, headache, neck pain, back pain, SOB, chest pain, or abd pain.  VITALS: Vitals have been reviewed.  GEN APPEARANCE: Alert and cooperative, non-toxic appearing and in NAD  HEAD: Atraumatic, normocephalic.   EYES: PERRL, EOMI.   EARS: Gross hearing intact.   NOSE: No nasal discharge.   THROAT: MMM. Oral cavity and pharynx normal.   CV: Irregularly irregular    LUNGS: CTAB. No wheezing. No rales. No rhonchi. No diminished breath sounds.   ABDOMEN: Soft, NTND. No guarding or rebound.   MSK/EXT: Spine appears normal, no spine point tenderness.    NEURO: Alert, follows commands. Speech normal. Sensation and motor normal x4 extremities.   SKIN: Normal color for race, warm, dry and intact. No evidence of rash.  84 y/o male with PMhx of HTN, A-fib on Xarelto, Gout, CVA, and CAD with stent, aortic stenosis s/p TAVR, and Medtronic PPM who presented to the ED for episode of syncope today.  Concern for Arrhythmia, Syncope, Hypotension, Vasovagal  Labs, EKG, CXR, EP eval

## 2023-11-17 NOTE — PHARMACOTHERAPY INTERVENTION NOTE - COMMENTS
Medication history is complete. Medication list updated in Outpatient Medication Record (OMR). Source: [facility], Patient

## 2023-11-17 NOTE — H&P ADULT - PROBLEM/PLAN-1
Mena Nichols [facility] [id number] is a 30 y.o. female who has been consulted for vancomycin dosing.    Pharmacy consult for vancomycin dosing in no longer required.  Vancomycin was discontinued.    Thank you for allowing us to participate in this patient's care.     Myesha Ledesma, PharmD    DISPLAY PLAN FREE TEXT

## 2023-11-17 NOTE — ED PROVIDER NOTE - CLINICAL SUMMARY MEDICAL DECISION MAKING FREE TEXT BOX
MICKEY Grigsby NP Student: Patient is a 83 year old male, NKA, PMH of HTN, Afib, Gout, CVA 12/2022, CAD with 1 Stent, Aortic Stenosis S/P TAVR 2021, Medtronic Wireless micro PPM 2021, presenting with syncopal episode witnessed by wife. Patient endorses multiple episodes of Diarrhea last night and this morning, felt dizzy and weak, had sat down to have breakfast and morning medications then "Everything went black". Wife stated patients eyes rolled up, and was unresponsive, for "about a minute" Patient stated he felt fine after that one episode. Denies any sick contacts, fevers, chills, headaches, nausea, vomiting, chest pain, shortness of breath, cough, or dysuria.    Physical exam was unremarkable with some baseline weakness in left arm, no abnormal breath sounds, murmurs or gallops. Differentials include Dehydration with possible electrolyte derangement, arrhythmia, hypotensive secondary vasovagal.  Plan for CBC, CMP, EP consult for Medtronic device interrogation. Intravenous infusion of NS ordered for fluid resuscitation. MICKEY Grigsby NP Student: Patient is a 83 year old male, NKA, PMH of HTN, Afib, Gout, CVA 12/2022, CAD with 1 Stent, Aortic Stenosis S/P TAVR 2021, Medtronic Wireless micro PPM 2021, presenting with syncopal episode witnessed by wife. Patient endorses multiple episodes of Diarrhea last night and this morning, felt dizzy and weak, had sat down to have breakfast and morning medications then "Everything went black". Wife stated patients eyes rolled up, and was unresponsive, for "about a minute" Patient stated he felt fine after that one episode. Denies any sick contacts, fevers, chills, headaches, nausea, vomiting, chest pain, shortness of breath, cough, or dysuria.    Physical exam was unremarkable with some baseline weakness in left arm, no abnormal breath sounds, murmurs or gallops. Differentials include Dehydration with possible electrolyte derangement, arrhythmia, hypotensive secondary vasovagal.  Plan for CBC, CMP, EP consult for Medtronic device interrogation. Intravenous infusion of NS ordered for fluid resuscitation.        Michael, PGY3 - 82yo man with PMH Afib on Xarelto, severe aortic stenosis s/p TAVR in Nov 2021, Medtronic PPM placed 2021, HTN, DM type 2, CVA vs TIA 4 years ago (with residual RLE and RUE weakness), CAD, presenting with multiple episodes of diarrhea yesterday and syncope earlier this morning that lasted about 1 to 2 minutes. Had a similar episode previously due to cardiogenic etiology. Labs, chest x-ray, reassess.  Consider recurrent cardiogenic syncope versus possible underlying infectious etiology.  Pacemaker to be interrogated.  Determine disposition after work-up complete and interrogation results are known. physical exam nonactionable. *The above represents an initial assessment/impression. Please refer to progress notes for potential changes in patient clinical course* MICKEY Grigsby NP Student: Patient is a 83 year old male, NKA, PMH of HTN, Afib, Gout, CVA 12/2022, CAD with 1 Stent, Aortic Stenosis S/P TAVR 2021, Medtronic Wireless micro PPM 2021, presenting with syncopal episode witnessed by wife. Patient endorses multiple episodes of Diarrhea last night and this morning, felt dizzy and weak, had sat down to have breakfast and morning medications then "Everything went black". Wife stated patients eyes rolled up, and was unresponsive, for "about a minute" Patient stated he felt fine after that one episode. Denies any sick contacts, fevers, chills, headaches, nausea, vomiting, chest pain, shortness of breath, cough, or dysuria.    Physical exam was unremarkable with some baseline weakness in left arm, no abnormal breath sounds, murmurs or gallops. Differentials include Dehydration with possible electrolyte derangement, arrhythmia, hypotensive secondary vasovagal.  Plan for CBC, CMP, EP consult for Medtronic device interrogation. Intravenous infusion of NS ordered for fluid resuscitation.      Michael, PGY3 - 84yo man with PMH Afib on Xarelto, severe aortic stenosis s/p TAVR in Nov 2021, Medtronic PPM placed 2021, HTN, DM type 2, CVA vs TIA 4 years ago (with residual RLE and RUE weakness), CAD, presenting with multiple episodes of diarrhea yesterday and syncope earlier this morning that lasted about 1 to 2 minutes. Had a similar episode previously due to cardiogenic etiology. Labs, chest x-ray, reassess.  Consider recurrent cardiogenic syncope versus possible underlying infectious etiology.  Pacemaker to be interrogated.  Determine disposition after work-up complete and interrogation results are known. physical exam nonactionable. *The above represents an initial assessment/impression. Please refer to progress notes for potential changes in patient clinical course*

## 2023-11-17 NOTE — ED PROVIDER NOTE - OBJECTIVE STATEMENT
MICKEY Grigsby NP Student: Patient is a 83 year old male MICKEY Grigsby NP Student: Patient is a 83 year old male, NKA, PMH of HTN, Afib, Gout, CVA 12/2022, CAD with 1 Stent, Aortic Stenosis S/P TAVR 2021, Medtronic Wireless PPM 2021, presenting with syncopal episode witnessed by wife. Patient endorses multiple episodes of Diarrhea last night and this morning, felt dizzy and weak, had sat down to have breakfast and morning medications then "Everything went black". Wife stated patients eyes rolled up, and was unresponsive, for "about a minute" Patient stated he felt fine after that one episode. Denies any sick contacts, fevers, chills, headaches, nausea, vomiting, chest pain, shortness of breath, cough, or dysuria.

## 2023-11-17 NOTE — H&P ADULT - NSHPLABSRESULTS_GEN_ALL_CORE
LABS:                          14.3   10.94 )-----------( 111      ( 17 Nov 2023 12:40 )             45.9     11-17    138  |  104  |  18  ----------------------------<  126<H>  4.8   |  27  |  1.31<H>    Ca    9.0      17 Nov 2023 12:40  Mg     2.00     11-17    TPro  7.3  /  Alb  3.4  /  TBili  0.8  /  DBili  x   /  AST  35  /  ALT  30  /  AlkPhos  92  11-17    LIVER FUNCTIONS - ( 17 Nov 2023 12:40 )  Alb: 3.4 g/dL / Pro: 7.3 g/dL / ALK PHOS: 92 U/L / ALT: 30 U/L / AST: 35 U/L / GGT: x           PT/INR - ( 17 Nov 2023 12:40 )   PT: 14.0 sec;   INR: 1.26 ratio         PTT - ( 17 Nov 2023 12:40 )  PTT:39.8 sec  Urinalysis Basic - ( 17 Nov 2023 12:40 )    Color: x / Appearance: x / SG: x / pH: x  Gluc: 126 mg/dL / Ketone: x  / Bili: x / Urobili: x   Blood: x / Protein: x / Nitrite: x   Leuk Esterase: x / RBC: x / WBC x   Sq Epi: x / Non Sq Epi: x / Bacteria: x      IMAGING:      < from: Xray Chest 2 Views PA/Lat (11.17.23 @ 13:39) >  FINDINGS:  TAVR and a Micra pacemaker are present and unchanged. The lungs are   clear, the heart is borderline enlarged and there are no effusions or   pneumothorax  COMPARISON: December 8, 2022    IMPRESSION: Clear lungs.  --- End of Report ---  < end of copied text >  Appears clear on my read.      < from: Xray Shoulder 2 Views, Left (11.17.23 @ 13:39) >  FINDINGS:  There is no fracture or dislocation. Mild degenerative changes of the   acromioclavicular joint.  There is no focal soft tissue abnormality.    IMPRESSION:  No acute fracture.  --- End of Report ---  < end of copied text >

## 2023-11-17 NOTE — PROCEDURE NOTE - ADDITIONAL PROCEDURE DETAILS
Micra VR pacemaker in VVI mode   Normal sensing and pacing via iterative testing  Excellent threshold capture  No events recorded   No reprogramming  Battery remaining longevity >8years. Device implanted on 11/22/2021 for AT/AF and SND

## 2023-11-17 NOTE — ED ADULT NURSE NOTE - NSFALLHARMRISKINTERV_ED_ALL_ED

## 2023-11-17 NOTE — ED ADULT NURSE NOTE - OBJECTIVE STATEMENT
Patient received in room 5. Patient A&Ox3 (episodes of forgetfulness) and ambulatory with cane at baseline. Patient brought in by EMS c/o witnessed syncopal episode earlier today. phx afib, HTN, borderline DM, pacemaker, "heart valve replacement", "cardiac stent". Patient states he was sitting to have breakfast when he felt "unwell"; patient states he sat in chair and lost consciousness. Patient's family present at bedside, states she witnessed the event and patient lost consciousness for less than a minute and did not hit his head. Patient's family member states patient is currently at his baseline. Vital signs stable, airway patent, chest rise equal bilaterally, lung sounds clear and equal bilaterally, respirations unlabored. Patient able to speak in complete, uninterrupted sentences; patient denies dyspnea, shortness of breath, and chest pain. Abdomen flat, soft and non-distended; patient denies nausea/vomiting and changes in BMs/urine. Pulse/motor/sensory present and no edema noted to all four extremities. 20G IV placed to right AC, labs collected and sent, medications administered as prescribed. Safety measures in place, call bell within reach and family at bedside.

## 2023-11-17 NOTE — ED PROVIDER NOTE - ENMT, MLM
Patient Hard of Hearing, Airway patent, Nasal mucosa clear. Mouth with normal mucosa. Throat has no vesicles, no oropharyngeal exudates and uvula is midline.

## 2023-11-17 NOTE — ED PROVIDER NOTE - PROGRESS NOTE DETAILS
Michael, PGY3 - Electrophysiology consulted, will come see patient. Michael, PGY3 - Labs nonactionable, no events recorded in interrogation of pacemaker device.  Spoke to telemetry doc Dr. Olmedo, accepting patient for further syncope work-up

## 2023-11-17 NOTE — H&P ADULT - HISTORY OF PRESENT ILLNESS
Patient is an 83 year old male with history of HTN, AFib (on Xarelto), CVA (2022), CAD s/p stent, Aortic stenosis s/p TAVR, Medtronic PPM (2021), and Gout presenting after a syncopal episode this morning witnessed by wife. Patient states he was eating breakfast and taking his morning medications when he suddenly felt dizzy and blacked out. Per call with daughter for more collateral, she states that patient's wife saw that his eyes rolled up, and he was unresponsive to voice/touch for "about a minute." Patient states he felt fine after the episode but daughter states that  Patient is an 83 year old male with history of HTN, AFib (on Xarelto), CVA (2022), CAD s/p stent, Aortic stenosis s/p TAVR, Medtronic PPM (2021), and Gout presenting after a syncopal episode this morning witnessed by wife. Patient states he was eating breakfast and taking his morning medications when he suddenly felt dizzy and blacked out. Per call with daughter for more collateral, she states that patient's wife saw that his eyes rolled up, and he was unresponsive to voice/touch for "about a minute." Patient states he felt fine after the episode and denies tongue-biting/laceration nor urinary incontinence. 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 Of note, patient reports having had a stomach bug leading to upset stomach and poor appetite with multiple diarrhea episodes. States had 4X episodes two days ago, 3X yesterday, and another 2X while in the ED. Daughter is unclear about the past few days but does endorse diarrhea last week. Patient otherwise denies fevers/chills, headaches, nausea/vomiting, vision changes, chest pain, difficulty breathing, heart palpitations, constipation, nor dysuria. Had recent sick contacts regarding stomach bug in the family.     In the ED, patient with vitals T 98.8F,  initially, /99, and O2 saturation 100% on RA with RR 16. Labs notable for thrombocytopenia, Cr 1.31. CXR with clear lungs and XR shoulder without fracture. Received 1L NS bolus and Lidocaine patch in the ED.

## 2023-11-17 NOTE — ED ADULT NURSE REASSESSMENT NOTE - NS ED NURSE REASSESS COMMENT FT1
Pt A+Ox4.  Pt offers no complaints at this time.  Lungs clear, equal and unlabored.  Pt awaiting bed placement in hospital.  Fall precautions maintained.  Will continue to monitor.
Received report from day RN. Pt resting comfortably in bed, respirations are even and unlabored. Endorsing no complaints at this time. Awaiting bed assignment

## 2023-11-17 NOTE — CONSULT NOTE ADULT - SUBJECTIVE AND OBJECTIVE BOX
MR#[id number]  PATIENT NAME:RADHA MARQUEZ    DATE OF SERVICE: 11-17-23 @ 21:10  Patient was seen and examined by Leonardo Olmedo MD on    11-17-23  Interim events noted.Consultant notes ,Labs,Telemetry reviewed by me       HOSPITAL COURSE: HPI:      INTERIM EVENTS:Patient seen at bedside ,interim events noted.      PMH -reviewed admission note, no change since admission  HEART FAILURE: Acute[ ]Chronic[ ] Systolic[ ] Diastolic[ ] Combined Systolic and Diastolic[ ]  CAD[ ] CABG[ ] PCI[ ]  DEVICES[ ] PPM[ ] ICD[ ] ILR[ ]  ATRIAL FIBRILLATION[ ] Paroxysmal[ ] Permanent[ ] CHADS2-[  ]  CJ[ ] CKD1[ ] CKD2[ ] CKD3[ ] CKD4[ ] ESRD[ ]  COPD[ ] HTN[ ]   DM[ ] Type1[ ] Type 2[ ]   CVA[ ] Paresis[ ]    AMBULATION: Assisted[ ] Cane/walker[ ] Independent[ ]    MEDICATIONS  (STANDING):    MEDICATIONS  (PRN):            REVIEW OF SYSTEMS:  Constitutional: [ ] fever, [ ]weight loss,  [ ]fatigue [ ]weight gain  Eyes: [ ] visual changes  Respiratory: [ ]shortness of breath;  [ ] cough, [ ]wheezing, [ ]chills, [ ]hemoptysis  Cardiovascular: [ ] chest pain, [ ]palpitations, [ ]dizziness,  [ ]leg swelling[ ]orthopnea[ ]PND  Gastrointestinal: [ ] abdominal pain, [ ]nausea, [ ]vomiting,  [ ]diarrhea [ ]Constipation [ ]Melena  Genitourinary: [ ] dysuria, [ ] hematuria [ ][place]  Neurologic: [ ] headaches [ ] tremors[ ]weakness [ ]Paralysis Right[ ] Left[ ]  Skin: [ ] itching, [ ]burning, [ ] rashes  Endocrine: [ ] heat or cold intolerance  Musculoskeletal: [ ] joint pain or swelling; [ ] muscle, back, or extremity pain  Psychiatric: [ ] depression, [ ]anxiety, [ ]mood swings, or [ ]difficulty sleeping  Hematologic: [ ] easy bruising, [ ] bleeding gums    [ ] All remaining systems negative except as per above.   [ ]Unable to obtain.  [x] No change in ROS since admission      Vital Signs Last 24 Hrs  T(C): 36.6 (17 Nov 2023 21:00), Max: 37.1 (17 Nov 2023 10:53)  T(F): 97.9 (17 Nov 2023 21:00), Max: 98.8 (17 Nov 2023 12:45)  HR: 93 (17 Nov 2023 21:00) (85 - 106)  BP: 139/99 (17 Nov 2023 21:00) (139/99 - 168/105)  BP(mean): 103 (17 Nov 2023 12:45) (103 - 103)  RR: 18 (17 Nov 2023 21:00) (16 - 18)  SpO2: 99% (17 Nov 2023 21:00) (98% - 100%)    Parameters below as of 17 Nov 2023 21:00  Patient On (Oxygen Delivery Method): room air      I&O's Summary      PHYSICAL EXAM:  General: No acute distress BMI-  HEENT: EOMI, PERRL  Neck: Supple, [ ] JVD  Lungs: Equal air entry bilaterally; [ ] rales [ ] wheezing [ ] rhonchi  Heart: Regular rate and rhythm; [x ] murmur   2/6 [ x] systolic [ ] diastolic [ ] radiation[ ] rubs [ ]  gallops  Abdomen: Nontender, bowel sounds present  Extremities: No clubbing, cyanosis, [ ] edema [ ]Pulses  equal and intact  Nervous system:  Alert & Oriented X3, no focal deficits  Psychiatric: Normal affect  Skin: No rashes or lesions    LABS:  11-17    138  |  104  |  18  ----------------------------<  126<H>  4.8   |  27  |  1.31<H>    Ca    9.0      17 Nov 2023 12:40  Mg     2.00     11-17    TPro  7.3  /  Alb  3.4  /  TBili  0.8  /  DBili  x   /  AST  35  /  ALT  30  /  AlkPhos  92  11-17    Creatinine Trend: 1.31<--                        14.3   10.94 )-----------( 111      ( 17 Nov 2023 12:40 )             45.9     PT/INR - ( 17 Nov 2023 12:40 )   PT: 14.0 sec;   INR: 1.26 ratio         PTT - ( 17 Nov 2023 12:40 )  PTT:39.8 sec

## 2023-11-17 NOTE — H&P ADULT - NSHPREVIEWOFSYSTEMS_GEN_ALL_CORE
REVIEW OF SYSTEMS:    CONSTITUTIONAL: No fevers. No chills. No fatigue.  HEENT: No change in vision. No change in hearing. No sore throat.  CARDIOVASCULAR: No chest pain. No palpitations  RESPIRATORY: No shortness of breath. No cough.  GASTROINTESTINAL: No nausea. No vomiting. +Intermittent abdominal pain/tenderness, +Diarrhea.  GENITOURINARY:  No hematuria. No dysuria. No change in urination.   SKIN: No rashes. No itching.   MUSCULOSKELETAL: No muscle aches. No stiffness.  PSYCHIATRIC: No history of depression or anxiety  NEUROLOGICAL: No headaches. No change in bowel or bladder control. +Dizziness.  ENDOCRINOLOGIC: No reports of cold or heat intolerance. No polydipsia  ALLERGIES: No history of asthma, hives, or eczema

## 2023-11-18 DIAGNOSIS — R55 SYNCOPE AND COLLAPSE: ICD-10-CM

## 2023-11-18 DIAGNOSIS — N17.9 ACUTE KIDNEY FAILURE, UNSPECIFIED: ICD-10-CM

## 2023-11-18 DIAGNOSIS — Z29.9 ENCOUNTER FOR PROPHYLACTIC MEASURES, UNSPECIFIED: ICD-10-CM

## 2023-11-18 DIAGNOSIS — I63.9 CEREBRAL INFARCTION, UNSPECIFIED: ICD-10-CM

## 2023-11-18 DIAGNOSIS — R10.819 ABDOMINAL TENDERNESS, UNSPECIFIED SITE: ICD-10-CM

## 2023-11-18 DIAGNOSIS — M10.9 GOUT, UNSPECIFIED: ICD-10-CM

## 2023-11-18 DIAGNOSIS — I10 ESSENTIAL (PRIMARY) HYPERTENSION: ICD-10-CM

## 2023-11-18 DIAGNOSIS — I25.10 ATHEROSCLEROTIC HEART DISEASE OF NATIVE CORONARY ARTERY WITHOUT ANGINA PECTORIS: ICD-10-CM

## 2023-11-18 DIAGNOSIS — I48.91 UNSPECIFIED ATRIAL FIBRILLATION: ICD-10-CM

## 2023-11-18 LAB
ANION GAP SERPL CALC-SCNC: 12 MMOL/L — SIGNIFICANT CHANGE UP (ref 7–14)
ANION GAP SERPL CALC-SCNC: 12 MMOL/L — SIGNIFICANT CHANGE UP (ref 7–14)
APPEARANCE UR: CLEAR — SIGNIFICANT CHANGE UP
APPEARANCE UR: CLEAR — SIGNIFICANT CHANGE UP
BACTERIA # UR AUTO: NEGATIVE /HPF — SIGNIFICANT CHANGE UP
BACTERIA # UR AUTO: NEGATIVE /HPF — SIGNIFICANT CHANGE UP
BASOPHILS # BLD AUTO: 0.02 K/UL — SIGNIFICANT CHANGE UP (ref 0–0.2)
BASOPHILS # BLD AUTO: 0.02 K/UL — SIGNIFICANT CHANGE UP (ref 0–0.2)
BASOPHILS NFR BLD AUTO: 0.2 % — SIGNIFICANT CHANGE UP (ref 0–2)
BASOPHILS NFR BLD AUTO: 0.2 % — SIGNIFICANT CHANGE UP (ref 0–2)
BILIRUB UR-MCNC: NEGATIVE — SIGNIFICANT CHANGE UP
BILIRUB UR-MCNC: NEGATIVE — SIGNIFICANT CHANGE UP
BUN SERPL-MCNC: 16 MG/DL — SIGNIFICANT CHANGE UP (ref 7–23)
BUN SERPL-MCNC: 16 MG/DL — SIGNIFICANT CHANGE UP (ref 7–23)
CALCIUM SERPL-MCNC: 8.7 MG/DL — SIGNIFICANT CHANGE UP (ref 8.4–10.5)
CALCIUM SERPL-MCNC: 8.7 MG/DL — SIGNIFICANT CHANGE UP (ref 8.4–10.5)
CAST: 1 /LPF — SIGNIFICANT CHANGE UP (ref 0–4)
CAST: 1 /LPF — SIGNIFICANT CHANGE UP (ref 0–4)
CHLORIDE SERPL-SCNC: 107 MMOL/L — SIGNIFICANT CHANGE UP (ref 98–107)
CHLORIDE SERPL-SCNC: 107 MMOL/L — SIGNIFICANT CHANGE UP (ref 98–107)
CO2 SERPL-SCNC: 21 MMOL/L — LOW (ref 22–31)
CO2 SERPL-SCNC: 21 MMOL/L — LOW (ref 22–31)
COLOR SPEC: YELLOW — SIGNIFICANT CHANGE UP
COLOR SPEC: YELLOW — SIGNIFICANT CHANGE UP
CREAT ?TM UR-MCNC: 158 MG/DL — SIGNIFICANT CHANGE UP
CREAT ?TM UR-MCNC: 158 MG/DL — SIGNIFICANT CHANGE UP
CREAT SERPL-MCNC: 1.08 MG/DL — SIGNIFICANT CHANGE UP (ref 0.5–1.3)
CREAT SERPL-MCNC: 1.08 MG/DL — SIGNIFICANT CHANGE UP (ref 0.5–1.3)
DIFF PNL FLD: NEGATIVE — SIGNIFICANT CHANGE UP
DIFF PNL FLD: NEGATIVE — SIGNIFICANT CHANGE UP
EGFR: 69 ML/MIN/1.73M2 — SIGNIFICANT CHANGE UP
EGFR: 69 ML/MIN/1.73M2 — SIGNIFICANT CHANGE UP
EOSINOPHIL # BLD AUTO: 0.11 K/UL — SIGNIFICANT CHANGE UP (ref 0–0.5)
EOSINOPHIL # BLD AUTO: 0.11 K/UL — SIGNIFICANT CHANGE UP (ref 0–0.5)
EOSINOPHIL NFR BLD AUTO: 1.3 % — SIGNIFICANT CHANGE UP (ref 0–6)
EOSINOPHIL NFR BLD AUTO: 1.3 % — SIGNIFICANT CHANGE UP (ref 0–6)
EPEC DNA STL QL NAA+PROBE: DETECTED
EPEC DNA STL QL NAA+PROBE: DETECTED
ETEC DNA STL QL NAA+PROBE: DETECTED
ETEC DNA STL QL NAA+PROBE: DETECTED
GI PCR PANEL: DETECTED
GI PCR PANEL: DETECTED
GLUCOSE SERPL-MCNC: 85 MG/DL — SIGNIFICANT CHANGE UP (ref 70–99)
GLUCOSE SERPL-MCNC: 85 MG/DL — SIGNIFICANT CHANGE UP (ref 70–99)
GLUCOSE UR QL: NEGATIVE MG/DL — SIGNIFICANT CHANGE UP
GLUCOSE UR QL: NEGATIVE MG/DL — SIGNIFICANT CHANGE UP
HCT VFR BLD CALC: 42.1 % — SIGNIFICANT CHANGE UP (ref 39–50)
HCT VFR BLD CALC: 42.1 % — SIGNIFICANT CHANGE UP (ref 39–50)
HGB BLD-MCNC: 13.4 G/DL — SIGNIFICANT CHANGE UP (ref 13–17)
HGB BLD-MCNC: 13.4 G/DL — SIGNIFICANT CHANGE UP (ref 13–17)
IANC: 5.5 K/UL — SIGNIFICANT CHANGE UP (ref 1.8–7.4)
IANC: 5.5 K/UL — SIGNIFICANT CHANGE UP (ref 1.8–7.4)
IMM GRANULOCYTES NFR BLD AUTO: 0.2 % — SIGNIFICANT CHANGE UP (ref 0–0.9)
IMM GRANULOCYTES NFR BLD AUTO: 0.2 % — SIGNIFICANT CHANGE UP (ref 0–0.9)
KETONES UR-MCNC: NEGATIVE MG/DL — SIGNIFICANT CHANGE UP
KETONES UR-MCNC: NEGATIVE MG/DL — SIGNIFICANT CHANGE UP
LEUKOCYTE ESTERASE UR-ACNC: NEGATIVE — SIGNIFICANT CHANGE UP
LEUKOCYTE ESTERASE UR-ACNC: NEGATIVE — SIGNIFICANT CHANGE UP
LYMPHOCYTES # BLD AUTO: 2.43 K/UL — SIGNIFICANT CHANGE UP (ref 1–3.3)
LYMPHOCYTES # BLD AUTO: 2.43 K/UL — SIGNIFICANT CHANGE UP (ref 1–3.3)
LYMPHOCYTES # BLD AUTO: 27.6 % — SIGNIFICANT CHANGE UP (ref 13–44)
LYMPHOCYTES # BLD AUTO: 27.6 % — SIGNIFICANT CHANGE UP (ref 13–44)
MAGNESIUM SERPL-MCNC: 1.9 MG/DL — SIGNIFICANT CHANGE UP (ref 1.6–2.6)
MAGNESIUM SERPL-MCNC: 1.9 MG/DL — SIGNIFICANT CHANGE UP (ref 1.6–2.6)
MCHC RBC-ENTMCNC: 23.3 PG — LOW (ref 27–34)
MCHC RBC-ENTMCNC: 23.3 PG — LOW (ref 27–34)
MCHC RBC-ENTMCNC: 31.8 GM/DL — LOW (ref 32–36)
MCHC RBC-ENTMCNC: 31.8 GM/DL — LOW (ref 32–36)
MCV RBC AUTO: 73.3 FL — LOW (ref 80–100)
MCV RBC AUTO: 73.3 FL — LOW (ref 80–100)
MONOCYTES # BLD AUTO: 0.72 K/UL — SIGNIFICANT CHANGE UP (ref 0–0.9)
MONOCYTES # BLD AUTO: 0.72 K/UL — SIGNIFICANT CHANGE UP (ref 0–0.9)
MONOCYTES NFR BLD AUTO: 8.2 % — SIGNIFICANT CHANGE UP (ref 2–14)
MONOCYTES NFR BLD AUTO: 8.2 % — SIGNIFICANT CHANGE UP (ref 2–14)
NEUTROPHILS # BLD AUTO: 5.5 K/UL — SIGNIFICANT CHANGE UP (ref 1.8–7.4)
NEUTROPHILS # BLD AUTO: 5.5 K/UL — SIGNIFICANT CHANGE UP (ref 1.8–7.4)
NEUTROPHILS NFR BLD AUTO: 62.5 % — SIGNIFICANT CHANGE UP (ref 43–77)
NEUTROPHILS NFR BLD AUTO: 62.5 % — SIGNIFICANT CHANGE UP (ref 43–77)
NITRITE UR-MCNC: NEGATIVE — SIGNIFICANT CHANGE UP
NITRITE UR-MCNC: NEGATIVE — SIGNIFICANT CHANGE UP
NRBC # BLD: 0 /100 WBCS — SIGNIFICANT CHANGE UP (ref 0–0)
NRBC # BLD: 0 /100 WBCS — SIGNIFICANT CHANGE UP (ref 0–0)
NRBC # FLD: 0 K/UL — SIGNIFICANT CHANGE UP (ref 0–0)
NRBC # FLD: 0 K/UL — SIGNIFICANT CHANGE UP (ref 0–0)
PH UR: 6.5 — SIGNIFICANT CHANGE UP (ref 5–8)
PH UR: 6.5 — SIGNIFICANT CHANGE UP (ref 5–8)
PHOSPHATE SERPL-MCNC: 2.7 MG/DL — SIGNIFICANT CHANGE UP (ref 2.5–4.5)
PHOSPHATE SERPL-MCNC: 2.7 MG/DL — SIGNIFICANT CHANGE UP (ref 2.5–4.5)
PLATELET # BLD AUTO: 118 K/UL — LOW (ref 150–400)
PLATELET # BLD AUTO: 118 K/UL — LOW (ref 150–400)
POTASSIUM SERPL-MCNC: 3.9 MMOL/L — SIGNIFICANT CHANGE UP (ref 3.5–5.3)
POTASSIUM SERPL-MCNC: 3.9 MMOL/L — SIGNIFICANT CHANGE UP (ref 3.5–5.3)
POTASSIUM SERPL-SCNC: 3.9 MMOL/L — SIGNIFICANT CHANGE UP (ref 3.5–5.3)
POTASSIUM SERPL-SCNC: 3.9 MMOL/L — SIGNIFICANT CHANGE UP (ref 3.5–5.3)
PROT UR-MCNC: 100 MG/DL
PROT UR-MCNC: 100 MG/DL
RBC # BLD: 5.74 M/UL — SIGNIFICANT CHANGE UP (ref 4.2–5.8)
RBC # BLD: 5.74 M/UL — SIGNIFICANT CHANGE UP (ref 4.2–5.8)
RBC # FLD: 19 % — HIGH (ref 10.3–14.5)
RBC # FLD: 19 % — HIGH (ref 10.3–14.5)
RBC CASTS # UR COMP ASSIST: 2 /HPF — SIGNIFICANT CHANGE UP (ref 0–4)
RBC CASTS # UR COMP ASSIST: 2 /HPF — SIGNIFICANT CHANGE UP (ref 0–4)
SODIUM SERPL-SCNC: 140 MMOL/L — SIGNIFICANT CHANGE UP (ref 135–145)
SODIUM SERPL-SCNC: 140 MMOL/L — SIGNIFICANT CHANGE UP (ref 135–145)
SODIUM UR-SCNC: 74 MMOL/L — SIGNIFICANT CHANGE UP
SODIUM UR-SCNC: 74 MMOL/L — SIGNIFICANT CHANGE UP
SP GR SPEC: 1.02 — SIGNIFICANT CHANGE UP (ref 1–1.03)
SP GR SPEC: 1.02 — SIGNIFICANT CHANGE UP (ref 1–1.03)
SQUAMOUS # UR AUTO: 0 /HPF — SIGNIFICANT CHANGE UP (ref 0–5)
SQUAMOUS # UR AUTO: 0 /HPF — SIGNIFICANT CHANGE UP (ref 0–5)
TROPONIN T, HIGH SENSITIVITY RESULT: 20 NG/L — SIGNIFICANT CHANGE UP
TROPONIN T, HIGH SENSITIVITY RESULT: 20 NG/L — SIGNIFICANT CHANGE UP
TSH SERPL-MCNC: 0.86 UIU/ML — SIGNIFICANT CHANGE UP (ref 0.27–4.2)
TSH SERPL-MCNC: 0.86 UIU/ML — SIGNIFICANT CHANGE UP (ref 0.27–4.2)
UROBILINOGEN FLD QL: 1 MG/DL — SIGNIFICANT CHANGE UP (ref 0.2–1)
UROBILINOGEN FLD QL: 1 MG/DL — SIGNIFICANT CHANGE UP (ref 0.2–1)
UUN UR-MCNC: 997.6 MG/DL — SIGNIFICANT CHANGE UP
UUN UR-MCNC: 997.6 MG/DL — SIGNIFICANT CHANGE UP
WBC # BLD: 8.8 K/UL — SIGNIFICANT CHANGE UP (ref 3.8–10.5)
WBC # BLD: 8.8 K/UL — SIGNIFICANT CHANGE UP (ref 3.8–10.5)
WBC # FLD AUTO: 8.8 K/UL — SIGNIFICANT CHANGE UP (ref 3.8–10.5)
WBC # FLD AUTO: 8.8 K/UL — SIGNIFICANT CHANGE UP (ref 3.8–10.5)
WBC UR QL: 0 /HPF — SIGNIFICANT CHANGE UP (ref 0–5)
WBC UR QL: 0 /HPF — SIGNIFICANT CHANGE UP (ref 0–5)

## 2023-11-18 PROCEDURE — 76700 US EXAM ABDOM COMPLETE: CPT | Mod: 26

## 2023-11-18 PROCEDURE — 70450 CT HEAD/BRAIN W/O DYE: CPT | Mod: 26

## 2023-11-18 RX ORDER — HYDRALAZINE HCL 50 MG
25 TABLET ORAL EVERY 8 HOURS
Refills: 0 | Status: DISCONTINUED | OUTPATIENT
Start: 2023-11-18 | End: 2023-11-21

## 2023-11-18 RX ORDER — ATORVASTATIN CALCIUM 80 MG/1
40 TABLET, FILM COATED ORAL AT BEDTIME
Refills: 0 | Status: DISCONTINUED | OUTPATIENT
Start: 2023-11-18 | End: 2023-11-21

## 2023-11-18 RX ORDER — METOPROLOL TARTRATE 50 MG
25 TABLET ORAL DAILY
Refills: 0 | Status: DISCONTINUED | OUTPATIENT
Start: 2023-11-18 | End: 2023-11-21

## 2023-11-18 RX ORDER — RIVAROXABAN 15 MG-20MG
20 KIT ORAL
Refills: 0 | Status: DISCONTINUED | OUTPATIENT
Start: 2023-11-18 | End: 2023-11-21

## 2023-11-18 RX ORDER — ALLOPURINOL 300 MG
100 TABLET ORAL DAILY
Refills: 0 | Status: DISCONTINUED | OUTPATIENT
Start: 2023-11-18 | End: 2023-11-21

## 2023-11-18 RX ORDER — POTASSIUM CHLORIDE 20 MEQ
20 PACKET (EA) ORAL ONCE
Refills: 0 | Status: COMPLETED | OUTPATIENT
Start: 2023-11-18 | End: 2023-11-18

## 2023-11-18 RX ORDER — MAGNESIUM OXIDE 400 MG ORAL TABLET 241.3 MG
400 TABLET ORAL ONCE
Refills: 0 | Status: COMPLETED | OUTPATIENT
Start: 2023-11-18 | End: 2023-11-18

## 2023-11-18 RX ORDER — INFLUENZA VIRUS VACCINE 15; 15; 15; 15 UG/.5ML; UG/.5ML; UG/.5ML; UG/.5ML
0.7 SUSPENSION INTRAMUSCULAR ONCE
Refills: 0 | Status: DISCONTINUED | OUTPATIENT
Start: 2023-11-18 | End: 2023-11-21

## 2023-11-18 RX ADMIN — RIVAROXABAN 20 MILLIGRAM(S): KIT at 17:11

## 2023-11-18 RX ADMIN — Medication 25 MILLIGRAM(S): at 21:36

## 2023-11-18 RX ADMIN — Medication 100 MILLIGRAM(S): at 13:53

## 2023-11-18 RX ADMIN — Medication 25 MILLIGRAM(S): at 21:35

## 2023-11-18 RX ADMIN — LIDOCAINE 1 PATCH: 4 CREAM TOPICAL at 01:49

## 2023-11-18 RX ADMIN — Medication 25 MILLIGRAM(S): at 13:53

## 2023-11-18 RX ADMIN — ATORVASTATIN CALCIUM 40 MILLIGRAM(S): 80 TABLET, FILM COATED ORAL at 21:35

## 2023-11-18 RX ADMIN — MAGNESIUM OXIDE 400 MG ORAL TABLET 400 MILLIGRAM(S): 241.3 TABLET ORAL at 22:01

## 2023-11-18 RX ADMIN — Medication 20 MILLIEQUIVALENT(S): at 22:01

## 2023-11-18 RX ADMIN — Medication 25 MILLIGRAM(S): at 06:14

## 2023-11-18 NOTE — PHYSICAL THERAPY INITIAL EVALUATION ADULT - GENERAL OBSERVATIONS, REHAB EVAL
Patient found semi-reclined in bed, NAD, A&Ox4, +cardiac monitor, patient agreeable to participate in skilled PT evaluation. Orthostatics taken supine 119/74 HR 87, sitting 122/82 HR 98, standing 131/57 HR 91, patient asymptomatic, denies dizziness/lightheadedness

## 2023-11-18 NOTE — PATIENT PROFILE ADULT - NSPROSPHOSPCHAPLAINYN_GEN_A_NUR
[FreeTextEntry1] : 43-year-old with history of primary infertility history of excision of right lobe of thyroid gland in May 2022 now clinically euthyroid with improved thyroid function testing she is being followed up by Dr. Marcel Archuleta for possible donor egg insemination, she will continue Sprintec at this time as this is regulating her periods and helping prevent symptomatic hot flashes and sweats.  All risk benefits pros cons discussed with the patient in layman's terms and questions answered she will return in 6 months mammogram bilateral breast sonogram and Pap smear have been completed.
no

## 2023-11-18 NOTE — PATIENT PROFILE ADULT - FALL HARM RISK - HARM RISK INTERVENTIONS

## 2023-11-18 NOTE — PHYSICAL THERAPY INITIAL EVALUATION ADULT - ADDITIONAL COMMENTS
As per patient he lives in a private home with wife and children. Reports 3 steps to enter, remains on first floor. Denies any falls.

## 2023-11-18 NOTE — PHYSICAL THERAPY INITIAL EVALUATION ADULT - MANUAL MUSCLE TESTING RESULTS, REHAB EVAL
PAtients bilateral upper extremity strength grossly 4/5, bilateral lower extremity strength grossly 3+/5 upon MMT and functional assessments

## 2023-11-18 NOTE — PHYSICAL THERAPY INITIAL EVALUATION ADULT - LEVEL OF INDEPENDENCE: STAIR NEGOTIATION, REHAB EVAL
not assessed at this time due to positive orthostatics without any symptoms, held off on stair training for safety at this time

## 2023-11-18 NOTE — PHYSICAL THERAPY INITIAL EVALUATION ADULT - PERTINENT HX OF CURRENT PROBLEM, REHAB EVAL
Patient is an 83 year old male with history of HTN, AFib (on Xarelto), CVA (2022), CAD s/p stent, Aortic stenosis s/p TAVR, [employer] PPM (2021), and Gout presenting after a syncopal episode this morning witnessed by wife. Patient states he was eating breakfast, suddenly felt dizzy and blacked out. Per daughter, the patient's wife saw that his eyes rolled up, and he was unresponsive to voice/touch for "about a minute." Patient states he felt fine after the episode and denies tongue-biting/laceration nor urinary incontinence. 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 Of note, patient reports having had a stomach bug leading to upset stomach and poor appetite with multiple diarrhea episodes. States had 4X episodes two days ago, 3X yesterday, and another 2X while in the ED. Daughter is unclear about the past few days but does endorse diarrhea last week.  Syncope episode Likely exacerbated by dehydration and fluid losses iso diarrhea and poor appetite lately

## 2023-11-18 NOTE — PHYSICAL THERAPY INITIAL EVALUATION ADULT - PATIENT PROFILE REVIEW, REHAB EVAL
ACTIVITY; increase as tolerated/yes Valtrex Counseling: I discussed with the patient the risks of valacyclovir including but not limited to kidney damage, nausea, vomiting and severe allergy.  The patient understands that if the infection seems to be worsening or is not improving, they are to call.

## 2023-11-19 LAB
ANION GAP SERPL CALC-SCNC: 9 MMOL/L — SIGNIFICANT CHANGE UP (ref 7–14)
ANION GAP SERPL CALC-SCNC: 9 MMOL/L — SIGNIFICANT CHANGE UP (ref 7–14)
BUN SERPL-MCNC: 15 MG/DL — SIGNIFICANT CHANGE UP (ref 7–23)
BUN SERPL-MCNC: 15 MG/DL — SIGNIFICANT CHANGE UP (ref 7–23)
CALCIUM SERPL-MCNC: 8.6 MG/DL — SIGNIFICANT CHANGE UP (ref 8.4–10.5)
CALCIUM SERPL-MCNC: 8.6 MG/DL — SIGNIFICANT CHANGE UP (ref 8.4–10.5)
CHLORIDE SERPL-SCNC: 106 MMOL/L — SIGNIFICANT CHANGE UP (ref 98–107)
CHLORIDE SERPL-SCNC: 106 MMOL/L — SIGNIFICANT CHANGE UP (ref 98–107)
CO2 SERPL-SCNC: 23 MMOL/L — SIGNIFICANT CHANGE UP (ref 22–31)
CO2 SERPL-SCNC: 23 MMOL/L — SIGNIFICANT CHANGE UP (ref 22–31)
CREAT SERPL-MCNC: 1.12 MG/DL — SIGNIFICANT CHANGE UP (ref 0.5–1.3)
CREAT SERPL-MCNC: 1.12 MG/DL — SIGNIFICANT CHANGE UP (ref 0.5–1.3)
EGFR: 66 ML/MIN/1.73M2 — SIGNIFICANT CHANGE UP
EGFR: 66 ML/MIN/1.73M2 — SIGNIFICANT CHANGE UP
GLUCOSE SERPL-MCNC: 86 MG/DL — SIGNIFICANT CHANGE UP (ref 70–99)
GLUCOSE SERPL-MCNC: 86 MG/DL — SIGNIFICANT CHANGE UP (ref 70–99)
HCT VFR BLD CALC: 40.2 % — SIGNIFICANT CHANGE UP (ref 39–50)
HCT VFR BLD CALC: 40.2 % — SIGNIFICANT CHANGE UP (ref 39–50)
HGB BLD-MCNC: 12.7 G/DL — LOW (ref 13–17)
HGB BLD-MCNC: 12.7 G/DL — LOW (ref 13–17)
MAGNESIUM SERPL-MCNC: 2 MG/DL — SIGNIFICANT CHANGE UP (ref 1.6–2.6)
MAGNESIUM SERPL-MCNC: 2 MG/DL — SIGNIFICANT CHANGE UP (ref 1.6–2.6)
MCHC RBC-ENTMCNC: 23.3 PG — LOW (ref 27–34)
MCHC RBC-ENTMCNC: 23.3 PG — LOW (ref 27–34)
MCHC RBC-ENTMCNC: 31.6 GM/DL — LOW (ref 32–36)
MCHC RBC-ENTMCNC: 31.6 GM/DL — LOW (ref 32–36)
MCV RBC AUTO: 73.8 FL — LOW (ref 80–100)
MCV RBC AUTO: 73.8 FL — LOW (ref 80–100)
NRBC # BLD: 0 /100 WBCS — SIGNIFICANT CHANGE UP (ref 0–0)
NRBC # BLD: 0 /100 WBCS — SIGNIFICANT CHANGE UP (ref 0–0)
NRBC # FLD: 0 K/UL — SIGNIFICANT CHANGE UP (ref 0–0)
NRBC # FLD: 0 K/UL — SIGNIFICANT CHANGE UP (ref 0–0)
PHOSPHATE SERPL-MCNC: 2.7 MG/DL — SIGNIFICANT CHANGE UP (ref 2.5–4.5)
PHOSPHATE SERPL-MCNC: 2.7 MG/DL — SIGNIFICANT CHANGE UP (ref 2.5–4.5)
PLATELET # BLD AUTO: 99 K/UL — LOW (ref 150–400)
PLATELET # BLD AUTO: 99 K/UL — LOW (ref 150–400)
POTASSIUM SERPL-MCNC: 4.2 MMOL/L — SIGNIFICANT CHANGE UP (ref 3.5–5.3)
POTASSIUM SERPL-MCNC: 4.2 MMOL/L — SIGNIFICANT CHANGE UP (ref 3.5–5.3)
POTASSIUM SERPL-SCNC: 4.2 MMOL/L — SIGNIFICANT CHANGE UP (ref 3.5–5.3)
POTASSIUM SERPL-SCNC: 4.2 MMOL/L — SIGNIFICANT CHANGE UP (ref 3.5–5.3)
RBC # BLD: 5.45 M/UL — SIGNIFICANT CHANGE UP (ref 4.2–5.8)
RBC # BLD: 5.45 M/UL — SIGNIFICANT CHANGE UP (ref 4.2–5.8)
RBC # FLD: 19 % — HIGH (ref 10.3–14.5)
RBC # FLD: 19 % — HIGH (ref 10.3–14.5)
SODIUM SERPL-SCNC: 138 MMOL/L — SIGNIFICANT CHANGE UP (ref 135–145)
SODIUM SERPL-SCNC: 138 MMOL/L — SIGNIFICANT CHANGE UP (ref 135–145)
TROPONIN T, HIGH SENSITIVITY RESULT: 26 NG/L — SIGNIFICANT CHANGE UP
TROPONIN T, HIGH SENSITIVITY RESULT: 26 NG/L — SIGNIFICANT CHANGE UP
WBC # BLD: 6.78 K/UL — SIGNIFICANT CHANGE UP (ref 3.8–10.5)
WBC # BLD: 6.78 K/UL — SIGNIFICANT CHANGE UP (ref 3.8–10.5)
WBC # FLD AUTO: 6.78 K/UL — SIGNIFICANT CHANGE UP (ref 3.8–10.5)
WBC # FLD AUTO: 6.78 K/UL — SIGNIFICANT CHANGE UP (ref 3.8–10.5)

## 2023-11-19 PROCEDURE — 93306 TTE W/DOPPLER COMPLETE: CPT | Mod: 26

## 2023-11-19 PROCEDURE — 99222 1ST HOSP IP/OBS MODERATE 55: CPT | Mod: GC

## 2023-11-19 RX ADMIN — Medication 25 MILLIGRAM(S): at 06:10

## 2023-11-19 RX ADMIN — Medication 25 MILLIGRAM(S): at 06:09

## 2023-11-19 RX ADMIN — RIVAROXABAN 20 MILLIGRAM(S): KIT at 17:59

## 2023-11-19 RX ADMIN — Medication 100 MILLIGRAM(S): at 13:07

## 2023-11-19 RX ADMIN — Medication 3 MILLIGRAM(S): at 21:25

## 2023-11-19 RX ADMIN — Medication 25 MILLIGRAM(S): at 21:26

## 2023-11-19 RX ADMIN — ATORVASTATIN CALCIUM 40 MILLIGRAM(S): 80 TABLET, FILM COATED ORAL at 21:26

## 2023-11-19 RX ADMIN — Medication 25 MILLIGRAM(S): at 13:07

## 2023-11-19 NOTE — CONSULT NOTE ADULT - ATTENDING COMMENTS
83 year old with A fib and prior TAVR  Presented with diarrhea and CJ  GI pcr with ETEC and EPEC    Diarrhea has improved.  ETEC and EPEC are often self limited.  If diarrhea has improved, no indication for antibiotics    CJ improved  Low platelets, but also had low platelets after tavr in 2021.  Follow up with PMD    Call ID  service with additional questions 83 year old with A fib and prior TAVR  Presented with diarrhea and CJ  GI pcr with ETEC and EPEC    Diarrhea has improved.  ETEC and EPEC are often self limited.  If diarrhea has improved, no indication for antibiotics  Isolation is not needed    CJ improved  Low platelets, but also had low platelets after tavr in 2021.  Follow up with PMD    outpt heptalogy eval to discuss Hep C treatment options    Call ID  service with additional questions 83 year old with A fib and prior TAVR  Presented with diarrhea and CJ  GI pcr with ETEC and EPEC    Diarrhea has improved.  ETEC and EPEC are often self limited.  If diarrhea has improved, no indication for antibiotics  Isolation is not needed    CJ improved  Low platelets, but also had low platelets after tavr in 2021.  Follow up with PMD      Call ID  service with additional questions

## 2023-11-19 NOTE — CONSULT NOTE ADULT - ASSESSMENT
Incomplete Note    Pt to be seen today  83 M PMH HTN, AF (Xarelto), CVA (2022), CAD s/p stent, AS s/p TAVR, [employer] PPM (2021), and gout presenting after a syncopal episode that was witnessed by his spouse.     Diarrhea, syncope   Here GI PCR + for EPEC and ETEC  Labs notable for TCP, CJ (improved)  ID consulted for recommendations    Overall;  Diarrhea, improved  Syncope, abnormal CT Head     Suggest;  Syncopal workup   Supportive care for ETEC and EPEC, diarrhea has improved   Check HIV and Hep C screen  Further workup of TCP per Primary Team    All recommendations are tentative pending Attending Attestation.    Ciarra Wilson MD, PGY-5   ID Fellow  [employer] Teams Preferred  After 5pm/weekends call [phone]

## 2023-11-19 NOTE — CHART NOTE - NSCHARTNOTEFT_GEN_A_CORE
Vital Signs Last 24 Hrs  T(C): 36.5 (19 Nov 2023 06:00), Max: 36.9 (18 Nov 2023 20:44)  T(F): 97.7 (19 Nov 2023 06:00), Max: 98.4 (18 Nov 2023 20:44)  HR: 80 (19 Nov 2023 06:00) (67 - 140)  BP: 120/80 (19 Nov 2023 06:00) (120/80 - 149/83)  BP(mean): --  RR: 18 (19 Nov 2023 06:00) (17 - 18)  SpO2: 100% (19 Nov 2023 06:00) (97% - 100%)    Parameters below as of 19 Nov 2023 06:00  Patient On (Oxygen Delivery Method): room air                          12.7   6.78  )-----------( 99       ( 19 Nov 2023 06:48 )             40.2   11-19    138  |  106  |  15  ----------------------------<  86  4.2   |  23  |  1.12    Ca    8.6      19 Nov 2023 06:48  Phos  2.7     11-19  Mg     2.00     11-19    TPro  7.3  /  Alb  3.4  /  TBili  0.8  /  DBili  x   /  AST  35  /  ALT  30  /  AlkPhos  92  11-17    GI PCR  -Enteropathogenic E. coli (EPEC), Enterotoxigenic E.coli (ETEC)    Noted with GI Pcr as above, ID curbsided and discussed case with ID Dr. Wilson >recommend no antibiotics at this time, continue with contact isolation and supportive care, plan of care discussed with RN and attending Vital Signs Last 24 Hrs  T(C): 36.5 (19 Nov 2023 06:00), Max: 36.9 (18 Nov 2023 20:44)  T(F): 97.7 (19 Nov 2023 06:00), Max: 98.4 (18 Nov 2023 20:44)  HR: 80 (19 Nov 2023 06:00) (67 - 140)  BP: 120/80 (19 Nov 2023 06:00) (120/80 - 149/83)  BP(mean): --  RR: 18 (19 Nov 2023 06:00) (17 - 18)  SpO2: 100% (19 Nov 2023 06:00) (97% - 100%)    Parameters below as of 19 Nov 2023 06:00  Patient On (Oxygen Delivery Method): room air                          12.7   6.78  )-----------( 99       ( 19 Nov 2023 06:48 )             40.2   11-19    138  |  106  |  15  ----------------------------<  86  4.2   |  23  |  1.12    Ca    8.6      19 Nov 2023 06:48  Phos  2.7     11-19  Mg     2.00     11-19    TPro  7.3  /  Alb  3.4  /  TBili  0.8  /  DBili  x   /  AST  35  /  ALT  30  /  AlkPhos  92  11-17    GI PCR  -Enteropathogenic E. coli (EPEC), Enterotoxigenic E.coli (ETEC)    Noted with GI Pcr as above, ID consulted and discussed case with ID Dr. Wilson> holding off antibiotics at this time, will continue contact isolation and supportive care, will followup official recs. Plan of care discussed with Dr. Robertson

## 2023-11-19 NOTE — CONSULT NOTE ADULT - SUBJECTIVE AND OBJECTIVE BOX
Patient is a 83 Male who presents with a chief complaint of syncope    HPI:  83 M PMH HTN, AF (Xarelto), CVA (2022), CAD s/p stent, AS s/p TAVR, [employer] PPM (2021), and gout presenting after a syncopal episode that was witnessed by his spouse.     Pt also with diarrhea for the past week. Labs here notable for TCP, CJ (improved), as well as GI PCR + for ETEC and EPEC. ID consulted for recommendations.     REVIEW OF SYSTEMS      prior hospital charts reviewed [V]  primary team notes reviewed [V]  other consultant notes reviewed [V]    PAST MEDICAL & SURGICAL HISTORY:  AF (atrial fibrillation)    HTN (hypertension)    HLD (hyperlipidemia)    Gout    CVA (cerebrovascular accident)  L hemiparesis 2019    Stented coronary artery  x1    SOCIAL HISTORY:    FAMILY HISTORY:  Family history of stroke (Sibling)    Allergies  No Known Allergies    ANTIMICROBIALS:    ANTIMICROBIALS (past 90 days):  MEDICATIONS  (STANDING):    OTHER MEDS:   MEDICATIONS  (STANDING):  acetaminophen     Tablet .. 650 every 6 hours PRN  allopurinol 100 daily  atorvastatin 40 at bedtime  hydrALAZINE 25 every 8 hours  influenza  Vaccine (HIGH DOSE) 0.7 once  melatonin 3 at bedtime PRN  metoprolol succinate ER 25 daily  rivaroxaban 20 with dinner    VITALS:  Vital Signs Last 24 Hrs  T(F): 97.7 (11-19-23 @ 06:00), Max: 98.8 (11-17-23 @ 12:45)    Vital Signs Last 24 Hrs  HR: 80 (11-19-23 @ 06:00) (67 - 140)  BP: 120/80 (11-19-23 @ 06:00) (120/80 - 149/83)  RR: 18 (11-19-23 @ 06:00)  SpO2: 100% (11-19-23 @ 06:00) (97% - 100%)  Wt(kg): --    EXAM:      Labs:                        12.7   6.78  )-----------( 99       ( 19 Nov 2023 06:48 )             40.2     11-19    138  |  106  |  15  ----------------------------<  86  4.2   |  23  |  1.12    Ca    8.6      19 Nov 2023 06:48  Phos  2.7     11-19  Mg     2.00     11-19    TPro  7.3  /  Alb  3.4  /  TBili  0.8  /  DBili  x   /  AST  35  /  ALT  30  /  AlkPhos  92  11-17    WBC Trend:  WBC Count: 6.78 (11-19-23 @ 06:48)  WBC Count: 8.80 (11-18-23 @ 04:57)  WBC Count: 10.94 (11-17-23 @ 12:40)    Auto Neutrophil #: 5.50 K/uL (11-18-23 @ 04:57)  Auto Neutrophil #: 8.32 K/uL (11-17-23 @ 12:40)  Auto Neutrophil #: 4.28 K/uL (12-08-22 @ 12:10)    Creatine Trend:  Creatinine: 1.12 (11-19)  Creatinine: 1.08 (11-18)  Creatinine: 1.31 (11-17)    Liver Biochemical Testing Trend:  Alanine Aminotransferase (ALT/SGPT): 30 (11-17)  Alanine Aminotransferase (ALT/SGPT): 34 (12-09)  Alanine Aminotransferase (ALT/SGPT): 41 (12-08)  Aspartate Aminotransferase (AST/SGOT): 35 (11-17-23 @ 12:40)  Aspartate Aminotransferase (AST/SGOT): 30 (12-09-22 @ 06:55)  Aspartate Aminotransferase (AST/SGOT): 36 (12-08-22 @ 12:10)  Bilirubin Total: 0.8 (11-17)  Bilirubin Total, Serum: 0.6 (12-09)  Bilirubin Total, Serum: 0.7 (12-08)    Trend LDH  12-11-20 @ 07:32  815<H>  12-02-20 @ 06:43  358<H>    Auto Eosinophil %: 1.3 % (11-18-23 @ 04:57)  Auto Eosinophil %: 0.3 % (11-17-23 @ 12:40)    Urinalysis Basic - ( 19 Nov 2023 06:48 )    Color: x / Appearance: x / SG: x / pH: x  Gluc: 86 mg/dL / Ketone: x  / Bili: x / Urobili: x   Blood: x / Protein: x / Nitrite: x   Leuk Esterase: x / RBC: x / WBC x   Sq Epi: x / Non Sq Epi: x / Bacteria: x    MICROBIOLOGY:    Culture - Urine (collected 08 Dec 2022 16:05)  Source: Clean Catch Clean Catch (Midstream)  Final Report:    <10,000 CFU/mL Normal Urogenital Seda    Troponin T, High Sensitivity Result: 26 (11-19)  Troponin T, High Sensitivity Result: 20 (11-17)    RADIOLOGY:  imaging below personally reviewed    < from: CT Head No Cont (11.18.23 @ 12:23) >  IMPRESSION: Age-appropriate involutional and ischemic gliotic changes.   Left centrum semiovale ischemia of undetermined age appears new since   12/8/2022. No hemorrhage.    --- End of Report ---    < end of copied text >  < from: US Abdomen Complete (US Abdomen Complete .) (11.18.23 @ 17:46) >  IMPRESSION:  Cholelithiasis.  No sonographic evidence of acute cholecystitis or   dilated bile ducts.  The pancreas is obscured by bowel gas.  No hydronephrosis or splenomegaly.    --- End of Report ---    < end of copied text > Patient is a 83 Male who presents with a chief complaint of syncope    HPI:  83 M PMH HTN, AF (Xarelto), CVA (2022), CAD s/p stent, AS s/p TAVR, [employer] PPM (2021), and gout presenting after a syncopal episode that was witnessed by his spouse.     Pt also with diarrhea for the past week. Labs here notable for TCP, CJ (improved), as well as GI PCR + for ETEC and EPEC. ID consulted for recommendations.     REVIEW OF SYSTEMS  Constitutional: No fevers, chills  Skin: No rash	  Eyes: No discharge	  ENMT: No sore throat  Respiratory: No cough, no SOB  Cardiovascular:  No chest pain  Gastrointestinal: No pain, nausea, vomiting  Diarrhea improved 	  Genitourinary: No dysuria  MSK: No arthralgias  Neurological: No AMS     prior hospital charts reviewed [V]  primary team notes reviewed [V]  other consultant notes reviewed [V]    PAST MEDICAL & SURGICAL HISTORY:  AF (atrial fibrillation)    HTN (hypertension)    HLD (hyperlipidemia)    Gout    CVA (cerebrovascular accident)  L hemiparesis 2019    Stented coronary artery  x1    SOCIAL HISTORY:  No sick contacts     FAMILY HISTORY:  Family history of stroke (Sibling)    Allergies  No Known Allergies    ANTIMICROBIALS:    ANTIMICROBIALS (past 90 days):  MEDICATIONS  (STANDING):    OTHER MEDS:   MEDICATIONS  (STANDING):  acetaminophen     Tablet .. 650 every 6 hours PRN  allopurinol 100 daily  atorvastatin 40 at bedtime  hydrALAZINE 25 every 8 hours  influenza  Vaccine (HIGH DOSE) 0.7 once  melatonin 3 at bedtime PRN  metoprolol succinate ER 25 daily  rivaroxaban 20 with dinner    VITALS:  Vital Signs Last 24 Hrs  T(F): 97.7 (11-19-23 @ 06:00), Max: 98.8 (11-17-23 @ 12:45)    Vital Signs Last 24 Hrs  HR: 80 (11-19-23 @ 06:00) (67 - 140)  BP: 120/80 (11-19-23 @ 06:00) (120/80 - 149/83)  RR: 18 (11-19-23 @ 06:00)  SpO2: 100% (11-19-23 @ 06:00) (97% - 100%)  Wt(kg): --    EXAM:  General: Patient in NAD  HEENT: NCAT  CV: S1+S2  Lungs: No respiratory distress, CTAB  Abd: Soft, nontender, no guarding  : No suprapubic tenderness  Neuro: Calm  Ext: No cyanosis  Skin: No rash    Labs:                        12.7   6.78  )-----------( 99       ( 19 Nov 2023 06:48 )             40.2     11-19    138  |  106  |  15  ----------------------------<  86  4.2   |  23  |  1.12    Ca    8.6      19 Nov 2023 06:48  Phos  2.7     11-19  Mg     2.00     11-19    TPro  7.3  /  Alb  3.4  /  TBili  0.8  /  DBili  x   /  AST  35  /  ALT  30  /  AlkPhos  92  11-17    WBC Trend:  WBC Count: 6.78 (11-19-23 @ 06:48)  WBC Count: 8.80 (11-18-23 @ 04:57)  WBC Count: 10.94 (11-17-23 @ 12:40)    Auto Neutrophil #: 5.50 K/uL (11-18-23 @ 04:57)  Auto Neutrophil #: 8.32 K/uL (11-17-23 @ 12:40)  Auto Neutrophil #: 4.28 K/uL (12-08-22 @ 12:10)    Creatine Trend:  Creatinine: 1.12 (11-19)  Creatinine: 1.08 (11-18)  Creatinine: 1.31 (11-17)    Liver Biochemical Testing Trend:  Alanine Aminotransferase (ALT/SGPT): 30 (11-17)  Alanine Aminotransferase (ALT/SGPT): 34 (12-09)  Alanine Aminotransferase (ALT/SGPT): 41 (12-08)  Aspartate Aminotransferase (AST/SGOT): 35 (11-17-23 @ 12:40)  Aspartate Aminotransferase (AST/SGOT): 30 (12-09-22 @ 06:55)  Aspartate Aminotransferase (AST/SGOT): 36 (12-08-22 @ 12:10)  Bilirubin Total: 0.8 (11-17)  Bilirubin Total, Serum: 0.6 (12-09)  Bilirubin Total, Serum: 0.7 (12-08)    Trend LDH  12-11-20 @ 07:32  815<H>  12-02-20 @ 06:43  358<H>    Auto Eosinophil %: 1.3 % (11-18-23 @ 04:57)  Auto Eosinophil %: 0.3 % (11-17-23 @ 12:40)    Urinalysis Basic - ( 19 Nov 2023 06:48 )    Color: x / Appearance: x / SG: x / pH: x  Gluc: 86 mg/dL / Ketone: x  / Bili: x / Urobili: x   Blood: x / Protein: x / Nitrite: x   Leuk Esterase: x / RBC: x / WBC x   Sq Epi: x / Non Sq Epi: x / Bacteria: x    MICROBIOLOGY:    Culture - Urine (collected 08 Dec 2022 16:05)  Source: Clean Catch Clean Catch (Midstream)  Final Report:    <10,000 CFU/mL Normal Urogenital Seda    Troponin T, High Sensitivity Result: 26 (11-19)  Troponin T, High Sensitivity Result: 20 (11-17)    RADIOLOGY:  imaging below personally reviewed    < from: CT Head No Cont (11.18.23 @ 12:23) >  IMPRESSION: Age-appropriate involutional and ischemic gliotic changes.   Left centrum semiovale ischemia of undetermined age appears new since   12/8/2022. No hemorrhage.    --- End of Report ---    < end of copied text >  < from: US Abdomen Complete (US Abdomen Complete .) (11.18.23 @ 17:46) >  IMPRESSION:  Cholelithiasis.  No sonographic evidence of acute cholecystitis or   dilated bile ducts.  The pancreas is obscured by bowel gas.  No hydronephrosis or splenomegaly.    --- End of Report ---    < end of copied text >

## 2023-11-20 ENCOUNTER — TRANSCRIPTION ENCOUNTER (OUTPATIENT)
Age: 83
End: 2023-11-20

## 2023-11-20 LAB
ANION GAP SERPL CALC-SCNC: 11 MMOL/L — SIGNIFICANT CHANGE UP (ref 7–14)
ANION GAP SERPL CALC-SCNC: 11 MMOL/L — SIGNIFICANT CHANGE UP (ref 7–14)
BASOPHILS # BLD AUTO: 0.02 K/UL — SIGNIFICANT CHANGE UP (ref 0–0.2)
BASOPHILS # BLD AUTO: 0.02 K/UL — SIGNIFICANT CHANGE UP (ref 0–0.2)
BASOPHILS NFR BLD AUTO: 0.4 % — SIGNIFICANT CHANGE UP (ref 0–2)
BASOPHILS NFR BLD AUTO: 0.4 % — SIGNIFICANT CHANGE UP (ref 0–2)
BLD GP AB SCN SERPL QL: NEGATIVE — SIGNIFICANT CHANGE UP
BLD GP AB SCN SERPL QL: NEGATIVE — SIGNIFICANT CHANGE UP
BUN SERPL-MCNC: 15 MG/DL — SIGNIFICANT CHANGE UP (ref 7–23)
BUN SERPL-MCNC: 15 MG/DL — SIGNIFICANT CHANGE UP (ref 7–23)
CALCIUM SERPL-MCNC: 8.6 MG/DL — SIGNIFICANT CHANGE UP (ref 8.4–10.5)
CALCIUM SERPL-MCNC: 8.6 MG/DL — SIGNIFICANT CHANGE UP (ref 8.4–10.5)
CHLORIDE SERPL-SCNC: 104 MMOL/L — SIGNIFICANT CHANGE UP (ref 98–107)
CHLORIDE SERPL-SCNC: 104 MMOL/L — SIGNIFICANT CHANGE UP (ref 98–107)
CO2 SERPL-SCNC: 21 MMOL/L — LOW (ref 22–31)
CO2 SERPL-SCNC: 21 MMOL/L — LOW (ref 22–31)
CREAT SERPL-MCNC: 1.14 MG/DL — SIGNIFICANT CHANGE UP (ref 0.5–1.3)
CREAT SERPL-MCNC: 1.14 MG/DL — SIGNIFICANT CHANGE UP (ref 0.5–1.3)
EGFR: 65 ML/MIN/1.73M2 — SIGNIFICANT CHANGE UP
EGFR: 65 ML/MIN/1.73M2 — SIGNIFICANT CHANGE UP
EOSINOPHIL # BLD AUTO: 0.27 K/UL — SIGNIFICANT CHANGE UP (ref 0–0.5)
EOSINOPHIL # BLD AUTO: 0.27 K/UL — SIGNIFICANT CHANGE UP (ref 0–0.5)
EOSINOPHIL NFR BLD AUTO: 5.3 % — SIGNIFICANT CHANGE UP (ref 0–6)
EOSINOPHIL NFR BLD AUTO: 5.3 % — SIGNIFICANT CHANGE UP (ref 0–6)
GLUCOSE SERPL-MCNC: 87 MG/DL — SIGNIFICANT CHANGE UP (ref 70–99)
GLUCOSE SERPL-MCNC: 87 MG/DL — SIGNIFICANT CHANGE UP (ref 70–99)
HCT VFR BLD CALC: 42.5 % — SIGNIFICANT CHANGE UP (ref 39–50)
HCT VFR BLD CALC: 42.5 % — SIGNIFICANT CHANGE UP (ref 39–50)
HCV AB S/CO SERPL IA: 0.06 S/CO — SIGNIFICANT CHANGE UP (ref 0–0.99)
HCV AB S/CO SERPL IA: 0.06 S/CO — SIGNIFICANT CHANGE UP (ref 0–0.99)
HCV AB SERPL-IMP: SIGNIFICANT CHANGE UP
HCV AB SERPL-IMP: SIGNIFICANT CHANGE UP
HGB BLD-MCNC: 13.6 G/DL — SIGNIFICANT CHANGE UP (ref 13–17)
HGB BLD-MCNC: 13.6 G/DL — SIGNIFICANT CHANGE UP (ref 13–17)
HIV 1+2 AB+HIV1 P24 AG SERPL QL IA: SIGNIFICANT CHANGE UP
HIV 1+2 AB+HIV1 P24 AG SERPL QL IA: SIGNIFICANT CHANGE UP
IANC: 2.57 K/UL — SIGNIFICANT CHANGE UP (ref 1.8–7.4)
IANC: 2.57 K/UL — SIGNIFICANT CHANGE UP (ref 1.8–7.4)
IMM GRANULOCYTES NFR BLD AUTO: 0.2 % — SIGNIFICANT CHANGE UP (ref 0–0.9)
IMM GRANULOCYTES NFR BLD AUTO: 0.2 % — SIGNIFICANT CHANGE UP (ref 0–0.9)
LYMPHOCYTES # BLD AUTO: 1.66 K/UL — SIGNIFICANT CHANGE UP (ref 1–3.3)
LYMPHOCYTES # BLD AUTO: 1.66 K/UL — SIGNIFICANT CHANGE UP (ref 1–3.3)
LYMPHOCYTES # BLD AUTO: 32.6 % — SIGNIFICANT CHANGE UP (ref 13–44)
LYMPHOCYTES # BLD AUTO: 32.6 % — SIGNIFICANT CHANGE UP (ref 13–44)
MAGNESIUM SERPL-MCNC: 2 MG/DL — SIGNIFICANT CHANGE UP (ref 1.6–2.6)
MAGNESIUM SERPL-MCNC: 2 MG/DL — SIGNIFICANT CHANGE UP (ref 1.6–2.6)
MCHC RBC-ENTMCNC: 23.2 PG — LOW (ref 27–34)
MCHC RBC-ENTMCNC: 23.2 PG — LOW (ref 27–34)
MCHC RBC-ENTMCNC: 32 GM/DL — SIGNIFICANT CHANGE UP (ref 32–36)
MCHC RBC-ENTMCNC: 32 GM/DL — SIGNIFICANT CHANGE UP (ref 32–36)
MCV RBC AUTO: 72.4 FL — LOW (ref 80–100)
MCV RBC AUTO: 72.4 FL — LOW (ref 80–100)
MONOCYTES # BLD AUTO: 0.56 K/UL — SIGNIFICANT CHANGE UP (ref 0–0.9)
MONOCYTES # BLD AUTO: 0.56 K/UL — SIGNIFICANT CHANGE UP (ref 0–0.9)
MONOCYTES NFR BLD AUTO: 11 % — SIGNIFICANT CHANGE UP (ref 2–14)
MONOCYTES NFR BLD AUTO: 11 % — SIGNIFICANT CHANGE UP (ref 2–14)
NEUTROPHILS # BLD AUTO: 2.57 K/UL — SIGNIFICANT CHANGE UP (ref 1.8–7.4)
NEUTROPHILS # BLD AUTO: 2.57 K/UL — SIGNIFICANT CHANGE UP (ref 1.8–7.4)
NEUTROPHILS NFR BLD AUTO: 50.5 % — SIGNIFICANT CHANGE UP (ref 43–77)
NEUTROPHILS NFR BLD AUTO: 50.5 % — SIGNIFICANT CHANGE UP (ref 43–77)
NRBC # BLD: 0 /100 WBCS — SIGNIFICANT CHANGE UP (ref 0–0)
NRBC # BLD: 0 /100 WBCS — SIGNIFICANT CHANGE UP (ref 0–0)
NRBC # FLD: 0 K/UL — SIGNIFICANT CHANGE UP (ref 0–0)
NRBC # FLD: 0 K/UL — SIGNIFICANT CHANGE UP (ref 0–0)
PHOSPHATE SERPL-MCNC: 3.1 MG/DL — SIGNIFICANT CHANGE UP (ref 2.5–4.5)
PHOSPHATE SERPL-MCNC: 3.1 MG/DL — SIGNIFICANT CHANGE UP (ref 2.5–4.5)
PLATELET # BLD AUTO: 130 K/UL — LOW (ref 150–400)
PLATELET # BLD AUTO: 130 K/UL — LOW (ref 150–400)
POTASSIUM SERPL-MCNC: 4.4 MMOL/L — SIGNIFICANT CHANGE UP (ref 3.5–5.3)
POTASSIUM SERPL-MCNC: 4.4 MMOL/L — SIGNIFICANT CHANGE UP (ref 3.5–5.3)
POTASSIUM SERPL-SCNC: 4.4 MMOL/L — SIGNIFICANT CHANGE UP (ref 3.5–5.3)
POTASSIUM SERPL-SCNC: 4.4 MMOL/L — SIGNIFICANT CHANGE UP (ref 3.5–5.3)
RBC # BLD: 5.87 M/UL — HIGH (ref 4.2–5.8)
RBC # BLD: 5.87 M/UL — HIGH (ref 4.2–5.8)
RBC # FLD: 19.6 % — HIGH (ref 10.3–14.5)
RBC # FLD: 19.6 % — HIGH (ref 10.3–14.5)
RH IG SCN BLD-IMP: POSITIVE — SIGNIFICANT CHANGE UP
RH IG SCN BLD-IMP: POSITIVE — SIGNIFICANT CHANGE UP
SODIUM SERPL-SCNC: 136 MMOL/L — SIGNIFICANT CHANGE UP (ref 135–145)
SODIUM SERPL-SCNC: 136 MMOL/L — SIGNIFICANT CHANGE UP (ref 135–145)
WBC # BLD: 5.1 K/UL — SIGNIFICANT CHANGE UP (ref 3.8–10.5)
WBC # BLD: 5.1 K/UL — SIGNIFICANT CHANGE UP (ref 3.8–10.5)
WBC # FLD AUTO: 5.1 K/UL — SIGNIFICANT CHANGE UP (ref 3.8–10.5)
WBC # FLD AUTO: 5.1 K/UL — SIGNIFICANT CHANGE UP (ref 3.8–10.5)

## 2023-11-20 PROCEDURE — 99232 SBSQ HOSP IP/OBS MODERATE 35: CPT

## 2023-11-20 RX ADMIN — Medication 3 MILLIGRAM(S): at 22:15

## 2023-11-20 RX ADMIN — RIVAROXABAN 20 MILLIGRAM(S): KIT at 16:35

## 2023-11-20 RX ADMIN — ATORVASTATIN CALCIUM 40 MILLIGRAM(S): 80 TABLET, FILM COATED ORAL at 22:15

## 2023-11-20 RX ADMIN — Medication 25 MILLIGRAM(S): at 14:53

## 2023-11-20 RX ADMIN — Medication 25 MILLIGRAM(S): at 05:22

## 2023-11-20 RX ADMIN — LIDOCAINE 1 PATCH: 4 CREAM TOPICAL at 07:15

## 2023-11-20 RX ADMIN — Medication 100 MILLIGRAM(S): at 11:01

## 2023-11-20 RX ADMIN — Medication 25 MILLIGRAM(S): at 22:15

## 2023-11-20 RX ADMIN — Medication 25 MILLIGRAM(S): at 05:21

## 2023-11-20 NOTE — DISCHARGE NOTE PROVIDER - HOSPITAL COURSE
83 year old male, NKA, PMH of HTN, Afib, Gout, CVA 12/2022, CAD with 1 Stent, Aortic Stenosis S/P TAVR 2021, [employer] PPM 2021, presenting with syncopal episode witnessed by wife.      Syncope.   - Pt presents with syncopal episode accompanied by dizziness and unresponsiveness. Pt denies tongue laceration nor urinary incontinence though daughter reports with a bit more confusion than baseline. Likely exacerbated by dehydration and fluid losses iso diarrhea and poor appetite lately  - EKG with Afib rhythm, no St elevations or depressions, QTc 426   - PPM interrogated without acute events  - some continued confusion similar to prior acute CVA episode per daughter, f/u CTH  - Trop 20 >26,  TSH -normal    - some continued confusion similar to prior acute CVA episode per daughter, CTH - Age-appropriate involutional and ischemic gliotic changes, Left centrum semiovale ischemia of undetermined age appears new since 12/8/2022, no hemorrhage   - TTE--- EF 66%, normal LV systolic function, no regional wall motion abnormalities, mild LVH, increased LV mass and concentric hypertrophy, normal RV systolic function, mild MR,trace intravalvular regurgitation, mildly dilated left and right atrium    - orthostatics negative        Dispo: home     On_________, case was discussed with Dr. Olmedo, patient is medically cleared and optimized for discharge today. All medications were reviewed with attending, and sent to mutually agreed upon pharmacy.   83 year old male, NKA, PMH of HTN, Afib, Gout, CVA 12/2022, CAD with 1 Stent, Aortic Stenosis S/P TAVR 2021, [employer] PPM 2021, presenting with syncopal episode witnessed by wife.    Syncope.   - Pt presents with syncopal episode accompanied by dizziness and unresponsiveness. Pt denies tongue laceration nor urinary incontinence though daughter reports with a bit more confusion than baseline. Likely exacerbated by dehydration and fluid losses iso diarrhea and poor appetite lately  - EKG with Afib rhythm, no St elevations or depressions, QTc 426   - PPM interrogated without acute events  - some continued confusion similar to prior acute CVA episode per daughter, f/u CTH  - Trop 20 >26,  TSH -normal    - some continued confusion similar to prior acute CVA episode per daughter, CTH - Age-appropriate involutional and ischemic gliotic changes, Left centrum semiovale ischemia of undetermined age appears new since 12/8/2022, no hemorrhage   - TTE--- EF 66%, normal LV systolic function, no regional wall motion abnormalities, mild LVH, increased LV mass and concentric hypertrophy, normal RV systolic function, mild MR,trace intravalvular regurgitation, mildly dilated left and right atrium    - orthostatics negative      Abdominal tenderness., Diarrhea   - Pt with abdominal tenderness, likely due to viral gastroenteritis, no recent abx use  - GI PCR  -Enteropathogenic E. coli (EPEC), Enterotoxigenic E.coli (ETEC)  - ID Curbsided 11/19- no Abx, supportive care  - US abdomen -Cholelithiasis.  No e/o acute cholecystitis or dilated bile ducts, no hydronephrosis or splenomegaly  - diarrhea improving     CJ (acute kidney injury).   - Cr 1.31 on admission, unknown baseline  - likely pre-renal due to poor appetite and fluid losses from diarrhea  - s/p fluid resuscitation in ED  - UA +proteinuria, urine lytes noted   - Cr improved    Thrombocytopenia  - CBC stable     Atrial fibrillation.   - CHADSVASC score 6  - cont Xarelto  - cont Metoprolol.    Hypertension.   - cont Hydralazine  - cont Metoprolol  - will hold Torsemide in lieu of further fluid resuscitation.    CVA (cerebrovascular accident).   - cont Xarelto, statin.    CAD (coronary artery disease).   - cont statin  - cont Xarelto.    Gout.   - cont allopurinol     Dispo: home     On 11/21/2023, case was discussed with Dr. Olmedo, patient is medically cleared and optimized for discharge today. All medications were reviewed with attending, and sent to mutually agreed upon pharmacy.   83 year old male, NKA, PMH of HTN, Afib, Gout, CVA 12/2022, CAD with 1 Stent, Aortic Stenosis S/P TAVR 2021, [employer] PPM 2021, presenting with syncopal episode witnessed by wife. Sncope workup completed, now improved and medically cleared for discharge.     Syncope.   - Pt presents with syncopal episode accompanied by dizziness and unresponsiveness. Pt denies tongue laceration nor urinary incontinence though daughter reports with a bit more confusion than baseline. Likely exacerbated by dehydration and fluid losses iso diarrhea and poor appetite lately  - EKG with Afib rhythm, no St elevations or depressions, QTc 426   - PPM interrogated without acute events  - some continued confusion similar to prior acute CVA episode per daughter, CTH - Age-appropriate involutional and ischemic gliotic changes, Left centrum semiovale ischemia of undetermined age appears new since 12/8/2022, no hemorrhage   - TTE--- EF 66%, normal LV systolic function, no regional wall motion abnormalities, mild LVH, increased LV mass and concentric hypertrophy, normal RV systolic function, mild MR,trace intravalvular regurgitation, mildly dilated left and right atrium    - orthostatics negative      Abdominal tenderness., Diarrhea   - Pt with abdominal tenderness, likely due to viral gastroenteritis, no recent abx use  - GI PCR  -Enteropathogenic E. coli (EPEC), Enterotoxigenic E.coli (ETEC)  - ID Curbsided 11/19- no Abx, supportive care  - US abdomen -Cholelithiasis.  No e/o acute cholecystitis or dilated bile ducts, no hydronephrosis or splenomegaly  - diarrhea resolved     CJ (acute kidney injury).   - Cr 1.31 on admission, unknown baseline  - likely pre-renal due to poor appetite and fluid losses from diarrhea  - s/p fluid resuscitation in ED  - UA +proteinuria, urine lytes noted   - Cr improved    Thrombocytopenia  - CBC stable     Atrial fibrillation.   - CHADSVASC score 6  - cont Xarelto  - cont Metoprolol.    Hypertension.   - cont Hydralazine  - cont Metoprolol  - will hold Torsemide in lieu of further fluid resuscitation.    CVA (cerebrovascular accident).   - cont Xarelto, statin.    CAD (coronary artery disease).   - cont statin  - cont Xarelto.    Gout.   - cont allopurinol     Dispo: home     On 11/21/2023, case was discussed with Dr. Olmedo and Dr. Robertson, patient is medically cleared and optimized for discharge home today. All medications were reviewed with attending, and sent to mutually agreed upon pharmacy.   PATIENT SEEN AND EXAMINED BY TRINA BURNETT M.D. ON :-11/21/2023  DATE OF SERVICE:     11/21/2023        Interim events noted,Labs ,Radiological studies and Cardiology tests reviewed .    83  year old male, NKA, PMH of HTN, Afib, Gout, CVA 12/2022, CAD with 1 Stent, Aortic Stenosis S/P TAVR 2021, [employer] PPM 2021, presenting with syncopal episode witnessed by wife. Sncope workup completed, now improved and medically cleared for discharge.     Syncope.   - Pt presents with syncopal episode accompanied by dizziness and unresponsiveness. Pt denies tongue laceration nor urinary incontinence though daughter reports with a bit more confusion than baseline. Likely exacerbated by dehydration and fluid losses iso diarrhea and poor appetite lately  - EKG with Afib rhythm, no St elevations or depressions, QTc 426   - PPM interrogated without acute events  - some continued confusion similar to prior acute CVA episode per daughter, CTH - Age-appropriate involutional and ischemic gliotic changes, Left centrum semiovale ischemia of undetermined age appears new since 12/8/2022, no hemorrhage   - TTE--- EF 66%, normal LV systolic function, no regional wall motion abnormalities, mild LVH, increased LV mass and concentric hypertrophy, normal RV systolic function, mild MR,trace intravalvular regurgitation, mildly dilated left and right atrium    - orthostatics negative      Abdominal tenderness., Diarrhea   - Pt with abdominal tenderness, likely due to viral gastroenteritis, no recent abx use  - GI PCR  -Enteropathogenic E. coli (EPEC), Enterotoxigenic E.coli (ETEC)  - ID Curbsided 11/19- no Abx, supportive care  - US abdomen -Cholelithiasis.  No e/o acute cholecystitis or dilated bile ducts, no hydronephrosis or splenomegaly  - diarrhea resolved     CJ (acute kidney injury).   - Cr 1.31 on admission, unknown baseline  - likely pre-renal due to poor appetite and fluid losses from diarrhea  - s/p fluid resuscitation in ED  - UA +proteinuria, urine lytes noted   - Cr improved    Thrombocytopenia  - CBC stable     Atrial fibrillation.   - CHADSVASC score 6  - cont Xarelto  - cont Metoprolol.    Hypertension.   - cont Hydralazine  - cont Metoprolol  - will hold Torsemide in lieu of further fluid resuscitation.    CVA (cerebrovascular accident).   - cont Xarelto, statin.    CAD (coronary artery disease).   - cont statin  - cont Xarelto.    Gout.   - cont allopurinol     Dispo: home     On 11/21/2023, case was discussed with Dr. Burnett and Dr. Robertson, patient is medically cleared and optimized for discharge home today. All medications were reviewed with attending, and sent to mutually agreed upon pharmacy.

## 2023-11-20 NOTE — DISCHARGE NOTE PROVIDER - NSDCFUADDAPPT_GEN_ALL_CORE_FT
Repeat bloodwork within 1-2 weeks with your doctor (CBC, BMP)    Recommend outpatient Hepatology Evaluation to discuss Hepatitis C treatment options Repeat bloodwork within 1-2 weeks with your doctor (CBC, BMP, LFTs), monitor platelets     Recommend outpatient Hepatology Evaluation to discuss Hepatitis C treatment options

## 2023-11-20 NOTE — DISCHARGE NOTE PROVIDER - NSDCFUSCHEDAPPT_GEN_ALL_CORE_FT
[facility]  ELECTROPH [address]  Scheduled Appointment: 01/04/2024     [facility]  ELECTROPH [address]  Scheduled Appointment: 04/08/2024    [facility]  ELECTROPH [address]  Scheduled Appointment: 07/05/2024

## 2023-11-20 NOTE — PROGRESS NOTE ADULT - SUBJECTIVE AND OBJECTIVE BOX
PATIENT SEEN AND EXAMINED BY TRINA BURNETT M.D. ON :- 11/20/23  DATE OF SERVICE:   11/20/23          Interim events noted,Labs ,Radiological studies and Cardiology tests reviewed .  DISCUSSED WITH ACP/MEDICAL RESIDENT ON PLAN OF CARE.    MR#[id number]  [facility] COURSE: HPI:  Patient is an 83 year old male with history of HTN, AFib (on Xarelto), CVA (2022), CAD s/p stent, Aortic stenosis s/p TAVR, Medtronic PPM (2021), and Gout presenting after a syncopal episode this morning witnessed by wife. Patient states he was eating breakfast and taking his morning medications when he suddenly felt dizzy and blacked out. Per call with daughter for more collateral, she states that patient's wife saw that his eyes rolled up, and he was unresponsive to voice/touch for "about a minute." Patient states he felt fine after the episode and denies tongue-biting/laceration nor urinary incontinence. 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 Of note, patient reports having had a stomach bug leading to upset stomach and poor appetite with multiple diarrhea episodes. States had 4X episodes two days ago, 3X yesterday, and another 2X while in the ED. Daughter is unclear about the past few days but does endorse diarrhea last week. Patient otherwise denies fevers/chills, headaches, nausea/vomiting, vision changes, chest pain, difficulty breathing, heart palpitations, constipation, nor dysuria. Had recent sick contacts regarding stomach bug in the family.     In the ED, patient with vitals T 98.8F,  initially, /99, and O2 saturation 100% on RA with RR 16. Labs notable for thrombocytopenia, Cr 1.31. CXR with clear lungs and XR shoulder without fracture. Received 1L NS bolus and Lidocaine patch in the ED.  (17 Nov 2023 22:00)      INTERIM EVENTS:Patient seen at bedside ,interim events noted.      PMH -reviewed admission note, no change since admission  HEART FAILURE: Acute[ ]Chronic[ ] Systolic[ ] Diastolic[ ] Combined Systolic and Diastolic[ ]  CAD[ ] CABG[ ] PCI[ ]  DEVICES[ ] PPM[ ] ICD[ ] ILR[ ]  ATRIAL FIBRILLATION[ ] Paroxysmal[ ] Permanent[ ] CHADS2-[  ]  CJ[ ] CKD1[ ] CKD2[ ] CKD3[ ] CKD4[ ] ESRD[ ]  COPD[ ] HTN[ ]   DM[ ] Type1[ ] Type 2[ ]   CVA[ ] Paresis[ ]    AMBULATION: Assisted[ ] Cane/walker[ ] Independent[ ]    MEDICATIONS  (STANDING):  allopurinol 100 milliGRAM(s) Oral daily  atorvastatin 40 milliGRAM(s) Oral at bedtime  hydrALAZINE 25 milliGRAM(s) Oral every 8 hours  influenza  Vaccine (HIGH DOSE) 0.7 milliLiter(s) IntraMuscular once  metoprolol succinate ER 25 milliGRAM(s) Oral daily  rivaroxaban 20 milliGRAM(s) Oral with dinner    MEDICATIONS  (PRN):  acetaminophen     Tablet .. 650 milliGRAM(s) Oral every 6 hours PRN Temp greater or equal to 38C (100.4F), Mild Pain (1 - 3)  melatonin 3 milliGRAM(s) Oral at bedtime PRN Insomnia            REVIEW OF SYSTEMS:  Constitutional: [ ] fever, [ ]weight loss,  [ ]fatigue [ ]weight gain  Eyes: [ ] visual changes  Respiratory: [ ]shortness of breath;  [ ] cough, [ ]wheezing, [ ]chills, [ ]hemoptysis  Cardiovascular: [ ] chest pain, [ ]palpitations, [ ]dizziness,  [ ]leg swelling[ ]orthopnea[ ]PND  Gastrointestinal: [ ] abdominal pain, [ ]nausea, [ ]vomiting,  [ ]diarrhea [ ]Constipation [ ]Melena  Genitourinary: [ ] dysuria, [ ] hematuria [ ]Banegas  Neurologic: [ ] headaches [ ] tremors[ ]weakness [ ]Paralysis Right[ ] Left[ ]  Skin: [ ] itching, [ ]burning, [ ] rashes  Endocrine: [ ] heat or cold intolerance  Musculoskeletal: [ ] joint pain or swelling; [ ] muscle, back, or extremity pain  Psychiatric: [ ] depression, [ ]anxiety, [ ]mood swings, or [ ]difficulty sleeping  Hematologic: [ ] easy bruising, [ ] bleeding gums    [ ] All remaining systems negative except as per above.   [ ]Unable to obtain.  [x] No change in ROS since admission      Vital Signs Last 24 Hrs  T(C): 36.7 (20 Nov 2023 17:55), Max: 36.7 (19 Nov 2023 21:26)  T(F): 98 (20 Nov 2023 17:55), Max: 98 (19 Nov 2023 21:26)  HR: 77 (20 Nov 2023 17:55) (72 - 89)  BP: 131/68 (20 Nov 2023 17:55) (131/68 - 158/99)  BP(mean): --  RR: 18 (20 Nov 2023 17:55) (18 - 18)  SpO2: 97% (20 Nov 2023 17:55) (96% - 97%)    Parameters below as of 20 Nov 2023 17:55  Patient On (Oxygen Delivery Method): room air      I&O's Summary      PHYSICAL EXAM:  General: No acute distress BMI-  HEENT: EOMI, PERRL  Neck: Supple, [ ] JVD  Lungs: Equal air entry bilaterally; [ ] rales [ ] wheezing [ ] rhonchi  Heart: Regular rate and rhythm; [x ] murmur   2/6 [ x] systolic [ ] diastolic [ ] radiation[ ] rubs [ ]  gallops  Abdomen: Nontender, bowel sounds present  Extremities: No clubbing, cyanosis, [ ] edema [ ]Pulses  equal and intact  Nervous system:  Alert & Oriented X3, no focal deficits  Psychiatric: Normal affect  Skin: No rashes or lesions    LABS:  11-20    136  |  104  |  15  ----------------------------<  87  4.4   |  21<L>  |  1.14    Ca    8.6      20 Nov 2023 07:00  Phos  3.1     11-20  Mg     2.00     11-20      Creatinine Trend: 1.14<--, 1.12<--, 1.08<--, 1.31<--                        13.6   5.10  )-----------( 130      ( 20 Nov 2023 07:00 )             42.5               
Follow Up:      Inverval History/ROS: Patient is a 83y old  Male who presents with a chief complaint of Syncope (19 Nov 2023 09:28)    No fever  Diarrhea resolved    Allergies    No Known Allergies    Intolerances        ANTIMICROBIALS:      OTHER MEDS:  acetaminophen     Tablet .. 650 milliGRAM(s) Oral every 6 hours PRN  allopurinol 100 milliGRAM(s) Oral daily  atorvastatin 40 milliGRAM(s) Oral at bedtime  hydrALAZINE 25 milliGRAM(s) Oral every 8 hours  influenza  Vaccine (HIGH DOSE) 0.7 milliLiter(s) IntraMuscular once  melatonin 3 milliGRAM(s) Oral at bedtime PRN  metoprolol succinate ER 25 milliGRAM(s) Oral daily  rivaroxaban 20 milliGRAM(s) Oral with dinner      Vital Signs Last 24 Hrs  T(C): 36.6 (20 Nov 2023 12:05), Max: 36.8 (19 Nov 2023 20:00)  T(F): 97.9 (20 Nov 2023 12:05), Max: 98.3 (19 Nov 2023 20:00)  HR: 89 (20 Nov 2023 12:05) (72 - 109)  BP: 134/69 (20 Nov 2023 12:05) (134/69 - 158/99)  BP(mean): --  RR: 18 (20 Nov 2023 12:05) (17 - 18)  SpO2: 96% (20 Nov 2023 12:05) (96% - 97%)    Parameters below as of 20 Nov 2023 12:05  Patient On (Oxygen Delivery Method): room air        PHYSICAL EXAM:  General: [x ] non-toxic  HEAD/EYES: [ ] PERRL [x ] white sclera [ ] icterus  ENT:  [ ] normal [x ] supple [ ] thrush [ ] pharyngeal exudate  Cardiovascular:   [ ] murmur [x ] normal [ ] PPM/AICD  Respiratory:  [x ] clear to ausculation bilaterally  GI:  [ x] soft, non-tender, normal bowel sounds  :  [ ] cisneros [ x] no CVA tenderness   Musculoskeletal:  [ ] no synovitis  Neurologic:  [ ] non-focal exam   Skin:  [x ] no rash  Lymph: [ ] no lymphadenopathy  Psychiatric:  [x ] appropriate affect [ ] alert & oriented  Lines:  [ x] no phlebitis [ ] central line                                13.6   5.10  )-----------( 130      ( 20 Nov 2023 07:00 )             42.5       11-20    136  |  104  |  15  ----------------------------<  87  4.4   |  21<L>  |  1.14    Ca    8.6      20 Nov 2023 07:00  Phos  3.1     11-20  Mg     2.00     11-20        Urinalysis Basic - ( 20 Nov 2023 07:00 )    Color: x / Appearance: x / SG: x / pH: x  Gluc: 87 mg/dL / Ketone: x  / Bili: x / Urobili: x   Blood: x / Protein: x / Nitrite: x   Leuk Esterase: x / RBC: x / WBC x   Sq Epi: x / Non Sq Epi: x / Bacteria: x        MICROBIOLOGY:    RADIOLOGY:    
    SUBJECTIVE / OVERNIGHT EVENTS:pt seen and examined  11-20-23    MEDICATIONS  (STANDING):  allopurinol 100 milliGRAM(s) Oral daily  atorvastatin 40 milliGRAM(s) Oral at bedtime  hydrALAZINE 25 milliGRAM(s) Oral every 8 hours  influenza  Vaccine (HIGH DOSE) 0.7 milliLiter(s) IntraMuscular once  metoprolol succinate ER 25 milliGRAM(s) Oral daily  rivaroxaban 20 milliGRAM(s) Oral with dinner    MEDICATIONS  (PRN):  acetaminophen     Tablet .. 650 milliGRAM(s) Oral every 6 hours PRN Temp greater or equal to 38C (100.4F), Mild Pain (1 - 3)  melatonin 3 milliGRAM(s) Oral at bedtime PRN Insomnia    T(C): 36.4 (11-20-23 @ 21:49), Max: 36.7 (11-20-23 @ 17:55)  HR: 70 (11-20-23 @ 21:49) (70 - 89)  BP: 150/86 (11-20-23 @ 21:49) (131/68 - 150/86)  RR: 18 (11-20-23 @ 21:49) (18 - 18)  SpO2: 98% (11-20-23 @ 21:49) (96% - 98%)    CAPILLARY BLOOD GLUCOSE        I&O's Summary      Constitutional: No fever, fatigue  Skin: No rash.  Eyes: No recent vision problems or eye pain.  ENT: No congestion, ear pain, or sore throat.  Cardiovascular: No chest pain or palpation.  Respiratory: No cough, shortness of breath, congestion, or wheezing.  Gastrointestinal: No abdominal pain, nausea, vomiting, or diarrhea.  Genitourinary: No dysuria.  Musculoskeletal: No joint swelling.  Neurologic: No headache.    PHYSICAL EXAM:  GENERAL: NAD  EYES: EOMI, PERRLA  NECK: Supple, No JVD  CHEST/LUNG: dec breath sounds at bases  HEART:  S1 , S2 +  ABDOMEN: soft , bs+  EXTREMITIES:  no edema  NEUROLOGY:alert awake      LABS:                        13.6   5.10  )-----------( 130      ( 20 Nov 2023 07:00 )             42.5     11-20    136  |  104  |  15  ----------------------------<  87  4.4   |  21<L>  |  1.14    Ca    8.6      20 Nov 2023 07:00  Phos  3.1     11-20  Mg     2.00     11-20            Urinalysis Basic - ( 20 Nov 2023 07:00 )    Color: x / Appearance: x / SG: x / pH: x  Gluc: 87 mg/dL / Ketone: x  / Bili: x / Urobili: x   Blood: x / Protein: x / Nitrite: x   Leuk Esterase: x / RBC: x / WBC x   Sq Epi: x / Non Sq Epi: x / Bacteria: x        RADIOLOGY & ADDITIONAL TESTS:    Imaging Personally Reviewed:yes    Consultant(s) Notes Reviewed:  yes    Care Discussed with Consultants/Other Providers:yes  
PATIENT SEEN AND EXAMINED BY TRINA BURNETT M.D. ON :- 11/18/23  DATE OF SERVICE:    11/18/23         Interim events noted,Labs ,Radiological studies and Cardiology tests reviewed .  DISCUSSED WITH ACP/MEDICAL RESIDENT ON PLAN OF CARE.    MR#[id number]  [facility] COURSE: HPI:  Patient is an 83 year old male with history of HTN, AFib (on Xarelto), CVA (2022), CAD s/p stent, Aortic stenosis s/p TAVR, [employer]tronic PPM (2021), and Gout presenting after a syncopal episode this morning witnessed by wife. Patient states he was eating breakfast and taking his morning medications when he suddenly felt dizzy and blacked out. Per call with daughter for more collateral, she states that patient's wife saw that his eyes rolled up, and he was unresponsive to voice/touch for "about a minute." Patient states he felt fine after the episode and denies tongue-biting/laceration nor urinary incontinence. 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 Of note, patient reports having had a stomach bug leading to upset stomach and poor appetite with multiple diarrhea episodes. States had 4X episodes two days ago, 3X yesterday, and another 2X while in the ED. Daughter is unclear about the past few days but does endorse diarrhea last week. Patient otherwise denies fevers/chills, headaches, nausea/vomiting, vision changes, chest pain, difficulty breathing, heart palpitations, constipation, nor dysuria. Had recent sick contacts regarding stomach bug in the family.     In the ED, patient with vitals T 98.8F,  initially, /99, and O2 saturation 100% on RA with RR 16. Labs notable for thrombocytopenia, Cr 1.31. CXR with clear lungs and XR shoulder without fracture. Received 1L NS bolus and Lidocaine patch in the ED.  (17 Nov 2023 22:00)      INTERIM EVENTS:Patient seen at bedside ,interim events noted.      PMH -reviewed admission note, no change since admission  HEART FAILURE: Acute[ ]Chronic[ ] Systolic[ ] Diastolic[ ] Combined Systolic and Diastolic[ ]  CAD[ ] CABG[ ] PCI[ ]  DEVICES[ ] PPM[ ] ICD[ ] ILR[ ]  ATRIAL FIBRILLATION[ ] Paroxysmal[ ] Permanent[ ] CHADS2-[  ]  CJ[ ] CKD1[ ] CKD2[ ] CKD3[ ] CKD4[ ] ESRD[ ]  COPD[ ] HTN[ ]   DM[ ] Type1[ ] Type 2[ ]   CVA[ ] Paresis[ ]    AMBULATION: Assisted[ ] Cane/walker[ ] Independent[ ]    MEDICATIONS  (STANDING):  allopurinol 100 milliGRAM(s) Oral daily  atorvastatin 40 milliGRAM(s) Oral at bedtime  hydrALAZINE 25 milliGRAM(s) Oral every 8 hours  influenza  Vaccine (HIGH DOSE) 0.7 milliLiter(s) IntraMuscular once  metoprolol succinate ER 25 milliGRAM(s) Oral daily  rivaroxaban 20 milliGRAM(s) Oral with dinner    MEDICATIONS  (PRN):  acetaminophen     Tablet .. 650 milliGRAM(s) Oral every 6 hours PRN Temp greater or equal to 38C (100.4F), Mild Pain (1 - 3)  melatonin 3 milliGRAM(s) Oral at bedtime PRN Insomnia            REVIEW OF SYSTEMS:  Constitutional: [ ] fever, [ ]weight loss,  [ ]fatigue [ ]weight gain  Eyes: [ ] visual changes  Respiratory: [ ]shortness of breath;  [ ] cough, [ ]wheezing, [ ]chills, [ ]hemoptysis  Cardiovascular: [ ] chest pain, [ ]palpitations, [ ]dizziness,  [ ]leg swelling[ ]orthopnea[ ]PND  Gastrointestinal: [ ] abdominal pain, [ ]nausea, [ ]vomiting,  [ ]diarrhea [ ]Constipation [ ]Melena  Genitourinary: [ ] dysuria, [ ] hematuria [ ]Banegas  Neurologic: [ ] headaches [ ] tremors[ ]weakness [ ]Paralysis Right[ ] Left[ ]  Skin: [ ] itching, [ ]burning, [ ] rashes  Endocrine: [ ] heat or cold intolerance  Musculoskeletal: [ ] joint pain or swelling; [ ] muscle, back, or extremity pain  Psychiatric: [ ] depression, [ ]anxiety, [ ]mood swings, or [ ]difficulty sleeping  Hematologic: [ ] easy bruising, [ ] bleeding gums    [ ] All remaining systems negative except as per above.   [ ]Unable to obtain.  [x] No change in ROS since admission      Vital Signs Last 24 Hrs  T(C): 36.2 (18 Nov 2023 12:10), Max: 37.1 (17 Nov 2023 20:57)  T(F): 97.1 (18 Nov 2023 12:10), Max: 98.7 (17 Nov 2023 20:57)  HR: 83 (18 Nov 2023 12:10) (81 - 93)  BP: 149/83 (18 Nov 2023 12:10) (138/81 - 175/90)  BP(mean): --  RR: 18 (18 Nov 2023 12:10) (16 - 18)  SpO2: 99% (18 Nov 2023 12:10) (98% - 100%)    Parameters below as of 18 Nov 2023 06:04  Patient On (Oxygen Delivery Method): room air      I&O's Summary      PHYSICAL EXAM:  General: No acute distress BMI-  HEENT: EOMI, PERRL  Neck: Supple, [ ] JVD  Lungs: Equal air entry bilaterally; [ ] rales [ ] wheezing [ ] rhonchi  Heart: Regular rate and rhythm; [x ] murmur   2/6 [ x] systolic [ ] diastolic [ ] radiation[ ] rubs [ ]  gallops  Abdomen: Nontender, bowel sounds present  Extremities: No clubbing, cyanosis, [ ] edema [ ]Pulses  equal and intact  Nervous system:  Alert & Oriented X3, no focal deficits  Psychiatric: Normal affect  Skin: No rashes or lesions    LABS:  11-18    140  |  107  |  16  ----------------------------<  85  3.9   |  21<L>  |  1.08    Ca    8.7      18 Nov 2023 04:57  Phos  2.7     11-18  Mg     1.90     11-18    TPro  7.3  /  Alb  3.4  /  TBili  0.8  /  DBili  x   /  AST  35  /  ALT  30  /  AlkPhos  92  11-17    Creatinine Trend: 1.08<--, 1.31<--                        13.4   8.80  )-----------( 118      ( 18 Nov 2023 04:57 )             42.1     PT/INR - ( 17 Nov 2023 12:40 )   PT: 14.0 sec;   INR: 1.26 ratio         PTT - ( 17 Nov 2023 12:40 )  PTT:39.8 sec          
    SUBJECTIVE / OVERNIGHT EVENTS:pt seen and examined  23    MEDICATIONS  (STANDING):  allopurinol 100 milliGRAM(s) Oral daily  atorvastatin 40 milliGRAM(s) Oral at bedtime  hydrALAZINE 25 milliGRAM(s) Oral every 8 hours  influenza  Vaccine (HIGH DOSE) 0.7 milliLiter(s) IntraMuscular once  metoprolol succinate ER 25 milliGRAM(s) Oral daily  rivaroxaban 20 milliGRAM(s) Oral with dinner    MEDICATIONS  (PRN):  acetaminophen     Tablet .. 650 milliGRAM(s) Oral every 6 hours PRN Temp greater or equal to 38C (100.4F), Mild Pain (1 - 3)  melatonin 3 milliGRAM(s) Oral at bedtime PRN Insomnia    T(C): 36.9 (23 @ 00:30), Max: 36.9 (23 @ 06:04)  HR: 88 (23 @ 00:30) (67 - 140)  BP: 144/85 (23 @ 00:30) (138/81 - 149/83)  RR: 18 (23 @ 00:30) (17 - 18)  SpO2: 97% (23 @ 00:30) (97% - 99%)    CAPILLARY BLOOD GLUCOSE        I&O's Summary      Constitutional: No fever, fatigue  Skin: No rash.  Eyes: No recent vision problems or eye pain.  ENT: No congestion, ear pain, or sore throat.  Cardiovascular: No chest pain or palpation.  Respiratory: No cough, shortness of breath, congestion, or wheezing.  Gastrointestinal: No abdominal pain, nausea, vomiting, or diarrhea.  Genitourinary: No dysuria.  Musculoskeletal: No joint swelling.  Neurologic: No headache.    PHYSICAL EXAM:  GENERAL: NAD  EYES: EOMI, PERRLA  NECK: Supple, No JVD  CHEST/LUNG: decbr at bases  HEART:  S1 , S2 +  ABDOMEN: soft , bs+  EXTREMITIES:  no edema  NEUROLOGY:alert awake      LABS:                        13.4   8.80  )-----------( 118      ( 2023 04:57 )             42.1         140  |  107  |  16  ----------------------------<  85  3.9   |  21<L>  |  1.08    Ca    8.7      2023 04:57  Phos  2.7     11-18  Mg     1.90     11-18    TPro  7.3  /  Alb  3.4  /  TBili  0.8  /  DBili  x   /  AST  35  /  ALT  30  /  AlkPhos  92  11-17    PT/INR - ( 2023 12:40 )   PT: 14.0 sec;   INR: 1.26 ratio         PTT - ( 2023 12:40 )  PTT:39.8 sec      Urinalysis Basic - ( 2023 06:33 )    Color: Yellow / Appearance: Clear / S.020 / pH: x  Gluc: x / Ketone: Negative mg/dL  / Bili: Negative / Urobili: 1.0 mg/dL   Blood: x / Protein: 100 mg/dL / Nitrite: Negative   Leuk Esterase: Negative / RBC: 2 /HPF / WBC 0 /HPF   Sq Epi: x / Non Sq Epi: 0 /HPF / Bacteria: Negative /HPF        RADIOLOGY & ADDITIONAL TESTS:    Imaging Personally Reviewed:    Consultant(s) Notes Reviewed:      Care Discussed with Consultants/Other Providers:  
PATIENT SEEN AND EXAMINED BY TRINA BURNETT M.D. ON :- 11/19/23  DATE OF SERVICE:  11/19/23           Interim events noted,Labs ,Radiological studies and Cardiology tests reviewed .  DISCUSSED WITH ACP/MEDICAL RESIDENT ON PLAN OF CARE.  MR#[id number]  [facility] COURSE: HPI:  Patient is an 83 year old male with history of HTN, AFib (on Xarelto), CVA (2022), CAD s/p stent, Aortic stenosis s/p TAVR, [employer]tronic PPM (2021), and Gout presenting after a syncopal episode this morning witnessed by wife. Patient states he was eating breakfast and taking his morning medications when he suddenly felt dizzy and blacked out. Per call with daughter for more collateral, she states that patient's wife saw that his eyes rolled up, and he was unresponsive to voice/touch for "about a minute." Patient states he felt fine after the episode and denies tongue-biting/laceration nor urinary incontinence. 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 Of note, patient reports having had a stomach bug leading to upset stomach and poor appetite with multiple diarrhea episodes. States had 4X episodes two days ago, 3X yesterday, and another 2X while in the ED. Daughter is unclear about the past few days but does endorse diarrhea last week. Patient otherwise denies fevers/chills, headaches, nausea/vomiting, vision changes, chest pain, difficulty breathing, heart palpitations, constipation, nor dysuria. Had recent sick contacts regarding stomach bug in the family.     In the ED, patient with vitals T 98.8F,  initially, /99, and O2 saturation 100% on RA with RR 16. Labs notable for thrombocytopenia, Cr 1.31. CXR with clear lungs and XR shoulder without fracture. Received 1L NS bolus and Lidocaine patch in the ED.  (17 Nov 2023 22:00)      INTERIM EVENTS:Patient seen at bedside ,interim events noted.      PMH -reviewed admission note, no change since admission  HEART FAILURE: Acute[ ]Chronic[ ] Systolic[ ] Diastolic[ ] Combined Systolic and Diastolic[ ]  CAD[ ] CABG[ ] PCI[ ]  DEVICES[ ] PPM[ ] ICD[ ] ILR[ ]  ATRIAL FIBRILLATION[ ] Paroxysmal[ ] Permanent[ ] CHADS2-[  ]  CJ[ ] CKD1[ ] CKD2[ ] CKD3[ ] CKD4[ ] ESRD[ ]  COPD[ ] HTN[ ]   DM[ ] Type1[ ] Type 2[ ]   CVA[ ] Paresis[ ]    AMBULATION: Assisted[ ] Cane/walker[ ] Independent[ ]    MEDICATIONS  (STANDING):  allopurinol 100 milliGRAM(s) Oral daily  atorvastatin 40 milliGRAM(s) Oral at bedtime  hydrALAZINE 25 milliGRAM(s) Oral every 8 hours  influenza  Vaccine (HIGH DOSE) 0.7 milliLiter(s) IntraMuscular once  metoprolol succinate ER 25 milliGRAM(s) Oral daily  rivaroxaban 20 milliGRAM(s) Oral with dinner    MEDICATIONS  (PRN):  acetaminophen     Tablet .. 650 milliGRAM(s) Oral every 6 hours PRN Temp greater or equal to 38C (100.4F), Mild Pain (1 - 3)  melatonin 3 milliGRAM(s) Oral at bedtime PRN Insomnia            REVIEW OF SYSTEMS:  Constitutional: [ ] fever, [ ]weight loss,  [ ]fatigue [ ]weight gain  Eyes: [ ] visual changes  Respiratory: [ ]shortness of breath;  [ ] cough, [ ]wheezing, [ ]chills, [ ]hemoptysis  Cardiovascular: [ ] chest pain, [ ]palpitations, [ ]dizziness,  [ ]leg swelling[ ]orthopnea[ ]PND  Gastrointestinal: [ ] abdominal pain, [ ]nausea, [ ]vomiting,  [ ]diarrhea [ ]Constipation [ ]Melena  Genitourinary: [ ] dysuria, [ ] hematuria [ ]Banegas  Neurologic: [ ] headaches [ ] tremors[ ]weakness [ ]Paralysis Right[ ] Left[ ]  Skin: [ ] itching, [ ]burning, [ ] rashes  Endocrine: [ ] heat or cold intolerance  Musculoskeletal: [ ] joint pain or swelling; [ ] muscle, back, or extremity pain  Psychiatric: [ ] depression, [ ]anxiety, [ ]mood swings, or [ ]difficulty sleeping  Hematologic: [ ] easy bruising, [ ] bleeding gums    [ ] All remaining systems negative except as per above.   [ ]Unable to obtain.  [x] No change in ROS since admission      Vital Signs Last 24 Hrs  T(C): 36.4 (19 Nov 2023 12:31), Max: 36.9 (18 Nov 2023 20:44)  T(F): 97.5 (19 Nov 2023 12:31), Max: 98.4 (18 Nov 2023 20:44)  HR: 73 (19 Nov 2023 12:31) (67 - 140)  BP: 143/74 (19 Nov 2023 12:31) (120/80 - 148/69)  BP(mean): --  RR: 18 (19 Nov 2023 12:31) (17 - 18)  SpO2: 98% (19 Nov 2023 12:31) (97% - 100%)    Parameters below as of 19 Nov 2023 06:00  Patient On (Oxygen Delivery Method): room air      I&O's Summary      PHYSICAL EXAM:  General: No acute distress BMI-  HEENT: EOMI, PERRL  Neck: Supple, [ ] JVD  Lungs: Equal air entry bilaterally; [ ] rales [ ] wheezing [ ] rhonchi  Heart: Regular rate and rhythm; [x ] murmur   2/6 [ x] systolic [ ] diastolic [ ] radiation[ ] rubs [ ]  gallops  Abdomen: Nontender, bowel sounds present  Extremities: No clubbing, cyanosis, [ ] edema [ ]Pulses  equal and intact  Nervous system:  Alert & Oriented X3, no focal deficits  Psychiatric: Normal affect  Skin: No rashes or lesions    LABS:  11-19    138  |  106  |  15  ----------------------------<  86  4.2   |  23  |  1.12    Ca    8.6      19 Nov 2023 06:48  Phos  2.7     11-19  Mg     2.00     11-19      Creatinine Trend: 1.12<--, 1.08<--, 1.31<--                        12.7   6.78  )-----------( 99       ( 19 Nov 2023 06:48 )             40.2

## 2023-11-20 NOTE — DISCHARGE NOTE PROVIDER - NSDCCPCAREPLAN_GEN_ALL_CORE_FT
PRINCIPAL DISCHARGE DIAGNOSIS  Diagnosis: Syncope  Assessment and Plan of Treatment: With dizziness, now resolved   Your pacemaker interrogation revealed no events.    Your CAT scan head with no acute hemorrhage.   You were seen by the cardiologist, your  echocardiogram (ultrasound of heart) revealed normal left ventriuclar systolic function with ejection fraction of 66%, recommend outpatient followup with your PCP and routine with cardiology      SECONDARY DISCHARGE DIAGNOSES  Diagnosis: CJ (acute kidney injury)  Assessment and Plan of Treatment: Noted with elevated kidney function now improved, encourage oral intake, monitor BMP with your doctor within 1 week    Diagnosis: Abdominal tenderness  Assessment and Plan of Treatment: Came with abdominal tenderness and diarrhea, now resolved.  Your GI PCR found to have bacteria called E.coli.   Your abdominal ultrasound showed Cholelithiasis, no acute cholecystitis.  As per infectious disease doctor- no need for antibiotics.    Diagnosis: Hypertension  Assessment and Plan of Treatment: Stable Continue blood pressure medication regimen as directed. Follow a low salt/cholesterol diet. Monitor for any visual changes, headaches or dizziness.  Monitor blood pressure regularly.  Follow up with your primary care provider for further management for high blood pressure.  Monitor kidney function closely while taking torsemide    Diagnosis: Atrial fibrillation  Assessment and Plan of Treatment: stable, continue Xarelto,  Metoprolol.  Monitor your blood counts regularly with your doctor (platelets, hemoglobin)

## 2023-11-20 NOTE — DISCHARGE NOTE PROVIDER - ATTENDING DISCHARGE PHYSICAL EXAMINATION:
- Review of records, telemetry, vital signs and daily labs.   - General and cardiovascular physical examination.  - Generation of cardiovascular treatment plan.  - Coordination of care.      Patient was seen and examined by me on 11/21/2023,interim events noted,labs and radiology studies reviewed.  Leonardo Olmedo MD,FACC.  [address] [place], [place] [id number].  [phone]

## 2023-11-20 NOTE — DISCHARGE NOTE PROVIDER - NSDCMRMEDTOKEN_GEN_ALL_CORE_FT
allopurinol 100 mg oral tablet: 1 tab(s) orally once a day  atorvastatin 40 mg oral tablet: 1 tab(s) orally once a day (at bedtime)  hydrALAZINE 25 mg oral tablet: 1 tab(s) orally every 8 hours  metoprolol succinate 25 mg oral tablet, extended release: 1 tab(s) orally once a day  rivaroxaban 20 mg oral tablet: 1 tab(s) orally once a day (before a meal)  torsemide 10 mg oral tablet: 1 tab(s) orally once a day   allopurinol 100 mg oral tablet: 1 tab(s) orally once a day  atorvastatin 40 mg oral tablet: 1 tab(s) orally once a day (at bedtime)  hydrALAZINE 25 mg oral tablet: 1 tab(s) orally every 8 hours  metoprolol succinate 25 mg oral tablet, extended release: 1 tab(s) orally once a day  rivaroxaban 20 mg oral tablet: 1 tab(s) orally once a day (before a meal)  torsemide 10 mg oral tablet: 1 tab(s) orally once a day monitor kidney function

## 2023-11-20 NOTE — DISCHARGE NOTE PROVIDER - CARE PROVIDER_API CALL
GOPAL ESTES  [address] [place] [address]  Phone: [phone]  Fax: [phone]  Follow Up Time: 1 week    Leonardo Olmedo  Cardiology  [address] [place] [address]  Phone: [phone]  Fax: [phone]  Follow Up Time:

## 2023-11-20 NOTE — DISCHARGE NOTE PROVIDER - NSFOLLOWUPCLINICS_GEN_ALL_ED_FT
[facility]  Gastroenterology  [address] [place] [address]  Phone: [phone]  Fax:

## 2023-11-20 NOTE — PROGRESS NOTE ADULT - ASSESSMENT
83 year old with A fib and prior TAVR  Presented with diarrhea and CJ  GI pcr with ETEC and EPEC    Diarrhea has improved.  ETEC and EPEC are often self limited.  If diarrhea has improved, no indication for antibiotics  Isolation is not needed    CJ improved  Low platelets, but also had low platelets after tavr in 2021.  Follow up with PMD    Call ID  service with additional questions
83M with PMH of HTN, AFib (on Xarelto), CVA (2022), CAD s/p stent, Aortic stenosis s/p TAVR, Medtronic PPM (2021), and Gout presenting after a syncopal episode, admitted for further work-up and management      Problem/Plan - 1:  ·  Problem: Syncope.   ·  Plan: Pt presents with syncopal episode accompanied by dizziness and unresponsiveness. Pt denies tongue laceration nor urinary incontinence though daughter reports with a bit more confusion than baseline. Likely exacerbated by dehydration and fluid losses iso diarrhea and poor appetite lately  - EKG with Afib rhythm, no St elevations or depressions, QTc 426  - PPM interrogated without acute events  - some continued confusion similar to prior acute CVA episode per daughter, f/u CTH  - f/u troponin  - f/u TSH  - f/u TTE  - f/u orthostatics.    Problem/Plan - 2:  ·  Problem: Abdominal tenderness.   ·  Plan: Pt with abdominal tenderness, likely due to viral gastroenteritis, no recent abx use  - cont to monitor  - f/u GI PCR  - f/u US abdomen for other etiologies.    Problem/Plan - 3:  ·  Problem: CJ (acute kidney injury).   ·  Plan: Cr 1.31 on admission, unknown baseline  - likely pre-renal due to poor appetite and fluid losses from diarrhea  - s/p fluid resuscitation in ED  - monitor Cr  - f/u UA, urine lytes.    Problem/Plan - 4:  ·  Problem: Atrial fibrillation.   ·  Plan: CHADSVASC score 6  - cont Xarelto  - cont Metoprolol.    Problem/Plan - 5:  ·  Problem: Hypertension.   ·  Plan: - cont Hydralazine  - cont Metoprolol  - will hold Torsemide in lieu of further fluid resuscitation.    Problem/Plan - 6:  ·  Problem: CVA (cerebrovascular accident).   ·  Plan: - cont Xarelto  - cont statin.    Problem/Plan - 7:  ·  Problem: CAD (coronary artery disease).   ·  Plan: - cont statin  - cont Xarelto.    Problem/Plan - 8:  ·  Problem: Gout.   ·  Plan: - cont allopurinol.    Problem/Plan - 9:  ·  Problem: Need for prophylactic measure.   ·  Plan: DVT ppx: Xarelto  Diet: DASH/TLC.  
83M with PMH of HTN, AFib (on Xarelto), CVA (2022), CAD s/p stent, Aortic stenosis s/p TAVR, Medtronic PPM (2021), and Gout presenting after a syncopal episode, admitted for further work-up and management      Problem/Plan - 1:  ·  Problem: Syncope.   ·  Plan: Pt presents with syncopal episode accompanied by dizziness and unresponsiveness. Pt denies tongue laceration nor urinary incontinence though daughter reports with a bit more confusion than baseline. Likely exacerbated by dehydration and fluid losses iso diarrhea and poor appetite lately  - EKG with Afib rhythm, no St elevations or depressions, QTc 426  - PPM interrogated without acute events   - orthostatics neg, troponin 20, TSH wnl  - TTE- EF 66%, normal LV systolic function, mild LVH, increased LV mass and concentric hypertrophy, mild MR,trace intravalvular regurgitation, mildly dilated LA/RA, FU CARDS --  - CTH -Age-appropriate involutional and ischemic gliotic changes, no hemorrhage     Problem/Plan - 2:  ·  Problem: Abdominal tenderness.   Abdominal tenderness., Diarrhea   - Pt with abdominal tenderness, likely due to viral gastroenteritis, no recent abx use  - GI PCR  -Enteropathogenic E. coli (EPEC), Enterotoxigenic E.coli (ETEC)  - ID Curbsided  no Abx, supportive care  - US abdomen -Cholelithiasis.  No e/o acute cholecystitis or dilated bile ducts, no hydronephrosis or splenomegaly  - diarrhea improving     Problem/Plan - 3:  ·  Problem: CJ (acute kidney injury).   ·  Plan: Cr 1.31 on admission, unknown baseline  - likely pre-renal due to poor appetite and fluid losses from diarrhea  -   Problem/Plan - 4:  ·  Problem: Atrial fibrillation.   ·  Plan: CHADSVASC score 6  - cont Xarelto  - cont Metoprolol.    Problem/Plan - 5:  ·  Problem: Hypertension.   ·  Plan: - cont Hydralazine  - cont Metoprolol  - will hold Torsemide in lieu of further fluid resuscitation.    Problem/Plan - 6:  ·  Problem: CVA (cerebrovascular accident).   ·  Plan: - cont Xarelto  - cont statin.    Problem/Plan - 7:  ·  Problem: CAD (coronary artery disease).   ·  Plan: - cont statin  - cont Xarelto.    Problem/Plan - 8:  ·  Problem: Gout.   ·  Plan: - cont allopurinol.    Problem/Plan - 9:  ·  Problem: Need for prophylactic measure.   ·  Plan: DVT ppx: Xarelto  Diet: DASH/TLC.  
83M with PMH of HTN, AFib (on Xarelto), CVA (2022), CAD s/p stent, Aortic stenosis s/p TAVR, Medtronic PPM (2021), and Gout presenting after a syncopal episode, admitted for further work-up and management      Problem/Plan - 1:  ·  Problem: Syncope.   ·  Plan: Pt presents with syncopal episode accompanied by dizziness and unresponsiveness. Pt denies tongue laceration nor urinary incontinence though daughter reports with a bit more confusion than baseline. Likely exacerbated by dehydration and fluid losses iso diarrhea and poor appetite lately  - EKG with Afib rhythm, no St elevations or depressions, QTc 426  - PPM interrogated without acute events   - orthostatics neg, troponin 20, TSH wnl  - TTE- EF 66%, normal LV systolic function, mild LVH, increased LV mass and concentric hypertrophy, mild MR,trace intravalvular regurgitation, mildly dilated LA/RA, FU CARDS --  - CTH -Age-appropriate involutional and ischemic gliotic changes, no hemorrhage     Problem/Plan - 2:  ·  Problem: Abdominal tenderness.   ·  Plan: Pt with abdominal tenderness, likely due to viral gastroenteritis, no recent abx use  - cont to monitor  - f/u GI PCR  - f/u US abdomen for other etiologies.    Problem/Plan - 3:  ·  Problem: CJ (acute kidney injury).   ·  Plan: Cr 1.31 on admission, unknown baseline  - likely pre-renal due to poor appetite and fluid losses from diarrhea  - s/p fluid resuscitation in ED  - monitor Cr  - f/u UA, urine lytes.    Problem/Plan - 4:  ·  Problem: Atrial fibrillation.   ·  Plan: CHADSVASC score 6  - cont Xarelto  - cont Metoprolol.    Problem/Plan - 5:  ·  Problem: Hypertension.   ·  Plan: - cont Hydralazine  - cont Metoprolol  - will hold Torsemide in lieu of further fluid resuscitation.    Problem/Plan - 6:  ·  Problem: CVA (cerebrovascular accident).   ·  Plan: - cont Xarelto  - cont statin.    Problem/Plan - 7:  ·  Problem: CAD (coronary artery disease).   ·  Plan: - cont statin  - cont Xarelto.    Problem/Plan - 8:  ·  Problem: Gout.   ·  Plan: - cont allopurinol.    Problem/Plan - 9:  ·  Problem: Need for prophylactic measure.   ·  Plan: DVT ppx: Xarelto  Diet: DASH/TLC.  
83M with PMH of HTN, AFib (on Xarelto), CVA (2022), CAD s/p stent, Aortic stenosis s/p TAVR, Medtronic PPM (2021), and Gout presenting after a syncopal episode, admitted for further work-up and management      Problem/Plan - 1:  ·  Problem: Syncope.   ·  Plan: Pt presents with syncopal episode accompanied by dizziness and unresponsiveness. Pt denies tongue laceration nor urinary incontinence though daughter reports with a bit more confusion than baseline. Likely exacerbated by dehydration and fluid losses iso diarrhea and poor appetite lately  - EKG with Afib rhythm, no St elevations or depressions, QTc 426  - PPM interrogated without acute events   - orthostatics neg, troponin 20, TSH wnl  - TTE- EF 66%, normal LV systolic function, mild LVH, increased LV mass and concentric hypertrophy, mild MR,trace intravalvular regurgitation, mildly dilated LA/RA, FU CARDS --  - CTH -Age-appropriate involutional and ischemic gliotic changes, no hemorrhage     Problem/Plan - 2:  ·  Problem: Abdominal tenderness.   Abdominal tenderness., Diarrhea   - Pt with abdominal tenderness, likely due to viral gastroenteritis, no recent abx use  - GI PCR  -Enteropathogenic E. coli (EPEC), Enterotoxigenic E.coli (ETEC)  - ID Curbsided  no Abx, supportive care  - US abdomen -Cholelithiasis.  No e/o acute cholecystitis or dilated bile ducts, no hydronephrosis or splenomegaly  - diarrhea improving     Problem/Plan - 3:  ·  Problem: CJ (acute kidney injury).   ·  Plan: Cr 1.31 on admission, unknown baseline  - likely pre-renal due to poor appetite and fluid losses from diarrhea  - s/p fluid resuscitation in ED  - monitor Cr  - f/u UA, urine lytes.    Problem/Plan - 4:  ·  Problem: Atrial fibrillation.   ·  Plan: CHADSVASC score 6  - cont Xarelto  - cont Metoprolol.    Problem/Plan - 5:  ·  Problem: Hypertension.   ·  Plan: - cont Hydralazine  - cont Metoprolol  - will hold Torsemide in lieu of further fluid resuscitation.    Problem/Plan - 6:  ·  Problem: CVA (cerebrovascular accident).   ·  Plan: - cont Xarelto  - cont statin.    Problem/Plan - 7:  ·  Problem: CAD (coronary artery disease).   ·  Plan: - cont statin  - cont Xarelto.    Problem/Plan - 8:  ·  Problem: Gout.   ·  Plan: - cont allopurinol.    Problem/Plan - 9:  ·  Problem: Need for prophylactic measure.   ·  Plan: DVT ppx: Xarelto  Diet: DASH/TLC.  
83M with PMH of HTN, AFib (on Xarelto), CVA (2022), CAD s/p stent, Aortic stenosis s/p TAVR, Medtronic PPM (2021), and Gout presenting after a syncopal episode, admitted for further work-up and management      Problem/Plan - 1:  ·  Problem: Syncope.   ·  Plan: Pt presents with syncopal episode accompanied by dizziness and unresponsiveness. Pt denies tongue laceration nor urinary incontinence though daughter reports with a bit more confusion than baseline. Likely exacerbated by dehydration and fluid losses iso diarrhea and poor appetite lately  - EKG with Afib rhythm, no St elevations or depressions, QTc 426  - PPM interrogated without acute events  - some continued confusion similar to prior acute CVA episode per daughter, f/u CTH  - f/u troponin  - f/u TSH  - f/u TTE  - f/u orthostatics.    Problem/Plan - 2:  ·  Problem: Abdominal tenderness.   ·  Plan: Pt with abdominal tenderness, likely due to viral gastroenteritis, no recent abx use  - cont to monitor  - f/u GI PCR  - f/u US abdomen for other etiologies.    Problem/Plan - 3:  ·  Problem: CJ (acute kidney injury).   ·  Plan: Cr 1.31 on admission, unknown baseline  - likely pre-renal due to poor appetite and fluid losses from diarrhea  - s/p fluid resuscitation in ED  - monitor Cr  - f/u UA, urine lytes.    Problem/Plan - 4:  ·  Problem: Atrial fibrillation.   ·  Plan: CHADSVASC score 6  - cont Xarelto  - cont Metoprolol.    Problem/Plan - 5:  ·  Problem: Hypertension.   ·  Plan: - cont Hydralazine  - cont Metoprolol  - will hold Torsemide in lieu of further fluid resuscitation.    Problem/Plan - 6:  ·  Problem: CVA (cerebrovascular accident).   ·  Plan: - cont Xarelto  - cont statin.    Problem/Plan - 7:  ·  Problem: CAD (coronary artery disease).   ·  Plan: - cont statin  - cont Xarelto.    Problem/Plan - 8:  ·  Problem: Gout.   ·  Plan: - cont allopurinol.    Problem/Plan - 9:  ·  Problem: Need for prophylactic measure.   ·  Plan: DVT ppx: Xarelto  Diet: DASH/TLC.

## 2023-11-20 NOTE — DISCHARGE NOTE PROVIDER - NSDCACTIVITY_GEN_ALL_CORE
Bathing allowed/Do not drive or operate machinery/Showering allowed/Walking - Indoors allowed/No heavy lifting/straining/Walking - Outdoors allowed

## 2023-11-20 NOTE — PROGRESS NOTE ADULT - TIME BILLING
- Review of records, telemetry, vital signs and daily labs.   - General and cardiovascular physical examination.  - Generation of cardiovascular treatment plan.  - Coordination of care.      Patient was seen and examined by me on 11/18/23,interim events noted,labs and radiology studies reviewed.  Leonardo Olmedo MD,FACC.  [address] [place], [place] [id number].  [phone]
- Review of records, telemetry, vital signs and daily labs.   - General and cardiovascular physical examination.  - Generation of cardiovascular treatment plan.  - Coordination of care.      Patient was seen and examined by me on 11/19/23,interim events noted,labs and radiology studies reviewed.  Leonardo Olmedo MD,FACC.  [address] [place], [place] [id number].  [phone]
- Review of records, telemetry, vital signs and daily labs.   - General and cardiovascular physical examination.  - Generation of cardiovascular treatment plan.  - Coordination of care.      Patient was seen and examined by me on 11/20/23,interim events noted,labs and radiology studies reviewed.  Leonardo Olmedo MD,FACC.  [address] [place], [place] [id number].  [phone]

## 2023-11-21 ENCOUNTER — TRANSCRIPTION ENCOUNTER (OUTPATIENT)
Age: 83
End: 2023-11-21

## 2023-11-21 VITALS
SYSTOLIC BLOOD PRESSURE: 153 MMHG | RESPIRATION RATE: 18 BRPM | DIASTOLIC BLOOD PRESSURE: 86 MMHG | TEMPERATURE: 98 F | HEART RATE: 78 BPM | OXYGEN SATURATION: 100 %

## 2023-11-21 LAB
ANION GAP SERPL CALC-SCNC: 12 MMOL/L — SIGNIFICANT CHANGE UP (ref 7–14)
ANION GAP SERPL CALC-SCNC: 12 MMOL/L — SIGNIFICANT CHANGE UP (ref 7–14)
BUN SERPL-MCNC: 18 MG/DL — SIGNIFICANT CHANGE UP (ref 7–23)
BUN SERPL-MCNC: 18 MG/DL — SIGNIFICANT CHANGE UP (ref 7–23)
CALCIUM SERPL-MCNC: 8.7 MG/DL — SIGNIFICANT CHANGE UP (ref 8.4–10.5)
CALCIUM SERPL-MCNC: 8.7 MG/DL — SIGNIFICANT CHANGE UP (ref 8.4–10.5)
CHLORIDE SERPL-SCNC: 105 MMOL/L — SIGNIFICANT CHANGE UP (ref 98–107)
CHLORIDE SERPL-SCNC: 105 MMOL/L — SIGNIFICANT CHANGE UP (ref 98–107)
CO2 SERPL-SCNC: 21 MMOL/L — LOW (ref 22–31)
CO2 SERPL-SCNC: 21 MMOL/L — LOW (ref 22–31)
CREAT SERPL-MCNC: 1.23 MG/DL — SIGNIFICANT CHANGE UP (ref 0.5–1.3)
CREAT SERPL-MCNC: 1.23 MG/DL — SIGNIFICANT CHANGE UP (ref 0.5–1.3)
EGFR: 59 ML/MIN/1.73M2 — LOW
EGFR: 59 ML/MIN/1.73M2 — LOW
GLUCOSE SERPL-MCNC: 76 MG/DL — SIGNIFICANT CHANGE UP (ref 70–99)
GLUCOSE SERPL-MCNC: 76 MG/DL — SIGNIFICANT CHANGE UP (ref 70–99)
HCT VFR BLD CALC: 40.4 % — SIGNIFICANT CHANGE UP (ref 39–50)
HCT VFR BLD CALC: 40.4 % — SIGNIFICANT CHANGE UP (ref 39–50)
HGB BLD-MCNC: 13.1 G/DL — SIGNIFICANT CHANGE UP (ref 13–17)
HGB BLD-MCNC: 13.1 G/DL — SIGNIFICANT CHANGE UP (ref 13–17)
MAGNESIUM SERPL-MCNC: 2.1 MG/DL — SIGNIFICANT CHANGE UP (ref 1.6–2.6)
MAGNESIUM SERPL-MCNC: 2.1 MG/DL — SIGNIFICANT CHANGE UP (ref 1.6–2.6)
MCHC RBC-ENTMCNC: 23.1 PG — LOW (ref 27–34)
MCHC RBC-ENTMCNC: 23.1 PG — LOW (ref 27–34)
MCHC RBC-ENTMCNC: 32.4 GM/DL — SIGNIFICANT CHANGE UP (ref 32–36)
MCHC RBC-ENTMCNC: 32.4 GM/DL — SIGNIFICANT CHANGE UP (ref 32–36)
MCV RBC AUTO: 71.4 FL — LOW (ref 80–100)
MCV RBC AUTO: 71.4 FL — LOW (ref 80–100)
NRBC # BLD: 0 /100 WBCS — SIGNIFICANT CHANGE UP (ref 0–0)
NRBC # BLD: 0 /100 WBCS — SIGNIFICANT CHANGE UP (ref 0–0)
NRBC # FLD: 0 K/UL — SIGNIFICANT CHANGE UP (ref 0–0)
NRBC # FLD: 0 K/UL — SIGNIFICANT CHANGE UP (ref 0–0)
PHOSPHATE SERPL-MCNC: 3.4 MG/DL — SIGNIFICANT CHANGE UP (ref 2.5–4.5)
PHOSPHATE SERPL-MCNC: 3.4 MG/DL — SIGNIFICANT CHANGE UP (ref 2.5–4.5)
PLATELET # BLD AUTO: 131 K/UL — LOW (ref 150–400)
PLATELET # BLD AUTO: 131 K/UL — LOW (ref 150–400)
POTASSIUM SERPL-MCNC: 4.3 MMOL/L — SIGNIFICANT CHANGE UP (ref 3.5–5.3)
POTASSIUM SERPL-MCNC: 4.3 MMOL/L — SIGNIFICANT CHANGE UP (ref 3.5–5.3)
POTASSIUM SERPL-SCNC: 4.3 MMOL/L — SIGNIFICANT CHANGE UP (ref 3.5–5.3)
POTASSIUM SERPL-SCNC: 4.3 MMOL/L — SIGNIFICANT CHANGE UP (ref 3.5–5.3)
RBC # BLD: 5.66 M/UL — SIGNIFICANT CHANGE UP (ref 4.2–5.8)
RBC # BLD: 5.66 M/UL — SIGNIFICANT CHANGE UP (ref 4.2–5.8)
RBC # FLD: 19.3 % — HIGH (ref 10.3–14.5)
RBC # FLD: 19.3 % — HIGH (ref 10.3–14.5)
SODIUM SERPL-SCNC: 138 MMOL/L — SIGNIFICANT CHANGE UP (ref 135–145)
SODIUM SERPL-SCNC: 138 MMOL/L — SIGNIFICANT CHANGE UP (ref 135–145)
WBC # BLD: 6.29 K/UL — SIGNIFICANT CHANGE UP (ref 3.8–10.5)
WBC # BLD: 6.29 K/UL — SIGNIFICANT CHANGE UP (ref 3.8–10.5)
WBC # FLD AUTO: 6.29 K/UL — SIGNIFICANT CHANGE UP (ref 3.8–10.5)
WBC # FLD AUTO: 6.29 K/UL — SIGNIFICANT CHANGE UP (ref 3.8–10.5)

## 2023-11-21 RX ADMIN — Medication 100 MILLIGRAM(S): at 13:03

## 2023-11-21 RX ADMIN — RIVAROXABAN 20 MILLIGRAM(S): KIT at 17:31

## 2023-11-21 RX ADMIN — Medication 25 MILLIGRAM(S): at 05:55

## 2023-11-21 RX ADMIN — Medication 25 MILLIGRAM(S): at 13:03

## 2023-11-21 NOTE — DISCHARGE NOTE NURSING/CASE MANAGEMENT/SOCIAL WORK - NSDCFUADDAPPT_GEN_ALL_CORE_FT
Repeat bloodwork within 1-2 weeks with your doctor (CBC, BMP)    Recommend outpatient Hepatology Evaluation to discuss Hepatitis C treatment options

## 2023-11-21 NOTE — CHART NOTE - NSCHARTNOTEFT_GEN_A_CORE
Vital Signs Last 24 Hrs  T(C): 36.9 (21 Nov 2023 13:50), Max: 37 (21 Nov 2023 13:46)  T(F): 98.5 (21 Nov 2023 13:50), Max: 98.6 (21 Nov 2023 13:46)  HR: 78 (21 Nov 2023 13:50) (65 - 78)  BP: 153/86 (21 Nov 2023 13:50) (115/72 - 153/86)  BP(mean): --  RR: 18 (21 Nov 2023 13:50) (18 - 18)  SpO2: 100% (21 Nov 2023 13:50) (97% - 100%)    Parameters below as of 21 Nov 2023 13:50  Patient On (Oxygen Delivery Method): room air                          13.1   6.29  )-----------( 131      ( 21 Nov 2023 06:00 )             40.4   11-21    138  |  105  |  18  ----------------------------<  76  4.3   |  21<L>  |  1.23    Ca    8.7      21 Nov 2023 06:00  Phos  3.4     11-21  Mg     2.10     11-21      Results and case was discussed with Dr. Olmedo and Dr. Robertson, patient is medically cleared and optimized for discharge home today.   All medications were reviewed with attending, and sent to mutually agreed upon pharmacy> resume all home medications including torsemide, repeat BMP within 1 week.  Discharged instructions discussed by writer with patient, patient's wife and patient's daughter, informed to followup with PCP next week for repeat bloodwork, all questions answered, states patient has all home meds and does not need refills.

## 2023-11-21 NOTE — DISCHARGE NOTE NURSING/CASE MANAGEMENT/SOCIAL WORK - PATIENT PORTAL LINK FT
You can access the FollowMyHealth Patient Portal offered by [facility] by registering at the following website: [email or web address]. By joining [employer]’s FollowMyHealth portal, you will also be able to view your health information using other applications (apps) compatible with our system.

## 2023-11-21 NOTE — PROVIDER CONTACT NOTE (OTHER) - SITUATION
11/21/23                            Monica Seay  [address]  [facility] [address]    To Whom It May Concern:    This is to certify Monica Seay was evaluated with Bailey Mercer PA-C on 11/21/23 and can return to regular work on 11/26/2023.     RESTRICTIONS: None            Electronically signed by:  Bailey Mercer PA-C  [facility]  [address]  [address]  [facility] [address]  Dept Phone: [phone]        Patient experienced rapid A.fib on telemetry monitor when ambulating to bathroom

## 2023-11-21 NOTE — DISCHARGE NOTE NURSING/CASE MANAGEMENT/SOCIAL WORK - NSDCPEPTSTROKESIGNS_GEN_ALL_CORE
Sudden numbness or weakness of the face, arm, or leg, especially on one side of the body. Confusion, trouble speaking or understanding. Trouble seeing in one or both eyes. Trouble walking, dizziness, loss of balance or coordination. Severe headache. Burow's Advancement Flap Text: The defect edges were debeveled with a #15 scalpel blade.  Given the location of the defect and the proximity to free margins a Burow's advancement flap was deemed most appropriate.  Using a sterile surgical marker, the appropriate advancement flap was drawn incorporating the defect and placing the expected incisions within the relaxed skin tension lines where possible.    The area thus outlined was incised deep to adipose tissue with a #15 scalpel blade.  The skin margins were undermined to an appropriate distance in all directions utilizing iris scissors.

## 2024-01-04 ENCOUNTER — APPOINTMENT (OUTPATIENT)
Dept: ELECTROPHYSIOLOGY | Facility: CLINIC | Age: 84
End: 2024-01-04
Payer: MEDICARE

## 2024-01-04 ENCOUNTER — NON-APPOINTMENT (OUTPATIENT)
Age: 84
End: 2024-01-04

## 2024-01-04 PROCEDURE — 93296 REM INTERROG EVL PM/IDS: CPT

## 2024-01-04 PROCEDURE — 93294 REM INTERROG EVL PM/LDLS PM: CPT

## 2024-01-11 NOTE — DISCHARGE NOTE PROVIDER - DISCHARGE DATE
16-Dec-2020 Type of Leave:  Continuous FMLA  Reason for Leave:  Surgery & recovery  Start date of leave:  2/2/24  How much time needed?:  6 weeks  Forms Due Date:  Not given  Was Fee and Turnaround info Given?:  Yes, 15-20 business day turn around time    Pt would like forms uploaded to [facility] [employer] upon completion.

## 2024-04-07 ENCOUNTER — RESULT CHARGE (OUTPATIENT)
Age: 84
End: 2024-04-07

## 2024-04-08 ENCOUNTER — NON-APPOINTMENT (OUTPATIENT)
Age: 84
End: 2024-04-08

## 2024-04-08 ENCOUNTER — APPOINTMENT (OUTPATIENT)
Dept: ELECTROPHYSIOLOGY | Facility: CLINIC | Age: 84
End: 2024-04-08
Payer: MEDICARE

## 2024-04-08 VITALS
WEIGHT: 174 LBS | SYSTOLIC BLOOD PRESSURE: 137 MMHG | OXYGEN SATURATION: 100 % | HEART RATE: 78 BPM | DIASTOLIC BLOOD PRESSURE: 83 MMHG | BODY MASS INDEX: 28.08 KG/M2

## 2024-04-08 PROCEDURE — 93279 PRGRMG DEV EVAL PM/LDLS PM: CPT

## 2024-04-08 PROCEDURE — 93000 ELECTROCARDIOGRAM COMPLETE: CPT | Mod: 59

## 2024-04-08 RX ORDER — METOPROLOL SUCCINATE 25 MG/1
25 TABLET, EXTENDED RELEASE ORAL DAILY
Qty: 30 | Refills: 0 | Status: DISCONTINUED | COMMUNITY
Start: 2021-12-08 | End: 2024-04-08

## 2024-04-08 RX ORDER — METOPROLOL SUCCINATE 100 MG/1
100 TABLET, EXTENDED RELEASE ORAL
Refills: 0 | Status: ACTIVE | COMMUNITY
Start: 2024-04-08

## 2024-04-30 NOTE — ED PROVIDER NOTE - OBJECTIVE STATEMENT
Patient/Caregiver provided printed discharge information.
81M PMHx CAD (1 stent,) DM2, HTN, HLD, AS, Diastolic HF, Afib (on Xarelto) and CVA (L hemiparesis) - recent TAVR w Dr. Tomas - DCed one day ago on a holter monitor. Pt presenting today with CC of one episode of dizziness. On holter monitor - arrythmia noted and pt was called to come back in. Pt feels back at baseline. No sob, orthopnea, chest pain, LE edema.

## 2024-07-05 ENCOUNTER — NON-APPOINTMENT (OUTPATIENT)
Age: 84
End: 2024-07-05

## 2024-07-05 ENCOUNTER — APPOINTMENT (OUTPATIENT)
Dept: ELECTROPHYSIOLOGY | Facility: CLINIC | Age: 84
End: 2024-07-05
Payer: MEDICARE

## 2024-07-05 PROCEDURE — 93296 REM INTERROG EVL PM/IDS: CPT

## 2024-07-05 PROCEDURE — 93294 REM INTERROG EVL PM/LDLS PM: CPT

## 2024-08-02 NOTE — ED PROVIDER NOTE - COVID-19 ORDERING FACILITY
ERIKA/Devon Communicate fall risk and risk factors to all staff, patient, and family/Provide visual cue: yellow wristband, yellow gown, etc/Reinforce activity limits and safety measures with patient and family/Call bell, personal items and telephone in reach/Instruct patient to call for assistance before getting out of bed/chair/stretcher/Non-slip footwear applied when patient is off stretcher/Millen to call system/Physically safe environment - no spills, clutter or unnecessary equipment/Purposeful Proactive Rounding/Room/bathroom lighting operational, light cord in reach

## 2024-08-13 NOTE — DISCHARGE NOTE ADULT - WEIGHT IN LBS
Action 3: Continue Plan: Well controlled w/ clobetasol\\nContinue moisturizing cream Detail Level: Zone Initiate Regimen: Clobetasol Detail Level: Detailed Plan: Pt states well controlled w/ moisturizing QAM\\nPt was previously given samples of dermeleve and cerave anti itch 184

## 2024-09-30 NOTE — ED ADULT TRIAGE NOTE - CCCP TRG CHIEF CMPLNT
Pt sts she has been having knots and muscle spasms for last 3 weeks. Has been seen by pcp and aden. Was placed on  muscle relaxers and nsaids. Sts nothing makes it better or worse.    woke up with weakness and cramping right arm and leg

## 2024-10-04 ENCOUNTER — APPOINTMENT (OUTPATIENT)
Dept: ELECTROPHYSIOLOGY | Facility: CLINIC | Age: 84
End: 2024-10-04

## 2024-10-04 PROCEDURE — 93294 REM INTERROG EVL PM/LDLS PM: CPT

## 2024-10-04 PROCEDURE — 93296 REM INTERROG EVL PM/IDS: CPT

## 2024-12-10 NOTE — H&P ADULT - PROBLEM/PLAN-1
Patient's labs are up to date with regards to lipids, LFT's unless there is a specific concern or change in diet/medication; not completely opposed just want to know more.  I agree that HbgA1c needs done.  Is there anything specifically of concern?  I would recommend scheduling a follow up appointment; we can obtain labs prior to if needed.   DISPLAY PLAN FREE TEXT

## 2025-01-07 ENCOUNTER — APPOINTMENT (OUTPATIENT)
Dept: ELECTROPHYSIOLOGY | Facility: CLINIC | Age: 85
End: 2025-01-07
Payer: MEDICARE

## 2025-01-07 ENCOUNTER — NON-APPOINTMENT (OUTPATIENT)
Age: 85
End: 2025-01-07

## 2025-01-07 PROCEDURE — 93296 REM INTERROG EVL PM/IDS: CPT

## 2025-01-07 PROCEDURE — 93294 REM INTERROG EVL PM/LDLS PM: CPT

## 2025-01-29 NOTE — ED PROVIDER NOTE - BREATH SOUNDS
MORGAN BUSH  70y, Male  Allergy: No Known Allergies      LOS  9d    CHIEF COMPLAINT: sob (27 Jan 2025 09:39)      INTERVAL EVENTS/HPI  - No acute events overnight  - T(F): , Max: 98.3 (01-29-25 @ 05:00)  - remains on bipap - net negative   - WBC Count: 21.73 (01-29-25 @ 05:01)  WBC Count: 20.14 (01-28-25 @ 05:00)     - Creatinine: 1.9 (01-29-25 @ 05:01)  Creatinine: 2.3 (01-28-25 @ 05:00)       ROS  General: Denies rigors, nightsweats  HEENT: Denies headache, rhinorrhea, sore throat, eye pain  CV: Denies CP, palpitations  PULM: Denies wheezing, hemoptysis  GI: Denies hematemesis, hematochezia, melena  : Denies discharge, hematuria  MSK: Denies arthralgias, myalgias  SKIN: Denies rash, lesions  NEURO: Denies paresthesias, weakness  PSYCH: Denies depression, anxiety    VITALS:  T(F): 97.7, Max: 98.3 (01-29-25 @ 05:00)  HR: 99  BP: 144/74  RR: 34Vital Signs Last 24 Hrs  T(C): 36.5 (29 Jan 2025 12:00), Max: 36.8 (29 Jan 2025 05:00)  T(F): 97.7 (29 Jan 2025 12:00), Max: 98.3 (29 Jan 2025 05:00)  HR: 99 (29 Jan 2025 14:00) (83 - 102)  BP: 144/74 (29 Jan 2025 14:00) (114/60 - 155/74)  BP(mean): 97 (29 Jan 2025 14:00) (75 - 106)  RR: 34 (29 Jan 2025 14:00) (24 - 36)  SpO2: 90% (29 Jan 2025 14:00) (90% - 98%)    Parameters below as of 29 Jan 2025 15:00  Patient On (Oxygen Delivery Method): nasal cannula, high flow        PHYSICAL EXAM:  Gen: NAD, resting in bed  HEENT: Normocephalic, atraumatic  Neck: supple, no lymphadenopathy  CV: Regular rate & regular rhythm  Lungs: decreased BS at bases, no fremitus  Abdomen: Soft, BS present  Ext: Warm, well perfused  Neuro: non focal, awake  Skin: no rash, no erythema  Lines: no phlebitis    FH: Non-contributory  Social Hx: Non-contributory    TESTS & MEASUREMENTS:                        10.5   21.73 )-----------( 441      ( 29 Jan 2025 05:01 )             30.7     01-29    141  |  98  |  85[HH]  ----------------------------<  200[H]  3.9   |  34[H]  |  1.9[H]    Ca    8.6      29 Jan 2025 05:01    TPro  5.1[L]  /  Alb  2.4[L]  /  TBili  0.5  /  DBili  x   /  AST  53[H]  /  ALT  55[H]  /  AlkPhos  419[H]  01-29      LIVER FUNCTIONS - ( 29 Jan 2025 05:01 )  Alb: 2.4 g/dL / Pro: 5.1 g/dL / ALK PHOS: 419 U/L / ALT: 55 U/L / AST: 53 U/L / GGT: x           Urinalysis Basic - ( 29 Jan 2025 05:01 )    Color: x / Appearance: x / SG: x / pH: x  Gluc: 200 mg/dL / Ketone: x  / Bili: x / Urobili: x   Blood: x / Protein: x / Nitrite: x   Leuk Esterase: x / RBC: x / WBC x   Sq Epi: x / Non Sq Epi: x / Bacteria: x        Culture - Blood (collected 01-27-25 @ 06:12)  Source: .Blood BLOOD  Preliminary Report (01-29-25 @ 14:02):    No growth at 48 Hours    Urinalysis with Rflx Culture (collected 01-20-25 @ 19:07)    Culture - Blood (collected 01-20-25 @ 15:38)  Source: .Blood BLOOD  Final Report (01-25-25 @ 23:07):    No growth at 5 days    Culture - Blood (collected 01-20-25 @ 15:38)  Source: .Blood BLOOD  Final Report (01-25-25 @ 23:07):    No growth at 5 days            INFECTIOUS DISEASES TESTING  Procalcitonin: 4.48 (01-27-25 @ 06:12)  MRSA PCR Result.: Negative (01-25-25 @ 07:28)  Procalcitonin: 29.80 (01-24-25 @ 11:30)  MRSA PCR Result.: Negative (01-24-25 @ 10:45)  Legionella Antigen, Urine: Negative (01-23-25 @ 12:10)  Rapid RVP Result: NotDetec (01-13-25 @ 13:24)      INFLAMMATORY MARKERS      RADIOLOGY & ADDITIONAL TESTS:  I have personally reviewed the last available Chest xray  CXR      CT      CARDIOLOGY TESTING  12 Lead ECG:   Ventricular Rate 109 BPM    QRS Duration 112 ms    Q-T Interval 314 ms    QTC Calculation(Bazett) 422 ms    R Axis 44 degrees    T Axis 27 degrees    Diagnosis Line Atrial fibrillation with rapid ventricular response  Abnormal ECG    Confirmed by TASH ESPARZA MD [id number]) on 1/27/2025 9:55:13 PM (01-26-25 @ 23:29)  12 Lead ECG:   Ventricular Rate 118 BPM    QRS Duration 110 ms    Q-T Interval 326 ms    QTC Calculation(Bazett) 456 ms    R Axis 58 degrees    T Axis 35 degrees    Diagnosis Line Atrial fibrillation with rapid ventricular response  Incomplete right bundle branchblock  Abnormal ECG  Confirmed by TASH ESPARZA MD ([id number]) on 1/22/2025 9:16:25 PM (01-22-25 @ 17:25)      MEDICATIONS  allopurinol 150 Oral daily  ascorbic acid 500 Oral daily  aspirin  chewable 81 Oral daily  atorvastatin 10 Oral at bedtime  benzocaine 20% Spray 1 Topical three times a day  buMETAnide Injectable 2 IV Push every 12 hours  cefepime   IVPB 2000 IV Intermittent every 12 hours  cetirizine 10 Oral daily  chlorhexidine 2% Cloths 1 Topical daily  dextrose 5%. 1000 IV Continuous <Continuous>  dextrose 5%. 1000 IV Continuous <Continuous>  dextrose 50% Injectable 25 IV Push once  dextrose 50% Injectable 12.5 IV Push once  dextrose 50% Injectable 25 IV Push once  glucagon  Injectable 1 IntraMuscular once  heparin  Infusion.  IV Continuous <Continuous>  influenza  Vaccine (HIGH DOSE) 0.5 IntraMuscular once  insulin glargine Injectable (LANTUS) 30 SubCutaneous two times a day  insulin lispro (ADMELOG) corrective regimen sliding scale  SubCutaneous three times a day before meals  insulin lispro Injectable (ADMELOG) 25 SubCutaneous three times a day before meals  melatonin 10 Oral at bedtime  metoprolol tartrate Injectable 5 IV Push every 6 hours  nystatin    Suspension 539575 Swish and Swallow three times a day  nystatin Powder 1 Topical every 8 hours  oseltamivir 30 Oral two times a day  pantoprazole  Injectable 40 IV Push daily  polyethylene glycol 3350 17 Oral every 12 hours  senna 2 Oral every 12 hours  sertraline 25 Oral daily      WEIGHT  Weight (kg): 132.9 (01-27-25 @ 11:20)  Creatinine: 1.9 mg/dL (01-29-25 @ 05:01)      ANTIBIOTICS:  cefepime   IVPB 2000 milliGRAM(s) IV Intermittent every 12 hours  nystatin    Suspension 530229 Unit(s) Swish and Swallow three times a day  oseltamivir 30 milliGRAM(s) Oral two times a day      All available historical records have been reviewed       RALES/WHEEZES

## 2025-04-08 ENCOUNTER — APPOINTMENT (OUTPATIENT)
Dept: ELECTROPHYSIOLOGY | Facility: CLINIC | Age: 85
End: 2025-04-08

## 2025-04-28 ENCOUNTER — NON-APPOINTMENT (OUTPATIENT)
Age: 85
End: 2025-04-28

## 2025-04-28 ENCOUNTER — APPOINTMENT (OUTPATIENT)
Dept: ELECTROPHYSIOLOGY | Facility: CLINIC | Age: 85
End: 2025-04-28

## 2025-04-28 ENCOUNTER — RESULT CHARGE (OUTPATIENT)
Age: 85
End: 2025-04-28

## 2025-04-28 VITALS
HEIGHT: 66 IN | DIASTOLIC BLOOD PRESSURE: 92 MMHG | OXYGEN SATURATION: 98 % | HEART RATE: 106 BPM | WEIGHT: 172 LBS | SYSTOLIC BLOOD PRESSURE: 146 MMHG | BODY MASS INDEX: 27.64 KG/M2

## 2025-04-28 PROCEDURE — 93279 PRGRMG DEV EVAL PM/LDLS PM: CPT

## 2025-04-28 PROCEDURE — 93000 ELECTROCARDIOGRAM COMPLETE: CPT | Mod: XU

## 2025-07-10 ENCOUNTER — APPOINTMENT (OUTPATIENT)
Dept: ELECTROPHYSIOLOGY | Facility: CLINIC | Age: 85
End: 2025-07-10

## 2025-07-10 PROCEDURE — 93296 REM INTERROG EVL PM/IDS: CPT

## 2025-07-10 PROCEDURE — 93294 REM INTERROG EVL PM/LDLS PM: CPT

## 2025-07-17 NOTE — PROGRESS NOTE ADULT - PROBLEM SELECTOR PLAN 3
[FreeTextEntry1] : Mr. Tineo is 73 years old male had telehealth consultation on 2/11/25. He presented with PSA on 10/21/24 of 4.30ng/ml; on 2/29/24 it was 10.90 ng/ml. MRI on 3/18/24 showed a prostate volume of 124cc and prostate measured 6.3cm x 5 cm x 7.6 cm. Fusion guided prostate biopsies on 11/4/24 revealed 2 out of 12 cores are positive, 30% tissue involvement Ranjan score 7 (3+4) GG2, a diagnosis of favorable prostatic adenocarcinoma. Mr. Tineo is here via telehealth to discuss radiation therapy  Referred by Dr. Ching.  PSA trend : ng/mL  10/21/2024: 4.30 ng/ml 2/29/2024: 10.90 ng/ml  2/11/25- CONSULT Today Mr. Tineo presents today for consideration of radiation therapy options to the prostate, referred by Dr. Ching. Overall, the patient states he feels well and denies any radiation therapy in the past. He notes baseline urine function with and nocturia x1-2 and taking Flomax bid. He experiences at times urinary frequency and urgency at times but denies dribbling, urinary retention, dysuria, hematuria, leakage or incontinence. He has well-formed bowel movements. He denies blood or mucous in the stool. He denies ever having a colonoscopy. He is receiving ADT therapy presently and experiencing night sweats and hot flashes. He is not currently sexually active and unable to maintain an erection. Patient has history of severe BPH treated successfully one year ago with Aquablation.  7/15/25; [place] the [place]; he is on ADT. Describes hot flashes, sweats and poor libido. Nocturia2-3x, denies urgency, urinary frequency, straining, dysuria.  he also describes admission to [facility] in the [place] several years ago diagnosed with CVA, also had a pacemaker, eventually removed. He denies any cardiac or neurologic complaints.   ____________________________________________________________________________________ MRI Prostate 3/18/24 - The prostate measures 6.3cm x 5 cm x 7.6 cm -Prostate volume is 124cc -Prostate capsule appears intact -The seminal vesicles appear normal -No enlarged lymph nodes are present Impression: -Severe benign prostatic hyperplasia. No MRI evidence of prostate neoplasm or fool of sig. restricted diffusion. Posterior peripheral zone post inflammatory changes,  HR better controlled   will continue to monitor  hold all AC at this point secondary to likely ongoing bleeding